# Patient Record
Sex: FEMALE | Race: AMERICAN INDIAN OR ALASKA NATIVE | Employment: OTHER | URBAN - METROPOLITAN AREA
[De-identification: names, ages, dates, MRNs, and addresses within clinical notes are randomized per-mention and may not be internally consistent; named-entity substitution may affect disease eponyms.]

---

## 2017-01-12 ENCOUNTER — TELEPHONE (OUTPATIENT)
Dept: GASTROENTEROLOGY | Facility: OUTPATIENT CENTER | Age: 57
End: 2017-01-12

## 2017-01-16 ENCOUNTER — TELEPHONE (OUTPATIENT)
Dept: GASTROENTEROLOGY | Facility: OUTPATIENT CENTER | Age: 57
End: 2017-01-16

## 2017-01-18 DIAGNOSIS — L20.84 INTRINSIC ECZEMA: Primary | ICD-10-CM

## 2017-01-18 RX ORDER — TRIAMCINOLONE ACETONIDE 1 MG/G
CREAM TOPICAL 2 TIMES DAILY
Qty: 15 G | Refills: 0 | Status: SHIPPED | OUTPATIENT
Start: 2017-01-18 | End: 2019-06-13

## 2017-01-18 NOTE — TELEPHONE ENCOUNTER
Triamcinolone 0.1% Cream      Last Written Prescription Date: 01/26/16  Last Fill Quantity: 15,  # refills: 0   Last Office Visit with [facility], [facility] or [facility] prescribing provider: 12/06/16    Thank You  Mahogany Richard CPhT, [facility] Technician  [phone]  [email or web address]

## 2017-02-23 DIAGNOSIS — F41.9 ANXIETY: ICD-10-CM

## 2017-02-23 RX ORDER — GABAPENTIN 600 MG/1
600 TABLET ORAL 3 TIMES DAILY
Qty: 90 TABLET | Refills: 0 | Status: SHIPPED | OUTPATIENT
Start: 2017-02-23 | End: 2017-03-24

## 2017-02-23 NOTE — TELEPHONE ENCOUNTER
"[facility] phone call message- patient requesting a refill:    Full Medication Name: patient did not have name,or know the name of her medication, she said it was her \"anxiety medication\". Would like medication refill sent to [facility] as she \"is busy tomorrow and we are closed Saturday and Sunday\". Patient states that her \"anxiety is starting to get high\".Forwarded message to triage.        Pharmacy confirmed as   [id number] - [place], [place] - [address]  [address]  [facility] [address]  Phone: [phone] Fax: [phone]    [facility] [place], [place] - [address]  [facility] [address]  Phone: [phone] Fax: [phone]    [facility] [address] [place] [address] AT [facility] & [address] [place] [address]  Phone: [phone] Fax: [phone]  : [facility]    Additional Comments: see above     OK to leave a message on voice mail? Yes    Primary language: English      needed? No    Call taken on February 23, 2017 at 2:38 PM by Angie German  "

## 2017-03-24 ENCOUNTER — TELEPHONE (OUTPATIENT)
Dept: FAMILY MEDICINE | Facility: CLINIC | Age: 57
End: 2017-03-24

## 2017-03-24 DIAGNOSIS — F41.9 ANXIETY: ICD-10-CM

## 2017-03-24 RX ORDER — GABAPENTIN 600 MG/1
600 TABLET ORAL 3 TIMES DAILY
Qty: 90 TABLET | Refills: 0 | Status: SHIPPED | OUTPATIENT
Start: 2017-03-24 | End: 2017-03-28

## 2017-03-24 NOTE — TELEPHONE ENCOUNTER
[facility] phone call message- medication clarification/question:    Full Medication Name: gabapentin (NEURONTIN) 600 MG tablet   Dose: Sig: Take 1 tablet (600 mg) by mouth 3 times daily    Question: Patient requesting nurse call to discuss reason prescription was a 30 day supply.  Please call to discuss.    Pharmacy confirmed as    [facility] [place], [place] - [address]: Yes    OK to leave a message on voice mail? Yes    Primary language: English      needed? No    Call taken on March 24, 2017 at 1:53 PM by Elena Lawrence

## 2017-03-24 NOTE — TELEPHONE ENCOUNTER
Gabapentin 600mg Tablets      Last Written Prescription Date: 02/23/17  Last Fill Quantity: 90,  # refills: 0   Last Office Visit with [facility], [facility] or [facility] prescribing provider: 12/06/16    Thank You  Mahogany Richard CPhT, [facility] Technician  [phone]  [email or web address]

## 2017-03-27 NOTE — TELEPHONE ENCOUNTER
"RN called patient and relayed message below. Verbalized understanding. Scheduled appointment today at 3 pm. Patient states she will \"try her best to come to the appointment. But people don't understanding what anxiety is.\" Unable to understanding patient. RN requesting patient to repeat as she was not able to understanding the patient. RN reinforced importance of doing her best to come to the appointment if she feels she is going through Gabapentin withdrawal as patient previously stated. Patient scheduled. Patient hung up the phone.    Mariel Blunt RN  "

## 2017-03-28 ENCOUNTER — DOCUMENTATION ONLY (OUTPATIENT)
Dept: FAMILY MEDICINE | Facility: CLINIC | Age: 57
End: 2017-03-28

## 2017-03-28 ENCOUNTER — OFFICE VISIT (OUTPATIENT)
Dept: FAMILY MEDICINE | Facility: CLINIC | Age: 57
End: 2017-03-28

## 2017-03-28 VITALS
RESPIRATION RATE: 16 BRPM | SYSTOLIC BLOOD PRESSURE: 118 MMHG | DIASTOLIC BLOOD PRESSURE: 86 MMHG | TEMPERATURE: 98.2 F | OXYGEN SATURATION: 100 % | BODY MASS INDEX: 31.93 KG/M2 | HEART RATE: 103 BPM | WEIGHT: 190.4 LBS

## 2017-03-28 DIAGNOSIS — F41.9 ANXIETY: ICD-10-CM

## 2017-03-28 RX ORDER — GABAPENTIN 600 MG/1
600 TABLET ORAL 3 TIMES DAILY
Qty: 90 TABLET | Refills: 3 | Status: SHIPPED | OUTPATIENT
Start: 2017-03-28 | End: 2021-04-19

## 2017-03-28 ASSESSMENT — ANXIETY QUESTIONNAIRES
7. FEELING AFRAID AS IF SOMETHING AWFUL MIGHT HAPPEN: NEARLY EVERY DAY
6. BECOMING EASILY ANNOYED OR IRRITABLE: MORE THAN HALF THE DAYS
IF YOU CHECKED OFF ANY PROBLEMS ON THIS QUESTIONNAIRE, HOW DIFFICULT HAVE THESE PROBLEMS MADE IT FOR YOU TO DO YOUR WORK, TAKE CARE OF THINGS AT HOME, OR GET ALONG WITH OTHER PEOPLE: SOMEWHAT DIFFICULT
2. NOT BEING ABLE TO STOP OR CONTROL WORRYING: NEARLY EVERY DAY
3. WORRYING TOO MUCH ABOUT DIFFERENT THINGS: NEARLY EVERY DAY
5. BEING SO RESTLESS THAT IT IS HARD TO SIT STILL: NOT AT ALL
1. FEELING NERVOUS, ANXIOUS, OR ON EDGE: NEARLY EVERY DAY
GAD7 TOTAL SCORE: 15

## 2017-03-28 ASSESSMENT — ENCOUNTER SYMPTOMS
RESPIRATORY NEGATIVE: 1
DYSPHORIC MOOD: 1
CONSTITUTIONAL NEGATIVE: 1
CARDIOVASCULAR NEGATIVE: 1
NERVOUS/ANXIOUS: 1

## 2017-03-28 ASSESSMENT — PATIENT HEALTH QUESTIONNAIRE - PHQ9: 5. POOR APPETITE OR OVEREATING: SEVERAL DAYS

## 2017-03-28 NOTE — PROGRESS NOTES
"      HPI:       Amy Choudhury is a 56 year old who presents for the following  Patient presents with:  Medication Request: discuss medication      Patient reports things as \"not going well\".  She feels like things have went \"backwards\".     Has not had any Gabapentin since last Thursday - feels like she is in withdrawal.  Worsening anxiety, difficulty with breathing.      Patient comments that she is a person that is in charge and takes care of everything and anxiety really affects this.     Not currently working.  Feels as though she won't be able to work while being on Gabapentin. Feels like she isn't able to interview well due to \"rambling\" and \"changing of subjects\".     Worsening of anxiety since death of daughter ( from medial complications).      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     No regular counseling.  Previously saw Dr. Arriaga.      Walked to appointment today.  Difficulty with getting out of her home, frequent no shows.     Problem, Medication and Allergy Lists were reviewed and are current.  Patient is an established patient of this clinic.         Review of Systems:   Review of Systems   Constitutional: Negative.    Respiratory: Negative.    Cardiovascular: Negative.    Hematological:        See HPI   Psychiatric/Behavioral: Positive for dysphoric mood. The patient is nervous/anxious.              Physical Exam:   Patient Vitals for the past 24 hrs:   BP Temp Temp src Pulse Resp SpO2 Weight   17 1330 118/86 98.2  F (36.8  C) Oral 103 16 100 % 190 lb 6.4 oz (86.4 kg)     Body mass index is 31.93 kg/(m^2).  Vitals were reviewed and were normal     Physical Exam   Constitutional: She is oriented to person, place, and time. She appears well-nourished. No distress.   Neurological: She is alert and oriented to person, place, and time.   Psychiatric: Thought content normal. Her mood appears anxious. Her speech is rapid and/or " pressured. Cognition and memory are normal.   Patient is tearful during interaction       Assessment and Plan     Amy was seen today for medication request.    Diagnoses and all orders for this visit:    Anxiety  -     gabapentin (NEURONTIN) 600 MG tablet; Take 1 tablet (600 mg) by mouth 3 times daily  -     Encouraged return to regular psychotherapy.    -     Dr. Arriaga met with patient.   -     Follow-up in 1 month for recheck of medication, sooner as needed.       Options for treatment and follow-up care were reviewed with the patient. Amy Choudhury  engaged in the decision making process and verbalized understanding of the options discussed and agreed with the final plan.    Huma Ray, JAVIER CNP

## 2017-03-28 NOTE — PROGRESS NOTES
Met with patient today regarding no shows. Patient states that she will call   If she is not able to make it .  Contract signed and scanned. Copy given to Dr. Castellanos.

## 2017-03-28 NOTE — MR AVS SNAPSHOT
After Visit Summary   3/28/2017    Amy Choudhury    MRN: [id number]           Patient Information     Date Of Birth          1960        Visit Information        Provider Department      3/28/2017 1:20 PM Huma Ray APRN CNP [facility]        Today's Diagnoses     Anxiety           Follow-ups after your visit        Your next 10 appointments already scheduled     2017  9:00 AM CDT   Return Visit with Marta Arriaga PhD   [facility] ([facility])    [address],  [address]  [place] [address]   [phone]              Who to contact     Please call your clinic at [phone] to:    Ask questions about your health    Make or cancel appointments    Discuss your medicines    Learn about your test results    Speak to your doctor   If you have compliments or concerns about an experience at your clinic, or if you wish to file a complaint, please contact [facility] Patient Relations at [phone] or email us at [email or web address]         Additional Information About Your Visit        MyChart Information     [employer] is an electronic gateway that provides easy, online access to your medical records. With [employer], you can request a clinic appointment, read your test results, renew a prescription or communicate with your care team.     To sign up for [employer] visit the website at [email or web address]   You will be asked to enter the access code listed below, as well as some personal information. Please follow the directions to create your username and password.     Your access code is: [email or web address]  Expires: 2017  4:52 PM     Your access code will  in 90 days. If you need help or a new code, please contact your [facility] or call [phone] for assistance.        Care EveryWhere ID     This is your Care EveryWhere ID. This could be  used by other organizations to access your [facility] medical records  [id number]        Your Vitals Were     Pulse Temperature Respirations Pulse Oximetry BMI (Body Mass Index)       103 98.2  F (36.8  C) (Oral) 16 100% 31.93 kg/m2        Blood Pressure from Last 3 Encounters:   03/28/17 118/86   12/06/16 122/84   12/02/16 137/80    Weight from Last 3 Encounters:   03/28/17 190 lb 6.4 oz (86.4 kg)   12/06/16 205 lb (93 kg)   12/02/16 206 lb 6.4 oz (93.6 kg)              Today, you had the following     No orders found for display         Where to get your medicines      These medications were sent to [facility] [place], [place] - [address] [facility] [address]     Phone:  [phone]     gabapentin 600 MG tablet          Primary Care Provider Office Phone # Fax #    Randal Castellanos [phone]       [facility] [address] [address]        Thank you!     Thank you for choosing hospitals [facility]  for your care. Our goal is always to provide you with excellent care. Hearing back from our patients is one way we can continue to improve our services. Please take a few minutes to complete the written survey that you may receive in the mail after your visit with us. Thank you!             Your Updated Medication List - Protect others around you: Learn how to safely use, store and throw away your medicines at www.disposemymeds.org.          This list is accurate as of: 3/28/17  4:52 PM.  Always use your most recent med list.                   Brand Name Dispense Instructions for use    acetaminophen 500 MG tablet    TYLENOL    100 tablet    Take 2 tablets (1,000 mg) by mouth every 8 hours as needed for mild pain       calcium carb 1250 mg (500 mg [place])/vitamin D 200 units 500-200 MG-UNIT per tablet    OSCAL with D    90 tablet    Take 1 tablet by mouth 2 times daily (with meals)       calcium carbonate 500 MG tablet     OS-CRISTIAN 500 mg [place]. Ca    90 tablet    Take 1 tablet (500 mg) by mouth 3 times daily       cholecalciferol 2000 UNITS tablet     90 tablet    Take 1 tablet by mouth daily       cyanocobalamin 1000 MCG/ML injection    VITAMIN B12    0.9 mL    Inject 1 mL (1,000 mcg) into the muscle every 30 days       gabapentin 600 MG tablet    NEURONTIN    90 tablet    Take 1 tablet (600 mg) by mouth 3 times daily       ibuprofen 800 MG tablet    ADVIL/MOTRIN    90 tablet    Take 1 tablet (800 mg) by mouth every 8 hours as needed for moderate pain       MULTIVITAMIN ADULT PO      Take 1 tablet by mouth daily       OMEGA-3 FISH OIL PO          permethrin 1 % Liqd     60 mL    Apply to clean, towel-dried hair, saturate hair and scalp, wash off after 10 min. May repeat in one week if needed.       PROTONIX PO      Take 40 mg by mouth daily as needed       triamcinolone 0.1 % cream    KENALOG    15 g    Apply topically 2 times daily       vitamin B complex with vitamin C Tabs tablet     100 tablet    Take 1 tablet by mouth daily

## 2017-03-28 NOTE — PROGRESS NOTES
"Primary Care Behavioral Health Consult Note    Meeting was: unscheduled  Others present: None  Meeting lasted: 40 minutes    Identifying Information and Presenting Problem:    ZAIDA Ray requested behavioral health consultation for this patient to check-in on functioning.  The patient is a 56 year old American female and agreed to be seen by behavioral health today.    Topics Discussed/Interventions Provided:       Amy presented today for a refill of gabapentin for anxiety. She continues to be concerned that she is \"addicted\" to the medication. Discussed difference between physiological dependence and addiction. She does not like how gabapentin impairs her attention and memory, and believes it negatively impacts her eye sight. Encouraged discussion with PCP or assessment with psychiatry if she is concerned about the medications unwanted effects. She declined at this point.    Amy continues to have a complicated and stressful home environment. Discussed difficulty of setting boundaries with 3 daughters who are now using drugs. Provided supportive listening and affirmation about healthy choices.      Assessment:     Mental Status: Amy Choudhury appeared generally alert and oriented. Dress was casual and appropriate to the weather and occasion. Grooming and hygiene were adequate. Eye contact was intermittent. Speech was of normal volume and rate and was clear, coherent, and relevant. Mood was anxious with restricted, tearful affect. Thought processes were tangential and rambling - consistent with her prior presentation. She responded adequately to redirection. Thought content was WNL with no evidence of psychotic or paranoid features. No evidence of SI/HI or self-harm, intent, or plans. Memory appeared grossly intact. Insight and judgment appeared adequate for safety and patient exhibited adequate impulse control during the appointment.    Does the patient appear to be at imminent risk of harm to self/others at " this time? No      PHQ-9 SCORE 5/11/2016 12/2/2016 3/28/2017   Total Score - - -   Total Score 18 14 18       KACY-7 SCORE 12/2/2016 12/9/2016 3/28/2017   Total Score - - -   Total Score 16 12 15       Diagnoses (PROVISIONAL):      Adjustment Disorder    Plan:      Recommended appointment with Behavioral health for continued counseling

## 2017-03-29 ASSESSMENT — PATIENT HEALTH QUESTIONNAIRE - PHQ9: SUM OF ALL RESPONSES TO PHQ QUESTIONS 1-9: 18

## 2017-03-29 ASSESSMENT — ANXIETY QUESTIONNAIRES: GAD7 TOTAL SCORE: 15

## 2017-03-29 NOTE — PROGRESS NOTES
Preceptor Attestation:  I have reviewed and agree with the behavioral health fellow's documentation for this visit.  I did not see the patient.  Supervising Clinical Psychologist:  Claudia Silverman PSYD LP

## 2017-04-04 ENCOUNTER — TELEPHONE (OUTPATIENT)
Dept: FAMILY MEDICINE | Facility: CLINIC | Age: 57
End: 2017-04-04

## 2017-04-04 NOTE — TELEPHONE ENCOUNTER
This provider placed an outreach call to the patient as she did not show for her scheduled appointment today 4/4/2017. Left a message requesting the patient to call the clinic to discuss missed appointment and plan for ongoing mental health therapy.    Marta Arriaga, PhD

## 2017-04-29 ENCOUNTER — HOSPITAL ENCOUNTER (EMERGENCY)
Facility: CLINIC | Age: 57
Discharge: HOME | End: 2017-04-30
Attending: EMERGENCY MEDICINE | Admitting: EMERGENCY MEDICINE
Payer: COMMERCIAL

## 2017-04-29 DIAGNOSIS — H69.92 DYSFUNCTION OF EUSTACHIAN TUBE, LEFT: ICD-10-CM

## 2017-04-29 PROCEDURE — 99283 EMERGENCY DEPT VISIT LOW MDM: CPT | Performed by: EMERGENCY MEDICINE

## 2017-04-29 PROCEDURE — 99284 EMERGENCY DEPT VISIT MOD MDM: CPT | Mod: Z6 | Performed by: EMERGENCY MEDICINE

## 2017-04-29 NOTE — ED AVS SNAPSHOT
[facility] [place], Emergency Department    [address]    [facility] [address]    Phone:  [phone]    Fax:  [phone]                                       Amy Choudhury   MRN: [id number]    Department:  [facility], Emergency Department   Date of Visit:  4/29/2017           After Visit Summary Signature Page     I have received my discharge instructions, and my questions have been answered. I have discussed any challenges I see with this plan with the nurse or doctor.    ..........................................................................................................................................  Patient/Patient Representative Signature      ..........................................................................................................................................  Patient Representative Print Name and Relationship to Patient    ..................................................               ................................................  Date                                            Time    ..........................................................................................................................................  Reviewed by Signature/Title    ...................................................              ..............................................  Date                                                            Time

## 2017-04-29 NOTE — ED AVS SNAPSHOT
[facility], Emergency Department    [address]    [place] [address]    Phone:  [phone]    Fax:  [phone]                                       Amy Chouduhry   MRN: [id number]    Department:  [facility], Emergency Department   Date of Visit:  4/29/2017           Patient Information     Date Of Birth          1960        Your diagnoses for this visit were:     Dysfunction of eustachian tube, left        You were seen by Adelita Zamudio MD.        Discharge Instructions       Use Sudafed for the next few days, and tylenol and/or ibuprofen as needed. You can also try Afrin nasal spray (over the counter). Follow up with your clinic doctor in 3-5 days if not improving, and return to the ER with any worsening or concerns.    Discharge References/Attachments     EAR, ANATOMY OF THE (ENGLISH)      24 Hour Appointment Hotline       To make an appointment at any [facility] clinic, call [phone] ([phone]). If you don't have a family doctor or clinic, we will help you find one. [facility] clinics are conveniently located to serve the needs of you and your family.             Review of your medicines      START taking        Dose / Directions Last dose taken    pseudoePHEDrine 30 MG tablet   Commonly known as:  SUDAFED   Dose:  60 mg   Quantity:  30 tablet        Take 2 tablets (60 mg) by mouth every 6 hours as needed for congestion   Refills:  0          CONTINUE these medicines which may have CHANGED, or have new prescriptions. If we are uncertain of the size of tablets/capsules you have at home, strength may be listed as something that might have changed.        Dose / Directions Last dose taken    * acetaminophen 500 MG tablet   Commonly known as:  TYLENOL   Dose:  1000 mg   What changed:  Another medication with the same name was added. Make sure you understand how and when to take each.   Quantity:  100 tablet        Take 2 tablets (1,000 mg) by mouth every 8 hours as needed  for mild pain   Refills:  0        * acetaminophen 500 MG tablet   Commonly known as:  TYLENOL   Dose:  1000 mg   What changed:  You were already taking a medication with the same name, and this prescription was added. Make sure you understand how and when to take each.   Quantity:  60 tablet        Take 2 tablets (1,000 mg) by mouth every 6 hours as needed for pain or fever   Refills:  0        * Notice:  This list has 2 medication(s) that are the same as other medications prescribed for you. Read the directions carefully, and ask your doctor or other care provider to review them with you.      Our records show that you are taking the medicines listed below. If these are incorrect, please call your family doctor or clinic.        Dose / Directions Last dose taken    calcium carb 1250 mg (500 mg [place])/vitamin D 200 units 500-200 MG-UNIT per tablet   Commonly known as:  OSCAL with D   Dose:  1 tablet   Quantity:  90 tablet        Take 1 tablet by mouth 2 times daily (with meals)   Refills:  3        calcium carbonate 500 MG tablet   Commonly known as:  OS-CRISTIAN 500 mg [place]. Ca   Dose:  500 mg   Quantity:  90 tablet        Take 1 tablet (500 mg) by mouth 3 times daily   Refills:  3        cholecalciferol 2000 UNITS tablet   Dose:  1 tablet   Quantity:  90 tablet        Take 1 tablet by mouth daily   Refills:  3        cyanocobalamin 1000 MCG/ML injection   Commonly known as:  VITAMIN B12   Dose:  1 mL   Quantity:  0.9 mL        Inject 1 mL (1,000 mcg) into the muscle every 30 days   Refills:  6        gabapentin 600 MG tablet   Commonly known as:  NEURONTIN   Dose:  600 mg   Quantity:  90 tablet        Take 1 tablet (600 mg) by mouth 3 times daily   Refills:  3        ibuprofen 800 MG tablet   Commonly known as:  ADVIL/MOTRIN   Dose:  800 mg   Quantity:  90 tablet        Take 1 tablet (800 mg) by mouth every 8 hours as needed for moderate pain   Refills:  1        MULTIVITAMIN ADULT PO   Dose:  1 tablet        Take 1  "tablet by mouth daily   Refills:  0        OMEGA-3 FISH OIL PO        Refills:  0        permethrin 1 % Liqd   Quantity:  60 mL        Apply to clean, towel-dried hair, saturate hair and scalp, wash off after 10 min. May repeat in one week if needed.   Refills:  1        PROTONIX PO   Dose:  40 mg        Take 40 mg by mouth daily as needed   Refills:  0        triamcinolone 0.1 % cream   Commonly known as:  KENALOG   Quantity:  15 g        Apply topically 2 times daily   Refills:  0        vitamin B complex with vitamin C Tabs tablet   Dose:  1 tablet   Quantity:  100 tablet        Take 1 tablet by mouth daily   Refills:  6                Prescriptions were sent or printed at these locations (2 Prescriptions)                   Other Prescriptions                Printed at Department/Unit printer (2 of 2)         pseudoePHEDrine (SUDAFED) 30 MG tablet               acetaminophen (TYLENOL) 500 MG tablet                Procedures and tests performed during your visit     Beta strep group A culture    Rapid strep screen      Orders Needing Specimen Collection     None      Pending Results     Date and Time Order Name Status Description    4/30/2017 0022 Beta strep group A culture In process             Pending Culture Results     Date and Time Order Name Status Description    4/30/2017 0022 Beta strep group A culture In process             Thank you for choosing [facility]       Thank you for choosing [facility] for your care. Our goal is always to provide you with excellent care. Hearing back from our patients is one way we can continue to improve our services. Please take a few minutes to complete the written survey that you may receive in the mail after you visit with us. Thank you!        [employer] Information     [employer] lets you send messages to your doctor, view your test results, renew your prescriptions, schedule appointments and more. To sign up, go to [email or web address] . Click on \"Log in\" on the left side of " "the screen, which will take you to the Welcome page. Then click on \"Sign up Now\" on the right side of the page.     You will be asked to enter the access code listed below, as well as some personal information. Please follow the directions to create your username and password.     Your access code is: [email or web address]  Expires: 2017  4:52 PM     Your access code will  in 90 days. If you need help or a new code, please call your [facility] clinic or [phone].        Care EveryWhere ID     This is your Care EveryWhere ID. This could be used by other organizations to access your [facility] medical records  [id number]        After Visit Summary       This is your record. Keep this with you and show to your community pharmacist(s) and doctor(s) at your next visit.                  "

## 2017-04-30 VITALS
SYSTOLIC BLOOD PRESSURE: 131 MMHG | DIASTOLIC BLOOD PRESSURE: 85 MMHG | TEMPERATURE: 98.4 F | OXYGEN SATURATION: 98 % | RESPIRATION RATE: 18 BRPM | HEART RATE: 90 BPM

## 2017-04-30 LAB
DEPRECATED S PYO AG THROAT QL EIA: NORMAL
MICRO REPORT STATUS: NORMAL
SPECIMEN SOURCE: NORMAL

## 2017-04-30 PROCEDURE — 87081 CULTURE SCREEN ONLY: CPT | Performed by: EMERGENCY MEDICINE

## 2017-04-30 PROCEDURE — 87880 STREP A ASSAY W/OPTIC: CPT | Performed by: EMERGENCY MEDICINE

## 2017-04-30 RX ORDER — PSEUDOEPHEDRINE HCL 30 MG
60 TABLET ORAL EVERY 6 HOURS PRN
Qty: 30 TABLET | Refills: 0 | Status: SHIPPED | OUTPATIENT
Start: 2017-04-30 | End: 2019-06-13

## 2017-04-30 RX ORDER — ACETAMINOPHEN 500 MG
1000 TABLET ORAL EVERY 6 HOURS PRN
Qty: 60 TABLET | Refills: 0 | Status: SHIPPED | OUTPATIENT
Start: 2017-04-30 | End: 2017-05-07

## 2017-04-30 ASSESSMENT — ENCOUNTER SYMPTOMS
COUGH: 1
RHINORRHEA: 1
HEADACHES: 0
MYALGIAS: 0
SORE THROAT: 1
FEVER: 0

## 2017-04-30 NOTE — DISCHARGE INSTRUCTIONS
Use Sudafed for the next few days, and tylenol and/or ibuprofen as needed. You can also try Afrin nasal spray (over the counter). Follow up with your clinic doctor in 3-5 days if not improving, and return to the ER with any worsening or concerns.

## 2017-04-30 NOTE — ED NOTES
Pt reports started with left ear ache day before yesterday and progressively down in her throat. Ibuprofen not helping her.

## 2017-04-30 NOTE — ED PROVIDER NOTES
History     Chief Complaint   Patient presents with     ear ache and swollen glands     Pt reports started with left ear ache day before yesterday and progressively down in her throat. Ibuprofen not helping her.      HPI  Amy Choudhury is a 57 year old female with a history of GERD and hyperparathyroidism who presents with left ear pain and left-sided sore throat. She complains of difficulty swallowing due to the pain -- notes that her throat/ear are mainly painful when she swallows. She denies fever, dental pain, myalgias or headache. She complains of congestion, runny nose and cough since last night as well. She has been taking ibuprofen with minimal relief. Reports recent sick contact with a neighbor who had strep.    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     Depressive disorder      Gastro-oesophageal reflux disease     bleeding ulcers     Hiatal hernia     NEWLY DIAGNOSISED     Hyperparathyroidism, unspecified (H) 3/29/2012     Peptic ulcer, unspecified site, unspecified as acute or chronic, without mention of hemorrhage or perforation      Pneumonia     NOVEMBER     PONV (postoperative nausea and vomiting)      TMJ (temporomandibular joint syndrome) 5/8/2014     Vitamin D deficiency 6/7/2012       Past Surgical History:   Procedure Laterality Date     APPENDECTOMY       CHOLECYSTECTOMY       DILATION AND CURETTAGE, OPERATIVE HYSTEROSCOPY WITH MORCELLATOR, COMBINED  11/21/2012    Procedure: COMBINED DILATION AND CURETTAGE, OPERATIVE HYSTEROSCOPY WITH MORCELLATOR;  Hysteroscopy, Polypectomy, Dilation and Curettage  ;  Surgeon: Marta Montejo MD;  Location: [facility]     GI SURGERY      gastric bypass at age 31     ORTHOPEDIC SURGERY      back surgery at age 31     PARATHYROIDECTOMY Right 12/14/2015    Procedure: PARATHYROIDECTOMY;  Surgeon: Gregory Coleman MD;   Location: [facility]     CA MARSUP BARTHOLIN GLAND CYST       SPINE SURGERY         Family History   Problem Relation Age of Onset     Thyroid Disease Mother      Breast Cancer Mother 57      65 y.o.     Other - See Comments Mother      cystic acne     DIABETES Daughter      Type 1; insulin     CANCER Brother        Social History   Substance Use Topics     Smoking status: Current Every Day Smoker     Packs/day: 0.50     Years: 30.00     Types: Cigarettes     Smokeless tobacco: Never Used     Alcohol use Yes      Comment: occasionally     No current facility-administered medications for this encounter.      Current Outpatient Prescriptions   Medication     pseudoePHEDrine (SUDAFED) 30 MG tablet     acetaminophen (TYLENOL) 500 MG tablet     ibuprofen (ADVIL/MOTRIN) 800 MG tablet     calcium carb 1250 mg, 500 mg [place],/vitamin D 200 units (OSCAL WITH D) 500-200 MG-UNIT per tablet     gabapentin (NEURONTIN) 600 MG tablet     triamcinolone (KENALOG) 0.1 % cream     Multiple Vitamins-Minerals (MULTIVITAMIN ADULT PO)     calcium carbonate (OS-CRISTIAN 500 MG [place]. [place]) 500 MG tablet     Pantoprazole Sodium (PROTONIX PO)     Omega-3 Fatty Acids (OMEGA-3 FISH OIL PO)     permethrin 1 % LIQD     acetaminophen (TYLENOL) 500 MG tablet     cyanocobalamin (VITAMIN B12) 1000 MCG/ML injection     vitamin  B complex with vitamin C (VITAMIN  B COMPLEX) TABS     cholecalciferol 2000 UNITS tablet        Allergies   Allergen Reactions     Codeine Sulfate GI Disturbance      I have reviewed the Medications, Allergies, Past Medical and Surgical History, and Social History in the Epic system.    Review of Systems   Constitutional: Negative for fever.   HENT: Positive for congestion, ear pain (left), rhinorrhea and sore throat (left-sided). Negative for dental problem.    Respiratory: Positive for cough.    Musculoskeletal: Negative for myalgias.   Neurological: Negative for headaches.   All other systems reviewed and are negative.      Physical  Exam   BP: 134/84  Pulse: 90  Temp: 98.4  F (36.9  C)  Resp: 18  SpO2: 97 %  Physical Exam   Constitutional: No distress.   HENT:   Head: Atraumatic.   Mouth/Throat: No oropharyngeal exudate.       Mild pharyngeal erythema. No trismus or masses. TMs clear   Eyes: Pupils are equal, round, and reactive to light. No scleral icterus.   Cardiovascular: Normal heart sounds and intact distal pulses.    Pulmonary/Chest: Breath sounds normal. No respiratory distress.   Abdominal: Soft. There is no tenderness.   Musculoskeletal: She exhibits no edema or tenderness.   Skin: Skin is warm. No rash noted. She is not diaphoretic.       ED Course     ED Course     Procedures     12:09 AM  The patient was seen and examined by Dr. Zamudio in Room 7.               Critical Care time:  none               Labs Ordered and Resulted from Time of ED Arrival Up to the Time of Departure from the ED   RAPID STREP SCREEN   BETA STREP GROUP A CULTURE            Assessments & Plan (with Medical Decision Making)   Patient does have some very mild pharyngeal erythema, though given the pain with swallowing, I did opt to do a rapid strep, which was negative. She has been afebrile, does not have headache or body aches. I don t think this is the flu. She does have a notable dental carry, but has no tenderness with percussion of any of her teeth. No swelling of the face, no tenderness to palpation of the face at all. TMs are clear. I do think this is likely eustachian tube dysfunction. She will be given prescription for Sudafed and Tylenol, and instructed she may use Afrin as an alternative if the Sudafed is not helping. She verbalized understanding, and is agreeable to plan. She was instructed to follow up with primary care in a few days if symptoms are not improving, return to the ER with any worsening or concerns.     I have reviewed the nursing notes.    I have reviewed the findings, diagnosis, plan and need for follow up with the  patient.    Discharge Medication List as of 4/30/2017  1:13 AM      START taking these medications    Details   pseudoePHEDrine (SUDAFED) 30 MG tablet Take 2 tablets (60 mg) by mouth every 6 hours as needed for congestion, Disp-30 tablet, R-0, Local Print      !! acetaminophen (TYLENOL) 500 MG tablet Take 2 tablets (1,000 mg) by mouth every 6 hours as needed for pain or fever, Disp-60 tablet, R-0, Local Print       !! - Potential duplicate medications found. Please discuss with provider.          Final diagnoses:   Dysfunction of eustachian tube, left     IJory, am serving as a trained medical scribe to document services personally performed by Adelita Zamudio MD, based on the provider's statements to me.   IAdelita MD, was physically present and have reviewed and verified the accuracy of this note documented by Jory Hernandez.     4/29/2017   [facility], EMERGENCY DEPARTMENT     Adelita Zamudio MD  04/30/17 0203

## 2017-05-02 LAB
BACTERIA SPEC CULT: NORMAL
MICRO REPORT STATUS: NORMAL
SPECIMEN SOURCE: NORMAL

## 2017-05-24 ENCOUNTER — TELEPHONE (OUTPATIENT)
Dept: FAMILY MEDICINE | Facility: CLINIC | Age: 57
End: 2017-05-24

## 2017-05-24 DIAGNOSIS — M54.50 CHRONIC LOW BACK PAIN WITHOUT SCIATICA, UNSPECIFIED BACK PAIN LATERALITY: ICD-10-CM

## 2017-05-24 DIAGNOSIS — G89.29 CHRONIC LOW BACK PAIN WITHOUT SCIATICA, UNSPECIFIED BACK PAIN LATERALITY: ICD-10-CM

## 2017-05-24 RX ORDER — IBUPROFEN 800 MG/1
800 TABLET, FILM COATED ORAL EVERY 8 HOURS PRN
Qty: 90 TABLET | Refills: 1 | Status: SHIPPED | OUTPATIENT
Start: 2017-05-24 | End: 2019-06-13

## 2017-05-24 NOTE — TELEPHONE ENCOUNTER
PRIOR AUTHORIZATION FOR   Drug: Gabapentin 600mg Tablets  Insurance: [facility]  ID Number: [id number]  BIN Number: [id number]  PCN Number: [place]  Group Number: [id number]  Phone Number: [phone]    Please notify pharmacy if Prior Auth is approved or denied    Thank You  Mahogany Richard CPhT, [place] [facility] Technician  [phone]  [email or web address]

## 2017-05-24 NOTE — TELEPHONE ENCOUNTER
Ibuprofen 800mg Tablets      Last Written Prescription Date: 12/07/16  Last Fill Quantity: 90,  # refills: 1   Last Office Visit with [facility], [facility] or [facility] prescribing provider: 03/28/17    Thank You  Mahogany Richard CPhT, [place] [facility] Technician  [phone]  [email or web address]

## 2017-05-26 NOTE — TELEPHONE ENCOUNTER
Jennifer from [facility] is calling to let PCP know that if the prescription is changed from tablets to capsules, the medication would be covered.    Erendira Ramesh, CMA

## 2017-06-03 ENCOUNTER — HEALTH MAINTENANCE LETTER (OUTPATIENT)
Age: 57
End: 2017-06-03

## 2018-06-01 ENCOUNTER — TELEPHONE (OUTPATIENT)
Dept: FAMILY MEDICINE | Facility: CLINIC | Age: 58
End: 2018-06-01

## 2018-06-01 NOTE — TELEPHONE ENCOUNTER
She has not been seen in clinic since March 2017, I personally haven't seen her since 2016.  I have very little to say other than she did not follow up as requested has anxiety. To complete a letter I would need to see her.  Sajan Castellanos

## 2019-06-13 ENCOUNTER — OFFICE VISIT (OUTPATIENT)
Dept: FAMILY MEDICINE | Facility: CLINIC | Age: 59
End: 2019-06-13
Payer: COMMERCIAL

## 2019-06-13 VITALS
RESPIRATION RATE: 18 BRPM | HEIGHT: 65 IN | SYSTOLIC BLOOD PRESSURE: 126 MMHG | BODY MASS INDEX: 36.65 KG/M2 | TEMPERATURE: 98.1 F | DIASTOLIC BLOOD PRESSURE: 81 MMHG | WEIGHT: 220 LBS | HEART RATE: 92 BPM | OXYGEN SATURATION: 98 %

## 2019-06-13 DIAGNOSIS — Z90.89 S/P PARATHYROIDECTOMY: ICD-10-CM

## 2019-06-13 DIAGNOSIS — Z13.9 SCREENING FOR CONDITION: ICD-10-CM

## 2019-06-13 DIAGNOSIS — M54.50 CHRONIC LOW BACK PAIN WITHOUT SCIATICA, UNSPECIFIED BACK PAIN LATERALITY: ICD-10-CM

## 2019-06-13 DIAGNOSIS — Z98.84 STATUS POST GASTRIC BYPASS FOR OBESITY: ICD-10-CM

## 2019-06-13 DIAGNOSIS — F41.9 ANXIETY: Primary | ICD-10-CM

## 2019-06-13 DIAGNOSIS — G89.29 CHRONIC LOW BACK PAIN WITHOUT SCIATICA, UNSPECIFIED BACK PAIN LATERALITY: ICD-10-CM

## 2019-06-13 DIAGNOSIS — E53.8 VITAMIN B12 DEFICIENCY (NON ANEMIC): ICD-10-CM

## 2019-06-13 DIAGNOSIS — Z98.890 S/P PARATHYROIDECTOMY: ICD-10-CM

## 2019-06-13 DIAGNOSIS — L20.84 INTRINSIC ECZEMA: ICD-10-CM

## 2019-06-13 LAB — TSH SERPL DL<=0.005 MIU/L-ACNC: 1.84 MU/L (ref 0.4–4)

## 2019-06-13 RX ORDER — TRIAMCINOLONE ACETONIDE 1 MG/G
CREAM TOPICAL 2 TIMES DAILY
Qty: 15 G | Refills: 0 | Status: SHIPPED | OUTPATIENT
Start: 2019-06-13 | End: 2019-06-13

## 2019-06-13 RX ORDER — TRIAMCINOLONE ACETONIDE 1 MG/G
CREAM TOPICAL 2 TIMES DAILY
Qty: 15 G | Refills: 0 | Status: SHIPPED | OUTPATIENT
Start: 2019-06-13 | End: 2019-06-15

## 2019-06-13 RX ORDER — CALCIUM CARBONATE 500(1250)
2 TABLET ORAL 3 TIMES DAILY
Qty: 200 TABLET | Refills: 3 | Status: SHIPPED | OUTPATIENT
Start: 2019-06-13 | End: 2021-04-19

## 2019-06-13 RX ORDER — IBUPROFEN 800 MG/1
800 TABLET, FILM COATED ORAL EVERY 8 HOURS PRN
Qty: 90 TABLET | Refills: 1 | Status: SHIPPED | OUTPATIENT
Start: 2019-06-13 | End: 2021-09-05

## 2019-06-13 ASSESSMENT — MIFFLIN-ST. JEOR: SCORE: 1587.53

## 2019-06-13 NOTE — LETTER
June 18, 2019      Amy Choudhury  [address]  [place] [address]        Dear Amy,    Thank you for getting your care at [facility]. Please see below for your test results. Your Vitamin B1 is low. We need to retest the B12.  I recommend going on oral B12 and a bcomplex vitamin.  Your vitamin d is also low .  I have sent a prescription to [facility] for all these     Resulted Orders   Vitamin B1 plasma   Result Value Ref Range    Vitamin B1 3 (L) 4 - 15 nmol/L      Comment:      (Note)  INTERPRETIVE DATA: Vitamin B1, Plasma  Thiamine (vitamin B1) is reported. However, thiamine   diphosphate (TDP), the biologically active form of   thiamine, is not found in measurable concentrations in   plasma, and is best determined in whole blood specimens.   Plasma thiamine concentration reflects recent intake rather   than body stores.  Test developed and characteristics determined by [facility]. See Compliance Statement B: [facility] [employer]  Performed by [facility],  [address] [place] [id number] [phone]  [email or web address], Barron Guthrie MD, Lab. Director     Vitamin D Deficiency   Result Value Ref Range    Vitamin D Deficiency screening 12 (L) 20 - 75 ug/L      Comment:      Season, race, dietary intake, and treatment affect the concentration of   25-hydroxy-Vitamin D. Values may decrease during winter months and increase   during summer months. Values 20-29 ug/L may indicate Vitamin D insufficiency   and values <20 ug/L may indicate Vitamin D deficiency.  Vitamin D determination is routinely performed by an immunoassay specific for   25 hydroxyvitamin D3.  If an individual is on vitamin D2 (ergocalciferol)   supplementation, please specify 25 OH vitamin D2 and D3 level determination by   LCMSMS test VITD23.     Hepatitis C Screen Reflex to HCV RNA Quant and Genotype   Result Value Ref Range    Hepatitis C Antibody Nonreactive NR^Nonreactive      Comment:      Assay performance  characteristics have not been established for newborns,   infants, and children     Hepatitis B surface antigen   Result Value Ref Range    Hep B Surface Agn Nonreactive NR^Nonreactive   HIV Antigen Antibody Combo   Result Value Ref Range    HIV Antigen Antibody Combo Nonreactive NR^Nonreactive          Comment:      HIV-1 p24 Ag & HIV-1/HIV-2 Ab Not Detected   TSH with free T4 reflex   Result Value Ref Range    TSH 1.84 0.40 - 4.00 mU/L       If you have any concerns about these results please call and leave a message for me or send a [facility] message to the clinic.    Sincerely,    Randal Castellanos MD

## 2019-06-13 NOTE — PROGRESS NOTES
Aym is a 59 year old  who presents for   Patient presents with:  Referral: for mental health [facility]- Used to be in the program and now looking to restart per pt. Referral to a chiropractor for her back pain   Establish Care: reestablish care with PCP  Refill Request: Protonix and Kenalog      Assessment and Plan          1. Anxiety  Informed patient that we unlikely to be able to do the mental health evaluation here in clinic this week though I did the referral so that she we could help her find a place to go.  - BEHAVIORAL HEALTH REFERRAL ([facility] interal and external)    2. Status post gastric bypass for obesity     - cholecalciferol 2000 units tablet; Take 1 tablet by mouth daily  Dispense: 90 tablet; Refill: 3    3. S/P parathyroidectomy (H)     - calcium carbonate (OS-CRISTIAN) 500 MG tablet; Take 2 tablets (1,000 mg) by mouth 3 times daily  Dispense: 200 tablet; Refill: 3    4. Intrinsic eczema     - triamcinolone (KENALOG) 0.1 % external cream; Apply topically 2 times daily  Dispense: 15 g; Refill: 0    5. Vitamin B12 deficiency (non anemic)     - vitamin B complex with vitamin C (VITAMIN  B COMPLEX) tablet; Take 1 tablet by mouth daily  Dispense: 100 tablet; Refill: 6    6. Screening for condition     - Vitamin B1 plasma  - Vitamin D Deficiency  - Hepatitis C Screen Reflex to HCV RNA Quant and Genotype  - Hepatitis B surface antigen  - HIV Antigen Antibody Combo  - TSH with free T4 reflex      Please call or return to clinic if your symptoms don't go away.    Follow up plan  Please make a clinic appointment for follow up with me (JOHNATHON HIGUERA) in 1-3  weeks for physical .         Return in about 2 weeks (around 6/27/2019) for CPE/Wellness Visit.    Medications Discontinued During This Encounter   Medication Reason     calcium carb 1250 mg, 500 mg [place],/vitamin D 200 units (OSCAL WITH D) 500-200 MG-UNIT per tablet Stopped by Patient     calcium carbonate (OS-CRISTIAN 500 MG [place]. [place]) 500 MG tablet       pseudoePHEDrine (SUDAFED) 30 MG tablet Stopped by Patient     cholecalciferol 2000 UNITS tablet Reorder     triamcinolone (KENALOG) 0.1 % cream Reorder     vitamin  B complex with vitamin C (VITAMIN  B COMPLEX) TABS Reorder     ibuprofen (ADVIL/MOTRIN) 800 MG tablet Reorder     triamcinolone (KENALOG) 0.1 % external cream Reorder         Randal Castellanos MD         MEÑO Reyes is a 59 year old  who presents for   Patient presents with:  Referral: for mental health [facility]- Used to be in the program and now looking to restart per pt. Referral to a chiropractor for her back pain   Establish Care: reestablish care with PCP  Refill Request: Protonix and Kenalog      Visit [place]  1. Was homeless for awhile  [place] in the [facility],   [place] living in [place] rough neighborhood.   Courts need a Mental Health Evaluation, requesting that I do a referral for this evaluation  Did have an evaluation at [facility] ([facility]    Patient has a history of anxiety and some depression but this is not been treated consistently.  Currently patient is on no antidepressants or other medications and reports feeling quite well.  2.  Patient has a history of gastric bypass and has not been taking her B12 shots  3.  Patient's requests refills   of vitamin D an ibuprofen and refill of her Kenalog.  Problem, Medication and Allergy Lists were reviewed and updated if needed..  Patient is an established patient of this clinic..  Health Maintenance Due   Topic Date Due     PREVENTIVE CARE VISIT  1960     HEPATITIS C SCREENING  1960     ADVANCED DIRECTIVE PLANNING  1962     DEPRESSION ACTION PLAN  1962     COLONOSCOPY  04/17/1972     HIV SCREENING  04/17/1977     ZOSTER IMMUNIZATION (1 of 2) 04/17/2012     MAMMO SCREENING  10/11/2014     PAP  09/27/2015     KACY ASSESSMENT  03/28/2018     PHQ-9  03/28/2018          Review of Systems:   Review of Systems   Constitutional: Negative for fever.   HENT: Negative for trouble swallowing.    Eyes:  "Negative for pain.   Respiratory: Negative for cough and wheezing.    Cardiovascular: Negative for chest pain.   Gastrointestinal: Negative for abdominal pain.   Genitourinary: Negative for dysuria.            Physical Exam:     Vitals:    06/13/19 1515   BP: 126/81   Pulse: 92   Resp: 18   Temp: 98.1  F (36.7  C)   TempSrc: Oral   SpO2: 98%   Weight: 99.8 kg (220 lb)   Height: 1.657 m (5' 5.24\")     Body mass index is 36.35 kg/m .  Vitals were reviewed and were normal     Physical Exam   Constitutional: Vital signs are normal. She appears well-developed and well-nourished. She is active. She does not appear ill.   HENT:   Head: Normocephalic.   Right Ear: Tympanic membrane and external ear normal.   Left Ear: Tympanic membrane and external ear normal.   Nose: Mucosal edema and rhinorrhea present. No sinus tenderness. Right sinus exhibits no maxillary sinus tenderness and no frontal sinus tenderness. Left sinus exhibits no maxillary sinus tenderness and no frontal sinus tenderness.   Mouth/Throat: Oropharynx is clear and moist.   Eyes: Pupils are equal, round, and reactive to light. Conjunctivae and EOM are normal.   Neck: Normal range of motion.   Pulmonary/Chest: Effort normal and breath sounds normal. No stridor. No respiratory distress.   Abdominal: Soft.   Skin: She is not diaphoretic.   Psychiatric: She has a normal mood and affect.         Results:      Results from this visit  Results for orders placed or performed in visit on 06/13/19   Vitamin D Deficiency   Result Value Ref Range    Vitamin D Deficiency screening 12 (L) 20 - 75 ug/L   Hepatitis C Screen Reflex to HCV RNA Quant and Genotype   Result Value Ref Range    Hepatitis C Antibody Nonreactive NR^Nonreactive   Hepatitis B surface antigen   Result Value Ref Range    Hep B Surface Agn Nonreactive NR^Nonreactive   HIV Antigen Antibody Combo   Result Value Ref Range    HIV Antigen Antibody Combo Nonreactive NR^Nonreactive       TSH with free T4 reflex "   Result Value Ref Range    TSH 1.84 0.40 - 4.00 mU/L       Options for treatment and follow-up care were reviewed with the patient. Amy Choudhury  engaged in the decision making process and verbalized understanding of the options discussed and agreed with the final plan.  Randal Castellanos MD  [facility]

## 2019-06-13 NOTE — PATIENT INSTRUCTIONS
Here is the plan from today's visit    1. Anxiety     - BEHAVIORAL HEALTH REFERRAL ([facility] interal and external)    2. Status post gastric bypass for obesity     - cholecalciferol 2000 units tablet; Take 1 tablet by mouth daily  Dispense: 90 tablet; Refill: 3    3. S/P parathyroidectomy (H)     - calcium carbonate (OS-CRISTIAN) 500 MG tablet; Take 2 tablets (1,000 mg) by mouth 3 times daily  Dispense: 200 tablet; Refill: 3    4. Intrinsic eczema     - triamcinolone (KENALOG) 0.1 % external cream; Apply topically 2 times daily  Dispense: 15 g; Refill: 0    5. Vitamin B12 deficiency (non anemic)     - vitamin B complex with vitamin C (VITAMIN  B COMPLEX) tablet; Take 1 tablet by mouth daily  Dispense: 100 tablet; Refill: 6    6. Screening for condition     - Vitamin B1 plasma  - Vitamin D Deficiency  - Hepatitis C Screen Reflex to HCV RNA Quant and Genotype  - Hepatitis B surface antigen  - HIV Antigen Antibody Combo  - TSH with free T4 reflex      Please call or return to clinic if your symptoms don't go away.    Follow up plan  Please make a clinic appointment for follow up with me (JOHNATHON HIGUERA) in 1-3  weeks for physical .    Thank you for coming to [facility] today.  Lab Testing:  **If you had lab testing today and your results are reassuring or normal they will be mailed to you or sent through [facility] within 7 days.   **If the lab tests need quick action we will call you with the results.  The phone number we will call with results is # [phone] (home) NONE (work). If this is not the best number please call our clinic and change the number.  Medication Refills:  If you need any refills please call your pharmacy and they will contact us.   If you need to  your refill at a new pharmacy, please contact the new pharmacy directly. The new pharmacy will help you get your medications transferred faster.   Scheduling:  If you have any concerns about today's visit or wish to schedule another appointment  please call our office during normal business hours [phone] (8-5:00 M-F)  If a referral was made to a [facility] and you don't get a call from central scheduling please call [phone].  If a Mammogram was ordered for you at [facility] call [phone] to schedule or change your appointment.  If you had an XRay/CT/Ultrasound/MRI ordered the number is [phone] to schedule or change your radiology appointment.   Medical Concerns:  If you have urgent medical concerns please call [phone] at any time of the day.    Randal Castellanos MD

## 2019-06-14 ENCOUNTER — TELEPHONE (OUTPATIENT)
Dept: FAMILY MEDICINE | Facility: CLINIC | Age: 59
End: 2019-06-14

## 2019-06-14 DIAGNOSIS — L20.84 INTRINSIC ECZEMA: ICD-10-CM

## 2019-06-14 LAB
DEPRECATED CALCIDIOL+CALCIFEROL SERPL-MC: 12 UG/L (ref 20–75)
HBV SURFACE AG SERPL QL IA: NONREACTIVE
HCV AB SERPL QL IA: NONREACTIVE
HIV 1+2 AB+HIV1 P24 AG SERPL QL IA: NONREACTIVE

## 2019-06-14 NOTE — TELEPHONE ENCOUNTER
Hanna Castellanos,    Per patient request, she wanted to change her existing prescription order for the Triamcinolone 0.1% cream over to the ointment based product instead. Please let us know if this is ok or send us a new prescription if possible. If there are any questions for us please give us a call back as well at the number provided below.     Thank You,    Magen Wells, Pharmacy Technician  [facility]  PH:[phone]  Fax: [phone]

## 2019-06-15 RX ORDER — TRIAMCINOLONE ACETONIDE 1 MG/G
OINTMENT TOPICAL 2 TIMES DAILY
Qty: 15 G | Refills: 1 | Status: ON HOLD | OUTPATIENT
Start: 2019-06-15 | End: 2021-09-10

## 2019-06-15 ASSESSMENT — ENCOUNTER SYMPTOMS
DYSURIA: 0
WHEEZING: 0
COUGH: 0
FEVER: 0
EYE PAIN: 0
TROUBLE SWALLOWING: 0
ABDOMINAL PAIN: 0

## 2019-06-15 NOTE — RESULT ENCOUNTER NOTE
Dear Amy  Here are your labs as you can see the vitamin D is is low.  My suggested plan is is to supplement this with high-dose vitamin D and to recheck this in 1 to 2 months I also sent in a prescription for vitamin B12 orally..  Please message me if you have any concerns.  Sajan Castellanos MD

## 2019-06-17 ENCOUNTER — TELEPHONE (OUTPATIENT)
Dept: PSYCHOLOGY | Facility: CLINIC | Age: 59
End: 2019-06-17

## 2019-06-17 LAB — VIT B1 SERPL-MCNC: 3 NMOL/L (ref 4–15)

## 2019-06-17 NOTE — TELEPHONE ENCOUNTER
Hanna - Dr. Castellanos's note indicates she wants to restart in [facility].  Due to medical leave, we are not able to enroll people in the program at this time.  We would be happy to contact her in the future when we have the program staffed again.      MH Referral from Dr. Castellanos - patient needs a psychological evaluation as part of a court order. Unclear to me what the evaluation is in connection with.  We are not able to do court ordered evaluation's here.  I would suggest she call some of the big mental health agencies in the community and let them know what she is looking for. It is likely that they will do these.    Mental Health Referral  Please Refer out to:    [facility] (adults)  [address]  [place]  [phone]  (other locations available, can call main #)    [facility]  [address]  [place]  [address]  [phone]    [facility]  [address] [place] [address] [address]  [place], [place]  [id number] [phone]    [facility]  [address] [place], [place]  [phone]                Please document when patient is given this information and close this note.  Thank you.

## 2019-06-18 DIAGNOSIS — E53.8 VITAMIN B12 DEFICIENCY (NON ANEMIC): ICD-10-CM

## 2019-06-18 DIAGNOSIS — E55.9 VITAMIN D DEFICIENCY: Primary | ICD-10-CM

## 2019-06-18 RX ORDER — ERGOCALCIFEROL 1.25 MG/1
50000 CAPSULE, LIQUID FILLED ORAL WEEKLY
Qty: 12 CAPSULE | Refills: 0 | Status: SHIPPED | OUTPATIENT
Start: 2019-06-18 | End: 2019-09-04

## 2019-06-18 RX ORDER — LANOLIN ALCOHOL/MO/W.PET/CERES
1000 CREAM (GRAM) TOPICAL DAILY
Qty: 90 TABLET | Refills: 3 | Status: SHIPPED | OUTPATIENT
Start: 2019-06-18 | End: 2021-04-19

## 2019-06-20 NOTE — TELEPHONE ENCOUNTER
Informed patient that we are not excepting any new enrollments at this time. When offered other MH providers she declined. Patient states that she is working with [facility] for MH services. But would like to wait until we are excepting new enrollments again. She states that she would prefer to stay were she is comfortable.    She will follow up in a month to check on the status.    Elizabeth Dawson CMA  [facility]

## 2019-06-24 NOTE — TELEPHONE ENCOUNTER
Added patient to shawna referred [facility] list so when we are accepting new enrollments again she can be contacted. Thank you

## 2019-09-28 ENCOUNTER — HEALTH MAINTENANCE LETTER (OUTPATIENT)
Age: 59
End: 2019-09-28

## 2021-01-10 ENCOUNTER — HEALTH MAINTENANCE LETTER (OUTPATIENT)
Age: 61
End: 2021-01-10

## 2021-02-01 ENCOUNTER — DOCUMENTATION ONLY (OUTPATIENT)
Dept: FAMILY MEDICINE | Facility: CLINIC | Age: 61
End: 2021-02-01

## 2021-02-01 NOTE — PROGRESS NOTES
"When opening a documentation only encounter, be sure to enter in \"Chief Complaint\" Forms and in \" Comments\" Title of form, description if needed.    Amy is a 60 year old  female  Form received via: Fax  Form now resides in: Provider Ready    Haylee Chan MA       Unable to locate form in provider ready folder,or in  forms folder. Per protocol encounter has been opened x1 month,and will be signed/addressed.      Haylee Chan MA                      "

## 2021-04-19 ENCOUNTER — OFFICE VISIT (OUTPATIENT)
Dept: FAMILY MEDICINE | Facility: CLINIC | Age: 61
End: 2021-04-19
Payer: COMMERCIAL

## 2021-04-19 VITALS
HEIGHT: 67 IN | BODY MASS INDEX: 36.41 KG/M2 | SYSTOLIC BLOOD PRESSURE: 102 MMHG | HEART RATE: 100 BPM | TEMPERATURE: 98.8 F | WEIGHT: 232 LBS | OXYGEN SATURATION: 97 % | DIASTOLIC BLOOD PRESSURE: 73 MMHG

## 2021-04-19 DIAGNOSIS — Z00.00 ROUTINE GENERAL MEDICAL EXAMINATION AT A HEALTH CARE FACILITY: Primary | ICD-10-CM

## 2021-04-19 DIAGNOSIS — Z98.890 S/P PARATHYROIDECTOMY: ICD-10-CM

## 2021-04-19 DIAGNOSIS — E53.8 VITAMIN B12 DEFICIENCY (NON ANEMIC): ICD-10-CM

## 2021-04-19 DIAGNOSIS — M25.562 CHRONIC PAIN OF BOTH KNEES: ICD-10-CM

## 2021-04-19 DIAGNOSIS — G89.29 CHRONIC PAIN OF BOTH KNEES: ICD-10-CM

## 2021-04-19 DIAGNOSIS — Z87.891 PERSONAL HISTORY OF TOBACCO USE: ICD-10-CM

## 2021-04-19 DIAGNOSIS — E66.01 MORBID OBESITY (H): ICD-10-CM

## 2021-04-19 DIAGNOSIS — H53.8 BLURRED VISION: ICD-10-CM

## 2021-04-19 DIAGNOSIS — M25.561 CHRONIC PAIN OF BOTH KNEES: ICD-10-CM

## 2021-04-19 DIAGNOSIS — E21.3 HYPERPARATHYROIDISM (H): ICD-10-CM

## 2021-04-19 DIAGNOSIS — Z90.89 S/P PARATHYROIDECTOMY: ICD-10-CM

## 2021-04-19 DIAGNOSIS — Z98.84 STATUS POST GASTRIC BYPASS FOR OBESITY: ICD-10-CM

## 2021-04-19 DIAGNOSIS — E11.9 TYPE 2 DIABETES MELLITUS WITHOUT COMPLICATION, WITHOUT LONG-TERM CURRENT USE OF INSULIN (H): ICD-10-CM

## 2021-04-19 DIAGNOSIS — R53.83 FATIGUE, UNSPECIFIED TYPE: ICD-10-CM

## 2021-04-19 DIAGNOSIS — Z86.39 HISTORY OF IRON DEFICIENCY: ICD-10-CM

## 2021-04-19 DIAGNOSIS — Z13.9 SCREENING FOR CONDITION: ICD-10-CM

## 2021-04-19 LAB
CHOLEST SERPL-MCNC: 194 MG/DL
HBA1C MFR BLD: 6.7 % (ref 4.1–5.7)
HDLC SERPL-MCNC: 65 MG/DL
HGB BLD-MCNC: 14.1 G/DL (ref 11.7–15.7)
LDLC SERPL CALC-MCNC: 103 MG/DL
NONHDLC SERPL-MCNC: 129 MG/DL
TRIGL SERPL-MCNC: 132 MG/DL
TSH SERPL DL<=0.005 MIU/L-ACNC: 2.58 MU/L (ref 0.4–4)

## 2021-04-19 PROCEDURE — 80061 LIPID PANEL: CPT | Performed by: FAMILY MEDICINE

## 2021-04-19 PROCEDURE — 99213 OFFICE O/P EST LOW 20 MIN: CPT | Mod: 25 | Performed by: FAMILY MEDICINE

## 2021-04-19 PROCEDURE — G0296 VISIT TO DETERM LDCT ELIG: HCPCS | Performed by: FAMILY MEDICINE

## 2021-04-19 PROCEDURE — 99396 PREV VISIT EST AGE 40-64: CPT | Performed by: FAMILY MEDICINE

## 2021-04-19 PROCEDURE — 85018 HEMOGLOBIN: CPT | Performed by: FAMILY MEDICINE

## 2021-04-19 PROCEDURE — 36415 COLL VENOUS BLD VENIPUNCTURE: CPT | Performed by: FAMILY MEDICINE

## 2021-04-19 PROCEDURE — 84443 ASSAY THYROID STIM HORMONE: CPT | Performed by: FAMILY MEDICINE

## 2021-04-19 PROCEDURE — 83036 HEMOGLOBIN GLYCOSYLATED A1C: CPT | Performed by: FAMILY MEDICINE

## 2021-04-19 RX ORDER — LANOLIN ALCOHOL/MO/W.PET/CERES
1000 CREAM (GRAM) TOPICAL DAILY
Qty: 90 TABLET | Refills: 3 | Status: SHIPPED | OUTPATIENT
Start: 2021-04-19 | End: 2021-10-15

## 2021-04-19 RX ORDER — ELECTROLYTES/DEXTROSE
1 SOLUTION, ORAL ORAL DAILY
Qty: 200 TABLET | Refills: 1 | Status: SHIPPED | OUTPATIENT
Start: 2021-04-19 | End: 2021-10-15

## 2021-04-19 RX ORDER — ACETAMINOPHEN 500 MG
1000 TABLET ORAL EVERY 8 HOURS PRN
Qty: 100 TABLET | Refills: 0 | Status: SHIPPED | OUTPATIENT
Start: 2021-04-19 | End: 2021-09-05

## 2021-04-19 RX ORDER — CALCIUM CARBONATE 500(1250)
2 TABLET ORAL 3 TIMES DAILY
Qty: 200 TABLET | Refills: 3 | Status: SHIPPED | OUTPATIENT
Start: 2021-04-19 | End: 2021-10-15

## 2021-04-19 ASSESSMENT — MIFFLIN-ST. JEOR: SCORE: 1653.23

## 2021-04-19 ASSESSMENT — ANXIETY QUESTIONNAIRES
5. BEING SO RESTLESS THAT IT IS HARD TO SIT STILL: NOT AT ALL
GAD7 TOTAL SCORE: 8
3. WORRYING TOO MUCH ABOUT DIFFERENT THINGS: MORE THAN HALF THE DAYS
7. FEELING AFRAID AS IF SOMETHING AWFUL MIGHT HAPPEN: SEVERAL DAYS
IF YOU CHECKED OFF ANY PROBLEMS ON THIS QUESTIONNAIRE, HOW DIFFICULT HAVE THESE PROBLEMS MADE IT FOR YOU TO DO YOUR WORK, TAKE CARE OF THINGS AT HOME, OR GET ALONG WITH OTHER PEOPLE: VERY DIFFICULT
6. BECOMING EASILY ANNOYED OR IRRITABLE: SEVERAL DAYS
1. FEELING NERVOUS, ANXIOUS, OR ON EDGE: MORE THAN HALF THE DAYS
2. NOT BEING ABLE TO STOP OR CONTROL WORRYING: MORE THAN HALF THE DAYS

## 2021-04-19 ASSESSMENT — PATIENT HEALTH QUESTIONNAIRE - PHQ9
5. POOR APPETITE OR OVEREATING: NOT AT ALL
SUM OF ALL RESPONSES TO PHQ QUESTIONS 1-9: 12

## 2021-04-19 NOTE — PATIENT INSTRUCTIONS
Preventive Health Recommendations  Female Ages 50 - 64    Yearly exam: See your health care provider every year in order to  o Review health changes.   o Discuss preventive care.    o Review your medicines if your doctor has prescribed any.      Get a Pap test every three years (unless you have an abnormal result and your provider advises testing more often).    If you get Pap tests with HPV test, you only need to test every 5 years, unless you have an abnormal result.     You do not need a Pap test if your uterus was removed (hysterectomy) and you have not had cancer.    You should be tested each year for STDs (sexually transmitted diseases) if you're at risk.     Have a mammogram every 1 to 2 years.    Have a colonoscopy at age 50, or have a yearly FIT test (stool test). These exams screen for colon cancer.      Have a cholesterol test every 5 years, or more often if advised.    Have a diabetes test (fasting glucose) every three years. If you are at risk for diabetes, you should have this test more often.     If you are at risk for osteoporosis (brittle bone disease), think about having a bone density scan (DEXA).    Shots: Get a flu shot each year. Get a tetanus shot every 10 years.    Nutrition:     Eat at least 5 servings of fruits and vegetables each day.    Eat whole-grain bread, whole-wheat pasta and brown rice instead of white grains and rice.    Get adequate Calcium and Vitamin D.     Lifestyle    Exercise at least 150 minutes a week (30 minutes a day, 5 days a week). This will help you control your weight and prevent disease.    Limit alcohol to one drink per day.    No smoking.     Wear sunscreen to prevent skin cancer.     See your dentist every six months for an exam and cleaning.    See your eye doctor every 1 to 2 years.    Lung Cancer Screening   Frequently Asked Questions  If you are at high-risk for lung cancer, getting screened with low-dose computed tomography (LDCT) every year can help save  your life. This handout offers answers to some of the most common questions about lung cancer screening. If you have other questions, please call [phone] ([phone]).     What is it?  Lung cancer screening uses special X-ray technology to create an image of your lung tissue. The exam is quick and easy and takes less than 10 seconds. We don t give you any medicine or use any needles. You can eat before and after the exam. You don t need to change your clothes as long as the clothing on your chest doesn t contain metal. But, you do need to be able to hold your breath for at least 6 seconds during the exam.    What is the goal of lung cancer screening?  The goal of lung cancer screening is to save lives. Many times, lung cancer is not found until a person starts having physical symptoms. Lung cancer screening can help detect lung cancer in the earliest stages when it may be easier to treat.    Who should be screened for lung cancer?  We suggest lung cancer screening for anyone who is at high-risk for lung cancer. You are in the high-risk group if you:      are between the ages of 55 and 79, and    have smoked at least 1 pack of cigarettes a day for 30 or more years, and    still smoke or have quit within the past 15 years.    However, if you have a new cough or shortness of breath, you should talk to your doctor before being screened.    Some national lung health advocacy groups also recommend screening for people ages 50 to 79 who have smoked an average of 1 pack of cigarettes a day for 20 years. They must also have at least 1 other risk factor for lung cancer, not including exposure to secondhand smoke. Other risk factors are having had cancer in the past, emphysema, pulmonary fibrosis, COPD, a family history of lung cancer, or exposure to certain materials such as arsenic, asbestos, beryllium, cadmium, chromium, diesel fumes, nickel, radon or silica. Your care team can help you know if you have one of  these risk factors.     Why does it matter if I have symptoms?  Certain symptoms can be a sign that you have a condition in your lungs that should be checked and treated by your doctor. These symptoms include fever, chest pain, a new or changing cough, shortness of breath that you have never felt before, coughing up blood or unexplained weight loss. Having any of these symptoms can greatly affect the results of lung cancer screening.       Should all smokers get an LDCT lung cancer screening exam?  It depends. Lung cancer screening is for a very specific group of men and women who have a history of heavy smoking over a long period of time (see  Who should be screened for lung cancer  above).  I am in the high-risk group, but have been diagnosed with cancer in the past. Is LDCT lung cancer screening right for me?  In some cases, you should not have LDCT lung screening, such as when your doctor is already following your cancer with CT scan studies. Your doctor will help you decide if LDCT lung screening is right for you.  Do I need to have a screening exam every year?  Yes. If you are in the high-risk group described earlier, you should get an LDCT lung cancer screening exam every year until you are 79, or are no longer willing or able to undergo screening and possible procedures to diagnose and treat lung cancer.  How effective is LDCT at preventing death from lung cancer?  Studies have shown that LDCT lung cancer screening can lower the risk of death from lung cancer by 20 percent in people who are at high-risk.  What are the risks?  There are some risks and limitations of LDCT lung cancer screening. We want to make sure you understand the risks and benefits, so please let us know if you have any questions. Your doctor may want to talk with you more about these risks.    Radiation exposure: As with any exam that uses radiation, there is a very small increased risk of cancer. The amount of radiation in LDCT is  small--about the same amount a person would get from a mammogram. Your doctor orders the exam when he or she feels the potential benefits outweigh the risks.    False negatives: No test is perfect, including LDCT. It is possible that you may have a medical condition, including lung cancer, that is not found during your exam. This is called a false negative result.    False positives and more testing: LDCT very often finds something in the lung that could be cancer, but in fact is not. This is called a false positive result. False positive tests often cause anxiety. To make sure these findings are not cancer, you may need to have more tests. These tests will be done only if you give us permission. Sometimes patients need a treatment that can have side effects, such as a biopsy. For more information on false positives, see  What can I expect from the results?     Findings not related to lung cancer: Your LDCT exam also takes pictures of areas of your body next to your lungs. In a very small number of cases, the CT scan will show an abnormal finding in one of these areas, such as your kidneys, adrenal glands, liver or thyroid. This finding may not be serious, but you may need more tests. Your doctor can help you decide what other tests you may need, if any.  What can I expect from the results?  About 1 out of 4 LDCT exams will find something that may need more tests. Most of the time, these findings are lung nodules. Lung nodules are very small collections of tissue in the lung. These nodules are very common, and the vast majority--more than 97 percent--are not cancer (benign). Most are normal lymph nodes or small areas of scarring from past infections.  But, if a small lung nodule is found to be cancer, the cancer can be cured more than 90 percent of the time. To know if the nodule is cancer, we may need to get more images before your next yearly screening exam. If the nodule has suspicious features (for example, it  is large, has an odd shape or grows over time), we will refer you to a specialist for further testing.  Will my doctor also get the results?  Yes. Your doctor will get a copy of your results.  Is it okay to keep smoking now that there s a cancer screening exam?  No. Tobacco is one of the strongest cancer-causing agents. It causes not only lung cancer, but other cancers and cardiovascular (heart) diseases as well. The damage caused by smoking builds over time. This means that the longer you smoke, the higher your risk of disease. While it is never too late to quit, the sooner you quit, the better.  Where can I find help to quit smoking?  The best way to prevent lung cancer is to stop smoking. If you have already quit smoking, congratulations and keep it up! For help on quitting smoking, please call [employer] at 5-260-043-IOWR ([phone] or the American Cancer Society at 1-285.251.4854 to find local resources near you.  One-on-one health coaching:  If you d prefer to work individually with a health care provider on tobacco cessation, we offer:      Medication Therapy Management:  Our specially trained pharmacists work closely with you and your doctor to help you quit smoking.  Call [phone] or [phone] (toll free).     Can Do: Health coaching offered by [facility].  [email or web address]

## 2021-04-19 NOTE — PROGRESS NOTES
"   SUBJECTIVE:   CC: Amy Choudhury is an 61 year old woman who presents for a preventive health visit. She has not been seen at the clinic since 2019 and denies receiving healthcare anywhere else during that timeframe.    -Fatigue. Started in November. Affects ability to get things done around the house. Sleep is \"irregular\". Wants to check TSH.  -Numb fingers. Has happened for many years, has been told due to back problems and anxiety.   -Appetite \"low and high\", endorses diarrhea for a few days last week, \"not eating the right things\" because she has to take public transportation for a long time to get to [employer] for healthy food at a lower cost  -Amy would like to check to see if she has diabetes. She gained 30 lbs in the last year and has a family history.  -\"Kaleidoscope\" in peripheral vision, comes and goes. Happens when \"up and doing things\". Endorses mild lightheadedness/dizziness, especially when on the bus. Denies headache, vertigo, gait changes.  -Concerns for depression. Has a therapist that she sees regularly. Has a SW who helps her with finding better housing. Not feeling like a part of her community anymore due to COVID. Her community has changed due to rioting and protesting. \"We Natives take care of our own\". Takes care of two grandkids at home, on a budget, \"household is a mess\". Oldest daughter passed away 6 years ago. Youngest has been in the hospital for 8 months now due to what sounds like septic cranial embolism s/p craniectomy due to IVDU. Wanted to bring in  spiritual healers to hospital but couldn't due to COVID. Many near death moments in hospital where she was called in because her daughter may not make it. Nephew  two weeks ago from an overdose. \"There's so much pain in my family, but I have to keep going for my daughter. I don't know what I would do if I had to bury another child\". Not interested in medication for depression right now because she doesn't want to " "have to be weaned off of it. Gabapentin previously helped with anxiety.   -Hot flashes which began at age 46. LMP 6-7 years ago. Last few months has had \"a couple good ones [hot flashes]\".    Last colonoscopy was in the hospital in 1997 due to GIB. Interested in completing mammogram, screening colonoscopy and pap smear. Denies feeling any breast lumps. Would like to schedule pap for another day. Her daughter had health concerns following pneumococcal vaccine, not interested in getting it herself. Hasn't gotten COVID vaccination yet because she doesn't know if she can trust it yet.    Amy has smoked cigarettes since the age of 16, mostly 1 pack/day. Has recently cut down to 3 cigarettes/day. She is not currently interested in quitting. Her friend was recently diagnosed with lung cancer.    Not taking any medications b/c hasn't seen a doctor in a long time. Interested in re-starting meds and also completing some labs and preventative screenings.    KACY-7 score of 8  PHQ-9 score of 12    Split Bill scripting  The purpose of this visit is to discuss your medical history and prevent health problems before you are sick. You may be responsible for a co-pay, coinsurance, or deductible if your visit today includes services such as checking on a sore throat, having an x-ray or lab test, or treating and evaluating a new or existing condition :[id number]}  Patient has been advised of split billing requirements and indicates understanding: Yes  Healthy Habits:    Do you get at least three servings of calcium containing foods daily (dairy, green leafy vegetables, etc.)? yes         Problems taking medications regularly No    Medication side effects: No    Have you had an eye exam in the past two years? no    Do you see a dentist twice per year? no    Do you have sleep apnea, excessive snoring or daytime drowsiness?no        Today's PHQ-2 Score:   PHQ-2 ( 1999 Pfizer) 4/19/2021 6/13/2019   Q1: Little interest or pleasure in doing " things 2 2   Q2: Feeling down, depressed or hopeless 2 2   PHQ-2 Score 4 4       Abuse: Current or Past(Physical, Sexual or Emotional)- No  Do you feel safe in your environment? Yes    Have you ever done Advance Care Planning? (For example, a Health Directive, POLST, or a discussion with a medical provider or your loved ones about your wishes): No, advance care planning information given to patient to review.  Patient plans to discuss their wishes with loved ones or provider.      Social History     Tobacco Use     Smoking status: Current Every Day Smoker     Packs/day: 0.50     Years: 30.00     Pack years: 15.00     Types: Cigarettes     Smokeless tobacco: Never Used   Substance Use Topics     Alcohol use: Yes     Comment: occasionally     If you drink alcohol do you typically have >3 drinks per day or >7 drinks per week? No                     Reviewed orders with patient.  Reviewed health maintenance and updated orders accordingly - Yes      FSH-7: No flowsheet data found.    Mammogram Screening: Recommended mammography every 1-2 years with patient discussion and risk factor consideration  Pertinent mammograms are reviewed under the imaging tab.    History of abnormal Pap smear: NO - age 30-65 PAP every 5 years with negative HPV co-testing recommended  PAP / HPV 9/27/2012   PAP NIL     Reviewed and updated as needed this visit by clinical staff  Tobacco  Allergies  Meds  Problems  Med Hx  Surg Hx  Fam Hx  Soc Hx        Reviewed and updated as needed this visit by Provider  Tobacco  Allergies  Meds  Problems  Med Hx  Surg Hx  Fam Hx             ROS:  CONSTITUTIONAL: 30 lb weight gain in past year ,NEGATIVE for fever/ chillsweight  EYES: New peripheral vision changes, NEGATIVE for irritation  ENT: NEGATIVE for ear, mouth and throat problems  RESP: NEGATIVE for significant cough or dyspnea  BREAST: NEGATIVE for masses, tenderness or discharge  GI: diarrhea for a few days last week, NEGATIVE for nausea,  "abdominal pain, heartburn  MUSCULOSKELETAL: NEGATIVE for significant arthralgias or myalgia  NEURO: Mild lightheadedness/dizziness with activity, NEGATIVE for weakness, or paresthesias  PSYCHIATRIC: Increased depressive symptoms due to several traumatic life events    OBJECTIVE:   /73 (BP Location: Right arm, Patient Position: Sitting, Cuff Size: Adult Large)   Pulse 100   Temp 98.8  F (37.1  C) (Oral)   Ht 1.691 m (5' 6.58\")   Wt 105.2 kg (232 lb)   SpO2 97%   Breastfeeding No   BMI 36.80 kg/m    EXAM:  GENERAL: healthy, alert and no distress  EYES: Eyes grossly normal to inspection, PERRL and conjunctivae and sclerae normal, arcus sinensis  HENT: ear canals and TM's normal, nose and mouth without ulcers or lesions  RESP: lungs clear to auscultation - no rales, rhonchi or wheezes  CV: regular rate and rhythm, normal S1 S2, no S3 or S4, no murmur, click or rub, no peripheral edema and peripheral pulses strong  ABDOMEN: soft, nontender, no hepatosplenomegaly, no masses and bowel sounds normal  MS: no gross musculoskeletal defects noted, no edema  NEURO: A&Ox3, gait normal    ASSESSMENT/PLAN:   1. Routine general medical examination at a health care facility  Patient desires to resume medications. Does not currently want to get COVID vaccine due to side effects. Counseled on risks and benefits of COVID vaccination.   - EYE ADULT REFERRAL; Future  - Hemoglobin  - Lipid panel reflex to direct LDL Non-fasting    2. Screening for condition  Patient wishes to defer pap for another clinic visit. Hemoglobin ordered due to history of anemia and complaint of fatigue in the last year. Lipid panel, mammogram and colonoscopy screens due.  - Screening Mammogram Digital Bilateral; Future  - GASTROENTEROLOGY ADULT REF PROCEDURE ONLY; Future  - Hemoglobin  - Lipid panel reflex to direct LDL Non-fasting    3. Fatigue, unspecified type  Suspect multi-factorial due to increased life stressors as well as history of " anemia.  - TSH with free T4 reflex  - Hemoglobin    4. S/P parathyroidectomy  Patient wishes to re-start calcium supplement. Patient is also concerned about her thyroid hormone levels after parathyroidectomy 5 years ago.   - calcium carbonate (OS-CRISTIAN) 500 MG tablet; Take 2 tablets (1,000 mg) by mouth 3 times daily  Dispense: 200 tablet; Refill: 3  - Hemoglobin  - Lipid panel reflex to direct LDL Non-fasting  - TSH with free T4 reflex    5. Chronic pain of both knees  Of several year's duration. Suspect OA vs. Patellofemoral syndrome.  - acetaminophen (TYLENOL) 500 MG tablet; Take 2 tablets (1,000 mg) by mouth every 8 hours as needed for mild pain  Dispense: 100 tablet; Refill: 0  - Hemoglobin  - Lipid panel reflex to direct LDL Non-fasting  - DO manipulation referral    6. Blurred vision  Peripheral vision changes within the last year. Suspect glaucoma vs. Macular degeneration. Patient did not endorse symptoms of retinal detachment or increased ICP.  - Multiple Vitamin (MULTIVITAMIN ADULT) TABS; Take 1 tablet by mouth daily  Dispense: 200 tablet; Refill: 1  - Hemoglobin  - Lipid panel reflex to direct LDL Non-fasting  - Hemoglobin A1c ([place])  - Ophthalmology referral    7. Status post gastric bypass for obesity  Patient is interested in re-starting supplements. Counseled patient on importance of supplements when she is s/p bypass.  - cholecalciferol 50 MCG (2000 UT) tablet; Take 1 tablet (50 mcg) by mouth daily  Dispense: 90 tablet; Refill: 3  - cyanocobalamin (VITAMIN B-12) 1000 MCG tablet; Take 1 tablet (1,000 mcg) by mouth daily  Dispense: 90 tablet; Refill: 3  - vitamin B complex with vitamin C (VITAMIN  B COMPLEX) tablet; Take 1 tablet by mouth daily  Dispense: 100 tablet; Refill: 6  - Hemoglobin  - Lipid panel reflex to direct LDL Non-fasting    8. Vitamin B12 deficiency (non anemic)  Patient is interested in re-starting supplements. Counseled patient on importance of supplements when she is s/p bypass.  -  "cyanocobalamin (VITAMIN B-12) 1000 MCG tablet; Take 1 tablet (1,000 mcg) by mouth daily  Dispense: 90 tablet; Refill: 3  - vitamin B complex with vitamin C (VITAMIN  B COMPLEX) tablet; Take 1 tablet by mouth daily  Dispense: 100 tablet; Refill: 6  - Hemoglobin    9. History of iron deficiency  Patient endorses that she has been found to be anemic in the past. Also endorses new fatigue within the last year as well as lightheadedness with activity. Will assess and prescribe iron supplements if necessary.  - Hemoglobin  - Lipid panel reflex to direct LDL Non-fasting    10. Hyperparathyroidism (H)  S/p parathyroidectomy  - Hemoglobin  - Lipid panel reflex to direct LDL Non-fasting    11. Personal history of tobacco use  Patient is not interested in quitting at this time, but is interested in annual lung CT screen as she has 45 pack years.   - Prof fee: Shared Decisionmaking for Lung Cancer Screening  - CT Chest Lung Cancer Scrn Low Dose wo; Future  - Hemoglobin  - Lipid panel reflex to direct LDL Non-fasting    Patient has been advised of split billing requirements and indicates understanding: Yes  COUNSELING:   Reviewed preventive health counseling, as reflected in patient instructions    Estimated body mass index is 36.8 kg/m  as calculated from the following:    Height as of this encounter: 1.691 m (5' 6.58\").    Weight as of this encounter: 105.2 kg (232 lb).    Weight management plan: Discussed healthy diet and exercise guidelines    She reports that she has been smoking cigarettes. She has a 15.00 pack-year smoking history. She has never used smokeless tobacco.  Tobacco Cessation Action Plan:   Information offered: Patient not interested at this time    Counseling Resources:  ATP IV Guidelines  Pooled Cohorts Equation Calculator  Breast Cancer Risk Calculator  BRCA-Related Cancer Risk Assessment: FHS-7 Tool  FRAX Risk Assessment  ICSI Preventive Guidelines  Dietary Guidelines for Americans, 2010  USDA's " MyPlate  ASA Prophylaxis  Lung CA Screening    Randal Castellanos MD  [facility]  Lung Cancer Screening Shared Decision Making Visit     Amy Choudhury is eligible for lung cancer screening on the basis of the information provided in my signed lung cancer screening order.     I have discussed with patient the risks and benefits of screening for lung cancer with low-dose CT.     The risks include:  radiation exposure: one low dose chest CT has as much ionizing radiation as about 15 chest x-rays or 6 months of background radiation living in [place]    false positives: 96% of positive findings/nodules are NOT cancer, but some might still require additional diagnostic evaluation, including biopsy  over-diagnosis: some slow growing cancers that might never have been clinically significant will be detected and treated unnecessarily     The benefit of early detection of lung cancer is contingent upon adherence to annual screening or more frequent follow up if indicated.     Furthermore, reaping the benefits of screening requires Amy Choudhury to be willing and physically able to undergo diagnostic procedures, if indicated. Although no specific guide is available for determining severity of comorbidities, it is reasonable to withhold screening in patients who have greater mortality risk from other diseases.     We did discuss that the only way to prevent lung cancer is to not smoke. Smoking cessation counseling was given, duration 3-10 minutes.      I did offer risk estimation using a calculator such as this one:yes     I was present with the medical student who participated in the service and in the documentation of this note. I have verified the history and personally performed the physical exam and medical decision making, and have verified the content of the note, which accurately reflects my assessment of the patient and the plan of care.   Randal Castellanos MD

## 2021-04-20 PROBLEM — E66.01 MORBID OBESITY (H): Status: ACTIVE | Noted: 2021-04-20

## 2021-04-20 PROBLEM — E11.9 DIABETES MELLITUS, TYPE 2 (H): Status: ACTIVE | Noted: 2021-04-20

## 2021-04-20 ASSESSMENT — ANXIETY QUESTIONNAIRES: GAD7 TOTAL SCORE: 8

## 2021-04-21 NOTE — RESULT ENCOUNTER NOTE
Dear Amy  Here are your labs as you can see the A1c is 6.7.  My suggested plan is that you might have Diabetes. Please follow up to discuss further..  Please message me if you have any concerns.  Sajan Castellanos MD

## 2021-08-28 ENCOUNTER — HEALTH MAINTENANCE LETTER (OUTPATIENT)
Age: 61
End: 2021-08-28

## 2021-09-04 ENCOUNTER — NURSE TRIAGE (OUTPATIENT)
Dept: NURSING | Facility: CLINIC | Age: 61
End: 2021-09-04

## 2021-09-04 NOTE — TELEPHONE ENCOUNTER
Has covid.  Depression and anxiety have increased recently.  Lives in a building that is not safe/clean.   Increase in shortness of breath.    Daughter recently passed away.  Family is not willing to help patient.  Has a plan, is going to jump off a bridge. [facility] [place] [facility]    Has arranged for one daughter to come and take Amy's cat. Daughter is on her way.    Because she's been turned down by family in the past when she's asked for help, she will not let them know of her plan to take her own life.    She's been helping with the care of her 11 granddaughter recently, since her granddaughter's mother passed away. Her granddaughter is with her now.    She's going to call her granddaughter's father and ask him to come get his daughter. She's using her granddaughter's phone. [phone].  Amy doesn't have a phone at this time, otherwise. She said she'll answer the phone when I call her back. She asked for 10 minutes. She is not going to tell her family of her intentionto  harm herself right now.     She is going to tell her family that she is going to the ER. She is not going to tell them that it is related to her depression and her desire to die.    Father of granddaughter will meet Amy and the granddaughter at the hospital.  Daughter who is coming for the cat is at the mall right now so uncertain as to when she'll get there.  Amy is going to check with a friend in her building about taking the cat.  I'll call back again in a little while.    I called Amy again about 3:15 pm. Her daughter is on her way to Amy's home, now to get the cat.  I asked Amy if she is okay then, once her cat is gone to call 911 herself. She said she is. She said she knows she needs help. She's not able at this point to even feed herself.  Wilth covid she knows she'll be in isolation. She knows too that she'll be safe and care for.        COVID 19 Nurse Triage Plan/Patient Instructions    Please be aware  that novel coronavirus (COVID-19) may be circulating in the community. If you develop symptoms such as fever, cough, or SOB or if you have concerns about the presence of another infection including coronavirus (COVID-19), please contact your health care provider or visit [email or web address].     Disposition/Instructions    Call to EMS/911 recommended. Follow protocol based instructions.     Bring Your Own Device:  Please also bring your smart device(s) (smart phones, tablets, laptops) and their charging cables for your personal use and to communicate with your care team during your visit.    Thank you for taking steps to prevent the spread of this virus.  o Limit your contact with others.  o Wear a simple mask to cover your cough.  o Wash your hands well and often.    Resources    [facility]: About COVID-19: [email or web address]    CDC: What to Do If You're Sick: www.cdc.gov/coronavirus/2019-ncov/about/steps-when-sick.html    CDC: Ending Home Isolation: www.cdc.gov/coronavirus/2019-ncov/hcp/disposition-in-home-patients.html     CDC: Caring for Someone: www.cdc.gov/coronavirus/2019-ncov/if-you-are-sick/care-for-someone.html     [facility]: Interim Guidance for Hospital Discharge to Home: www.health.[facility].mn.us/diseases/coronavirus/hcp/hospdischarge.pdf    [facility] clinical trials (COVID-19 research studies): clinicalaffairs.[facility]-clinical-trials     Below are the COVID-19 hotlines at the [facility] ([facility]). Interpreters are available.   o For health questions: Call 450-712-7969 or 1-474.150.4139 (7 a.m. to 7 p.m.)  o For questions about schools and childcare: Call 855-283-5604 or 1-214.801.9253 (7 a.m. to 7 p.m.)     Reason for Disposition    Patient is threatening suicide now    Protocols used: ISHMAEL-A-GWEN WILKERSON RN [facility]

## 2021-09-05 ENCOUNTER — APPOINTMENT (OUTPATIENT)
Dept: GENERAL RADIOLOGY | Facility: CLINIC | Age: 61
DRG: 881 | End: 2021-09-05
Attending: EMERGENCY MEDICINE
Payer: COMMERCIAL

## 2021-09-05 ENCOUNTER — HOSPITAL ENCOUNTER (INPATIENT)
Facility: CLINIC | Age: 61
LOS: 8 days | Discharge: PSYCHIATRIC OR SUBSTANCE USE FACILITY | DRG: 881 | End: 2021-09-13
Attending: EMERGENCY MEDICINE | Admitting: INTERNAL MEDICINE
Payer: COMMERCIAL

## 2021-09-05 DIAGNOSIS — E87.6 HYPOKALEMIA: ICD-10-CM

## 2021-09-05 DIAGNOSIS — U07.1 PNEUMONIA DUE TO 2019 NOVEL CORONAVIRUS: ICD-10-CM

## 2021-09-05 DIAGNOSIS — R45.851 SUICIDAL IDEATION: ICD-10-CM

## 2021-09-05 DIAGNOSIS — J12.82 PNEUMONIA DUE TO 2019 NOVEL CORONAVIRUS: ICD-10-CM

## 2021-09-05 LAB
ALBUMIN SERPL-MCNC: 3.1 G/DL (ref 3.4–5)
ALP SERPL-CCNC: 84 U/L (ref 40–150)
ALT SERPL W P-5'-P-CCNC: 24 U/L (ref 0–50)
ANION GAP SERPL CALCULATED.3IONS-SCNC: 4 MMOL/L (ref 3–14)
AST SERPL W P-5'-P-CCNC: 19 U/L (ref 0–45)
BASOPHILS # BLD AUTO: 0 10E3/UL (ref 0–0.2)
BASOPHILS NFR BLD AUTO: 0 %
BILIRUB SERPL-MCNC: 0.2 MG/DL (ref 0.2–1.3)
BUN SERPL-MCNC: 7 MG/DL (ref 7–30)
CALCIUM SERPL-MCNC: 7.7 MG/DL (ref 8.5–10.1)
CHLORIDE BLD-SCNC: 110 MMOL/L (ref 94–109)
CO2 SERPL-SCNC: 27 MMOL/L (ref 20–32)
CREAT SERPL-MCNC: 0.65 MG/DL (ref 0.52–1.04)
CREAT SERPL-MCNC: 0.79 MG/DL (ref 0.52–1.04)
EOSINOPHIL # BLD AUTO: 0 10E3/UL (ref 0–0.7)
EOSINOPHIL NFR BLD AUTO: 1 %
ERYTHROCYTE [DISTWIDTH] IN BLOOD BY AUTOMATED COUNT: 15.5 % (ref 10–15)
ETHANOL SERPL-MCNC: <0.01 G/DL
GFR SERPL CREATININE-BSD FRML MDRD: 82 ML/MIN/1.73M2
GFR SERPL CREATININE-BSD FRML MDRD: >90 ML/MIN/1.73M2
GLUCOSE BLD-MCNC: 94 MG/DL (ref 70–99)
HCT VFR BLD AUTO: 40 % (ref 35–47)
HGB BLD-MCNC: 12.3 G/DL (ref 11.7–15.7)
HOLD SPECIMEN: NORMAL
IMM GRANULOCYTES # BLD: 0 10E3/UL
IMM GRANULOCYTES NFR BLD: 1 %
LYMPHOCYTES # BLD AUTO: 1.3 10E3/UL (ref 0.8–5.3)
LYMPHOCYTES NFR BLD AUTO: 31 %
MCH RBC QN AUTO: 24.9 PG (ref 26.5–33)
MCHC RBC AUTO-ENTMCNC: 30.8 G/DL (ref 31.5–36.5)
MCV RBC AUTO: 81 FL (ref 78–100)
MONOCYTES # BLD AUTO: 0.6 10E3/UL (ref 0–1.3)
MONOCYTES NFR BLD AUTO: 14 %
NEUTROPHILS # BLD AUTO: 2.3 10E3/UL (ref 1.6–8.3)
NEUTROPHILS NFR BLD AUTO: 53 %
NRBC # BLD AUTO: 0 10E3/UL
NRBC BLD AUTO-RTO: 0 /100
PLATELET # BLD AUTO: 311 10E3/UL (ref 150–450)
POTASSIUM BLD-SCNC: 3.2 MMOL/L (ref 3.4–5.3)
POTASSIUM BLD-SCNC: 3.8 MMOL/L (ref 3.4–5.3)
PROT SERPL-MCNC: 6.6 G/DL (ref 6.8–8.8)
RBC # BLD AUTO: 4.93 10E6/UL (ref 3.8–5.2)
SARS-COV-2 RNA RESP QL NAA+PROBE: POSITIVE
SODIUM SERPL-SCNC: 141 MMOL/L (ref 133–144)
TROPONIN I SERPL-MCNC: <0.015 UG/L (ref 0–0.04)
WBC # BLD AUTO: 4.2 10E3/UL (ref 4–11)

## 2021-09-05 PROCEDURE — 84484 ASSAY OF TROPONIN QUANT: CPT | Performed by: EMERGENCY MEDICINE

## 2021-09-05 PROCEDURE — 36415 COLL VENOUS BLD VENIPUNCTURE: CPT | Performed by: INTERNAL MEDICINE

## 2021-09-05 PROCEDURE — 99285 EMERGENCY DEPT VISIT HI MDM: CPT | Mod: 25

## 2021-09-05 PROCEDURE — 85025 COMPLETE CBC W/AUTO DIFF WBC: CPT | Performed by: EMERGENCY MEDICINE

## 2021-09-05 PROCEDURE — 120N000001 HC R&B MED SURG/OB

## 2021-09-05 PROCEDURE — C9803 HOPD COVID-19 SPEC COLLECT: HCPCS

## 2021-09-05 PROCEDURE — 87635 SARS-COV-2 COVID-19 AMP PRB: CPT | Performed by: INTERNAL MEDICINE

## 2021-09-05 PROCEDURE — 99223 1ST HOSP IP/OBS HIGH 75: CPT | Mod: AI | Performed by: INTERNAL MEDICINE

## 2021-09-05 PROCEDURE — 82077 ASSAY SPEC XCP UR&BREATH IA: CPT | Performed by: EMERGENCY MEDICINE

## 2021-09-05 PROCEDURE — 250N000011 HC RX IP 250 OP 636: Performed by: INTERNAL MEDICINE

## 2021-09-05 PROCEDURE — 84132 ASSAY OF SERUM POTASSIUM: CPT | Performed by: INTERNAL MEDICINE

## 2021-09-05 PROCEDURE — 250N000013 HC RX MED GY IP 250 OP 250 PS 637: Performed by: INTERNAL MEDICINE

## 2021-09-05 PROCEDURE — 80053 COMPREHEN METABOLIC PANEL: CPT | Performed by: EMERGENCY MEDICINE

## 2021-09-05 PROCEDURE — 82565 ASSAY OF CREATININE: CPT | Performed by: INTERNAL MEDICINE

## 2021-09-05 PROCEDURE — 36415 COLL VENOUS BLD VENIPUNCTURE: CPT | Performed by: EMERGENCY MEDICINE

## 2021-09-05 PROCEDURE — 90791 PSYCH DIAGNOSTIC EVALUATION: CPT

## 2021-09-05 PROCEDURE — 250N000013 HC RX MED GY IP 250 OP 250 PS 637: Performed by: EMERGENCY MEDICINE

## 2021-09-05 PROCEDURE — 71045 X-RAY EXAM CHEST 1 VIEW: CPT

## 2021-09-05 PROCEDURE — 93005 ELECTROCARDIOGRAM TRACING: CPT

## 2021-09-05 RX ORDER — IBUPROFEN 600 MG/1
600 TABLET, FILM COATED ORAL EVERY 6 HOURS PRN
Status: DISCONTINUED | OUTPATIENT
Start: 2021-09-05 | End: 2021-09-13 | Stop reason: HOSPADM

## 2021-09-05 RX ORDER — ACETAMINOPHEN 325 MG/1
650 TABLET ORAL EVERY 6 HOURS PRN
Status: DISCONTINUED | OUTPATIENT
Start: 2021-09-05 | End: 2021-09-13 | Stop reason: HOSPADM

## 2021-09-05 RX ORDER — POTASSIUM CHLORIDE 1500 MG/1
40 TABLET, EXTENDED RELEASE ORAL ONCE
Status: COMPLETED | OUTPATIENT
Start: 2021-09-05 | End: 2021-09-05

## 2021-09-05 RX ORDER — LIDOCAINE 40 MG/G
CREAM TOPICAL
Status: DISCONTINUED | OUTPATIENT
Start: 2021-09-05 | End: 2021-09-13 | Stop reason: HOSPADM

## 2021-09-05 RX ORDER — IBUPROFEN 400 MG/1
400 TABLET, FILM COATED ORAL EVERY 8 HOURS PRN
COMMUNITY
End: 2021-10-14

## 2021-09-05 RX ORDER — ONDANSETRON 2 MG/ML
4 INJECTION INTRAMUSCULAR; INTRAVENOUS EVERY 6 HOURS PRN
Status: DISCONTINUED | OUTPATIENT
Start: 2021-09-05 | End: 2021-09-13 | Stop reason: HOSPADM

## 2021-09-05 RX ORDER — PROCHLORPERAZINE 25 MG
25 SUPPOSITORY, RECTAL RECTAL EVERY 12 HOURS PRN
Status: DISCONTINUED | OUTPATIENT
Start: 2021-09-05 | End: 2021-09-13 | Stop reason: HOSPADM

## 2021-09-05 RX ORDER — ACETAMINOPHEN 500 MG
1000 TABLET ORAL EVERY 8 HOURS PRN
Status: DISCONTINUED | OUTPATIENT
Start: 2021-09-05 | End: 2021-09-13 | Stop reason: HOSPADM

## 2021-09-05 RX ORDER — PROCHLORPERAZINE MALEATE 10 MG
10 TABLET ORAL EVERY 6 HOURS PRN
Status: DISCONTINUED | OUTPATIENT
Start: 2021-09-05 | End: 2021-09-13 | Stop reason: HOSPADM

## 2021-09-05 RX ORDER — LANOLIN ALCOHOL/MO/W.PET/CERES
1000 CREAM (GRAM) TOPICAL DAILY
Status: DISCONTINUED | OUTPATIENT
Start: 2021-09-05 | End: 2021-09-05

## 2021-09-05 RX ORDER — ACETAMINOPHEN 500 MG
1000 TABLET ORAL ONCE
Status: DISCONTINUED | OUTPATIENT
Start: 2021-09-05 | End: 2021-09-05

## 2021-09-05 RX ORDER — ONDANSETRON 4 MG/1
4 TABLET, ORALLY DISINTEGRATING ORAL EVERY 6 HOURS PRN
Status: DISCONTINUED | OUTPATIENT
Start: 2021-09-05 | End: 2021-09-13 | Stop reason: HOSPADM

## 2021-09-05 RX ORDER — ELECTROLYTES/DEXTROSE
1 SOLUTION, ORAL ORAL DAILY
Status: DISCONTINUED | OUTPATIENT
Start: 2021-09-05 | End: 2021-09-05

## 2021-09-05 RX ORDER — ACETAMINOPHEN 650 MG/1
650 SUPPOSITORY RECTAL EVERY 6 HOURS PRN
Status: DISCONTINUED | OUTPATIENT
Start: 2021-09-05 | End: 2021-09-13 | Stop reason: HOSPADM

## 2021-09-05 RX ORDER — CALCIUM CARBONATE 500(1250)
2 TABLET ORAL 3 TIMES DAILY
Status: DISCONTINUED | OUTPATIENT
Start: 2021-09-05 | End: 2021-09-05

## 2021-09-05 RX ORDER — IBUPROFEN 200 MG
400 TABLET ORAL ONCE
Status: COMPLETED | OUTPATIENT
Start: 2021-09-05 | End: 2021-09-05

## 2021-09-05 RX ADMIN — POTASSIUM CHLORIDE 40 MEQ: 1500 TABLET, EXTENDED RELEASE ORAL at 03:52

## 2021-09-05 RX ADMIN — ONDANSETRON 4 MG: 2 INJECTION INTRAMUSCULAR; INTRAVENOUS at 15:51

## 2021-09-05 RX ADMIN — ENOXAPARIN SODIUM 40 MG: 40 INJECTION SUBCUTANEOUS at 13:27

## 2021-09-05 RX ADMIN — IBUPROFEN 400 MG: 200 TABLET, FILM COATED ORAL at 03:52

## 2021-09-05 RX ADMIN — IBUPROFEN 600 MG: 600 TABLET ORAL at 15:56

## 2021-09-05 ASSESSMENT — ACTIVITIES OF DAILY LIVING (ADL)
ADLS_ACUITY_SCORE: 16
ADLS_ACUITY_SCORE: 16
ADLS_ACUITY_SCORE: 15

## 2021-09-05 ASSESSMENT — ENCOUNTER SYMPTOMS
DIARRHEA: 1
COUGH: 1
SHORTNESS OF BREATH: 1
ABDOMINAL PAIN: 0
FEVER: 1

## 2021-09-05 ASSESSMENT — MIFFLIN-ST. JEOR
SCORE: 1609.75
SCORE: 1573.79

## 2021-09-05 NOTE — ED NOTES
"[facility]  ED Nurse Handoff Report    ED Chief complaint: Suicidal      ED Diagnosis:   Final diagnoses:   None       Code Status: Full Code---according to previous visits    Allergies:   Allergies   Allergen Reactions     Codeine Sulfate GI Disturbance       Patient Story: pt feeling suicidal. Has lost 2 daughters and another is in the hospital. Also sent another grandson to a group home. Has granddaughter part time. Depression worsening and suicidal thoughts with plan to jump off bridge tonight.   Focused Assessment:  Pt sad and tearful. Reports worsening depression and self worth. Had plan to jump off a bridge. States brought a pen and paper with her to write a note. Does not feel safe to be alone. Came voluntarily but placed on RUPINDER per Dr. Calhoun    Treatments and/or interventions provided: labs, ekg, DEC assessment, RUPINDER  Patient's response to treatments and/or interventions: tearful and cooperative    To be done/followed up on inpatient unit:  monitor    Does this patient have any cognitive concerns?: none    Activity level - Baseline/Home:  Independent  Activity Level - Current:   Independent    Patient's Preferred language: English   Needed?: No    Isolation: None and COVID r/o and special precautions  Infection: Not Applicable  COVID r/o and special precautions  Patient tested for COVID 19 prior to admission: NO----- + test 8/31/2021  Bariatric?: No    Vital Signs:   Vitals:    09/05/21 0207 09/05/21 0212 09/05/21 0253   BP: (!) 143/89     Pulse: 89     Resp: 18     Temp:  98.3  F (36.8  C)    TempSrc:  Temporal    SpO2: 99%  98%   Weight: 99.8 kg (220 lb)     Height: 1.651 m (5' 5\")         Cardiac Rhythm:     Was the PSS-3 completed:   Yes  What interventions are required if any?    Interventions: Monitored via video  Required Interventions: Belongings removed  High Risk Required Interventions: Provider notified;On continuous in person observation    Family Comments: " granddaughter was here but was picked up by father  OBS brochure/video discussed/provided to patient/family: No              Name of person given brochure if not patient:               Relationship to patient:     For the majority of the shift this patient's behavior was Green.   Behavioral interventions performed were none.    ED NURSE PHONE NUMBER: [phone]

## 2021-09-05 NOTE — ED TRIAGE NOTES
Pt reports 2 of her daughters have  and 1 has been in the hospital for the past year. Plan was to jump off a bridge and states brought a pen and paper with her to write a suicide note. They also brought her cat to her granddaughter's friend to watch.

## 2021-09-05 NOTE — PHARMACY-ADMISSION MEDICATION HISTORY
Pharmacy Medication History  Admission medication history interview status for the 9/5/2021  admission is complete. See EPIC admission navigator for prior to admission medications     Location of Interview: Phone  Medication history sources: Patient    Significant changes made to the medication list:  Changed: ibuprofen from 800mg to 400mg    In the past week, patient estimated taking medication this percent of the time: less than 50% due to other and (no longer wanting to take the meds)    Additional medication history information:   Patient reported only taking ibuprofen PRN. Other meds were left on profile due to refills left on prescription but contain notes reporting the patient is not using the medications.    Medication reconciliation completed by provider prior to medication history? Yes    Time spent in this activity: 15 mins    Prior to Admission medications    Medication Sig Last Dose Taking? Auth Provider   ibuprofen (ADVIL/MOTRIN) 400 MG tablet Take 400 mg by mouth every 8 hours as needed for moderate pain  at PRN Yes Unknown, Entered By History   calcium carbonate (OS-CRISTIAN) 500 MG tablet Take 2 tablets (1,000 mg) by mouth 3 times daily   Randal Castellanos MD   cholecalciferol 50 MCG (2000 UT) tablet Take 1 tablet (50 mcg) by mouth daily   Randal Castellanos MD   cyanocobalamin (VITAMIN B-12) 1000 MCG tablet Take 1 tablet (1,000 mcg) by mouth daily   Randal Castellanos MD   Multiple Vitamin (MULTIVITAMIN ADULT) TABS Take 1 tablet by mouth daily   Randal Castellanos MD   triamcinolone (KENALOG) 0.1 % external ointment Apply topically 2 times daily   Randal Castellanos MD   vitamin B complex with vitamin C (VITAMIN  B COMPLEX) tablet Take 1 tablet by mouth daily   Randal Castellanos MD       The information provided in this note is only as accurate as the sources available at the time of update(s)     Susan Castro PharmD

## 2021-09-05 NOTE — SAFE
Amy Choudhury  September 5, 2021  SAFE Note    Critical Safety Issues: Patient is suicidal with desire to die. Does not feel safe alone and will need to be observed while inpatient for medical and seen by psychiatry.       Current Suicidal Ideation/Self-Injurious Concerns/Methods: Jumping (from building, into traffic, etc.)      Current or Historical Inappropriate Sexual Behavior: No      Current or Historical Aggression/Homicidal Ideation: None - N/A      Triggers: Death of daughter, loss of 2nd child, Covid 19, depression, anxiety, hopelessness, not feeling as though other's care    Updated care team: Yes: Informed physician    For additional details see full LMHP assessment.       Anastasiya Ho [facility]

## 2021-09-05 NOTE — PLAN OF CARE
RECEIVING UNIT ED HANDOFF REVIEW    ED Nurse Handoff Report was reviewed by: Joy Huerta RN on September 5, 2021 at 11:11 AM

## 2021-09-05 NOTE — ED NOTES
"9/5/2021  Amy Choudhury 1960     [facility] Crisis Assessment:    Started at: 5:46 am  Completed at: 6:22 am  Patient was assessed via virtually ([employer] cart or other teleconferencing device).    Chief Complaint and History of Presenting Problem:    Patient is a 61 year old  female who presented to the ED by Self and Medics related to concerns for Suicidality. Amy reports she has been planning her death for \"quite some time\" and has been preparing to die. She denies being open and honest with her therapist SB Quinn, [facility] whom she has been with for 2 years. Amy reports the sudden death of her 2nd child is the event that precipitated these thoughts along with being isolated due to pandemic and current dx of Covid 19. Amy reports experiencing symptoms: headache, body aches, weakness, and throat pain. Amy shared she had planned to jump off of a bridge today. She bought a pen and paper to write suicide note. Amy gave her cat away and made plans to drop off her granddaughter. She ended up contacting EMS as she was not able to make it to the bridge as she came to the conclusion her granddaughter just lost her mother, she did not feel as though she could jump. Amy however is conflicted and stated, \"everyone would be better off\" and indicating being a burden to others.     Assessment and intervention involved meeting with pt, obtaining collateral from EPIC and Care Everywhere records and consult with ED staff, employing crisis psychotherapy including: Establishing rapport, Assess dimensions of crisis, Apply solution-focused therapy to address current crisis and Other: empathetic listening, validation of events, aligned with patient, provided a safe and supportive enviornment to engage.. Collateral information includes reviewing of EMR Epic, gathering of information and consulting with providers.     Biopsychosocial Background and Demographic Information    Amy reports she lives alone and " "has been socially isolated due to Covid. Amy has had a difficult year with one of her children being placed inpatient due to illness, and the death of her second child. Amy shares that she has had prior hx of mental illness with depression and anxiety. Amy admits she has been prepping for a couple of months to die. At this time she is not on medication for mental health purposes. It has been 4 years since she took medication. Amy feels she is in need of some sort of respite. She states she feels understood. Amy feels her family does not check on her as often as they should.       Mental Health History and Current Symptoms     Has a long hx of mental health issues. Amy reports since covid she has been isolated and stated\"no one would no if anything happened to me because nobody checks on me. She currently endorses: sad mood, loss of child, tearfulness, thoughts she would be better off dead, thoughts of suicide with plan to jump from bridge, sleep disturbance, along with symptoms of covid.                                                                                        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 At baseline, patient describes their mental health symptoms as present and she is able to manage with therapy alone.     Mental Health History (prior psychiatric hospitalizations, civil commitments, programmatic care, etc):n/a  Family Mental and Chemical Health History: unknown    Current and Historic Psychotropic Medications: n/a  Medication Adherent: No  Recent medication changes? No    Relevant Medical Concerns  Patient identifies concerns with completing ADLs? Yes  Patient can ambulate independently? Yes  Other medical health concerns? Yes  History of concussion or TBI? No     Trauma History   Physical, Emotional, or Sexual abuse: No  Loss of a friend or family member to suicide: Yes  Other identified traumatic event or significant " stressor: Yes    Substance Use History and Treatments  She reports no hx of substance use o ETOH however, EMR records indicate elsewhere    Drug screen/BAL/Breathalyzer Completed? No  Results: n/a     History of Suicidal Ideation, Suicide Attempts, Non-Suicidal Self Injury, and Risk Formulation:   Details of Current Ideation, Attempt(s), Plan(s): Suicidal ideation with intent by jumping from a bridge.  Risk factors:recent death of child, socially isolated,  family history of suicide, access to lethal means, living alone, recent death of loved one and loss of relationship, separation/divorce, etc..   Protective factors: in ED, formal support responsibilities to others (spouse, pets, children, etc.) and raised multiple childen through foster and adoption.  History and Prior Methods of Self-injury: n/a  History of Suicide Attempts:n/a    ESS-6  1.a. Over the past 2 weeks, have you had thoughts of killing yourself? Yes   1.b. Have you ever attempted to kill yourself and, if yes, when did this last happen? No  2. Recent or current suicide plan? Yes  3. Recent or current intent to act on ideation? Yes  4. Lifetime psychiatric hospitalization? No  5. Pattern of excessive substance use? No  6. Current irritability, agitation, or aggression? Yes  ESS-6 Score: 4/6 High risk      Other Risk Areas  Aggressive/assumptive/homicidal risk factors: No   Sexually inappropriate behavior? No      Vulnerability to sexual exploitation? No     Clinical Presentation and Current Symptoms   Amy presented sad and tearful and at times inconsolable. Amy has a desire to die with intent. Her reason for living is her granddaughter. She has prepped to die by giving away debbie items such as her cat. Amy denies auditoy and visual hallucinations. She is oriented to all spheres. She denies homicidal ideation, plan, or intent. Judgement is impacted by grief and sadness from losing a child, and insight is fair.     Attention, Hyperactivity, and  Impulsivity: No   Anxiety:Yes: Generalized Symptoms: Agitation, Avoidance, Cognitive anxiety - feelings of doom, racing thoughts, difficulty concentrating , Excessive worry and Physiological anxiety - sweating, flushing, shaking, shortness of breath, or racing heart    Behavioral Difficulties: Yes: Agitation, Disruptive, Negativistic/Defiant and Withdrawal/Isolation   Mood Symptoms: Yes: Appetite change/weight change , Crying or feels like crying, Feelings of helplessness , Feelings of hopelessness , Feelings of worthlessness , Impaired concentration, Impaired decision making , Increased irritability/agitation, Isolative , Loss of interest / Anhedonia , Sad, depressed mood  and Thoughts of suicide/death    Appetite: No   Feeding and Eating: No  Interpersonal Functioning: Yes: Impaired Interpersonal Functioning  Learning Disabilities/Cognitive/Developmental Disorders: No   General Cognitive Impairments: Yes: Decision-Making and Judgment/Insight  If yes, see completed Mini-Cog Assessment below.  Sleep: No   Psychosis: No    Trauma: No       Mental Status Exam:  Affect: Appropriate and Flat  Appearance: Disheveled   Attention Span/Concentration: Attentive    Eye Contact: Variable  Fund of Knowledge: Appropriate   Language /Speech Content: Fluent  Language /Speech Volume: Normal   Language /Speech Rate/Productions: Normal   Recent Memory: Intact  Remote Memory: Intact  Mood: Anxious, Irritable and Sad   Orientation:   Person: Yes   Place: Yes  Time of Day: Yes   Date: Yes   Situation (Do they understand why they are here?): Yes   Psychomotor Behavior: Normal   Thought Content: Clear and Suicidal  Thought Form: Intact    Current Providers and Contact Information   Patient is her own legal guardian.     Primary Care Provider: Yes, Randal Castellanos MD  Psychiatrist: No  Therapist: Yes, Adam BASURTO [facility]  : No  CTSS or ARMHS: No  ACT Team: No  Other: No    Has an LEXIE been signed?  "Yes ; For PCP and Adam Chavira; By: Amy; Relationship to patient self.     Clinical Summary and Recommendations    Clinical summary of assessment (include strengths, protective factors, community resources, and assessment of vulnerability/risk): Patient presented to ED after contacting ambulance. Along with suicidal ideation patient has ested positive for Covid 19. Patient has symptoms that have to be addressed and monitored. In the meantime, patient is unable to remain safe in the ED or in a room without supervision. Patient has been experiencing pervasive thoughts of death and ending her life. She has prepared and also did not mention to her therapist. While she is empathetic and compassionate she feels as though her life is a burden to even those she has fostered and raised. Thoughts and behaviors are volatile. protecitve factor is her granddaughter as Amy states she just lost her mother I can;t do that to her.\". She is vulnerable to each moment and this is her 2nd child to die. The pain and capcity is no longer bearable for patien.      Diagnosis with F Codes: F43.0 Acute Stress  Recommendation for inpatient placement medical due to Covid and inpatient behavioral helath due to SI with intent and plan.    Disposition  Attending provider, Dr Calhoun Sherice consulted and does  agree with recommended disposition which includes Medical Admission: with inpatient for SI and Covid. Patient agrees with recommended level of care. She agrees she is not stable enough to be left alone and states she will need to be observed and medially admitted for positive covid results with symptoms.     Details of final disposition include: Medical admission: consulted with ED physician and it is agreeable for pateint to be observed and states unable to remain safe .      Patient is going inpatient and is unable to remain safe  If Inpatient, is patient admitted voluntary? Yes   Patient aware of potential for transfer if there is not " appropriate placement? NA  [place]? NA   Central Intake Notified? No  If Discharging, what are follow up needs? **  Safety/after care plan provided to Patient and Provider, Dr Unique Smiley by [facility]    Duration of assessment time: 1.0 hrs    CPT code(s) utilized: 21958, up to 74 minutes      JUNIE Harvey    .[facility]

## 2021-09-05 NOTE — ED TRIAGE NOTES
Has been walking around cities all day with granddaughter. She is stressed out about custody issues with granddaughter after her daughters death and feeling suicidal.

## 2021-09-05 NOTE — ED PROVIDER NOTES
History   Chief Complaint:  Suicidal     The history is provided by the EMS personnel and the patient.      Amy Choudhury is a 61 year old female with history of depression, anxiety and type 2 diabetes who presents with suicidal. The patient has recently had suicidal ideation. She was planning to jump off of a bridge and had brought a pen and paper to write her suicide note. She had made plans for her granddaughter, who currently lives with the patient, and gave her cat away. Tonight, she got on a bus to drop off her granddaughter, and when she got off the bus she didn't feel like she could walk any further so she called EMS. Here, she was diagnosed with COVID-19 a week ago and has been experiencing an intermittent fever, shortness of breath, cough and some diarrhea. She denies any chest pain or abdominal pain.    Review of Systems   Constitutional: Positive for fever.   Respiratory: Positive for cough and shortness of breath.    Cardiovascular: Negative for chest pain.   Gastrointestinal: Positive for diarrhea. Negative for abdominal pain.   Psychiatric/Behavioral: Positive for suicidal ideas.   All other systems reviewed and are negative.    Allergies:  Codeine Sulfate  Nsaids  Morphine    Medications:  Calcium carbonate  Vitamin B-12    Past Medical History:    Anemia  Anxiety  Axillary abscess  Benign neoplasm of adrenal gland  Depression  GERD  Hiatal hernia  Hyperparathyroidism  Peptic ulcer  TMJ  Vitamin D deficiency  Microcytic anemia  Obesity  Sciatica  Tobacco dependence syndrome  Adjustment disorder with mixed anxiety and depressed move  Type 2 diabetes mellitus   Panic attacks  Eczema    Past Surgical History:    Appendectomy  Cholecystectomy  Gastric bypass  Parathyroidectomy  Bartholin gland cyst  Spine surgery    Family History:    Mother: Thyroid disease, Breast cancer, Cystic acne, Diabetes  Brother: Cancer  Daughter(s): Type 1 diabetes, Alcohol/Drug  Father: Cancer    Social History:  The  "patient was accompanied to the ED by EMS.    Physical Exam     Patient Vitals for the past 24 hrs:   BP Temp Temp src Pulse Resp SpO2 Height Weight   09/05/21 0500 -- -- -- -- -- 93 % -- --   09/05/21 0400 -- -- -- -- -- 97 % -- --   09/05/21 0253 -- -- -- -- -- 98 % -- --   09/05/21 0212 -- 98.3  F (36.8  C) Temporal -- -- -- -- --   09/05/21 0207 (!) 143/89 -- -- 89 18 99 % 1.651 m (5' 5\") 99.8 kg (220 lb)       Physical Exam  General: Sitting up in bed  Eyes:  The pupils are equal and round    Conjunctivae and sclerae are normal  ENT:    Wearing a mask  Neck:  Normal range of motion  CV:  Regular rate, regular rhythm     Skin warm and well perfused   Resp:  Non labored breathing on room air    No tachypnea    No cough heard  GI:  Abdomen is soft, there is no rigidity    No distension    No rebound tenderness     No abdominal tenderness  MS:  Normal muscular tone  Skin:  No rash or acute skin lesions noted  Neuro:   Awake, alert.      Speech is normal and fluent.    Face is symmetric.     Moves all extremities equally  Psych:  Crying    Emergency Department Course   ECG:  ECG taken at 0249, ECG read at 0253  Normal sinus rhythm  Possible Inferior infarct, age undetermined  Abnormal ECG  No significant change compared to EKG dated 05/29/2014  Rate 74 bpm. IL interval 160 ms. QRS duration 72 ms. QT/QTc 414/459 ms. P-R-T axes 68 -4 52.    Imaging:  XR Chest Port 1 View  Query mild left greater than right peripheral basilar groundglass, consistent with provided diagnosis of Covid, recommend follow-up to resolution. No pneumothorax or pleural effusion. Cardiac silhouette within normal limits. No acute osseous   abnormality.  Reading per radiology    Laboratory:  CBC: WBC 4.2, HGB 12.3,   CMP: Potassium 3.2 (L), Chloride 110 (H), Calcium 7.7 (L), Albumin 3.1 (L), Protein Total 6.6 (L) o/w WNL (Creatinine 0.79)    Troponin (Collected 0247): <0.015    Ethyl Alcohol Level: <0.01    Drug abuse screen 77 urine: " "pending    Emergency Department Course:  Reviewed:  I reviewed nursing notes, vitals, past medical history and care everywhere    Assessments:  0249 I obtained history and examined the patient as noted above.     0632  Spoke to DEC about patient - recommends psych admission     Interventions:  0352 Potassium chloride 40mEq PO  0352 Ibuprofen 400mg PO    Disposition:  The patient was admitted to the hospital under the care of Dr. Hopkins     Impression & Plan   Medical Decision Making:  Amy Choudhury is a 61-year-old female who presented to the emergency department with suicidal ideation.  Notably\" patient has plans to harm her self.  He is also Covid positive.  Her vital signs are reassuring.  Chest x-ray shows findings of Covid pneumonia but she is not hypoxic. Labs overall reassuring.  Does not need admission for covid right now but does need admission for further psych assessment. Virtual DEC was done that recommended psych admission as well.  Given that she is Covid positive, admitted her medically with psych consult.     Diagnosis:    ICD-10-CM    1. Pneumonia due to 2019 novel coronavirus  U07.1     J12.82    2. Hypokalemia  E87.6    3. Suicidal ideation  R45.851        Scribe Disclosure:  I, Gopal Galeas, am serving as a scribe at 2:12 AM on 9/5/2021 to document services personally performed by Sherice Calhoun MD based on my observations and the provider's statements to me.      Sherice Calhoun MD  09/05/21 0640    "

## 2021-09-05 NOTE — H&P
[facility]    History and Physical  Hospitalist       Date of Admission:  9/5/2021    Assessment & Plan     This is a 61-year-old female with history of GERD, anxiety, depression, prediabetes, who came to the ER with suicidal ideation and thoughts, she was recently diagnosed to have COVID infection.       ASSESSMENT AND PLAN:     1.  Suicidal ideation and thoughts:  The patient is acutely suicidal.  She is a 61-year-old female with history of anxiety and depression, not on any medication.  We will admit her for that.  Keep her on suicidal precautions.  Consult Psychiatry to evaluate the patient.  2.  COVID-19 infection:  The patient does have a positive COVID-19 as her chest x-ray shows some changes consistent with COVID-19, but she is saturating well and afebrile at this time.  Needs conservative management at this time.  No need for any dexamethasone and remdesivir at this time.  We will keep her on continuous pulse oximetry to rule out any hypoxia.  3.  History of anxiety and depression:  Further management as per the psychiatrist.  Currently is stable, not on any medication.  4.  Pre-diabetes:  Blood sugar is well controlled at this time.    CODE STATUS:  FULL CODE.    The case discussed with ER physician and nursing staff taking care of the patient.    Los Rod MD      DVT Prophylaxis: Enoxaparin (Lovenox) SQ  Code Status: Full Code    Disposition: Expected discharge in 2 days once stable.    Los Rod MD    Primary Care Physician   Randal Castellanos    Chief Complaint   Suicidal ideation and thoughts     History is obtained from the patient    History of Present Illness   Admitted: 09/05/2021    HISTORY OF PRESENT ILLNESS:  This is a 61-year-old female with history of GERD, anxiety, depression, prediabetes, who came to the ER with suicidal ideation and thoughts.    The patient told me that she has history of suicidal ideation and thoughts in the past.  She was planning on jumping  off a bridge and brought pen and paper to write a suicide note as well.  She was also diagnosed with COVID-19 last week, on 31st I believe she said, on Monday or Tuesday last week, and was having at that time fever, some cough, some shortness of breath on exertion and some diarrhea off and on. She called EMS today because she was not feeling well and so was brought to the ED.  At this time, denies any fever, chest pain, abdominal pain, nausea, vomiting, dizziness, lightheadedness, occasional cough and mild shortness of breath on exertion.  She told me that she is not taking any drugs or drinking alcohol currently.  Rest of the review of system is negative.    Past Medical History    I have reviewed this patient's medical history and updated it with pertinent information if needed.   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     Depressive disorder      Gastro-oesophageal reflux disease     bleeding ulcers     Hiatal hernia     NEWLY DIAGNOSISED     Hyperparathyroidism, unspecified (H) 3/29/2012     Peptic ulcer, unspecified site, unspecified as acute or chronic, without mention of hemorrhage or perforation      Pneumonia     NOVEMBER     PONV (postoperative nausea and vomiting)      TMJ (temporomandibular joint syndrome) 5/8/2014     Vitamin D deficiency 6/7/2012       Past Surgical History   I have reviewed this patient's surgical history and updated it with pertinent information if needed.  Past Surgical History:   Procedure Laterality Date     APPENDECTOMY       CHOLECYSTECTOMY       DILATION AND CURETTAGE, OPERATIVE HYSTEROSCOPY WITH MORCELLATOR, COMBINED  11/21/2012    Procedure: COMBINED DILATION AND CURETTAGE, OPERATIVE HYSTEROSCOPY WITH MORCELLATOR;  Hysteroscopy, Polypectomy, Dilation and Curettage  ;  Surgeon: Marta Montejo MD;  Location: [facility] OR     GI SURGERY      gastric  bypass at age 31     ORTHOPEDIC SURGERY      back surgery at age 31     PARATHYROIDECTOMY Right 12/14/2015    Procedure: PARATHYROIDECTOMY;  Surgeon: Gregory Coleman MD;  Location: [facility]       SPINE SURGERY         Prior to Admission Medications   Prior to Admission Medications   Prescriptions Last Dose Informant Patient Reported? Taking?   Multiple Vitamin (MULTIVITAMIN ADULT) TABS   No No   Sig: Take 1 tablet by mouth daily   Omega-3 Fatty Acids (OMEGA-3 FISH OIL PO)   Yes No   acetaminophen (TYLENOL) 500 MG tablet   No No   Sig: Take 2 tablets (1,000 mg) by mouth every 8 hours as needed for mild pain   calcium carbonate (OS-CRISTIAN) 500 MG tablet   No No   Sig: Take 2 tablets (1,000 mg) by mouth 3 times daily   cholecalciferol 50 MCG (2000 UT) tablet   No No   Sig: Take 1 tablet (50 mcg) by mouth daily   cyanocobalamin (VITAMIN B-12) 1000 MCG tablet   No No   Sig: Take 1 tablet (1,000 mcg) by mouth daily   ibuprofen (ADVIL/MOTRIN) 800 MG tablet   No No   Sig: Take 1 tablet (800 mg) by mouth every 8 hours as needed for moderate pain   triamcinolone (KENALOG) 0.1 % external ointment   No No   Sig: Apply topically 2 times daily   Patient not taking: Reported on 4/19/2021   vitamin B complex with vitamin C (VITAMIN  B COMPLEX) tablet   No No   Sig: Take 1 tablet by mouth daily      Facility-Administered Medications: None     Allergies   Allergies   Allergen Reactions     Codeine Sulfate GI Disturbance       Social History   I have reviewed this patient's social history and updated it with pertinent information if needed. Amy ALLEN Kei  reports that she has been smoking cigarettes. She has a 15.00 pack-year smoking history. She has never used smokeless tobacco. She reports current alcohol use. She reports that she does not use drugs.    Family History   I have reviewed this patient's family history and updated it with pertinent information if needed.   Family History   Problem  Relation Age of Onset     Thyroid Disease Mother      Breast Cancer Mother 57         65 y.o.     Other - See Comments Mother         cystic acne     Cancer Brother      Diabetes Daughter         Type 1; insulin       Review of Systems   CONSTITUTIONAL:  positive for  fevers  EYES:  negative  HEENT:  negative  RESPIRATORY:  positive for  dry cough and dyspnea  CARDIOVASCULAR:  negative  GASTROINTESTINAL:  positive for diarrhea  GENITOURINARY:  negative  INTEGUMENT/BREAST:  negative  HEMATOLOGIC/LYMPHATIC:  negative  ALLERGIC/IMMUNOLOGIC:  negative  ENDOCRINE:  negative  MUSCULOSKELETAL:  negative  NEUROLOGICAL:  negative  BEHAVIOR/PSYCH:  negative    Physical Exam   Temp: 98.3  F (36.8  C) Temp src: Temporal BP: 123/74 Pulse: 78   Resp: 18 SpO2: 98 % O2 Device: None (Room air)    Vital Signs with Ranges  Temp:  [98.3  F (36.8  C)] 98.3  F (36.8  C)  Pulse:  [78-89] 78  Resp:  [18] 18  BP: (123-143)/(74-89) 123/74  SpO2:  [93 %-99 %] 98 %  220 lbs 0 oz    Constitutional: Awake, alert, cooperative, no apparent distress.  Eyes: Conjunctiva and pupils examined and normal.  HEENT: Moist mucous membranes, normal dentition.  Respiratory: good air entry bilaterally, occasional wheezing.  Cardiovascular: Regular rate and rhythm, normal S1 and S2, and no murmur noted.  GI: Soft, non-distended, non-tender, normal bowel sounds.  Lymph/Hematologic: No anterior cervical or supraclavicular adenopathy.  Skin: No rashes, no cyanosis, no edema.  Musculoskeletal: No joint swelling, erythema or tenderness.  Neurologic: Cranial nerves 2-12 intact, normal strength and sensation.  Psychiatric: Alert, oriented to person, place and time, no obvious anxiety or depression.    Data   Data reviewed today:  I personally reviewed the chest x-ray image(s) showing Query mild left greater than right peripheral basilar groundglass, consistent with provided diagnosis of Covid, recommend follow-up to resolution. No pneumothorax or pleural effusion.  Cardiac silhouette within normal limits. No acute osseous   abnormality..  Recent Labs   Lab 09/05/21  0247   WBC 4.2   HGB 12.3   MCV 81         POTASSIUM 3.2*   CHLORIDE 110*   CO2 27   BUN 7   CR 0.79   ANIONGAP 4   CRISTIAN 7.7*   GLC 94   ALBUMIN 3.1*   PROTTOTAL 6.6*   BILITOTAL 0.2   ALKPHOS 84   ALT 24   AST 19   TROPONIN <0.015       Recent Results (from the past 24 hour(s))   XR Chest Port 1 View    Narrative    EXAM: XR CHEST PORT 1 VIEW  LOCATION: [facility]  DATE/TIME: 9/5/2021 2:51 AM    INDICATION: shortness of breath. covid +  COMPARISON: 01/13/2013      Impression    IMPRESSION: Query mild left greater than right peripheral basilar groundglass, consistent with provided diagnosis of Covid, recommend follow-up to resolution. No pneumothorax or pleural effusion. Cardiac silhouette within normal limits. No acute osseous   abnormality.

## 2021-09-05 NOTE — H&P
Admitted: 09/05/2021    HISTORY OF PRESENT ILLNESS:  This is a 61-year-old female with history of GERD, anxiety, depression, prediabetes, who came to the ER with suicidal ideation and thoughts.    The patient told me that she has history of suicidal ideation and thoughts in the past.  She was planning on jumping off a bridge and brought pen and paper to write a suicide note as well.  She was also diagnosed with COVID-19 last week, on 31st I believe she said, on Monday or Tuesday last week, and was having at that time fever, some cough, some shortness of breath on exertion and some diarrhea off and on. She called EMS today because she was not feeling well and so was brought to the ED.  At this time, denies any fever, chest pain, abdominal pain, nausea, vomiting, dizziness, lightheadedness, occasional cough and mild shortness of breath on exertion.  She told me that she is not taking any drugs or drinking alcohol currently.  Rest of the review of system is negative.    ASSESSMENT AND PLAN:     1.  Suicidal ideation and thoughts:  The patient is acutely suicidal.  She is a 61-year-old female with history of anxiety and depression, not on any medication.  We will admit her for that.  Keep her on suicidal precautions.  Consult Psychiatry to evaluate the patient.  2.  COVID-19 infection:  The patient does have a positive COVID-19 as her chest x-ray shows some changes consistent with COVID-19, but she is saturating well and afebrile at this time.  Needs conservative management at this time.  No need for any dexamethasone and remdesivir at this time.  We will keep her on continuous pulse oximetry to rule out any hypoxia.  3.  History of anxiety and depression:  Further management as per the psychiatrist.  Currently is stable, not on any medication.  4.  Pre-diabetes:  Blood sugar is well controlled at this time.    CODE STATUS:  FULL CODE.    The case discussed with ER physician and nursing staff taking care of the  patient.    Los Rod MD        D: 2021   T: 2021   MT: MIRIAM    Name:     SARAH ASCENCION ALEJANDROMiquel  MRN:      [id number]        Account:     [id number]   :      1960           Admitted:    2021       Document: [id number]

## 2021-09-06 LAB
AMPHETAMINES UR QL SCN: ABNORMAL
ANION GAP SERPL CALCULATED.3IONS-SCNC: 2 MMOL/L (ref 3–14)
ATRIAL RATE - MUSE: 74 BPM
BARBITURATES UR QL: ABNORMAL
BASOPHILS # BLD AUTO: 0 10E3/UL (ref 0–0.2)
BASOPHILS NFR BLD AUTO: 0 %
BENZODIAZ UR QL: ABNORMAL
BUN SERPL-MCNC: 7 MG/DL (ref 7–30)
CALCIUM SERPL-MCNC: 8 MG/DL (ref 8.5–10.1)
CANNABINOIDS UR QL SCN: ABNORMAL
CHLORIDE BLD-SCNC: 113 MMOL/L (ref 94–109)
CO2 SERPL-SCNC: 25 MMOL/L (ref 20–32)
COCAINE UR QL: ABNORMAL
CREAT SERPL-MCNC: 0.7 MG/DL (ref 0.52–1.04)
CRP SERPL-MCNC: 16.4 MG/L (ref 0–8)
D DIMER PPP FEU-MCNC: 0.4 UG/ML FEU (ref 0–0.5)
DIASTOLIC BLOOD PRESSURE - MUSE: NORMAL MMHG
EOSINOPHIL # BLD AUTO: 0 10E3/UL (ref 0–0.7)
EOSINOPHIL NFR BLD AUTO: 1 %
ERYTHROCYTE [DISTWIDTH] IN BLOOD BY AUTOMATED COUNT: 15.3 % (ref 10–15)
GFR SERPL CREATININE-BSD FRML MDRD: >90 ML/MIN/1.73M2
GLUCOSE BLD-MCNC: 143 MG/DL (ref 70–99)
GLUCOSE BLDC GLUCOMTR-MCNC: 121 MG/DL (ref 70–99)
GLUCOSE BLDC GLUCOMTR-MCNC: 133 MG/DL (ref 70–99)
GLUCOSE BLDC GLUCOMTR-MCNC: 97 MG/DL (ref 70–99)
HBA1C MFR BLD: 5.8 % (ref 0–5.6)
HCT VFR BLD AUTO: 38.8 % (ref 35–47)
HGB BLD-MCNC: 11.9 G/DL (ref 11.7–15.7)
IMM GRANULOCYTES # BLD: 0 10E3/UL
IMM GRANULOCYTES NFR BLD: 1 %
INTERPRETATION ECG - MUSE: NORMAL
LDH SERPL L TO P-CCNC: 198 U/L (ref 81–234)
LYMPHOCYTES # BLD AUTO: 1.3 10E3/UL (ref 0.8–5.3)
LYMPHOCYTES NFR BLD AUTO: 33 %
MCH RBC QN AUTO: 24.6 PG (ref 26.5–33)
MCHC RBC AUTO-ENTMCNC: 30.7 G/DL (ref 31.5–36.5)
MCV RBC AUTO: 80 FL (ref 78–100)
MONOCYTES # BLD AUTO: 0.5 10E3/UL (ref 0–1.3)
MONOCYTES NFR BLD AUTO: 11 %
NEUTROPHILS # BLD AUTO: 2.2 10E3/UL (ref 1.6–8.3)
NEUTROPHILS NFR BLD AUTO: 54 %
NRBC # BLD AUTO: 0 10E3/UL
NRBC BLD AUTO-RTO: 0 /100
OPIATES UR QL SCN: ABNORMAL
P AXIS - MUSE: 68 DEGREES
PCP UR QL SCN: ABNORMAL
PLATELET # BLD AUTO: 307 10E3/UL (ref 150–450)
POTASSIUM BLD-SCNC: 3.7 MMOL/L (ref 3.4–5.3)
PR INTERVAL - MUSE: 160 MS
QRS DURATION - MUSE: 72 MS
QT - MUSE: 414 MS
QTC - MUSE: 459 MS
R AXIS - MUSE: -4 DEGREES
RBC # BLD AUTO: 4.84 10E6/UL (ref 3.8–5.2)
SODIUM SERPL-SCNC: 140 MMOL/L (ref 133–144)
SYSTOLIC BLOOD PRESSURE - MUSE: NORMAL MMHG
T AXIS - MUSE: 52 DEGREES
VENTRICULAR RATE- MUSE: 74 BPM
WBC # BLD AUTO: 4 10E3/UL (ref 4–11)

## 2021-09-06 PROCEDURE — 85004 AUTOMATED DIFF WBC COUNT: CPT | Performed by: INTERNAL MEDICINE

## 2021-09-06 PROCEDURE — 99233 SBSQ HOSP IP/OBS HIGH 50: CPT | Performed by: HOSPITALIST

## 2021-09-06 PROCEDURE — 83615 LACTATE (LD) (LDH) ENZYME: CPT | Performed by: HOSPITALIST

## 2021-09-06 PROCEDURE — 250N000011 HC RX IP 250 OP 636: Performed by: INTERNAL MEDICINE

## 2021-09-06 PROCEDURE — 250N000013 HC RX MED GY IP 250 OP 250 PS 637: Performed by: HOSPITALIST

## 2021-09-06 PROCEDURE — 36415 COLL VENOUS BLD VENIPUNCTURE: CPT | Performed by: HOSPITALIST

## 2021-09-06 PROCEDURE — 80307 DRUG TEST PRSMV CHEM ANLYZR: CPT | Performed by: EMERGENCY MEDICINE

## 2021-09-06 PROCEDURE — 120N000001 HC R&B MED SURG/OB

## 2021-09-06 PROCEDURE — 85379 FIBRIN DEGRADATION QUANT: CPT | Performed by: HOSPITALIST

## 2021-09-06 PROCEDURE — 36415 COLL VENOUS BLD VENIPUNCTURE: CPT | Performed by: INTERNAL MEDICINE

## 2021-09-06 PROCEDURE — 86140 C-REACTIVE PROTEIN: CPT | Performed by: HOSPITALIST

## 2021-09-06 PROCEDURE — 250N000013 HC RX MED GY IP 250 OP 250 PS 637: Performed by: INTERNAL MEDICINE

## 2021-09-06 PROCEDURE — 83036 HEMOGLOBIN GLYCOSYLATED A1C: CPT | Performed by: HOSPITALIST

## 2021-09-06 PROCEDURE — 80048 BASIC METABOLIC PNL TOTAL CA: CPT | Performed by: INTERNAL MEDICINE

## 2021-09-06 RX ORDER — NICOTINE POLACRILEX 4 MG
15-30 LOZENGE BUCCAL
Status: DISCONTINUED | OUTPATIENT
Start: 2021-09-06 | End: 2021-09-13 | Stop reason: HOSPADM

## 2021-09-06 RX ORDER — ALBUTEROL SULFATE 90 UG/1
2 AEROSOL, METERED RESPIRATORY (INHALATION) 4 TIMES DAILY PRN
Status: DISCONTINUED | OUTPATIENT
Start: 2021-09-06 | End: 2021-09-06

## 2021-09-06 RX ORDER — DEXTROSE MONOHYDRATE 25 G/50ML
25-50 INJECTION, SOLUTION INTRAVENOUS
Status: DISCONTINUED | OUTPATIENT
Start: 2021-09-06 | End: 2021-09-13 | Stop reason: HOSPADM

## 2021-09-06 RX ORDER — ALBUTEROL SULFATE 90 UG/1
2 AEROSOL, METERED RESPIRATORY (INHALATION) EVERY 4 HOURS PRN
Status: DISCONTINUED | OUTPATIENT
Start: 2021-09-06 | End: 2021-09-13 | Stop reason: HOSPADM

## 2021-09-06 RX ORDER — BENZONATATE 100 MG/1
100 CAPSULE ORAL 3 TIMES DAILY
Status: DISCONTINUED | OUTPATIENT
Start: 2021-09-06 | End: 2021-09-13 | Stop reason: HOSPADM

## 2021-09-06 RX ADMIN — ALBUTEROL SULFATE 2 PUFF: 90 AEROSOL, METERED RESPIRATORY (INHALATION) at 11:29

## 2021-09-06 RX ADMIN — IBUPROFEN 600 MG: 600 TABLET ORAL at 19:19

## 2021-09-06 RX ADMIN — IBUPROFEN 600 MG: 600 TABLET ORAL at 02:11

## 2021-09-06 RX ADMIN — ALBUTEROL SULFATE 2 PUFF: 90 AEROSOL, METERED RESPIRATORY (INHALATION) at 16:33

## 2021-09-06 RX ADMIN — BENZONATATE 100 MG: 100 CAPSULE, LIQUID FILLED ORAL at 10:32

## 2021-09-06 RX ADMIN — BENZONATATE 100 MG: 100 CAPSULE, LIQUID FILLED ORAL at 21:37

## 2021-09-06 RX ADMIN — ENOXAPARIN SODIUM 40 MG: 40 INJECTION SUBCUTANEOUS at 14:56

## 2021-09-06 RX ADMIN — IBUPROFEN 600 MG: 600 TABLET ORAL at 11:28

## 2021-09-06 RX ADMIN — BENZONATATE 100 MG: 100 CAPSULE, LIQUID FILLED ORAL at 16:31

## 2021-09-06 ASSESSMENT — ACTIVITIES OF DAILY LIVING (ADL)
ADLS_ACUITY_SCORE: 15
ADLS_ACUITY_SCORE: 15
ADLS_ACUITY_SCORE: 14
ADLS_ACUITY_SCORE: 15

## 2021-09-06 ASSESSMENT — MIFFLIN-ST. JEOR: SCORE: 1603.75

## 2021-09-06 NOTE — PROGRESS NOTES
Current condition (current mood & behavior): Green; calm and cooperative. Feels sad and tearful at times.  Sitter present: yes  Every 15 minute documentation by NA/RN completed for Shift: yes  Room safety Suicide Checklist completed in Epic: yes  Patient's color of severity (suicide scale): YELLOW; continues to voices suiciadal ideations.   Order for psych consult placed (if appropriate): yes  Suicide care plan added: yes      Karishma Carmona RN

## 2021-09-06 NOTE — PLAN OF CARE
Summary:   admitted today with suicidal ideation and nausea with positive covid result on 8/31  DATE & TIME: 9/5/21 [id number]  Cognitive Concerns/ Orientation : Alert and oriented x4,cooperative.   BEHAVIOR & AGGRESSION TOOL COLOR: yellow  CIWA SCORE: n/a   ABNL VS/O2: VSS RA  MOBILITY: up independently in room and in sight of suicide sitter.  PAIN MANAGMENT: headache and ear pain managed with ibuprophen  DIET: Regular  BOWEL/BLADDER: continent to bathroom  ABNL LAB/BG: potassium 3.8 after got replaced  DRAIN/DEVICES: PIV SL   TELEMETRY RHYTHM: NSR  SKIN: small scratch on arm.  TESTS/PROCEDURES: pending followup with psych fr when sit can be removed  D/C DAY/GOALS/PLACE: pending to inpatient psych for SI after covid isolatino completed.  OTHER IMPORTANT INFO: LS wheezy throughout, occasional cough, BS active. Having occasional diarrhea per report.

## 2021-09-06 NOTE — PROGRESS NOTES
[facility]    Amy Choudhury  September 6, 2021    Amy is followed related to Other: mental health support.  Patient is COVID+ and suicidal.. Please see initial DEC Crisis [facility] for complete assessment information. Notable concerns include suicidal ideation with plan to jump from a bridge.  Recent death of a family member, her child.  Patient is COVID+.    Patient is interviewed via Ipad.  Pt is alert; affect is appropriate; mood is anxious, distressed. Pt has active suicidal thoughts with specific plan to jump from a bridge. There are not concerns for aggression.    Clinician attempted to meet with patient.  She states that she is not getting the help that she needs.  She reports COVID is going to kill her because she is not getting the mental health help she needs.  Patient is fixed on if she would have gone to [place] ED she would be on a mental health unit.  Clinician attempted to discuss with her that COVID+ patients do not go to the mental health unit until they are medically cleared.  Patient is dismissive of this information.  Patient states she is not getting her mental health needs addressed.  Clinician attempted to discuss with patient that clinician was meeting with her today to support her mental health needs.  Patient expressed not wanting to talk to clinician and not wanting a sitter to be present.  Clinician attempted to engage patient.  IPad disconnected.  Clinician spoke to unit staff and received information that another IPad may currently be on its way to patient's room.  Clinician made multiple attempts to reconnect.  Left message with unit staff on how to contact clinician should she like to have a visit.      Clinician confirmed with central intake the COVID+ patients do not go to the mental health units until medically cleared.  Discussed patient is not on their adult wait list. Central intake will need to be notified when  patient is medically cleared.    crow the course of contact, provided reassurance, offered validation and provided psychoeducation.     Coordination with hospital treatment team and central intake.    Plan:     Continue to monitor for harm. Consider: Use a positive, direct and calm approach. Pt's tend to match the energy/mood of the staff. Keep focus positive and upbeat and Provide the pt with options to provide a sense of control. Try to tell the pt what they can do instead of what they can't do    Continue care coordination with hospital treatment team and central intake   Maintain current transition plan.    DEC will follow. DEC may be reached at [phone] if further clinical intervention is needed.     Margaret Stokes  [facility], [facility]   [phone]

## 2021-09-06 NOTE — CONSULTS
[facility], Consult & Liaison    Amy Choudhury  September 6, 2021    Psychiatry consult acknowledged. Consult note completed by Margaret OZUNA on 9/6/21 via [facility] Service.     SB OGDEN, LGSW  [facility], [facility], Consult & Liaison Service  [phone]

## 2021-09-06 NOTE — PLAN OF CARE
Summary:   admitted today with suicidal ideation and nausea with positive covid result on 8/31  DATE & TIME: 9/5/21 3-11pm, shift   Cognitive Concerns/ Orientation : orientedx4- anxious but cooperative.   BEHAVIOR & AGGRESSION TOOL COLOR: yellow  CIWA SCORE: n/a   ABNL VS/O2: VSS ex temp 100.1 max  MOBILITY: up independently in room and in sight of suicide sitter.  PAIN MANAGMENT: headache managed with ibuprophen  DIET: regular-ate well but threw some up tonight- able to eat a bit more after IV zofran  BOWEL/BLADDER: continent to bathroom  ABNL LAB/BG: potassium 3.2, recheck pending.  DRAIN/DEVICES: PIV moved into left hand due to discomfort in old AC.   TELEMETRY RHYTHM: NSR  SKIN: small scratch on arm.  TESTS/PROCEDURES: pending followup with psych fr when sit can be removed  D/C DAY/GOALS/PLACE: pending to inpatient psych for SI after covid isolatino completed.  OTHER IMPORTANT INFO: LS wheezy throughout, occasional cough, BS active. Having occasional diarrhea per report.

## 2021-09-06 NOTE — PROGRESS NOTES
Current condition (current mood & behavior): accepting and anxious  Sitter present: yes  Every 15 minute documentation by NA/RN completed for Shift: yes  Room safety Suicide Checklist completed in Epic: yes  Patient's color of severity (suicide scale): YELLOW  Order for psych consult placed (if appropriate): yes  Suicide care plan added: yes      Tushar Montoya RN

## 2021-09-06 NOTE — PLAN OF CARE
"DATE & TIME: 09/06/2021 AM shift    Cognitive Concerns/ Orientation : A/Ox4. Sad/tearful at times; other times calm and cooperative.    BEHAVIOR & AGGRESSION TOOL COLOR: Green; sitter present.   CIWA SCORE: n/a   ABNL VS/O2: VSS on RA except BP elevated 140s/70s. Afebrile. Denies chest pain. Reports SOB and LOBATO. Hx of smoking. +Covid 19. LS with expiratory wheezes, frequent dry cough. Started on Tessalon. PRN albuterol inhaler x 1. Encouraged to use IS or flutter.   MOBILITY: Independent in the room.  PAIN MANAGMENT: C/o headache, ibuprofen x 1 given with relief.   DIET: Mod carb diet; eating well this shift.   BOWEL/BLADDER: Continent of B&B. No BM this shift. Still needs urine sample.   ABNL LAB/BG: /133. Hg A1C 5.8. CRP inflammation 16.4.   DRAIN/DEVICES: PIV SL  TELEMETRY RHYTHM: NSR  SKIN: WDL; bruises noted.   TESTS/PROCEDURES: Psych eval pending.   D/C DATE: Pending psych eval. Pt continues to voice thoughts of hurting herself. States \"I still think about it. I think about what led me to think about it.\" Pt response well when focused on positives.   Discharge Barriers:still voices suicidal ideations; needs psych eval.   OTHER IMPORTANT INFO: sitter present; pt is on suicide precautions.   "

## 2021-09-06 NOTE — PROGRESS NOTES
[facility]    Hospitalist Progress Note  PT Summary:  PT is a 61-year-old female with history of GERD, anxiety, depression, prediabetes, who came to the ER with suicidal ideation and thoughts, she was recently diagnosed to have COVID infection.    ASSESSMENT AND PLAN:     Suicidal ideatios  Hx of anxiety and depression   Was not on any medication PTA.  still having thoughts but would not actually do it.   - Suicidal precautions.  - Further plan per psych.    COVID-19 infection:  Mild tomoderate disease.   Positive for COVID-19 on 8/31 and on admission (9/5). CXR shows some changes suggestive of COVID-19. Mildly worsened chronic cough/sob (smoker since age 17). Sating well on RA (mid to high 90's). Aafebrile at this time.    - Continuous pulse-ox.   - Supportive with antitussives  - Check inflamatory markers (alyssia d dimer to adkust lovenox if needed).  -  No indication for dexamethasone and remdesivir at this time (start if hypoxic).    DM likely type II. A1C in 2019 was 6.7.    Blood sugar is well controlled at this time.  - sliding scale insulin.  - repeat A1C.  - ADA diet.  - diabetic education.    Smoking use do:  - Appropriate counseling.  - Declines NRT.    DVT Prophylaxis: Enoxaparin (Lovenox) SQ  Code Status: Full Code    Disposition: Per psych. TBD..    Josh Sweet MD  [phone] (C) [phone]    Interval History   Still with suicidal ideations. Slightly worsened chronic cough and sob. Smoker. No cp/ no GI sxs.    Physical Exam   Temp: 98.6  F (37  C) Temp src: Oral BP: 139/73 Pulse: 76   Resp: 16 SpO2: 97 % O2 Device: None (Room air)    Vitals:    09/05/21 0207 09/05/21 1556   Weight: 99.8 kg (220 lb) 101.8 kg (224 lb 6.9 oz)     Vital Signs with Ranges  Temp:  [98.3  F (36.8  C)-100.1  F (37.8  C)] 98.6  F (37  C)  Pulse:  [67-89] 76  Resp:  [16-18] 16  BP: (123-143)/(71-89) 139/73  SpO2:  [93 %-99 %] 97 %  No intake/output data recorded.    Constitutional: Awake, alert,  cooperative, no apparent distress  Respiratory: Clear to auscultation bilaterally, no crackles or wheezing  Cardiovascular: Regular rate and rhythm, normal S1 and S2, and no murmur noted  GI: Normal bowel sounds, soft, non-distended, non-tender  Skin/Integumen: No rashes, no cyanosis, no edema  Other:     Medications       enoxaparin ANTICOAGULANT  40 mg Subcutaneous Q24H     sodium chloride (PF)  3 mL Intracatheter Q8H       Data   Recent Labs   Lab 09/05/21  1239 09/05/21  0247   WBC  --  4.2   HGB  --  12.3   MCV  --  81   PLT  --  311   NA  --  141   POTASSIUM  --  3.2*   CHLORIDE  --  110*   CO2  --  27   BUN  --  7   CR 0.65 0.79   ANIONGAP  --  4   CRISTIAN  --  7.7*   GLC  --  94   ALBUMIN  --  3.1*   PROTTOTAL  --  6.6*   BILITOTAL  --  0.2   ALKPHOS  --  84   ALT  --  24   AST  --  19   TROPONIN  --  <0.015       Recent Results (from the past 24 hour(s))   XR Chest Port 1 View    Narrative    EXAM: XR CHEST PORT 1 VIEW  LOCATION: [facility]  DATE/TIME: 9/5/2021 2:51 AM    INDICATION: shortness of breath. covid +  COMPARISON: 01/13/2013      Impression    IMPRESSION: Query mild left greater than right peripheral basilar groundglass, consistent with provided diagnosis of Covid, recommend follow-up to resolution. No pneumothorax or pleural effusion. Cardiac silhouette within normal limits. No acute osseous   abnormality.

## 2021-09-07 LAB
GLUCOSE BLDC GLUCOMTR-MCNC: 103 MG/DL (ref 70–99)
GLUCOSE BLDC GLUCOMTR-MCNC: 104 MG/DL (ref 70–99)
GLUCOSE BLDC GLUCOMTR-MCNC: 108 MG/DL (ref 70–99)
GLUCOSE BLDC GLUCOMTR-MCNC: 86 MG/DL (ref 70–99)
GLUCOSE BLDC GLUCOMTR-MCNC: 88 MG/DL (ref 70–99)

## 2021-09-07 PROCEDURE — 99232 SBSQ HOSP IP/OBS MODERATE 35: CPT | Performed by: PSYCHIATRY & NEUROLOGY

## 2021-09-07 PROCEDURE — 120N000001 HC R&B MED SURG/OB

## 2021-09-07 PROCEDURE — 250N000013 HC RX MED GY IP 250 OP 250 PS 637: Performed by: INTERNAL MEDICINE

## 2021-09-07 PROCEDURE — 250N000013 HC RX MED GY IP 250 OP 250 PS 637: Performed by: HOSPITALIST

## 2021-09-07 PROCEDURE — 99233 SBSQ HOSP IP/OBS HIGH 50: CPT | Performed by: HOSPITALIST

## 2021-09-07 PROCEDURE — 250N000011 HC RX IP 250 OP 636: Performed by: INTERNAL MEDICINE

## 2021-09-07 RX ORDER — LORAZEPAM 1 MG/1
1 TABLET ORAL 2 TIMES DAILY PRN
Status: DISCONTINUED | OUTPATIENT
Start: 2021-09-07 | End: 2021-09-08

## 2021-09-07 RX ADMIN — ALBUTEROL SULFATE 2 PUFF: 90 AEROSOL, METERED RESPIRATORY (INHALATION) at 12:24

## 2021-09-07 RX ADMIN — GUAIFENESIN 10 ML: 100 SOLUTION ORAL at 22:50

## 2021-09-07 RX ADMIN — LORAZEPAM 1 MG: 1 TABLET ORAL at 17:03

## 2021-09-07 RX ADMIN — BENZONATATE 100 MG: 100 CAPSULE, LIQUID FILLED ORAL at 08:31

## 2021-09-07 RX ADMIN — BENZONATATE 100 MG: 100 CAPSULE, LIQUID FILLED ORAL at 16:04

## 2021-09-07 RX ADMIN — IBUPROFEN 600 MG: 600 TABLET ORAL at 16:04

## 2021-09-07 RX ADMIN — GUAIFENESIN 10 ML: 100 SOLUTION ORAL at 04:33

## 2021-09-07 RX ADMIN — IBUPROFEN 600 MG: 600 TABLET ORAL at 04:33

## 2021-09-07 RX ADMIN — BENZONATATE 100 MG: 100 CAPSULE, LIQUID FILLED ORAL at 22:49

## 2021-09-07 RX ADMIN — ALBUTEROL SULFATE 2 PUFF: 90 AEROSOL, METERED RESPIRATORY (INHALATION) at 04:24

## 2021-09-07 RX ADMIN — ENOXAPARIN SODIUM 40 MG: 40 INJECTION SUBCUTANEOUS at 13:56

## 2021-09-07 ASSESSMENT — ACTIVITIES OF DAILY LIVING (ADL)
ADLS_ACUITY_SCORE: 14

## 2021-09-07 NOTE — PROGRESS NOTES
Extended Care    Amy Choudhury  2021    Amy is followed related to Other: pt is COVID+ and has suicidal thoughts. Please see initial DEC Crisis Assessment [facility] for complete assessment information. Notable concerns include suicidal thoughts with plan to jump off a bridge. Pt has had much death and loss in her life; notably her daughter.     Patient is interviewed via Ipad. Pt is alert; affect is blunted, tearful and appropriate; mood is sad. Pt has passive suicidal thoughts described as a desire to be with her family members that have . . There are not concerns for Aggression. There are not new concerns noted. Over the course of contact, offered validation and assisted in processing patient's thoughts and feeling relating to loss and grieving. Pt is very sad and noted she has been struggling with the loss of her daughter, and even more so since having to manage the isolation from COVID. She stated she has struggled with side effects from medication and so does not want to take them. She noted this is one reason she did not come in sooner; she realizes one of the biggest things we do is prescribe medication. Pt does see a therapist, Adam FAULKNER at [facility] ([phone]). She requested writer attempt to call and let him know she is in the hospital. She has tried calling and was on hold for 20 minutes. She is also hoping to get phone numbers from her cell phone (secured at the hospital), so she can make calls (discussed with RN).     Pt was upset about having 1:1 staff due to not having much privacy, and so did not wish to go into much detail about her loss, etc., but she did seem to open up somewhat anyway.     Writer did leave message for therapist with her direct room number: [phone]    Brief check in with pt's RN, Jo.         Plan:     Continue to monitor for harm. Consider: Use a positive, direct and calm approach. Pt's tend to match the  energy/mood of the staff. Keep focus positive and upbeat, Provide the pt with options to provide a sense of control. Try to tell the pt what they can do instead of what they can't do and Allow family calls/visits    Continue care coordination with hospital treatment team and Intake     Maintain current transition plan.     DEC will follow. DEC may be reached at [phone] if further clinical intervention is needed.     Manisha Coronel  [facility], [facility]   [phone]

## 2021-09-07 NOTE — PROGRESS NOTES
"CLINICAL NUTRITION SERVICES BRIEF NOTE  Consult received - Provider Order \"DM\"    New Findings  - Patient arrived to ED with suicidal ideation and thoughts. She is COVID positive.   - Reviewed A1c:   Lab Results   Component Value Date    A1C 5.8 09/06/2021    A1C 6.7 04/19/2021    A1C 5.0 02/11/2015     - Called into patient's room (COVID precautions in place). She reports that her appetite is normal. She typically grazes, as she finds that she doesn't like to eat three large meals each day. This has been helpful in her BG management as well.   - Growing up her mother had diabetes, and her daughter also had Type 1 DM. She is well aware of the recommendations and was able to successfully help her daughter manage her glucose levels growing up.     Intervention  - Patient did not feel a full ed session was necessary. Did provide general tips such as balancing CHO-dense foods with foods high in protein. Pt asked about portion sizing and if she could receive two portions of the protein (ex: fish).   - Added High protein diet to trigger call center to allow larger protein portions.     Amita Banda RD, LD  [facility] [facility], [facility]   Pager: [phone]  Weekend Pager: [phone]    "

## 2021-09-07 NOTE — CONSULTS
[facility] Psychiatric Consult Progress Note    Interval History:   Pt seen, chart reviewed, case discussed with nursing staff.  Patient remains on station 66 convalescing from Covid. She reports that she is minimally symptomatic. She is fearful about having Covid though. She is more concerned about her dperession/ Brings up her loss of her second daughter earlier this year as primarily why her depression has been severe in recent months. Deines active SI in the hospital but still having passive SI. She has 1:1 sitter. She reports poor sleep and appetite. Energy low. Denies histor yof ana, OCD, psychosis, eating disorder or ADHD/learning disorders. Follows with therapist. She remains opposed to medications in general. Advised consideration of selective serotonin reuptake inhibitor though given severity of symptoms. She politley listned but reiterated she does not want to take medication> She is very tearful at times, emotionally, labile. Other times pleasant. She is in favor of going to inpatient psychiatry once she is able to> Frustrated by fact she cannot go now due to her Covid. Under the impression that if she had gone to [place] she could be in inpatient psychiatry but I checked there and found out that is still not true. Per intake we do not have any covid positive dedicated psych units at [facility] or anywhere else.    Review of systems:   10 point Review of Systems completed by Dr. Lamar, and is  is negative other than noted in the HPI     Medications:       benzonatate  100 mg Oral TID     enoxaparin ANTICOAGULANT  40 mg Subcutaneous Q24H     insulin aspart  1-3 Units Subcutaneous TID AC     insulin aspart  1-3 Units Subcutaneous At Bedtime     sodium chloride (PF)  3 mL Intracatheter Q8H     acetaminophen **OR** acetaminophen, acetaminophen, albuterol, glucose **OR** dextrose **OR** glucagon, guaiFENesin, ibuprofen, lidocaine 4%, lidocaine (buffered or not buffered), LORazepam,  melatonin, ondansetron **OR** ondansetron, prochlorperazine **OR** prochlorperazine **OR** prochlorperazine, sodium chloride (PF)    Mental Status Examination:     Appearance:  awake, alert  Eye Contact:  good  Speech:  clear, coherent  Language:Normal  Psychomotor Behavior:  no evidence of tardive dyskinesia, dystonia, or tics  Mood:  sad  and depressed  Affect:  mood congruent, intensity is exaggerated, intensity is heightened, intensity is dramatic and labile  Thought Process:  logical, linear and goal oriented no loose associations  Thought Content:  passive suicidal ideation present, no auditory hallucinations present and no visual hallucinations present  Oriented to:  time, person, and place  Attention Span and Concentration:  intact  Recent and Remote Memory:  intact  Fund of Knowledge: appropriate  Muscle Strength and Tone: normal  Gait and Station: not evaluated  Insight:  fair  Judgment:  fair        Labs/Vitals:     Recent Results (from the past 24 hour(s))   Glucose by meter    Collection Time: 09/06/21  9:38 PM   Result Value Ref Range    GLUCOSE BY METER POCT 121 (H) 70 - 99 mg/dL   Glucose by meter    Collection Time: 09/07/21  1:47 AM   Result Value Ref Range    GLUCOSE BY METER POCT 88 70 - 99 mg/dL   Glucose by meter    Collection Time: 09/07/21  8:26 AM   Result Value Ref Range    GLUCOSE BY METER POCT 108 (H) 70 - 99 mg/dL   Glucose by meter    Collection Time: 09/07/21 10:57 AM   Result Value Ref Range    GLUCOSE BY METER POCT 86 70 - 99 mg/dL   Glucose by meter    Collection Time: 09/07/21  5:03 PM   Result Value Ref Range    GLUCOSE BY METER POCT 103 (H) 70 - 99 mg/dL     B/P: 142/94, T: 98.2, P: 67, R: 20  Impression  MDD severe, recurrent .  Patient remains on station 66 convalescing from Covid. She reports that she is minimally symptomatic. She is fearful about having Covid though. She is more concerned about her dperession/ Brings up her loss of her second daughter earlier this year as  primarily why her depression has been severe in recent months.              Plan:   1. Continue medical stabilization. Once medically cleared from Covid we would recommend transfer to inpatient psychiatry. At this time patient remains holdable if she were to demand discharge though she is voluntary and seeking help at this time. She is opposed to medications for now but encouraged her ot consider an SSRI trial. Has tried Citalopram and Wellbutrin previously and generally is avoidant of medications to help with her mental health, finding spiritual/therapy approaches prefererred.    2. Re-consult psychiatry as needed during time boarding awaiting clearance of Covid  Attestation:  Patient has been seen and evaluated by me,  Shankar Lamar MD

## 2021-09-07 NOTE — PROGRESS NOTES
Current condition (current mood & behavior): calm and cooperative  Sitter present: yes  Every 15 minute documentation by NA/RN completed for Shift: yes  Room safety Suicide Checklist completed in Epic: yes  Patient's color of severity (suicide scale): YELLOW  Order for psych consult placed (if appropriate): yes  Suicide care plan added: yes      Beth Carty RN

## 2021-09-07 NOTE — PROGRESS NOTES
[facility]    Hospitalist Progress Note  PT Summary:  PT is a 61-year-old female with history of GERD, anxiety, depression, prediabetes, who came to the ER with suicidal ideation and thoughts, she was recently diagnosed to have COVID infection.    ASSESSMENT AND PLAN:     Suicidal ideatios  Hx of anxiety and depression   Was not on any medication PTA.  still having thoughts but would not act on them.  - Suicidal precautions.  - Further plan per psych.    COVID-19 infection:  Mild tomoderate disease.   Positive for COVID-19 on 8/31 and on admission (9/5). CXR shows some changes suggestive of COVID-19. No sob this am cough is better  (smoker since age 17). Sating well on RA (mid to high 90's). Aafebrile at this time.    - Continuous pulse-ox.   - Supportive with antitussives  -  No indication for dexamethasone and remdesivir at this time (start if hypoxic).    DM likely type II. A1C in 2019 was 6.7.    Blood sugar is well controlled at this time.  - sliding scale insulin.  - repeat A1C.  - ADA diet.  - diabetic education.    Smoking use do:  - Appropriate counseling.  - Declines NRT.    DVT Prophylaxis: Enoxaparin (Lovenox) SQ  Code Status: Full Code    Disposition: Per psych. TBD..    Josh Sweet MD  [phone] (R) [phone]    Interval History   Still having some suicidal thoughts.. Cough Is better. No sob at this timer. Denies cp..    Physical Exam   Temp: 97.9  F (36.6  C) Temp src: Oral BP: 136/84 Pulse: 78   Resp: 20 SpO2: 97 % O2 Device: None (Room air)    Vitals:    09/05/21 0207 09/05/21 1556 09/06/21 0640   Weight: 99.8 kg (220 lb) 101.8 kg (224 lb 6.9 oz) 101.2 kg (223 lb 1.7 oz)     Vital Signs with Ranges  Temp:  [97.4  F (36.3  C)-98.4  F (36.9  C)] 97.9  F (36.6  C)  Pulse:  [62-78] 78  Resp:  [18-20] 20  BP: (136-148)/(71-94) 136/84  SpO2:  [95 %-97 %] 97 %  I/O last 3 completed shifts:  In: 780 [P.O.:780]  Out: 550 [Urine:550]    Constitutional: Awake, alert,  cooperative, no apparent distress  Respiratory: Clear to auscultation bilaterally, no crackles or wheezing  Cardiovascular: Regular rate and rhythm, normal S1 and S2, and no murmur noted  GI: Normal bowel sounds, soft, non-distended, non-tender  Skin/Integumen: No rashes, no cyanosis, no edema  Other:     Medications       benzonatate  100 mg Oral TID     enoxaparin ANTICOAGULANT  40 mg Subcutaneous Q24H     insulin aspart  1-3 Units Subcutaneous TID AC     insulin aspart  1-3 Units Subcutaneous At Bedtime     sodium chloride (PF)  3 mL Intracatheter Q8H       Data   Recent Labs   Lab 09/07/21  0826 09/07/21  0147 09/06/21  2138 09/06/21  0852 09/05/21  2249 09/05/21  1239 09/05/21  0247 09/05/21  0247   WBC  --   --   --  4.0  --   --   --  4.2   HGB  --   --   --  11.9  --   --   --  12.3   MCV  --   --   --  80  --   --   --  81   PLT  --   --   --  307  --   --   --  311   NA  --   --   --  140  --   --   --  141   POTASSIUM  --   --   --  3.7 3.8  --   --  3.2*   CHLORIDE  --   --   --  113*  --   --   --  110*   CO2  --   --   --  25  --   --   --  27   BUN  --   --   --  7  --   --   --  7   CR  --   --   --  0.70  --  0.65  --  0.79   ANIONGAP  --   --   --  2*  --   --   --  4   CRISTIAN  --   --   --  8.0*  --   --   --  7.7*   * 88 121* 143*  --   --    < > 94   ALBUMIN  --   --   --   --   --   --   --  3.1*   PROTTOTAL  --   --   --   --   --   --   --  6.6*   BILITOTAL  --   --   --   --   --   --   --  0.2   ALKPHOS  --   --   --   --   --   --   --  84   ALT  --   --   --   --   --   --   --  24   AST  --   --   --   --   --   --   --  19   TROPONIN  --   --   --   --   --   --   --  <0.015    < > = values in this interval not displayed.       No results found for this or any previous visit (from the past 24 hour(s)).

## 2021-09-07 NOTE — PLAN OF CARE
DATE & TIME: 09/06/2021 3823-3470    Cognitive Concerns/ Orientation : A/Ox4.     BEHAVIOR & AGGRESSION TOOL COLOR: Green. Calm and cooperative.   CIWA SCORE: n/a   ABNL VS/O2: VSS on RA except BP elevated 148/94. Afebrile.Reports SOB and LOBATO. Hx of smoking. +Covid 19. LS with expiratory wheezes, frequent dry congested cough. On schedule Tessalon and PRN albuterol inhaler given x1 and PRN Robitussin given for cough X1 . Encouraged to use IS or flutter.  MOBILITY: Independent in the room.  PAIN MANAGMENT: Ibuprofen given for ear, throat and mild headache.  DIET: Mod carb diet; good appetite.  BOWEL/BLADDER: Continent of B&B. No BM this shift.    ABNL LAB/BG: BG 88. Hg A1C 5.8. CRP inflammation 16.4.   DRAIN/DEVICES: PIV SL  TELEMETRY RHYTHM: NSR  SKIN: scattered bruises   TESTS/PROCEDURES: urine for toxicity positive for cocaine qual urine.  D/C DATE: Pending.   Discharge Barriers: per psych continue to monitor; use a positive , direct and calm approach on patient; try to tell the patient what she can do instead of what she can't do.  OTHER IMPORTANT INFO: sitter present; pt is on suicide precautions. No suicidal comment noted. Special precaution for COVID 19 maintained.

## 2021-09-07 NOTE — PROVIDER NOTIFICATION
MD Notification    Notified Person: MD    Notified Person Name: Dr. Sweet    Notification Date/Time: 1610, 9/7/21    Notification Interaction: Page    Purpose of Notification: Pt feels as if she is having an anxiety attack and would like a medication for anxiety. Says she takes Lorazepam at home.     Orders Received:    Comments:

## 2021-09-07 NOTE — PLAN OF CARE
DATE & TIME: 09/06/2021 PM shift    Cognitive Concerns/ Orientation : A/Ox4.     BEHAVIOR & AGGRESSION TOOL COLOR: Green. Calm and cooperative.   CIWA SCORE: n/a   ABNL VS/O2: VSS on RA except BP elevated 144/88. Afebrile. Reports SOB and LOBATO. Hx of smoking. +Covid 19. LS with expiratory wheezes, frequent dry congested cough. On schedule Tessalon. PRN albuterol inhaler x 1. Encouraged to use IS or flutter.   MOBILITY: Independent in the room.  PAIN MANAGMENT: C/o headache, ibuprofen x 1 given with relief.   DIET: Mod carb diet; good appetite.  BOWEL/BLADDER: Continent of B&B. No BM this shift.    ABNL LAB/BG: BG 97/121. Hg A1C 5.8. CRP inflammation 16.4.   DRAIN/DEVICES: PIV SL  TELEMETRY RHYTHM: NSR  SKIN: scattered bruises   TESTS/PROCEDURES: urine for toxicity positive for cocaine qual urine.  D/C DATE: Pending.   Discharge Barriers: per psych continue to monitor; use a positive , direct and calm approach on patient; try to tell the patient what she can do instead of what she can't do.  OTHER IMPORTANT INFO: sitter present; pt is on suicide precautions. No suicidal comment noted. Special precaution for COVID 19 maintained.

## 2021-09-08 ENCOUNTER — TELEPHONE (OUTPATIENT)
Dept: BEHAVIORAL HEALTH | Facility: CLINIC | Age: 61
End: 2021-09-08
Payer: COMMERCIAL

## 2021-09-08 LAB
CREAT SERPL-MCNC: 0.67 MG/DL (ref 0.52–1.04)
GFR SERPL CREATININE-BSD FRML MDRD: >90 ML/MIN/1.73M2
GLUCOSE BLD-MCNC: 96 MG/DL (ref 70–99)
GLUCOSE BLDC GLUCOMTR-MCNC: 111 MG/DL (ref 70–99)
GLUCOSE BLDC GLUCOMTR-MCNC: 114 MG/DL (ref 70–99)
GLUCOSE BLDC GLUCOMTR-MCNC: 117 MG/DL (ref 70–99)
GLUCOSE BLDC GLUCOMTR-MCNC: 91 MG/DL (ref 70–99)
PLATELET # BLD AUTO: 295 10E3/UL (ref 150–450)
POTASSIUM BLD-SCNC: 3.9 MMOL/L (ref 3.4–5.3)

## 2021-09-08 PROCEDURE — 250N000011 HC RX IP 250 OP 636: Performed by: INTERNAL MEDICINE

## 2021-09-08 PROCEDURE — 82565 ASSAY OF CREATININE: CPT | Performed by: INTERNAL MEDICINE

## 2021-09-08 PROCEDURE — 99232 SBSQ HOSP IP/OBS MODERATE 35: CPT | Performed by: PSYCHIATRY & NEUROLOGY

## 2021-09-08 PROCEDURE — 250N000013 HC RX MED GY IP 250 OP 250 PS 637: Performed by: PSYCHIATRY & NEUROLOGY

## 2021-09-08 PROCEDURE — 250N000013 HC RX MED GY IP 250 OP 250 PS 637: Performed by: HOSPITALIST

## 2021-09-08 PROCEDURE — 120N000001 HC R&B MED SURG/OB

## 2021-09-08 PROCEDURE — 84132 ASSAY OF SERUM POTASSIUM: CPT | Performed by: INTERNAL MEDICINE

## 2021-09-08 PROCEDURE — 36415 COLL VENOUS BLD VENIPUNCTURE: CPT | Performed by: INTERNAL MEDICINE

## 2021-09-08 PROCEDURE — 85049 AUTOMATED PLATELET COUNT: CPT | Performed by: INTERNAL MEDICINE

## 2021-09-08 PROCEDURE — 250N000013 HC RX MED GY IP 250 OP 250 PS 637: Performed by: INTERNAL MEDICINE

## 2021-09-08 PROCEDURE — 82947 ASSAY GLUCOSE BLOOD QUANT: CPT | Performed by: INTERNAL MEDICINE

## 2021-09-08 PROCEDURE — 99233 SBSQ HOSP IP/OBS HIGH 50: CPT | Performed by: HOSPITALIST

## 2021-09-08 RX ORDER — SERTRALINE HYDROCHLORIDE 25 MG/1
25 TABLET, FILM COATED ORAL DAILY
Status: DISCONTINUED | OUTPATIENT
Start: 2021-09-08 | End: 2021-09-12

## 2021-09-08 RX ORDER — LORAZEPAM 1 MG/1
1 TABLET ORAL 2 TIMES DAILY PRN
Status: DISCONTINUED | OUTPATIENT
Start: 2021-09-08 | End: 2021-09-12

## 2021-09-08 RX ADMIN — BENZONATATE 100 MG: 100 CAPSULE, LIQUID FILLED ORAL at 08:38

## 2021-09-08 RX ADMIN — ALBUTEROL SULFATE 2 PUFF: 90 AEROSOL, METERED RESPIRATORY (INHALATION) at 05:36

## 2021-09-08 RX ADMIN — SERTRALINE HYDROCHLORIDE 25 MG: 25 TABLET ORAL at 09:21

## 2021-09-08 RX ADMIN — GUAIFENESIN 10 ML: 100 SOLUTION ORAL at 05:35

## 2021-09-08 RX ADMIN — IBUPROFEN 600 MG: 600 TABLET ORAL at 18:23

## 2021-09-08 RX ADMIN — BENZONATATE 100 MG: 100 CAPSULE, LIQUID FILLED ORAL at 15:11

## 2021-09-08 RX ADMIN — ENOXAPARIN SODIUM 40 MG: 40 INJECTION SUBCUTANEOUS at 13:53

## 2021-09-08 RX ADMIN — GUAIFENESIN 10 ML: 100 SOLUTION ORAL at 21:17

## 2021-09-08 RX ADMIN — BENZONATATE 100 MG: 100 CAPSULE, LIQUID FILLED ORAL at 21:18

## 2021-09-08 RX ADMIN — LORAZEPAM 1 MG: 1 TABLET ORAL at 11:27

## 2021-09-08 ASSESSMENT — ACTIVITIES OF DAILY LIVING (ADL)
ADLS_ACUITY_SCORE: 14
ADLS_ACUITY_SCORE: 12
ADLS_ACUITY_SCORE: 12
ADLS_ACUITY_SCORE: 14
ADLS_ACUITY_SCORE: 12
ADLS_ACUITY_SCORE: 14

## 2021-09-08 NOTE — PLAN OF CARE
DATE & TIME: 09/08/2021 3080-9924   Cognitive Concerns/ Orientation : A&O x4, calm and cooperative; anxious at times. Ativan given x1. Pt has intermittent panic attacks and she states that Ibuprofen and Ativan help. Pt tearful at times explaining story, reassured pt. Ibuprofen given x1.  BEHAVIOR & AGGRESSION TOOL COLOR: Green  CIWA SCORE: N/A  ABNL VS/O2: VSS on RA  MOBILITY: Independent in the room  PAIN MANAGMENT: Denies  DIET: Mod carb diet; good appetite.  BOWEL/BLADDER: Continent of B&B. Two BMs this shift, per pt report  ABNL LAB/BG: BGM 96, 117, 114  DRAIN/DEVICES: PIV SL  TELEMETRY RHYTHM: NSR  SKIN: scattered bruises, scabs on L knee  TESTS/PROCEDURES: urine for toxicity positive for cocaine qual urine.  D/C DATE: Discharge to inpatient psych on 9/9 once bed available  Discharge Barriers: per psych continue to monitor; use a positive , direct and calm approach on patient; try to tell the patient what she can do instead of what she can't do.  OTHER IMPORTANT INFO: Covid positive, covid precautions maintained. Suicide precautions in place. Sitter in room for SW precautions. Pt denies current SI but states that if she were home alone she would be worried. LOBATO, IS use encouraged. Covid recovered on 9/10/2021 at midnight. SW following.    Current condition (current mood & behavior): Calm and cooperative, tearful and anxious at times  Sitter present: yes  Every 15 minute documentation by NA/RN completed for Shift: yes  Room safety Suicide Checklist completed in Epic: yes  Patient's color of severity (suicide scale): YELLOW  Order for psych consult placed (if appropriate): yes  Suicide care plan added: yes      Chiqui Ontiveros RN

## 2021-09-08 NOTE — CONSULTS
[facility] Psychiatric Consult Progress Note    Interval History:   Pt seen, chart reviewed, case discussed with nursing staff and attending physician.   Patient remains on station 66 convalescing from Covid. She reports that she is minimally symptomatic, cough continues to improve, and at this point believes it is just a typical smokers cough for her. Denies other symptoms. She is relived that she is likely going to clear from Covid quarantine tomorrow as it has been quite mild for her and she was previously fearful it could get serious given her age and not being vaccinated. She is more concerned about her dperession./ Brings up her loss of her second daughter earlier this year as primarily why her depression has been severe in recent months, noting she is sad to be away from her granddaughter she lives with and cares for espeically because she does not like going to her dad's. She would like to get better. Deines active SI in the hospital but still having passive SI. She has 1:1 sitter. She reports poor sleep and appetite. Energy low.  She is now interested in trying selective serotonin reuptake inhibitor after again reviewing risks/benefits today. Advised consideration of selective serotonin reuptake inhibitor though given severity of symptoms.     Review of systems:   10 point Review of Systems completed by Dr. Lamar, and is  is negative other than noted in the HPI     Medications:       benzonatate  100 mg Oral TID     enoxaparin ANTICOAGULANT  40 mg Subcutaneous Q24H     insulin aspart  1-3 Units Subcutaneous TID AC     insulin aspart  1-3 Units Subcutaneous At Bedtime     sertraline  25 mg Oral Daily     sodium chloride (PF)  3 mL Intracatheter Q8H     acetaminophen **OR** acetaminophen, acetaminophen, albuterol, glucose **OR** dextrose **OR** glucagon, guaiFENesin, ibuprofen, lidocaine 4%, lidocaine (buffered or not buffered), LORazepam, melatonin, ondansetron **OR** ondansetron,  prochlorperazine **OR** prochlorperazine **OR** prochlorperazine, sodium chloride (PF)    Mental Status Examination:     Appearance:  awake, alert  Eye Contact:  good  Speech:  clear, coherent  Language:Normal  Psychomotor Behavior:  no evidence of tardive dyskinesia, dystonia, or tics  Mood:  sad  and depressed  Affect:   Slight improvement, but still quite tearful at times and labile. Other times very pleasant especially after making plan and developing rapport.  Thought Process:  logical, linear and goal oriented no loose associations  Thought Content:  passive suicidal ideation present, no auditory hallucinations present and no visual hallucinations present  Oriented to:  time, person, and place  Attention Span and Concentration:  intact  Recent and Remote Memory:  intact  Fund of Knowledge: appropriate  Muscle Strength and Tone: normal  Gait and Station: not evaluated  Insight:  fair  Judgment:  fair        Labs/Vitals:     Recent Results (from the past 24 hour(s))   Glucose by meter    Collection Time: 09/07/21 10:57 AM   Result Value Ref Range    GLUCOSE BY METER POCT 86 70 - 99 mg/dL   Glucose by meter    Collection Time: 09/07/21  5:03 PM   Result Value Ref Range    GLUCOSE BY METER POCT 103 (H) 70 - 99 mg/dL   Glucose by meter    Collection Time: 09/07/21  9:24 PM   Result Value Ref Range    GLUCOSE BY METER POCT 104 (H) 70 - 99 mg/dL   Glucose by meter    Collection Time: 09/08/21  2:00 AM   Result Value Ref Range    GLUCOSE BY METER POCT 91 70 - 99 mg/dL   Platelet count    Collection Time: 09/08/21  6:55 AM   Result Value Ref Range    Platelet Count 295 150 - 450 10e3/uL   Creatinine    Collection Time: 09/08/21  6:55 AM   Result Value Ref Range    Creatinine 0.67 0.52 - 1.04 mg/dL    GFR Estimate >90 >60 mL/min/1.73m2   Potassium    Collection Time: 09/08/21  6:55 AM   Result Value Ref Range    Potassium 3.9 3.4 - 5.3 mmol/L   Glucose    Collection Time: 09/08/21  6:55 AM   Result Value Ref Range     Glucose 96 70 - 99 mg/dL     B/P: 142/94, T: 98.2, P: 67, R: 20  Impression  MDD severe, recurrent .  Patient remains on station 66 convalescing from Covid. She reports that she is minimally symptomatic. She is fearful about having Covid though. She is more concerned about her dperession/ Brings up her loss of her second daughter earlier this year as primarily why her depression has been severe in recent months.              Plan:   1. Continue medical stabilization. My understanding per discussion with hospitalist Dr. Sweet is patient likely will be cleared from [facility] tomorrow and could thus transfer to inpatient psychiatry at that time.   2. Start sertraline 25 mg daily. Risks of suicidal thoughts, behaviors, worsening mood/anxiety, rare but life threatening risks like suicidal thoughts/behaivors reviewed. Continue Ativan 1 mg BID PRN anxiety as she reports good benefit from this yesterday when needed   2. Re-consult psychiatry Patient will need transfer to inpatient psychiatry once medically cleared which is anticipated tomorrow d  Attestation:  Patient has been seen and evaluated by me,  Shankar Lamar MD

## 2021-09-08 NOTE — PROGRESS NOTES
"DATE & TIME: 09/07/2021 0479-4649    Cognitive Concerns/ Orientation : A/Ox4.     BEHAVIOR & AGGRESSION TOOL COLOR: Green. Calm and cooperative.   CIWA SCORE: n/a   ABNL VS/O2: VSS on RA. Denies pain and SOB. Is LOBATO at ties. Dry cough, scheduled Robitussin given. IS in room. Lung sounds are slightly wheezy on expiration.   MOBILITY: Independent in the room.  PAIN MANAGMENT: HA this shift, Ibuprofen given 1x. Became very anxious during shift, PRN oral ativan given and effective for pt.  DIET: Mod carb diet; good appetite.  BOWEL/BLADDER: Continent of B&B. No BM this shift.    ABNL LAB/BG: BG stable, last one was 103.  DRAIN/DEVICES: PIV SL  TELEMETRY RHYTHM: NSR  SKIN: scattered bruises, WNL   TESTS/PROCEDURES: urine for toxicity positive for cocaine qual urine.  D/C DATE: Pending.   Discharge Barriers: per psych continue to monitor; use a positive , direct and calm approach on patient; try to tell the patient what she can do instead of what she can't do.  OTHER IMPORTANT INFO: Covid positive, covid precautions maintained. Suicide precautions in place, pt is calm and states she feels \"better than last night\" this am. Sitter in room. Behavior health followed up w/ pt today. Psych consult ordered and psych recommends inpatient psych care once medically stable.  "

## 2021-09-08 NOTE — PROGRESS NOTES
Extended Care    Amy Choudhury  September 8, 2021    Amy is followed related to long hospital stay with depression and suicidal ideation symptoms. Please see initial DEC Crisis Assessment [facility]. Census on the extended care team did not allow for the patient to be assessed today, however staff will continue to follow her case. Patient requires 10 days of isolation and according to infection control documentation tomorrow will be her 10th day.    Mahogany Valenzuela, [facility], [facility]   [phone]

## 2021-09-08 NOTE — PLAN OF CARE
DATE & TIME: 09/08/2021     Cognitive Concerns/ Orientation : Alert and Oriented x4.     BEHAVIOR & AGGRESSION TOOL COLOR: Green. Calm and cooperative.   CIWA SCORE: NA    ABNL VS/O2: VSS on RA. Denies pain and SOB. Is LOBATO at times. Dry cough, Scheduled Tessalon and PRN Robitussin given at bedtime.  PRN Robitussin given again at 0545.    MOBILITY: Independent in the room.  PAIN MANAGMENT: Denies pain this shift.    DIET: Mod carb diet; good appetite.  BOWEL/BLADDER: Continent of B&B. No BM this shift.    ABNL LAB/BG: BG stable 104 and 91, insulin not needed.    DRAIN/DEVICES: PIV SL  TELEMETRY RHYTHM: SB  SKIN: scattered bruises, WNL   TESTS/PROCEDURES: urine for toxicity positive for cocaine qual urine.  D/C DATE: Pending.   Discharge Barriers: per psych continue to monitor; use a positive , direct and calm approach on patient; try to tell the patient what she can do instead of what she can't do.  OTHER IMPORTANT INFO: Covid positive, covid precautions maintained. Suicide precautions in place, pt is calm and has been sleeping most of shift.  Sitter in room for safety.

## 2021-09-08 NOTE — PROGRESS NOTES
[facility]    Hospitalist Progress Note  PT Summary:  PT is a 61-year-old female with history of GERD, anxiety, depression, prediabetes, who came to the ER with suicidal ideation and thoughts, she was recently diagnosed to have COVID infection.    ASSESSMENT AND PLAN:     Suicidal ideatios  Hx of anxiety and depression   Was not on any medication PTA.  still having thoughts but would not act on them.  - Suicidal precautions.  - Further plan per psych (zoloft, prn ativan).    COVID-19 infection:  Mild tomoderate disease.   Positive for COVID-19 on 8/31 and on admission (9/5). CXR shows some changes suggestive of COVID-19. No sob this am cough at baseline  (smoker since age 17). Sating well on RA (high 90's). Afebrile at this time.    - Continuous pulse-ox.   - Supportive with antitussives  -  No indication for dexamethasone and remdesivir at this time (start if hypoxic).    DM likely type II. A1C in 2019 was 6.7.    Blood sugar is well controlled at this time.  - sliding scale insulin.  - repeat A1C.  - ADA diet.  - diabetic education.    Smoking use do:  - Appropriate counseling.  - Declines NRT.    DVT Prophylaxis: Enoxaparin (Lovenox) SQ  Code Status: Full Code    Disposition: to inpatient psych. COVID 19 ends today so she will be able to transfer from tomorrow on.    Josh Sweet MD  [phone] (Y) [phone]    Interval History   Still having  suicidal thoughts.. Cough Is better (this morning she states there was no really new cough and has been on her baseline all along). No sob at this timer. Denies cp.    Physical Exam   Temp: 98.7  F (37.1  C) Temp src: Oral BP: 137/72 Pulse: 76   Resp: 18 SpO2: 97 % O2 Device: None (Room air)    Vitals:    09/05/21 0207 09/05/21 1556 09/06/21 0640   Weight: 99.8 kg (220 lb) 101.8 kg (224 lb 6.9 oz) 101.2 kg (223 lb 1.7 oz)     Vital Signs with Ranges  Temp:  [97.6  F (36.4  C)-98.7  F (37.1  C)] 98.7  F (37.1  C)  Pulse:  [67-82] 76  Resp:   [16-20] 18  BP: (137-148)/(72-94) 137/72  SpO2:  [92 %-97 %] 97 %  I/O last 3 completed shifts:  In: 450 [P.O.:450]  Out: -     Constitutional: Awake, alert, cooperative, no apparent distress  Respiratory: Clear to auscultation bilaterally, no crackles or wheezing  Cardiovascular: Regular rate and rhythm, normal S1 and S2, and no murmur noted  GI: Normal bowel sounds, soft, non-distended, non-tender  Skin/Integumen: No rashes, no cyanosis, no edema  Other:     Medications       benzonatate  100 mg Oral TID     enoxaparin ANTICOAGULANT  40 mg Subcutaneous Q24H     insulin aspart  1-3 Units Subcutaneous TID AC     insulin aspart  1-3 Units Subcutaneous At Bedtime     sertraline  25 mg Oral Daily     sodium chloride (PF)  3 mL Intracatheter Q8H       Data   Recent Labs   Lab 09/08/21  0655 09/08/21  0200 09/07/21  2124 09/06/21  0852 09/05/21  2249 09/05/21  1239 09/05/21  0247 09/05/21  0247   WBC  --   --   --  4.0  --   --   --  4.2   HGB  --   --   --  11.9  --   --   --  12.3   MCV  --   --   --  80  --   --   --  81     --   --  307  --   --   --  311   NA  --   --   --  140  --   --   --  141   POTASSIUM 3.9  --   --  3.7 3.8  --   --  3.2*   CHLORIDE  --   --   --  113*  --   --   --  110*   CO2  --   --   --  25  --   --   --  27   BUN  --   --   --  7  --   --   --  7   CR 0.67  --   --  0.70  --  0.65  --  0.79   ANIONGAP  --   --   --  2*  --   --   --  4   CRISTIAN  --   --   --  8.0*  --   --   --  7.7*   GLC 96 91 104* 143*  --   --    < > 94   ALBUMIN  --   --   --   --   --   --   --  3.1*   PROTTOTAL  --   --   --   --   --   --   --  6.6*   BILITOTAL  --   --   --   --   --   --   --  0.2   ALKPHOS  --   --   --   --   --   --   --  84   ALT  --   --   --   --   --   --   --  24   AST  --   --   --   --   --   --   --  19   TROPONIN  --   --   --   --   --   --   --  <0.015    < > = values in this interval not displayed.       No results found for this or any previous visit (from the past 24  hour(s)).

## 2021-09-08 NOTE — PROGRESS NOTES
Patient asymptomatic for COVID on 8/31.    FOR ASYMPTOMATIC - 10 days from positive test  AND no COVID-19 symptom development in 10-day timeframe.    Day 10 is 9/9/21. Patient needs to complete iso 9/9/21 and at midnight patient can be considered recovered from COVID.

## 2021-09-09 ENCOUNTER — DOCUMENTATION ONLY (OUTPATIENT)
Dept: FAMILY MEDICINE | Facility: CLINIC | Age: 61
End: 2021-09-09

## 2021-09-09 LAB
GLUCOSE BLD-MCNC: 142 MG/DL (ref 70–99)
GLUCOSE BLDC GLUCOMTR-MCNC: 133 MG/DL (ref 70–99)
GLUCOSE BLDC GLUCOMTR-MCNC: 95 MG/DL (ref 70–99)
GLUCOSE BLDC GLUCOMTR-MCNC: 95 MG/DL (ref 70–99)
POTASSIUM BLD-SCNC: 4 MMOL/L (ref 3.4–5.3)

## 2021-09-09 PROCEDURE — 120N000001 HC R&B MED SURG/OB

## 2021-09-09 PROCEDURE — 250N000013 HC RX MED GY IP 250 OP 250 PS 637: Performed by: PSYCHIATRY & NEUROLOGY

## 2021-09-09 PROCEDURE — 250N000013 HC RX MED GY IP 250 OP 250 PS 637: Performed by: HOSPITALIST

## 2021-09-09 PROCEDURE — 250N000013 HC RX MED GY IP 250 OP 250 PS 637: Performed by: INTERNAL MEDICINE

## 2021-09-09 PROCEDURE — 250N000011 HC RX IP 250 OP 636: Performed by: INTERNAL MEDICINE

## 2021-09-09 PROCEDURE — 99232 SBSQ HOSP IP/OBS MODERATE 35: CPT | Performed by: HOSPITALIST

## 2021-09-09 PROCEDURE — 36415 COLL VENOUS BLD VENIPUNCTURE: CPT | Performed by: INTERNAL MEDICINE

## 2021-09-09 PROCEDURE — 82947 ASSAY GLUCOSE BLOOD QUANT: CPT | Performed by: INTERNAL MEDICINE

## 2021-09-09 PROCEDURE — 84132 ASSAY OF SERUM POTASSIUM: CPT | Performed by: INTERNAL MEDICINE

## 2021-09-09 RX ADMIN — ENOXAPARIN SODIUM 40 MG: 40 INJECTION SUBCUTANEOUS at 13:11

## 2021-09-09 RX ADMIN — BENZONATATE 100 MG: 100 CAPSULE, LIQUID FILLED ORAL at 08:47

## 2021-09-09 RX ADMIN — LORAZEPAM 1 MG: 1 TABLET ORAL at 10:23

## 2021-09-09 RX ADMIN — IBUPROFEN 600 MG: 600 TABLET ORAL at 10:23

## 2021-09-09 RX ADMIN — BENZONATATE 100 MG: 100 CAPSULE, LIQUID FILLED ORAL at 22:28

## 2021-09-09 RX ADMIN — SERTRALINE HYDROCHLORIDE 25 MG: 25 TABLET ORAL at 08:47

## 2021-09-09 RX ADMIN — BENZONATATE 100 MG: 100 CAPSULE, LIQUID FILLED ORAL at 15:29

## 2021-09-09 ASSESSMENT — ACTIVITIES OF DAILY LIVING (ADL)
ADLS_ACUITY_SCORE: 12

## 2021-09-09 NOTE — PROGRESS NOTES
Current condition (current mood & behavior): calm cooperative   Sitter present: yes  Every 15 minute documentation by NA/RN completed for Shift: yes  Room safety Suicide Checklist completed in Epic: yes  Patient's color of severity (suicide scale): YELLOW  Order for psych consult placed (if appropriate): yes  Suicide care plan added: yes      Laron Cole RN

## 2021-09-09 NOTE — PLAN OF CARE
DATE & TIME: 09/09/2021   Cognitive Concerns/ Orientation : Alert and Oriented  x4, calm and cooperative; slept well through most of shift.    BEHAVIOR & AGGRESSION TOOL COLOR: Green  CIWA SCORE: N/A  ABNL VS/O2: VSS on RA  MOBILITY: Independent in the room  PAIN MANAGMENT: Denies  DIET: Mod carb diet; good appetite.  BOWEL/BLADDER: Continent of B&B. ABNL LAB/BG:   DRAIN/DEVICES: PIV SL  TELEMETRY RHYTHM: NSR  SKIN: scattered bruises, scabs on L knee  TESTS/PROCEDURES: urine for toxicity positive for cocaine qual urine.  D/C DATE: Discharge to inpatient psych on 9/10 once bed available, needs to be after midnight on 9/10.    Discharge Barriers: per psych continue to monitor; use a positive , direct and calm approach on patient; try to tell the patient what she can do instead of what she can't do.  OTHER IMPORTANT INFO: Covid positive, covid precautions maintained. Suicide precautions in place. Sitter in room for SW precautions. Pt denies current SI but states that if she were home alone she would be worried. LOBATO, IS use encouraged. Covid recovered on 9/10/2021 at midnight. SW following.

## 2021-09-09 NOTE — CONSULTS
[facility], Consult & Liaison    Amy Choudhury  September 9, 2021      Psychiatry consult acknowledged.  attempted to meet with patient on unit to complete brief re-assessment for referral to inpatient psych. However, per infectious disease patient's quarantine period does not end until 1200 this evening. Nurse also reports patient is symptomatic (coughing) and may require a longer quarantine period. [facility] to meet with patient to assess mental health and provide brief therapeutic support. Please continue with recommendations from Dr. Lamar yesterday. Re-consult if concerns with medications or if patient has completed quarantine per infectious disease.     Annamaria Lui, [facility], [facility], Consult & Liaison Service  [phone]

## 2021-09-09 NOTE — PROGRESS NOTES
"When opening a documentation only encounter, be sure to enter in \"Chief Complaint\" Forms and in \" Comments\" Title of form, description if needed.    Amy is a 61 year old  female  Form received via: Fax  Form now resides in: Provider Ready    Shreya Hameed MA      Form has been completed by provider.     Form sent out via: Fax to [employer] at Fax Number: [phone]  Patient informed: No, Reason:n/a  Output date: September 9, 2021    Haylee Chan MA      **Please close the encounter**                          "

## 2021-09-09 NOTE — PROGRESS NOTES
[facility]    Hospitalist Progress Note      Assessment & Plan   Amy Choudhury is a 61 year old female who was admitted on 9/5/2021 with suicidal ideation. Found to have mild COVID-19 infection without need for intervention, monitored in hospital while awaiting for her to be out of quarantine.    Suicidal ideatios  Hx of anxiety and depression   Was not on any medication PTA.  still having thoughts but would not act on them.  - Suicidal precautions.  - Further plan per psych (zoloft, prn ativan).     COVID-19 infection:  Mild tomoderate disease.   Positive for COVID-19 on 8/31 and on admission (9/5). CXR shows some changes suggestive of COVID-19. No sob this am cough at baseline  (smoker since age 17). Sating well on RA (high 90's). Afebrile at this time.    - Continuous pulse-ox.   - Supportive with antitussives  -  No indication for dexamethasone and remdesivir at this time (start if hypoxic).  - No need for further precautions after midnight 9/9  - lovenox, discharge on xarelto for 30 days     DM likely type II. A1C in 2019 was 6.7.    Blood sugar is well controlled at this time. No need for further insulin monitoring  - ADA diet.     Smoking use do:  - Appropriate counseling.  - Declines NRT.    Clinically Significant Risk Factors Present on Admission                DVT Prophylaxis: Enoxaparin (Lovenox) SQ  Code Status: Full Code  Expected discharge: 9/10/21, to inpatient psych if bed available and still appropriate.    Elysia Huynh,   Hospitalist Service  [facility]  Securely message with the Vocera Web Console (learn more here)  Text Page (7am - 6pm) via [employer] Paging/Directory      Interval History   Patient seen and examined. 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 She feels well otherwise. No complaints. No coughing. Disappointed that her isolation precautions continued throughout today as her nephew was on his  way to visit from [place] today. No chest pain, shortness of breath, nausea, vomiting. Tolerating diet.    -Data reviewed today: I reviewed all new labs and imaging results over the last 24 hours. I personally reviewed no images or EKG's today.    Physical Exam   Temp: 97.9  F (36.6  C) Temp src: Oral BP: (!) 156/89 Pulse: 60   Resp: 18 SpO2: 98 % O2 Device: None (Room air)    Vitals:    09/05/21 0207 09/05/21 1556 09/06/21 0640   Weight: 99.8 kg (220 lb) 101.8 kg (224 lb 6.9 oz) 101.2 kg (223 lb 1.7 oz)     Vital Signs with Ranges  Temp:  [97.6  F (36.4  C)-98  F (36.7  C)] 97.9  F (36.6  C)  Pulse:  [57-66] 60  Resp:  [18] 18  BP: (124-159)/(79-91) 156/89  SpO2:  [96 %-98 %] 98 %  I/O last 3 completed shifts:  In: 120 [P.O.:120]  Out: -     Constitutional: Awake, alert, cooperative, no apparent distress  Respiratory: Clear to auscultation bilaterally, no crackles or wheezing  Cardiovascular: Regular rate and rhythm, normal S1 and S2, and no murmur noted  GI: Normal bowel sounds, soft, non-distended, non-tender  Skin/Integumen: No rashes, no cyanosis, no edema  Other:     Medications       benzonatate  100 mg Oral TID     enoxaparin ANTICOAGULANT  40 mg Subcutaneous Q24H     sertraline  25 mg Oral Daily     sodium chloride (PF)  3 mL Intracatheter Q8H       Data   Recent Labs   Lab 09/09/21  1611 09/09/21  1126 09/09/21  0720 09/08/21  0655 09/06/21  0852 09/05/21  2249 09/05/21  1239 09/05/21  0247 09/05/21  0247   WBC  --   --   --   --  4.0  --   --   --  4.2   HGB  --   --   --   --  11.9  --   --   --  12.3   MCV  --   --   --   --  80  --   --   --  81   PLT  --   --   --  295 307  --   --   --  311   NA  --   --   --   --  140  --   --   --  141   POTASSIUM  --   --  4.0 3.9 3.7  --   --    < > 3.2*   CHLORIDE  --   --   --   --  113*  --   --   --  110*   CO2  --   --   --   --  25  --   --   --  27   BUN  --   --   --   --  7  --   --   --  7   CR  --   --   --  0.67 0.70  --  0.65  --  0.79    ANIONGAP  --   --   --   --  2*  --   --   --  4   CRISTIAN  --   --   --   --  8.0*  --   --   --  7.7*   * 95 142* 96 143*   < >  --   --  94   ALBUMIN  --   --   --   --   --   --   --   --  3.1*   PROTTOTAL  --   --   --   --   --   --   --   --  6.6*   BILITOTAL  --   --   --   --   --   --   --   --  0.2   ALKPHOS  --   --   --   --   --   --   --   --  84   ALT  --   --   --   --   --   --   --   --  24   AST  --   --   --   --   --   --   --   --  19   TROPONIN  --   --   --   --   --   --   --   --  <0.015    < > = values in this interval not displayed.       No results found for this or any previous visit (from the past 24 hour(s)).

## 2021-09-09 NOTE — PLAN OF CARE
DATE & TIME: 09/09/2021 6085-1862            Cognitive Concerns/ Orientation : A&O x4, calm and cooperative; anxious at times. Ativan given x1. Pt has intermittent panic attacks and she states that Ibuprofen and Ativan help. Ibuprofen given x1.  BEHAVIOR & AGGRESSION TOOL COLOR: Green  CIWA SCORE: N/A  ABNL VS/O2: BP elevated 156/89 on RA  MOBILITY: Independent in the room  PAIN MANAGMENT: Denies  DIET: Mod carb diet; good appetite.  BOWEL/BLADDER: Continent of B&B  ABNL LAB/BG: , 95, 133  DRAIN/DEVICES: PIV SL  TELEMETRY RHYTHM: NSR  SKIN: scattered bruises, scabs on L knee  TESTS/PROCEDURES: urine for toxicity positive for cocaine qual urine.  D/C DATE: Discharge to inpatient psych on 9/10 once bed available  Discharge Barriers: per psych continue to monitor; use a positive , direct and calm approach on patient; try to tell the patient what she can do instead of what she can't do.  OTHER IMPORTANT INFO: Covid positive, covid precautions maintained. Suicide precautions in place. Sitter in room for SW precautions. Pt denies current SI but states that if she were home alone she would be worried. LOBATO, IS use encouraged. Covid recovered on 9/10/2021 at midnight. SW following.    Current condition (current mood & behavior): Calm and cooperative  Sitter present: yes  Every 15 minute documentation by NA/RN completed for Shift: yes  Room safety Suicide Checklist completed in Epic: yes  Patient's color of severity (suicide scale): YELLOW  Order for psych consult placed (if appropriate): yes  Suicide care plan added: yes      Chiqui Ontiveros RN

## 2021-09-10 LAB — GLUCOSE BLDC GLUCOMTR-MCNC: 88 MG/DL (ref 70–99)

## 2021-09-10 PROCEDURE — 99232 SBSQ HOSP IP/OBS MODERATE 35: CPT | Performed by: PSYCHIATRY & NEUROLOGY

## 2021-09-10 PROCEDURE — 250N000013 HC RX MED GY IP 250 OP 250 PS 637: Performed by: INTERNAL MEDICINE

## 2021-09-10 PROCEDURE — 250N000013 HC RX MED GY IP 250 OP 250 PS 637: Performed by: PSYCHIATRY & NEUROLOGY

## 2021-09-10 PROCEDURE — 250N000011 HC RX IP 250 OP 636: Performed by: INTERNAL MEDICINE

## 2021-09-10 PROCEDURE — 99232 SBSQ HOSP IP/OBS MODERATE 35: CPT | Performed by: HOSPITALIST

## 2021-09-10 PROCEDURE — 250N000013 HC RX MED GY IP 250 OP 250 PS 637: Performed by: HOSPITALIST

## 2021-09-10 PROCEDURE — 120N000001 HC R&B MED SURG/OB

## 2021-09-10 RX ORDER — LORAZEPAM 1 MG/1
1 TABLET ORAL 2 TIMES DAILY PRN
Qty: 10 TABLET | Refills: 0 | Status: ON HOLD | OUTPATIENT
Start: 2021-09-10 | End: 2021-09-20

## 2021-09-10 RX ORDER — SERTRALINE HYDROCHLORIDE 25 MG/1
25 TABLET, FILM COATED ORAL DAILY
Qty: 30 TABLET | Refills: 0 | Status: SHIPPED | OUTPATIENT
Start: 2021-09-11 | End: 2021-09-12

## 2021-09-10 RX ADMIN — SERTRALINE HYDROCHLORIDE 25 MG: 25 TABLET ORAL at 08:18

## 2021-09-10 RX ADMIN — BENZONATATE 100 MG: 100 CAPSULE, LIQUID FILLED ORAL at 16:25

## 2021-09-10 RX ADMIN — LORAZEPAM 1 MG: 1 TABLET ORAL at 08:18

## 2021-09-10 RX ADMIN — LORAZEPAM 1 MG: 1 TABLET ORAL at 14:17

## 2021-09-10 RX ADMIN — BENZONATATE 100 MG: 100 CAPSULE, LIQUID FILLED ORAL at 21:49

## 2021-09-10 RX ADMIN — ENOXAPARIN SODIUM 40 MG: 40 INJECTION SUBCUTANEOUS at 14:03

## 2021-09-10 RX ADMIN — BENZONATATE 100 MG: 100 CAPSULE, LIQUID FILLED ORAL at 08:17

## 2021-09-10 RX ADMIN — IBUPROFEN 600 MG: 600 TABLET ORAL at 08:18

## 2021-09-10 ASSESSMENT — ACTIVITIES OF DAILY LIVING (ADL)
ADLS_ACUITY_SCORE: 14
ADLS_ACUITY_SCORE: 12
ADLS_ACUITY_SCORE: 14
ADLS_ACUITY_SCORE: 12

## 2021-09-10 ASSESSMENT — MIFFLIN-ST. JEOR: SCORE: 1599.19

## 2021-09-10 NOTE — TELEPHONE ENCOUNTER
Patient cleared and ready for behavioral bed placement: Yes   S: Received call at 00:07 from Bhavesh on  unit 66 regarding Inpt MH bed for 61/F w/ suicidal Ideation. Callback number [phone].     B: Hx anxiety, suicidal ideation and adjustment disorder. Pt reports SI without a plan but she cannot contract for safety if she were to go home. Pt lost her daughter earlier this year. Pt is calm and cooperative - no reports of aggressive behaviors. No concerns for drug or etoh use. Pt does have a therapist. Pt is not currently on psych meds. Pt tested positive for Covid-19 on 8/31/21 and is currently medically cleared from Infection Prevention standpoint for Inpt MH admission. Hx of type II diabetes. Independent w/ cares and ambulation.     A: Voluntary    Hospitalist: Dr. Huynh. Unit will have to call back in AM to provide callback number as it was unknown at time of call.     R: Pt placed on work list pending available placement availability

## 2021-09-10 NOTE — PLAN OF CARE
DATE & TIME: 9/10/2021 [id number]   Cognitive Concerns/ Orientation : Pt A/Ox4. Sad & anxious.   BEHAVIOR & AGGRESSION TOOL COLOR: Green   ABNL VS/O2: Vitals stable  MOBILITY: Independent  PAIN MANAGMENT:Headache ( Ibuprofen given)  DIET: Mod CHO  BOWEL/BLADDER: Continent  DRAIN/DEVICES: IV SL  TELEMETRY RHYTHM: NSR  SKIN: Scattered bruising  D/C DATE: Discharge to inpatient psych once bed available   OTHER IMPORTANT INFO: Lung sounds diminished. Suicide precautions in place. Sitter at bedside. Pt states no SI this shift. Special precautions were discontinued, COVID recovered.  Contact precautions maintained. PRN Ativan for anxiety given twice during this shift.

## 2021-09-10 NOTE — PLAN OF CARE
DATE & TIME: 9/10/2021 5974-0018   Cognitive Concerns/ Orientation : Pt A/Ox4.   BEHAVIOR & AGGRESSION TOOL COLOR: Green   ABNL VS/O2: HTN and johanna at times on RA  MOBILITY: Independent  PAIN MANAGMENT: Denies  DIET: Mod CHO  BOWEL/BLADDER: Continent  DRAIN/DEVICES: IV SL  TELEMETRY RHYTHM: SB  SKIN: Scattered bruising  D/C DATE: Discharge to inpatient psych once bed available   OTHER IMPORTANT INFO: Lung sounds diminished. Suicide precautions in place. Sitter at bedside. Pt states no SI this shift. Special precautions were discontinued, COVID recovered  Contact precautions maintained.    Current condition (current mood & behavior): Calm, cooperative  Sitter present: yes  Every 15 minute documentation by NA/RN completed for Shift: yes  Room safety Suicide Checklist completed in Epic: yes  Patient's color of severity (suicide scale): YELLOW  Order for psych consult placed (if appropriate): yes  Suicide care plan added: yes    Patricia Espinoza RN

## 2021-09-10 NOTE — PROGRESS NOTES
.Current condition (current mood & behavior): Calm and cooperative  Sitter present: yes  Every 15 minute documentation by NA/RN completed for Shift: yes  Room safety Suicide Checklist completed in Epic: yes  Patient's color of severity (suicide scale): YELLOW  Order for psych consult placed (if appropriate): yes  Suicide care plan added: yes      Mary Murphy RN

## 2021-09-10 NOTE — PROGRESS NOTES
Amy Choudhury is reviewed for [facility] service.   Received report from patient's nurse, Mary, that patient is doing okay.  Nurse notes that patient has been anxious.  Patient's is now out of isolation and her mother just arrived for a visit.  Nurse reports patient appears happy to be out of isolation and visiting with mother.      Discussed clinician will not interrupt this time with her mother.  Will plan to offer a visit later today or tomorrow.    Will follow and meet with patient/family/care team as able or requested.     Margaret Stokes  [facility], [facility]   [phone]

## 2021-09-10 NOTE — PROGRESS NOTES
..Current condition (current mood & behavior): Anxious/teary but cooperative  Sitter present: yes  Every 15 minute documentation by NA/RN completed for Shift: yes  Room safety Suicide Checklist completed in Epic: yes  Patient's color of severity (suicide scale): YELLOW  Order for psych consult placed (if appropriate): yes  Suicide care plan added: yes      Dodie Humphreys RN

## 2021-09-10 NOTE — CONSULTS
"[facility] Psychiatric Consult Progress Note    Interval History:   Pt seen, chart reviewed, case discussed with nursing staff and attending physician.   Patient remains on station 66 , now has cleared from Covid. No longer on covid precautions. On my interview patient remains very depressed and emotional. Easily tearful. She describes thoughts of suicide have been passive, \"I'm not going to just go into the bathroom.' She states she is doing slightly better on the sertraline even at the low dose. States that the Ativan is quite effective PRN. She does not feel ready to go home and would like to go to inpatient psychiatry for further treatment of her depression. She is not having any covid symptoms. States she has a mild headache partially due to waking up and having a cold breakfast. She is able to laugh this off right now and is not in any significant discomfort. No cough, SOB, congestion or fever. She is planning on walking the unit today now that she is cleared to do so, noting that she is looking forward to getting that opportunity.     Review of systems:   10 point Review of Systems completed by Dr. Lamar, and is  is negative other than noted in the HPI     Medications:       benzonatate  100 mg Oral TID     enoxaparin ANTICOAGULANT  40 mg Subcutaneous Q24H     sertraline  25 mg Oral Daily     sodium chloride (PF)  3 mL Intracatheter Q8H     acetaminophen **OR** acetaminophen, acetaminophen, albuterol, glucose **OR** dextrose **OR** glucagon, guaiFENesin, ibuprofen, lidocaine 4%, lidocaine (buffered or not buffered), LORazepam, melatonin, ondansetron **OR** ondansetron, prochlorperazine **OR** prochlorperazine **OR** prochlorperazine, sodium chloride (PF)    Mental Status Examination:     Appearance:  awake, alert  Eye Contact:  good  Speech:  clear, coherent  Language:Normal  Psychomotor Behavior:  no evidence of tardive dyskinesia, dystonia, or tics  Mood:  sad  and depressed, slight " improvement  Affect:   Slight improvement, but still quite tearful at times and labile. Other times very pleasant especially after making plan and developing rapport.  Thought Process:  logical, linear and goal oriented no loose associations  Thought Content:  passive suicidal ideation present, no auditory hallucinations present and no visual hallucinations present  Oriented to:  time, person, and place  Attention Span and Concentration:  intact  Recent and Remote Memory:  intact  Fund of Knowledge: appropriate  Muscle Strength and Tone: normal  Gait and Station: not evaluated  Insight:  fair  Judgment:  fair        Labs/Vitals:     Recent Results (from the past 24 hour(s))   Glucose by meter    Collection Time: 09/09/21 11:26 AM   Result Value Ref Range    GLUCOSE BY METER POCT 95 70 - 99 mg/dL   Glucose by meter    Collection Time: 09/09/21  4:11 PM   Result Value Ref Range    GLUCOSE BY METER POCT 133 (H) 70 - 99 mg/dL   Glucose by meter    Collection Time: 09/10/21  7:23 AM   Result Value Ref Range    GLUCOSE BY METER POCT 88 70 - 99 mg/dL     B/P: 142/94, T: 98.2, P: 67, R: 20  Impression  MDD severe, recurrent .  Patient remains on station 66 now cleared from Covid isolation protocol. She remains very depressed with p passive SI but is strongly motivated to pursue inpatient psychiatric hospitalization.               Plan:   1. She has been medically cleared. Psych intake is working on trying to transfer to inpatient psychiatry  2. Continue sertraline 25 mg daily, plan would be to increase after 3-7 days as tolerated. SHe declines to push the dose quite yet but denies any side effects and believes she is experience a slight improvement since starting.   Continue Ativan 1 mg BID PRN anxiety as she reports good benefit from this yesterday when needed     Attestation:  Patient has been seen and evaluated by me,  Shankar Lamar MD

## 2021-09-10 NOTE — PLAN OF CARE
DATE & TIME: 9/9/21 1900-2300    Cognitive Concerns/ Orientation : Pt A/Ox4. She is excited to see family tomorrow.  BEHAVIOR & AGGRESSION TOOL COLOR: Green   ABNL VS/O2: VSS on RA  MOBILITY: Independent  PAIN MANAGMENT: Denies  DIET: Mod CHO  BOWEL/BLADDER: Continent  DRAIN/DEVICES: IV SL  TELEMETRY RHYTHM: SB  SKIN: Scattered bruising  D/C DATE: Discharge to inpatient psych once bed available off covid precautions at 2359 on 9/9  OTHER IMPORTANT INFO: Lung sounds diminished. Suicide precautions in place. Sitter at bedside. Pt states no SI this evening.    Current condition (current mood & behavior): Calm, cooperative  Sitter present: yes  Every 15 minute documentation by NA/RN completed for Shift: yes  Room safety Suicide Checklist completed in Epic: yes  Patient's color of severity (suicide scale): YELLOW  Order for psych consult placed (if appropriate): yes  Suicide care plan added: yes      Haylee Preston RN

## 2021-09-10 NOTE — PROGRESS NOTES
[facility]    Hospitalist Progress Note      Assessment & Plan   Amy Choudhury is a 61 year old female who was admitted on 2021 with suicidal ideation. Found to have mild COVID-19 infection without need for intervention, monitored in hospital while awaiting for her to be out of quarantine.    Suicidal ideation  Hx of anxiety and depression   Was not on any medication PTA.  still having thoughts but would not act on them.  - Suicidal precautions.  - Further plan per psych (zoloft, prn ativan). Keep sertraline at 25mg daily, consideration for uptitration in next few days if needed  - discharge once inpatient psych bed available     COVID-19 infection:  Mild tomoderate disease.   Positive for COVID-19 on  and on admission (). CXR shows some changes suggestive of COVID-19. No sob this am cough at baseline  (smoker since age 17). Sating well on RA (high 90's). Afebrile. Did not require dexamethasone or remdesivir. Precautions  at midnight   - Supportive with antitussives  - lovenox while inpatient, discharge on xarelto for 30 days     DM likely type II. A1C in 2019 was 6.7.    Blood sugar is well controlled at this time. No need for further insulin monitoring  - ADA diet.     Smoking use do:  - Appropriate counseling.  - Declines NRT.    Clinically Significant Risk Factors Present on Admission                DVT Prophylaxis: Enoxaparin (Lovenox) SQ  Code Status: Full Code  Expected discharge: 9/10/21, to inpatient psych if bed available    Elysia Huynh DO  Hospitalist Service  [facility]  Securely message with the Vocera Web Console (learn more here)  Text Page (7am - 6pm) via [facility] Paging/Directory      Interval History   Patient seen and examined. She has a headache today. Slept poorly. Breakfast was cold. She had sweats overnight, but no fever. She wasn't sure if family was going to be able to visit today. She is not currently suicidal. She  plans to go for a walk today.    -Data reviewed today: I reviewed all new labs and imaging results over the last 24 hours. I personally reviewed no images or EKG's today.    Physical Exam   Temp: 98.2  F (36.8  C) Temp src: Oral BP: (!) 147/87 Pulse: 61   Resp: 18 SpO2: 99 % O2 Device: None (Room air)    Vitals:    09/05/21 1556 09/06/21 0640 09/10/21 0648   Weight: 101.8 kg (224 lb 6.9 oz) 101.2 kg (223 lb 1.7 oz) 100.7 kg (222 lb 1.6 oz)     Vital Signs with Ranges  Temp:  [97.7  F (36.5  C)-98.2  F (36.8  C)] 98.2  F (36.8  C)  Pulse:  [59-62] 61  Resp:  [18] 18  BP: (135-164)/(87-92) 147/87  SpO2:  [96 %-99 %] 99 %  No intake/output data recorded.    Constitutional: Awake, alert, cooperative, no apparent distress  Respiratory: Clear to auscultation bilaterally, no crackles or wheezing  Cardiovascular: Regular rate and rhythm, normal S1 and S2, and no murmur noted  GI: Normal bowel sounds, soft, non-distended, non-tender  Skin/Integumen: No rashes, no cyanosis, no edema  Other:     Medications       benzonatate  100 mg Oral TID     enoxaparin ANTICOAGULANT  40 mg Subcutaneous Q24H     sertraline  25 mg Oral Daily     sodium chloride (PF)  3 mL Intracatheter Q8H       Data   Recent Labs   Lab 09/10/21  0723 09/09/21  1611 09/09/21  1126 09/09/21  0720 09/08/21  0655 09/06/21  0852 09/05/21  2249 09/05/21  1239 09/05/21  0247 09/05/21  0247   WBC  --   --   --   --   --  4.0  --   --   --  4.2   HGB  --   --   --   --   --  11.9  --   --   --  12.3   MCV  --   --   --   --   --  80  --   --   --  81   PLT  --   --   --   --  295 307  --   --   --  311   NA  --   --   --   --   --  140  --   --   --  141   POTASSIUM  --   --   --  4.0 3.9 3.7  --   --    < > 3.2*   CHLORIDE  --   --   --   --   --  113*  --   --   --  110*   CO2  --   --   --   --   --  25  --   --   --  27   BUN  --   --   --   --   --  7  --   --   --  7   CR  --   --   --   --  0.67 0.70  --  0.65  --  0.79   ANIONGAP  --   --   --   --   --  2*   --   --   --  4   CRISTIAN  --   --   --   --   --  8.0*  --   --   --  7.7*   GLC 88 133* 95 142* 96 143*   < >  --   --  94   ALBUMIN  --   --   --   --   --   --   --   --   --  3.1*   PROTTOTAL  --   --   --   --   --   --   --   --   --  6.6*   BILITOTAL  --   --   --   --   --   --   --   --   --  0.2   ALKPHOS  --   --   --   --   --   --   --   --   --  84   ALT  --   --   --   --   --   --   --   --   --  24   AST  --   --   --   --   --   --   --   --   --  19   TROPONIN  --   --   --   --   --   --   --   --   --  <0.015    < > = values in this interval not displayed.       No results found for this or any previous visit (from the past 24 hour(s)).

## 2021-09-10 NOTE — PLAN OF CARE
DATE & TIME: 9/10/2021 (6423-3342)   Cognitive Concerns/ Orientation : Pt A/Ox4. Sad & anxious at times, PRN Ativan given x2 this shift.   BEHAVIOR & AGGRESSION TOOL COLOR: Green   ABNL VS/O2: Vitals stable  MOBILITY: Independent in room, ambulated in hallways   PAIN MANAGMENT:Headache ( Ibuprofen given) with some relief   DIET: Mod CHO, tolerating   BOWEL/BLADDER: Continent  DRAIN/DEVICES: IV SL  TELEMETRY RHYTHM: NSR, telemetry discontinued this evening   SKIN: Scattered bruising  D/C DATE: Discharge to inpatient psych once bed available   OTHER IMPORTANT INFO: Lung sounds diminished. Suicide precautions in place. Sitter at bedside. Pt states no SI this shift. Special precautions were discontinued, COVID recovered.  Contact precautions maintained.

## 2021-09-11 LAB
CREAT SERPL-MCNC: 0.65 MG/DL (ref 0.52–1.04)
GFR SERPL CREATININE-BSD FRML MDRD: >90 ML/MIN/1.73M2
PLATELET # BLD AUTO: 368 10E3/UL (ref 150–450)

## 2021-09-11 PROCEDURE — 120N000001 HC R&B MED SURG/OB

## 2021-09-11 PROCEDURE — 250N000013 HC RX MED GY IP 250 OP 250 PS 637: Performed by: HOSPITALIST

## 2021-09-11 PROCEDURE — 82565 ASSAY OF CREATININE: CPT | Performed by: INTERNAL MEDICINE

## 2021-09-11 PROCEDURE — 99232 SBSQ HOSP IP/OBS MODERATE 35: CPT | Performed by: HOSPITALIST

## 2021-09-11 PROCEDURE — 36415 COLL VENOUS BLD VENIPUNCTURE: CPT | Performed by: INTERNAL MEDICINE

## 2021-09-11 PROCEDURE — 250N000013 HC RX MED GY IP 250 OP 250 PS 637: Performed by: PSYCHIATRY & NEUROLOGY

## 2021-09-11 PROCEDURE — 85049 AUTOMATED PLATELET COUNT: CPT | Performed by: INTERNAL MEDICINE

## 2021-09-11 PROCEDURE — 250N000013 HC RX MED GY IP 250 OP 250 PS 637: Performed by: INTERNAL MEDICINE

## 2021-09-11 RX ADMIN — LORAZEPAM 1 MG: 1 TABLET ORAL at 16:42

## 2021-09-11 RX ADMIN — BENZONATATE 100 MG: 100 CAPSULE, LIQUID FILLED ORAL at 08:26

## 2021-09-11 RX ADMIN — BENZONATATE 100 MG: 100 CAPSULE, LIQUID FILLED ORAL at 16:43

## 2021-09-11 RX ADMIN — SERTRALINE HYDROCHLORIDE 25 MG: 25 TABLET ORAL at 08:26

## 2021-09-11 RX ADMIN — RIVAROXABAN 10 MG: 10 TABLET, FILM COATED ORAL at 16:43

## 2021-09-11 RX ADMIN — IBUPROFEN 600 MG: 600 TABLET ORAL at 16:43

## 2021-09-11 RX ADMIN — LORAZEPAM 1 MG: 1 TABLET ORAL at 08:26

## 2021-09-11 RX ADMIN — IBUPROFEN 600 MG: 600 TABLET ORAL at 08:26

## 2021-09-11 ASSESSMENT — ACTIVITIES OF DAILY LIVING (ADL)
ADLS_ACUITY_SCORE: 14

## 2021-09-11 NOTE — PLAN OF CARE
DATE & TIME: 9/11/21 (9905-7630)   Cognitive Concerns/ Orientation : Pt A&Ox4, cooperative, anxious at times, Ativan PRN givenx2 this am  BEHAVIOR & AGGRESSION TOOL COLOR: Green   ABNL VS/O2: Vitals stable  MOBILITY: Independent in room  PAIN MANAGMENT: C/o headache, Ibuprofenx2 given with relief  DIET: Mod CHO, tolerating   BOWEL/BLADDER: Continent  DRAIN/DEVICES: IV SL  TELEMETRY RHYTHM: None  SKIN: Scattered bruising  D/C DATE: Discharge to inpatient psych once bed available   OTHER IMPORTANT INFO: Lung sounds diminished. Suicide precautions in place. Sitter at bedside. Pt states no SI this shift. COVID recovered.  Contact precautions maintained MRSA.     .Current condition (current mood & behavior): Cooperative, anxious at times, PRN ativan given x2  Sitter present: yes  Every 15 minute documentation by NA/RN completed for Shift: yes  Room safety Suicide Checklist completed in Epic: yes  Patient's color of severity (suicide scale): YELLOW  Order for psych consult placed (if appropriate): yes  Suicide care plan added: yes      Mary Murphy RN

## 2021-09-11 NOTE — PROGRESS NOTES
[facility]    Hospitalist Progress Note      Assessment & Plan   Amy Choudhury is a 61 year old female who was admitted on 2021 with suicidal ideation. Found to have mild COVID-19 infection without need for intervention, monitored in hospital while awaiting for her to be out of quarantine.    Suicidal ideation  Hx of anxiety and depression   Was not on any medication PTA.  still having thoughts but would not act on them.  - Suicidal precautions.  - Further plan per psych (zoloft, prn ativan). Keep sertraline at 25mg daily, consideration for uptitration in next few days if needed  - discharge once inpatient psych bed available     COVID-19 infection:  Mild tomoderate disease.   Positive for COVID-19 on  and on admission (). CXR shows some changes suggestive of COVID-19. No sob this am cough at baseline  (smoker since age 17). Sating well on RA (high 90's). Afebrile. Did not require dexamethasone or remdesivir. Precautions  at midnight   - Supportive with antitussives  - initially on lovenox for prophylaxis, switch to xarelto 10mg daily starting      DM likely type II. A1C in 2019 was 6.7.    Blood sugar is well controlled at this time. No need for further insulin monitoring  - ADA diet.     Smoking use do:  - Appropriate counseling.  - Declines NRT.    Clinically Significant Risk Factors Present on Admission                DVT Prophylaxis: xarelto  Code Status: Full Code  Expected discharge: to inpatient psych when bed available, stable for discharge    Elysia Huynh DO  Hospitalist Service  [facility]  Securely message with the Vocera Web Console (learn more here)  Text Page (7am - 6pm) via [facility] Paging/Directory      Interval History   Patient seen and examined. No headache today. No chest pain, shortness of breath, nausea, vomiting. Upset that sitter had to be in room when family visiting yesterday, wished she had some privacy. Had  night sweats again last night, no fevers. Breakfast is always cold when it arrives to her. Asking for some chocolate today.    -Data reviewed today: I reviewed all new labs and imaging results over the last 24 hours. I personally reviewed no images or EKG's today.    Physical Exam   Temp: 97.8  F (36.6  C) Temp src: Oral BP: 137/88 Pulse: 69   Resp: 18 SpO2: 99 % O2 Device: None (Room air)    Vitals:    09/05/21 1556 09/06/21 0640 09/10/21 0648   Weight: 101.8 kg (224 lb 6.9 oz) 101.2 kg (223 lb 1.7 oz) 100.7 kg (222 lb 1.6 oz)     Vital Signs with Ranges  Temp:  [97.8  F (36.6  C)-98.2  F (36.8  C)] 97.8  F (36.6  C)  Pulse:  [61-69] 69  Resp:  [18] 18  BP: (137-153)/(87-96) 137/88  SpO2:  [98 %-99 %] 99 %  No intake/output data recorded.    Constitutional: Awake, alert, cooperative, no apparent distress  Respiratory: Clear to auscultation bilaterally, no crackles or wheezing  Cardiovascular: Regular rate and rhythm, normal S1 and S2, and no murmur noted  GI: Normal bowel sounds, soft, non-distended, non-tender  Skin/Integumen: No rashes, no cyanosis, no edema  Other:     Medications       benzonatate  100 mg Oral TID     rivaroxaban ANTICOAGULANT  10 mg Oral Daily with supper     sertraline  25 mg Oral Daily     sodium chloride (PF)  3 mL Intracatheter Q8H       Data   Recent Labs   Lab 09/11/21  0546 09/10/21  0723 09/09/21  1611 09/09/21  1126 09/09/21  0720 09/08/21  0655 09/06/21  0852 09/05/21  1239 09/05/21  0247   WBC  --   --   --   --   --   --  4.0  --  4.2   HGB  --   --   --   --   --   --  11.9  --  12.3   MCV  --   --   --   --   --   --  80  --  81     --   --   --   --  295 307  --  311   NA  --   --   --   --   --   --  140  --  141   POTASSIUM  --   --   --   --  4.0 3.9 3.7   < > 3.2*   CHLORIDE  --   --   --   --   --   --  113*  --  110*   CO2  --   --   --   --   --   --  25  --  27   BUN  --   --   --   --   --   --  7  --  7   CR 0.65  --   --   --   --  0.67 0.70  --  0.79    ANIONGAP  --   --   --   --   --   --  2*  --  4   CRISTIAN  --   --   --   --   --   --  8.0*  --  7.7*   GLC  --  88 133* 95 142* 96 143*   < > 94   ALBUMIN  --   --   --   --   --   --   --   --  3.1*   PROTTOTAL  --   --   --   --   --   --   --   --  6.6*   BILITOTAL  --   --   --   --   --   --   --   --  0.2   ALKPHOS  --   --   --   --   --   --   --   --  84   ALT  --   --   --   --   --   --   --   --  24   AST  --   --   --   --   --   --   --   --  19   TROPONIN  --   --   --   --   --   --   --   --  <0.015    < > = values in this interval not displayed.       No results found for this or any previous visit (from the past 24 hour(s)).

## 2021-09-11 NOTE — PLAN OF CARE
DATE & TIME: 9/10/2021 0103-2622   Cognitive Concerns/ Orientation : Pt A/Ox4. Sad & anxious at times.  BEHAVIOR & AGGRESSION TOOL COLOR: Green   ABNL VS/O2: Vitals stable  MOBILITY: Independent in room  PAIN MANAGMENT: Denied this shift  DIET: Mod CHO, tolerating   BOWEL/BLADDER: Continent  DRAIN/DEVICES: IV SL  TELEMETRY RHYTHM: discontinued previous shift  SKIN: Scattered bruising  D/C DATE: Discharge to inpatient psych once bed available   OTHER IMPORTANT INFO: Lung sounds diminished. Suicide precautions in place. Sitter at bedside. Pt states no SI this shift. COVID recovered.  Contact precautions maintained.

## 2021-09-12 PROCEDURE — 250N000013 HC RX MED GY IP 250 OP 250 PS 637: Performed by: INTERNAL MEDICINE

## 2021-09-12 PROCEDURE — 250N000013 HC RX MED GY IP 250 OP 250 PS 637: Performed by: HOSPITALIST

## 2021-09-12 PROCEDURE — 120N000001 HC R&B MED SURG/OB

## 2021-09-12 PROCEDURE — 99232 SBSQ HOSP IP/OBS MODERATE 35: CPT | Performed by: HOSPITALIST

## 2021-09-12 PROCEDURE — 250N000013 HC RX MED GY IP 250 OP 250 PS 637: Performed by: PSYCHIATRY & NEUROLOGY

## 2021-09-12 RX ORDER — SERTRALINE HYDROCHLORIDE 25 MG/1
25 TABLET, FILM COATED ORAL ONCE
Status: COMPLETED | OUTPATIENT
Start: 2021-09-12 | End: 2021-09-12

## 2021-09-12 RX ORDER — SERTRALINE HYDROCHLORIDE 25 MG/1
50 TABLET, FILM COATED ORAL DAILY
Qty: 30 TABLET | Refills: 0 | Status: ON HOLD | OUTPATIENT
Start: 2021-09-12 | End: 2021-09-20

## 2021-09-12 RX ORDER — LORAZEPAM 1 MG/1
1 TABLET ORAL 3 TIMES DAILY PRN
Status: DISCONTINUED | OUTPATIENT
Start: 2021-09-12 | End: 2021-09-13 | Stop reason: HOSPADM

## 2021-09-12 RX ADMIN — BENZONATATE 100 MG: 100 CAPSULE, LIQUID FILLED ORAL at 17:37

## 2021-09-12 RX ADMIN — RIVAROXABAN 10 MG: 10 TABLET, FILM COATED ORAL at 17:37

## 2021-09-12 RX ADMIN — IBUPROFEN 600 MG: 600 TABLET ORAL at 17:37

## 2021-09-12 RX ADMIN — IBUPROFEN 600 MG: 600 TABLET ORAL at 06:58

## 2021-09-12 RX ADMIN — SERTRALINE HYDROCHLORIDE 25 MG: 25 TABLET ORAL at 07:42

## 2021-09-12 RX ADMIN — BENZONATATE 100 MG: 100 CAPSULE, LIQUID FILLED ORAL at 07:42

## 2021-09-12 RX ADMIN — SERTRALINE HYDROCHLORIDE 25 MG: 25 TABLET ORAL at 13:23

## 2021-09-12 RX ADMIN — LORAZEPAM 1 MG: 1 TABLET ORAL at 07:42

## 2021-09-12 RX ADMIN — LORAZEPAM 1 MG: 1 TABLET ORAL at 17:37

## 2021-09-12 RX ADMIN — LORAZEPAM 1 MG: 1 TABLET ORAL at 13:23

## 2021-09-12 ASSESSMENT — ACTIVITIES OF DAILY LIVING (ADL)
ADLS_ACUITY_SCORE: 14

## 2021-09-12 NOTE — PLAN OF CARE
DATE & TIME: 9/11/21 1900-0730  Cognitive Concerns/ Orientation : Pt A&Ox4, cooperative, anxious at times  BEHAVIOR & AGGRESSION TOOL COLOR: Green   ABNL VS/O2: Vitals stable  MOBILITY: Independent in room  PAIN MANAGMENT: C/o headache, Ibuprofen given with relief  DIET: Mod CHO, tolerating   BOWEL/BLADDER: Continent  DRAIN/DEVICES: IV SL  TELEMETRY RHYTHM: None  SKIN: Scattered bruising  D/C DATE: Discharge to inpatient psych once bed available   OTHER IMPORTANT INFO: Lung sounds diminished. Suicide precautions in place. Sitter at bedside. Pt states no SI this shift. COVID recovered.  Contact precautions maintained MRSA.

## 2021-09-12 NOTE — PLAN OF CARE
DATE & TIME: 9/12/21 (2329-5560)  Cognitive Concerns/ Orientation : Pt A&Ox4, cooperative, anxious at times having panic attacks and teary eyed, Ativan PRN given x2 this shift, MD increased PRN TID today, Zoloft increased 50 mg Daily, additional dose given today  BEHAVIOR & AGGRESSION TOOL COLOR: Green  ABNL VS/O2: Vitals stable, RA   MOBILITY: Independent in room  PAIN MANAGMENT: C/o headache, Ibuprofen given with relief this am   DIET: Mod CHO, tolerating   BOWEL/BLADDER: Continent  DRAIN/DEVICES: IV SL  TELEMETRY RHYTHM: None  SKIN: Scattered bruising  D/C DATE: Discharge to inpatient psych once bed available   OTHER IMPORTANT INFO: Lung sounds diminished. Suicide precautions in place. Sitter at bedside. Pt states no SI this shift. COVID recovered.  Contact precautions maintained MRSA.     .Current condition (current mood & behavior): Anxious, teary eyed, cooperative  Sitter present: yes  Every 15 minute documentation by NA/RN completed for Shift: yes  Room safety Suicide Checklist completed in Epic: yes  Patient's color of severity (suicide scale): Yellow  Order for psych consult placed (if appropriate): yes  Suicide care plan added: yes  Yes  Mary Murphy, RN

## 2021-09-12 NOTE — PROGRESS NOTES
[facility]    Hospitalist Progress Note      Assessment & Plan   Amy Choudhury is a 61 year old female who was admitted on 2021 with suicidal ideation. Found to have mild COVID-19 infection without need for intervention, monitored in hospital while awaiting for her to be out of quarantine.    Suicidal ideation  Hx of anxiety and depression   Was not on any medication PTA.  still having thoughts but would not act on them.  - Suicidal precautions.  - Further plan per psych (zoloft, prn ativan TID). increased sertraline to 50mg daily on  given persistent poor mood (patient request)  - discharge once inpatient psych bed available     COVID-19 infection:  Mild tomoderate disease.   Positive for COVID-19 on  and on admission (). CXR shows some changes suggestive of COVID-19. No sob this am cough at baseline  (smoker since age 17). Sating well on RA (high 90's). Afebrile. Did not require dexamethasone or remdesivir. Precautions  at midnight   - Supportive with antitussives  - initially on lovenox for prophylaxis, switched to xarelto 10mg daily on , continue for 20 days after discharge     DM likely type II. A1C in 2019 was 6.7.    Blood sugar is well controlled at this time. No need for further insulin monitoring  - ADA diet.     Smoking use do:  - Appropriate counseling.  - Declines NRT.    Clinically Significant Risk Factors Present on Admission                DVT Prophylaxis: xarelto  Code Status: Full Code  Expected discharge: to inpatient psych when bed available, stable for discharge    Elysia Huynh DO  Hospitalist Service  [facility]  Securely message with the Vocera Web Console (learn more here)  Text Page (7am - 6pm) via [employer] Paging/Directory      Interval History   Patient seen and examined. Tearful, anxious and sad today. Worried about her granddaughter and leaving her family to fend for themselves. Still upset about cold  breakfasts. Discussed increasing sertraline and she is agreeable. She requested additional ativan today. No chest pain, shortness of breath.    -Data reviewed today: I reviewed all new labs and imaging results over the last 24 hours. I personally reviewed no images or EKG's today.    Physical Exam   Temp: 97.9  F (36.6  C) Temp src: Oral BP: (!) 156/90 Pulse: 63   Resp: 18 SpO2: 97 % O2 Device: None (Room air)    Vitals:    09/05/21 1556 09/06/21 0640 09/10/21 0648   Weight: 101.8 kg (224 lb 6.9 oz) 101.2 kg (223 lb 1.7 oz) 100.7 kg (222 lb 1.6 oz)     Vital Signs with Ranges  Temp:  [97.8  F (36.6  C)-99.8  F (37.7  C)] 97.9  F (36.6  C)  Pulse:  [56-63] 63  Resp:  [16-20] 18  BP: (138-156)/(82-90) 156/90  SpO2:  [96 %-98 %] 97 %  I/O last 3 completed shifts:  In: 960 [P.O.:960]  Out: -     Constitutional: Awake, alert, cooperative, tearful, upset  Respiratory: Clear to auscultation bilaterally, no crackles or wheezing  Cardiovascular: Regular rate and rhythm, normal S1 and S2, and no murmur noted  GI: Normal bowel sounds, soft, non-distended, non-tender  Skin/Integumen: No rashes, no cyanosis, no edema  Other:     Medications       benzonatate  100 mg Oral TID     rivaroxaban ANTICOAGULANT  10 mg Oral Daily with supper     [START ON 9/13/2021] sertraline  50 mg Oral Daily     sodium chloride (PF)  3 mL Intracatheter Q8H       Data   Recent Labs   Lab 09/11/21  0546 09/10/21  0723 09/09/21  1611 09/09/21  1126 09/09/21  0720 09/08/21  0655 09/06/21  0852   WBC  --   --   --   --   --   --  4.0   HGB  --   --   --   --   --   --  11.9   MCV  --   --   --   --   --   --  80     --   --   --   --  295 307   NA  --   --   --   --   --   --  140   POTASSIUM  --   --   --   --  4.0 3.9 3.7   CHLORIDE  --   --   --   --   --   --  113*   CO2  --   --   --   --   --   --  25   BUN  --   --   --   --   --   --  7   CR 0.65  --   --   --   --  0.67 0.70   ANIONGAP  --   --   --   --   --   --  2*   CRISTIAN  --   --   --    --   --   --  8.0*   GLC  --  88 133* 95 142* 96 143*       No results found for this or any previous visit (from the past 24 hour(s)).

## 2021-09-12 NOTE — PROGRESS NOTES
"SPIRITUAL HEALTH SERVICES Progress Note  [facility]    Called pt due to COVID concerns. I introduced myself and SH services and pt shared that \"things have been really tough with personal issues right now\" but that she has no SH needs at this time. I offered words of our care and support and she expressed appreciation. I let her know that we remain available to her for support.    [facility]S remains available.      Mariah Mooney  Chaplain Resident    "

## 2021-09-13 ENCOUNTER — HOSPITAL ENCOUNTER (INPATIENT)
Facility: CLINIC | Age: 61
LOS: 8 days | Discharge: PSYCHIATRIC OR SUBSTANCE USE FACILITY | DRG: 885 | End: 2021-09-21
Attending: PSYCHIATRY & NEUROLOGY | Admitting: PSYCHIATRY & NEUROLOGY
Payer: COMMERCIAL

## 2021-09-13 ENCOUNTER — TELEPHONE (OUTPATIENT)
Dept: BEHAVIORAL HEALTH | Facility: CLINIC | Age: 61
End: 2021-09-13

## 2021-09-13 VITALS
TEMPERATURE: 98.1 F | BODY MASS INDEX: 35.69 KG/M2 | DIASTOLIC BLOOD PRESSURE: 85 MMHG | SYSTOLIC BLOOD PRESSURE: 150 MMHG | OXYGEN SATURATION: 96 % | HEIGHT: 66 IN | HEART RATE: 64 BPM | RESPIRATION RATE: 18 BRPM | WEIGHT: 222.1 LBS

## 2021-09-13 DIAGNOSIS — J12.82 PNEUMONIA DUE TO 2019 NOVEL CORONAVIRUS: ICD-10-CM

## 2021-09-13 DIAGNOSIS — R45.851 SUICIDAL IDEATION: ICD-10-CM

## 2021-09-13 DIAGNOSIS — F43.23 ADJUSTMENT DISORDER WITH MIXED ANXIETY AND DEPRESSED MOOD: Primary | ICD-10-CM

## 2021-09-13 DIAGNOSIS — U07.1 PNEUMONIA DUE TO 2019 NOVEL CORONAVIRUS: ICD-10-CM

## 2021-09-13 LAB
GLUCOSE BLDC GLUCOMTR-MCNC: 111 MG/DL (ref 70–99)
MRSA DNA SPEC QL NAA+PROBE: NEGATIVE
SA TARGET DNA: NEGATIVE

## 2021-09-13 PROCEDURE — 250N000013 HC RX MED GY IP 250 OP 250 PS 637: Performed by: HOSPITALIST

## 2021-09-13 PROCEDURE — 99239 HOSP IP/OBS DSCHRG MGMT >30: CPT | Performed by: HOSPITALIST

## 2021-09-13 PROCEDURE — 87641 MR-STAPH DNA AMP PROBE: CPT | Performed by: HOSPITALIST

## 2021-09-13 PROCEDURE — 250N000013 HC RX MED GY IP 250 OP 250 PS 637: Performed by: INTERNAL MEDICINE

## 2021-09-13 PROCEDURE — 124N000002 HC R&B MH UMMC

## 2021-09-13 RX ORDER — LORAZEPAM 1 MG/1
1 TABLET ORAL 2 TIMES DAILY PRN
Status: DISCONTINUED | OUTPATIENT
Start: 2021-09-13 | End: 2021-09-15

## 2021-09-13 RX ORDER — TRAZODONE HYDROCHLORIDE 50 MG/1
50 TABLET, FILM COATED ORAL
Status: DISCONTINUED | OUTPATIENT
Start: 2021-09-13 | End: 2021-09-15

## 2021-09-13 RX ORDER — AMOXICILLIN 250 MG
1 CAPSULE ORAL 2 TIMES DAILY PRN
Status: DISCONTINUED | OUTPATIENT
Start: 2021-09-13 | End: 2021-09-21 | Stop reason: HOSPADM

## 2021-09-13 RX ORDER — MAGNESIUM HYDROXIDE/ALUMINUM HYDROXICE/SIMETHICONE 120; 1200; 1200 MG/30ML; MG/30ML; MG/30ML
30 SUSPENSION ORAL EVERY 4 HOURS PRN
Status: DISCONTINUED | OUTPATIENT
Start: 2021-09-13 | End: 2021-09-21 | Stop reason: HOSPADM

## 2021-09-13 RX ORDER — ACETAMINOPHEN 325 MG/1
650 TABLET ORAL EVERY 4 HOURS PRN
Status: DISCONTINUED | OUTPATIENT
Start: 2021-09-13 | End: 2021-09-21 | Stop reason: HOSPADM

## 2021-09-13 RX ORDER — HYDROXYZINE HYDROCHLORIDE 25 MG/1
25 TABLET, FILM COATED ORAL EVERY 4 HOURS PRN
Status: DISCONTINUED | OUTPATIENT
Start: 2021-09-13 | End: 2021-09-21 | Stop reason: HOSPADM

## 2021-09-13 RX ADMIN — SERTRALINE HYDROCHLORIDE 50 MG: 50 TABLET ORAL at 08:26

## 2021-09-13 RX ADMIN — BENZONATATE 100 MG: 100 CAPSULE, LIQUID FILLED ORAL at 16:42

## 2021-09-13 RX ADMIN — LORAZEPAM 1 MG: 1 TABLET ORAL at 17:04

## 2021-09-13 RX ADMIN — BENZONATATE 100 MG: 100 CAPSULE, LIQUID FILLED ORAL at 08:26

## 2021-09-13 RX ADMIN — LORAZEPAM 1 MG: 1 TABLET ORAL at 12:09

## 2021-09-13 RX ADMIN — RIVAROXABAN 10 MG: 10 TABLET, FILM COATED ORAL at 16:42

## 2021-09-13 RX ADMIN — IBUPROFEN 600 MG: 600 TABLET ORAL at 05:55

## 2021-09-13 RX ADMIN — LORAZEPAM 1 MG: 1 TABLET ORAL at 05:55

## 2021-09-13 ASSESSMENT — ACTIVITIES OF DAILY LIVING (ADL)
ADLS_ACUITY_SCORE: 14

## 2021-09-13 ASSESSMENT — MIFFLIN-ST. JEOR: SCORE: 1598.28

## 2021-09-13 NOTE — PLAN OF CARE
DATE & TIME: 9/13/2021 2059-0788  Cognitive Concerns/ Orientation : A & O x 4, anxious at times  BEHAVIOR & AGGRESSION TOOL COLOR: Green   CIWA SCORE: NA    ABNL VS/O2: VSS except HTN on RA   MOBILITY: Independent in room   PAIN MANAGMENT: Ibuprofen x 1 for back pain  DIET: Mod carb  BOWEL/BLADDER: BS active x 4, continent   ABNL LAB/BG: None new  DRAIN/DEVICES: PIV saline locked   TELEMETRY RHYTHM: NA   SKIN: pale, abrasion to L  Knee   TESTS/PROCEDURES: NA   D/C DATE: pending to inpatient psych   Discharge Barriers: placement  OTHER IMPORTANT INFO: Denies suicidal ideations on this shift, suicide precautions in place-sitter at bedside; ativan given x 1 for increased anxiety; covid recovered, contact precautions maintained (MRSA); psych following       Current condition (current mood & behavior): sleeping   Sitter present: yes   Every 15 minute documentation by NA/RN completed for Shift: yes  Room safety Suicide Checklist completed in Epic: yes  Patient's color of severity (suicide scale): YELLOW  Order for psych consult placed (if appropriate): yes  Suicide care plan added: yes    Patricia Espinoza RN

## 2021-09-13 NOTE — PLAN OF CARE
DATE & TIME: 9/13/21 1518-3393  Cognitive Concerns/ Orientation : A&Ox4  BEHAVIOR & AGGRESSION TOOL COLOR: Green  CIWA SCORE:    ABNL VS/O2: VSS on RA  MOBILITY: Ind  PAIN MANAGEMENT: Denies  DIET: Mod Carb  BOWEL/BLADDER: Continent  ABNL LAB/BG: None  DRAIN/DEVICES: PIV SL  TELEMETRY RHYTHM:   SKIN: Pale, abrasion to L needed.  TESTS/PROCEDURES: MRSA swab done this am  D/C DATE: Transfer to [facility] at 2200 tonight.  Discharge Barriers: Bed availability.   OTHER IMPORTANT INFO: Suicide precautions in place-sitter at bedside.  Ativan given  for increased anxiety. COVID recovered, contact precautions maintained (MRSA); psych following.    Current condition (current mood & behavior): Calm, cooperative.  Sitter present: yes  Every 15 minute documentation by NA/RN completed for Shift: yes  Room safety Suicide Checklist completed in Epic: yes  Patient's color of severity (suicide scale): YELLOW  Order for psych consult placed (if appropriate): yes  Suicide care plan added: yes      Humaira Rendon, RN

## 2021-09-13 NOTE — PROGRESS NOTES
Patient is COVID recoverd. Patient does not have a current active MRSA infection requiring attention. Patient has a hx of MRSA dating back to 2009. Patient is on contact precautions per hospital policy for hx of MDROs.

## 2021-09-13 NOTE — PROGRESS NOTES
"Extended Care    Amy Choudhury  2021    Amy is followed related to Long wait time for admission: pt was COVID+, but is now off of quarantine; however, was found to have MRSA precautions and is now needing clarification of this, so is currently on isolation precautions. She is to be tested today and tomorrow (if she is not placed by then). . Please see initial DEC Crisis Assessment completed by [facility] on 21 for complete assessment information. Notable concerns include presented for suicidal thoughts to jump off a bridge. She has had much death and loss in her life; most recently and notably, her daughter.    Pt is pleasant, but tearful. She noted she has no plans to kill herself in the hospital, but still doesn't feel comfortable going home. She stated she keeps being reminded of death/suicide/feeling worthless when she watches TV; noting the many commercials and shows about this. She stated her daughter and grandson (19; who was adopted by her  daughter when she was 21), came to visit. She said it was \"humiliating\" as they had someone following them around the whole time.     Pt stated she is tired of being in a \"waiting pattern,\" and said that now that she is over COVID, she has been in isolation for MRSA, which she says she has never had, but was told it is very difficult to get off your chart.      Pt stated she comes from a family of spiritual healers and medicine people. She stated she does have people she can call for spiritual support, and that she may do so. She noted feeling worse that she thinks she is letting her family down for being unavailable to them.     Pt is upset that the food and coffee is cold by the time it reaches her, and that she has tried calling at different times of day to counteract this, but it has not worked. She has also adjusted and ordered cold items so when they come cold, she knows that is the way it is supposed to be.     Patient is " interviewed via Ipad. Pt is alert; affect is tearful and appropriate; mood is sad; tired. There are not concerns for Aggression. There are not new concerns noted. Over the course of contact, provided reassurance, offered validation and assisted in processing patient's thoughts and feeling relating to daughter's death and frustration with current situation. .     Coordination with floor RN    ED care team is updated, including floor RN. Discussed talking with hospitalist about allowing pt some time alone when family visits, and also about allowing hot meals as she has a sitter with her and has not exhibited any unsafe behaviors, nor intent to harm anyone thus far.     Plan:     Continue to monitor for harm. Consider: Use a positive, direct and calm approach. Pt's tend to match the energy/mood of the staff. Keep focus positive and upbeat, Use clear and concise directions, too many words can be overwhelming, Provide the pt with options to provide a sense of control. Try to tell the pt what they can do instead of what they can't do and Allow family calls/visits    Continue care coordination with family as requested by pt     Maintain current transition plan of awaiting inpatient mental health bed.     DEC will follow. DEC may be reached at [phone] if further clinical intervention is needed.     Manisha Coronel  [facility]   [phone]

## 2021-09-13 NOTE — PLAN OF CARE
DATE & TIME: 9/12/2021; 7712-1114  Cognitive Concerns/ Orientation : A & O x 4, calm and cooperative, resting most of shift, anxious at times  BEHAVIOR & AGGRESSION TOOL COLOR: Green   CIWA SCORE: NA    ABNL VS/O2: VSS on RA   MOBILITY: independent in room   PAIN MANAGMENT: Reported HA pain, managed well with ibuprofen   DIET: Mod carb-tolerating   BOWEL/BLADDER: BS active x 4, continent   ABNL LAB/BG: WDL   DRAIN/DEVICES: PIV/SL   TELEMETRY RHYTHM: NA   SKIN: pale, abrasion to L. Knee   TESTS/PROCEDURES: NA   D/C DATE: pending to inpatient psych   Discharge Barriers: placement  OTHER IMPORTANT INFO: denies suicidal ideations on this shift, suicide precautions in place-sitter at bedside; ativan given x 1 for increased anxiety; covid recovered, contact precautions maintained (MRSA); psych following    Current condition (current mood & behavior): sleeping   Sitter present: yes   Every 15 minute documentation by NA/RN completed for Shift: yes  Room safety Suicide Checklist completed in Epic: yes  Patient's color of severity (suicide scale): YELLOW  Order for psych consult placed (if appropriate): yes  Suicide care plan added: yes    Leesa Rodriguez, RN

## 2021-09-13 NOTE — TELEPHONE ENCOUNTER
S: Pt currently admitted to  66 awaiting placement in IP MH.     B: Pt has completed COVID quarantine of 10 days, is medically cleared and ready for bed placement in IP. Pt had MRSA status added on 9/7/21 due to an external infection from 2009. Current barriers to admission are bed availability, and identifying and clarifying with Infection Prevention if Pt must retain MRSA status and precautions.  Hx anxiety, suicidal ideation and adjustment disorder. Pt reports SI without a plan but she cannot contract for safety if she were to go home. Pt lost her daughter earlier this year. Pt is calm and cooperative - no reports of aggressive behaviors. No concerns for drug or etoh use. Pt does have a therapist. Pt is not currently on psych meds. Pt tested positive for Covid-19 on 8/31/21 and is currently medically cleared from Infection Prevention standpoint for In MH admission. Hx of type II diabetes. Independent w/ cares and ambulation.     A: Vol, medically cleared.     R: Intake called Infection prevention, 9/13 @ 9:35am and left voicemail asking to be contacted regarding Pt status, as intake is needing to know if Pt will need private bed.     Intake conducted a bed search outside [place] to seek IP MH placement. As of 9am:  [facility] per call to [place], at cap, no expected movement today  [facility]: at cap per website  [facility]: per call to Kev, they are at cap until tomorrow am  [facility]: per call to [place] at cap with no expected d/c s  [facility]: at cap per website  [facility]: at cap per website  [facility]: at cap per website  [facility]: at cap per website  [facility]: at cap per website  [place] [facility]: at cap per website  [facility]: at cap per website  [facility]: at cap per website  [facility]: per call to [facility], call after 11 am  [facility]: at cap per website  [facility]: at cap per website      9/13/21 @ 10:39am: Intake spoke with Saira at infection prevention who states that Pt is on standard MRSA precautions regardless  of presence of wound or not, and can only have MRSA status removed with 2 negative tests that are at least 24hrs apart. Intake called RN for Pt and confirmed that Pt does not have MRSA weeping wounds at this time. RN is going to page the physician on unit to order a test swab to start the process.     Intake continues to look for appropriate bed within [place] and within the [facility]. Current barriers are physical bed availability and MRSA status making this placement require a private bed for placement to [facility].     1:37pm - presenting for placement to [facility] private room  3:30pm - provider accepts for placement to [facility].   4:02pm unit and  66 informed of disposition.

## 2021-09-13 NOTE — PROGRESS NOTES
[facility]    Hospitalist Progress Note      Assessment & Plan   Amy Choudhury is a 61 year old female who was admitted on 2021 with suicidal ideation. Found to have mild COVID-19 infection without need for intervention, monitored in hospital while awaiting for her to be out of quarantine.    Suicidal ideation  Hx of anxiety and depression   Was not on any medication PTA.  still having thoughts but would not act on them.  - Suicidal precautions.  - Further plan per psych (zoloft, prn ativan TID). increased sertraline to 50mg daily on  given persistent poor mood (patient request)  - discharge once inpatient psych bed available     COVID-19 infection:  Mild tomoderate disease.   Positive for COVID-19 on  and on admission (). CXR shows some changes suggestive of COVID-19. No sob this am cough at baseline  (smoker since age 17). Sating well on RA (high 90's). Afebrile. Did not require dexamethasone or remdesivir. Precautions  at midnight   - Supportive with antitussives  - initially on lovenox for prophylaxis, switched to xarelto 10mg daily on , continue for 20 days after discharge     DM likely type II. A1C in 2019 was 6.7.    Blood sugar is well controlled at this time. No need for further insulin monitoring  - ADA diet.     Smoking use do:  - Appropriate counseling.  - Declines NRT.    MRSA precautions  Hx MRSA  without active infection this admit.   - MRSA swab checked , recheck on  to help with removal from her chart.    Clinically Significant Risk Factors Present on Admission              DVT Prophylaxis: xarelto  Code Status: Full Code  Expected discharge: to inpatient psych when bed available, stable for discharge    Elysia Huynh DO  Hospitalist Service  [facility]  Securely message with the Vocera Web Console (learn more here)  Text Page (7am - 6pm) via [employer] Paging/Directory      Interval History   Patient seen  and examined. Brought her a fun-size snickers today which got her to smile for me. Tried to work with nutrition to get tray changed to one with a warmer instead of the safe tray given stable behaviors. Discussed possibility with RN about sitter maybe sitting in ante-room for patient where she can still see patient, but not have to be directly in the room, as patient is upset about not having any private alone time.  Spent >25 minutes evaluating patient, speaking with RN, trying to brainstorm ideas to make things more comfortable for Amy, speaking with Charge RN and reviewing SW note.    -Data reviewed today: I reviewed all new labs and imaging results over the last 24 hours. I personally reviewed no images or EKG's today.    Physical Exam   Temp: 97.9  F (36.6  C) Temp src: Oral BP: (!) 154/91 Pulse: 78   Resp: 18 SpO2: 95 % O2 Device: None (Room air)    Vitals:    09/05/21 1556 09/06/21 0640 09/10/21 0648   Weight: 101.8 kg (224 lb 6.9 oz) 101.2 kg (223 lb 1.7 oz) 100.7 kg (222 lb 1.6 oz)     Vital Signs with Ranges  Temp:  [97.9  F (36.6  C)-98.3  F (36.8  C)] 97.9  F (36.6  C)  Pulse:  [62-78] 78  Resp:  [18] 18  BP: (141-154)/(83-91) 154/91  SpO2:  [95 %-97 %] 95 %  I/O last 3 completed shifts:  In: 560 [P.O.:560]  Out: -     Constitutional: Awake, alert, cooperative, no acute distress  Respiratory: Clear to auscultation bilaterally, no crackles or wheezing  Cardiovascular: Regular rate and rhythm, normal S1 and S2, and no murmur noted  GI: Normal bowel sounds, soft, non-distended, non-tender  Skin/Integumen: No rashes, no cyanosis, no edema  Other:     Medications       benzonatate  100 mg Oral TID     rivaroxaban ANTICOAGULANT  10 mg Oral Daily with supper     sertraline  50 mg Oral Daily     sodium chloride (PF)  3 mL Intracatheter Q8H       Data   Recent Labs   Lab 09/11/21  0546 09/10/21  0723 09/09/21  1611 09/09/21  1126 09/09/21  0720 09/08/21  0655     --   --   --   --  295   POTASSIUM  --    --   --   --  4.0 3.9   CR 0.65  --   --   --   --  0.67   GLC  --  88 133* 95 142* 96       No results found for this or any previous visit (from the past 24 hour(s)).

## 2021-09-13 NOTE — DISCHARGE SUMMARY
[facility]    Discharge Summary  Hospitalist    Date of Admission:  2021  Date of Discharge:  2021  Discharging Provider: Elysia Hunyh DO  Date of Service (when I saw the patient): 21    Discharge Diagnoses   Suicidal ideation  Hx of anxiety and depression   COVID-19 infection, recovered  DM likely type II  Tobacco use disorder  Hx MRSA    History of Present Illness   Amy Choudhury is an 61 year old female who presented with with suicidal ideation. Found to have mild COVID-19 infection without need for intervention, monitored in hospital while awaiting for her to be out of quarantine. Passive suicidal ideation continued this admit and she was monitored on 1:1    Hospital Course   Amy Choudhury was admitted on 2021.  The following problems were addressed during her hospitalization:    Suicidal ideation  Hx of anxiety and depression   Was not on any medication PTA.  Still having thoughts but would not act on them.  - Suicidal precautions.  - Sertraline to 50mg daily on  given persistent poor mood (patient request) and she is tolerating well  - ativan 1mg BID PRN for anxiety  - discharging to inpatient psych at [facility] [place] on      COVID-19 infection, resolved  Positive for COVID-19 on  and on admission (). CXR showed some changes suggestive of COVID-19. No SOB, has baseline intermittent cough (smoker since age 17). Sating well on RA (high 90's). Afebrile. Did not require dexamethasone or remdesivir. Precautions  at midnight   - Supportive with antitussives  - initially on lovenox for prophylaxis, switched to xarelto 10mg daily on , continue for 20 days after discharge     DM likely type II. A1C in 2019 was 6.7.    Blood sugar is well controlled at this time. No need for further insulin monitoring  - ADA diet.     Tobacco use disorder  - Appropriate counseling.  - Declines NRT.     MRSA precautions  Hx MRSA  without active infection this  "admit.   - MRSA swab checked 9/13, recheck on 9/20 to help with removal from her chart.    Elysia Huynh, DO    Significant Results and Procedures   See above    Pending Results   These results will be followed up by PCP  Unresulted Labs Ordered in the Past 30 Days of this Admission     Date and Time Order Name Status Description    9/13/2021 11:39 AM MRSA MSSA PCR, Nasal Swab In process           Code Status   Full Code       Primary Care Physician   Randal Castellanos    Physical Exam   Temp: 97.9  F (36.6  C) Temp src: Oral BP: (!) 154/91 Pulse: 78   Resp: 18 SpO2: 95 % O2 Device: None (Room air)    Vitals:    09/05/21 1556 09/06/21 0640 09/10/21 0648   Weight: 101.8 kg (224 lb 6.9 oz) 101.2 kg (223 lb 1.7 oz) 100.7 kg (222 lb 1.6 oz)     Vital Signs with Ranges  Temp:  [97.9  F (36.6  C)] 97.9  F (36.6  C)  Pulse:  [62-78] 78  Resp:  [18] 18  BP: (141-154)/(90-91) 154/91  SpO2:  [95 %-97 %] 95 %  I/O last 3 completed shifts:  In: 320 [P.O.:320]  Out: -     Patient seen and examined on day of discharge. She smiled for me when I brought her a small piece of chocolate that she excitedly ate. States she is \"easy to make happy\". Still has some passive suicidal ideation. Disappointed with her meals here because they are cold and the lack of privacy with bedside sitter. Discussed patient with psych at [place], they have a bed and can take her sometime tonight. Stable for discharge to inpatient psychiatry    Constitutional: Awake, alert, cooperative, no apparent distress.  Eyes: Conjunctiva and pupils examined and normal.  HEENT: Moist mucous membranes, normal dentition.  Respiratory: Clear to auscultation bilaterally, no crackles or wheezing.  Cardiovascular: Regular rate and rhythm, normal S1 and S2, and no murmur noted.  GI: Soft, non-distended, non-tender, normal bowel sounds.  Lymph/Hematologic: No anterior cervical or supraclavicular adenopathy.  Skin: No rashes, no cyanosis, no edema.  Musculoskeletal: No joint " swelling, erythema or tenderness.  Neurologic: Cranial nerves 2-12 intact, normal strength and sensation.  Psychiatric: Alert, oriented to person, place and time, slightly sad, but brightened briefly with the small chocolate treat.    Discharge Disposition   Discharged to inpatient psychiatry  Condition at discharge: Stable    Consultations This Hospital Stay   PSYCHIATRY IP CONSULT  NUTRITION SERVICES ADULT IP CONSULT  PSYCHIATRY IP CONSULT  PSYCHIATRY IP CONSULT  PSYCHIATRY IP CONSULT  PSYCHIATRY IP CONSULT    Time Spent on this Encounter   IElysia DO, personally saw the patient today and spent greater than 30 minutes discharging this patient.    Discharge Orders      COVID-19 GetWell Loop Referral      Reason for your hospital stay    Suicidal ideation     Follow-up and recommended labs and tests     Follow up with primary care provider, Randal Casetllanos, within 7 days for hospital follow- up.  No follow up labs or test are needed.     Contact provider    Contact your primary care provider if If increased trouble breathing, new arm/leg swelling, dizziness/passing out, falls, bleeding that doesn't stop, or uncontrolled pain.     Discharge - Quarantine/Isolation Instruction    Date of symptom onset:    Date of first positive test:  8/31  If you tested positive COVID-19 and show symptoms (fever, cough, body aches or trouble breathing):        Stay home and away from others (self-isolate) until:        At least 10 days have passed since your symptoms started. AND...        You've had no fever-and no medicine that reduces fever for 1 full day (24 hours). AND...         Your other symptoms have resolved (gotten better).  If you tested positive for COVID-19 but don't show symptoms:       Stay home and away from others (self-isolate) until at least 10 days have passed since the date of your first positive COVID-19 test.     Activity    Your activity upon discharge: activity as tolerated     Diet    Follow this  diet upon discharge: diabetic diet     Discharge Medications   Current Discharge Medication List      START taking these medications    Details   LORazepam (ATIVAN) 1 MG tablet Take 1 tablet (1 mg) by mouth 2 times daily as needed for anxiety  Qty: 10 tablet, Refills: 0    Associated Diagnoses: Suicidal ideation      rivaroxaban ANTICOAGULANT (XARELTO) 10 MG TABS tablet Take 1 tablet (10 mg) by mouth daily (with dinner)  Qty: 20 tablet, Refills: 0    Associated Diagnoses: Pneumonia due to 2019 novel coronavirus      sertraline (ZOLOFT) 25 MG tablet Take 2 tablets (50 mg) by mouth daily  Qty: 30 tablet, Refills: 0    Associated Diagnoses: Suicidal ideation         CONTINUE these medications which have NOT CHANGED    Details   ibuprofen (ADVIL/MOTRIN) 400 MG tablet Take 400 mg by mouth every 8 hours as needed for moderate pain      calcium carbonate (OS-CRISTIAN) 500 MG tablet Take 2 tablets (1,000 mg) by mouth 3 times daily  Qty: 200 tablet, Refills: 3    Associated Diagnoses: S/P parathyroidectomy      cholecalciferol 50 MCG (2000 UT) tablet Take 1 tablet (50 mcg) by mouth daily  Qty: 90 tablet, Refills: 3    Associated Diagnoses: Status post gastric bypass for obesity      cyanocobalamin (VITAMIN B-12) 1000 MCG tablet Take 1 tablet (1,000 mcg) by mouth daily  Qty: 90 tablet, Refills: 3    Associated Diagnoses: Vitamin B12 deficiency (non anemic)      !! Multiple Vitamin (MULTIVITAMIN ADULT) TABS Take 1 tablet by mouth daily  Qty: 200 tablet, Refills: 1    Associated Diagnoses: Blurred vision      !! vitamin B complex with vitamin C (VITAMIN  B COMPLEX) tablet Take 1 tablet by mouth daily  Qty: 100 tablet, Refills: 6    Associated Diagnoses: Vitamin B12 deficiency (non anemic)       !! - Potential duplicate medications found. Please discuss with provider.      STOP taking these medications       acetaminophen (TYLENOL) 500 MG tablet Comments:   Reason for Stopping:         triamcinolone (KENALOG) 0.1 % external ointment  Comments:   Reason for Stopping:             Allergies   Allergies   Allergen Reactions     Codeine Sulfate GI Disturbance     Data   Most Recent 3 CBC's:  Recent Labs   Lab Test 09/11/21  0546 09/08/21  0655 09/06/21  0852 09/05/21  0247 04/19/21  1713 12/08/15  1552 12/08/15  1552 02/11/15  1115   WBC  --   --  4.0 4.2  --   --   --  5.7   HGB  --   --  11.9 12.3 14.1  --  13.2 15.5   MCV  --   --  80 81  --   --  90.8 88    295 307 311  --    < >  --  304    < > = values in this interval not displayed.      Most Recent 3 BMP's:  Recent Labs   Lab Test 09/11/21  0546 09/10/21  0723 09/09/21  1611 09/09/21  1126 09/09/21  0720 09/08/21  0655 09/06/21  0852 09/05/21  1239 09/05/21  0247 12/15/15  0730 12/14/15  2100 12/08/15  1552 02/11/15  1115   NA  --   --   --   --   --   --  140  --  141  --   --  139.9 140   POTASSIUM  --   --   --   --  4.0 3.9 3.7   < > 3.2*  --   --  3.9 4.2   CHLORIDE  --   --   --   --   --   --  113*  --  110*  --   --  105.1 109   CO2  --   --   --   --   --   --  25  --  27  --   --  25.9 26   BUN  --   --   --   --   --   --  7  --  7  --   --  15.3 10   CR 0.65  --   --   --   --  0.67 0.70  --  0.79  --    < > 0.8 0.76   ANIONGAP  --   --   --   --   --   --  2*  --  4  --   --   --  4   CRISTIAN  --   --   --   --   --   --  8.0*  --  7.7* 8.1*  --  10.2* 10.7*   GLC  --  88 133* 95 142* 96 143*   < > 94  --    < > 95.8 76    < > = values in this interval not displayed.     Most Recent 2 LFT's:  Recent Labs   Lab Test 09/05/21  0247 05/29/14  1730   AST 19 25   ALT 24 25   ALKPHOS 84 110   BILITOTAL 0.2 0.4     Most Recent INR's and Anticoagulation Dosing History:  Anticoagulation Dose History     Recent Dosing and Labs Latest Ref Rng & Units 5/29/2014    INR 0.86 - 1.14 0.95        Most Recent 3 Troponin's:  Recent Labs   Lab Test 09/05/21 0247   TROPONIN <0.015     Most Recent Cholesterol Panel:  Recent Labs   Lab Test 04/19/21  1713   CHOL 194   *   HDL 65   TRIG 132      Most Recent 6 Bacteria Isolates From Any Culture (See EPIC Reports for Culture Details):  Recent Labs   Lab Test 04/30/17  0022   CULT No Beta Streptococcus isolated     Most Recent TSH, T4 and A1c Labs:  Recent Labs   Lab Test 09/06/21  0852 04/19/21  1713   TSH  --  2.58   A1C 5.8*  --      Results for orders placed or performed during the hospital encounter of 09/05/21   XR Chest Port 1 View    Narrative    EXAM: XR CHEST PORT 1 VIEW  LOCATION: [facility]  DATE/TIME: 9/5/2021 2:51 AM    INDICATION: shortness of breath. covid +  COMPARISON: 01/13/2013      Impression    IMPRESSION: Query mild left greater than right peripheral basilar groundglass, consistent with provided diagnosis of Covid, recommend follow-up to resolution. No pneumothorax or pleural effusion. Cardiac silhouette within normal limits. No acute osseous   abnormality.

## 2021-09-14 LAB
ALBUMIN SERPL-MCNC: 3.3 G/DL (ref 3.4–5)
ALP SERPL-CCNC: 92 U/L (ref 40–150)
ALT SERPL W P-5'-P-CCNC: 43 U/L (ref 0–50)
ANION GAP SERPL CALCULATED.3IONS-SCNC: 4 MMOL/L (ref 3–14)
AST SERPL W P-5'-P-CCNC: 33 U/L (ref 0–45)
BASOPHILS # BLD AUTO: 0 10E3/UL (ref 0–0.2)
BASOPHILS NFR BLD AUTO: 1 %
BILIRUB SERPL-MCNC: 0.4 MG/DL (ref 0.2–1.3)
BUN SERPL-MCNC: 13 MG/DL (ref 7–30)
CALCIUM SERPL-MCNC: 8.2 MG/DL (ref 8.5–10.1)
CHLORIDE BLD-SCNC: 109 MMOL/L (ref 94–109)
CHOLEST SERPL-MCNC: 200 MG/DL
CO2 SERPL-SCNC: 26 MMOL/L (ref 20–32)
CREAT SERPL-MCNC: 0.66 MG/DL (ref 0.52–1.04)
EOSINOPHIL # BLD AUTO: 0.1 10E3/UL (ref 0–0.7)
EOSINOPHIL NFR BLD AUTO: 2 %
ERYTHROCYTE [DISTWIDTH] IN BLOOD BY AUTOMATED COUNT: 14.6 % (ref 10–15)
GFR SERPL CREATININE-BSD FRML MDRD: >90 ML/MIN/1.73M2
GLUCOSE BLD-MCNC: 135 MG/DL (ref 70–99)
HCT VFR BLD AUTO: 39.4 % (ref 35–47)
HDLC SERPL-MCNC: 51 MG/DL
HGB BLD-MCNC: 12.6 G/DL (ref 11.7–15.7)
IMM GRANULOCYTES # BLD: 0 10E3/UL
IMM GRANULOCYTES NFR BLD: 0 %
LDLC SERPL CALC-MCNC: 123 MG/DL
LYMPHOCYTES # BLD AUTO: 1.3 10E3/UL (ref 0.8–5.3)
LYMPHOCYTES NFR BLD AUTO: 23 %
MCH RBC QN AUTO: 25 PG (ref 26.5–33)
MCHC RBC AUTO-ENTMCNC: 32 G/DL (ref 31.5–36.5)
MCV RBC AUTO: 78 FL (ref 78–100)
MONOCYTES # BLD AUTO: 0.6 10E3/UL (ref 0–1.3)
MONOCYTES NFR BLD AUTO: 10 %
NEUTROPHILS # BLD AUTO: 3.8 10E3/UL (ref 1.6–8.3)
NEUTROPHILS NFR BLD AUTO: 64 %
NONHDLC SERPL-MCNC: 149 MG/DL
NRBC # BLD AUTO: 0 10E3/UL
NRBC BLD AUTO-RTO: 0 /100
PLATELET # BLD AUTO: 411 10E3/UL (ref 150–450)
POTASSIUM BLD-SCNC: 4.2 MMOL/L (ref 3.4–5.3)
PROT SERPL-MCNC: 7.2 G/DL (ref 6.8–8.8)
RBC # BLD AUTO: 5.04 10E6/UL (ref 3.8–5.2)
SODIUM SERPL-SCNC: 139 MMOL/L (ref 133–144)
TRIGL SERPL-MCNC: 129 MG/DL
WBC # BLD AUTO: 5.8 10E3/UL (ref 4–11)

## 2021-09-14 PROCEDURE — 99222 1ST HOSP IP/OBS MODERATE 55: CPT | Mod: AI | Performed by: PSYCHIATRY & NEUROLOGY

## 2021-09-14 PROCEDURE — 250N000013 HC RX MED GY IP 250 OP 250 PS 637: Performed by: PSYCHIATRY & NEUROLOGY

## 2021-09-14 PROCEDURE — 80061 LIPID PANEL: CPT | Performed by: NURSE PRACTITIONER

## 2021-09-14 PROCEDURE — 36415 COLL VENOUS BLD VENIPUNCTURE: CPT | Performed by: NURSE PRACTITIONER

## 2021-09-14 PROCEDURE — 250N000013 HC RX MED GY IP 250 OP 250 PS 637: Performed by: NURSE PRACTITIONER

## 2021-09-14 PROCEDURE — 124N000002 HC R&B MH UMMC

## 2021-09-14 PROCEDURE — 85004 AUTOMATED DIFF WBC COUNT: CPT | Performed by: NURSE PRACTITIONER

## 2021-09-14 PROCEDURE — 80053 COMPREHEN METABOLIC PANEL: CPT | Performed by: NURSE PRACTITIONER

## 2021-09-14 RX ORDER — IBUPROFEN 600 MG/1
600 TABLET, FILM COATED ORAL EVERY 6 HOURS PRN
Status: DISCONTINUED | OUTPATIENT
Start: 2021-09-14 | End: 2021-09-21 | Stop reason: HOSPADM

## 2021-09-14 RX ADMIN — LORAZEPAM 1 MG: 1 TABLET ORAL at 07:59

## 2021-09-14 RX ADMIN — SERTRALINE HYDROCHLORIDE 50 MG: 50 TABLET ORAL at 07:57

## 2021-09-14 RX ADMIN — RIVAROXABAN 10 MG: 10 TABLET, FILM COATED ORAL at 18:30

## 2021-09-14 RX ADMIN — IBUPROFEN 600 MG: 600 TABLET ORAL at 11:01

## 2021-09-14 RX ADMIN — LORAZEPAM 1 MG: 1 TABLET ORAL at 14:50

## 2021-09-14 ASSESSMENT — MIFFLIN-ST. JEOR: SCORE: 1584.67

## 2021-09-14 NOTE — H&P
"[facility], [place]   Psychiatric History and Physical  Admission date: 9/13/2021        Chief Complaint:   \"Not good.\"         HPI:     The patient is a 60yo female with a history of depression and anxiety who was admitted after endorsing SI with a plan. She reports that she is still feeling \"like I am not needed here\" and has had thoughts to jump from a bridge. Not sleeping well. Is quite tearful during the interview. Says that she has had thoughts to \"hurt some of my neighbors\" if they are loud at night but \"not really.\" Is still grieving the loss of her daughter earlier this year. Has been taking care of her grandchildren but hasn't been able to do that recently due to her depression. Is tolerating Zoloft well. Discussed risks and benefits of Ativan. Is open to grief and loss group therapy.     Per [facility] assessment:  Patient is a 61 year old  female who presented to the ED by [facility] related to concerns for Suicidality. Amy reports she has been planning her death for \"quite some time\" and has been preparing to die. She denies being open and honest with her therapist SB Quinn, [facility] whom she has been with for 2 years. Amy reports the sudden death of her 2nd child is the event that precipitated these thoughts along with being isolated due to pandemic and current dx of Covid 19. Amy reports experiencing symptoms: headache, body aches, weakness, and throat pain. Amy shared she had planned to jump off of a bridge today. She bought a pen and paper to write suicide note. Amy gave her cat away and made plans to drop off her granddaughter. She ended up contacting EMS as she was not able to make it to the bridge as she came to the conclusion her granddaughter just lost her mother, she did not feel as though she could jump. Amy however is conflicted and stated, \"everyone would be better off\" and indicating being a burden to others.         Past Psychiatric " "History:     History of depression and anxiety.   Has been on Celexa and gabapentin in the past.   No history of suicide attempts.         Substance Use and History:     Denies alcohol or drug use. Says \"I could though because I live in a [place].\"         Past Medical History:   PAST MEDICAL HISTORY:   Past Medical History:   Diagnosis Date     Anemia      Anxiety      Axillary abscess     chronic, recurring     Backache      Benign neoplasm of adrenal gland 2012     Depressive disorder      Gastro-oesophageal reflux disease     bleeding ulcers     Hiatal hernia     NEWLY DIAGNOSISED     Hyperparathyroidism, unspecified (H) 3/29/2012     Peptic ulcer, unspecified site, unspecified as acute or chronic, without mention of hemorrhage or perforation      Pneumonia     NOVEMBER     PONV (postoperative nausea and vomiting)      TMJ (temporomandibular joint syndrome) 2014     Vitamin D deficiency 2012       PAST SURGICAL HISTORY:   Past Surgical History:   Procedure Laterality Date     APPENDECTOMY       CHOLECYSTECTOMY       DILATION AND CURETTAGE, OPERATIVE HYSTEROSCOPY WITH MORCELLATOR, COMBINED  2012    Procedure: COMBINED DILATION AND CURETTAGE, OPERATIVE HYSTEROSCOPY WITH MORCELLATOR;  Hysteroscopy, Polypectomy, Dilation and Curettage  ;  Surgeon: Marta Montejo MD;  Location: [facility] OR     GI SURGERY      gastric bypass at age 31     ORTHOPEDIC SURGERY      back surgery at age 31     PARATHYROIDECTOMY Right 2015    Procedure: PARATHYROIDECTOMY;  Surgeon: Gregory Coleman MD;  Location: [facility]       SPINE SURGERY               Family History:   FAMILY HISTORY:   Family History   Problem Relation Age of Onset     Thyroid Disease Mother      Breast Cancer Mother 57         65 y.o.     Other - See Comments Mother         cystic acne     Cancer Brother      Diabetes Daughter         Type 1; insulin           Social History:   Please see the full " psychosocial profile from the clinical treatment coordinator.   SOCIAL HISTORY:   Social History     Tobacco Use     Smoking status: Current Every Day Smoker     Packs/day: 0.50     Years: 30.00     Pack years: 15.00     Types: Cigarettes     Smokeless tobacco: Never Used   Substance Use Topics     Alcohol use: Yes     Comment: occasionally            Physical ROS:   The patient endorsed poor sleep. The remainder of 10-point review of systems was negative except as noted in HPI.         PTA Medications:     Medications Prior to Admission   Medication Sig Dispense Refill Last Dose     calcium carbonate (OS-CRISTIAN) 500 MG tablet Take 2 tablets (1,000 mg) by mouth 3 times daily 200 tablet 3      cholecalciferol 50 MCG (2000 UT) tablet Take 1 tablet (50 mcg) by mouth daily 90 tablet 3      cyanocobalamin (VITAMIN B-12) 1000 MCG tablet Take 1 tablet (1,000 mcg) by mouth daily 90 tablet 3      ibuprofen (ADVIL/MOTRIN) 400 MG tablet Take 400 mg by mouth every 8 hours as needed for moderate pain        LORazepam (ATIVAN) 1 MG tablet Take 1 tablet (1 mg) by mouth 2 times daily as needed for anxiety 10 tablet 0      Multiple Vitamin (MULTIVITAMIN ADULT) TABS Take 1 tablet by mouth daily 200 tablet 1      rivaroxaban ANTICOAGULANT (XARELTO) 10 MG TABS tablet Take 1 tablet (10 mg) by mouth daily (with dinner) 20 tablet 0      sertraline (ZOLOFT) 25 MG tablet Take 2 tablets (50 mg) by mouth daily 30 tablet 0      vitamin B complex with vitamin C (VITAMIN  B COMPLEX) tablet Take 1 tablet by mouth daily 100 tablet 6           Allergies:     Allergies   Allergen Reactions     Codeine Sulfate GI Disturbance          Labs:     Recent Results (from the past 48 hour(s))   MRSA MSSA PCR, Nasal Swab    Collection Time: 09/13/21 12:01 PM    Specimen: Nare, Left; Swab   Result Value Ref Range    MRSA Target DNA Negative Negative    SA Target DNA Negative Negative   Glucose by meter    Collection Time: 09/13/21  9:32 PM   Result Value Ref  "Range    GLUCOSE BY METER POCT 111 (H) 70 - 99 mg/dL          Physical and Psychiatric Examination:     BP (!) 148/85   Pulse 66   Temp 97.9  F (36.6  C) (Oral)   Resp 14   Ht 1.676 m (5' 6\")   Wt 100.7 kg (221 lb 14.4 oz)   SpO2 96%   BMI 35.82 kg/m    Weight is 221 lbs 14.4 oz  Body mass index is 35.82 kg/m .    Physical Exam:  I have reviewed the physical exam as documented by the medical team and agree with findings and assessment and have no additional findings to add at this time.    Mental Status Exam:  Appearance: awake, alert and adequately groomed  Attitude:  cooperative  Eye Contact:  fair  Mood:  depressed  Affect:  mood congruent  Speech:  clear, coherent  Language: fluent and intact in English  Psychomotor, Gait, Musculoskeletal:  no evidence of tardive dyskinesia, dystonia, or tics  Thought Process:  goal oriented  Associations:  no loose associations  Thought Content:  passive suicidal ideation present  Insight:  fair  Judgement:  intact  Oriented to:  time, person, and place  Attention Span and Concentration:  intact  Recent and Remote Memory:  intact  Fund of Knowledge:  appropriate         Admission Diagnoses:   MDD, recurrent, severe without psychosis  Grief disorder         Assessment & Plan:     1) Continue Zoloft 50mg Qday. Titrate to effective dose.   2) Continue PRN Ativan. Discussed risks of this.   3) Patient to be referred to grief/loss therapy.     Disposition Plan   Reason for ongoing admission: poses an imminent risk to self  Discharge location: TBD  Discharge Medications: not ordered  Follow-up Appointments: not scheduled  Legal Status: voluntary    Entered by: Moo Dahl on 9/14/2021 at 6:10 AM           "

## 2021-09-14 NOTE — PLAN OF CARE
Patient is a 61 year old female admitted to station 20 from [facility] unit 66, with post covid symptoms but medically cleared, has MRSA inactive phase, Hx of anxiety, depression, suicidal ideation and adjustment disorder, Patient has history of diabetes type 2 but controlled with diet management no medications prescribed, patient reports SI without a plan but had ideations to jump off a bridge, patient's current stressors include loosing loved ones, patient lost 2 daughters to cancer one in 2014 and the last in May of 2021, patient reported that she is still struggling with grief.    Patient presents with sad tearful affect, reporting depressed mood, patient's blood pressure was slightly elevated initial reading was 148/85, other vitals were stable.    Patient was oriented to the unit protocols explained patient verbalized understanding, no other concerns were reported or noted, will continue to monitor condition and offer support.

## 2021-09-14 NOTE — PHARMACY-ADMISSION MEDICATION HISTORY
Please see Admission Medication History note completed on 9/5/21 under previous encounter at [facility] for information regarding prior to admission medications.    Of note, patient started on rivaroxaban (Xarelto) 20 mg daily on 9/11/21 for COVID-19 DVT prophylaxis, indicated to continue for 20 days after discharge from medical specialty unit.    Nicollette McMann, PharmD  [facility]: [place] *[phone]

## 2021-09-14 NOTE — PROGRESS NOTES
09/14/21 0528   Patient Belongings   Did you bring any home meds/supplements to the hospital?  Yes   Disposition of meds  Sent to security/pharmacy per site process   Patient Belongings locker   Patient Belongings Put in Hospital Secure Location (Security or Locker, etc.) cell phone/electronics;clothing;glasses;keys;purse/wallet;money (see comment)   Belongings Search Yes   Clothing Search Yes     Medication send to security . Envelop number [id number]     Black bag with 12 magazines; one reading book; a bag of crayons; 3 packs of toilette wipes; sun glasses in the right pocket;    Pick purses with keys set, 4 dollars  and 92 cents; [employer] Black  smart phone in a purple case,  and adapter, driving licence,     Brown bag with a bag of premium pipe tobacco with a folding kit color white; clothing ( Black short PJ set, 2 tops, 3 underwares, 4 short pants, 4 dresses 1bras.    No shoes were found when working on patient belongings          A               Admission:  I am responsible for any personal items that are not sent to the safe or pharmacy.  [facility] is not responsible for loss, theft or damage of any property in my possession.    Signature:  _________________________________ Date: _______  Time: _____                                              Staff Signature:  ____________________________ Date: ________  Time: _____      2nd Staff person, if patient is unable/unwilling to sign:    Signature: ________________________________ Date: ________  Time: _____     Discharge:  [facility] has returned all of my personal belongings:    Signature: _________________________________ Date: ________  Time: _____                                          Staff Signature:  ____________________________ Date: ________  Time: _____

## 2021-09-14 NOTE — PROGRESS NOTES
Transport arrived at 2130, patient transferring to [facility] unit 20. RN at [place] contacted, had received report from previous RN and had no questions. Patient said all belongings were returned.

## 2021-09-14 NOTE — PLAN OF CARE
Problem: Behavioral Health Plan of Care  Goal: Patient-Specific Goal (Individualization)  Flowsheets (Taken 9/14/2021 1322)  Patient Vulnerabilities:   recent loss   poor physical health   lacks insight into illness   traumatic event  Patient Personal Strengths:   expressive of emotions   independent living skills   stable living environment   no history of violence   motivated for treatment  Note:   Personal Plan of Care    Patient goals:  Feel stable  Absence of SI    Plan for admission:  1. stabilization of mood disorder symptoms.   2. Absence of SI/Safe with self  3. Medication management per Mds  4. Coordination of Care with outpatient providers/family  5. Psychiatric follow up care in place

## 2021-09-14 NOTE — PLAN OF CARE
BEHAVIORAL TEAM DISCUSSION    Participants: Sherice Melton RN, Gabbie Ibarra [facility]  Progress: Initial assessment   Anticipated length of stay: 3-5 days  Continued Stay Criteria/Rationale: SI with plan due to worsening depression. Needs clinical stabilization and medication management.  Medical/Physical: See H&P note  Precautions:   Behavioral Orders   Procedures    Code 1 - Restrict to Unit    Routine Programming     As clinically indicated    Status 15     Every 15 minutes.    Suicide precautions     Patients on Suicide Precautions should have a Combination Diet ordered that includes a Diet selection(s) AND a Behavioral Tray selection for Safe Tray - with utensils, or Safe Tray - NO utensils       Plan: Patient will have ongoing psychiatric assessment. Medications will be reviewed and adjusted per MD as indicated. Outpatient providers will be contacted for care coordination. CTC will continue to assess needs and  ensure appropriate follow up care is in place.   Rationale for change in precautions or plan: None

## 2021-09-14 NOTE — PLAN OF CARE
"Initial Psychosocial Assessment    I have reviewed the chart, met with the patient, and developed Care Plan.  Information for assessment was obtained from: Patient and chart review         Presenting Problem:  Patient is a 61-year-old female admitted to [facility] on 9/14/2021 due to SI with plan in the context of worsening depression and anxiety. Patient's suicide plan was  jumping from a bridge. Patient reports coming to the hospital because she felt \"like not wanting to live anymore.\" Patient shared that she has experienced too many loses throughout her life but losing her daughter this past May was extremely difficult. In addition, she expressed concerns about losing her son who has been ill for several months and is currently in the hospital as she added \"I cannot bear the thought of losing another child this year.\"        History of Mental Health and Chemical Dependency:  Patient has a mental health hx of anxiety and MDD with trauma and stessor related disorder. Patient reports one previous mental health hospitalization for depression. Reports no hx of substance use o ETOH.     Family Description (Constellation, Family Psychiatric History):  Patient grew up in [place]. She has two biological children (bio-daughter passed away May 2021), has three adopted children, and has six step children. Patient also raised multiple childen through foster care. Patient  her ex- about 15 years ago. Patient reports hx of family history of suicide but did not provide further details.     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     Financial Status:  Unable to assess     Legal Issues:  None    Ethnic/Cultural Issues:  Denies issues    Spiritual Orientation:  No issues " endorsed      Service History:  None    Social Functioning (organization, interests):  At baseline, patient describes her mental health symptoms as present and she is able to manage with therapy alone    Current Treatment Providers are:  Primary Care Provider: Randal Castellanos MD  Therapist: SB Quinn, Corinna at [facility] Care Coordinator: Jennifer [phone]    Social Service Assessment/Plan:  Patient will have ongoing psychiatric assessment. Medications will be reviewed and adjusted per MD as indicated. Outpatient providers will be contacted for care coordination. Hospital staff will provide a safe environment and a therapeutic milieu. Patient will be encouraged to participate in unit groups and activities. CTC will continue to assess needs and  ensure appropriate follow up care is in place.

## 2021-09-14 NOTE — PLAN OF CARE
"  Problem: Suicidal Behavior  Goal: Suicidal Behavior is Absent or Managed  Outcome: No Change     Problem: Depression  Goal: Improved Mood  Outcome: No Change     Problem: Sleep Disturbance  Goal: Adequate Sleep/Rest  Outcome: No Change     Pt mood is sad. Presented with a flat affect. A little irritable. Pt was tearful when this writer checked in with her. Pt stated that she is here because \"I need help and nobody is helping\". Reassured pt that staff and team is here to help her. Pt was a little irritable when being asked question for assessment. Requested for egg crate mattress. Unit still waiting from [facility] for the supply. Pt complained of headache at a 9/10. Refused Tylenol. Order for Ibuprofen obtained and was administered to pt. Will continue to monitor.   "

## 2021-09-14 NOTE — PLAN OF CARE
Problem: Sleep Disturbance  Goal: Adequate Sleep/Rest  Outcome: Improving   Patient slept most part of the shift,had  7 hours of sleep,safety checks in place every 15 minutes per unit policy, no other known concerns this shift. Will monitor as needed

## 2021-09-15 PROCEDURE — 124N000002 HC R&B MH UMMC

## 2021-09-15 PROCEDURE — G0177 OPPS/PHP; TRAIN & EDUC SERV: HCPCS

## 2021-09-15 PROCEDURE — 99232 SBSQ HOSP IP/OBS MODERATE 35: CPT | Performed by: PSYCHIATRY & NEUROLOGY

## 2021-09-15 PROCEDURE — 250N000013 HC RX MED GY IP 250 OP 250 PS 637: Performed by: NURSE PRACTITIONER

## 2021-09-15 PROCEDURE — 250N000013 HC RX MED GY IP 250 OP 250 PS 637: Performed by: PSYCHIATRY & NEUROLOGY

## 2021-09-15 RX ORDER — SERTRALINE HYDROCHLORIDE 100 MG/1
100 TABLET, FILM COATED ORAL DAILY
Status: DISCONTINUED | OUTPATIENT
Start: 2021-09-16 | End: 2021-09-21 | Stop reason: HOSPADM

## 2021-09-15 RX ORDER — TRAZODONE HYDROCHLORIDE 50 MG/1
50 TABLET, FILM COATED ORAL AT BEDTIME
Status: DISCONTINUED | OUTPATIENT
Start: 2021-09-15 | End: 2021-09-16

## 2021-09-15 RX ORDER — LORAZEPAM 1 MG/1
1 TABLET ORAL 3 TIMES DAILY PRN
Status: DISCONTINUED | OUTPATIENT
Start: 2021-09-15 | End: 2021-09-17

## 2021-09-15 RX ADMIN — LORAZEPAM 1 MG: 1 TABLET ORAL at 09:48

## 2021-09-15 RX ADMIN — RIVAROXABAN 10 MG: 10 TABLET, FILM COATED ORAL at 18:48

## 2021-09-15 RX ADMIN — LORAZEPAM 1 MG: 1 TABLET ORAL at 19:18

## 2021-09-15 RX ADMIN — IBUPROFEN 600 MG: 600 TABLET ORAL at 08:18

## 2021-09-15 RX ADMIN — SERTRALINE HYDROCHLORIDE 50 MG: 50 TABLET ORAL at 08:18

## 2021-09-15 RX ADMIN — LORAZEPAM 1 MG: 1 TABLET ORAL at 14:13

## 2021-09-15 NOTE — PROGRESS NOTES
"  PSYCHIATRIC PROGRESS NOTE    Admission Date: 2021  Date of Service: 09/15/2021    The patient was seen for Dr. Dahl, her chart reviewed, her case discussed with staff.  She continues to be markedly depressed and anxious. She seems to have tolerated Zoloft well and has been utilizing PRN Ativan with some relief of her anxiety. The only thing that keeps her alive is her grand daughter whom she has been taking care of. Otherwise she would be \"going home to where we all came from\".  She was once hospitalized here under the care of Dr. Pathak in  and was treated with Celexa and Seroquel for \"psychotic depression\". She states that Celexa \"didn't do anything\" and was eventually stopped. Her depression lingered but \"was manageable\" until her second daughter  earlier this year  This is a 61 year old female with history of depression, anxiety and type 2 diabetes who initially presented to [facility] ER with suicidal ideation. She was planning to jump off of a bridge and had brought a pen and paper to write her suicide note. She had made plans for her granddaughter, who currently lives with the patient, and gave her cat away. On the day of admission she did not feel well and called 911. In ED she was diagnosed with COVID-19. She has been experiencing an intermittent fever, shortness of breath, cough and some diarrhea for a week prior to admission. She was admitted to the medical floor for COVID-induced pneumonia. There she was seen by Dr. Lamar for psychiatric consultation on 21. He started her on Zoloft and PRN Ativan. She continued to be depressed and suicidal and was eventually transferred to this hospital, [facility] where she was seen by Dr. Dahl.    MEDICATIONS  Zoloft 50 mg QAM  Ativan 1 mg BID PRN  Trazodone 50 mg HS PRN    LABS: As of 21 her Blood Chemistry was notable for low Calcium of 8.2, low Albumin of 3.3, high Cholesterol of 200 with LDL Cholesterol of 123 and Non HDL " Cholesterol of 149. Her blood Glucose was 135. Her CBC was WNL except for MCH of 25.0    VS: Pulse - 80, BP - 124/82, T - 98.1, Resp - 16, SpO2 - 98%    MENTAL STATUS EXAMINATION  Reveals a normally built and normally developed 61-year-old white female appearing her stated age. She was alert and oriented X 3. Her speech was coherent and goal related. She appeared to be moderately-to- severely depressed, hopeless and anhedonic. She was tearful throughout the interview. She continued to verbalize suicidal ideas. No overt psychotic features were noted or elicited. She had some insight into her problems but her judgement was questionable.    DIAGNOSTIC IMPRESSION  Major Depression, recurrent  Dysthymic Disorder  Anxiety Disorder NOS    Plan: I will increase her Zoloft to 100 mg QAM and order Trazodone at bed time. I will increase PRN Ativan to 1 mg TID.    Aldo Ruiz MD

## 2021-09-15 NOTE — PLAN OF CARE
"  Problem: Suicidal Behavior  Goal: Suicidal Behavior is Absent or Managed  Outcome: No Change     Problem: Depression  Goal: Improved Mood  Outcome: No Change     Problem: Anxiety  Goal: Anxiety Reduction or Resolution  Outcome: No Change     Problem: Behavioral Health Plan of Care  Goal: Plan of Care Review  Outcome: No Change     Problem: Behavioral Health Plan of Care  Goal: Patient-Specific Goal (Individualization)  Outcome: No Change  Flowsheets (Taken 9/15/2021 6838)  Patient Personal Strengths:   expressive of emotions   expressive of needs     Problem: Behavioral Health Plan of Care  Goal: Adheres to Safety Considerations for Self and Others  Outcome: No Change       Pt presented with a flat affect. Pt states that she is \"impatient, thinking that the process here is slow\". Pt acknowledges that she has to get better before she comes home. Pt misses her grand daughter  and she is tearful as she spoke with this writer. Rates her anxiety and depression both at 8/10. Pt endorses SI thoughts but contracts for safety while on the unit.  For medication side effect, Pt states that she had hot flushes despite having menopause  years ago. Attributes it with medication. Encouraged pt to speak with her doctors about it. Pt complained of headache  rating a 7/10. PRN requested and was given ibuprofen. Goal for today is to go to groups -which she accomplished, pt now in group activity with OT.   "

## 2021-09-15 NOTE — PLAN OF CARE
Assessment/Intervention/Current Symptoms and Care Coordination: Met with team. Reviewed patient's chart and progress notes. Checked in with patient. Patient is agreeable to therapy and psychiatry referrals. Patient called patient's [facility] Care Coordinator, Jennifer [phone]. Writer provided update on patient's treatment progress.          Discharge Plan or Goal: Will return home. Potential referral for day treatment or PHP. Will be referred to outpatient mental health providers.          Barriers to Discharge: Worsening depression and SI. Need clinical stabilization and medication management.               Referral Status:         Legal Status:  Voluntary

## 2021-09-15 NOTE — PLAN OF CARE
Problem: Sleep Disturbance  Goal: Adequate Sleep/Rest  Outcome: Improving   Patient had 7 hours of sleep, no PRN's or snacks given, safety precautions in place every 15 minutes per unit policy. No other known concerns at this time.   Will continue to offer support for the rest of the shift.

## 2021-09-16 PROCEDURE — 250N000013 HC RX MED GY IP 250 OP 250 PS 637: Performed by: NURSE PRACTITIONER

## 2021-09-16 PROCEDURE — 99232 SBSQ HOSP IP/OBS MODERATE 35: CPT | Performed by: PSYCHIATRY & NEUROLOGY

## 2021-09-16 PROCEDURE — 250N000013 HC RX MED GY IP 250 OP 250 PS 637: Performed by: PSYCHIATRY & NEUROLOGY

## 2021-09-16 PROCEDURE — G0177 OPPS/PHP; TRAIN & EDUC SERV: HCPCS

## 2021-09-16 PROCEDURE — 124N000002 HC R&B MH UMMC

## 2021-09-16 RX ORDER — TRAZODONE HYDROCHLORIDE 50 MG/1
50 TABLET, FILM COATED ORAL
Status: DISCONTINUED | OUTPATIENT
Start: 2021-09-16 | End: 2021-09-21 | Stop reason: HOSPADM

## 2021-09-16 RX ADMIN — SERTRALINE HYDROCHLORIDE 100 MG: 100 TABLET ORAL at 08:31

## 2021-09-16 RX ADMIN — LORAZEPAM 1 MG: 1 TABLET ORAL at 17:06

## 2021-09-16 RX ADMIN — IBUPROFEN 600 MG: 600 TABLET ORAL at 17:06

## 2021-09-16 RX ADMIN — RIVAROXABAN 10 MG: 10 TABLET, FILM COATED ORAL at 18:20

## 2021-09-16 RX ADMIN — LORAZEPAM 1 MG: 1 TABLET ORAL at 08:31

## 2021-09-16 RX ADMIN — IBUPROFEN 600 MG: 600 TABLET ORAL at 08:31

## 2021-09-16 ASSESSMENT — ACTIVITIES OF DAILY LIVING (ADL)
DRESS: SCRUBS (BEHAVIORAL HEALTH);INDEPENDENT
ORAL_HYGIENE: INDEPENDENT
HYGIENE/GROOMING: INDEPENDENT

## 2021-09-16 ASSESSMENT — MIFFLIN-ST. JEOR: SCORE: 1589.21

## 2021-09-16 NOTE — PLAN OF CARE
Assessment/Intervention/Current Symptoms and Care Coordination: Met with team. Reviewed patient's chart and progress notes. Checked in with patient. Writer referred patient to the [facility] for Grief and Loss Writer was informed that patient will need to contact the [facility] directly to complete an intake prior to scheduling an appointment. Writer called patient's CADI worker Eugenegagan Puentes at [phone]. Writer left vm requesting a call back.          Discharge Plan or Goal: Will return home. Potential referral for day treatment or [facility]. Will be referred to outpatient mental health providers.            Barriers to Discharge: Worsening depression and SI. Need clinical stabilization and medication management.                  Referral Status:           Legal Status:  Voluntary

## 2021-09-16 NOTE — PROGRESS NOTES
09/15/21 1408   General Information   Date Initially Attended OT 09/15/21     Amy attended 1 OT group today: Clinic. Purpose: Coping skill exploration, creative expression within personally meaningful activities, and clinical observation of social, cognitive, and kinesthetic performance skills.  Pt Response: Chosen activity: Creative expression project. IND to initiate, gather materials, sequence and adjust to workspace demands as needed. Demonstrated adequate focus, planning, and problem solving for this minimally complex task. Able to ask for assistance and appeared comfortable interacting with peers and staff.    Susan Hannon OT on 9/16/2021 at 9:09 AM

## 2021-09-16 NOTE — PLAN OF CARE
"C/o anxiety upon waking this morning, requested and received  Ativan PRN with decrease in anxiety.  Reports mood as \"okay\".  Denies suicidal thoughts and thoughts of self harm.  Rates depression 5/10.  Affect flat and blunted, calm and cooperative.  Reported having a headache this morning, given Ibuprofen with relief of headache.     Problem: Suicidal Behavior  Goal: Suicidal Behavior is Absent or Managed  Outcome: No Change     Problem: Depression  Goal: Improved Mood  Outcome: No Change     Problem: Anxiety  Goal: Anxiety Reduction or Resolution  Outcome: No Change     Problem: Sleep Disturbance  Goal: Adequate Sleep/Rest  Outcome: No Change     Problem: Behavioral Health Plan of Care  Goal: Plan of Care Review  Outcome: No Change  Flowsheets (Taken 9/16/2021 3018)  Plan of Care Reviewed With: patient  Patient Agreement with Plan of Care: agrees  Goal: Patient-Specific Goal (Individualization)  Outcome: No Change  Note:     Goal: Adheres to Safety Considerations for Self and Others  Outcome: No Change  Goal: Absence of New-Onset Illness or Injury  Outcome: No Change  Goal: Optimized Coping Skills in Response to Life Stressors  Outcome: No Change  Goal: Develops/Participates in Therapeutic [place] to Support Successful Transition  Outcome: No Change     "

## 2021-09-16 NOTE — PROGRESS NOTES
Behavioral Health  Note   Behavioral Health  Spirituality Group Note     [facility]    Name: Amy Choudhury    YOB: 1960   MRN: [id number]    Age: 61 year old     Patient attended -led group, which included discussion of spirituality, coping with illness and building resilience.   Patient attended group for 1.0 hrs.   patient minimally participated, but was respectful to the group process.     Jovanna [facility]  Staff    Page [phone]

## 2021-09-16 NOTE — PLAN OF CARE
"  Problem: Suicidal Behavior  Goal: Suicidal Behavior is Absent or Managed  Outcome: Improving  Flowsheets (Taken 9/15/2021 2043)  Mutually Determined Action Steps (Suicidal Behavior Absent/Managed):   identifies protective factors   sets future-oriented goal   verbalizes safety check rationale     Problem: Depression  Goal: Improved Mood  Outcome: Improving     Problem: Anxiety  Goal: Anxiety Reduction or Resolution  Outcome: Improving     Problem: Behavioral Health Plan of Care  Goal: Adheres to Safety Considerations for Self and Others  Outcome: Improving  Goal: Optimized Coping Skills in Response to Life Stressors  Outcome: Improving     Patient was isolative to room intermittently out with encouragements, patient endorsed mild anxiety and improved depressive symptoms, voiced concerns regarding her progress here, stated \"It feels like everything is moving slow' referring to the care here, patient denies any active suicidal and self harm thinking, nutritional intake was adequate, insight and judgement were appropriate, no other concerns noted.   "

## 2021-09-16 NOTE — PROGRESS NOTES
"[facility], [place]   Psychiatric Progress Note        Interim History:   The patient's care was discussed with the treatment team during the daily team meeting and/or staff's chart notes were reviewed.  Staff report patient has been doing well on the unit. Will be referred to grief and loss groups.     The patient reports that she is \"okay - maybe a little better.\" Says that she woke up with a headache but it is now gone. Says that her suicidal thoughts are decreasing. Is still tearful when discussing her losses and is looking forward to starting grief therapy. Says that she was started on Trazodone without the provider speaking to her about her sleep yesterday. Asks about having double proteins with meals.          Medications:       rivaroxaban ANTICOAGULANT  10 mg Oral Daily with supper     sertraline  100 mg Oral Daily          Allergies:     Allergies   Allergen Reactions     Codeine Sulfate GI Disturbance          Labs:   No results found for this or any previous visit (from the past 24 hour(s)).       Psychiatric Examination:     /80   Pulse 74   Temp 97.8  F (36.6  C) (Oral)   Resp 15   Ht 1.676 m (5' 6\")   Wt 99.7 kg (219 lb 14.4 oz)   SpO2 97%   BMI 35.49 kg/m    Weight is 219 lbs 14.4 oz  Body mass index is 35.49 kg/m .  Orthostatic Vitals       Most Recent      Sitting Orthostatic /80 09/16 0751    Sitting Orthostatic Pulse (bpm) 74 09/16 0751    Standing Orthostatic /82 09/16 0751    Standing Orthostatic Pulse (bpm) 80 09/16 0751            Appearance: awake, alert and adequately groomed  Attitude:  cooperative  Eye Contact:  good  Mood:  better  Affect:  mood congruent  Speech:  clear, coherent  Psychomotor Behavior:  no evidence of tardive dyskinesia, dystonia, or tics  Thought Process:  goal oriented  Associations:  no loose associations  Thought Content:  passive suicidal ideation present and improving  Insight:  fair  Judgement:  " intact  Oriented to:  time, person, and place  Attention Span and Concentration:  intact  Recent and Remote Memory:  intact    Clinical Global Impressions  First:  Considering your total clinical experience with this particular patient population, how severe are the patient's symptoms at this time?: 7 (09/14/21 0607)  Compared to the patient's condition at the START of treatment, this patient's condition is: 4 (09/14/21 0607)  Most recent:  Considering your total clinical experience with this particular patient population, how severe are the patient's symptoms at this time?: 7 (09/14/21 0607)  Compared to the patient's condition at the START of treatment, this patient's condition is: 4 (09/14/21 0607)         Precautions:     Behavioral Orders   Procedures     Code 1 - Restrict to Unit     Routine Programming     As clinically indicated     Status 15     Every 15 minutes.     Suicide precautions     Patients on Suicide Precautions should have a Combination Diet ordered that includes a Diet selection(s) AND a Behavioral Tray selection for Safe Tray - with utensils, or Safe Tray - NO utensils            Diagnoses:     MDD, recurrent, severe without psychosis  Grief disorder         Plan:     1) Continue Zoloft 100mg Qday.   2) Continue PRN Ativan.   3) Patient being referred to grief and loss groups.   4) Change Trazodone to PRN.       Disposition Plan   Reason for ongoing admission: poses an imminent risk to self  Discharge location: home with self-care  Discharge Medications: not ordered  Follow-up Appointments: not scheduled  Legal Status: voluntary    Entered by: Moo Dahl on September 16, 2021 at 10:08 AM

## 2021-09-16 NOTE — PLAN OF CARE
Amy appeared to sleep a total of 6.75 hours this night shift.  No prns or snacks given or requested.

## 2021-09-17 PROCEDURE — 99232 SBSQ HOSP IP/OBS MODERATE 35: CPT | Performed by: PSYCHIATRY & NEUROLOGY

## 2021-09-17 PROCEDURE — 250N000013 HC RX MED GY IP 250 OP 250 PS 637: Performed by: PSYCHIATRY & NEUROLOGY

## 2021-09-17 PROCEDURE — 124N000002 HC R&B MH UMMC

## 2021-09-17 PROCEDURE — G0177 OPPS/PHP; TRAIN & EDUC SERV: HCPCS

## 2021-09-17 PROCEDURE — 250N000013 HC RX MED GY IP 250 OP 250 PS 637: Performed by: NURSE PRACTITIONER

## 2021-09-17 RX ORDER — LORAZEPAM 1 MG/1
1 TABLET ORAL 2 TIMES DAILY PRN
Status: DISCONTINUED | OUTPATIENT
Start: 2021-09-17 | End: 2021-09-21

## 2021-09-17 RX ADMIN — LORAZEPAM 1 MG: 1 TABLET ORAL at 12:42

## 2021-09-17 RX ADMIN — RIVAROXABAN 10 MG: 10 TABLET, FILM COATED ORAL at 18:22

## 2021-09-17 RX ADMIN — SERTRALINE HYDROCHLORIDE 100 MG: 100 TABLET ORAL at 08:32

## 2021-09-17 RX ADMIN — HYDROXYZINE HYDROCHLORIDE 25 MG: 25 TABLET, FILM COATED ORAL at 10:00

## 2021-09-17 RX ADMIN — LORAZEPAM 1 MG: 1 TABLET ORAL at 01:54

## 2021-09-17 RX ADMIN — IBUPROFEN 600 MG: 600 TABLET ORAL at 20:18

## 2021-09-17 RX ADMIN — IBUPROFEN 600 MG: 600 TABLET ORAL at 01:54

## 2021-09-17 RX ADMIN — LORAZEPAM 1 MG: 1 TABLET ORAL at 20:18

## 2021-09-17 RX ADMIN — ALUMINUM HYDROXIDE, MAGNESIUM HYDROXIDE, AND SIMETHICONE 30 ML: 200; 200; 20 SUSPENSION ORAL at 20:18

## 2021-09-17 ASSESSMENT — ACTIVITIES OF DAILY LIVING (ADL)
DRESS: INDEPENDENT
HYGIENE/GROOMING: INDEPENDENT
HYGIENE/GROOMING: INDEPENDENT
DRESS: INDEPENDENT
ORAL_HYGIENE: INDEPENDENT
ORAL_HYGIENE: INDEPENDENT
LAUNDRY: UNABLE TO COMPLETE

## 2021-09-17 NOTE — PLAN OF CARE
Amy appeared to sleep a total of 5.75 hours this night shift.  Ibuprofen 600 mg and Ativan both given prn for anxiety and back pain.  No snacks given or requested.

## 2021-09-17 NOTE — PLAN OF CARE
Assessment/Intervention/Current Symptoms and Care Coordination: Met with team. Reviewed patient's chart and progress notes. Checked in with patient. Writer received a call from patient's CADI worker, Eugene Puentes at [phone]. Writer discussed patient's treatment progress and potential aftercare follow ups. Writer called [facility] [phone]. Writer was unable to leave a .         Discharge Plan or Goal: Will return home. Potential referral for day treatment or [facility]. Will be referred to outpatient mental health providers.            Barriers to Discharge: Worsening depression and SI. Need clinical stabilization and medication management.                  Referral Status:           Legal Status:  Voluntary

## 2021-09-17 NOTE — PROGRESS NOTES
"[facility], [place]   Psychiatric Progress Note        Interim History:   The patient's care was discussed with the treatment team during the daily team meeting and/or staff's chart notes were reviewed.  Staff report patient has been referred to grief and loss counseling.     The patient reports that she is feeling a little better. Sleeping and eating well. Denies any current SI. Says that she did wake up at 2am and took PRN Ativan due to feeling anxious. Will call grief and loss center today. Doesn't feel safe returning home but doesn't want to consider IRTS as she has her granddaughter living with her. She will discuss this with her  and will likely need to discharge to current living situation on Monday.          Medications:       rivaroxaban ANTICOAGULANT  10 mg Oral Daily with supper     sertraline  100 mg Oral Daily          Allergies:     Allergies   Allergen Reactions     Codeine Sulfate GI Disturbance          Labs:   No results found for this or any previous visit (from the past 24 hour(s)).       Psychiatric Examination:     /80   Pulse 74   Temp 98.4  F (36.9  C) (Oral)   Resp 16   Ht 1.676 m (5' 6\")   Wt 99.7 kg (219 lb 14.4 oz)   SpO2 98%   BMI 35.49 kg/m    Weight is 219 lbs 14.4 oz  Body mass index is 35.49 kg/m .  Orthostatic Vitals       Most Recent      Sitting Orthostatic /80 09/16 0751    Sitting Orthostatic Pulse (bpm) 74 09/16 0751    Standing Orthostatic /82 09/16 0751    Standing Orthostatic Pulse (bpm) 80 09/16 0751            Appearance: awake, alert and adequately groomed  Attitude:  cooperative  Eye Contact:  good  Mood:  better  Affect:  mood congruent  Speech:  clear, coherent  Psychomotor Behavior:  no evidence of tardive dyskinesia, dystonia, or tics  Thought Process:  goal oriented  Associations:  no loose associations  Thought Content:  no evidence of suicidal ideation or homicidal ideation and no evidence of " psychotic thought  Insight:  fair  Judgement:  intact  Oriented to:  time, person, and place  Attention Span and Concentration:  intact  Recent and Remote Memory:  intact    Clinical Global Impressions  First:  Considering your total clinical experience with this particular patient population, how severe are the patient's symptoms at this time?: 7 (09/14/21 0607)  Compared to the patient's condition at the START of treatment, this patient's condition is: 4 (09/14/21 0607)  Most recent:  Considering your total clinical experience with this particular patient population, how severe are the patient's symptoms at this time?: 7 (09/14/21 0607)  Compared to the patient's condition at the START of treatment, this patient's condition is: 4 (09/14/21 0607)         Precautions:     Behavioral Orders   Procedures     Code 1 - Restrict to Unit     Routine Programming     As clinically indicated     Status 15     Every 15 minutes.     Suicide precautions     Patients on Suicide Precautions should have a Combination Diet ordered that includes a Diet selection(s) AND a Behavioral Tray selection for Safe Tray - with utensils, or Safe Tray - NO utensils            Diagnoses:     MDD, recurrent, severe without psychosis  Grief disorder         Plan:     1) Continue Zoloft 100mg Qday.   2) Continue PRN Ativan.   3) Patient being referred to grief and loss groups.   4) Continue Trazodone PRN.   5) Patient will discuss with her outside  her housing options. Likely discharge on Monday.       Disposition Plan   Reason for ongoing admission: poses an imminent risk to self  Discharge location: home with self-care  Discharge Medications: not ordered  Follow-up Appointments: not scheduled  Legal Status: voluntary    Entered by: Moo Dahl on September 17, 2021 at 11:37 AM

## 2021-09-17 NOTE — PLAN OF CARE
Problem: OT General Care Plan  Goal: OT Frequency  Description: With min assist, Amy will demonstrate illness management skills necessary for positive resumption of home and community roles by discharge.  Outcome: Improving     Date of Service: September 17, 2021    Description: Amy attended 1 occupational therapy group today. Overall mood/affect: Content - congruent  11:15 - 12:00. 7 total participants. Occupational Therapy Clinic. Purpose: Coping skill exploration, creative expression within personally meaningful activities, and clinical observation of social, cognitive, and kinesthetic performance skills.  Pt Response: Chosen activity: Sun catcher. I to initiate, gather materials, sequence and adjust to workspace demands as needed. Demonstrated improved focus, planning, and problem solving. Able to ask for assistance and appeared comfortable interacting with peers and staff.    Assessment: Demonstrates adequate cognition / concentration to tasks. Benefit from engagement in OT groups is supported as they explore healthy recovery strategies, positive coping skills for symptom management, relapse prevention, and resumption of personal roles, routines and daily occupations.    Plan: Continue to encourage group attendance. Provide graded occupation-based activities for increased success towards OT goals and ongoing assessment.     Susan Hannon OT on 9/17/2021 at 2:10 PM

## 2021-09-17 NOTE — PLAN OF CARE
Pt was mostly isolative to her room for a majority of this evening shift. She was visible in the dining room during dinner and was social with some of the other patients. She presented with blunt/flat affect and depressed, sad mood. She denied any active SI/SIB thoughts or urges. No PRN medications were administered. Medication compliant.    Problem: Suicidal Behavior  Goal: Suicidal Behavior is Absent or Managed  Outcome: Improving

## 2021-09-17 NOTE — PLAN OF CARE
"Affect flat and blunted.  Out in the lounge area this morning, c/o increased anxiety mid morning , requested PRN ativan, encouraged to try hydroxyzine for anxiety.  Medicated with hydroxyzine 25 mg at 10:00, reports little relief of anxiety and some mild blurring of vision after taking hydroxyzine.  Encouraged to try using TIPP's skills,  given information sheet on TIPP's,  also given essential oil of lavender.  Reported increasing anxiety again at 12:40, requested and received PRN Ativan at that time.  Rates anxiety 8/10.  Unable to rate depression \"anxiety over riding depression\".  denies suicidal thoughts , thoughts of self harm and hallucinations.     Problem: Suicidal Behavior  Goal: Suicidal Behavior is Absent or Managed  Outcome: No Change     Problem: Depression  Goal: Improved Mood  Outcome: No Change     Problem: Anxiety  Goal: Anxiety Reduction or Resolution  Outcome: No Change     Problem: Sleep Disturbance  Goal: Adequate Sleep/Rest  Outcome: No Change     Problem: Behavioral Health Plan of Care  Goal: Plan of Care Review  Outcome: No Change  Flowsheets (Taken 9/17/2021 1253)  Plan of Care Reviewed With: patient  Patient Agreement with Plan of Care: agrees  Goal: Patient-Specific Goal (Individualization)  Outcome: No Change  Note:     Goal: Adheres to Safety Considerations for Self and Others  Outcome: No Change  Goal: Absence of New-Onset Illness or Injury  Outcome: No Change  Goal: Optimized Coping Skills in Response to Life Stressors  Outcome: No Change  Goal: Develops/Participates in Therapeutic [place] to Support Successful Transition  Outcome: No Change     "

## 2021-09-18 PROCEDURE — 250N000013 HC RX MED GY IP 250 OP 250 PS 637: Performed by: PSYCHIATRY & NEUROLOGY

## 2021-09-18 PROCEDURE — 124N000002 HC R&B MH UMMC

## 2021-09-18 PROCEDURE — 250N000013 HC RX MED GY IP 250 OP 250 PS 637: Performed by: NURSE PRACTITIONER

## 2021-09-18 RX ADMIN — SERTRALINE HYDROCHLORIDE 100 MG: 100 TABLET ORAL at 09:20

## 2021-09-18 RX ADMIN — LORAZEPAM 1 MG: 1 TABLET ORAL at 09:21

## 2021-09-18 RX ADMIN — RIVAROXABAN 10 MG: 10 TABLET, FILM COATED ORAL at 18:26

## 2021-09-18 RX ADMIN — LORAZEPAM 1 MG: 1 TABLET ORAL at 18:28

## 2021-09-18 ASSESSMENT — ACTIVITIES OF DAILY LIVING (ADL)
LAUNDRY: UNABLE TO COMPLETE
DRESS: SCRUBS (BEHAVIORAL HEALTH)
DRESS: SCRUBS (BEHAVIORAL HEALTH);INDEPENDENT
ORAL_HYGIENE: INDEPENDENT
HYGIENE/GROOMING: INDEPENDENT
HYGIENE/GROOMING: INDEPENDENT
ORAL_HYGIENE: INDEPENDENT

## 2021-09-18 NOTE — PLAN OF CARE
"Upon waking reported feeling good, denied anxiety or pain.  Reports increasing anxiety after breakfast, rating anxiety 8/10.   Reports trigger as \"everything that has been going on\".  Encouraged to try using TIPP's strategies and use of essential oils, verbalized need for medication, requested and received PRN Ativan 1 mg.  Has been working on crossword puzzles and word puzzles, \"to keep in brain focused\".  Denies SI/SIB/AH/VH.   14:30  Has been napping in room throughout the afternoon.     Problem: Suicidal Behavior  Goal: Suicidal Behavior is Absent or Managed  Outcome: No Change     Problem: Depression  Goal: Improved Mood  Outcome: No Change  Intervention: Monitor and Manage Depressive Symptoms  Recent Flowsheet Documentation  Taken 9/18/2021 1010 by Beverly Schroeder RN  Supportive Measures:    self-care encouraged    verbalization of feelings encouraged  Complementary Therapy:    essential oils utilized    other (see comments)     Problem: Anxiety  Goal: Anxiety Reduction or Resolution  Outcome: No Change  Intervention: Promote Anxiety Reduction  Flowsheets (Taken 9/18/2021 1010)  Supportive Measures:    self-care encouraged    verbalization of feelings encouraged  Complementary Therapy:    essential oils utilized    other (see comments)  Note: TIPP's strategies encouraged     Problem: Sleep Disturbance  Goal: Adequate Sleep/Rest  Outcome: No Change     Problem: Behavioral Health Plan of Care  Goal: Plan of Care Review  Outcome: No Change  Flowsheets (Taken 9/18/2021 1000)  Plan of Care Reviewed With: patient  Patient Agreement with Plan of Care: agrees  Goal: Patient-Specific Goal (Individualization)  Outcome: No Change  Note:     Goal: Adheres to Safety Considerations for Self and Others  Outcome: No Change  Goal: Absence of New-Onset Illness or Injury  Outcome: No Change  Goal: Optimized Coping Skills in Response to Life Stressors  Outcome: No Change  Intervention: Promote Effective Coping " Strategies  Recent Flowsheet Documentation  Taken 9/18/2021 1010 by Beverly Schroeder, RN  Supportive Measures:    self-care encouraged    verbalization of feelings encouraged  Goal: Develops/Participates in Therapeutic [place] to Support Successful Transition  Outcome: No Change

## 2021-09-18 NOTE — PLAN OF CARE
"Pt was mostly isolative to her room for most of this evening shift. She was observed resting/sleeping in bed. During the few times she was visible in the milieu, she appeared anxious. She was also untidy and disheveled in appearance. She stated that she just tried PRN Hydroxyzine for the first time today and that it \"knocked her out a little bit.\" She was unable to acknowledge if she currently felt anxious because she said she was \"too drowsy.\" She ate dinner in the dining room by herself and stated she was \"sad\" her \"friends\" had discharged today but \"relieved\" that there were less patients on the unit overall.  She denied any active SI/SIB urges or thoughts. No safety concerns at this time.     Problem: Behavioral Health Plan of Care  Goal: Plan of Care Review  Recent Flowsheet Documentation  Taken 9/17/2021 3231 by Jennifer Dennis  Plan of Care Reviewed With: patient  Patient Agreement with Plan of Care: agrees     Problem: Anxiety  Goal: Anxiety Reduction or Resolution  Outcome: No Change     "

## 2021-09-19 PROCEDURE — 250N000013 HC RX MED GY IP 250 OP 250 PS 637: Performed by: NURSE PRACTITIONER

## 2021-09-19 PROCEDURE — 250N000013 HC RX MED GY IP 250 OP 250 PS 637: Performed by: PSYCHIATRY & NEUROLOGY

## 2021-09-19 PROCEDURE — 124N000002 HC R&B MH UMMC

## 2021-09-19 RX ADMIN — IBUPROFEN 600 MG: 600 TABLET ORAL at 07:50

## 2021-09-19 RX ADMIN — HYDROXYZINE HYDROCHLORIDE 25 MG: 25 TABLET, FILM COATED ORAL at 07:51

## 2021-09-19 RX ADMIN — LORAZEPAM 1 MG: 1 TABLET ORAL at 14:17

## 2021-09-19 RX ADMIN — SERTRALINE HYDROCHLORIDE 100 MG: 100 TABLET ORAL at 07:50

## 2021-09-19 RX ADMIN — IBUPROFEN 600 MG: 600 TABLET ORAL at 18:59

## 2021-09-19 RX ADMIN — RIVAROXABAN 10 MG: 10 TABLET, FILM COATED ORAL at 18:56

## 2021-09-19 ASSESSMENT — ACTIVITIES OF DAILY LIVING (ADL)
DRESS: SCRUBS (BEHAVIORAL HEALTH);INDEPENDENT
HYGIENE/GROOMING: INDEPENDENT
ORAL_HYGIENE: INDEPENDENT
DRESS: SCRUBS (BEHAVIORAL HEALTH);INDEPENDENT
ORAL_HYGIENE: INDEPENDENT
LAUNDRY: UNABLE TO COMPLETE
HYGIENE/GROOMING: INDEPENDENT

## 2021-09-19 ASSESSMENT — MIFFLIN-ST. JEOR: SCORE: 1588.76

## 2021-09-19 NOTE — PLAN OF CARE
Pt was mostly isolative and withdrawn throughout most of this evening shift. She was observed watching TV by herself in the lounge for part of the evening, and she also ate dinner in the lounge alongside another patient, although she had minimal social interaction with others.   Problem: Behavioral Health Plan of Care  Goal: Plan of Care Review  Recent Flowsheet Documentation  Taken 9/18/2021 3091 by Jennifer Dennis  Plan of Care Reviewed With: patient  Patient Agreement with Plan of Care: agrees

## 2021-09-19 NOTE — PLAN OF CARE
Amy appears to have slept all night shift.  Appears comfortable with no concerns while sleeping.  No prns or snacks given or requested.

## 2021-09-19 NOTE — PLAN OF CARE
"Denies suicidal thoughts and thoughts of self harm.  Reports mood as \"sad\".  Reports that she tends to reminisce about the family members she has lost.  Affect is flat and blunted.  Rates depression 8/10 and anxiety 8/10.  Requested Hydroxyzine this morning for anxiety and Ativan this afternoon for increasing anxiety.  Reports she has been using deep breathing and distraction as coping skills.  Has been isolative to room most of the day,resting or reading in bed.   C/o back pain this morning, medicated with ibuprofen with decrease in pain.     Thoughts of self harm.   Problem: Depression  Goal: Improved Mood  Outcome: No Change     Problem: Anxiety  Goal: Anxiety Reduction or Resolution  Outcome: No Change     Problem: Behavioral Health Plan of Care  Goal: Plan of Care Review  Recent Flowsheet Documentation  Taken 9/19/2021 1427 by Beverly Schroeder RN  Plan of Care Reviewed With: patient  Patient Agreement with Plan of Care: agrees  Goal: Develops/Participates in Therapeutic [place] to Support Successful Transition  Intervention: Foster Therapeutic [place]  Recent Flowsheet Documentation  Taken 9/19/2021 1427 by Beverly Schroeder RN  Trust Relationship/Rapport: thoughts/feelings acknowledged     Problem: Depression  Goal: Improved Mood  Outcome: No Change     Problem: Anxiety  Goal: Anxiety Reduction or Resolution  Outcome: No Change     "

## 2021-09-20 PROCEDURE — 250N000013 HC RX MED GY IP 250 OP 250 PS 637: Performed by: PSYCHIATRY & NEUROLOGY

## 2021-09-20 PROCEDURE — H2032 ACTIVITY THERAPY, PER 15 MIN: HCPCS

## 2021-09-20 PROCEDURE — 99232 SBSQ HOSP IP/OBS MODERATE 35: CPT | Performed by: PSYCHIATRY & NEUROLOGY

## 2021-09-20 PROCEDURE — 250N000013 HC RX MED GY IP 250 OP 250 PS 637: Performed by: NURSE PRACTITIONER

## 2021-09-20 PROCEDURE — U0005 INFEC AGEN DETEC AMPLI PROBE: HCPCS | Performed by: PSYCHIATRY & NEUROLOGY

## 2021-09-20 PROCEDURE — 124N000002 HC R&B MH UMMC

## 2021-09-20 RX ORDER — LORAZEPAM 1 MG/1
1 TABLET ORAL DAILY PRN
Qty: 14 TABLET | Refills: 0 | Status: SHIPPED | OUTPATIENT
Start: 2021-09-20 | End: 2021-10-15

## 2021-09-20 RX ORDER — SERTRALINE HYDROCHLORIDE 100 MG/1
100 TABLET, FILM COATED ORAL DAILY
Qty: 30 TABLET | Refills: 1 | Status: SHIPPED | OUTPATIENT
Start: 2021-09-21 | End: 2021-10-15

## 2021-09-20 RX ADMIN — TRAZODONE HYDROCHLORIDE 50 MG: 50 TABLET ORAL at 23:12

## 2021-09-20 RX ADMIN — LORAZEPAM 1 MG: 1 TABLET ORAL at 14:56

## 2021-09-20 RX ADMIN — SERTRALINE HYDROCHLORIDE 100 MG: 100 TABLET ORAL at 09:28

## 2021-09-20 RX ADMIN — IBUPROFEN 600 MG: 600 TABLET ORAL at 09:28

## 2021-09-20 RX ADMIN — HYDROXYZINE HYDROCHLORIDE 25 MG: 25 TABLET, FILM COATED ORAL at 05:51

## 2021-09-20 RX ADMIN — RIVAROXABAN 10 MG: 10 TABLET, FILM COATED ORAL at 20:02

## 2021-09-20 ASSESSMENT — ACTIVITIES OF DAILY LIVING (ADL)
ORAL_HYGIENE: INDEPENDENT
DRESS: INDEPENDENT
ORAL_HYGIENE: INDEPENDENT
DRESS: INDEPENDENT
HYGIENE/GROOMING: INDEPENDENT
LAUNDRY: WITH SUPERVISION
LAUNDRY: WITH SUPERVISION
HYGIENE/GROOMING: INDEPENDENT

## 2021-09-20 NOTE — PLAN OF CARE
Amy appeared to sleep a total of 6.75 hours this night shift.  Up once during the night for Vistaril prn.  No snacks given or requested.  Now appears to be sleeping and comfortable.

## 2021-09-20 NOTE — PLAN OF CARE
Assessment/Intervention/Current Symptoms and Care Coordination: Met with team. Reviewed patient's chart and progress notes. Checked in with patient. Writer call patient's CADI worker, Eugene Puentes at [phone]. Writer discussed patient's progress and discharge plan. Writer [facility] at [phone]. Wrier left  requesting a call back. Writer called [facility] crisis stabilization center. Patient was accepted for admission at [facility]. Writer scheduled primary care appointment. Completed AVS.      Discharge Plan or Goal: Will be discharged to [facility]. Will follow up with outpatient providers.             Barriers to Discharge: Denies SI/SIB. Ready to discharge.            Referral Status:           Legal Status:  Voluntary

## 2021-09-20 NOTE — PLAN OF CARE
Pt was mostly isolative to her room throughout this evening shift. She spent most of the evening in her bed reading books/magazines. She stated she was experiencing pain in her back and she appeared to wince/grimace when she repositioned in bed. She was given PRN Advil (600mg) and reminded the importance of trying to stay ahead of her pain when attempting to manage it. She communicated understanding. No other PRN's were administered. She appeared less anxious during this evening shift. Denied any active SI/SIB urges/thoughts.     Problem: Behavioral Health Plan of Care  Goal: Plan of Care Review  Recent Flowsheet Documentation  Taken 9/19/2021 2053 by Jennifer Dennis  Plan of Care Reviewed With: patient  Patient Agreement with Plan of Care: agrees     Problem: Anxiety  Goal: Anxiety Reduction or Resolution  Outcome: Improving

## 2021-09-20 NOTE — PLAN OF CARE
Pt has been isolative to her room this shift, she is pleasant and calm upon interaction. Med compliant with no stated or observed side effects, pt received PRN ibuprofen for c/o back pain. Pt endorses anxiety related to discharge as well as mild depression. She endorses passive SI without a plan, contracts for safety in the hospital.

## 2021-09-20 NOTE — DISCHARGE SUMMARY
"Psychiatric Discharge Summary    Amy Choudhury MRN# [id number]   Age: 61 year old YOB: 1960     Date of Admission:  9/13/2021  Date of Discharge:  9/21/2021  Admitting Physician:  Moo Dahl MD  Discharge Physician:  Moo Dahl MD          Event Leading to Hospitalization:   The patient is a 60yo female with a history of depression and anxiety who was admitted after endorsing SI with a plan. She reports that she is still feeling \"like I am not needed here\" and has had thoughts to jump from a bridge. Not sleeping well. Is quite tearful during the interview. Says that she has had thoughts to \"hurt some of my neighbors\" if they are loud at night but \"not really.\" Is still grieving the loss of her daughter earlier this year. Has been taking care of her grandchildren but hasn't been able to do that recently due to her depression. Is tolerating Zoloft well. Discussed risks and benefits of Ativan. Is open to grief and loss group therapy.           See Admission note by Moo Dahl MD for additional details.          Diagnoses:     MDD, recurrent, severe without psychosis  Grief disorder         Labs:        Lab Results   Component Value Date     09/14/2021    .9 12/08/2015    Lab Results   Component Value Date    CHLORIDE 109 09/14/2021    CHLORIDE 105.1 12/08/2015    Lab Results   Component Value Date    BUN 13 09/14/2021    BUN 15.3 12/08/2015      Lab Results   Component Value Date    POTASSIUM 4.2 09/14/2021    POTASSIUM 3.9 12/08/2015    Lab Results   Component Value Date    CO2 26 09/14/2021    CO2 25.9 12/08/2015    Lab Results   Component Value Date    CR 0.66 09/14/2021    CR 0.8 12/08/2015          Lab Results   Component Value Date    WBC 5.8 09/14/2021    HGB 12.6 09/14/2021    HCT 39.4 09/14/2021    MCV 78 09/14/2021     09/14/2021     Lab Results   Component Value Date    AST 33 09/14/2021    ALT 43 09/14/2021    ALKPHOS 92 09/14/2021    " BILITOTAL 0.4 09/14/2021     Lab Results   Component Value Date    TSH 2.58 04/19/2021            Consults:   No consultations were requested during this admission         Hospital Course:   Amy Choudhury was admitted to [facility] with attending Moo Dahl MD as a voluntary patient. The patient was placed under status 15 (15 minute checks) to ensure patient safety.   CBC, BMP and utox obtained.    All outpatient medications were continued. Zoloft was titrated to 100mg Qday to good effect. Ativan was decreased to 1mg Qday PRN.     Amy Choudhury did participate in groups and was visible in the milieu.     The patient's symptoms of depression and suicidality improved.     Amy Choudhury was released to crisis bed. At the time of discharge Amy Choudhury was determined to not be a danger to herself or others. At the current time of discharge, the patient does not meet criteria for involuntary hospitalization. On the day of discharge, the patient reports that they do not have suicidal or homicidal ideation and would never hurt themselves or others. Steps taken to minimize risk include: assessing patient s behavior and thought process daily during hospital stay, discharging patient with adequate plan for follow up for mental and physical health and discussing safety plan of returning to the hospital should the patient ever have thoughts of harming themselves or others. Therefore, based on all available evidence including the factors cited above, the patient does not appear to be at imminent risk for self-harm, and is appropriate for outpatient level of care.           Discharge Medications:     Current Discharge Medication List      CONTINUE these medications which have CHANGED    Details   LORazepam (ATIVAN) 1 MG tablet Take 1 tablet (1 mg) by mouth daily as needed for anxiety  Qty: 14 tablet, Refills: 0    Associated Diagnoses: Suicidal ideation      sertraline (ZOLOFT) 100 MG tablet Take 1 tablet (100  mg) by mouth daily  Qty: 30 tablet, Refills: 1    Associated Diagnoses: Adjustment disorder with mixed anxiety and depressed mood         CONTINUE these medications which have NOT CHANGED    Details   calcium carbonate (OS-CRISTIAN) 500 MG tablet Take 2 tablets (1,000 mg) by mouth 3 times daily  Qty: 200 tablet, Refills: 3    Associated Diagnoses: S/P parathyroidectomy      cholecalciferol 50 MCG (2000 UT) tablet Take 1 tablet (50 mcg) by mouth daily  Qty: 90 tablet, Refills: 3    Associated Diagnoses: Status post gastric bypass for obesity      cyanocobalamin (VITAMIN B-12) 1000 MCG tablet Take 1 tablet (1,000 mcg) by mouth daily  Qty: 90 tablet, Refills: 3    Associated Diagnoses: Vitamin B12 deficiency (non anemic)      ibuprofen (ADVIL/MOTRIN) 400 MG tablet Take 400 mg by mouth every 8 hours as needed for moderate pain      !! Multiple Vitamin (MULTIVITAMIN ADULT) TABS Take 1 tablet by mouth daily  Qty: 200 tablet, Refills: 1    Associated Diagnoses: Blurred vision      rivaroxaban ANTICOAGULANT (XARELTO) 10 MG TABS tablet Take 1 tablet (10 mg) by mouth daily (with dinner)  Qty: 20 tablet, Refills: 0    Associated Diagnoses: Pneumonia due to 2019 novel coronavirus      !! vitamin B complex with vitamin C (VITAMIN  B COMPLEX) tablet Take 1 tablet by mouth daily  Qty: 100 tablet, Refills: 6    Associated Diagnoses: Vitamin B12 deficiency (non anemic)       !! - Potential duplicate medications found. Please discuss with provider.               Psychiatric Examination:   Appearance:  awake, alert and adequately groomed  Attitude:  cooperative  Eye Contact:  good  Mood:  better  Affect:  mood congruent  Speech:  clear, coherent  Psychomotor Behavior:  no evidence of tardive dyskinesia, dystonia, or tics  Thought Process:  goal oriented  Associations:  no loose associations  Thought Content:  no evidence of suicidal ideation or homicidal ideation and no evidence of psychotic thought  Insight:  fair  Judgment:   fair  Oriented to:  time, person, and place  Attention Span and Concentration:  intact  Recent and Remote Memory:  intact  Language: Able to read and write  Fund of Knowledge: appropriate  Muscle Strength and Tone: normal  Gait and Station: Normal         Discharge Plan:   Continue medications as above.     Health Care Follow-up:      [facility] [place], [facility]  Date/Time: Any time today   Addresss: [address] [place] [address]  Phone: [phone]     [facility]  Appointment Date/Time: 10/15/2021 at 8:40 am. This will be an in person appointment      Primary Care Giver: Randal Castellanos MD    Address: [address] [place] [id number]     Phone Number: [phone]          Other Referrals:  [facility]   Phone: [phone] or [phone]      Other Providers:  CADI Worker: Eugene Puentes [phone]  Behavioral Health  at [facility]: Mae Ledbetter  [phone]     [facility]: [phone]  Your ID: [id number]        Attend all scheduled appointments with your outpatient providers. Call at least 24 hours in advance if you need to reschedule an appointment to ensure continued access to your outpatient providers.      Major Treatments, Procedures and Findings:  You were provided with: a psychiatric assessment, assessed for medical stability, medication evaluation and/or management, group therapy and milieu management     Symptoms to Report: feeling more aggressive, increased confusion, losing more sleep, mood getting worse or thoughts of suicide     Early warning signs can include: increased depression or anxiety sleep disturbances increased thoughts or behaviors of suicide or self-harm  increased unusual thinking, such as paranoia or hearing voices     Safety and Wellness:  Take all medicines as directed.  Make no changes unless your doctor suggests them.  Follow treatment recommendations.  Refrain from  "alcohol and non-prescribed drugs.  Ask your support system to help you reduce your access to items that could harm yourself or others. If there is a concern for safety, call 911.     Resources:   Crisis Intervention: 301.308.1761 or 817-913-8189 (TTY: 131.739.3865).  Call anytime for help.  National [place] on Mental Illness (www.[place].feliberto.org): 450.118.8719 or 927-023-5150.  Suicide Awareness Voices of Education (SAVE) (www.save.org): 262-325-LGCT (4196)  National Suicide Prevention Line (www.mentalhealthmn.org): 902-851-JUTM (8630)  Mental Health Consumer/Survivor Network of [place] (www.mhcsn.net): 467.169.8200 or 467-112-2875  Mental Health Association of [place] (www.mentalhealth.org): 820.722.8646 or 641-060-2212  [facility] Crisis (COPE) Response - Adult 526 348-1001  Text 4 Life: txt \"LIFE\" to 14403 for immediate support and crisis intervention  Crisis text line: Text \"[place]\" to 798317. Free, confidential, 24/7.    Attestation:    The patient was seen and evaluated by me. I spent more than 30 minutes on discharge day activities. Moo Dahl MD    "

## 2021-09-21 VITALS
RESPIRATION RATE: 16 BRPM | WEIGHT: 219.8 LBS | TEMPERATURE: 97.7 F | HEIGHT: 66 IN | DIASTOLIC BLOOD PRESSURE: 86 MMHG | SYSTOLIC BLOOD PRESSURE: 140 MMHG | HEART RATE: 69 BPM | BODY MASS INDEX: 35.32 KG/M2 | OXYGEN SATURATION: 97 %

## 2021-09-21 LAB — SARS-COV-2 RNA RESP QL NAA+PROBE: NEGATIVE

## 2021-09-21 PROCEDURE — 99239 HOSP IP/OBS DSCHRG MGMT >30: CPT | Performed by: PSYCHIATRY & NEUROLOGY

## 2021-09-21 PROCEDURE — 250N000013 HC RX MED GY IP 250 OP 250 PS 637: Performed by: PSYCHIATRY & NEUROLOGY

## 2021-09-21 PROCEDURE — H2032 ACTIVITY THERAPY, PER 15 MIN: HCPCS

## 2021-09-21 PROCEDURE — 250N000013 HC RX MED GY IP 250 OP 250 PS 637: Performed by: NURSE PRACTITIONER

## 2021-09-21 RX ORDER — LORAZEPAM 1 MG/1
1 TABLET ORAL DAILY PRN
Status: DISCONTINUED | OUTPATIENT
Start: 2021-09-21 | End: 2021-09-21 | Stop reason: HOSPADM

## 2021-09-21 RX ADMIN — LORAZEPAM 1 MG: 1 TABLET ORAL at 10:15

## 2021-09-21 RX ADMIN — SERTRALINE HYDROCHLORIDE 100 MG: 100 TABLET ORAL at 09:20

## 2021-09-21 RX ADMIN — ALUMINUM HYDROXIDE, MAGNESIUM HYDROXIDE, AND SIMETHICONE 30 ML: 200; 200; 20 SUSPENSION ORAL at 08:20

## 2021-09-21 ASSESSMENT — ACTIVITIES OF DAILY LIVING (ADL)
DRESS: INDEPENDENT
HYGIENE/GROOMING: INDEPENDENT
ORAL_HYGIENE: INDEPENDENT

## 2021-09-21 NOTE — PLAN OF CARE
"Tearful this morning, stating that she was upset about not being discharged yesterday to [facility]. Feels that she said something wrong yesterday. Reassurance provided that she did nothing wrong.  Denies suicidal thoughts or thoughts of self harm today.  Reports that she can maintain safety upon discharge to [facility], stating \"I need to take the next step forward\".   Talked tearfully about all of her losses, encouraged her to follow through on setting up and going to grief counseling ( has information and number, that was provided by [facility], at bedside). C/o of anxiety, requested and received Ativan 1 mg. Rates depression and anxiety 9/10.   Attended Ot group this morning. Pleasant and social with peers when in lounge area.  Does spend more time in her room reading than in the lounge area, stating that reading helps keep her mind focused.  Aware of plans to discharge to [facility] and agrees with discharge plan.        Problem: Depression  Goal: Improved Mood  Outcome: No Change     Problem: Anxiety  Goal: Anxiety Reduction or Resolution  Outcome: No Change     Problem: Behavioral Health Plan of Care  Goal: Plan of Care Review  Recent Flowsheet Documentation  Taken 9/21/2021 1218 by Beverly Schroeder RN  Plan of Care Reviewed With: patient  Patient Agreement with Plan of Care: agrees  Goal: Develops/Participates in Therapeutic [place] to Support Successful Transition  Intervention: Foster Therapeutic [place]  Recent Flowsheet Documentation  Taken 9/21/2021 1218 by Beverly Schroeder RN  Trust Relationship/Rapport:    emotional support provided    empathic listening provided     Problem: Depression  Goal: Improved Mood  Outcome: No Change     Problem: Anxiety  Goal: Anxiety Reduction or Resolution  Outcome: No Change     Problem: Behavioral Health Plan of Care  Goal: Plan of Care Review  Recent Flowsheet Documentation  Taken 9/21/2021 1218 by Beverly Schroeder RN  Plan of Care Reviewed With: " patient  Patient Agreement with Plan of Care: agrees  Goal: Develops/Participates in Therapeutic [place] to Support Successful Transition  Intervention: Foster Therapeutic [place]  Recent Flowsheet Documentation  Taken 9/21/2021 1218 by Beverly Schroeder RN  Trust Relationship/Rapport:    emotional support provided    empathic listening provided

## 2021-09-21 NOTE — PLAN OF CARE
Problem: General Rehab Plan of Care  Goal: Therapeutic Recreation/Music Therapy Goal (Art Therapy)  Description: The patient and/or their representative will achieve their patient-specific goals related to the plan of care.  The patient-specific goals include: emotional expression  Outcome: Improving   Art Therapy directive was to practice a breathing exercise using a visual breathing tool handout and also to create pt's owning visual breathing tool using art materials of pts choice.  Goals of directive: mindfulness, emotion regulation  Pt was an active participant, focused on task for the full duration of group. Pt created two interlocked hearts as the outline for her breathing tool. Pt shared that she lost her daughter 6 years ago and a second child this last June. Pt shared that she is still actively grieving these losses and is why she is currently admitted to hospital.  Pts mood was calm, pleasant participant, engaged in art making process.

## 2021-09-21 NOTE — PLAN OF CARE
Pt has been generally isolative to her room this shift, she did attend the evening group that was offered. She endorses anxiety and depression. Pt endorses passive SI and contracts for safety while in hospital, states she does not know if she would be able to maintain safety outside of hospital. Writer reinforced the importance of utilizing coping strategies and continuing medications following discharge. Med compliant with no stated or observed side effects.

## 2021-09-21 NOTE — PLAN OF CARE
Pt ambulated off unit at 1730 with this writer, and she got into a pre arranged cab destined for [place] [facility]. [facility] that pt would arrive approximately one hour later than planned.    Pt left with all belongings intact, including new and continued Rx. She verbalized understanding these Rx, why, how and when to take them and with whom to follow up for further Rx management, as outlined on AVS. Pt denied SI upon discharge but was clearly sad and tearful regarding the multiple deaths in her family. She was able to cite coping skills and supportive people in her life. She was otherwise calm and cooperative and was future oriented. She was grateful for the care she received here and was encouraged to use multiple resources listed on AVS or come back to the hospital in the event of decompensation.     Problem: Suicidal Behavior  Goal: Suicidal Behavior is Absent or Managed  Outcome: Adequate for Discharge  Flowsheets (Taken 9/21/2021 1888)  Mutually Determined Action Steps (Suicidal Behavior Absent/Managed):   sets future-oriented goal   identifies crisis plan     Problem: Depression  Goal: Improved Mood  Outcome: Adequate for Discharge     Problem: Anxiety  Goal: Anxiety Reduction or Resolution  Outcome: Adequate for Discharge     Problem: Sleep Disturbance  Goal: Adequate Sleep/Rest  Outcome: Adequate for Discharge     Problem: Behavioral Health Plan of Care  Goal: Plan of Care Review  Outcome: Adequate for Discharge  Goal: Patient-Specific Goal (Individualization)  Outcome: Adequate for Discharge  Note: Shift Summery:  met    Patient's Stated Goal for Shift:  met    Goal Status:  Met    Goal: Adheres to Safety Considerations for Self and Others  Outcome: Adequate for Discharge  Goal: Absence of New-Onset Illness or Injury  Outcome: Adequate for Discharge  Goal: Optimized Coping Skills in Response to Life Stressors  Outcome: Adequate for Discharge  Goal:  Develops/Participates in Therapeutic [place] to Support Successful Transition  Outcome: Adequate for Discharge

## 2021-09-21 NOTE — PLAN OF CARE
Problem: General Rehab Plan of Care  Goal: Therapeutic Recreation/Music Therapy Goal (Art Therapy)  Description: The patient and/or their representative will achieve their patient-specific goals related to the plan of care.  The patient-specific goals include: identifying positive strengths and goals  Outcome: No Change   Pt attended 2 out of 3 Art Therapy groups.  Art Therapy directives included: collage vision board and personal strength sculpture.  Goals of directives: identifying personal strengths and goals, emotional regulation.  Pt was an active participant, focused on collage vision board during the first two groups. Pt finished collage and shared with author and group. Pt briefly mentioned the grief and loss her and her family are having due to recent loss of pts children. Pt said her family needs a break from the trauma and loss and pt found calming collage images of water, comfort food, flowers in soft colors. Expressing her feelings of loss through her artwork seems to be helpful for pt to process.  Pts mood was calm, pleasant participant.

## 2021-09-21 NOTE — DISCHARGE INSTRUCTIONS
Behavioral Discharge Planning and Instructions    Summary: You were admitted on 9/13/2021  due to Worsening depression and SI with plan.  You were treated by Dr. Dahl and discharged on 9/21/2021 from [facility] to [facility]     Main Diagnosis: Depression and Anxiety       Health Care Follow-up:     Transportation arranged through [employer]. Patient will be picked up between 4:00 PM - 4:30PM by [employer] or [employer]: [phone]    [facility]  Date/Time: 9/21/2021 at 5:00 PM   Addresss: [id number] [place], [place] [address], [place] [address]  Phone: [phone]    [facility]  Appointment Date/Time: 10/15/2021 at 8:40 am. This will be an in person appointment      Primary Care Giver: Randal Castellanos MD    Address: [address] [place] [id number]     Phone Number: [phone]        Other Referrals:  [facility]   Phone: [phone] or [phone]     Other Providers:  CADI Worker: Eugene Puentes [phone]  Behavioral Health  at [facility] Mae Ledbetter  [phone]    Medic Ride: [phone]  Your ID: [id number]      Attend all scheduled appointments with your outpatient providers. Call at least 24 hours in advance if you need to reschedule an appointment to ensure continued access to your outpatient providers.     Major Treatments, Procedures and Findings:  You were provided with: a psychiatric assessment, assessed for medical stability, medication evaluation and/or management, group therapy and milieu management    Symptoms to Report: feeling more aggressive, increased confusion, losing more sleep, mood getting worse or thoughts of suicide    Early warning signs can include: increased depression or anxiety sleep disturbances increased thoughts or behaviors of suicide or self-harm  increased unusual thinking, such as paranoia or hearing voices    Safety and Wellness:  Take all  "medicines as directed.  Make no changes unless your doctor suggests them.  Follow treatment recommendations.  Refrain from alcohol and non-prescribed drugs.  Ask your support system to help you reduce your access to items that could harm yourself or others. If there is a concern for safety, call 911.    Resources:   Crisis Intervention: 215.637.9520 or 198-841-2117 (TTY: 200.226.5756).  Call anytime for help.  National [place] on Mental Illness (www.[place].feliberto.org): 221.352.2021 or 228-242-7658.  Suicide Awareness Voices of Education (SAVE) (www.save.org): 433-151-YCUS (1482)  National Suicide Prevention Line (www.mentalQ Care Internationalmn.org): 645-361-VNVZ (7694)  Mental Health Consumer/Survivor Network of [place] (www.mhcsn.net): 191.624.7261 or 984-429-1901  Mental Health Association of [place] (www.mentalhealth.org): 133.964.8797 or 278-486-5798  [facility] Crisis (COPE) Response - Adult 856 392-9295  Text 4 Life: txt \"LIFE\" to 67651 for immediate support and crisis intervention  Crisis text line: Text \"[place]\" to 722711. Free, confidential, 24/7.    General Medication Instructions:   See your medication sheet(s) for instructions.   Take all medicines as directed.  Make no changes unless your doctor suggests them.   Go to all your doctor visits.  Be sure to have all your required lab tests. This way, your medicines can be refilled on time.  Do not use any drugs not prescribed by your doctor.  Avoid alcohol.    Advance Directives:   Scanned document on file with [employer]? No scanned doc  Is document scanned? Pt states no documents  Honoring Choices Your Rights Handout: Informed and given  Was more information offered? Pt declined    The Treatment team has appreciated the opportunity to work with you. If you have any questions or concerns about your recent admission, you can contact the unit which can receive your call 24 hours a day, 7 days a week. They will be able to get in touch with a Provider if needed. The unit number is " [phone].

## 2021-09-21 NOTE — PLAN OF CARE
Problem: Anxiety  Goal: Anxiety Reduction or Resolution  Outcome: No Change     Amy appears to be sleeping comfortably all this night shift.  No prns or snacks given or requested.

## 2021-09-21 NOTE — PROGRESS NOTES
"[facility] [place]   Psychiatric Progress Note        Interim History:   The patient's care was discussed with the treatment team during the daily team meeting and/or staff's chart notes were reviewed.  Staff report patient has been doing better. Had some concerns about her safety if discharged.     The patient reports that she is feeling sad. Says that she has had some urinary urgency. Agreeable to follow with her PCP. Sleeping and eating better. Denies SI. Initially agreeable to discharge to crisis bed but then had concerns about being safe with this plan.          Medications:       rivaroxaban ANTICOAGULANT  10 mg Oral Daily with supper     sertraline  100 mg Oral Daily          Allergies:     Allergies   Allergen Reactions     Codeine Sulfate GI Disturbance          Labs:   No results found for this or any previous visit (from the past 24 hour(s)).       Psychiatric Examination:     BP (!) 140/86   Pulse 69   Temp 97.7  F (36.5  C) (Oral)   Resp 16   Ht 1.676 m (5' 6\")   Wt 99.7 kg (219 lb 12.8 oz)   SpO2 98%   BMI 35.48 kg/m    Weight is 219 lbs 12.8 oz  Body mass index is 35.48 kg/m .  Orthostatic Vitals       Most Recent      Sitting Orthostatic /80 09/16 0751    Sitting Orthostatic Pulse (bpm) 74 09/16 0751    Standing Orthostatic /82 09/16 0751    Standing Orthostatic Pulse (bpm) 80 09/16 0751            Appearance: awake, alert and adequately groomed  Attitude:  cooperative  Eye Contact:  good  Mood:  anxious  Affect:  mood congruent  Speech:  clear, coherent  Psychomotor Behavior:  no evidence of tardive dyskinesia, dystonia, or tics  Thought Process:  goal oriented  Associations:  no loose associations  Thought Content:  no evidence of suicidal ideation or homicidal ideation and no evidence of psychotic thought  Insight:  fair  Judgement:  fair  Oriented to:  time, person, and place  Attention Span and Concentration:  intact  Recent and Remote Memory:  " intact    Clinical Global Impressions  First:  Considering your total clinical experience with this particular patient population, how severe are the patient's symptoms at this time?: 7 (09/14/21 0607)  Compared to the patient's condition at the START of treatment, this patient's condition is: 4 (09/14/21 0607)  Most recent:  Considering your total clinical experience with this particular patient population, how severe are the patient's symptoms at this time?: 7 (09/14/21 0607)  Compared to the patient's condition at the START of treatment, this patient's condition is: 4 (09/14/21 0607)         Precautions:     Behavioral Orders   Procedures     Code 1 - Restrict to Unit     Routine Programming     As clinically indicated     Status 15     Every 15 minutes.     Suicide precautions     Patients on Suicide Precautions should have a Combination Diet ordered that includes a Diet selection(s) AND a Behavioral Tray selection for Safe Tray - with utensils, or Safe Tray - NO utensils            Diagnoses:     MDD, recurrent, severe without psychosis  Grief disorder         Plan:     1) Continue Zoloft 100mg Qday.   2) Continue PRN Ativan.   3) Patient being referred to grief and loss groups.   4) Continue Trazodone PRN.   5) Patient did not feel safe to discharge today. Possible discharge to crisis bed tomorrow.       Disposition Plan   Reason for ongoing admission: poses an imminent risk to self  Discharge location: home with self-care  Discharge Medications: ordered.  Follow-up Appointments: scheduled  Legal Status: voluntary    Entered by: Moo Dahl on September 20, 2021 at 5:39 AM

## 2021-09-21 NOTE — PLAN OF CARE
BEHAVIORAL TEAM DISCUSSION    Participants: Bonnie Melton RN, Gabbie Ibarra [facility]  Progress: Patient endorses sadness and hopelessness but denies any plan or intent to hurt herself. Patient is open to go to [place] crisis bed. No other behavioral concerns noted   Anticipated length of stay: No anticipated discharge plan at this moment  Continued Stay Criteria/Rationale: Endorses SI with plan. Needs clinical stabilization and medication management  Medical/Physical: See MD's daily note.   Precautions:   Behavioral Orders   Procedures     Code 1 - Restrict to Unit     Routine Programming     As clinically indicated     Status 15     Every 15 minutes.     Suicide precautions     Patients on Suicide Precautions should have a Combination Diet ordered that includes a Diet selection(s) AND a Behavioral Tray selection for Safe Tray - with utensils, or Safe Tray - NO utensils       Plan: Potential placement at crisis bed with [facility].   Rationale for change in precautions or plan: None

## 2021-09-22 ENCOUNTER — PATIENT OUTREACH (OUTPATIENT)
Dept: CARE COORDINATION | Facility: CLINIC | Age: 61
End: 2021-09-22

## 2021-09-22 DIAGNOSIS — Z71.89 OTHER SPECIFIED COUNSELING: ICD-10-CM

## 2021-09-22 NOTE — PROGRESS NOTES
Clinic Care Coordination Contact  [facility]/Voicemail       Clinical Data: Care Coordinator Outreach  Outreach attempted x 1.  Left message on patient's voicemail with call back information and requested return call.  Plan: CC KULWANT will attempt to reach patient in one business day.    CARLINE Reyes   Social Work Clinic Care Coordinator   [facility]  PH: [phone]  [email or web address]

## 2021-09-23 ENCOUNTER — DOCUMENTATION ONLY (OUTPATIENT)
Dept: FAMILY MEDICINE | Facility: CLINIC | Age: 61
End: 2021-09-23

## 2021-09-23 ENCOUNTER — TELEPHONE (OUTPATIENT)
Dept: FAMILY MEDICINE | Facility: CLINIC | Age: 61
End: 2021-09-23

## 2021-09-23 NOTE — PROGRESS NOTES
Clinic Care Coordination Contact  [facility]/Voicemail       Clinical Data: Care Coordinator Outreach  Outreach attempted x 2.  Left message on patient's voicemail with call back information and requested return call.  Plan: CC KULWANT will make no further outreaches at this time.    CARLINE Reyes   Social Work Clinic Care Coordinator   [facility]  PH: [phone]  [email or web address]

## 2021-09-23 NOTE — PROGRESS NOTES
"When opening a documentation only encounter, be sure to enter in \"Chief Complaint\" Forms and in \" Comments\" Title of form, description if needed.    Amy is a 61 year old  female  Form received via: Fax  Form now resides in: Provider Ready    Haylee Chan MA     Form has been completed by provider.     Form sent out via: Fax to [employer] at Fax Number: [phone]  Patient informed: No, Reason:n/a  Output date: September 29, 2021    Haylee Chan MA      **Please close the encounter**                      "

## 2021-10-14 ENCOUNTER — TELEPHONE (OUTPATIENT)
Dept: FAMILY MEDICINE | Facility: CLINIC | Age: 61
End: 2021-10-14

## 2021-10-14 NOTE — TELEPHONE ENCOUNTER
Writer spoke to [facility] RN Lisa- states she was aware of patient's symptoms and xarelto question. States patient also has orders for IB so she is wondering if that should be held as well until appointment tomorrow. Wondering if okay for tylenol instead but didn't know if patient had history of liver problems.     Call back is [phone] and to ask for RN. Routing additional information to Dr. Castellanos to advise on if patient should hold xarelto and IB tonight (appt tomorrow) and if okay to take tylenol for pain.       Lisa would also like a copy of patient's discharge summary form hospital faxed to them at [phone]. She will also be sending paperwork with patient tomorrow to be filled out by Dr. Castellanos.   Annamaria Goldman RN

## 2021-10-14 NOTE — TELEPHONE ENCOUNTER
RN spoke to Lisa SPEARS at [facility]- relayed message below from Dr. Castellanos. She verbalized understanding- asked that medication list be updated at visit tomorrow indicating the discontinuation of these meds and addition of tylenol. Would like copy of medication list sent back with patient.      Lisa also wanted Dr. Castellanos to know that she needs a refill of her lorazepam 1 mg tablet and has been out for a week. States she has put this on patient's paperwork as well. Patient requesting to have all medication sent to [facility] pharmacy tomorrow.     Routing to PCP as FYI for appointment.   Annamaria Goldman, RN

## 2021-10-14 NOTE — TELEPHONE ENCOUNTER
[facility] phone call message- medication clarification/question:    Full Medication Name: Xarelto   Dose: 10mg    Question/Clarification needed: Patient was told to hold this medication by one RN and told to not hold the medication by another RN. Patient would like clarification on what she should do.             OK to leave a message on voice mail? No-Unable to take message because patient cannot be reached by phone    Advised patient that RN would call back within 3 hours, unless emergent.    Primary language: English      needed? No    Call taken on October 14, 2021 at 10:52 AM by Rosio Lara CMA    Route to [facility] TRIAGE

## 2021-10-14 NOTE — TELEPHONE ENCOUNTER
"RN spoke to patient- reports she has appointment with Dr. Castellanos for tomorrow 10/15/21.     States she was recently in the hospital for COVID and then went to [facility] for mental health treatment. At the time she was put on xarelto 10 mg and told to continue until told to stop by her doctor. She is now at [facility] in [facility] which is IR for mental health. Last night patient had BM with \"bright red blood\" in it. States she has had \"bleeding ulcers\" in the past but it has been many years. States at the time her stomach felt \"hard and distended\". Unable to provide quantity as she only noticed it when she was flushing- reports \"not a lot\".     She has not had a BM today so no recurrence of the bleeding. Denies abdominal pain, pain with passing stool, fever, dizziness. Reports distention of abdomen resolved after her BM and is not present now. Patient reports she talked to an \"on call\" RN last night who told her NOT to take her xarelto 10 mg tablet until she can see Dr. Castellanos. The RN at her facility who was in today said since she hasn't had a recurrence of the bleeding she SHOULD take her xarelto so she doesn't get a blood clot.     Patient very concerned about what she should do tonight. RN advised that I was encouraged she wasn't having any symptoms of GI bleed today and that it appears she was started on xarelto as a prophylactic with COVID vs actual clot. Declined appointment for today to discuss symptoms. Patient requested message be sent high priority to PCP to advise on whether or not to hold Xarelto tonight and then she will follow up at appointment tomorrow.     RN did advise if the bleeding happens again today, she develops fever, abdominal distention, severe pain, or dizziness prior to appointment tomorrow she should be evaluated in clinic, UC or ED right away. She verbalized understanding. Routing to PCP to advise as requested.     Of note- patient wasn't sure of call back number at her " "facility and couldn't find staff to ask. Writer found house number of [phone] which patient thought was correct. Was calling from \"library phone\" [phone]. RN advised patient to contact clinic directly once she finds staff and gets correct number.   Annamaria Goldman RN    "

## 2021-10-14 NOTE — TELEPHONE ENCOUNTER
Recommend discontinuing Xarelto and ibuprofen, Acetaminophen is fine to use up to 1000mg take three times daily   Sajan Castellanos

## 2021-10-14 NOTE — PROGRESS NOTES
[facility] Progress Note        Assessment & Plan     Moderate episode of recurrent major depressive disorder (H)  Patient reported that she is not actively suicidal and is committed to staying  Alive for her granddaughter she is interested in connecting with a psychiatrist and finding out about EMDR and other therapies for anxiety and depression.  Plan is to increase sertraline, exercise every other day, 3 good things, follow up in 1-2 weeks   - sertraline (ZOLOFT) 100 MG tablet; Take 1.5 tablets (150 mg) by mouth daily  - hydrOXYzine (ATARAX) 25 MG tablet; Take 1 tablet (25 mg) by mouth 3 times daily as needed for itching  - LORazepam (ATIVAN) 1 MG tablet; Take 1 tablet (1 mg) by mouth daily as needed for anxiety  - Behavioral Health Referral ([facility] internal and external)    Rectal bleeding  Likely anal fissure vs hemorrhoid, has now stopped, patient will follow thorough with colonoscopy   - Hemoglobin; Future  - Adult Gastro Ref - Procedure Only; Future  - Hemoglobin    KACY (generalized anxiety disorder)  Will add lorazepam back as well as hydroxyzine  - sertraline (ZOLOFT) 100 MG tablet; Take 1.5 tablets (150 mg) by mouth daily  - hydrOXYzine (ATARAX) 25 MG tablet; Take 1 tablet (25 mg) by mouth 3 times daily as needed for itching  - LORazepam (ATIVAN) 1 MG tablet; Take 1 tablet (1 mg) by mouth daily as needed for anxiety  - Behavioral Health Referral ([facility] internal and external)    Suicidal ideation  Discussed in detail patient is feeling stuck though also committed to not act on it.    Chronic low back pain without sciatica, unspecified back pain laterality    - acetaminophen (TYLENOL) 650 MG CR tablet; Take 1-2 tablets (650-1,300 mg) by mouth every 8 hours as needed for mild pain or fever    Hyperglycemia    - Hemoglobin A1c; Future  - Hemoglobin A1c    Need for prophylactic vaccination and inoculation against influenza    - INFLUENZA QUAD, RECOMBINANT, P-FREE (RIV4) (FLUBLOK)      I spent a  "total of 51 minutes on the day of the visit.   Time spent doing chart review, history and exam, documentation and further activities per the note       BMI:   Estimated body mass index is 36.54 kg/m  as calculated from the following:    Height as of 9/13/21: 1.676 m (5' 6\").    Weight as of this encounter: 102.7 kg (226 lb 6.4 oz).       Depression Screening Follow Up    PHQ 10/15/2021   PHQ-9 Total Score 16   Q9: Thoughts of better off dead/self-harm past 2 weeks Several days           C-SSRS ([place]) 10/15/2021   Within the last month, have you wished you were dead or wished you could go to sleep and not wake up? No   Within the last month, have you had any actual thoughts of killing yourself? No   Within the last month, have you ever done anything, started to do anything, or prepared to do anything to end your life? Yes, lifetime       Follow Up       Follow Up Actions Taken  Mental Health Referral placed    Discussed the following ways the patient can remain in a safe environment:  be around others and patient currently in a half way house      Return in about 2 weeks (around 10/29/2021) for Recheck Depression.    Randal Castellanos MD  [facility] CARMINE Reyes is a 61 year old who presents for the following health issues Anxiety and Depression  Patient presents with:  RECHECK: follow up hospital   Imm/Inj: Flu Shot    PHQ 3/28/2017 4/19/2021 10/15/2021   PHQ-9 Total Score 18 12 16   Q9: Thoughts of better off dead/self-harm past 2 weeks Not at all Not at all Several days           HPI     Depression and Anxiety Follow-Up    How are you doing with your depression since your last visit? Improved from hospitalization    How are you doing with your anxiety since your last visit?  Worsened -somewhat frustrated by [facility] house    Are you having other symptoms that might be associated with depression or anxiety? No    Have you had a significant life event? OTHER: death of " daughter     Do you have any concerns with your use of alcohol or other drugs? No    Social History     Tobacco Use     Smoking status: Current Every Day Smoker     Packs/day: 0.50     Years: 30.00     Pack years: 15.00     Types: Cigarettes     Smokeless tobacco: Never Used   Substance Use Topics     Alcohol use: Yes     Comment: occasionally     Drug use: No     PHQ 3/28/2017 4/19/2021 10/15/2021   PHQ-9 Total Score 18 12 16   Q9: Thoughts of better off dead/self-harm past 2 weeks Not at all Not at all Several days     KACY-7 SCORE 3/28/2017 4/19/2021 10/15/2021   Total Score - - -   Total Score 15 8 16             Objective    /83   Pulse 92   Temp 98.7  F (37.1  C) (Oral)   Resp 16   Wt 102.7 kg (226 lb 6.4 oz)   SpO2 95%   BMI 36.54 kg/m    Body mass index is 36.54 kg/m .  Physical Exam  Vitals and nursing note reviewed.   Constitutional:       General: She is not in acute distress.     Appearance: She is well-developed.   Neurological:      Mental Status: She is alert and oriented to person, place, and time.   Psychiatric:         Speech: Speech normal.         Behavior: Behavior normal.         Thought Content: Thought content normal.         Cognition and Memory: Cognition normal.         Judgment: Judgment normal.      Comments: MENTAL STATUS EXAM  Appearance: appropriate  Attitude: cooperative  Behavior: normal  Eye Contact: normal  Speech: normal to slightly pressured   Orientation: oriented to person , place, time and situation  Mood: labile, still in grieving  Affect: Mood Congruent  Thought Process: clear  Suicidal Ideation: reports no recent thoughts, no intention  Hallucination: no

## 2021-10-15 ENCOUNTER — OFFICE VISIT (OUTPATIENT)
Dept: FAMILY MEDICINE | Facility: CLINIC | Age: 61
End: 2021-10-15
Payer: COMMERCIAL

## 2021-10-15 VITALS
SYSTOLIC BLOOD PRESSURE: 134 MMHG | TEMPERATURE: 98.7 F | WEIGHT: 226.4 LBS | DIASTOLIC BLOOD PRESSURE: 83 MMHG | OXYGEN SATURATION: 95 % | BODY MASS INDEX: 36.54 KG/M2 | RESPIRATION RATE: 16 BRPM | HEART RATE: 92 BPM

## 2021-10-15 DIAGNOSIS — R45.851 SUICIDAL IDEATION: ICD-10-CM

## 2021-10-15 DIAGNOSIS — F41.1 GAD (GENERALIZED ANXIETY DISORDER): ICD-10-CM

## 2021-10-15 DIAGNOSIS — F33.1 MODERATE EPISODE OF RECURRENT MAJOR DEPRESSIVE DISORDER (H): Primary | ICD-10-CM

## 2021-10-15 DIAGNOSIS — R73.9 HYPERGLYCEMIA: ICD-10-CM

## 2021-10-15 DIAGNOSIS — K62.5 RECTAL BLEEDING: ICD-10-CM

## 2021-10-15 DIAGNOSIS — M54.50 CHRONIC LOW BACK PAIN WITHOUT SCIATICA, UNSPECIFIED BACK PAIN LATERALITY: ICD-10-CM

## 2021-10-15 DIAGNOSIS — G89.29 CHRONIC LOW BACK PAIN WITHOUT SCIATICA, UNSPECIFIED BACK PAIN LATERALITY: ICD-10-CM

## 2021-10-15 DIAGNOSIS — Z23 NEED FOR PROPHYLACTIC VACCINATION AND INOCULATION AGAINST INFLUENZA: ICD-10-CM

## 2021-10-15 LAB
HBA1C MFR BLD: 6.6 % (ref 0–5.6)
HGB BLD-MCNC: 12.2 G/DL (ref 11.7–15.7)

## 2021-10-15 PROCEDURE — 36415 COLL VENOUS BLD VENIPUNCTURE: CPT | Performed by: FAMILY MEDICINE

## 2021-10-15 PROCEDURE — 90471 IMMUNIZATION ADMIN: CPT | Performed by: FAMILY MEDICINE

## 2021-10-15 PROCEDURE — 99215 OFFICE O/P EST HI 40 MIN: CPT | Mod: 25 | Performed by: FAMILY MEDICINE

## 2021-10-15 PROCEDURE — 85018 HEMOGLOBIN: CPT | Performed by: FAMILY MEDICINE

## 2021-10-15 PROCEDURE — 83036 HEMOGLOBIN GLYCOSYLATED A1C: CPT | Performed by: FAMILY MEDICINE

## 2021-10-15 PROCEDURE — 90682 RIV4 VACC RECOMBINANT DNA IM: CPT | Performed by: FAMILY MEDICINE

## 2021-10-15 RX ORDER — LORAZEPAM 1 MG/1
1 TABLET ORAL DAILY PRN
Qty: 14 TABLET | Refills: 0 | Status: ON HOLD | OUTPATIENT
Start: 2021-10-15 | End: 2022-02-17

## 2021-10-15 RX ORDER — SENNOSIDES 8.6 MG
650-1300 CAPSULE ORAL EVERY 8 HOURS PRN
Qty: 100 TABLET | Refills: 1 | Status: ON HOLD | OUTPATIENT
Start: 2021-10-15 | End: 2022-02-17

## 2021-10-15 RX ORDER — HYDROXYZINE HYDROCHLORIDE 25 MG/1
25 TABLET, FILM COATED ORAL 3 TIMES DAILY PRN
Qty: 90 TABLET | Refills: 1 | Status: SHIPPED | OUTPATIENT
Start: 2021-10-15 | End: 2024-04-23

## 2021-10-15 RX ORDER — SERTRALINE HYDROCHLORIDE 100 MG/1
150 TABLET, FILM COATED ORAL DAILY
Qty: 90 TABLET | Refills: 1 | Status: SHIPPED | OUTPATIENT
Start: 2021-10-15 | End: 2022-07-29

## 2021-10-15 ASSESSMENT — ANXIETY QUESTIONNAIRES
GAD7 TOTAL SCORE: 16
6. BECOMING EASILY ANNOYED OR IRRITABLE: NEARLY EVERY DAY
3. WORRYING TOO MUCH ABOUT DIFFERENT THINGS: NEARLY EVERY DAY
5. BEING SO RESTLESS THAT IT IS HARD TO SIT STILL: NOT AT ALL
1. FEELING NERVOUS, ANXIOUS, OR ON EDGE: NEARLY EVERY DAY
7. FEELING AFRAID AS IF SOMETHING AWFUL MIGHT HAPPEN: NEARLY EVERY DAY
2. NOT BEING ABLE TO STOP OR CONTROL WORRYING: NEARLY EVERY DAY
IF YOU CHECKED OFF ANY PROBLEMS ON THIS QUESTIONNAIRE, HOW DIFFICULT HAVE THESE PROBLEMS MADE IT FOR YOU TO DO YOUR WORK, TAKE CARE OF THINGS AT HOME, OR GET ALONG WITH OTHER PEOPLE: EXTREMELY DIFFICULT

## 2021-10-15 ASSESSMENT — PATIENT HEALTH QUESTIONNAIRE - PHQ9
5. POOR APPETITE OR OVEREATING: SEVERAL DAYS
SUM OF ALL RESPONSES TO PHQ QUESTIONS 1-9: 16

## 2021-10-15 ASSESSMENT — COLUMBIA-SUICIDE SEVERITY RATING SCALE - C-SSRS
1. WITHIN THE PAST MONTH, HAVE YOU WISHED YOU WERE DEAD OR WISHED YOU COULD GO TO SLEEP AND NOT WAKE UP?: NO
2. IN THE PAST MONTH, HAVE YOU ACTUALLY HAD ANY THOUGHTS OF KILLING YOURSELF?: NO
6. HAVE YOU EVER DONE ANYTHING, STARTED TO DO ANYTHING, OR PREPARED TO DO ANYTHING TO END YOUR LIFE?: YES, LIFETIME

## 2021-10-15 NOTE — PATIENT INSTRUCTIONS
Here is the plan from today's visit    1. Moderate episode of recurrent major depressive disorder (H)  15 min exercise every other day  3 good things    - sertraline (ZOLOFT) 100 MG tablet; Take 1.5 tablets (150 mg) by mouth daily  Dispense: 90 tablet; Refill: 1  - hydrOXYzine (ATARAX) 25 MG tablet; Take 1 tablet (25 mg) by mouth 3 times daily as needed for itching  Dispense: 90 tablet; Refill: 1    2. Rectal bleeding    - Hemoglobin; Future  - Adult Gastro Ref - Procedure Only; Future    3. KACY (generalized anxiety disorder)    - sertraline (ZOLOFT) 100 MG tablet; Take 1.5 tablets (150 mg) by mouth daily  Dispense: 90 tablet; Refill: 1  - hydrOXYzine (ATARAX) 25 MG tablet; Take 1 tablet (25 mg) by mouth 3 times daily as needed for itching  Dispense: 90 tablet; Refill: 1  3 good things  Every evening and the 2 hours before you go to bed sit down and write down 3 good things that happened here during the day.  These do not have to be big things that can be as little as enjoying a couple coffee or seeing a beautiful [place].    Write down  - 3 good things that happened that day.  -What was the positive emotion that you felt when you experience that good thing.  For example all or amusement or [place] or pride.  -Finally write down what was your part in making each of those good things happen.    When people do the good things exercise daily for as little as 3 weeks studies have shown that this is as effective as an antidepressant.  Even after 6 months has been shown that the effect is still measurable.    Its free, effective, and has no side effects. Give it a try!      Please call or return to clinic if your symptoms don't go away.    Follow up plan  Return in about 2 weeks (around 10/29/2021) for Recheck Depression.     Thank you for coming to [facility] today.  Lab Testing:  **If you had lab testing today and your results are reassuring or normal they will be mailed to you or sent through [employer] within 7 days.    **If the lab tests need quick action we will call you with the results.  The phone number we will call with results is # [phone] (home) NONE (work). If this is not the best number please call our clinic and change the number.  Medication Refills:  If you need any refills please call your pharmacy and they will contact us.   If you need to  your refill at a new pharmacy, please contact the new pharmacy directly. The new pharmacy will help you get your medications transferred faster.   Scheduling:  If you have any concerns about today's visit or wish to schedule another appointment please call our office during normal business hours [phone] (8-5:00 M-F)   eferrals to [facility] please call [phone].   Mammogram Scheduling [phone]     XRay/CT/Ultrasound/MRI Scheduling [phone]    Medical Concerns:  If you have urgent medical concerns please call [phone] at any time of the day.    Randal Castellanos MD
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
## 2021-10-16 ASSESSMENT — ANXIETY QUESTIONNAIRES: GAD7 TOTAL SCORE: 16

## 2021-10-18 ENCOUNTER — TELEPHONE (OUTPATIENT)
Dept: PSYCHOLOGY | Facility: CLINIC | Age: 61
End: 2021-10-18

## 2021-10-18 NOTE — TELEPHONE ENCOUNTER
Psychiatry Consult Referral:  Please schedule this patient with Dr. Celeste.  Check with the patient if they are able to do a video visit.  They will need access to either a smartphone or a laptop with camera/microphone.  Please talk to patient about ability to do video visits.  These consults should really be done as a video visit if at all possible.  If patient does not have access to technology, we can offer them to come into clinic to be at one of our computers to do the video visit with the psychiatrist.      This is for a one time consult only, not for ongoing psychiatric care.  She will provide recommendations to the PCP for ongoing care.     Please let the patient know that this is an educational consult clinic.  For that reason, Dr. Celeste and a resident will be seeing the patient together virtually.     If you are unable to reach the patient after two phone calls, please send a letter and close this encounter.    Thank you!    Claudia Silverman PsyD

## 2021-10-20 NOTE — TELEPHONE ENCOUNTER
Video visit consult scheduled on 11/4 at 10:00am    CARLINE Hobbs  Pronouns: She/Her/Hers  , Care Coordination  [facility]  [phone]

## 2021-10-23 ENCOUNTER — HEALTH MAINTENANCE LETTER (OUTPATIENT)
Age: 61
End: 2021-10-23

## 2021-11-04 ENCOUNTER — VIRTUAL VISIT (OUTPATIENT)
Dept: PSYCHOLOGY | Facility: CLINIC | Age: 61
End: 2021-11-04
Payer: COMMERCIAL

## 2021-11-04 DIAGNOSIS — F41.9 ANXIETY: Primary | ICD-10-CM

## 2021-11-04 DIAGNOSIS — F41.9 ANXIETY DISORDER, UNSPECIFIED TYPE: ICD-10-CM

## 2021-11-04 PROCEDURE — 99203 OFFICE O/P NEW LOW 30 MIN: CPT | Mod: 95 | Performed by: PSYCHIATRY & NEUROLOGY

## 2021-11-04 ASSESSMENT — PATIENT HEALTH QUESTIONNAIRE - PHQ9: SUM OF ALL RESPONSES TO PHQ QUESTIONS 1-9: 16
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
## 2021-11-04 NOTE — PATIENT INSTRUCTIONS
Plan Today    Keep telehealth appt tomorrow with Dr Nicole  Psych Clinic will call you and that number is [phone]      Thank you for coming to the [facility].    Scheduling:  If you have any concerns about today's visit or wish to schedule another appointment please call our office during normal business hours [phone] (8-5:00 M-F)        MENTAL HEALTH CRISIS NUMBERS:  For a medical emergency please call  911 or go to the nearest ER.     [facility]:   [facility] -[phone]   Crisis Residence [facility] Residence -[phone]   Walk-In Counseling Center [facility] -[phone]   COPE 24/7 [place] Mobile Team -[phone] (adults)/[phone] (child)  CHILD: [facility] needs assessment team - [phone]      [facility]:   [facility] - [phone]   Walk-in counseling [facility] - [phone]   Walk-in counseling [facility] - [phone]   Crisis Residence [facility] Residence - [phone]  Urgent Care Adult Mental [facility]-[phone] mobile unit/ 24/7 crisis line    National Crisis Numbers:   National Suicide Prevention Lifeline: 4-009-868-TALK (058-108-5746)  Poison Control Center - 1-233.508.1246  [employer]/resources for a list of additional resources (SOS)  Trans Lifeline a hotline for transgender people 1-250.258.4291  The [employer] a hotline for LGBT youth 1-994.217.8834  Crisis Text Line: For any crisis 24/7   To: 976422  see www.crisistextline.org  - IF MAKING A CALL FEELS TOO HARD, send a text!       Again thank you for choosing [facility] and please let us know how we can best partner with you to improve you and your family's health.

## 2021-11-05 ENCOUNTER — VIRTUAL VISIT (OUTPATIENT)
Dept: PSYCHIATRY | Facility: CLINIC | Age: 61
End: 2021-11-05
Attending: PSYCHIATRY & NEUROLOGY
Payer: COMMERCIAL

## 2021-11-05 DIAGNOSIS — Z63.4 BEREAVEMENT: ICD-10-CM

## 2021-11-05 DIAGNOSIS — F43.23 ADJUSTMENT DISORDER WITH MIXED ANXIETY AND DEPRESSED MOOD: Primary | ICD-10-CM

## 2021-11-05 PROCEDURE — 90791 PSYCH DIAGNOSTIC EVALUATION: CPT | Mod: 95 | Performed by: STUDENT IN AN ORGANIZED HEALTH CARE EDUCATION/TRAINING PROGRAM

## 2021-11-05 SDOH — SOCIAL STABILITY - SOCIAL INSECURITY: DISSAPEARANCE AND DEATH OF FAMILY MEMBER: Z63.4
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
## 2021-11-05 NOTE — PATIENT INSTRUCTIONS
Plan Today    1) Meds:  Continue current medications as prescribed by Dr. Castellanos    2) Follow-up:  -Schedule follow-up appointment with Dr. Castellanos in 2-3 weeks  -Schedule/restart therapy with Slim Chavira    3) Other:  Look into survivor resources, support group for parents who have lost a child.      Thank you for coming to the [facility] MENTAL HEALTH & ADDICTION [facility].    Scheduling:  If you have any concerns about today's visit or wish to schedule another appointment please call our office during normal business hours [phone] (8-5:00 M-F)        MENTAL HEALTH CRISIS NUMBERS:  For a medical emergency please call  911 or go to the nearest ER.     [facility]:   [facility] -[phone]   Crisis Residence [facility] -[phone]   Walk-In Counseling [facility] -[phone]   COPE 24/7 [place] Mobile Team -[phone] (adults)/[phone] (child)  CHILD: [facility] needs assessment team - [phone]      [facility] - [phone]   Walk-in counseling [facility] - [phone]   Walk-in counseling [facility] - [phone]   Crisis Residence [facility] - [phone]  Urgent Care Adult Mental Memopp-[phone] mobile unit/ 24/7 crisis line    National Crisis Numbers:   National Suicide Prevention Lifeline: 9-391-878-TALK (080-707-3438)  Poison Control Center - 5-418-147-8283  SpeakingFriendsignia.SOS Online Backup/resources for a list of additional resources (SOS)  Trans Lifeline a hotline for transgender people 1-408.572.6907  The [employer] Project a hotline for LGBT youth 1-357.475.2180  Crisis Text Line: For any crisis 24/7   To: 827886  see www.crisistextline.org  - IF MAKING A CALL FEELS TOO HARD, send a text!       Again thank you for choosing [facility] HEALTH & ADDICTION [facility] and please let us know how we can best partner with you to improve you  and your family's health.

## 2021-11-09 ENCOUNTER — TELEPHONE (OUTPATIENT)
Dept: PSYCHOLOGY | Facility: CLINIC | Age: 61
End: 2021-11-09
Payer: COMMERCIAL

## 2021-11-09 NOTE — TELEPHONE ENCOUNTER
Ammon Adams - Can you reach out to Amy to schedule her for a follow up appointment with Dr. Castellanos in 2-3 weeks please?  Also, could you provide this link to the patient.  [facility] has a nice web page with grief resources listed.  Dr. Nicole asked about a group specifically for parents and loss of child, but the specifically child loss groups I could find cap out at a child age of around 18.    https://[email or web address]/health-conditions-[email or web address]-treatments/grief-resources/[email or web address]-groups    Thank you!  ===View-only below this line===  ----- Message -----  From: Coffin, Richard Breyen, MD  Sent: 11/5/2021   3:32 PM CST  To: Claudia Silverman PsyD, Randal Castellanos MD, *  Subject: RE: psych consult to be completed Friday Nov*    I saw Amy today, although she was also quite late to my appointment, we were able to get a quick history of what's going on with her here's our recommendations from the visit:    1) Meds:  Continue current medications as prescribed by Dr. Castellanos - she reported needing refill of lorazepam (she believes she had 5-6 tabs left so it was not refilled today and encouraged her to reach out to Dr. Castellanos for refills). Encouraged her to reach out for these refills, but wanted to pass along the message.    2) Follow-up:  -Schedule follow-up appointment with Dr. Castellanos in 2-3 weeks - for ongoing management of her anxiety and depression (seems like she's on a good regimen and just needs more time to continue working as she's been noticing some benefit).  -Schedule/restart therapy with Slim Chavira - was seeing him quite a bit prior to hospitalization and just needs to reconnect now that she's discharged.    3) Other:  Look into survivor resources, support group for parents who have lost a child.    Please let us know if you have any other questions!  Scott  ----- Message -----  From: Anabela Celeste MD  Sent: 11/4/2021  11:38 AM CDT  To: Claudia Silverman PsyD, Timothy Ramer, MD, *  Subject: psych  consult to be completed Friday Nov 5       Hello All,  This pt appeared in my Doxy at 1050 for a 1000 visit today.  It was too late to do a full psych consult.  I talked with the pt for about 20 mins, really just assessing any acute risks/ getting general background.  She is very sad, but no risk for self-harm and no psychosis.  Currently in an IRTS.    Dr Nicole has some open time in the AM tomorrow.  Since we are partnered for SMI consults, I am going to have the pt do a consult with Scott  tomorrow via Doxy through the Psych Clinic.    INTAKE:  I told the pt 0930 Friday Nov 5.  This is [phone] (and block 1030 slot for staffing/ wrap up)  Please put her on the schedule and call to confirm.  She is aware and agreed.    PLAN:  1) Intake- please call the pt on her cell (# in demo) to confirm this appt and work out tech plan  2) Scott - I will put a short paragraph in the chart and you can staff with me tomorrow when you are ready    Pt missed her appt today d/t oversleeping and limited access to her phone.  Tanvi

## 2021-12-03 NOTE — TELEPHONE ENCOUNTER
Attempted to reach patient regarding information below. Unable to LVM due to phone continuing to ring. If patient returns call please transfer to any available CC/SW.     Paulette Florence, Care Coordinator

## 2022-02-10 ENCOUNTER — APPOINTMENT (OUTPATIENT)
Dept: GENERAL RADIOLOGY | Facility: CLINIC | Age: 62
DRG: 563 | End: 2022-02-10
Attending: EMERGENCY MEDICINE
Payer: COMMERCIAL

## 2022-02-10 ENCOUNTER — HOSPITAL ENCOUNTER (INPATIENT)
Facility: CLINIC | Age: 62
LOS: 7 days | Discharge: NURSING, REHAB OR LONG-TERM CARE | DRG: 563 | End: 2022-02-17
Attending: EMERGENCY MEDICINE | Admitting: STUDENT IN AN ORGANIZED HEALTH CARE EDUCATION/TRAINING PROGRAM
Payer: COMMERCIAL

## 2022-02-10 DIAGNOSIS — G89.29 CHRONIC LOW BACK PAIN WITHOUT SCIATICA, UNSPECIFIED BACK PAIN LATERALITY: ICD-10-CM

## 2022-02-10 DIAGNOSIS — M54.50 CHRONIC LOW BACK PAIN WITHOUT SCIATICA, UNSPECIFIED BACK PAIN LATERALITY: ICD-10-CM

## 2022-02-10 DIAGNOSIS — K59.00 CONSTIPATION, UNSPECIFIED CONSTIPATION TYPE: ICD-10-CM
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
## 2022-02-10 DIAGNOSIS — F41.9 ANXIETY: ICD-10-CM
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
## 2022-02-10 DIAGNOSIS — S42.291A HUMERAL HEAD FRACTURE, RIGHT, CLOSED, INITIAL ENCOUNTER: ICD-10-CM

## 2022-02-10 DIAGNOSIS — E11.9 DIABETES MELLITUS, TYPE 2 (H): ICD-10-CM

## 2022-02-10 DIAGNOSIS — S42.201A CLOSED FRACTURE OF PROXIMAL END OF RIGHT HUMERUS, UNSPECIFIED FRACTURE MORPHOLOGY, INITIAL ENCOUNTER: Primary | ICD-10-CM

## 2022-02-10 LAB
ALBUMIN SERPL-MCNC: 3.4 G/DL (ref 3.4–5)
ALP SERPL-CCNC: 111 U/L (ref 40–150)
ALT SERPL W P-5'-P-CCNC: 22 U/L (ref 0–50)
ANION GAP SERPL CALCULATED.3IONS-SCNC: 4 MMOL/L (ref 3–14)
AST SERPL W P-5'-P-CCNC: 13 U/L (ref 0–45)
BASOPHILS # BLD AUTO: 0.1 10E3/UL (ref 0–0.2)
BASOPHILS NFR BLD AUTO: 0 %
BILIRUB SERPL-MCNC: 0.2 MG/DL (ref 0.2–1.3)
BUN SERPL-MCNC: 11 MG/DL (ref 7–30)
CALCIUM SERPL-MCNC: 8.3 MG/DL (ref 8.5–10.1)
CHLORIDE BLD-SCNC: 111 MMOL/L (ref 94–109)
CO2 SERPL-SCNC: 24 MMOL/L (ref 20–32)
CREAT SERPL-MCNC: 0.52 MG/DL (ref 0.52–1.04)
EOSINOPHIL # BLD AUTO: 0.1 10E3/UL (ref 0–0.7)
EOSINOPHIL NFR BLD AUTO: 1 %
ERYTHROCYTE [DISTWIDTH] IN BLOOD BY AUTOMATED COUNT: 14.2 % (ref 10–15)
GFR SERPL CREATININE-BSD FRML MDRD: >90 ML/MIN/1.73M2
GLUCOSE BLD-MCNC: 139 MG/DL (ref 70–99)
HCT VFR BLD AUTO: 38.8 % (ref 35–47)
HGB BLD-MCNC: 12.5 G/DL (ref 11.7–15.7)
IMM GRANULOCYTES # BLD: 0 10E3/UL
IMM GRANULOCYTES NFR BLD: 0 %
LYMPHOCYTES # BLD AUTO: 1.6 10E3/UL (ref 0.8–5.3)
LYMPHOCYTES NFR BLD AUTO: 12 %
MCH RBC QN AUTO: 25.6 PG (ref 26.5–33)
MCHC RBC AUTO-ENTMCNC: 32.2 G/DL (ref 31.5–36.5)
MCV RBC AUTO: 79 FL (ref 78–100)
MONOCYTES # BLD AUTO: 0.9 10E3/UL (ref 0–1.3)
MONOCYTES NFR BLD AUTO: 6 %
NEUTROPHILS # BLD AUTO: 11.4 10E3/UL (ref 1.6–8.3)
NEUTROPHILS NFR BLD AUTO: 81 %
NRBC # BLD AUTO: 0 10E3/UL
NRBC BLD AUTO-RTO: 0 /100
PLATELET # BLD AUTO: 412 10E3/UL (ref 150–450)
POTASSIUM BLD-SCNC: 3.5 MMOL/L (ref 3.4–5.3)
PROT SERPL-MCNC: 7.4 G/DL (ref 6.8–8.8)
RBC # BLD AUTO: 4.89 10E6/UL (ref 3.8–5.2)
SODIUM SERPL-SCNC: 139 MMOL/L (ref 133–144)
WBC # BLD AUTO: 14.1 10E3/UL (ref 4–11)

## 2022-02-10 PROCEDURE — 99285 EMERGENCY DEPT VISIT HI MDM: CPT | Mod: 25 | Performed by: EMERGENCY MEDICINE

## 2022-02-10 PROCEDURE — 96375 TX/PRO/DX INJ NEW DRUG ADDON: CPT | Performed by: EMERGENCY MEDICINE

## 2022-02-10 PROCEDURE — 80053 COMPREHEN METABOLIC PANEL: CPT | Performed by: EMERGENCY MEDICINE

## 2022-02-10 PROCEDURE — 96374 THER/PROPH/DIAG INJ IV PUSH: CPT | Performed by: EMERGENCY MEDICINE

## 2022-02-10 PROCEDURE — 96376 TX/PRO/DX INJ SAME DRUG ADON: CPT | Performed by: EMERGENCY MEDICINE

## 2022-02-10 PROCEDURE — 73070 X-RAY EXAM OF ELBOW: CPT | Mod: RT

## 2022-02-10 PROCEDURE — 73000 X-RAY EXAM OF COLLAR BONE: CPT | Mod: RT

## 2022-02-10 PROCEDURE — 120N000002 HC R&B MED SURG/OB UMMC

## 2022-02-10 PROCEDURE — U0005 INFEC AGEN DETEC AMPLI PROBE: HCPCS | Performed by: EMERGENCY MEDICINE

## 2022-02-10 PROCEDURE — 250N000011 HC RX IP 250 OP 636: Performed by: EMERGENCY MEDICINE

## 2022-02-10 PROCEDURE — C9803 HOPD COVID-19 SPEC COLLECT: HCPCS | Performed by: EMERGENCY MEDICINE

## 2022-02-10 PROCEDURE — 71045 X-RAY EXAM CHEST 1 VIEW: CPT

## 2022-02-10 PROCEDURE — 99284 EMERGENCY DEPT VISIT MOD MDM: CPT | Performed by: EMERGENCY MEDICINE

## 2022-02-10 PROCEDURE — 85018 HEMOGLOBIN: CPT | Performed by: EMERGENCY MEDICINE

## 2022-02-10 PROCEDURE — 36415 COLL VENOUS BLD VENIPUNCTURE: CPT | Performed by: EMERGENCY MEDICINE

## 2022-02-10 PROCEDURE — 82040 ASSAY OF SERUM ALBUMIN: CPT | Performed by: EMERGENCY MEDICINE

## 2022-02-10 PROCEDURE — 73060 X-RAY EXAM OF HUMERUS: CPT | Mod: RT

## 2022-02-10 PROCEDURE — 73030 X-RAY EXAM OF SHOULDER: CPT | Mod: RT

## 2022-02-10 RX ORDER — FENTANYL CITRATE 50 UG/ML
50-100 INJECTION, SOLUTION INTRAMUSCULAR; INTRAVENOUS ONCE
Status: COMPLETED | OUTPATIENT
Start: 2022-02-10 | End: 2022-02-10

## 2022-02-10 RX ORDER — HYDROMORPHONE HCL IN WATER/PF 6 MG/30 ML
0.5 PATIENT CONTROLLED ANALGESIA SYRINGE INTRAVENOUS
Status: DISCONTINUED | OUTPATIENT
Start: 2022-02-10 | End: 2022-02-10

## 2022-02-10 RX ORDER — HYDROMORPHONE HCL IN WATER/PF 6 MG/30 ML
0.5 PATIENT CONTROLLED ANALGESIA SYRINGE INTRAVENOUS
Status: DISCONTINUED | OUTPATIENT
Start: 2022-02-10 | End: 2022-02-11

## 2022-02-10 RX ADMIN — FENTANYL CITRATE 100 MCG: 50 INJECTION INTRAMUSCULAR; INTRAVENOUS at 21:42

## 2022-02-10 RX ADMIN — FENTANYL CITRATE 100 MCG: 50 INJECTION INTRAMUSCULAR; INTRAVENOUS at 23:14

## 2022-02-10 RX ADMIN — HYDROMORPHONE HYDROCHLORIDE 0.5 MG: 0.2 INJECTION, SOLUTION INTRAMUSCULAR; INTRAVENOUS; SUBCUTANEOUS at 21:07

## 2022-02-10 NOTE — LETTER
Health Information Management Services               Recipient:    Albania Gautam      Sender:    Liane Glasgow RN, BSN  Care Coordinator, [address] [facility]  Phone [phone]  Pager [phone]          Date: February 17, 2022  Patient Name:  Amy Choudhury  Routing Message:      TCU Referral, Any TCU      The documents accompanying this notice contain confidential information belonging to the sender.  This information is intended only for the use of the individual or entity named above.  The authorized recipient of this information is prohibited from disclosing this information to any other party and is required to destroy the information after its stated need has been fulfilled, unless otherwise required by state law.      If you are not the intended recipient, you are hereby notified that any disclosure, copy, distribution or action taken in reliance on the contents of these documents is strictly prohibited.  If you have received this document in error, please return it by fax to [phone] with a note on the cover sheet explaining why you are returning it (e.g. not your patient, not your provider, etc.).  If you need further assistance, please call [facility] Centralized Transcription at [phone].  Documents may also be returned by mail to [employer], , [address], [place], [place] [address].

## 2022-02-10 NOTE — LETTER
Health Information Management Services               Recipient:    Cornel Ferreira      Sender:    Liane Glasgow RN, BSN  Care Coordinator, [address] [facility]  Phone [phone]  Pager [phone]        Date: February 17, 2022  Patient Name:  Amy Choudhury  Routing Message:      TCU Referral, Any TCU      The documents accompanying this notice contain confidential information belonging to the sender.  This information is intended only for the use of the individual or entity named above.  The authorized recipient of this information is prohibited from disclosing this information to any other party and is required to destroy the information after its stated need has been fulfilled, unless otherwise required by state law.      If you are not the intended recipient, you are hereby notified that any disclosure, copy, distribution or action taken in reliance on the contents of these documents is strictly prohibited.  If you have received this document in error, please return it by fax to [phone] with a note on the cover sheet explaining why you are returning it (e.g. not your patient, not your provider, etc.).  If you need further assistance, please call [facility] Centralized Transcription at [phone].  Documents may also be returned by mail to [employer], , [address], [place], [place] [address].

## 2022-02-11 ENCOUNTER — APPOINTMENT (OUTPATIENT)
Dept: OCCUPATIONAL THERAPY | Facility: CLINIC | Age: 62
DRG: 563 | End: 2022-02-11
Attending: STUDENT IN AN ORGANIZED HEALTH CARE EDUCATION/TRAINING PROGRAM
Payer: COMMERCIAL

## 2022-02-11 LAB
ANION GAP SERPL CALCULATED.3IONS-SCNC: 3 MMOL/L (ref 3–14)
BUN SERPL-MCNC: 11 MG/DL (ref 7–30)
CALCIUM SERPL-MCNC: 8.4 MG/DL (ref 8.5–10.1)
CHLORIDE BLD-SCNC: 110 MMOL/L (ref 94–109)
CO2 SERPL-SCNC: 26 MMOL/L (ref 20–32)
CREAT SERPL-MCNC: 0.57 MG/DL (ref 0.52–1.04)
ERYTHROCYTE [DISTWIDTH] IN BLOOD BY AUTOMATED COUNT: 14.3 % (ref 10–15)
GFR SERPL CREATININE-BSD FRML MDRD: >90 ML/MIN/1.73M2
GLUCOSE BLD-MCNC: 140 MG/DL (ref 70–99)
GLUCOSE BLDC GLUCOMTR-MCNC: 127 MG/DL (ref 70–99)
GLUCOSE BLDC GLUCOMTR-MCNC: 145 MG/DL (ref 70–99)
HCT VFR BLD AUTO: 37.6 % (ref 35–47)
HGB BLD-MCNC: 11.9 G/DL (ref 11.7–15.7)
MCH RBC QN AUTO: 25.4 PG (ref 26.5–33)
MCHC RBC AUTO-ENTMCNC: 31.6 G/DL (ref 31.5–36.5)
MCV RBC AUTO: 80 FL (ref 78–100)
PLATELET # BLD AUTO: 382 10E3/UL (ref 150–450)
POTASSIUM BLD-SCNC: 3.4 MMOL/L (ref 3.4–5.3)
RBC # BLD AUTO: 4.68 10E6/UL (ref 3.8–5.2)
SARS-COV-2 RNA RESP QL NAA+PROBE: NEGATIVE
SODIUM SERPL-SCNC: 139 MMOL/L (ref 133–144)
WBC # BLD AUTO: 9.6 10E3/UL (ref 4–11)

## 2022-02-11 PROCEDURE — 99207 PR CDG-MDM COMPONENT: MEETS MODERATE - UP CODED: CPT | Performed by: STUDENT IN AN ORGANIZED HEALTH CARE EDUCATION/TRAINING PROGRAM

## 2022-02-11 PROCEDURE — 85027 COMPLETE CBC AUTOMATED: CPT | Performed by: STUDENT IN AN ORGANIZED HEALTH CARE EDUCATION/TRAINING PROGRAM

## 2022-02-11 PROCEDURE — 80048 BASIC METABOLIC PNL TOTAL CA: CPT | Performed by: STUDENT IN AN ORGANIZED HEALTH CARE EDUCATION/TRAINING PROGRAM

## 2022-02-11 PROCEDURE — 83036 HEMOGLOBIN GLYCOSYLATED A1C: CPT | Performed by: PHYSICIAN ASSISTANT

## 2022-02-11 PROCEDURE — 250N000013 HC RX MED GY IP 250 OP 250 PS 637: Performed by: STUDENT IN AN ORGANIZED HEALTH CARE EDUCATION/TRAINING PROGRAM

## 2022-02-11 PROCEDURE — 250N000013 HC RX MED GY IP 250 OP 250 PS 637: Performed by: INTERNAL MEDICINE

## 2022-02-11 PROCEDURE — 250N000011 HC RX IP 250 OP 636: Performed by: EMERGENCY MEDICINE

## 2022-02-11 PROCEDURE — 120N000002 HC R&B MED SURG/OB UMMC

## 2022-02-11 PROCEDURE — 96376 TX/PRO/DX INJ SAME DRUG ADON: CPT | Performed by: EMERGENCY MEDICINE

## 2022-02-11 PROCEDURE — 250N000011 HC RX IP 250 OP 636

## 2022-02-11 PROCEDURE — 250N000013 HC RX MED GY IP 250 OP 250 PS 637: Performed by: EMERGENCY MEDICINE

## 2022-02-11 PROCEDURE — 97535 SELF CARE MNGMENT TRAINING: CPT | Mod: GO

## 2022-02-11 PROCEDURE — 99223 1ST HOSP IP/OBS HIGH 75: CPT | Mod: AI | Performed by: STUDENT IN AN ORGANIZED HEALTH CARE EDUCATION/TRAINING PROGRAM

## 2022-02-11 PROCEDURE — 36415 COLL VENOUS BLD VENIPUNCTURE: CPT | Performed by: STUDENT IN AN ORGANIZED HEALTH CARE EDUCATION/TRAINING PROGRAM

## 2022-02-11 PROCEDURE — 250N000011 HC RX IP 250 OP 636: Performed by: STUDENT IN AN ORGANIZED HEALTH CARE EDUCATION/TRAINING PROGRAM

## 2022-02-11 PROCEDURE — 97165 OT EVAL LOW COMPLEX 30 MIN: CPT | Mod: GO

## 2022-02-11 RX ORDER — NALOXONE HYDROCHLORIDE 0.4 MG/ML
0.2 INJECTION, SOLUTION INTRAMUSCULAR; INTRAVENOUS; SUBCUTANEOUS
Status: DISCONTINUED | OUTPATIENT
Start: 2022-02-11 | End: 2022-02-17 | Stop reason: HOSPADM

## 2022-02-11 RX ORDER — ACETAMINOPHEN 325 MG/1
975 TABLET ORAL 3 TIMES DAILY
Status: DISCONTINUED | OUTPATIENT
Start: 2022-02-11 | End: 2022-02-17 | Stop reason: HOSPADM

## 2022-02-11 RX ORDER — PANTOPRAZOLE SODIUM 40 MG/1
40 TABLET, DELAYED RELEASE ORAL
Status: DISCONTINUED | OUTPATIENT
Start: 2022-02-12 | End: 2022-02-11

## 2022-02-11 RX ORDER — HYDROXYZINE HYDROCHLORIDE 25 MG/1
25 TABLET, FILM COATED ORAL 3 TIMES DAILY PRN
Status: DISCONTINUED | OUTPATIENT
Start: 2022-02-11 | End: 2022-02-12

## 2022-02-11 RX ORDER — PANTOPRAZOLE SODIUM 40 MG/1
40 TABLET, DELAYED RELEASE ORAL
Status: DISCONTINUED | OUTPATIENT
Start: 2022-02-11 | End: 2022-02-11

## 2022-02-11 RX ORDER — ALBUTEROL SULFATE 0.83 MG/ML
2.5 SOLUTION RESPIRATORY (INHALATION)
Status: DISCONTINUED | OUTPATIENT
Start: 2022-02-11 | End: 2022-02-17 | Stop reason: HOSPADM

## 2022-02-11 RX ORDER — MAGNESIUM HYDROXIDE/ALUMINUM HYDROXICE/SIMETHICONE 120; 1200; 1200 MG/30ML; MG/30ML; MG/30ML
30 SUSPENSION ORAL 4 TIMES DAILY PRN
Status: DISCONTINUED | OUTPATIENT
Start: 2022-02-11 | End: 2022-02-17 | Stop reason: HOSPADM

## 2022-02-11 RX ORDER — HYDROMORPHONE HCL IN WATER/PF 6 MG/30 ML
0.5 PATIENT CONTROLLED ANALGESIA SYRINGE INTRAVENOUS ONCE
Status: COMPLETED | OUTPATIENT
Start: 2022-02-11 | End: 2022-02-11

## 2022-02-11 RX ORDER — NALOXONE HYDROCHLORIDE 0.4 MG/ML
0.4 INJECTION, SOLUTION INTRAMUSCULAR; INTRAVENOUS; SUBCUTANEOUS
Status: DISCONTINUED | OUTPATIENT
Start: 2022-02-11 | End: 2022-02-17 | Stop reason: HOSPADM

## 2022-02-11 RX ORDER — ALBUTEROL SULFATE 90 UG/1
2 AEROSOL, METERED RESPIRATORY (INHALATION) EVERY 6 HOURS PRN
Status: DISCONTINUED | OUTPATIENT
Start: 2022-02-11 | End: 2022-02-17 | Stop reason: HOSPADM

## 2022-02-11 RX ORDER — BISACODYL 10 MG
10 SUPPOSITORY, RECTAL RECTAL DAILY PRN
Status: DISCONTINUED | OUTPATIENT
Start: 2022-02-11 | End: 2022-02-17 | Stop reason: HOSPADM

## 2022-02-11 RX ORDER — AMOXICILLIN 250 MG
1 CAPSULE ORAL 2 TIMES DAILY
Status: DISCONTINUED | OUTPATIENT
Start: 2022-02-11 | End: 2022-02-17 | Stop reason: HOSPADM

## 2022-02-11 RX ORDER — METHOCARBAMOL 500 MG/1
500 TABLET, FILM COATED ORAL 4 TIMES DAILY PRN
Status: DISCONTINUED | OUTPATIENT
Start: 2022-02-11 | End: 2022-02-17 | Stop reason: HOSPADM

## 2022-02-11 RX ORDER — OXYCODONE HYDROCHLORIDE 5 MG/1
5 TABLET ORAL EVERY 4 HOURS PRN
Status: DISCONTINUED | OUTPATIENT
Start: 2022-02-11 | End: 2022-02-12

## 2022-02-11 RX ORDER — ONDANSETRON 2 MG/ML
4 INJECTION INTRAMUSCULAR; INTRAVENOUS EVERY 6 HOURS PRN
Status: DISCONTINUED | OUTPATIENT
Start: 2022-02-11 | End: 2022-02-17 | Stop reason: HOSPADM

## 2022-02-11 RX ORDER — OXYCODONE HYDROCHLORIDE 5 MG/1
5 TABLET ORAL ONCE
Status: COMPLETED | OUTPATIENT
Start: 2022-02-11 | End: 2022-02-11

## 2022-02-11 RX ORDER — POTASSIUM CHLORIDE 750 MG/1
40 TABLET, EXTENDED RELEASE ORAL ONCE
Status: COMPLETED | OUTPATIENT
Start: 2022-02-11 | End: 2022-02-11

## 2022-02-11 RX ORDER — AMOXICILLIN 250 MG
2 CAPSULE ORAL 2 TIMES DAILY
Status: DISCONTINUED | OUTPATIENT
Start: 2022-02-11 | End: 2022-02-17 | Stop reason: HOSPADM

## 2022-02-11 RX ORDER — HYDROMORPHONE HCL IN WATER/PF 6 MG/30 ML
0.5 PATIENT CONTROLLED ANALGESIA SYRINGE INTRAVENOUS EVERY 4 HOURS PRN
Status: CANCELLED | OUTPATIENT
Start: 2022-02-11

## 2022-02-11 RX ORDER — LIDOCAINE 40 MG/G
CREAM TOPICAL
Status: DISCONTINUED | OUTPATIENT
Start: 2022-02-11 | End: 2022-02-17 | Stop reason: HOSPADM

## 2022-02-11 RX ORDER — ONDANSETRON 4 MG/1
4 TABLET, ORALLY DISINTEGRATING ORAL EVERY 6 HOURS PRN
Status: DISCONTINUED | OUTPATIENT
Start: 2022-02-11 | End: 2022-02-17 | Stop reason: HOSPADM

## 2022-02-11 RX ADMIN — ENOXAPARIN SODIUM 40 MG: 100 INJECTION SUBCUTANEOUS at 08:10

## 2022-02-11 RX ADMIN — ACETAMINOPHEN 975 MG: 325 TABLET, FILM COATED ORAL at 04:36

## 2022-02-11 RX ADMIN — OXYCODONE HYDROCHLORIDE 5 MG: 5 TABLET ORAL at 19:01

## 2022-02-11 RX ADMIN — ACETAMINOPHEN 975 MG: 325 TABLET, FILM COATED ORAL at 19:00

## 2022-02-11 RX ADMIN — OXYCODONE HYDROCHLORIDE 5 MG: 5 TABLET ORAL at 08:26

## 2022-02-11 RX ADMIN — PANTOPRAZOLE SODIUM 40 MG: 40 TABLET, DELAYED RELEASE ORAL at 13:51

## 2022-02-11 RX ADMIN — SERTRALINE HYDROCHLORIDE 150 MG: 100 TABLET ORAL at 08:09

## 2022-02-11 RX ADMIN — HYDROMORPHONE HYDROCHLORIDE 0.5 MG: 1 INJECTION, SOLUTION INTRAMUSCULAR; INTRAVENOUS; SUBCUTANEOUS at 10:21

## 2022-02-11 RX ADMIN — METHOCARBAMOL 500 MG: 500 TABLET ORAL at 19:00

## 2022-02-11 RX ADMIN — HYDROMORPHONE HYDROCHLORIDE 0.5 MG: 1 INJECTION, SOLUTION INTRAMUSCULAR; INTRAVENOUS; SUBCUTANEOUS at 03:45

## 2022-02-11 RX ADMIN — ALUMINUM HYDROXIDE, MAGNESIUM HYDROXIDE, AND SIMETHICONE 30 ML: 200; 200; 20 SUSPENSION ORAL at 10:43

## 2022-02-11 RX ADMIN — SENNOSIDES AND DOCUSATE SODIUM 2 TABLET: 8.6; 5 TABLET ORAL at 08:09

## 2022-02-11 RX ADMIN — HYDROMORPHONE HYDROCHLORIDE 0.5 MG: 1 INJECTION, SOLUTION INTRAMUSCULAR; INTRAVENOUS; SUBCUTANEOUS at 22:46

## 2022-02-11 RX ADMIN — POTASSIUM CHLORIDE 40 MEQ: 750 TABLET, EXTENDED RELEASE ORAL at 08:19

## 2022-02-11 RX ADMIN — B-COMPLEX W/ C & FOLIC ACID TAB 1 TABLET: TAB at 08:09

## 2022-02-11 RX ADMIN — OXYCODONE HYDROCHLORIDE 5 MG: 5 TABLET ORAL at 04:36

## 2022-02-11 RX ADMIN — HYDROMORPHONE HYDROCHLORIDE 0.5 MG: 1 INJECTION, SOLUTION INTRAMUSCULAR; INTRAVENOUS; SUBCUTANEOUS at 12:51

## 2022-02-11 RX ADMIN — ACETAMINOPHEN 975 MG: 325 TABLET, FILM COATED ORAL at 13:51

## 2022-02-11 RX ADMIN — METHOCARBAMOL 500 MG: 500 TABLET ORAL at 09:55

## 2022-02-11 RX ADMIN — OXYCODONE HYDROCHLORIDE 5 MG: 5 TABLET ORAL at 02:03

## 2022-02-11 ASSESSMENT — ACTIVITIES OF DAILY LIVING (ADL)
ADLS_ACUITY_SCORE: 13
ADLS_ACUITY_SCORE: 11
ADLS_ACUITY_SCORE: 11
DRESSING/BATHING: BATHING DIFFICULTY, ASSISTANCE 1 PERSON;DRESSING DIFFICULTY, ASSISTANCE 1 PERSON
ADLS_ACUITY_SCORE: 13
NUMBER_OF_TIMES_PATIENT_HAS_FALLEN_WITHIN_LAST_SIX_MONTHS: 2
ADLS_ACUITY_SCORE: 13
ADLS_ACUITY_SCORE: 21
ADLS_ACUITY_SCORE: 15
ADLS_ACUITY_SCORE: 15
DOING_ERRANDS_INDEPENDENTLY_DIFFICULTY: NO
ADLS_ACUITY_SCORE: 21
PATIENT_/_FAMILY_COMMUNICATION_STYLE: SPOKEN LANGUAGE (ENGLISH OR BILINGUAL)
ADLS_ACUITY_SCORE: 17
ADLS_ACUITY_SCORE: 17
ADLS_ACUITY_SCORE: 15
ADLS_ACUITY_SCORE: 15
WALKING_OR_CLIMBING_STAIRS_DIFFICULTY: YES
ADLS_ACUITY_SCORE: 11
ADLS_ACUITY_SCORE: 11
ADLS_ACUITY_SCORE: 17
ADLS_ACUITY_SCORE: 13
ADLS_ACUITY_SCORE: 21
DIFFICULTY_COMMUNICATING: NO
ADLS_ACUITY_SCORE: 21
ADLS_ACUITY_SCORE: 11
DRESSING/BATHING_DIFFICULTY: YES
CONCENTRATING,_REMEMBERING_OR_MAKING_DECISIONS_DIFFICULTY: YES
TOILETING_ISSUES: NO
ADLS_ACUITY_SCORE: 17
WALKING_OR_CLIMBING_STAIRS: AMBULATION DIFFICULTY, ASSISTANCE 1 PERSON;STAIR CLIMBING DIFFICULTY, ASSISTANCE 1 PERSON;TRANSFERRING DIFFICULTY, ASSISTANCE 1 PERSON
ADLS_ACUITY_SCORE: 11
FALL_HISTORY_WITHIN_LAST_SIX_MONTHS: YES
WEAR_GLASSES_OR_BLIND: YES
HEARING_DIFFICULTY_OR_DEAF: NO
VISION_MANAGEMENT: GLASSES
ADLS_ACUITY_SCORE: 21
DIFFICULTY_EATING/SWALLOWING: NO
ADLS_ACUITY_SCORE: 13

## 2022-02-11 ASSESSMENT — ENCOUNTER SYMPTOMS
EYE REDNESS: 0
NECK STIFFNESS: 0
NAUSEA: 0
ARTHRALGIAS: 1
SORE THROAT: 0
FEVER: 0
JOINT SWELLING: 1
VOMITING: 0
DIFFICULTY URINATING: 0
COUGH: 0
HEADACHES: 0
COLOR CHANGE: 0
SHORTNESS OF BREATH: 0
CHILLS: 0
DIARRHEA: 0
MYALGIAS: 1
AGITATION: 1
CONFUSION: 0
NECK PAIN: 0
ABDOMINAL PAIN: 0

## 2022-02-11 NOTE — ED NOTES
Cut off clothing to place gown on and apply sling, patient in to much pain, gave 100 mcg Fentanyl prior to re-positioning. Unable to apply sling and remove clothing from behind her. attempted x 3

## 2022-02-11 NOTE — CONSULTS
[facility]               ORTHOPAEDIC SURGERY CONSULT  DATE OF CONSULT: 2/11/2022 12:58 AM    REQUESTING PROVIDER: Yomaira Armstrong MD, MD - [facility] ED Staff.    CC: right shoulder pain    DATE OF INJURY: 2/10    HISTORY OF PRESENT ILLNESS:   The orthopaedic surgery service was consulted by Yomaira Fagan MD for evaluation and treatment recommendations of right proximal humerus fracture.    Amy Choudhury is a 61 year old RHD female with pmhx anxiety, depression, vitamin D deficiency who presents after a fall onto her right side. She reports she slipped and fell hard onto her right side. Immediate onset of pain to right side. Unable to get up until help arrived. She denies any lower extremity pain.     Denies any head trauma, or LOC.     Denies numbness, tingling, or weakness to the affected extremities.  Denies fevers, chills, nausea, vomiting, diarrhea, constipation, chest pain, shortness of breath.    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    PAST SURGICAL HISTORY:    Past Surgical History:   Procedure Laterality Date     APPENDECTOMY       CHOLECYSTECTOMY       DILATION AND CURETTAGE, OPERATIVE HYSTEROSCOPY WITH MORCELLATOR, COMBINED  11/21/2012    Procedure: COMBINED DILATION  AND CURETTAGE, OPERATIVE HYSTEROSCOPY WITH MORCELLATOR;  Hysteroscopy, Polypectomy, Dilation and Curettage  ;  Surgeon: Marta Montejo MD;  Location: [facility] OR     GI SURGERY      gastric bypass at age 31     ORTHOPEDIC SURGERY      back surgery at age 31     PARATHYROIDECTOMY Right 12/14/2015    Procedure: PARATHYROIDECTOMY;  Surgeon: Gregory Coleman MD;  Location: [facility]     [place] MARSUP BARTHOLIN GLAND CYST       SPINE SURGERY         MEDICATIONS:   Prior to Admission medications    Medication Sig Last Dose Taking? Auth Provider   acetaminophen (TYLENOL) 650 MG CR tablet Take 1-2 tablets (650-1,300 mg) by mouth every 8 hours as needed for mild pain or fever   Randal Castellanos MD   hydrOXYzine (ATARAX) 25 MG tablet Take 1 tablet (25 mg) by mouth 3 times daily as needed for itching   Randal Castellanos MD   LORazepam (ATIVAN) 1 MG tablet Take 1 tablet (1 mg) by mouth daily as needed for anxiety   Randal Castellanos MD   sertraline (ZOLOFT) 100 MG tablet Take 1.5 tablets (150 mg) by mouth daily   Randal Castellanos MD   vitamin B complex with vitamin C (VITAMIN  B COMPLEX) tablet Take 1 tablet by mouth daily  Patient not taking: Reported on 10/15/2021   Randal Castellanos MD       ALLERGIES:   Codeine sulfate    SOCIAL HISTORY:   Social History     Socioeconomic History     Marital status:      Spouse name: Not on file     Number of children: Not on file     Years of education: Not on file     Highest education level: Not on file   Occupational History     Not on file   Tobacco Use     Smoking status: Current Every Day Smoker     Packs/day: 0.50     Years: 30.00     Pack years: 15.00     Types: Cigarettes     Smokeless tobacco: Never Used   Substance and Sexual Activity     Alcohol use: Yes     Comment: occasionally     Drug use: No     Sexual activity: Yes     Partners: Male     Birth control/protection: None   Other Topics Concern     Parent/sibling w/ CABG, MI or angioplasty before 65F 55M? Not Asked   Social  History Narrative     Not on file     Social Determinants of Health     Financial Resource Strain: Not on file   Food Insecurity: Not on file   Transportation Needs: Not on file   Physical Activity: Not on file   Stress: Not on file   Social Connections: Not on file   Intimate Partner Violence: Not on file   Housing Stability: Not on file     FAMILY HISTORY:  Family History   Problem Relation Age of Onset     Thyroid Disease Mother      Breast Cancer Mother 57         65 y.o.     Other - See Comments Mother         cystic acne     Cancer Brother      Diabetes Daughter         Type 1; insulin       Patient denies known family history of bleeding, clotting, or anesthesia related complications.     REVIEW OF SYSTEMS:   Positive for right shoulder pain. Otherwise a 10-point reviews of systems was negative except as noted above in the HPI.     PHYSICAL EXAM:   Vitals:    02/10/22 1956   Pulse: 76   Resp: 16   SpO2: 99%     General: Awake, alert, appropriate, following commands, NAD. Appears to be in pain  Lungs: Breathing comfortably and nonlabored, no wheezes or stridor noted.  Right Upper Extremity: Skin intact. TTP over shoulder/proximal arm. No significant tenderness to palpation over clavicle, AC joint, elbow, forearm, wrist. Motor intact distally with finger flexion/extension/intrinsics/EPL, OK sign. SILT ax/m/r/u nerve distributions. Radial pulse palpable, 2+.   Left Upper Extremity: No deformity, skin intact. No significant tenderness to palpation over clavicle, AC joint, shoulder, arm, elbow, forearm, wrist. Normal ROM shoulder, elbow, wrist without pain. Motor intact distally with finger flexion/extension/intrinsics/EPL, OK sign 5/5 strength. SILT ax/m/r/u nerve distributions. Radial pulse palpable, 2+.     LABS:  Hemoglobin   Date Value Ref Range Status   02/10/2022 12.5 11.7 - 15.7 g/dL Final   10/15/2021 12.2 11.7 - 15.7 g/dL Final   2021 14.1 11.7 - 15.7 g/dL Final   2015 13.2 11.7 - 15.7  g/dL Final     WBC   Date Value Ref Range Status   2015 5.7 4.0 - 11.0 10e9/L Final     WBC Count   Date Value Ref Range Status   02/10/2022 14.1 (H) 4.0 - 11.0 10e3/uL Final     Platelet Count   Date Value Ref Range Status   02/10/2022 412 150 - 450 10e3/uL Final   2015 304 150 - 450 10e9/L Final     INR   Date Value Ref Range Status   2014 0.95 0.86 - 1.14 Final     Creatinine   Date Value Ref Range Status   02/10/2022 0.52 0.52 - 1.04 mg/dL Final   2015 0.8 0.5 - 1.0 mg/dL Final     Glucose   Date Value Ref Range Status   02/10/2022 139 (H) 70 - 99 mg/dL Final   2015 95.8 70.0 - 99.0 mg'dL Final     CRP Inflammation   Date Value Ref Range Status   2021 16.4 (H) 0.0 - 8.0 mg/L Final   2014 6.4 0.0 - 8.0 mg/L Final     Sed Rate   Date Value Ref Range Status   2014 8 0 - 30 mm/h Final       IMAGING:  Xr of right elbow, humerus, shoulder and clavicle were reviewed. Show a comminuted proximal humerus fracture.     IMPRESSION:   Amy Choduhury is a 61 year old RHD female w/ PMHx status-post fall on 2/10 with the followin. Right proximal humerus fracture    RECOMMENDATIONS:   - discharge to home if able to get adequate pain control.  - Plan for OR: none currently  - Anticoagulation/DVT Prophylaxis: none from orthopedics  - X-rays/Imaging: completed from orthopedics point of view  - Activity: up ad sylvester as pain allows  - Weight bearing: NWB RUE  - Pain control: multimodal, ice  - Diet: regular  - Follow-up: 1-2 weeks in clinic  - Disposition: discharge if able    Assessment and Plan discussed with staff Dr. Dumont,    David Reed MD  Orthopaedic Surgery Resident, PGY4   Pager: [phone]    For questions about this patient, please contact me at my pager during business hours before contacting the on call resident. At night or on weekends please contact the on call resident.

## 2022-02-11 NOTE — PLAN OF CARE
"Patient vital signs are at baseline: Yes  Patient able to ambulate as they were prior to admission or with assist devices provided by therapies during their stay:  No,  Reason:  lots of pain    Patient MUST void prior to discharge:  No,  Reason:  got here this morning    Patient able to tolerate oral intake:  No,  Reason:  got here this morning    Pain has adequate pain control using Oral analgesics:  No,  Reason:  no patient screaming of pain.        VS:   Vital signs:  Temp: (!) 96.3  F (35.7  C) Temp src: Oral BP: (!) 147/68 Pulse: 63   Resp: 18 SpO2: 97 % O2 Device: None (Room air) Oxygen Delivery: 2 LPM      Estimated body mass index is 36.54 kg/m  as calculated from the following:    Height as of 9/13/21: 1.676 m (5' 6\").    Weight as of 10/15/21: 102.7 kg (226 lb 6.4 oz).        O2: 2 Liters patient is a smoker sat was 87 RA when sleeping   Output: Had not voided yet   Last BM: 2/9/22   Activity: Was transferred by 6 people is not able to walk   Up for meals? No   Skin: Right upper arm broken bone painful   Pain: 10/10, calmed down a little after settled, lots of med in board   CMS: Pain, and not able to move right arm   Dressing: none   Diet: Reg   LDA: LFA   Equipment: IV Pump, chair patient belongings   Plan: Monitor pain   Additional Info: Patient was walking outside slipped on the ice at home.       "

## 2022-02-11 NOTE — PROGRESS NOTES
02/11/22 0900   Quick Adds   Type of Visit Initial Occupational Therapy Evaluation   Living Environment   People in home other (see comments)  (roommate)   Current Living Arrangements apartment   Home Accessibility stairs to enter home   Number of Stairs, Main Entrance 6   Stair Railings, Main Entrance railing on left side (ascending)   Transportation Anticipated   (unsure)   Living Environment Comments Patient reports living with a roommate, but she would not count on her to assist her with ADLs. Has tub/shower, but reports the shower isn't working well.    Self-Care   Usual Activity Tolerance good   Current Activity Tolerance poor   Equipment Currently Used at Home none   Activity/Exercise/Self-Care Comment PTA patient IND with mobility and ADLs. Was a .    Instrumental Activities of Daily Living (IADL)   IADL Comments Typically IND with IADLs.    Disability/Function   Hearing Difficulty or Deaf no   Wear Glasses or Blind yes   Vision Management glasses   Difficulty Communicating no   Difficulty Eating/Swallowing no   Walking or Climbing Stairs Difficulty no   Dressing/Bathing Difficulty no   Toileting issues no   Doing Errands Independently Difficulty (such as shopping) no   Fall history within last six months yes   Number of times patient has fallen within last six months 2  (fell on ice)   General Information   Onset of Illness/Injury or Date of Surgery 02/10/22   Referring Physician France Franco MD   Patient/Family Therapy Goal Statement (OT) Did not endorse   Additional Occupational Profile Info/Pertinent History of Current Problem Amy Choudhury is a 61 year old RHD female w/ PMHx status-post fall on 2/10 with the following: Right proximal humerus fracture   Existing Precautions/Restrictions fall;weight bearing;shoulder   Right Upper Extremity (Weight-bearing Status) non weight-bearing (NWB)   Cognitive Status Examination   Orientation Status orientation to person, place and time    Affect/Mental Status (Cognitive) WNL   Follows Commands WNL   Visual Perception   Visual Impairment/Limitations corrective lenses full-time   Sensory   Sensory Quick Adds No deficits were identified   Pain Assessment   Patient Currently in Pain Yes, see Vital Sign flowsheet  (significant pain)   Range of Motion Comprehensive   Comment, General Range of Motion L UE WFL. R UE cannot move elbow or shoulder, is able to wiggle fingers. Due to significant pain, pt not allowing staff to attempt to place R UE in sling   Strength Comprehensive (MMT)   Comment, General Manual Muscle Testing (MMT) Assessment L UE WFL. R UE not tested secondary to precautions and pain.    Coordination   Upper Extremity Coordination Right UE impaired   Functional Limitations Impaired ability to perform bilateral tasks   Bed Mobility   Bed Mobility   (unable to assess due to pain)   Transfers   Transfer Comments Unable to assess as patietn in too much pain, unwilling to let staff place sling in prep for mobility.    Bathing Assessment   Kevil Level (Bathing) maximum assist (25% patient effort)   Upper Body Dressing Assessment   Kevil Level (Upper Body Dressing) maximum assist (25% patient effort)   Lower Body Dressing Assessment   Kevil Level (Lower Body Dressing) maximum assist (25% patient effort)   Eating/Self Feeding   Kevil Level (Feeding) set up;independent   Comment (Feeding) with use of L hand   Toileting   Kevil Level (Toileting) dependent (less than 25% patient effort)  (pure wick )   Clinical Impression   Criteria for Skilled Therapeutic Interventions Met (OT) yes   OT Diagnosis Decreased functional mobility and ADLs   OT Problem List-Impairments impacting ADL problems related to;pain;post-surgical precautions;fear & anxiety   Assessment of Occupational Performance 5 or more Performance Deficits   Identified Performance Deficits dressing,bathing, toileting, transfers, home mgmt   Planned Therapy  Interventions (OT) ADL retraining;transfer training   Clinical Decision Making Complexity (OT) low complexity   Therapy Frequency (OT) Daily   Predicted Duration of Therapy 1 week   Anticipated Equipment Needs Upon Discharge (OT)   (TBD)   Risk & Benefits of therapy have been explained care plan/treatment goals reviewed;patient   OT Discharge Planning    OT Discharge Recommendation (DC Rec) Home with assist;home with home care occupational therapy;Transitional Care Facility   OT Rationale for DC Rec Patient is well below baseline, currently unable to tolerate any activity due to pain. Unable to place sling due to pain. patient reports she lives with roommate but cannot count on her to assist with ADLs. Needs to be fully IND to return to home setting. Does not have any family nearby that can assist. May need TCU.    OT Brief overview of current status  Unable to tolerate any activity due to pain, RN and MD's informed.    Total Evaluation Time (Minutes)   Total Evaluation Time (Minutes) 8

## 2022-02-11 NOTE — H&P
[facility]    History and Physical - Hospitalist Service, GOLD TEAM        Date of Admission:  2/10/2022    Assessment & Plan      Amy Choudhury is a 61 year old female admitted on 2/10/2022. She has history of GERD, depression, anxiety, Type II DM, vitamin D deficiency, and tobacco use disorder and presents after a fall onto the R elbow, found to have a proximal R humeral fracture. Orthopedics is recommending non-operative management and she is admitted for pain control.     Comminuted fracture of R humeral head and neck   Fall   Vitamin D deficiency  - Orthopedics consulted, appreciate recommendations  - NWKORINA HERNANDEZ   - Pain control with scheduled tylenol,  PO oxycodone PRN and IV dilaudid for breakthrough pain   - Sling as tolerated   - Ice PRN   - Follow up in Ortho Clinic in 1-2 weeks   - PT/OT consults   - Start Vitamin D supplement     Type II DM  - Diet controlled, last A1c 6.6 in October 2021     Anxiety, Depression   - Continue PTA sertraline  - Hold PTA lorazepam for now while receiving IV narcotics        Diet: Combination Diet Regular Diet AdultRegular   DVT Prophylaxis: Enoxaparin (Lovenox) SQ  Diehl Catheter: Not present  Central Lines: None  Cardiac Monitoring: None  Code Status:   FULL     Clinically Significant Risk Factors Present on Admission          # Hypocalcemia: Ca = 8.3 mg/dL (Ref range: 8.5 - 10.1 mg/dL) and/or iCa = N/A on admission, will replace as needed            Disposition Plan   Expected Discharge: 1-2 days    Anticipated discharge location:  Awaiting care coordination huddle  Delays:    Home versus TCU pending PT/OT evaluation        The patient's care was discussed with the Bedside Nurse and Patient.    France Franco MD  Hospitalist Service, GOLD TEAM   [facility]  Securely message with the Vocera Web Console (learn more here)  Text page via [employer] Paging/Directory          ______________________________________________________________________    Chief Complaint   Fall     History is obtained from the patient and chart review     History of Present Illness   Amy Choudhury is a 61 year old female who has history of Vitamin D deficiency,GERD, depression, anxiety, Type II DM, and tobacco use disorder who presents today after a fall. She says she fell and slipped on the ice, landed on her R arm. Had immediate pain and unable to get up and 911 was called. No loss of consciousness or hitting her head. She denies any headache, vision change, neck pain, chest pain, shortness of breath, abdominal pain, nausea, or vomiting. She does say it aggravates her shoulder pain to take a deep breath.     In the ED she was found to have a proximal humerus fracture. She was seen by orthopedics who recommended supportive care and non-operative management. Pain was unable to be controlled in the ED so she was admitted for further pain control.     Review of Systems    The 10 point Review of Systems is negative other than noted in the HPI or here.     Past Medical History    I have reviewed this patient's medical history and updated it with pertinent information if needed.   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  Past Surgical History:   Procedure Laterality Date     APPENDECTOMY       CHOLECYSTECTOMY       DILATION AND CURETTAGE, OPERATIVE HYSTEROSCOPY WITH MORCELLATOR, COMBINED  2012    Procedure: COMBINED DILATION AND CURETTAGE, OPERATIVE HYSTEROSCOPY WITH MORCELLATOR;  Hysteroscopy, Polypectomy, Dilation and Curettage  ;  Surgeon: Marta Montejo MD;  Location: [facility] OR     GI SURGERY      gastric bypass at age 31     ORTHOPEDIC SURGERY      back surgery at age 31     PARATHYROIDECTOMY Right 2015    Procedure: PARATHYROIDECTOMY;  Surgeon: Gregory Coleman MD;  [facility]: [facility]     [place] MARSUP BARTHOLIN GLAND CYST       SPINE SURGERY         Social History   I have reviewed this patient's social history and updated it with pertinent information if needed.  Social History     Tobacco Use     Smoking status: Current Every Day Smoker     Packs/day: 0.50     Years: 30.00     Pack years: 15.00     Types: Cigarettes     Smokeless tobacco: Never Used   Substance Use Topics     Alcohol use: Yes     Comment: occasionally     Drug use: No       Family History   I have reviewed this patient's family history and updated it with pertinent information if needed.  Family History   Problem Relation Age of Onset     Thyroid Disease Mother      Breast Cancer Mother 57         65 y.o.     Other - See Comments Mother         cystic acne     Cancer Brother      Diabetes Daughter         Type 1; insulin       Prior to Admission Medications   Prior to Admission Medications   Prescriptions Last Dose Informant Patient Reported? Taking?   LORazepam (ATIVAN) 1 MG tablet   No No   Sig: Take 1 tablet (1 mg) by mouth daily as needed for anxiety   acetaminophen (TYLENOL) 650 MG CR tablet   No No   Sig: Take 1-2 tablets (650-1,300 mg) by mouth every 8 hours as needed for mild pain or fever   hydrOXYzine (ATARAX) 25 MG tablet   No No   Sig: Take 1 tablet (25 mg) by mouth 3 times daily as needed for itching   sertraline  (ZOLOFT) 100 MG tablet   No No   Sig: Take 1.5 tablets (150 mg) by mouth daily   vitamin B complex with vitamin C (VITAMIN  B COMPLEX) tablet  Self No No   Sig: Take 1 tablet by mouth daily   Patient not taking: Reported on 10/15/2021      Facility-Administered Medications: None     Allergies   Allergies   Allergen Reactions     Codeine Sulfate GI Disturbance       Physical Exam   Vital Signs: Temp: 97.8  F (36.6  C) Temp src: Oral BP: 122/81 Pulse: 72   Resp: 18 SpO2: 94 % O2 Device: None (Room air)    Weight: 0 lbs 0 oz    General: Awake, alert, no distress, conversant   Eyes: Sclera anicteric. No conjunctival injection. PERRLA.   Mouth: Oropharynx benign, no tonsillar exudate. MMM.   Neck: Supple, full ROM, no adenopathy.  CV: Normal rate and rhythm, normal S1 and S2. No murmurs, rubs, gallops. Radial pulses 2+ and symmetric.  Respiratory: Lungs clear to auscultation bilaterally. No wheezing, rhonchi, or rales.  Abdomen: Soft, non-distended. Non-tender to palpation. No rebound tenderness or guarding. +BS.   MSK: Holds R arm straight, guarding and does not allow palpation of the R arm. No obvious joint deformity but appears swollen. No bruising. Unable to assess ROM of the R shoulder or elbow. Able to move fingers of R hand freely.   Extremities: No lower extremity edema.   Skin: Warm, dry. No rash on visible skin.   Neuro: Alert, oriented to conversation and situation. Face symmetric, speech intact. Moves all extremities.   Psych: Anxious       Data   Data reviewed today: I reviewed all medications, new labs and imaging results over the last 24 hours. I personally reviewed the Right clavicle XR showing R humeral head and neck fracture.     Recent Labs   Lab 02/10/22  2313   WBC 14.1*   HGB 12.5   MCV 79         POTASSIUM 3.5   CHLORIDE 111*   CO2 24   BUN 11   CR 0.52   ANIONGAP 4   CRISTIAN 8.3*   *   ALBUMIN 3.4   PROTTOTAL 7.4   BILITOTAL 0.2   ALKPHOS 111   ALT 22   AST 13

## 2022-02-11 NOTE — PLAN OF CARE
"Pt A/O X 4. Afebrile. VSS. Lungs-Clear bilaterally with anterior/lateral side only. Pt educated on the incentive spirometer, IS encouraged. Pt declined PRN albuterol. Pt is on Continuous Pulse-oximetry. Bowels-Hyperactive in all four quadrants, last BM 2-10-22 per pt report. Pt was incontinent X 1, agueda-cares completed, and Purewick put in place. Pt was bladder scanned @ 1400 PM and had 208 mL's. Denies nausea and vomiting. CMS and Neuro's are intact. Pt has a moderate  with a the right hand. Denies numbness and tingling in all extremities. Has pain in the right arm and given IV Dilaudid, Robaxin, Oxycodone, ICE applied, and pain is manageable. Pt declined Oxycodone at times when offered, and pt stated that she just wants to sleep. Is on a Regular diet and appetite was Good this shift. Pt declined a full body assessment and let writer assess the bilateral legs and arms only. Pt stated, \"I can't move, and don't feel like moving for you to see anything. I hurt when moving.\" Pt's right arm was placed in a sling/immobilizer by . Pt did not get out of bed and refused to be repositioned this shift. PIV patent in the left arm and saline locked. Pt is able to make needs known, and call light is within reach. Continue to monitor.   "

## 2022-02-11 NOTE — ED NOTES
Bed: ED08  Expected date:   Expected time:   Means of arrival:   Comments:  A659  61 F  Right arm pain due to fall

## 2022-02-11 NOTE — PHARMACY-ADMISSION MEDICATION HISTORY
Admission Medication History Completed by Pharmacy    See [facility] Admission Navigator for allergy information, preferred outpatient pharmacy, prior to admission medications and immunization status.     Medication History Sources:     Patient    Pharmacy fill history via [facility] Everywhere    Changes made to PTA medication list (reason):    Added: None    Deleted: None    Changed: None    Additional Information:    Pt reports not taking hydroxyzine or vitamin B complex. States hydroxyzine doesn't work for anxiety.    She states she has used triamcinolone 0.1% ointment in the past for eczema, is requesting some here    Prior to Admission medications    Medication Sig Last Dose Taking? Auth Provider   acetaminophen (TYLENOL) 650 MG CR tablet Take 1-2 tablets (650-1,300 mg) by mouth every 8 hours as needed for mild pain or fever Past Month at Unknown time Yes Randal Castellanos MD   LORazepam (ATIVAN) 1 MG tablet Take 1 tablet (1 mg) by mouth daily as needed for anxiety More than a month at Unknown time Yes Randal Castellanos MD   sertraline (ZOLOFT) 100 MG tablet Take 1.5 tablets (150 mg) by mouth daily 2/10/2022 at Unknown time Yes Randal Castellanos MD   hydrOXYzine (ATARAX) 25 MG tablet Take 1 tablet (25 mg) by mouth 3 times daily as needed for itching  Patient not taking: Reported on 2/11/2022 Not Taking at Unknown time  Randal Castellanos MD   vitamin B complex with vitamin C (VITAMIN  B COMPLEX) tablet Take 1 tablet by mouth daily  Patient not taking: Reported on 2/11/2022 Not Taking at Unknown time  Randal Castellanos MD       Date completed: 02/11/22    Medication history completed by: Kerri Carpenter [facility]

## 2022-02-11 NOTE — UTILIZATION REVIEW
[facility] Utilization Review  Admission Status; Secondary Review Determination     Admission Date: 2/10/2022  7:54 PM      Under the authority of the Utilization Management Committee, the utilization review process indicated a secondary review on the above patient.  The review outcome is based on review of the medical records, discussions with staff, and applying clinical experience noted on the date of the review.        (X)      Inpatient Status Appropriate - This patient's medical care is consistent with medical management for inpatient care and reasonable inpatient medical practice.          RATIONALE FOR DETERMINATION   Amy Choudhury is a 61 year old female with past medical history of depression, diabetes, who presented to the emergency room with complaints of right arm pain after slipping on ice and falling.  She is admitted for management of comminuted, right humeral fracture and pain control with safe disposition planning.  Per orthopedics, recommended nonoperative management.  Also noted to have hypoxia requiring supplemental oxygen briefly.  However, patient is in significant pain and unable to ambulate requiring 6 person assist and frequent IV and oral opioids.  Continues to have 10/10 pain and required IV Dilaudid/fentanyl x5 thus far.  PT/OT consults are pending.  In light of ongoing uncontrolled pain requiring IV and oral opioids, inability to ambulate with concern for recurrent falls, need for supportive cares and safe disposition planning and anticipated length of stay more than 2 midnights, criteria for inpatient admission is met.      The information on this document is developed by the utilization review team in order for the business office to ensure compliance.  This only denotes the appropriateness of proper admission status and does not reflect the quality of care rendered.              Sincerely,       Lit Agrawal MD, MS  Physician Advisor  Utilization  Review-[place]    Phone: [phone]

## 2022-02-11 NOTE — PROGRESS NOTES
Patient was seen, circumstances of admission reviewed.    History and physical from this morning reviewed    Patient notes improved pain control as the days progressed.  She did experience abdominal discomfort likely secondary to potassium replacement.    She is at risk for respiratory failure in view of a long cigarette use history and reviewed the nature of her injury.  She did have mild hypoxia earlier this morning requiring oxygen.  Lung exam remarkable for faint scattered rhonchi.  Patient is no respiratory distress.    Plan:  Cautious use of pain medications  Incentive spirometry  As needed albuterol inhaler or nebs  Mobilize  OT and PT consultation  Discharge when pain controlled and medically stable.

## 2022-02-11 NOTE — ED PROVIDER NOTES
ED Provider Note  [facility]      History     Chief Complaint   Patient presents with     Fall     slipped on ice; fell onto right elbo; pain elbo to shoulder;      HPI  Amy Choudhury is a 61 year old female who presents to the emergency department today after slipping on ice.  Please note it is extraordinarily difficult to get an accurate history from the patient due to her current state.  She was evidently walking outside and slipped on ice, fell onto her right side.  She is not certain if she fell directly onto her elbow or how she landed, but she had immediate pain in her right shoulder.  She was unable to get up and 911 was called.  She denies hitting her head, denies LOC.  She denies any neck pain, chest pain, back pain, abdominal pain, cough, or shortness of breath.  No fever, nausea, vomiting, or diarrhea.    Patient denies any medical problems whatsoever, though admits that she typically does not see a clinic or have a primary physician.  Record review shows that she does have a significant mental health history including severe major depressive disorder.    She reports that she has received 1 dose of the Covid 19 vaccine, but then subsequently had COVID-19 back in September 2021.  She has not received her second dose of the vaccine. No COVID vaccine documented in [facility].    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     Depressive disorder      Gastro-oesophageal reflux disease     bleeding ulcers     Hiatal hernia     NEWLY DIAGNOSISED     Hyperparathyroidism, unspecified (H) 3/29/2012     Peptic ulcer, unspecified site, unspecified as acute or chronic, without mention of hemorrhage or perforation      Pneumonia     NOVEMBER     PONV (postoperative nausea and vomiting)      TMJ (temporomandibular joint syndrome) 5/8/2014     Vitamin D deficiency 6/7/2012        Past Surgical History:   Procedure Laterality Date     APPENDECTOMY       CHOLECYSTECTOMY       DILATION AND CURETTAGE, OPERATIVE HYSTEROSCOPY WITH MORCELLATOR, COMBINED  2012    Procedure: COMBINED DILATION AND CURETTAGE, OPERATIVE HYSTEROSCOPY WITH MORCELLATOR;  Hysteroscopy, Polypectomy, Dilation and Curettage  ;  Surgeon: Marta Montejo MD;  Location: [facility] OR     GI SURGERY      gastric bypass at age 31     ORTHOPEDIC SURGERY      back surgery at age 31     PARATHYROIDECTOMY Right 2015    Procedure: PARATHYROIDECTOMY;  Surgeon: Gregory Coleman MD;  [facility]     [facility] BARTHOLIN GL[facility] CYST       SPINE SURGERY         Family History   Problem Relation Age of Onset     Thyroid Disease Mother      Breast Cancer Mother 57         65 y.o.     Other - See Comments Mother         cystic acne     Cancer Brother      Diabetes Daughter         Type 1; insulin       Social History     Tobacco Use     Smoking status: Current Every Day Smoker     Packs/day: 0.50     Years: 30.00     Pack years: 15.00     Types: Cigarettes     Smokeless tobacco: Never Used   Substance Use Topics     Alcohol use: Yes     Comment: occasionally         Past Medical History  Past Medical History:   Diagnosis Date     Anemia      Anxiety      Axillary abscess     chronic, recurring     Backache      Benign neoplasm of adrenal gland 2012     Depressive disorder      Gastro-oesophageal reflux disease     bleeding ulcers     Hiatal hernia     NEWLY DIAGNOSISED     Hyperparathyroidism, unspecified (H) 3/29/2012     Peptic ulcer, unspecified site, unspecified as acute or chronic, without mention of hemorrhage or perforation      Pneumonia     NOVEMBER     PONV (postoperative nausea and vomiting)      TMJ (temporomandibular joint syndrome) 2014     Vitamin D deficiency 2012     Past Surgical History:   Procedure Laterality Date     APPENDECTOMY       CHOLECYSTECTOMY       DILATION AND CURETTAGE,  OPERATIVE HYSTEROSCOPY WITH MORCELLATOR, COMBINED  2012    Procedure: COMBINED DILATION AND CURETTAGE, OPERATIVE HYSTEROSCOPY WITH MORCELLATOR;  Hysteroscopy, Polypectomy, Dilation and Curettage  ;  Surgeon: Marta Montejo MD;  Location: [facility]     GI SURGERY      gastric bypass at age 31     ORTHOPEDIC SURGERY      back surgery at age 31     PARATHYROIDECTOMY Right 2015    Procedure: PARATHYROIDECTOMY;  Surgeon: Gregory Coleman MD;  [facility]     [facility] MARSUP BARTHOLIN GLAND CYST       SPINE SURGERY       acetaminophen (TYLENOL) 650 MG CR tablet  hydrOXYzine (ATARAX) 25 MG tablet  LORazepam (ATIVAN) 1 MG tablet  sertraline (ZOLOFT) 100 MG tablet  vitamin B complex with vitamin C (VITAMIN  B COMPLEX) tablet      Allergies   Allergen Reactions     Codeine Sulfate GI Disturbance     Family History  Family History   Problem Relation Age of Onset     Thyroid Disease Mother      Breast Cancer Mother 57         65 y.o.     Other - See Comments Mother         cystic acne     Cancer Brother      Diabetes Daughter         Type 1; insulin     Social History   Social History     Tobacco Use     Smoking status: Current Every Day Smoker     Packs/day: 0.50     Years: 30.00     Pack years: 15.00     Types: Cigarettes     Smokeless tobacco: Never Used   Substance Use Topics     Alcohol use: Yes     Comment: occasionally     Drug use: No      Past medical history, past surgical history, medications, allergies, family history, and social history were reviewed with the patient. No additional pertinent items.       Review of Systems   Constitutional: Negative for chills and fever.   HENT: Negative for congestion and sore throat.    Eyes: Negative for redness.   Respiratory: Negative for cough and shortness of breath.    Cardiovascular: Negative for chest pain.   Gastrointestinal: Negative for abdominal pain, diarrhea, nausea and vomiting.   Genitourinary: Negative for difficulty urinating.    Musculoskeletal: Positive for arthralgias, joint swelling and myalgias. Negative for neck pain and neck stiffness.   Skin: Negative for color change.   Neurological: Negative for headaches.   Psychiatric/Behavioral: Positive for agitation. Negative for confusion.   All other systems reviewed and are negative.    A complete review of systems was performed with pertinent positives and negatives noted in the HPI, and all other systems negative.    Physical Exam   Pulse: 76  Resp: 16  SpO2: 99 %  Physical Exam  Vitals and nursing note reviewed.   Constitutional:       General: She is in acute distress.      Appearance: She is well-developed. She is not diaphoretic.      Comments: Highly anxious, uncooperative with most questions and exam, highly distressed even after analgesic medication. Difficult to redirect. Angry and inappropriate at times.    HENT:      Head: Normocephalic and atraumatic.   Eyes:      Conjunctiva/sclera: Conjunctivae normal.      Pupils: Pupils are equal, round, and reactive to light.   Neck:      Comments: No posterior C spine TTP  Cardiovascular:      Rate and Rhythm: Normal rate and regular rhythm.      Heart sounds: Normal heart sounds.   Pulmonary:      Effort: Pulmonary effort is normal. No respiratory distress.      Breath sounds: Normal breath sounds. No wheezing or rales.   Abdominal:      General: Bowel sounds are normal. There is no distension.      Palpations: Abdomen is soft.      Tenderness: There is no abdominal tenderness.      Comments: Obese, soft, nontender   Musculoskeletal:         General: Swelling and tenderness present.      Cervical back: Normal range of motion and neck supple.      Comments: Significant tenderness to palpation over right shoulder, and along proximal humerus.  No tenderness to gentle palpation of the mid to distal humerus, elbow, forearm, or hand.  Sensation intact in all sensory distributions distally.  Cap refill normal.  She is able to wiggle her  fingers.  She will not allow passive range of motion at the elbow and will not attempt range of motion at the elbow on her own.   Skin:     General: Skin is warm and dry.   Neurological:      Mental Status: She is alert and oriented to person, place, and time.   Psychiatric:      Comments: Highly anxious, irritable, angry, verbally lashing out at medical staff. Poor insight. Defensive.          ED Course      Procedures       The medical record was reviewed and interpreted.  Current labs reviewed and interpreted.              Results for orders placed or performed during the hospital encounter of 02/10/22   Clavicle XR, right     Status: None    Narrative    EXAM: XR CLAVICLE RT 2 VIEWS  LOCATION: [facility]  DATE/TIME: 2/10/2022 9:21 PM    INDICATION: Fall with right shoulder pain  COMPARISON: None.      Impression    IMPRESSION: Comminuted fracture involving in the right humeral head and the right humeral neck. Minimally displaced comminuted fracture fragments laterally with slight impaction of the proximal right humeral shaft upon the humeral head. No definitive   evidence for intra-articular involvement. No evidence for dislocation of the right glenohumeral joint. Minimal degenerative changes right acromioclavicular joint. No other fractures.    XR Shoulder Right G/E 3 Views     Status: None    Narrative    EXAM: XR SHOULDER RIGHT G/E 3 VIEWS  LOCATION: [facility]  DATE/TIME: 2/10/2022 9:25 PM    INDICATION: Fall with right shoulder pain  COMPARISON: None.      Impression    IMPRESSION: Comminuted fracture involving in the right humeral head and neck. No definitive evidence for significant intra-articular involvement. No dislocation of glenohumeral joint. Minimal displacement of the lateral humeral head fractures with   impaction of the humeral shaft upon the humeral head. Right acromioclavicular joint intact. Right  clavicle intact. No other fractures evident.       Humerus XR, G/E 2 views, right     Status: None    Narrative    EXAM: XR HUMERUS RIGHT G/E 2 VIEWS  LOCATION: [facility]  DATE/TIME: 2/10/2022 9:21 PM    INDICATION: Fall with right arm pain  COMPARISON: Right shoulder radiographs 02/10/2022      Impression    IMPRESSION: Comminuted fracture involving in the right humeral head and neck with minimally displaced lateral fracture fragments. No dislocation of the right glenohumeral joint. Moderate degenerative osteoarthrosis at the right glenohumeral joint.   Minimal degenerative changes at an intact right acromioclavicular joint. No evidence for distal radial fracture. Right elbow intact.   XR Elbow Right 2 Views     Status: None    Narrative    EXAM: XR ELBOW RIGHT 2 VIEWS  LOCATION: [facility]  DATE/TIME: 2/10/2022 9:21 PM    INDICATION: Fall with right elbow pain  COMPARISON: None.      Impression    IMPRESSION: Allowing for rotation of the AP view there is no definitive evidence for acute fracture or dislocation. Radiocapitellar and ulnohumeral joints intact. No significant elbow effusion.   XR Chest 1 View     Status: None    Narrative    EXAM: XR CHEST 1 VIEW  LOCATION: [facility]  DATE/TIME: 2/10/2022 9:22 PM    INDICATION: Fall with right sided chest pain and shoulder pain  COMPARISON: Right shoulder radiograph 02/10/2022      Impression    IMPRESSION: Comminuted fracture involving the right humeral head and neck. Please see dedicated right shoulder radiographs 02/10/2022 for further details. Minimal cardiac enlargement. Normal pulmonary vascularity. No pneumothorax or pulmonary   infiltrates. Minimal degenerative changes in the left shoulder in the spine. Metallic density projecting over the left aspect of the neck may lie outside the patient.   Comprehensive metabolic panel      Status: Abnormal   Result Value Ref Range    Sodium 139 133 - 144 mmol/L    Potassium 3.5 3.4 - 5.3 mmol/L    Chloride 111 (H) 94 - 109 mmol/L    Carbon Dioxide (CO2) 24 20 - 32 mmol/L    Anion Gap 4 3 - 14 mmol/L    Urea Nitrogen 11 7 - 30 mg/dL    Creatinine 0.52 0.52 - 1.04 mg/dL    Calcium 8.3 (L) 8.5 - 10.1 mg/dL    Glucose 139 (H) 70 - 99 mg/dL    Alkaline Phosphatase 111 40 - 150 U/L    AST 13 0 - 45 U/L    ALT 22 0 - 50 U/L    Protein Total 7.4 6.8 - 8.8 g/dL    Albumin 3.4 3.4 - 5.0 g/dL    Bilirubin Total 0.2 0.2 - 1.3 mg/dL    GFR Estimate >90 >60 mL/min/1.73m2   Asymptomatic COVID-19 Virus (Coronavirus) by PCR Nasopharyngeal     Status: Normal    Specimen: Nasopharyngeal; Swab   Result Value Ref Range    SARS CoV2 PCR Negative Negative    Narrative    Testing was performed using the [employer]  SARS-CoV-2 & Influenza A/B Assay on the [employer]  [employer]  System.  This test should be ordered for the detection of SARS-COV-2 in individuals who meet SARS-CoV-2 clinical and/or epidemiological criteria. Test performance is unknown in asymptomatic patients.  This test is for in vitro diagnostic use under the FDA EUA for laboratories certified under CLIA to perform moderate and/or high complexity testing. This test has not been FDA cleared or approved.  A negative test does not rule out the presence of PCR inhibitors in the specimen or target RNA in concentration below the limit of detection for the assay. The possibility of a false negative should be considered if the patient's recent exposure or clinical presentation suggests COVID-19.  [facility] are certified under the Clinical Laboratory Improvement Amendments of 1988 (CLIA-88) as qualified to perform moderate and/or high complexity laboratory testing.   CBC with platelets and differential     Status: Abnormal   Result Value Ref Range    WBC Count 14.1 (H) 4.0 - 11.0 10e3/uL    RBC Count 4.89 3.80 - 5.20 10e6/uL    Hemoglobin 12.5 11.7 - 15.7  g/dL    Hematocrit 38.8 35.0 - 47.0 %    MCV 79 78 - 100 fL    MCH 25.6 (L) 26.5 - 33.0 pg    MCHC 32.2 31.5 - 36.5 g/dL    RDW 14.2 10.0 - 15.0 %    Platelet Count 412 150 - 450 10e3/uL    % Neutrophils 81 %    % Lymphocytes 12 %    % Monocytes 6 %    % Eosinophils 1 %    % Basophils 0 %    % Immature Granulocytes 0 %    NRBCs per 100 WBC 0 <1 /100    Absolute Neutrophils 11.4 (H) 1.6 - 8.3 10e3/uL    Absolute Lymphocytes 1.6 0.8 - 5.3 10e3/uL    Absolute Monocytes 0.9 0.0 - 1.3 10e3/uL    Absolute Eosinophils 0.1 0.0 - 0.7 10e3/uL    Absolute Basophils 0.1 0.0 - 0.2 10e3/uL    Absolute Immature Granulocytes 0.0 <=0.4 10e3/uL    Absolute NRBCs 0.0 10e3/uL   CBC with Platelets & Differential     Status: Abnormal    Narrative    The following orders were created for panel order CBC with Platelets & Differential.  Procedure                               Abnormality         Status                     ---------                               -----------         ------                     CBC with platelets and d...[[id number]]  Abnormal            Final result                 Please view results for these tests on the individual orders.     Medications   HYDROmorphone (DILAUDID) injection 0.5 mg (0.5 mg Intravenous Given 2/10/22 2107)   oxyCODONE (ROXICODONE) tablet 5 mg (has no administration in time range)   fentaNYL (PF) (SUBLIMAZE) injection  mcg (100 mcg Intravenous Given 2/10/22 2142)   fentaNYL (PF) (SUBLIMAZE) injection  mcg (100 mcg Intravenous Given 2/10/22 2314)        Assessments & Plan (with Medical Decision Making)   Patient presents to the emergency department today after falling and injuring her shoulder.  She was screaming upon arrival in the emergency department and was unable to be redirected/called.  She was given a dose of Dilaudid without improvement.  She was subsequently given a dose of fentanyl which seemed to help somewhat.  When I attempted to examine her she was very angry, hostile,  and defensive.  She would not allow me to help reposition her arm despite the fact that she called medical staff into her room to help with positioning.     Patient had multiple x-ray images which were ordered.  Right shoulder x-ray demonstrates comminuted fracture of the right humeral head and neck without definitive evidence of significant intra-articular involvement.  Right AC joint is intact and right clavicle is intact.  She had x-rays of the humerus, elbow, clavicle and chest which did not show any other injuries.    We had quite a bit of difficulty doing very basic cares for the patient.  She would not allow full vital signs (we still do not have a blood pressure or temperature on her as she has not allowed it even in several hours).  We did cut her sweater and shirt off with her permission but she then would not even attempt to sit forward so we could remove the clothing from her despite the fact that she was complaining that we have allowed her to sit in wet clothing.  This was after 2 doses of fentanyl.  During my exam, I attempted to place her into a more flexed position at the elbow with her forearm resting on her abdominal wall.  Patient would not allow this.  We have consulted orthopedics, please see their note for full details.  Orthopedics believes that this will be a nonoperative injury and recommends a right upper extremity sling.  Nursing did try several times to apply the sling and the patient has it somewhat longterm on at present but will not allow us to actually complete the proper positioning of the sling and bend of the elbow.    At this point patient will need to be admitted for pain control as she very clearly cannot handle this herself at home.  I do not suspect any other additional injury at this point based on her exam.  Will be admitted to the medicine service here on [place] for pain control.  Discussed with admitting team.    I have reviewed the nursing notes. I have reviewed the  findings, diagnosis, plan and need for follow up with the patient.    New Prescriptions    No medications on file       Final diagnoses:   Humeral head fracture, right, closed, initial encounter   Fall due to slipping on ice or snow, initial encounter       --  Yomaira Armstrong MD  [facility] EMERGENCY DEPARTMENT  2/10/2022     Yomaira Armstrong MD  02/11/22 0148

## 2022-02-12 ENCOUNTER — APPOINTMENT (OUTPATIENT)
Dept: OCCUPATIONAL THERAPY | Facility: CLINIC | Age: 62
DRG: 563 | End: 2022-02-12
Payer: COMMERCIAL

## 2022-02-12 ENCOUNTER — HEALTH MAINTENANCE LETTER (OUTPATIENT)
Age: 62
End: 2022-02-12

## 2022-02-12 LAB
ANION GAP SERPL CALCULATED.3IONS-SCNC: 4 MMOL/L (ref 3–14)
BUN SERPL-MCNC: 11 MG/DL (ref 7–30)
CALCIUM SERPL-MCNC: 8.3 MG/DL (ref 8.5–10.1)
CHLORIDE BLD-SCNC: 111 MMOL/L (ref 94–109)
CO2 SERPL-SCNC: 24 MMOL/L (ref 20–32)
CREAT SERPL-MCNC: 0.6 MG/DL (ref 0.52–1.04)
GFR SERPL CREATININE-BSD FRML MDRD: >90 ML/MIN/1.73M2
GLUCOSE BLD-MCNC: 115 MG/DL (ref 70–99)
GLUCOSE BLDC GLUCOMTR-MCNC: 108 MG/DL (ref 70–99)
HBA1C MFR BLD: 6 % (ref 0–5.6)
POTASSIUM BLD-SCNC: 4.1 MMOL/L (ref 3.4–5.3)
SODIUM SERPL-SCNC: 139 MMOL/L (ref 133–144)

## 2022-02-12 PROCEDURE — 99232 SBSQ HOSP IP/OBS MODERATE 35: CPT | Performed by: INTERNAL MEDICINE

## 2022-02-12 PROCEDURE — 250N000011 HC RX IP 250 OP 636: Performed by: STUDENT IN AN ORGANIZED HEALTH CARE EDUCATION/TRAINING PROGRAM

## 2022-02-12 PROCEDURE — 80048 BASIC METABOLIC PNL TOTAL CA: CPT | Performed by: INTERNAL MEDICINE

## 2022-02-12 PROCEDURE — 250N000013 HC RX MED GY IP 250 OP 250 PS 637: Performed by: INTERNAL MEDICINE

## 2022-02-12 PROCEDURE — 36415 COLL VENOUS BLD VENIPUNCTURE: CPT | Performed by: INTERNAL MEDICINE

## 2022-02-12 PROCEDURE — 250N000013 HC RX MED GY IP 250 OP 250 PS 637: Performed by: STUDENT IN AN ORGANIZED HEALTH CARE EDUCATION/TRAINING PROGRAM

## 2022-02-12 PROCEDURE — 97535 SELF CARE MNGMENT TRAINING: CPT | Mod: GO | Performed by: OCCUPATIONAL THERAPIST

## 2022-02-12 PROCEDURE — 120N000002 HC R&B MED SURG/OB UMMC

## 2022-02-12 RX ORDER — POLYETHYLENE GLYCOL 3350 17 G/17G
17 POWDER, FOR SOLUTION ORAL DAILY
Status: DISCONTINUED | OUTPATIENT
Start: 2022-02-12 | End: 2022-02-17 | Stop reason: HOSPADM

## 2022-02-12 RX ORDER — TRAZODONE HYDROCHLORIDE 50 MG/1
50 TABLET, FILM COATED ORAL AT BEDTIME
Status: DISCONTINUED | OUTPATIENT
Start: 2022-02-12 | End: 2022-02-17 | Stop reason: HOSPADM

## 2022-02-12 RX ORDER — LORAZEPAM 1 MG/1
1 TABLET ORAL DAILY PRN
Status: DISCONTINUED | OUTPATIENT
Start: 2022-02-12 | End: 2022-02-17 | Stop reason: HOSPADM

## 2022-02-12 RX ORDER — OXYCODONE HYDROCHLORIDE 5 MG/1
5-10 TABLET ORAL EVERY 4 HOURS PRN
Status: DISCONTINUED | OUTPATIENT
Start: 2022-02-12 | End: 2022-02-17 | Stop reason: HOSPADM

## 2022-02-12 RX ADMIN — OXYCODONE HYDROCHLORIDE 5 MG: 5 TABLET ORAL at 02:14

## 2022-02-12 RX ADMIN — ACETAMINOPHEN 975 MG: 325 TABLET, FILM COATED ORAL at 07:53

## 2022-02-12 RX ADMIN — METHOCARBAMOL 500 MG: 500 TABLET ORAL at 22:05

## 2022-02-12 RX ADMIN — METHOCARBAMOL 500 MG: 500 TABLET ORAL at 02:14

## 2022-02-12 RX ADMIN — OXYCODONE HYDROCHLORIDE 10 MG: 5 TABLET ORAL at 19:02

## 2022-02-12 RX ADMIN — OXYCODONE HYDROCHLORIDE 10 MG: 5 TABLET ORAL at 08:04

## 2022-02-12 RX ADMIN — HYDROMORPHONE HYDROCHLORIDE 0.5 MG: 1 INJECTION, SOLUTION INTRAMUSCULAR; INTRAVENOUS; SUBCUTANEOUS at 06:11

## 2022-02-12 RX ADMIN — MICONAZOLE NITRATE: 20 POWDER TOPICAL at 08:04

## 2022-02-12 RX ADMIN — ACETAMINOPHEN 975 MG: 325 TABLET, FILM COATED ORAL at 14:06

## 2022-02-12 RX ADMIN — MICONAZOLE NITRATE: 20 POWDER TOPICAL at 22:00

## 2022-02-12 RX ADMIN — B-COMPLEX W/ C & FOLIC ACID TAB 1 TABLET: TAB at 07:56

## 2022-02-12 RX ADMIN — ACETAMINOPHEN 975 MG: 325 TABLET, FILM COATED ORAL at 21:58

## 2022-02-12 RX ADMIN — SENNOSIDES AND DOCUSATE SODIUM 2 TABLET: 8.6; 5 TABLET ORAL at 19:02

## 2022-02-12 RX ADMIN — POLYETHYLENE GLYCOL 3350 17 G: 17 POWDER, FOR SOLUTION ORAL at 07:55

## 2022-02-12 RX ADMIN — TRAZODONE HYDROCHLORIDE 50 MG: 50 TABLET ORAL at 21:58

## 2022-02-12 RX ADMIN — SENNOSIDES AND DOCUSATE SODIUM 2 TABLET: 8.6; 5 TABLET ORAL at 07:56

## 2022-02-12 RX ADMIN — ENOXAPARIN SODIUM 40 MG: 100 INJECTION SUBCUTANEOUS at 07:53

## 2022-02-12 RX ADMIN — OXYCODONE HYDROCHLORIDE 10 MG: 5 TABLET ORAL at 12:07

## 2022-02-12 RX ADMIN — SERTRALINE HYDROCHLORIDE 150 MG: 100 TABLET ORAL at 08:04

## 2022-02-12 ASSESSMENT — ACTIVITIES OF DAILY LIVING (ADL)
ADLS_ACUITY_SCORE: 21
ADLS_ACUITY_SCORE: 17
ADLS_ACUITY_SCORE: 17
ADLS_ACUITY_SCORE: 15
ADLS_ACUITY_SCORE: 17
ADLS_ACUITY_SCORE: 21
ADLS_ACUITY_SCORE: 17
ADLS_ACUITY_SCORE: 21
ADLS_ACUITY_SCORE: 17
ADLS_ACUITY_SCORE: 21
ADLS_ACUITY_SCORE: 17
ADLS_ACUITY_SCORE: 15
ADLS_ACUITY_SCORE: 15
ADLS_ACUITY_SCORE: 17

## 2022-02-12 NOTE — PLAN OF CARE
VS: BP (!) 150/87 (BP Location: Left arm)   Pulse 68   Temp (!) 96.7  F (35.9  C) (Oral)   Resp 16   SpO2 98%    O2: NC on 1L. Denied SOB or chest pain this shift.    Output: Continence of B/B this shift using a bed pan    Last BM: 2/10/22   Activity: Pt not OOB this shift. Chair fast.    Skin: Skin intact except under L abdominal flap (redness present). Miconazole powder in cubby. Pt refused Powder this shift.    Pain: 10/10 pain in right shoulder. Treated with IV Dilaudid Oxy and Robaxin.   CMS: A/Ox4, intact. Denied n/t, n/v, or dizziness this shift.    Dressing: None   Diet: Regular Diet    LDA: L PIV S.L.    Equipment: Splint for Shoulder, personal belongings    Plan: Expected discharge 2/13/22. Continue POC. Pt. Is able to make needs known. Call light within reach. Pt appears to be sleeping during rounds.    Additional Info: Left a Sticky note for MD. Pt wants a 1:1 with her MD to discuss a different arm brace, sleeping pills options and stronger pain meds.

## 2022-02-12 NOTE — PROGRESS NOTES
Pt was seen course reviewed with nursing staff    Pt state pain control has been poor overnight, also concerned re proper fit of R UE immobilizer  Notes an occ non-productive cough, denies chest pain or SOB  Appetite improved  No BM since PTA, no abd pain  Wants something ordered for sleep      Afebrile  BP 150s-160s/  HR 70  02 sats upper 90s 1 LPM    Alert, in NAD, irritable  RR 12, unlabored  Lungs clear, occ congested cough  CV rrr  Abd soft, non-distended  No LE edema  R UE in immobilizer  No R hand swelling, strength intact      Results for DEMAR ASCENCIO (MRN [id number]) as of 2/12/2022 13:17   Ref. Range 2/12/2022 07:12   Sodium Latest Ref Range: 133 - 144 mmol/L 139   Potassium Latest Ref Range: 3.4 - 5.3 mmol/L 4.1   Chloride Latest Ref Range: 94 - 109 mmol/L 111 (H)   Carbon Dioxide Latest Ref Range: 20 - 32 mmol/L 24   Urea Nitrogen Latest Ref Range: 7 - 30 mg/dL 11   Creatinine Latest Ref Range: 0.52 - 1.04 mg/dL 0.60   GFR Estimate Latest Ref Range: >60 mL/min/1.73m2 >90   Calcium Latest Ref Range: 8.5 - 10.1 mg/dL 8.3 (L)   Anion Gap Latest Ref Range: 3 - 14 mmol/L 4       Assessment/plan       Demar Ascencio is a 61 year old female admitted on 2/10/2022. She has history of GERD, depression, anxiety, Type II DM, vitamin D deficiency, and tobacco use disorder and presents after a fall onto the R elbow, found to have a proximal R humeral fracture. Orthopedics has recommended non-operative management.  Pt was admitted for pain control    Comminuted fracture of R humeral head and neck   Fall   Vitamin D deficiency  Pain poorly controlled  Plan increase oxycodone to 5-10 mg q 4 prn, + prn IV Dilaudid  PT/OT  OT to assess immobilizer  Mobilize  Enoxaparin for DVT proph  Bowel regimen  outpt ortho f/u    Mild hypoxia  Long history of smoking, no formal history of lung disease  CXR wnl  Suspect atelectasis, hypoventilation secondary to injury, pain  Plan encourage IS  Prn albuterol      Type II DM  - Diet  controlled, last A1c 6.6 in October 2021   Plan discontinue BG monitoring     Anxiety, Depression   - Continue PTA sertraline  Prn lorazepam (takes PTA)

## 2022-02-12 NOTE — PROGRESS NOTES
"  VS: BP (!) 162/75 (BP Location: Right leg)   Pulse 70   Temp (!) 96.7  F (35.9  C) (Oral)   Resp 16   SpO2 97%    O2: Room air saturations 97%. Pt was able to complete IS to level of 1500. Pt states \" I smoke so I know  I am suppose to breathe deep. \" Pt has a cough occasionally. Needs to be encouraged to take deep breathes.    Output: Pt has been requesting to go on bedpan. Pt has been refusing to get up to try and go to the bathroom.    Last BM: 2/11/2022 reports patient. Talked about constipating effects of narcotics.    Activity: Pt states\" I am not going to try to get up until my pain is under control!!\" Pt is agreeable to try and get up with OT at 1230 today and allow staff to readjust sling and try to sit at edge of bed.    Skin:    Pain: Pt complaining of pain to a 10 on scale of 1-10. Pt falls asleep easily after having pain medications.    CMS: Intact   Dressing:    Diet: Regular. Tolerating well. Denies nausea.    LDA:    Equipment: Right arm sling and immobilizer.    Plan: Pt was able to get up with OT help and staff. Pt very anxious about brace and the way it is fitting for comfort. Pt was able to walk up to bathroom and void. Pt wants to hold arm when up. Sling was readjusted to patients comfort level. Pt was able to walk. Pt needs encouragement with ambulation and positioning. Pt only took oral pain pill on entire shift. Seems to be resting quite comfortably between cares. At times seems drowsy. Will continue to keep patient on continuous oximetry.    Additional Info: Pt very irritable with sleeplessness, pain and activity. Will try to arrange plan of cares on more of a schedule to help facilitate patients participation with cares.  Pt states\" I live in the bottom floor of an apartment.\" Status of patient will continue to be monitored.        "

## 2022-02-12 NOTE — PLAN OF CARE
VS: BP (!) 156/72 (BP Location: Right leg)   Pulse 58   Temp 96.8  F (36  C) (Oral)   Resp 16   SpO2 99%      O2: Sats >90% on 1 lpm.   Output: Pt is continent but unable to move due to pain. Pt has brief on and uses bedpan.   Last BM: LBM 2/10   Activity: Pt has been in bed. Able to lift bottom when changing brief.   Skin: Skin intact except  under abdominal fold. Miconazole powder ordered.   Pain: Pain managed with prn oxycodone and IV dilaudid for breakthrough pain.   CMS: Baseline numbness in bilateral hands.   Dressing: None.   Diet: Tolerating regular diet.   LDA: PIV SL in left hand.   Equipment: Shoulder splint brace and patient belongings.   Plan: TBD. Will continue to monitor.   Additional Info:

## 2022-02-13 PROCEDURE — 250N000013 HC RX MED GY IP 250 OP 250 PS 637: Performed by: STUDENT IN AN ORGANIZED HEALTH CARE EDUCATION/TRAINING PROGRAM

## 2022-02-13 PROCEDURE — 99207 PR CDG-MDM COMPONENT: MEETS MODERATE - UP CODED: CPT | Performed by: INTERNAL MEDICINE

## 2022-02-13 PROCEDURE — 99232 SBSQ HOSP IP/OBS MODERATE 35: CPT | Performed by: INTERNAL MEDICINE

## 2022-02-13 PROCEDURE — 250N000013 HC RX MED GY IP 250 OP 250 PS 637: Performed by: INTERNAL MEDICINE

## 2022-02-13 PROCEDURE — 250N000011 HC RX IP 250 OP 636: Performed by: STUDENT IN AN ORGANIZED HEALTH CARE EDUCATION/TRAINING PROGRAM

## 2022-02-13 PROCEDURE — 120N000002 HC R&B MED SURG/OB UMMC

## 2022-02-13 RX ADMIN — MICONAZOLE NITRATE: 20 POWDER TOPICAL at 21:26

## 2022-02-13 RX ADMIN — B-COMPLEX W/ C & FOLIC ACID TAB 1 TABLET: TAB at 08:30

## 2022-02-13 RX ADMIN — ENOXAPARIN SODIUM 40 MG: 100 INJECTION SUBCUTANEOUS at 08:30

## 2022-02-13 RX ADMIN — POLYETHYLENE GLYCOL 3350 17 G: 17 POWDER, FOR SOLUTION ORAL at 08:30

## 2022-02-13 RX ADMIN — ACETAMINOPHEN 975 MG: 325 TABLET, FILM COATED ORAL at 08:30

## 2022-02-13 RX ADMIN — OXYCODONE HYDROCHLORIDE 10 MG: 5 TABLET ORAL at 06:53

## 2022-02-13 RX ADMIN — OXYCODONE HYDROCHLORIDE 10 MG: 5 TABLET ORAL at 11:36

## 2022-02-13 RX ADMIN — TRAZODONE HYDROCHLORIDE 50 MG: 50 TABLET ORAL at 21:26

## 2022-02-13 RX ADMIN — OXYCODONE HYDROCHLORIDE 10 MG: 5 TABLET ORAL at 21:26

## 2022-02-13 RX ADMIN — OXYCODONE HYDROCHLORIDE 10 MG: 5 TABLET ORAL at 17:04

## 2022-02-13 RX ADMIN — SENNOSIDES AND DOCUSATE SODIUM 1 TABLET: 50; 8.6 TABLET ORAL at 08:30

## 2022-02-13 RX ADMIN — SENNOSIDES AND DOCUSATE SODIUM 2 TABLET: 8.6; 5 TABLET ORAL at 19:06

## 2022-02-13 RX ADMIN — SERTRALINE HYDROCHLORIDE 150 MG: 100 TABLET ORAL at 08:30

## 2022-02-13 RX ADMIN — METHOCARBAMOL 500 MG: 500 TABLET ORAL at 06:53

## 2022-02-13 RX ADMIN — OXYCODONE HYDROCHLORIDE 10 MG: 5 TABLET ORAL at 00:14

## 2022-02-13 RX ADMIN — ACETAMINOPHEN 975 MG: 325 TABLET, FILM COATED ORAL at 19:07

## 2022-02-13 RX ADMIN — ACETAMINOPHEN 975 MG: 325 TABLET, FILM COATED ORAL at 13:06

## 2022-02-13 RX ADMIN — HYDROMORPHONE HYDROCHLORIDE 0.5 MG: 1 INJECTION, SOLUTION INTRAMUSCULAR; INTRAVENOUS; SUBCUTANEOUS at 04:49

## 2022-02-13 ASSESSMENT — ACTIVITIES OF DAILY LIVING (ADL)
ADLS_ACUITY_SCORE: 17

## 2022-02-13 NOTE — PLAN OF CARE
VS: BP (!) 140/68 (BP Location: Left arm)   Pulse 63   Temp 97.5  F (36.4  C) (Oral)   Resp 16   SpO2 95%      O2: Sats >90% on 1 lpm.   Output: Pt is continent but unable to move due to pain. Pt has brief on and uses bedpan.   Last BM: LBM 2/10   Activity: Pt has been in bed. Able to lift bottom when changing brief.   Skin: Skin intact except  under abdominal fold. Miconazole powder applied.   Pain: Pain managed with prn oxycodone and Robaxin.    CMS: Baseline numbness in bilateral hands.   Dressing: None.   Diet: Tolerating regular diet.   LDA: PIV SL in left hand.   Equipment: Shoulder splint brace and patient belongings.   Plan: TBD. Will continue to monitor.   Additional Info:

## 2022-02-13 NOTE — PROGRESS NOTES
SPIRITUAL HEALTH SERVICES  SPIRITUAL ASSESSMENT Progress Note  [facility] ([facility] [place]) 5th floor Ortho     REFERRAL SOURCE: Admission request     Pt was receptive to  visit but voiced no spiritual needs at this time.  Identifies as .  Pt was given information on how to reach out to [facility] should she have further needs.    PLAN: [facility] will remain available     Ivania Coe    Pager: [phone]

## 2022-02-13 NOTE — PLAN OF CARE
[id number]  VS: VSS and afebrile.   O2: Sats >92%    Output: Brief on and uses bedpan.   Last BM: 2/11   Activity: Decline OOB.  Soft sling in R shoulder   Skin: Skin intact except  under abdominal fold.    Pain: C/o intolerable pain, IV dilaudid given 1x, Oxycodone and Robaxin.    CMS: Baseline numbness in bilateral hands.   Dressing: None.   Diet: Regular   LDA: PIV SL in left hand.   Equipment: patient belongings.   Plan: Continue POC   Additional Info:

## 2022-02-13 NOTE — PLAN OF CARE
VS: BP (!) 152/70 (BP Location: Left arm)   Pulse 73   Temp 98.7  F (37.1  C) (Oral)   Resp 16   SpO2 90%    O2: Stable on room air   Output: Voids in toilet or bedpan continent   Last BM: 2/11/22   Activity: Up with assist of 1 or 2 as standby, pt has constant pain and requires emotional support throughout any ambulation   Up for meals? Yes   Skin: Intact, some redness in abdominal fold   Pain: 8-9/10, medications given, ice pack applied   CMS: Numbness in lower extremities baseline per pt   Dressing: None   Diet: Regular, tolerated well   LDA: PIV left hand   Equipment: Personal belongings, shoulder splint brace, call light within reach   Plan: TBD, will continue to monitor   Additional Info:

## 2022-02-13 NOTE — PROGRESS NOTES
Pt was seen course reviewed with nursing staff    Pt states pain control is gradually improving, though she is still very uncomfortable at times, particularly when getting out of bed  Has been able to decrease use of IV Dilaudid  Denies SOB, has a minimal cough  No BM since admission    Afebrile  BP 150s-160s/  HR 70  02 sats low 90s RA    Sleepy, appears comfortable  RR 12, unlabored  Lungs clear  CV rrr  Abd soft, non-distended  No LE edema  R UE in immobilizer        Assessment/plan       Amy Choudhury is a 61 year old female admitted on 2/10/2022. She has history of GERD, depression, anxiety, Type II DM, vitamin D deficiency, and tobacco use disorder and presents after a fall onto the R elbow, found to have a proximal R humeral fracture. Orthopedics has recommended non-operative management.  Pt was admitted for pain control    Comminuted fracture of R humeral head and neck   Fall   Vitamin D deficiency  Pain control is gradually improving  Pt remains on oxycodone to 5-10 mg q 4 prn, + prn IV Dilaudid  PT/OT  Mobilize, unclear if Pt will be able to discharge to home vs TCU  Enoxaparin for DVT proph  Bowel regimen  outpt ortho f/u    Mild hypoxia  Long history of smoking, no formal history of lung disease  CXR wnl  Suspect atelectasis, hypoventilation secondary to injury, pain  Plan encourage IS  Prn albuterol      Type II DM  - Diet controlled, last A1c 6.6 in October 2021   Plan discontinue BG monitoring     Anxiety, Depression   - Continue PTA sertraline  Prn lorazepam (takes PTA)

## 2022-02-14 ENCOUNTER — APPOINTMENT (OUTPATIENT)
Dept: OCCUPATIONAL THERAPY | Facility: CLINIC | Age: 62
DRG: 563 | End: 2022-02-14
Payer: COMMERCIAL

## 2022-02-14 ENCOUNTER — APPOINTMENT (OUTPATIENT)
Dept: GENERAL RADIOLOGY | Facility: CLINIC | Age: 62
DRG: 563 | End: 2022-02-14
Attending: CLINICAL NURSE SPECIALIST
Payer: COMMERCIAL

## 2022-02-14 LAB
CREAT SERPL-MCNC: 0.72 MG/DL (ref 0.52–1.04)
GFR SERPL CREATININE-BSD FRML MDRD: >90 ML/MIN/1.73M2
PLATELET # BLD AUTO: 377 10E3/UL (ref 150–450)

## 2022-02-14 PROCEDURE — 82565 ASSAY OF CREATININE: CPT | Performed by: STUDENT IN AN ORGANIZED HEALTH CARE EDUCATION/TRAINING PROGRAM

## 2022-02-14 PROCEDURE — 250N000011 HC RX IP 250 OP 636: Performed by: STUDENT IN AN ORGANIZED HEALTH CARE EDUCATION/TRAINING PROGRAM

## 2022-02-14 PROCEDURE — 73060 X-RAY EXAM OF HUMERUS: CPT | Mod: RT

## 2022-02-14 PROCEDURE — 99232 SBSQ HOSP IP/OBS MODERATE 35: CPT | Performed by: INTERNAL MEDICINE

## 2022-02-14 PROCEDURE — 36415 COLL VENOUS BLD VENIPUNCTURE: CPT | Performed by: STUDENT IN AN ORGANIZED HEALTH CARE EDUCATION/TRAINING PROGRAM

## 2022-02-14 PROCEDURE — 73060 X-RAY EXAM OF HUMERUS: CPT | Mod: 26 | Performed by: RADIOLOGY

## 2022-02-14 PROCEDURE — 99207 PR CDG-MDM COMPONENT: MEETS MODERATE - UP CODED: CPT | Performed by: INTERNAL MEDICINE

## 2022-02-14 PROCEDURE — 250N000013 HC RX MED GY IP 250 OP 250 PS 637: Performed by: STUDENT IN AN ORGANIZED HEALTH CARE EDUCATION/TRAINING PROGRAM

## 2022-02-14 PROCEDURE — 120N000002 HC R&B MED SURG/OB UMMC

## 2022-02-14 PROCEDURE — 97530 THERAPEUTIC ACTIVITIES: CPT | Mod: GO | Performed by: OCCUPATIONAL THERAPIST

## 2022-02-14 PROCEDURE — 85049 AUTOMATED PLATELET COUNT: CPT | Performed by: STUDENT IN AN ORGANIZED HEALTH CARE EDUCATION/TRAINING PROGRAM

## 2022-02-14 PROCEDURE — 250N000013 HC RX MED GY IP 250 OP 250 PS 637: Performed by: INTERNAL MEDICINE

## 2022-02-14 RX ORDER — TRIAMCINOLONE ACETONIDE 1 MG/G
CREAM TOPICAL 2 TIMES DAILY
Status: DISCONTINUED | OUTPATIENT
Start: 2022-02-14 | End: 2022-02-17 | Stop reason: HOSPADM

## 2022-02-14 RX ADMIN — OXYCODONE HYDROCHLORIDE 10 MG: 5 TABLET ORAL at 10:40

## 2022-02-14 RX ADMIN — MICONAZOLE NITRATE: 20 POWDER TOPICAL at 19:33

## 2022-02-14 RX ADMIN — METHOCARBAMOL 500 MG: 500 TABLET ORAL at 10:40

## 2022-02-14 RX ADMIN — SERTRALINE HYDROCHLORIDE 150 MG: 100 TABLET ORAL at 07:46

## 2022-02-14 RX ADMIN — OXYCODONE HYDROCHLORIDE 10 MG: 5 TABLET ORAL at 15:04

## 2022-02-14 RX ADMIN — B-COMPLEX W/ C & FOLIC ACID TAB 1 TABLET: TAB at 07:47

## 2022-02-14 RX ADMIN — METHOCARBAMOL 500 MG: 500 TABLET ORAL at 13:47

## 2022-02-14 RX ADMIN — TRIAMCINOLONE ACETONIDE: 1 CREAM TOPICAL at 21:29

## 2022-02-14 RX ADMIN — SENNOSIDES AND DOCUSATE SODIUM 2 TABLET: 8.6; 5 TABLET ORAL at 19:30

## 2022-02-14 RX ADMIN — ACETAMINOPHEN 975 MG: 325 TABLET, FILM COATED ORAL at 07:46

## 2022-02-14 RX ADMIN — ENOXAPARIN SODIUM 40 MG: 100 INJECTION SUBCUTANEOUS at 07:47

## 2022-02-14 RX ADMIN — ACETAMINOPHEN 975 MG: 325 TABLET, FILM COATED ORAL at 13:41

## 2022-02-14 RX ADMIN — ACETAMINOPHEN 975 MG: 325 TABLET, FILM COATED ORAL at 19:30

## 2022-02-14 RX ADMIN — OXYCODONE HYDROCHLORIDE 10 MG: 5 TABLET ORAL at 19:30

## 2022-02-14 RX ADMIN — OXYCODONE HYDROCHLORIDE 10 MG: 5 TABLET ORAL at 23:45

## 2022-02-14 RX ADMIN — OXYCODONE HYDROCHLORIDE 10 MG: 5 TABLET ORAL at 04:52

## 2022-02-14 RX ADMIN — SENNOSIDES AND DOCUSATE SODIUM 2 TABLET: 8.6; 5 TABLET ORAL at 07:47

## 2022-02-14 RX ADMIN — METHOCARBAMOL 500 MG: 500 TABLET ORAL at 23:59

## 2022-02-14 RX ADMIN — MICONAZOLE NITRATE: 20 POWDER TOPICAL at 07:47

## 2022-02-14 RX ADMIN — METHOCARBAMOL 500 MG: 500 TABLET ORAL at 04:52

## 2022-02-14 RX ADMIN — POLYETHYLENE GLYCOL 3350 17 G: 17 POWDER, FOR SOLUTION ORAL at 07:47

## 2022-02-14 RX ADMIN — TRAZODONE HYDROCHLORIDE 50 MG: 50 TABLET ORAL at 21:29

## 2022-02-14 ASSESSMENT — ACTIVITIES OF DAILY LIVING (ADL)
ADLS_ACUITY_SCORE: 15
ADLS_ACUITY_SCORE: 15
ADLS_ACUITY_SCORE: 17
ADLS_ACUITY_SCORE: 17
ADLS_ACUITY_SCORE: 15
ADLS_ACUITY_SCORE: 17
ADLS_ACUITY_SCORE: 17
ADLS_ACUITY_SCORE: 13
ADLS_ACUITY_SCORE: 15
ADLS_ACUITY_SCORE: 13
ADLS_ACUITY_SCORE: 17
ADLS_ACUITY_SCORE: 15
ADLS_ACUITY_SCORE: 15
ADLS_ACUITY_SCORE: 17
ADLS_ACUITY_SCORE: 15
ADLS_ACUITY_SCORE: 15
ADLS_ACUITY_SCORE: 17
ADLS_ACUITY_SCORE: 15
ADLS_ACUITY_SCORE: 17
ADLS_ACUITY_SCORE: 15
ADLS_ACUITY_SCORE: 13
ADLS_ACUITY_SCORE: 15

## 2022-02-14 NOTE — PROGRESS NOTES
VS: Blood pressure (!) 148/84, pulse 69, temperature 98.1  F (36.7  C), temperature source Oral, resp. rate 16, SpO2 98 %, not currently breastfeeding.       O2: Spo@ 98% on RA; lung sounds clearn and equal bilaterally; Dry non productive cough present   Output: Voids spontaneously into bedpan    Last BM: lbm 02/11   Activity: Not OOB during shift due to high rated pain; turns independently in bed   Skin: Redness under abdominal fold- powder applied; bruising to right arm above elbow   Pain: Pain rated 8-9/10; pain managed with oxycodone and tylenol   CMS: Numbness in upper extremities at baseline; aox4;    Dressing: L hand PIV dressing-cdi   Diet: Regular diet, moderate appetite    LDA: L hand PIV- SL   Equipment: IV pole, walker, should splint brace   Plan: Continue with plan of care    Additional Info:

## 2022-02-14 NOTE — PROGRESS NOTES
Pt was seen course reviewed with nursing staff    Pt states R arm remains painful, is having a hard time getting out of bed, caring for self. Doesn't think she will be able to go home (lives with a roommate).  She is concerned that more needs to be done for fracture)  Last BM was 2/11    Afebrile  BP 120s-140s/  HR 70s  02 sats upper 90s RA    Alert, anxious, frustrated, lying in bed, in NAD  RR 12, unlabored  Lungs clear  CV rrr  Abd soft, non-distended  No LE edema  R UE in immobilizer. R hand without edema, strength intact        Assessment/plan       Amy Choudhury is a 61 year old female admitted on 2/10/2022. She has history of GERD, depression, anxiety, Type II DM, vitamin D deficiency, and tobacco use disorder and presents after a fall onto the R elbow, found to have a proximal R humeral fracture. Orthopedics has recommended non-operative management.  Pt was admitted for pain control    Comminuted fracture of R humeral head and neck   Fall   Vitamin D deficiency  Pain control remains problematic, as is functional status  Pt remains on oxycodone to 5-10 mg q 4 hours prn  Plan ortho f/u consult today. Therapies  Enoxaparin for DVT proph  Suspect need for TCU placement    Mild hypoxia  Long history of smoking, no formal history of lung disease  CXR wnl  Suspect atelectasis, hypoventilation secondary to injury, pain  Improved, now saturating well on RA  Plan encourage IS  Prn albuterol      Type II DM  - Diet controlled, last A1c 6.6 in October 2021        Anxiety, Depression   - Continue PTA sertraline  Prn lorazepam (takes PTA)    Disposition likely to TCU

## 2022-02-14 NOTE — PLAN OF CARE
VS: /71 (BP Location: Left arm)   Pulse 78   Temp 97.8  F (36.6  C) (Oral)   Resp 18   SpO2 98%    O2: Stable on room air   Output: Voids spontaneously in toilet or bedpan   Last BM: 2/11/22   Activity: Up with assist of 1   Up for meals? Yes   Skin: Redness to folds under abdomen   Pain: 9-10/10, medications were given, pt stated improvement   CMS: Intact   Dressing: None   Diet: Regular, tolerated well   LDA: PIV left hand SL   Equipment: IV pole, immobilizer arm brace, call light within reach, personal belongings,    Plan: TBD   Additional Info:    Patient vital signs are at baseline: Yes  Patient able to ambulate as they were prior to admission or with assist devices provided by therapies during their stay:  No,  Reason:  States pain upon movement  Patient MUST void prior to discharge:  Yes  Patient able to tolerate oral intake:  Yes  Pain has adequate pain control using Oral analgesics:  No,  Reason:  Pain never went below a 9/10 this shift

## 2022-02-14 NOTE — PLAN OF CARE
"OT:  Patient not progressing activity due to pain and reporting feeling \"like bone is going to pop through her skin\" with activity.  Patient holding arm and crying with bed mobility and transfer from bed to cart to x-ray, not allowing therapist to readjust when clearly R elbow is not even in sling.  Patient reporting only comfort is with ice pack pressed on arm/compression to area.  Discussed possible use of Taylor brace with ortho NP- NP reports will put in orthotics consult.    "

## 2022-02-14 NOTE — PLAN OF CARE
[id number]  V/S VSS and afebrile   O2: Sats > 92% on RA  Infrequent cough present   Output: Voids spontaneously into bedpan    Last BM: 2/11   Activity: Decline OOB. Does not wanna move.  Lift buttocks when voiding.    Skin: Redness under abdominal fold.  Bruising to right arm above elbow   Pain: Given oxycodone and Robaxin 1x.    CMS: Numbness in upper extremities at baseline  Alert and oriented x4.    Dressing: L hand PIV dressing-CDI    Diet: Regular   LDA: L hand PIV- SL   Equipment: IV pole, walker, should splint brace   Plan: Continue with plan of care    Additional Info: Irritable. Does not wanna be disturb overnight.   Safety check done.

## 2022-02-14 NOTE — PROGRESS NOTES
I did see patient today about ultrasling 4 adjustment. I am happy to readjust ultrasling 4. Patient stated she was in discomfort and wanted me to come back later to adjust the sling. I will attempt to readjust the sling at a later time. The patient informed me she does not like the sling and would like to have the arm Ace wrapped to her body. If you would like to try something different a shoulder immobilizer would accomplish what the ace wrap would do. The Don wilmer ultrasling Is the more supportive option. I will try back later in the  day or tomorrow AM.   Cornel FERNANDEZ,LO.

## 2022-02-15 ENCOUNTER — APPOINTMENT (OUTPATIENT)
Dept: OCCUPATIONAL THERAPY | Facility: CLINIC | Age: 62
DRG: 563 | End: 2022-02-15
Payer: COMMERCIAL

## 2022-02-15 LAB
GLUCOSE BLDC GLUCOMTR-MCNC: 105 MG/DL (ref 70–99)
GLUCOSE BLDC GLUCOMTR-MCNC: 98 MG/DL (ref 70–99)

## 2022-02-15 PROCEDURE — 250N000011 HC RX IP 250 OP 636: Performed by: STUDENT IN AN ORGANIZED HEALTH CARE EDUCATION/TRAINING PROGRAM

## 2022-02-15 PROCEDURE — 99232 SBSQ HOSP IP/OBS MODERATE 35: CPT | Performed by: INTERNAL MEDICINE

## 2022-02-15 PROCEDURE — 250N000013 HC RX MED GY IP 250 OP 250 PS 637: Performed by: STUDENT IN AN ORGANIZED HEALTH CARE EDUCATION/TRAINING PROGRAM

## 2022-02-15 PROCEDURE — 97535 SELF CARE MNGMENT TRAINING: CPT | Mod: GO

## 2022-02-15 PROCEDURE — 250N000013 HC RX MED GY IP 250 OP 250 PS 637: Performed by: INTERNAL MEDICINE

## 2022-02-15 PROCEDURE — 120N000002 HC R&B MED SURG/OB UMMC

## 2022-02-15 RX ADMIN — OXYCODONE HYDROCHLORIDE 10 MG: 5 TABLET ORAL at 05:34

## 2022-02-15 RX ADMIN — TRIAMCINOLONE ACETONIDE: 1 CREAM TOPICAL at 09:47

## 2022-02-15 RX ADMIN — B-COMPLEX W/ C & FOLIC ACID TAB 1 TABLET: TAB at 09:41

## 2022-02-15 RX ADMIN — ACETAMINOPHEN 975 MG: 325 TABLET, FILM COATED ORAL at 09:39

## 2022-02-15 RX ADMIN — OXYCODONE HYDROCHLORIDE 10 MG: 5 TABLET ORAL at 19:00

## 2022-02-15 RX ADMIN — ACETAMINOPHEN 975 MG: 325 TABLET, FILM COATED ORAL at 23:05

## 2022-02-15 RX ADMIN — TRAZODONE HYDROCHLORIDE 50 MG: 50 TABLET ORAL at 23:05

## 2022-02-15 RX ADMIN — ACETAMINOPHEN 975 MG: 325 TABLET, FILM COATED ORAL at 17:25

## 2022-02-15 RX ADMIN — OXYCODONE HYDROCHLORIDE 10 MG: 5 TABLET ORAL at 23:05

## 2022-02-15 RX ADMIN — OXYCODONE HYDROCHLORIDE 10 MG: 5 TABLET ORAL at 14:29

## 2022-02-15 RX ADMIN — ENOXAPARIN SODIUM 40 MG: 100 INJECTION SUBCUTANEOUS at 09:40

## 2022-02-15 RX ADMIN — SENNOSIDES AND DOCUSATE SODIUM 2 TABLET: 8.6; 5 TABLET ORAL at 20:37

## 2022-02-15 RX ADMIN — SERTRALINE HYDROCHLORIDE 150 MG: 100 TABLET ORAL at 09:42

## 2022-02-15 RX ADMIN — SENNOSIDES AND DOCUSATE SODIUM 2 TABLET: 8.6; 5 TABLET ORAL at 09:43

## 2022-02-15 RX ADMIN — POLYETHYLENE GLYCOL 3350 17 G: 17 POWDER, FOR SOLUTION ORAL at 09:41

## 2022-02-15 RX ADMIN — METHOCARBAMOL 500 MG: 500 TABLET ORAL at 12:37

## 2022-02-15 RX ADMIN — OXYCODONE HYDROCHLORIDE 10 MG: 5 TABLET ORAL at 09:51

## 2022-02-15 RX ADMIN — MICONAZOLE NITRATE: 20 POWDER TOPICAL at 20:39

## 2022-02-15 RX ADMIN — METHOCARBAMOL 500 MG: 500 TABLET ORAL at 20:37

## 2022-02-15 ASSESSMENT — ACTIVITIES OF DAILY LIVING (ADL)
ADLS_ACUITY_SCORE: 13

## 2022-02-15 NOTE — PROGRESS NOTES
I did show the patient the shoulder immobilizer.  Patient stated she is happy with the adjustments made to her ultra sling. I did make some further adjustments today at the patient request. Patient informed me that she would like to stay in her current brace at this time.I will delete the order at this time and if patient has a change of mind about the fit or function of her current brace just place another order and we will adjust or change sling.   BONILLA Gonzalez.

## 2022-02-15 NOTE — PLAN OF CARE
VS: BP (!) 145/77   Pulse 66   Temp 97.8  F (36.6  C) (Oral)   Resp 16   SpO2 95%    O2: SpO2 > 95% on RA. LS clear and equal bilaterally. Denies chest pain and SOB.    Output: Voids spontaneously to bathroom without difficulty.    Last BM: 2/11/22   Activity: Up with assist of 1. Pt reminded to call before independently going to bathroom.    Skin: Intact. Redness to abdominal folds.    Pain: Pain managed with ice packs and Oxycodone PRN, and Robaxin.    CMS: AOX4. Intact. Baseline numbness in upper extremities.    Dressing: CDI   Diet: Regular, denies N/V   LDA: L PIV SL.    Equipment: IV pole, personal belongings, immobilizer arm brace   Plan: TBD. Continue with plan of care, call light in pt's reach.    Additional Info:     Patient vital signs are at baseline: Yes  Patient able to ambulate as they were prior to admission or with assist devices provided by therapies during their stay:  Yes  Patient MUST void prior to discharge:  Yes  Patient able to tolerate oral intake:  Yes  Pain has adequate pain control using Oral analgesics:  No,  Reason:  pt reports 8-9 on a scale of 1-10 this shift.

## 2022-02-15 NOTE — PLAN OF CARE
Pt A/O X 4. Afebrile. VSS. Lungs-Clear bilaterally with both anterior and posterior. Bowels-Hyperactive in all four quadrants, last BM 2-11-22. Voids spontaneously without difficulty in the bathroom. Denies nausea and vomiting. CMS and Neuro's are intact. Baseline numbness in the BUE's (Neuropathy). Has pain in the right shoulder and given Oxycodone, Robaxin, Tylenol, ICE applied, and pain is manageable. Is on a Regular diet and appetite was Good this shift. Blood glucose levels 98, 105. Abdominal folds have blanchable redness. Pt up in room with SBA. PIV patent in the left arm and saline locked. Bilateral heels are elevated off the bed. Pt is able to make needs known, and call light is within reach. Continue to monitor.

## 2022-02-15 NOTE — PROGRESS NOTES
VS: Blood pressure 137/86, pulse 63, temperature 98.4  F (36.9  C), temperature source Oral, resp. rate 16, SpO2 97 %, not currently breastfeeding.       O2: SpO2 97% on RA; lung sounds clear and equal bilaterally; denies any SOB or chest pain; infrequent productive cough present    Output: Voids spontaneously w/o difficulty   Last BM: lbm 02/12   Activity: Up with assist of 1 w/ walker    Skin: Redness to abdominal fold   Pain: Pain rated 8-9/10 to right shoulder and arm, pt reports relief after medication administration; ice applied to the area   CMS: Intact; aox4;    Dressing: L hand PIV- CDI   Diet: Regular diet, good appetite for evening meal    LDA: PIV in L hand-SL   Equipment: IV pole, arm brace, personal belongings    Plan: Continue to monitor    Additional Info:

## 2022-02-15 NOTE — PROGRESS NOTES
[facility]    Medicine Progress Note - Hospitalist Service, GOLD TEAM 17    Date of Admission:  2/10/2022    Assessment & Plan          Amy Choudhury is a 61 year old female admitted on 2/10/2022. She has history of GERD, depression, anxiety, Type II DM, vitamin D deficiency, and tobacco use disorder and presents after a fall onto the R elbow, found to have a proximal R humeral fracture. Orthopedics has recommended non-operative management.  Pt was admitted for pain control     Comminuted fracture of R humeral head and neck   Fall   Vitamin D deficiency  Pain control remains problematic, as is functional status  Pt remains on oxycodone to 5-10 mg q 4 hours prn  Plan ortho f/u consult today. Therapies  Enoxaparin for DVT proph  Suspect need for TCU placement     Mild hypoxia  Long history of smoking, no formal history of lung disease  CXR wnl  Suspect atelectasis, hypoventilation secondary to injury, pain  Improved, now saturating well on RA  Plan encourage IS  Prn albuterol        Type II DM  - Diet controlled, Hg A1c 6 2/11/22  , blood sugar          Anxiety, Depression   - Continue PTA sertraline  Prn lorazepam (takes PTA)    Hypertension   No history of hypertension , monitor for  need to star medications       Chronic lower back pain      Diet: Combination Diet Regular Diet Adult    DVT Prophylaxis:  Subcutaneous lovenox Diehl Catheter: Not present  Central Lines: None  Cardiac Monitoring: None  Code Status: Full Code      Disposition Plan  states cannot use right arm , has roommate who cannot help her   Expected Discharge: 02/16/2022     Anticipated discharge location:  Awaiting care coordination huddle     The patient's care was discussed with the care team .    Laisha Bojorquez MD  Hospitalist Service, GOLD TEAM 17  [facility]  Securely message with the Vocera Web Console (learn more here)  Text page via [employer]  Paging/Directory   Please see signed in provider for up to date coverage information      Clinically Significant Risk Factors Present on Admission                    ______________________________________________________________________    Interval History      Pain better after sling was adjusted , no chest pain  No shortness of breath  No nausea vomiting       Data reviewed today: I reviewed all medications, new labs and imaging results over the last 24 hours.      Physical Exam   Vital Signs: Temp: 97  F (36.1  C) Temp src: Oral BP: (!) 154/86 Pulse: 80   Resp: 16 SpO2: 94 % O2 Device: None (Room air)    Weight: 0 lbs 0 oz   General appearence: awake alert   no apparent distress    HEENT: EOMI, PEARLA, sclera nonicteric,  moist mucus membranes,   NECK : supple  RESPIRATORY: lungs clear to auscultation bilateral,  no wheezing or crackles   CARDIOVASCULAR:S1 S2 regular rate and rhythm, no rubs gallops or murmurs appreciated  GASTROINTESTINAL:soft, non-distended , non-tender , + bowel sounds, no masses felt   SKIN: warm and dry, no mottling noted   NEUROLOGIC; awake alert and oriented, no focal deficits found  EXTREMITIES: no clubbing, cyanosis or edema , moves all extremity, good pedal pulses   MUSCULOSKELETAL:  Right arm in brace        Data   Recent Labs   Lab 02/15/22  1044 02/15/22  0921 02/14/22  0532 02/12/22  0748 02/12/22  0712 02/11/22  0822 02/11/22  0550 02/10/22  2313   WBC  --   --   --   --   --   --  9.6 14.1*   HGB  --   --   --   --   --   --  11.9 12.5   MCV  --   --   --   --   --   --  80 79   PLT  --   --  377  --   --   --  382 412   NA  --   --   --   --  139  --  139 139   POTASSIUM  --   --   --   --  4.1  --  3.4 3.5   CHLORIDE  --   --   --   --  111*  --  110* 111*   CO2  --   --   --   --  24  --  26 24   BUN  --   --   --   --  11  --  11 11   CR  --   --  0.72  --  0.60  --  0.57 0.52   ANIONGAP  --   --   --   --  4  --  3 4   CRISTIAN  --   --   --   --  8.3*  --  8.4* 8.3*   * 98   --  108* 115*   < > 140* 139*   ALBUMIN  --   --   --   --   --   --   --  3.4   PROTTOTAL  --   --   --   --   --   --   --  7.4   BILITOTAL  --   --   --   --   --   --   --  0.2   ALKPHOS  --   --   --   --   --   --   --  111   ALT  --   --   --   --   --   --   --  22   AST  --   --   --   --   --   --   --  13    < > = values in this interval not displayed.     No results found for this or any previous visit (from the past 24 hour(s)).

## 2022-02-16 ENCOUNTER — APPOINTMENT (OUTPATIENT)
Dept: OCCUPATIONAL THERAPY | Facility: CLINIC | Age: 62
DRG: 563 | End: 2022-02-16
Payer: COMMERCIAL

## 2022-02-16 LAB — GLUCOSE BLDC GLUCOMTR-MCNC: 187 MG/DL (ref 70–99)

## 2022-02-16 PROCEDURE — 250N000013 HC RX MED GY IP 250 OP 250 PS 637: Performed by: STUDENT IN AN ORGANIZED HEALTH CARE EDUCATION/TRAINING PROGRAM

## 2022-02-16 PROCEDURE — 97530 THERAPEUTIC ACTIVITIES: CPT | Mod: GO

## 2022-02-16 PROCEDURE — 250N000013 HC RX MED GY IP 250 OP 250 PS 637: Performed by: INTERNAL MEDICINE

## 2022-02-16 PROCEDURE — 120N000002 HC R&B MED SURG/OB UMMC

## 2022-02-16 PROCEDURE — 97535 SELF CARE MNGMENT TRAINING: CPT | Mod: GO

## 2022-02-16 PROCEDURE — 99232 SBSQ HOSP IP/OBS MODERATE 35: CPT | Performed by: INTERNAL MEDICINE

## 2022-02-16 PROCEDURE — 250N000011 HC RX IP 250 OP 636: Performed by: STUDENT IN AN ORGANIZED HEALTH CARE EDUCATION/TRAINING PROGRAM

## 2022-02-16 RX ADMIN — OXYCODONE HYDROCHLORIDE 10 MG: 5 TABLET ORAL at 17:21

## 2022-02-16 RX ADMIN — POLYETHYLENE GLYCOL 3350 17 G: 17 POWDER, FOR SOLUTION ORAL at 07:49

## 2022-02-16 RX ADMIN — TRIAMCINOLONE ACETONIDE: 1 CREAM TOPICAL at 08:09

## 2022-02-16 RX ADMIN — B-COMPLEX W/ C & FOLIC ACID TAB 1 TABLET: TAB at 07:48

## 2022-02-16 RX ADMIN — ACETAMINOPHEN 975 MG: 325 TABLET, FILM COATED ORAL at 14:30

## 2022-02-16 RX ADMIN — OXYCODONE HYDROCHLORIDE 10 MG: 5 TABLET ORAL at 03:50

## 2022-02-16 RX ADMIN — ACETAMINOPHEN 975 MG: 325 TABLET, FILM COATED ORAL at 07:47

## 2022-02-16 RX ADMIN — TRAZODONE HYDROCHLORIDE 50 MG: 50 TABLET ORAL at 21:31

## 2022-02-16 RX ADMIN — OXYCODONE HYDROCHLORIDE 10 MG: 5 TABLET ORAL at 21:30

## 2022-02-16 RX ADMIN — SENNOSIDES AND DOCUSATE SODIUM 1 TABLET: 50; 8.6 TABLET ORAL at 07:48

## 2022-02-16 RX ADMIN — ACETAMINOPHEN 975 MG: 325 TABLET, FILM COATED ORAL at 21:30

## 2022-02-16 RX ADMIN — OXYCODONE HYDROCHLORIDE 10 MG: 5 TABLET ORAL at 08:14

## 2022-02-16 RX ADMIN — SERTRALINE HYDROCHLORIDE 150 MG: 100 TABLET ORAL at 07:48

## 2022-02-16 RX ADMIN — ENOXAPARIN SODIUM 40 MG: 100 INJECTION SUBCUTANEOUS at 07:49

## 2022-02-16 RX ADMIN — SENNOSIDES AND DOCUSATE SODIUM 2 TABLET: 8.6; 5 TABLET ORAL at 21:31

## 2022-02-16 RX ADMIN — METHOCARBAMOL 500 MG: 500 TABLET ORAL at 03:50

## 2022-02-16 RX ADMIN — OXYCODONE HYDROCHLORIDE 10 MG: 5 TABLET ORAL at 12:40

## 2022-02-16 ASSESSMENT — ACTIVITIES OF DAILY LIVING (ADL)
ADLS_ACUITY_SCORE: 13
ADLS_ACUITY_SCORE: 13
ADLS_ACUITY_SCORE: 14
ADLS_ACUITY_SCORE: 14
ADLS_ACUITY_SCORE: 13
ADLS_ACUITY_SCORE: 14
ADLS_ACUITY_SCORE: 13
ADLS_ACUITY_SCORE: 14
ADLS_ACUITY_SCORE: 13
ADLS_ACUITY_SCORE: 14
ADLS_ACUITY_SCORE: 13
ADLS_ACUITY_SCORE: 13
ADLS_ACUITY_SCORE: 12
ADLS_ACUITY_SCORE: 13
ADLS_ACUITY_SCORE: 13
ADLS_ACUITY_SCORE: 14

## 2022-02-16 NOTE — PLAN OF CARE
Physical Therapy: Orders received. Chart reviewed and discussed with care team.? Physical Therapy not indicated due to pt being able to mobilize independently.? Defer discharge recommendations to OT.? Will complete orders.

## 2022-02-16 NOTE — PLAN OF CARE
"  VS: VSS. Denies CP/SOB. A/O x4   O2: >90% on RA    Output: Voiding adequate amounts w/o pain or difficulty    Last BM: 2/15, small- \"still need to go\" fluids encouraged bowel meds given    Activity: Up SBA   Up for meals? Declined    Skin: Bruising R shoulder, rash/redness in abd folds   Pain: Pain in R shoulder managing with PRN oxy and robaxin. Scheduled tylenol. Ice packs    CMS: Baseline neuropathy in BUE    Dressing: None    Diet: Regular, tolerating well   LDA: PIV saline locked    Equipment: IV pole   Plan: TBD    Additional Info:        "

## 2022-02-16 NOTE — PLAN OF CARE
VS:       BP (!) 144/70 (BP Location: Left arm, Patient Position: Sitting)   Pulse 70   Temp 97.5  F (36.4  C) (Oral)   Resp 16   SpO2 96%  Pt A/O X 4. Afebrile. VSS. Lungs sounds clear bilaterally with both anterior and posterior. IS encouraged. Denies nausea, shortness of breath, and chest pain.     Output:       Bowels sounds present in all four quadrants. Voids spontaneously without difficulty in the toilet.      Activity:       Pt up ad sylvester. Right arm remains immobilized in sling. Needs some assistance with tray set up for meals and opening containers.     Skin:   Bruising noted on right forearm. Redness noted on stomach skin folds, antifungal powder declined. Redness and flaking noted on R side of face next to nose, cream applied.     Pain:       Has pain 7-8/10 in the right shoulder and upper arm. Given Oxycodone 10mg q4h and Tylenol 975mg q6h, ICE applied. Pt consistently reports pain as 8/10 prior to administration of oxycodone and 7/10 on reassessment one hour post administration.     CMS:       CMS and Neuro's are intact. Denies numbness and tingling in all extremities.      Dressing:       IV dressing is CDI.      Diet:       Pt is on a regular diet and appetite was adequate this shift.       LDA:       PIV is patent in the R hand.      Equipment:       PCD's declined.   Plan:       Pt is able to make needs known and the call light is within the pt's reach. Continue to monitor.       Additional Info:       Mild drowsiness and flat affect noted 1 hr after oxycodone. Pt remains on Contact precautions for MRSA. Blood glucose of 187 noted 1-2 hrs after breakfast.                  Goal Outcome Evaluation:    Plan of Care Reviewed With: patient

## 2022-02-16 NOTE — PLAN OF CARE
VS: BP (!) 162/76 (BP Location: Left arm, Patient Position: Supine)   Pulse 60   Temp (!) 96.6  F (35.9  C) (Axillary)   Resp 15   SpO2 96%     O2: SpO2 > 95% on RA. LS clear and equal bilaterally. Denies chest pain and SOB. Infrequent dry coughs noted.    Output: Voids spontaneously to bathroom without difficulty.    Last BM: 2/15/22. BS active in all quadrants   Activity: SBA. Pt reminded to call before independently going to bathroom.    Skin: Intact. R Shoulder bruising. Redness to abdominal folds, powder applied.     Pain: Pain managed with ice packs and Oxycodone PRN, and Robaxin.    CMS: AOX4. Intact. Baseline neuropathy in upper extremities.    Dressing: PIV dressing CDI.   Diet: Regular, denies N/V   LDA: L PIV SL.    Equipment: IV pole, personal belongings, immobilizer arm brace   Plan: TBD. Continue with plan of care, call light in pt's reach.    Additional Info:

## 2022-02-16 NOTE — PROGRESS NOTES
[facility]    Medicine Progress Note - Hospitalist Service, GOLD TEAM 17    Date of Admission:  2/10/2022    Assessment & Plan          Amy Choudhury is a 61 year old female admitted on 2/10/2022. She has history of GERD, depression, anxiety, Type II DM, vitamin D deficiency, and tobacco use disorder and presents after a fall onto the R elbow, found to have a proximal R humeral fracture. Orthopedics has recommended non-operative management.  Pt was admitted for pain control     Comminuted fracture of R humeral head and neck   Fall   Vitamin D deficiency  Pain control remains problematic, as is functional status  Pt remains on oxycodone to 5-10 mg q 4 hours prn  Plan ortho f/u consult today. Therapies  Enoxaparin for DVT proph  Suspect need for TCU placement     Mild hypoxia  Long history of smoking, no formal history of lung disease  CXR wnl  Suspect atelectasis, hypoventilation secondary to injury, pain  Improved, now saturating well on RA  Plan encourage IS  Prn albuterol        Type II DM  - Diet controlled, Hg A1c 6 2/11/22  , blood sugar          Anxiety, Depression   - Continue PTA sertraline  Prn lorazepam (takes PTA)    Hypertension   No history of hypertension , monitor for  need to star medications       Chronic lower back pain      Diet: Combination Diet Regular Diet Adult    DVT Prophylaxis:  Subcutaneous lovenox Diehl Catheter: Not present  Central Lines: None  Cardiac Monitoring: None  Code Status: Full Code      Disposition Plan  states cannot use right arm , has roommate who cannot help her   Expected Discharge: 02/17/2022     Anticipated discharge location:  Awaiting care coordination huddle     The patient's care was discussed with the care team .    Laisha Bojorquez MD  Hospitalist Service, GOLD TEAM 17  [facility]  Securely message with the Voc[employer] Web Console (learn more here)  Text page via [employer]  Paging/Directory   Please see signed in provider for up to date coverage information      Clinically Significant Risk Factors Present on Admission                    ______________________________________________________________________    Interval History      Some more pain today in right arm, states she cannot do much , cannot dress herself, open cans or anything without use of right arm  Denies chest pain  No shortness of breath  No nausea vomiting       Data reviewed today: I reviewed all medications, new labs and imaging results over the last 24 hours.      Physical Exam   Vital Signs: Temp: 97.5  F (36.4  C) Temp src: Oral BP: (!) 144/70 Pulse: 70   Resp: 16 SpO2: 96 % O2 Device: None (Room air)    Weight: 0 lbs 0 oz   General appearence: awake alert   no apparent distress    HEENT: EOMI, PEARLA, sclera nonicteric,  moist mucus membranes,   NECK : supple  RESPIRATORY: lungs clear to auscultation bilateral,  no wheezing or crackles   CARDIOVASCULAR:S1 S2 regular rate and rhythm, no rubs gallops or murmurs appreciated  GASTROINTESTINAL:soft, non-distended , non-tender , + bowel sounds, no masses felt   SKIN: warm and dry, no mottling noted   NEUROLOGIC; awake alert and oriented, no focal deficits found  EXTREMITIES: no clubbing, cyanosis or edema , moves all extremity, good pedal pulses   MUSCULOSKELETAL:  Right arm in brace        Data   Recent Labs   Lab 02/16/22  1132 02/15/22  1044 02/15/22  0921 02/14/22  0532 02/12/22  0748 02/12/22  0712 02/11/22  0822 02/11/22  0550 02/10/22  2313   WBC  --   --   --   --   --   --   --  9.6 14.1*   HGB  --   --   --   --   --   --   --  11.9 12.5   MCV  --   --   --   --   --   --   --  80 79   PLT  --   --   --  377  --   --   --  382 412   NA  --   --   --   --   --  139  --  139 139   POTASSIUM  --   --   --   --   --  4.1  --  3.4 3.5   CHLORIDE  --   --   --   --   --  111*  --  110* 111*   CO2  --   --   --   --   --  24  --  26 24   BUN  --   --   --   --   --   11  --  11 11   CR  --   --   --  0.72  --  0.60  --  0.57 0.52   ANIONGAP  --   --   --   --   --  4  --  3 4   CRISTIAN  --   --   --   --   --  8.3*  --  8.4* 8.3*   * 105* 98  --    < > 115*   < > 140* 139*   ALBUMIN  --   --   --   --   --   --   --   --  3.4   PROTTOTAL  --   --   --   --   --   --   --   --  7.4   BILITOTAL  --   --   --   --   --   --   --   --  0.2   ALKPHOS  --   --   --   --   --   --   --   --  111   ALT  --   --   --   --   --   --   --   --  22   AST  --   --   --   --   --   --   --   --  13    < > = values in this interval not displayed.     No results found for this or any previous visit (from the past 24 hour(s)).

## 2022-02-17 ENCOUNTER — APPOINTMENT (OUTPATIENT)
Dept: OCCUPATIONAL THERAPY | Facility: CLINIC | Age: 62
DRG: 563 | End: 2022-02-17
Payer: COMMERCIAL

## 2022-02-17 VITALS
TEMPERATURE: 97.1 F | DIASTOLIC BLOOD PRESSURE: 72 MMHG | OXYGEN SATURATION: 96 % | RESPIRATION RATE: 16 BRPM | HEART RATE: 65 BPM | SYSTOLIC BLOOD PRESSURE: 132 MMHG

## 2022-02-17 LAB
CREAT SERPL-MCNC: 0.63 MG/DL (ref 0.52–1.04)
GFR SERPL CREATININE-BSD FRML MDRD: >90 ML/MIN/1.73M2
PLATELET # BLD AUTO: 409 10E3/UL (ref 150–450)

## 2022-02-17 PROCEDURE — 97535 SELF CARE MNGMENT TRAINING: CPT | Mod: GO

## 2022-02-17 PROCEDURE — 250N000013 HC RX MED GY IP 250 OP 250 PS 637: Performed by: INTERNAL MEDICINE

## 2022-02-17 PROCEDURE — 99232 SBSQ HOSP IP/OBS MODERATE 35: CPT | Performed by: INTERNAL MEDICINE

## 2022-02-17 PROCEDURE — 250N000011 HC RX IP 250 OP 636: Performed by: STUDENT IN AN ORGANIZED HEALTH CARE EDUCATION/TRAINING PROGRAM

## 2022-02-17 PROCEDURE — 85049 AUTOMATED PLATELET COUNT: CPT | Performed by: STUDENT IN AN ORGANIZED HEALTH CARE EDUCATION/TRAINING PROGRAM

## 2022-02-17 PROCEDURE — 36415 COLL VENOUS BLD VENIPUNCTURE: CPT | Performed by: STUDENT IN AN ORGANIZED HEALTH CARE EDUCATION/TRAINING PROGRAM

## 2022-02-17 PROCEDURE — 82565 ASSAY OF CREATININE: CPT | Performed by: STUDENT IN AN ORGANIZED HEALTH CARE EDUCATION/TRAINING PROGRAM

## 2022-02-17 PROCEDURE — 97530 THERAPEUTIC ACTIVITIES: CPT | Mod: GO

## 2022-02-17 PROCEDURE — 250N000013 HC RX MED GY IP 250 OP 250 PS 637: Performed by: STUDENT IN AN ORGANIZED HEALTH CARE EDUCATION/TRAINING PROGRAM

## 2022-02-17 RX ORDER — AMOXICILLIN 250 MG
1 CAPSULE ORAL 2 TIMES DAILY
Qty: 30 TABLET | Refills: 0 | Status: SHIPPED | OUTPATIENT
Start: 2022-02-17 | End: 2022-02-17

## 2022-02-17 RX ORDER — METHOCARBAMOL 500 MG/1
500 TABLET, FILM COATED ORAL 4 TIMES DAILY PRN
Qty: 20 TABLET | Refills: 0 | Status: SHIPPED | OUTPATIENT
Start: 2022-02-17 | End: 2024-04-23

## 2022-02-17 RX ORDER — AMOXICILLIN 250 MG
1 CAPSULE ORAL 2 TIMES DAILY
Qty: 30 TABLET | Refills: 0 | DISCHARGE
Start: 2022-02-17 | End: 2024-04-23

## 2022-02-17 RX ORDER — OXYCODONE HYDROCHLORIDE 5 MG/1
5-10 TABLET ORAL EVERY 6 HOURS PRN
Qty: 15 TABLET | Refills: 0 | Status: SHIPPED | OUTPATIENT
Start: 2022-02-17 | End: 2024-04-19

## 2022-02-17 RX ORDER — POLYETHYLENE GLYCOL 3350 17 G/17G
17 POWDER, FOR SOLUTION ORAL DAILY
Qty: 510 G | Refills: 0 | Status: SHIPPED | OUTPATIENT
Start: 2022-02-17 | End: 2024-04-23

## 2022-02-17 RX ORDER — SENNOSIDES 8.6 MG
650 CAPSULE ORAL 4 TIMES DAILY
Qty: 100 TABLET | Refills: 1 | Status: SHIPPED | OUTPATIENT
Start: 2022-02-17 | End: 2024-04-23

## 2022-02-17 RX ADMIN — OXYCODONE HYDROCHLORIDE 10 MG: 5 TABLET ORAL at 09:52

## 2022-02-17 RX ADMIN — ACETAMINOPHEN 975 MG: 325 TABLET, FILM COATED ORAL at 13:57

## 2022-02-17 RX ADMIN — METHOCARBAMOL 500 MG: 500 TABLET ORAL at 05:29

## 2022-02-17 RX ADMIN — OXYCODONE HYDROCHLORIDE 10 MG: 5 TABLET ORAL at 13:57

## 2022-02-17 RX ADMIN — TRIAMCINOLONE ACETONIDE: 1 CREAM TOPICAL at 09:52

## 2022-02-17 RX ADMIN — ACETAMINOPHEN 975 MG: 325 TABLET, FILM COATED ORAL at 08:45

## 2022-02-17 RX ADMIN — POLYETHYLENE GLYCOL 3350 17 G: 17 POWDER, FOR SOLUTION ORAL at 08:45

## 2022-02-17 RX ADMIN — ENOXAPARIN SODIUM 40 MG: 100 INJECTION SUBCUTANEOUS at 08:45

## 2022-02-17 RX ADMIN — B-COMPLEX W/ C & FOLIC ACID TAB 1 TABLET: TAB at 08:45

## 2022-02-17 RX ADMIN — SENNOSIDES AND DOCUSATE SODIUM 1 TABLET: 50; 8.6 TABLET ORAL at 08:45

## 2022-02-17 RX ADMIN — LORAZEPAM 1 MG: 1 TABLET ORAL at 15:47

## 2022-02-17 RX ADMIN — OXYCODONE HYDROCHLORIDE 10 MG: 5 TABLET ORAL at 05:29

## 2022-02-17 RX ADMIN — SERTRALINE HYDROCHLORIDE 150 MG: 100 TABLET ORAL at 08:45

## 2022-02-17 RX ADMIN — MICONAZOLE NITRATE: 20 POWDER TOPICAL at 08:46

## 2022-02-17 ASSESSMENT — ACTIVITIES OF DAILY LIVING (ADL)
ADLS_ACUITY_SCORE: 13
ADLS_ACUITY_SCORE: 14
ADLS_ACUITY_SCORE: 13
ADLS_ACUITY_SCORE: 12
ADLS_ACUITY_SCORE: 13
ADLS_ACUITY_SCORE: 12
ADLS_ACUITY_SCORE: 13

## 2022-02-17 NOTE — PLAN OF CARE
VS: VSS. Denies CP/SOB. A/O x4   O2: >90% on RA    Output: Voiding adequate amounts w/o pain or difficulty    Last BM: 2/16, small   Activity: Up SBA   Up for meals? Declined    Skin: Bruising R shoulder, rash/redness in abd folds   Pain: Pain in R shoulder managing with PRN oxy. Scheduled tylenol. Ice packs    CMS: Baseline neuropathy in BUE    Dressing: None    Diet: Regular, tolerating well   LDA: PIV saline locked    Equipment: IV pole   Plan: TBD    Additional Info:  No social work consult, or note from Social work if they are following? If pt going to TCU- need consult placed.

## 2022-02-17 NOTE — PLAN OF CARE
Status Note    [id number]    Patient slept throughout night, VSS on RA, able to make needs known, using call light appropriately,  amb with SBA in room.  Oxy and flexeril given x1 for pain rated 8/10, effective.  No acute events this shift, continue with POC.

## 2022-02-17 NOTE — PLAN OF CARE
VS: Temp: 98.3  F (36.8  C) Temp src: Oral BP: (!) 141/86 Pulse: 64   Resp: 16 SpO2: 95 % O2 Device: None (Room air)       O2: 95% on room air. No supplemental oxygen required.   Output: Pt voids spontaneously and independently.   Last BM: 02/15/2022   Activity: Standby assist, pt moves around independently and has a steady gait.   Skin: Bruising on R) arm, redness/rash on abdominal skin folds. Cream/powder declined.   Pain: Pt rating pain at an 8-9 before pain medication and a 6-7 after pain medication. Pain is controlled well by oxycodone 10mg q 4hrs PRN.   CMS: A & O x4, denies numbness/tingling.   Dressing: N/A   Diet: Regular   LDA: PIV L) lower forearm   Equipment: Immobilizer, personal belongings, pt refused PCDs.   Plan: Possible TCU placement   Additional Info: Pt needs tray set up and assistance opening packages before meals

## 2022-02-17 NOTE — PLAN OF CARE
VS: VSS. Denies CP/SOB. A/O x4   O2: >90% on RA    Output: Voiding adequate amounts w/o pain or difficulty    Last BM: 2/16, small   Activity: Up SBA, steady    Up for meals? Declined    Skin: Bruising R shoulder, rash/redness in abd folds   Pain: Pain in R shoulder managing with PRN oxy. Scheduled tylenol. Ice packs.   CMS: Baseline neuropathy in BUE    Dressing: None    Diet: Regular, tolerating well   LDA: PIV removed before discharge   Equipment: IV pole, shoulder immobilizer    Plan: Discharge to TCU    Additional Info: Pt anxious after being told she will be discharging, is ok and agreeable with plan but anxious about things that need to be done at home-requested PRN ativan- given. Thoughts/feeling acknowledged, reassurance provided.   Shower given before discharge.             DISCHARGE SUMMARY    Pt discharging to: [facility]  Transportation: wheelchair, [facility]  AVS given and discussed: Faxed and hard copies sent with pt/EMS  Stoplight Tool given and discussed: N/A  Medications given: Faxed and hard copies sent with pt/EMS  Belongings returned: Yes, ensured all belongings packed and sent with pt.   Comments: Escorted safely to elevators, Report given to RN at [facility].         Goal Outcome Evaluation:    Plan of Care Reviewed With: patient

## 2022-02-17 NOTE — CONSULTS
Care Management Discharge Note    Discharge Date: 02/17/2022       Discharge Disposition: Home with Home Care     Discharge Services:   Meals on Wheels, Transportation Services    [facility] [place]   Phone  [phone]  Fax  [phone]      Discharge DME: None    Discharge Transportation: health plan transportation    Education Provided on the Discharge Plan:  yes  Persons Notified of Discharge Plans: patient  Patient/Family in Agreement with the Plan: yes    Handoff Referral Completed: Yes    Additional Information:  Met with patient at bedside to discuss discharge planning. Patient voiced concerns about needing assistance with setting up meals and bathing. A referral was sent to [facility] for skilled nursing, occupational therapy and a HHA. Patient was given Meals on Wheels information. At time of discharge [place] transportation to be set up for patient. No further discharge concerns at this time. RNCC available as needed.    Update 1216: 21 home care referrals made and denied. Patient does not have insurance coverage for a HHA. Patient would need the HHA 3 times a week for a safe discharge to home. TCU referrals made globally.    Liane Glasgow RN, BSN  Care Coordinator, 5 Ortho  Phone [phone]  Pager [phone]

## 2022-02-17 NOTE — PROGRESS NOTES
VS: Blood pressure 139/73, pulse 69, temperature 97.9  F (36.6  C), temperature source Oral, resp. rate 16, SpO2 95 %, not currently breastfeeding.       O2: spo2 95% on RA; lung sounds clear and equal bilaterally; denies any SOB or chest pain   Output: Voids spontaneously without difficulty    Last BM: lbm 02/17- bowel regimen continued    Activity: Pt up with stand by assist; has been ambulating to the restroom independently w/o calling for help;    Skin: Bruising to right bicep and abdomen left side; rash under abdominal fold- powder applied; HORTENCIA right arm fully, sling in place for support   Pain: Pain rated 8/10 to right shoulder and elbow; managed w/ PRN oxycodone 10mg and ice   CMS: Aox4; bilateral numbness/tingling in hands at baseline, no other numbness/tingling present;    Dressing: none   Diet: Regular diet, adequate appetite    LDA: L hand PIV SL   Equipment: IV pole, walker. Personal clothing    Plan: Pt awaiting possible discharge to TCU. Does not meet requirements for PT but requires assistance w/ OT.  Unable to go home w/ homecare- not covered.    Additional Info:

## 2022-02-17 NOTE — PROGRESS NOTES
Care Management Discharge Note    Discharge Date: 02/17/2022       Discharge Disposition:TCU    Discharge Services:   [facility] ([facility] Details  Fax            [address] [place] [address]       Phone: [phone]       Fax: [phone]          Discharge DME: None    Discharge Transportation: health plan transportation    PAS Confirmation Code:  [id number]  Patient/family educated on Medicare website which has current facility and service quality ratings: yes    Education Provided on the Discharge Plan:  yes  Persons Notified of Discharge Plans: patient  Patient/Family in Agreement with the Plan: yes    Handoff Referral Completed: Yes    Additional Information:  Met with patient at bedside to discuss discharge planning. [facility] has been accepted to [facility]. Patient has not been vaccinated but has had Covid-19 in September 2021. Patient will need to quarantine in her TCU room for 14 days. Patient has a private room but a shared bathroom. Patient will not be able to use the bathroom. A commode will be at bedside and she will be able to do bed baths at bedside. Patient will not be able to go out and smoke. She may have a nicotine patch in needed. All of these things were explained to the patient and she was in agreement. Discharge orders faxed to [facility].  Wheelchair transport set up with [facility] at 1700. RNCC available as needed.    Liane Glasgow RN, BSN  Care Coordinator, 5 Ortho  Phone [phone]  Pager [phone]

## 2022-02-17 NOTE — PROGRESS NOTES
[facility]    Medicine Progress Note - Hospitalist Service, GOLD TEAM 17    Date of Admission:  2/10/2022    Assessment & Plan          Amy Choudhury is a 61 year old female admitted on 2/10/2022. She has history of GERD, depression, anxiety, Type II DM, vitamin D deficiency, and tobacco use disorder and presents after a fall onto the R elbow, found to have a proximal R humeral fracture. Orthopedics has recommended non-operative management.  Pt was admitted for pain control     Comminuted fracture of R humeral head and neck   Fall   Vitamin D deficiency  Pain control remains problematic, as is functional status  Pt remains on oxycodone to 5-10 mg q 4 hours prn  Plan ortho f/u consult today. Therapies  Enoxaparin for DVT proph  Plan home with home care however now having difficulty finding home health care so might need TCU      Mild hypoxia  Long history of smoking, no formal history of lung disease  CXR wnl  Suspect atelectasis, hypoventilation secondary to injury, pain  Improved, now saturating well on RA  Plan encourage IS  Prn albuterol        Type II DM  - Diet controlled, Hg A1c 6 2/11/22  , blood sugar          Anxiety, Depression   - Continue PTA sertraline  Prn lorazepam (takes PTA)    Hypertension   No history of hypertension , monitor for  need to star medications       Chronic lower back pain      Diet: Combination Diet Regular Diet Adult  Diet    DVT Prophylaxis:  Subcutaneous lovenox Diehl Catheter: Not present  Central Lines: None  Cardiac Monitoring: None  Code Status: Full Code      Disposition Plan  states cannot use right arm , has roommate who cannot help her   Expected Discharge: 02/17/2022     Anticipated discharge location: home with help/services       The patient's care was discussed with the care team .    Laisha Bojorquez MD  Hospitalist Service, GOLD TEAM 17  [facility]  Securely message  with the [employer] Web Console (learn more here)  Text page via [facility] Paging/Directory   Please see signed in provider for up to date coverage information      Clinically Significant Risk Factors Present on Admission                    ______________________________________________________________________    Interval History      Still having pain, cannot do anything with right arm , no chest pain  No shortness of breath    Data reviewed today: I reviewed all medications, new labs and imaging results over the last 24 hours.      Physical Exam   Vital Signs: Temp: 97.9  F (36.6  C) Temp src: Oral BP: 139/73 Pulse: 69   Resp: 16 SpO2: 95 % O2 Device: None (Room air)    Weight: 0 lbs 0 oz   General appearence: awake alert   no apparent distress    HEENT: EOMI, PEARLA, sclera nonicteric,  moist mucus membranes,   NECK : supple  RESPIRATORY: lungs clear to auscultation bilateral,  no wheezing or crackles   CARDIOVASCULAR:S1 S2 regular rate and rhythm, no rubs gallops or murmurs appreciated  GASTROINTESTINAL:soft, non-distended , non-tender , + bowel sounds, no masses felt   SKIN: warm and dry, no mottling noted   NEUROLOGIC; awake alert and oriented, no focal deficits found  EXTREMITIES: no clubbing, cyanosis or edema , moves all extremity, good pedal pulses   MUSCULOSKELETAL:  Right arm in brace        Data   Recent Labs   Lab 02/17/22  0705 02/16/22  1132 02/15/22  1044 02/15/22  0921 02/14/22  0532 02/12/22  0748 02/12/22  0712 02/11/22  0822 02/11/22  0550 02/10/22  2313   WBC  --   --   --   --   --   --   --   --  9.6 14.1*   HGB  --   --   --   --   --   --   --   --  11.9 12.5   MCV  --   --   --   --   --   --   --   --  80 79     --   --   --  377  --   --   --  382 412   NA  --   --   --   --   --   --  139  --  139 139   POTASSIUM  --   --   --   --   --   --  4.1  --  3.4 3.5   CHLORIDE  --   --   --   --   --   --  111*  --  110* 111*   CO2  --   --   --   --   --   --  24  --  26 24   BUN  --   --   --    --   --   --  11  --  11 11   CR 0.63  --   --   --  0.72  --  0.60  --  0.57 0.52   ANIONGAP  --   --   --   --   --   --  4  --  3 4   CRISTIAN  --   --   --   --   --   --  8.3*  --  8.4* 8.3*   GLC  --  187* 105* 98  --    < > 115*   < > 140* 139*   ALBUMIN  --   --   --   --   --   --   --   --   --  3.4   PROTTOTAL  --   --   --   --   --   --   --   --   --  7.4   BILITOTAL  --   --   --   --   --   --   --   --   --  0.2   ALKPHOS  --   --   --   --   --   --   --   --   --  111   ALT  --   --   --   --   --   --   --   --   --  22   AST  --   --   --   --   --   --   --   --   --  13    < > = values in this interval not displayed.     No results found for this or any previous visit (from the past 24 hour(s)).

## 2022-02-18 ENCOUNTER — PATIENT OUTREACH (OUTPATIENT)
Dept: CARE COORDINATION | Facility: CLINIC | Age: 62
End: 2022-02-18
Payer: COMMERCIAL

## 2022-02-18 DIAGNOSIS — Z71.89 OTHER SPECIFIED COUNSELING: ICD-10-CM

## 2022-02-18 NOTE — PROGRESS NOTES
Clinic Care Coordination Contact    Background: Care Coordination referral placed from [facility] discharge report for reason of patient meeting criteria for a TCM outreach call by [facility] team.    Assessment: Upon chart review, [facility] Team member will cancel/close the referral for TCM outreach due to reason below:    Patient is not established within [facility]. Upon chart review, CCRC Team member noted patient discharged to TCU    Plan: Care Coordination referral for TCM outreach canceled.    Julianna De Jesus MA  [facility], [facility]

## 2022-02-18 NOTE — PLAN OF CARE
Occupational Therapy Discharge Summary    Reason for therapy discharge:    Discharged to transitional care facility.    Progress towards therapy goal(s). See goals on Care Plan in [facility] electronic health record for goal details.  Goals partially met.  Barriers to achieving goals:   limited tolerance for therapy and discharge from facility.    Therapy recommendation(s):    Continued therapy is recommended.  Rationale/Recommendations:  to progress IND with one handed ADLs within restrictions.

## 2022-02-22 NOTE — DISCHARGE SUMMARY
[facility]  Hospitalist Discharge Summary      Date of Admission:  2/10/2022  Date of Discharge:  2/17/2022  5:15 PM  Discharging Provider: Laisha Bojorquez MD  Discharge Service: Hospitalist Service, GOLD TEAM 17    Discharge Diagnoses     Comminuted fracture of R humeral head and neck   Fall   Vitamin D deficiency  Mild hypoxia  Type II DM  Anxiety, Depression   Hypertension       Follow-ups Needed After Discharge   Follow-up Appointments     Adult [facility] Follow-up and recommended labs and tests      Follow up with primary care provider, Randal Castellanos, within 7 days to   evaluate medication change and for hospital follow- up.     Follow up with   orthopedic surgery   in 1 week     Appointments on [place] and/or [facility] (with [facility] or [facility]   provider or service). Call [phone] if you haven't heard regarding   these appointments within 7 days of discharge.             Unresulted Labs Ordered in the Past 30 Days of this Admission     No orders found from 1/11/2022 to 2/11/2022.          Discharge Disposition   Transferred to TCU   Condition at discharge: Stable      Hospital Course      Amy Choudhury is a 61 year old female admitted on 2/10/2022. She has history of GERD, depression, anxiety, Type II DM, vitamin D deficiency, and tobacco use disorder and presents after a fall onto the R elbow, found to have a proximal R humeral fracture. Orthopedics has recommended non-operative management.  Pt was admitted for pain control     Comminuted fracture of R humeral head and neck   Fall   Vitamin D deficiency  - seen by ortho and recomended non-out patient  Management with   -she cannot do anything with right arm/hand, unfortunately has no home health care coverage so needed TCU   -follow up  with orthopedic surgery         Mild hypoxia  Long history of smoking, no formal history of lung disease, resolved   CXR wnl  Suspect atelectasis, hypoventilation  secondary to injury, pain          Type II DM  - Diet controlled, Hg A1c 6 2/11/22  , blood sugars remained good       Anxiety, Depression   - Continue PTA sertraline  - lorazepam (takes PTA) uses onec every 2 weeks about, did not prescribe on discharge      Hypertension   Not on medications  , monitor for  need to star medications         Chronic lower back pain            Consultations This Hospital Stay   PHYSICAL THERAPY ADULT IP CONSULT  OCCUPATIONAL THERAPY ADULT IP CONSULT  ORTHOSIS BRACE IP CONSULT  ORTHOPAEDIC SURGERY ADULT/PEDS IP CONSULT  ORTHOSIS EXTREMITY UPPER REFERRAL IP CONSULT  ORTHOSIS EXTREMITY UPPER REFERRAL IP CONSULT  PHYSICAL THERAPY ADULT IP CONSULT  CARE MANAGEMENT / SOCIAL WORK IP CONSULT    Code Status   Prior    Time Spent on this Encounter          Laisha Bojorquez MD  [facility] MED SURG ORTHOPEDIC  [address] [facility] [address]  Phone: [phone]  Fax: [phone]  ______________________________________________________________________    Physical Exam   Vital Signs:                    Weight: 0 lbs 0 oz   Vital Signs: Temp: 97.9  F (36.6  C) Temp src: Oral BP: 139/73 Pulse: 69   Resp: 16 SpO2: 95 % O2 Device: None (Room air)    Weight: 0 lbs 0 oz   General appearence: awake alert   no apparent distress     HEENT: EOMI, PEARLA, sclera nonicteric,  moist mucus membranes,   NECK : supple  RESPIRATORY: lungs clear to auscultation bilateral,  no wheezing or crackles   CARDIOVASCULAR:S1 S2 regular rate and rhythm, no rubs gallops or murmurs appreciated  GASTROINTESTINAL:soft, non-distended , non-tender , + bowel sounds, no masses felt   SKIN: warm and dry, no mottling noted   NEUROLOGIC; awake alert and oriented, no focal deficits found  EXTREMITIES: no clubbing, cyanosis or edema , moves all extremity, good pedal pulses   MUSCULOSKELETAL:  Right arm in brace           Primary Care Physician   Randal Castellanos    Discharge Orders      Reason for your hospital  stay    Humerus fracture     Activity    Your activity upon discharge: non weight bearing in right arm, wear your sling, do not consume any alcohol or operate any heavy machinery or drive while you are taking narcotic medications     Adult [facility] Follow-up and recommended labs and tests    Follow up with primary care provider, Randal Castellanos, within 7 days to evaluate medication change and for hospital follow- up.     Follow up with   orthopedic surgery   in 1 week     Appointments on [place] and/or [facility] (with [facility] or [facility] provider or service). Call [phone] if you haven't heard regarding these appointments within 7 days of discharge.     Diet    Follow this diet upon discharge: as tolerated       Significant Results and Procedures   Most Recent 3 CBC's:Recent Labs   Lab Test 02/17/22  0705 02/14/22  0532 02/11/22  0550 02/10/22  2313 10/15/21  1008 09/14/21  0819   WBC  --   --  9.6 14.1*  --  5.8   HGB  --   --  11.9 12.5 12.2 12.6   MCV  --   --  80 79  --  78    377 382 412  --  411     Most Recent 3 BMP's:Recent Labs   Lab Test 02/17/22  0705 02/16/22  1132 02/15/22  1044 02/15/22  0921 02/14/22  0532 02/12/22  0748 02/12/22  0712 02/11/22  0822 02/11/22  0550 02/10/22  2313   NA  --   --   --   --   --   --  139  --  139 139   POTASSIUM  --   --   --   --   --   --  4.1  --  3.4 3.5   CHLORIDE  --   --   --   --   --   --  111*  --  110* 111*   CO2  --   --   --   --   --   --  24  --  26 24   BUN  --   --   --   --   --   --  11  --  11 11   CR 0.63  --   --   --  0.72  --  0.60  --  0.57 0.52   ANIONGAP  --   --   --   --   --   --  4  --  3 4   CRISTIAN  --   --   --   --   --   --  8.3*  --  8.4* 8.3*   GLC  --  187* 105* 98  --    < > 115*   < > 140* 139*    < > = values in this interval not displayed.     Most Recent 2 LFT's:Recent Labs   Lab Test 02/10/22  2313 09/14/21  0819   AST 13 33   ALT 22 43   ALKPHOS 111 92   BILITOTAL 0.2 0.4     Most Recent 3 INR's:Recent Labs   Lab Test  05/29/14  1730   INR 0.95   ,   Results for orders placed or performed during the hospital encounter of 02/10/22   Clavicle XR, right    Narrative    EXAM: XR CLAVICLE RT 2 VIEWS  LOCATION: [facility]  DATE/TIME: 2/10/2022 9:21 PM    INDICATION: Fall with right shoulder pain  COMPARISON: None.      Impression    IMPRESSION: Comminuted fracture involving in the right humeral head and the right humeral neck. Minimally displaced comminuted fracture fragments laterally with slight impaction of the proximal right humeral shaft upon the humeral head. No definitive   evidence for intra-articular involvement. No evidence for dislocation of the right glenohumeral joint. Minimal degenerative changes right acromioclavicular joint. No other fractures.    XR Shoulder Right G/E 3 Views    Narrative    EXAM: XR SHOULDER RIGHT G/E 3 VIEWS  LOCATION: [facility]  DATE/TIME: 2/10/2022 9:25 PM    INDICATION: Fall with right shoulder pain  COMPARISON: None.      Impression    IMPRESSION: Comminuted fracture involving in the right humeral head and neck. No definitive evidence for significant intra-articular involvement. No dislocation of glenohumeral joint. Minimal displacement of the lateral humeral head fractures with   impaction of the humeral shaft upon the humeral head. Right acromioclavicular joint intact. Right clavicle intact. No other fractures evident.       Humerus XR, G/E 2 views, right    Narrative    EXAM: XR HUMERUS RIGHT G/E 2 VIEWS  LOCATION: [facility]  DATE/TIME: 2/10/2022 9:21 PM    INDICATION: Fall with right arm pain  COMPARISON: Right shoulder radiographs 02/10/2022      Impression    IMPRESSION: Comminuted fracture involving in the right humeral head and neck with minimally displaced lateral fracture fragments. No dislocation of the right glenohumeral joint. Moderate  degenerative osteoarthrosis at the right glenohumeral joint.   Minimal degenerative changes at an intact right acromioclavicular joint. No evidence for distal radial fracture. Right elbow intact.   XR Elbow Right 2 Views    Narrative    EXAM: XR ELBOW RIGHT 2 VIEWS  LOCATION: [facility]  DATE/TIME: 2/10/2022 9:21 PM    INDICATION: Fall with right elbow pain  COMPARISON: None.      Impression    IMPRESSION: Allowing for rotation of the AP view there is no definitive evidence for acute fracture or dislocation. Radiocapitellar and ulnohumeral joints intact. No significant elbow effusion.   XR Chest 1 View    Narrative    EXAM: XR CHEST 1 VIEW  LOCATION: [facility]  DATE/TIME: 2/10/2022 9:22 PM    INDICATION: Fall with right sided chest pain and shoulder pain  COMPARISON: Right shoulder radiograph 02/10/2022      Impression    IMPRESSION: Comminuted fracture involving the right humeral head and neck. Please see dedicated right shoulder radiographs 02/10/2022 for further details. Minimal cardiac enlargement. Normal pulmonary vascularity. No pneumothorax or pulmonary   infiltrates. Minimal degenerative changes in the left shoulder in the spine. Metallic density projecting over the left aspect of the neck may lie outside the patient.   XR Humerus Right G/E 2 Views    Narrative    2 views right humerus radiographs 2/14/2022 1:43 PM    History: s/p right proximal humerus fracture    Comparison: 2/10/2022    Findings:    AP and lateral views of the right humerus were obtained.     Redemonstration of proximal humeral fracture with new angulation of  the distal fragment, when compared to the prior exam    Bones appear osteopenic. No osseous bridging.    Shoulder and elbow joints are incompletely assessed but grossly  congruent. No substantial degenerative change in shoulder or elbow.    Soft tissue is unremarkable.      Impression     Impression:  1. Redemonstration of proximal humeral fracture. Change in alignment  with new angulation of the distal fragment of the proximal humeral  fracture when compared to the prior exam.   2. Glenohumeral joint is normally aligned.    JUTTA ELLERMANN, MD         SYSTEM ID:  [id number]       Discharge Medications   Discharge Medication List as of 2/17/2022  4:25 PM      START taking these medications    Details   methocarbamol (ROBAXIN) 500 MG tablet Take 1 tablet (500 mg) by mouth 4 times daily as needed for muscle spasms, Disp-20 tablet, R-0, E-Prescribe      oxyCODONE (ROXICODONE) 5 MG tablet Take 1-2 tablets (5-10 mg) by mouth every 6 hours as needed for moderate to severe pain, Disp-15 tablet, R-0, Local Print      polyethylene glycol (MIRALAX) 17 GM/Dose powder Take 17 g by mouth daily, Disp-510 g, R-0, E-Prescribe      senna-docusate (SENOKOT-S/PERICOLACE) 8.6-50 MG tablet Take 1 tablet by mouth 2 times daily, Disp-30 tablet, R-0, E-Prescribe         CONTINUE these medications which have CHANGED    Details   acetaminophen (TYLENOL) 650 MG CR tablet Take 1 tablet (650 mg) by mouth 4 times daily, Disp-100 tablet, R-1, E-Prescribe         CONTINUE these medications which have NOT CHANGED    Details   sertraline (ZOLOFT) 100 MG tablet Take 1.5 tablets (150 mg) by mouth daily, Disp-90 tablet, R-1, E-Prescribe      hydrOXYzine (ATARAX) 25 MG tablet Take 1 tablet (25 mg) by mouth 3 times daily as needed for itching, Disp-90 tablet, R-1, E-Prescribe      vitamin B complex with vitamin C (VITAMIN  B COMPLEX) tablet Take 1 tablet by mouth daily, Disp-100 tablet, R-6, E-Prescribe         STOP taking these medications       LORazepam (ATIVAN) 1 MG tablet Comments:   Reason for Stopping:             Allergies   Allergies   Allergen Reactions     Codeine Sulfate GI Disturbance

## 2022-03-08 ENCOUNTER — TELEPHONE (OUTPATIENT)
Dept: ORTHOPEDICS | Facility: CLINIC | Age: 62
End: 2022-03-08
Payer: COMMERCIAL

## 2022-03-08 NOTE — TELEPHONE ENCOUNTER
[facility] Call Center    Phone Message    May a detailed message be left on voicemail: yes     Reason for Call: Other: Lori @ [facility] calling to schedule post op appt for patient     Action Taken: Message routed to:  [facility]    Travel Screening: Not Applicable

## 2022-03-08 NOTE — TELEPHONE ENCOUNTER
Per ortho resident, pt can be managed non-operatively. Spoke with Dr. Warren's team. They are ok to see pt. Apt scheduled with Dr. Warren.             -Jerry, ATC- Orthopedics

## 2022-03-08 NOTE — TELEPHONE ENCOUNTER
DIAGNOSIS:fracture of R humeral head and neck     APPOINTMENT DATE: 3.15.22   NOTES STATUS DETAILS   DISCHARGE SUMMARY from hospital Internal 2.10.22-2.17.22 [facility]   MEDICATION LIST Internal    XRAYS (IMAGES & REPORTS) Internal 2.14.22 R humerus  2.10.22 R clavicle, R elbow, R humerus, R shoulder

## 2022-03-15 ENCOUNTER — ANCILLARY PROCEDURE (OUTPATIENT)
Dept: GENERAL RADIOLOGY | Facility: CLINIC | Age: 62
End: 2022-03-15
Attending: PREVENTIVE MEDICINE
Payer: COMMERCIAL

## 2022-03-15 ENCOUNTER — PRE VISIT (OUTPATIENT)
Dept: ORTHOPEDICS | Facility: CLINIC | Age: 62
End: 2022-03-15

## 2022-03-15 ENCOUNTER — OFFICE VISIT (OUTPATIENT)
Dept: ORTHOPEDICS | Facility: CLINIC | Age: 62
End: 2022-03-15
Payer: COMMERCIAL

## 2022-03-15 VITALS — WEIGHT: 225 LBS | RESPIRATION RATE: 17 BRPM | BODY MASS INDEX: 36.16 KG/M2 | HEIGHT: 66 IN

## 2022-03-15 DIAGNOSIS — M54.12 CERVICAL RADICULAR PAIN: ICD-10-CM

## 2022-03-15 DIAGNOSIS — S49.91XS RIGHT SHOULDER INJURY, SEQUELA: ICD-10-CM

## 2022-03-15 DIAGNOSIS — M54.12 CERVICAL RADICULAR PAIN: Primary | ICD-10-CM

## 2022-03-15 DIAGNOSIS — S69.91XS FINGER INJURY, RIGHT, SEQUELA: ICD-10-CM

## 2022-03-15 DIAGNOSIS — S42.309A HUMERUS FRACTURE: Primary | ICD-10-CM

## 2022-03-15 DIAGNOSIS — S42.309A HUMERUS FRACTURE: ICD-10-CM

## 2022-03-15 DIAGNOSIS — S42.201A CLOSED FRACTURE OF PROXIMAL END OF RIGHT HUMERUS, UNSPECIFIED FRACTURE MORPHOLOGY, INITIAL ENCOUNTER: ICD-10-CM

## 2022-03-15 PROCEDURE — 99204 OFFICE O/P NEW MOD 45 MIN: CPT | Performed by: PREVENTIVE MEDICINE

## 2022-03-15 PROCEDURE — 73060 X-RAY EXAM OF HUMERUS: CPT | Mod: RT | Performed by: RADIOLOGY

## 2022-03-15 PROCEDURE — 72040 X-RAY EXAM NECK SPINE 2-3 VW: CPT | Mod: GC | Performed by: RADIOLOGY

## 2022-03-15 PROCEDURE — 73130 X-RAY EXAM OF HAND: CPT | Mod: RT | Performed by: RADIOLOGY

## 2022-03-15 RX ORDER — LIDOCAINE 50 MG/G
1 PATCH TOPICAL EVERY 24 HOURS
Qty: 15 PATCH | Refills: 1 | Status: SHIPPED | OUTPATIENT
Start: 2022-03-15 | End: 2024-04-23

## 2022-03-15 RX ORDER — METHYLPREDNISOLONE 4 MG
TABLET, DOSE PACK ORAL
Qty: 21 TABLET | Refills: 0 | Status: SHIPPED | OUTPATIENT
Start: 2022-03-15 | End: 2024-04-23

## 2022-03-15 NOTE — LETTER
3/15/2022      RE: Amy Choudhury  [address]  [place] [address]       HISTORY OF PRESENT ILLNESS  Ms. Choudhury is a pleasant 61 year old year old female who presents to clinic today with   Amy explains that she had an injury when she fell about 7 weeks ago  Location: right shoulder, neck, right hand/finger  Quality:  achy pain    Severity: 8/10 at worst    Duration: since fall/injury over 6 weeks prior  Timing: occurs intermittently  Context: occurs while exercising and lifting her arm, using shoulder  Modifying factors:  resting and non-use makes it better, movement and use makes it worse  Associated signs & symptoms: neck pain and right shoudler and right hand pain    MEDICAL HISTORY  Patient Active Problem List   Diagnosis     Hyperparathyroidism (H)     Anxiety     Obesity     Status post gastric bypass for obesity     Microcytic anemia     Vitamin D deficiency     Benign neoplasm of adrenal gland     TMJ (temporomandibular joint syndrome)     Sciatica     Tobacco dependence syndrome     Adjustment disorder with mixed anxiety and depressed mood     Diabetes mellitus, type 2 (H)     Morbid obesity (H)     Suicidal ideation     Hypokalemia     Pneumonia due to 2019 novel coronavirus     Closed fracture of proximal end of right humerus, unspecified fracture morphology, initial encounter       Current Outpatient Medications   Medication Sig Dispense Refill     acetaminophen (TYLENOL) 650 MG CR tablet Take 1 tablet (650 mg) by mouth 4 times daily 100 tablet 1     hydrOXYzine (ATARAX) 25 MG tablet Take 1 tablet (25 mg) by mouth 3 times daily as needed for itching (Patient not taking: Reported on 2/11/2022) 90 tablet 1     methocarbamol (ROBAXIN) 500 MG tablet Take 1 tablet (500 mg) by mouth 4 times daily as needed for muscle spasms 20 tablet 0     oxyCODONE (ROXICODONE) 5 MG tablet Take 1-2 tablets (5-10 mg) by mouth every 6 hours as needed for moderate to severe pain 15 tablet 0      polyethylene glycol (MIRALAX) 17 GM/Dose powder Take 17 g by mouth daily 510 g 0     senna-docusate (SENOKOT-S/PERICOLACE) 8.6-50 MG tablet Take 1 tablet by mouth 2 times daily 30 tablet 0     sertraline (ZOLOFT) 100 MG tablet Take 1.5 tablets (150 mg) by mouth daily 90 tablet 1     vitamin B complex with vitamin C (VITAMIN  B COMPLEX) tablet Take 1 tablet by mouth daily (Patient not taking: Reported on 2022) 100 tablet 6       Allergies   Allergen Reactions     Codeine Sulfate GI Disturbance       Family History   Problem Relation Age of Onset     Thyroid Disease Mother      Breast Cancer Mother 57         65 y.o.     Other - See Comments Mother         cystic acne     Cancer Brother      Diabetes Daughter         Type 1; insulin     Social History     Socioeconomic History     Marital status:      Spouse name: Not on file     Number of children: Not on file     Years of education: Not on file     Highest education level: Not on file   Occupational History     Not on file   Tobacco Use     Smoking status: Current Every Day Smoker     Packs/day: 0.50     Years: 30.00     Pack years: 15.00     Types: Cigarettes     Smokeless tobacco: Never Used   Substance and Sexual Activity     Alcohol use: Yes     Comment: occasionally     Drug use: No     Sexual activity: Yes     Partners: Male     Birth control/protection: None   Other Topics Concern     Parent/sibling w/ CABG, MI or angioplasty before 65F 55M? Not Asked   Social History Narrative     Not on file     Social Determinants of Health     Financial Resource Strain: Not on file   Food Insecurity: Not on file   Transportation Needs: Not on file   Physical Activity: Not on file   Stress: Not on file   Social Connections: Not on file   Intimate Partner Violence: Not on file   Housing Stability: Not on file       Additional medical/Social/Surgical histories reviewed in [facility] and updated as appropriate.     REVIEW OF SYSTEMS (3/15/2022)  10 point ROS of  systems including Constitutional, Eyes, Respiratory, Cardiovascular, Gastroenterology, Genitourinary, Integumentary, Musculoskeletal, Psychiatric, Allergic/Immunologic were all negative except for pertinent positives noted in my HPI.     PHYSICAL EXAM  VSS  General  - normal appearance, in no obvious distress  HEENT  - conjunctivae not injected, moist mucous membranes, normocephalic/atraumatic head, ears normal appearance, no lesions, mouth normal appearance, no scars, normal dentition and teeth present  CV  - normal radial pulse  Pulm  - normal respiratory pattern, non-labored  Musculoskeletal - right shoulder  - inspection: normal bone and joint alignment, no obvious deformity, no scapular winging, no AC step-off  - palpation: has ttp over shoulder at proximal humerus, and right neck/trapezius,  normal clavicle, non-tender AC  - ROM:  limited flexion, IR, ER, abduction, painful at end range  - strength: 5/5  strength, 5/5 in all shoulder planes  Neuro  - no sensory or motor deficit, grossly normal coordination, normal muscle tone  Skin  - no ecchymosis, erythema, warmth, or induration, no obvious rash  Psych  - interactive, appropriate, normal mood and affect  Right hand: has ttp over cmc joint right thumb and pain with gripping      ASSESSMENT & PLAN  60 yo female with right shoulder proximal humerus fracture, hand injury, cmc arthritis, and neck pain due to cervical ddd, trauma    I independently reviewed the following imaging studies:  xrays cervical spine: shows ddd  xrays shoulder shows healing proximal slightly displaced humerus fracture  RX given for medrol and lidocaine patches  Cont. Other medications as prescribed  Cont. HEP  Given order for PT  F/u in 2-3 weeks    Appropriate PPE was utilized for prevention of spread of Covid-19.  Floyd Warren MD, CAQSM

## 2022-03-15 NOTE — NURSING NOTE
"Reason For Visit:   Chief Complaint   Patient presents with     Consult     pt is here to check on if her arm is healed. pt fell over a month ago.        Resp 17   Ht 1.676 m (5' 5.98\")   Wt 102.1 kg (225 lb)   BMI 36.33 kg/m      Pain Assessment  Patient Currently in Pain: Yes  0-10 Pain Scale: 3  Primary Pain Location: Arm    Amparo Lerma ATC     "

## 2022-03-15 NOTE — PROGRESS NOTES
HISTORY OF PRESENT ILLNESS  Ms. Choudhury is a pleasant 61 year old year old female who presents to clinic today with   Amy explains that she had an injury when she fell about 7 weeks ago  Location: right shoulder, neck, right hand/finger  Quality:  achy pain    Severity: 8/10 at worst    Duration: since fall/injury over 6 weeks prior  Timing: occurs intermittently  Context: occurs while exercising and lifting her arm, using shoulder  Modifying factors:  resting and non-use makes it better, movement and use makes it worse  Associated signs & symptoms: neck pain and right shoudler and right hand pain    MEDICAL HISTORY  Patient Active Problem List   Diagnosis     Hyperparathyroidism (H)     Anxiety     Obesity     Status post gastric bypass for obesity     Microcytic anemia     Vitamin D deficiency     Benign neoplasm of adrenal gland     TMJ (temporomandibular joint syndrome)     Sciatica     Tobacco dependence syndrome     Adjustment disorder with mixed anxiety and depressed mood     Diabetes mellitus, type 2 (H)     Morbid obesity (H)     Suicidal ideation     Hypokalemia     Pneumonia due to 2019 novel coronavirus     Closed fracture of proximal end of right humerus, unspecified fracture morphology, initial encounter       Current Outpatient Medications   Medication Sig Dispense Refill     acetaminophen (TYLENOL) 650 MG CR tablet Take 1 tablet (650 mg) by mouth 4 times daily 100 tablet 1     hydrOXYzine (ATARAX) 25 MG tablet Take 1 tablet (25 mg) by mouth 3 times daily as needed for itching (Patient not taking: Reported on 2/11/2022) 90 tablet 1     methocarbamol (ROBAXIN) 500 MG tablet Take 1 tablet (500 mg) by mouth 4 times daily as needed for muscle spasms 20 tablet 0     oxyCODONE (ROXICODONE) 5 MG tablet Take 1-2 tablets (5-10 mg) by mouth every 6 hours as needed for moderate to severe pain 15 tablet 0     polyethylene glycol (MIRALAX) 17 GM/Dose powder Take 17 g by mouth daily 510 g 0     senna-docusate  (SENOKOT-S/PERICOLACE) 8.6-50 MG tablet Take 1 tablet by mouth 2 times daily 30 tablet 0     sertraline (ZOLOFT) 100 MG tablet Take 1.5 tablets (150 mg) by mouth daily 90 tablet 1     vitamin B complex with vitamin C (VITAMIN  B COMPLEX) tablet Take 1 tablet by mouth daily (Patient not taking: Reported on 2022) 100 tablet 6       Allergies   Allergen Reactions     Codeine Sulfate GI Disturbance       Family History   Problem Relation Age of Onset     Thyroid Disease Mother      Breast Cancer Mother 57         65 y.o.     Other - See Comments Mother         cystic acne     Cancer Brother      Diabetes Daughter         Type 1; insulin     Social History     Socioeconomic History     Marital status:      Spouse name: Not on file     Number of children: Not on file     Years of education: Not on file     Highest education level: Not on file   Occupational History     Not on file   Tobacco Use     Smoking status: Current Every Day Smoker     Packs/day: 0.50     Years: 30.00     Pack years: 15.00     Types: Cigarettes     Smokeless tobacco: Never Used   Substance and Sexual Activity     Alcohol use: Yes     Comment: occasionally     Drug use: No     Sexual activity: Yes     Partners: Male     Birth control/protection: None   Other Topics Concern     Parent/sibling w/ CABG, MI or angioplasty before 65F 55M? Not Asked   Social History Narrative     Not on file     Social Determinants of Health     Financial Resource Strain: Not on file   Food Insecurity: Not on file   Transportation Needs: Not on file   Physical Activity: Not on file   Stress: Not on file   Social Connections: Not on file   Intimate Partner Violence: Not on file   Housing Stability: Not on file       Additional medical/Social/Surgical histories reviewed in [facility] and updated as appropriate.     REVIEW OF SYSTEMS (3/15/2022)  10 point ROS of systems including Constitutional, Eyes, Respiratory, Cardiovascular, Gastroenterology, Genitourinary,  Integumentary, Musculoskeletal, Psychiatric, Allergic/Immunologic were all negative except for pertinent positives noted in my HPI.     PHYSICAL EXAM  VSS  General  - normal appearance, in no obvious distress  HEENT  - conjunctivae not injected, moist mucous membranes, normocephalic/atraumatic head, ears normal appearance, no lesions, mouth normal appearance, no scars, normal dentition and teeth present  CV  - normal radial pulse  Pulm  - normal respiratory pattern, non-labored  Musculoskeletal - right shoulder  - inspection: normal bone and joint alignment, no obvious deformity, no scapular winging, no AC step-off  - palpation: has ttp over shoulder at proximal humerus, and right neck/trapezius,  normal clavicle, non-tender AC  - ROM:  limited flexion, IR, ER, abduction, painful at end range  - strength: 5/5  strength, 5/5 in all shoulder planes  Neuro  - no sensory or motor deficit, grossly normal coordination, normal muscle tone  Skin  - no ecchymosis, erythema, warmth, or induration, no obvious rash  Psych  - interactive, appropriate, normal mood and affect  Right hand: has ttp over cmc joint right thumb and pain with gripping      ASSESSMENT & PLAN  60 yo female with right shoulder proximal humerus fracture, hand injury, cmc arthritis, and neck pain due to cervical ddd, trauma    I independently reviewed the following imaging studies:  xrays cervical spine: shows ddd  xrays shoulder shows healing proximal slightly displaced humerus fracture  RX given for medrol and lidocaine patches  Cont. Other medications as prescribed  Cont. HEP  Given order for PT  F/u in 2-3 weeks    Appropriate PPE was utilized for prevention of spread of Covid-19.  Floyd Warren MD, CAQSM

## 2022-03-16 ENCOUNTER — TELEPHONE (OUTPATIENT)
Dept: ORTHOPEDICS | Facility: CLINIC | Age: 62
End: 2022-03-16
Payer: COMMERCIAL

## 2022-03-16 NOTE — TELEPHONE ENCOUNTER
Central Prior Authorization Team   Phone: [phone]      PRIOR AUTHORIZATION DENIED    Medication: lidocaine (LIDODERM) 5 % patch - EPA DENIED     Denial Date: 3/16/2022    Denial Rational:   We looked at [employer] policy on Lidocaine 5 % ADH. PATCH, information from your prescriber, and  your Member Handbook. Your medical records show that: Your plan covers this drug when you are  using it for an approved use. Your use of this drug does not meet the requirement. This is based on  the information we have.        Appeal Information: If the provider would like to appeal this denial, please provide a letter of medical necessity. Please also include any therapies that the patient has tried and their outcomes. The patient's insurance company will also require the provider to address why the insurance preferred options are not appropriate in the patient's therapy.  The reason could be that the preferred options will harm the patient; either physically or mentally. They are contraindicated to the patient; or the patient has already been taking the requested medication and changing the therapy would change the outcome of their therapy.    Once it has been completed and placed in the patient's chart, notify the [employer] and the appeal can be initiated on behalf of the patient and provider.

## 2022-03-16 NOTE — TELEPHONE ENCOUNTER
ATC LVM encouraged pt to try OTC Lidocaine patches. Informed pt that percentage of medication is different. Provided call center number and encouraged pt to call back with questions.     ATC LVM with facility RN line, informing them that Rx was denied and pt is encouraged to use OTC Lidocaine patches. Provided call center number and encouraged staff to call if they have questions/concerns.     Amparo Lerma ATC

## 2022-04-05 ENCOUNTER — VIRTUAL VISIT (OUTPATIENT)
Dept: ORTHOPEDICS | Facility: CLINIC | Age: 62
End: 2022-04-05
Payer: COMMERCIAL

## 2022-04-05 DIAGNOSIS — S42.201S CLOSED FRACTURE OF PROXIMAL END OF RIGHT HUMERUS, UNSPECIFIED FRACTURE MORPHOLOGY, SEQUELA: Primary | ICD-10-CM

## 2022-04-05 PROCEDURE — 99213 OFFICE O/P EST LOW 20 MIN: CPT | Mod: 95 | Performed by: PREVENTIVE MEDICINE

## 2022-04-05 RX ORDER — METHYLPREDNISOLONE 4 MG
TABLET, DOSE PACK ORAL
Qty: 21 TABLET | Refills: 0 | Status: SHIPPED | OUTPATIENT
Start: 2022-04-05 | End: 2024-04-23

## 2022-04-05 RX ORDER — IBUPROFEN 600 MG/1
600 TABLET, FILM COATED ORAL 2 TIMES DAILY PRN
Qty: 60 TABLET | Refills: 0 | Status: SHIPPED | OUTPATIENT
Start: 2022-04-05 | End: 2024-04-23

## 2022-04-05 NOTE — LETTER
4/5/2022       RE: Amy Choudhury  [address]  [place] [place] [facility] [address]     Dear Colleague,    Thank you for referring your patient, Amy Choudhury, to the [facility] [place] at [facility]. Please see a copy of my visit note below.    Patient is a  61   year old who is being evaluated via a billable telephone visit.      What phone number would you like to be contacted at? CELL  How would you like to obtain your AVS? MYCHART        Subjective   Patient is a  61   year old who presents by phone call visit for the following:   Ongoing right shoulder pain  Not resolved  Wants to discuss further workup and treatment  HPI       Review of Systems   Constitutional, HEENT, cardiovascular, pulmonary, gi and gu systems are negative, except as otherwise noted.      Objective           Vitals:  No vitals were obtained today due to virtual visit.    Physical Exam   healthy, alert and no distress  PSYCH: Alert and oriented times 3; coherent speech, normal   rate and volume, able to articulate logical thoughts, able   to abstract reason, no tangential thoughts, no hallucinations   or delusions  His affect is normal  RESP: No cough, no audible wheezing, able to talk in full sentences  Remainder of exam unable to be completed due to telephone visits    Assessment/Plan  60 yo female with ongoing right shoulder pain, not improved since previous treatments    I independently reviewed the following imaging studies and discussed with patient:  Shoulder xray: shows some arthritis  Discussed and ordered shoulder MRI  Given RX for ibuprofen and medrol pack  F/u after MRI          Phone call duration: 20 minutes  Phone call start: 320pm  Phone call end: 340pm  Dr Warren      Again, thank you for allowing me to participate in the care of your patient.      Sincerely,    Floyd Warren MD

## 2022-04-05 NOTE — LETTER
4/5/2022      RE: Amy Choudhury  [address]  [place] [address]       Patient is a  61   year old who is being evaluated via a billable telephone visit.      What phone number would you like to be contacted at? CELL  How would you like to obtain your AVS? SIDT        Subjective   Patient is a  61   year old who presents by phone call visit for the following:   Ongoing right shoulder pain  Not resolved  Wants to discuss further workup and treatment  HPI       Review of Systems   Constitutional, HEENT, cardiovascular, pulmonary, gi and gu systems are negative, except as otherwise noted.      Objective           Vitals:  No vitals were obtained today due to virtual visit.    Physical Exam   healthy, alert and no distress  PSYCH: Alert and oriented times 3; coherent speech, normal   rate and volume, able to articulate logical thoughts, able   to abstract reason, no tangential thoughts, no hallucinations   or delusions  His affect is normal  RESP: No cough, no audible wheezing, able to talk in full sentences  Remainder of exam unable to be completed due to telephone visits    Assessment/Plan  60 yo female with ongoing right shoulder pain, not improved since previous treatments    I independently reviewed the following imaging studies and discussed with patient:  Shoulder xray: shows some arthritis  Discussed and ordered shoulder MRI  Given RX for ibuprofen and medrol pack  F/u after MRI          Phone call duration: 20 minutes  Phone call start: 320pm  Phone call end: 340pm  Dr Warren

## 2022-04-05 NOTE — PROGRESS NOTES
Patient is a  61   year old who is being evaluated via a billable telephone visit.      What phone number would you like to be contacted at? CELL  How would you like to obtain your AVS? ANDREW        Subjective   Patient is a  61   year old who presents by phone call visit for the following:   Ongoing right shoulder pain  Not resolved  Wants to discuss further workup and treatment  HPI       Review of Systems   Constitutional, HEENT, cardiovascular, pulmonary, gi and gu systems are negative, except as otherwise noted.      Objective           Vitals:  No vitals were obtained today due to virtual visit.    Physical Exam   healthy, alert and no distress  PSYCH: Alert and oriented times 3; coherent speech, normal   rate and volume, able to articulate logical thoughts, able   to abstract reason, no tangential thoughts, no hallucinations   or delusions  His affect is normal  RESP: No cough, no audible wheezing, able to talk in full sentences  Remainder of exam unable to be completed due to telephone visits    Assessment/Plan  60 yo female with ongoing right shoulder pain, not improved since previous treatments    I independently reviewed the following imaging studies and discussed with patient:  Shoulder xray: shows some arthritis  Discussed and ordered shoulder MRI  Given RX for ibuprofen and medrol pack  F/u after MRI          Phone call duration: 20 minutes  Phone call start: 320pm  Phone call end: 340pm  Dr Warren

## 2022-04-07 ENCOUNTER — PRE VISIT (OUTPATIENT)
Dept: ORTHOPEDICS | Facility: CLINIC | Age: 62
End: 2022-04-07
Payer: COMMERCIAL

## 2022-04-07 ENCOUNTER — TELEPHONE (OUTPATIENT)
Dept: ORTHOPEDICS | Facility: CLINIC | Age: 62
End: 2022-04-07
Payer: COMMERCIAL

## 2022-04-07 DIAGNOSIS — M54.12 CERVICAL RADICULAR PAIN: Primary | ICD-10-CM

## 2022-04-07 NOTE — TELEPHONE ENCOUNTER
DIAGNOSIS: Closed fracture of proximal end of right humerus   APPOINTMENT DATE: 04/13/2022   NOTES STATUS DETAILS   OFFICE NOTE from referring provider Internal 04/05/2022 -- Floyd Warren MD in [facility]   DISCHARGE REPORT from the ER Internal  02/10/2022 -- France Franco MD -- [facility]   MEDICATION LIST Internal    XRAYS (IMAGES & REPORTS) Internal 03/15/2022, 02/14/2022, 02/10/2022 -- XR Humerus    02/10/2022 -- XR Shoulder

## 2022-04-07 NOTE — TELEPHONE ENCOUNTER
[facility] Call Center    Phone Message    May a detailed message be left on voicemail: yes     Reason for Call: Medication Refill Request    Has the patient contacted the pharmacy for the refill? Yes   Name of medication being requested: prednisone  Provider who prescribed the medication: Dr. Warren  Pharmacy: [facility]  Date medication is needed: today     Please fax prescription to [phone].    Action Taken: sports    Travel Screening: Not Applicable

## 2022-04-08 NOTE — TELEPHONE ENCOUNTER
ATC spoke with Claudine at [facility]. Claudine is requesting the prescription from date of service 4/5/2022 be faxed to [phone].     ATC has faxed the prescription for 600 mg of ibuprofen and medrol pack to [facility].    KATHIA Wilcox

## 2022-04-08 NOTE — TELEPHONE ENCOUNTER
[facility] Call Center    Phone Message    May a detailed message be left on voicemail: yes     Reason for Call: Other: Soo states the orders sent dont indicate how often pt needs to take this      Action Taken: Message routed to:  [facility]): sports    Travel Screening: Not Applicable     They need a new script with instructions

## 2022-04-10 ENCOUNTER — ANCILLARY PROCEDURE (OUTPATIENT)
Dept: MRI IMAGING | Facility: CLINIC | Age: 62
End: 2022-04-10
Attending: PREVENTIVE MEDICINE
Payer: COMMERCIAL

## 2022-04-10 DIAGNOSIS — S42.201S CLOSED FRACTURE OF PROXIMAL END OF RIGHT HUMERUS, UNSPECIFIED FRACTURE MORPHOLOGY, SEQUELA: ICD-10-CM

## 2022-04-10 PROCEDURE — 73221 MRI JOINT UPR EXTREM W/O DYE: CPT | Mod: RT | Performed by: RADIOLOGY

## 2022-04-12 RX ORDER — PREDNISONE 20 MG/1
40 TABLET ORAL DAILY
Qty: 12 TABLET | Refills: 0 | Status: SHIPPED | OUTPATIENT
Start: 2022-04-12 | End: 2024-04-23

## 2022-04-12 NOTE — TELEPHONE ENCOUNTER
ATC spoke with Claudine [facility]. Confirmed their facility has received Dr. Warren's most recent prescription of Prednisone and the requested dosage.    KATHIA Wilcox

## 2022-04-13 ENCOUNTER — OFFICE VISIT (OUTPATIENT)
Dept: ORTHOPEDICS | Facility: CLINIC | Age: 62
End: 2022-04-13
Payer: COMMERCIAL

## 2022-04-13 VITALS — WEIGHT: 225.09 LBS | BODY MASS INDEX: 36.17 KG/M2 | HEIGHT: 66 IN

## 2022-04-13 DIAGNOSIS — S42.201A CLOSED FRACTURE OF PROXIMAL END OF RIGHT HUMERUS, UNSPECIFIED FRACTURE MORPHOLOGY, INITIAL ENCOUNTER: Primary | ICD-10-CM

## 2022-04-13 DIAGNOSIS — S42.201S CLOSED FRACTURE OF PROXIMAL END OF RIGHT HUMERUS, UNSPECIFIED FRACTURE MORPHOLOGY, SEQUELA: ICD-10-CM

## 2022-04-13 PROCEDURE — 99203 OFFICE O/P NEW LOW 30 MIN: CPT | Performed by: ORTHOPAEDIC SURGERY

## 2022-04-13 NOTE — NURSING NOTE
"Reason For Visit:   Chief Complaint   Patient presents with     Consult     Closed fracture of proximal end of right humerus DOI: 2/10/22 ref. Dr Warren       PCP: Randal Castellanos  Ref: Dr. Warren    ?  No  Occupation none.  Currently working? No.    Date of injury: 2/10/22  Type of injury: slipped on ice and fell.  Date of surgery: NA  Type of surgery: NA.  Smoker: Yes  Request smoking cessation information: No    Right hand dominant    SANE score  Affected shoulder: Right  Right shoulder SANE: 35  Left shoulder SANE: 100    Ht 1.676 m (5' 5.98\")   Wt 102.1 kg (225 lb 1.4 oz)   BMI 36.35 kg/m        Pain Assessment  Patient Currently in Pain: Yes  0-10 Pain Scale: 4  Primary Pain Location: Shoulder (Right)  Pain Descriptors: Other (comment) (moves back and forth)  Alleviating Factors: Pain medication (predisone)  Aggravating Factors: Movement, Other (comment) (lifting)    Mahogany Chaparro, KATHIA        "

## 2022-04-13 NOTE — LETTER
4/13/2022         RE: Amy Choudhury  [address]  [place] [address]        Dear Colleague,    Thank you for referring your patient, Amy Choudhury, to the [facility] [place]. Please see a copy of my visit note below.    CHIEF CONCERN:  Right proximal humerus fracture    HISTORY OF PRESENT ILLNESS:  Amy is a 61 year old RHD woman who is seen today referred by Dr. Warren for a subacute right proximal humerus fracture sustained 2/10/22 in a slip and fall on the ice.  She was discharged from the hospital to transitional care and saw Dr. Warren about 3-1/2 weeks after her injury.  The patient reports that she started doing shoulder range of motion about 4 weeks after her injury.  She notes that she discontinued her sling or took it off quite a bit more starting about 5 days ago.  She describes that she has used her shoulder or that arm to reach for her grab bars.  She is troubled today by limitations with in her hand and describes limited movement of the right hand.  She denies numbness or tingling into that hand.    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     Depressive disorder      Gastro-oesophageal reflux disease     bleeding ulcers     Hiatal hernia     NEWLY DIAGNOSISED     Hyperparathyroidism, unspecified (H) 3/29/2012     Peptic ulcer, unspecified site, unspecified as acute or chronic, without mention of hemorrhage or perforation      Pneumonia     NOVEMBER     PONV (postoperative nausea and vomiting)      TMJ (temporomandibular joint syndrome) 5/8/2014     Vitamin D deficiency 6/7/2012       Past Surgical History:   Procedure Laterality Date     APPENDECTOMY       CHOLECYSTECTOMY       DILATION AND CURETTAGE, OPERATIVE HYSTEROSCOPY WITH MORCELLATOR, COMBINED  11/21/2012    Procedure: COMBINED DILATION AND CURETTAGE, OPERATIVE  HYSTEROSCOPY WITH MORCELLATOR;  Hysteroscopy, Polypectomy, Dilation and Curettage  ;  Surgeon: Marta Montejo MD;  Location: [facility] OR     GI SURGERY      gastric bypass at age 31     ORTHOPEDIC SURGERY      back surgery at age 31     PARATHYROIDECTOMY Right 12/14/2015    Procedure: PARATHYROIDECTOMY;  Surgeon: Gregory Coleman MD;  Location: [facility]     AK MARSUP BARTHOLIN GLAND CYST       SPINE SURGERY         Current Outpatient Medications   Medication Sig Dispense Refill     acetaminophen (TYLENOL) 650 MG CR tablet Take 1 tablet (650 mg) by mouth 4 times daily 100 tablet 1     hydrOXYzine (ATARAX) 25 MG tablet Take 1 tablet (25 mg) by mouth 3 times daily as needed for itching 90 tablet 1     ibuprofen (ADVIL/MOTRIN) 600 MG tablet Take 1 tablet (600 mg) by mouth 2 times daily as needed for moderate pain 60 tablet 0     lidocaine (LIDODERM) 5 % patch Place 1 patch onto the skin every 24 hours To prevent lidocaine toxicity, patient should be patch free for 12 hrs daily. Apply to right shoulder PRN daily 12 hours on/12 hours off 15 patch 1     methocarbamol (ROBAXIN) 500 MG tablet Take 1 tablet (500 mg) by mouth 4 times daily as needed for muscle spasms 20 tablet 0     methylPREDNISolone (MEDROL DOSEPAK) 4 MG tablet therapy pack Follow Package Directions 21 tablet 0     methylPREDNISolone (MEDROL DOSEPAK) 4 MG tablet therapy pack Follow Package Directions 21 tablet 0     oxyCODONE (ROXICODONE) 5 MG tablet Take 1-2 tablets (5-10 mg) by mouth every 6 hours as needed for moderate to severe pain 15 tablet 0     predniSONE (DELTASONE) 20 MG tablet Take 2 tablets (40 mg) by mouth in the morning. 12 tablet 0     sertraline (ZOLOFT) 100 MG tablet Take 1.5 tablets (150 mg) by mouth daily 90 tablet 1     vitamin B complex with vitamin C (VITAMIN  B COMPLEX) tablet Take 1 tablet by mouth daily 100 tablet 6     polyethylene glycol (MIRALAX) 17 GM/Dose powder Take 17 g by mouth daily (Patient not taking: Reported on  4/13/2022) 510 g 0     senna-docusate (SENOKOT-S/PERICOLACE) 8.6-50 MG tablet Take 1 tablet by mouth 2 times daily (Patient not taking: Reported on 4/13/2022) 30 tablet 0          Allergies   Allergen Reactions     Codeine Sulfate GI Disturbance       SOCIAL HISTORY:    Social History     Socioeconomic History     Marital status:      Spouse name: Not on file     Number of children: Not on file     Years of education: Not on file     Highest education level: Not on file   Occupational History     Not on file   Tobacco Use     Smoking status: Current Every Day Smoker     Packs/day: 0.50     Years: 30.00     Pack years: 15.00     Types: Cigarettes     Smokeless tobacco: Never Used   Substance and Sexual Activity     Alcohol use: Yes     Comment: occasionally     Drug use: No     Sexual activity: Yes     Partners: Male     Birth control/protection: None   Other Topics Concern     Parent/sibling w/ CABG, MI or angioplasty before 65F 55M? Not Asked   Social History Narrative     Not on file     Social Determinants of Health     Financial Resource Strain: Not on file   Food Insecurity: Not on file   Transportation Needs: Not on file   Physical Activity: Not on file   Stress: Not on file   Social Connections: Not on file   Intimate Partner Violence: Not on file   Housing Stability: Not on file       FAMILY HISTORY: Reviewed in EMR      REVIEW OF SYSTEMS: Positive for that noted in past medical history and history of present illness and otherwise reviewed in EMR     PHYSICAL EXAM:    Adult female in no acute distress. Articulates and communicates with normal affect.  Respirations even and unlabored  Focused upper extremity exam: Skin is intact about the right shoulder and upper extremity.  Sensation is intact to the axillary nerve distribution over the shoulder and she gently can set her deltoid.  Right shoulder range of motion is active equal to passive forward elevation to 70, external rotation to 15, and internal  rotation to the back pocket.  She has full elbow range of motion.  However she is unable to fully actively or passively flex her index finger.  Her index PIP joint does not quite get to 90 degrees.  Her long finger range of motion is better but she is still and unable to reach the fingertip to the palm.  She does demonstrate intact EPL.  Intrinsic function appears to be intact.    IMAGING:  Right shoulder/humerus XRs today were reviewed and this demonstrates callous formation about the metaphyseal humerus fracture which does have varus angulation.     ASSESSMENT:    1. Right proximal humerus fracture    PLAN:  I reviewed the imaging and exam findings with the patient. We discussed the varus angulation but evidence for progressive callous formation and healing. At this point I would focus on working to improve her hand ROM. Although this may be due to stiffness secondary to her period of immobilization I will monitor this closely as we initiate formal Hand therapy. If she does not progress active ROM I would obtain an EMG.  A Hand therapy order was placed and PT instructions provided for her Rehab facility.   She will return in 3-4 weeks to re-evaluate.     Radha Ellis MD

## 2022-04-13 NOTE — PROGRESS NOTES
CHIEF CONCERN:  Right proximal humerus fracture    HISTORY OF PRESENT ILLNESS:  Amy is a 61 year old RHD woman who is seen today referred by Dr. Warren for a subacute right proximal humerus fracture sustained 2/10/22 in a slip and fall on the ice.  She was discharged from the hospital to transitional care and saw Dr. Warren about 3-1/2 weeks after her injury.  The patient reports that she started doing shoulder range of motion about 4 weeks after her injury.  She notes that she discontinued her sling or took it off quite a bit more starting about 5 days ago.  She describes that she has used her shoulder or that arm to reach for her grab bars.  She is troubled today by limitations with in her hand and describes limited movement of the right hand.  She denies numbness or tingling into that hand.    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     Depressive disorder      Gastro-oesophageal reflux disease     bleeding ulcers     Hiatal hernia     NEWLY DIAGNOSISED     Hyperparathyroidism, unspecified (H) 3/29/2012     Peptic ulcer, unspecified site, unspecified as acute or chronic, without mention of hemorrhage or perforation      Pneumonia     NOVEMBER     PONV (postoperative nausea and vomiting)      TMJ (temporomandibular joint syndrome) 5/8/2014     Vitamin D deficiency 6/7/2012       Past Surgical History:   Procedure Laterality Date     APPENDECTOMY       CHOLECYSTECTOMY       DILATION AND CURETTAGE, OPERATIVE HYSTEROSCOPY WITH MORCELLATOR, COMBINED  11/21/2012    Procedure: COMBINED DILATION AND CURETTAGE, OPERATIVE HYSTEROSCOPY WITH MORCELLATOR;  Hysteroscopy, Polypectomy, Dilation and Curettage  ;  Surgeon: Marta Montejo MD;  Location: [facility]     GI SURGERY      gastric bypass at age 31     ORTHOPEDIC SURGERY      back surgery at age 31     PARATHYROIDECTOMY Right 12/14/2015     Procedure: PARATHYROIDECTOMY;  Surgeon: Gregory Coleman MD;  Location: [facility]     [place] [facility]       SPINE SURGERY         Current Outpatient Medications   Medication Sig Dispense Refill     acetaminophen (TYLENOL) 650 MG CR tablet Take 1 tablet (650 mg) by mouth 4 times daily 100 tablet 1     hydrOXYzine (ATARAX) 25 MG tablet Take 1 tablet (25 mg) by mouth 3 times daily as needed for itching 90 tablet 1     ibuprofen (ADVIL/MOTRIN) 600 MG tablet Take 1 tablet (600 mg) by mouth 2 times daily as needed for moderate pain 60 tablet 0     lidocaine (LIDODERM) 5 % patch Place 1 patch onto the skin every 24 hours To prevent lidocaine toxicity, patient should be patch free for 12 hrs daily. Apply to right shoulder PRN daily 12 hours on/12 hours off 15 patch 1     methocarbamol (ROBAXIN) 500 MG tablet Take 1 tablet (500 mg) by mouth 4 times daily as needed for muscle spasms 20 tablet 0     methylPREDNISolone (MEDROL DOSEPAK) 4 MG tablet therapy pack Follow Package Directions 21 tablet 0     methylPREDNISolone (MEDROL DOSEPAK) 4 MG tablet therapy pack Follow Package Directions 21 tablet 0     oxyCODONE (ROXICODONE) 5 MG tablet Take 1-2 tablets (5-10 mg) by mouth every 6 hours as needed for moderate to severe pain 15 tablet 0     predniSONE (DELTASONE) 20 MG tablet Take 2 tablets (40 mg) by mouth in the morning. 12 tablet 0     sertraline (ZOLOFT) 100 MG tablet Take 1.5 tablets (150 mg) by mouth daily 90 tablet 1     vitamin B complex with vitamin C (VITAMIN  B COMPLEX) tablet Take 1 tablet by mouth daily 100 tablet 6     polyethylene glycol (MIRALAX) 17 GM/Dose powder Take 17 g by mouth daily (Patient not taking: Reported on 4/13/2022) 510 g 0     senna-docusate (SENOKOT-S/PERICOLACE) 8.6-50 MG tablet Take 1 tablet by mouth 2 times daily (Patient not taking: Reported on 4/13/2022) 30 tablet 0          Allergies   Allergen Reactions     Codeine Sulfate GI Disturbance       SOCIAL HISTORY:    Social  History     Socioeconomic History     Marital status:      Spouse name: Not on file     Number of children: Not on file     Years of education: Not on file     Highest education level: Not on file   Occupational History     Not on file   Tobacco Use     Smoking status: Current Every Day Smoker     Packs/day: 0.50     Years: 30.00     Pack years: 15.00     Types: Cigarettes     Smokeless tobacco: Never Used   Substance and Sexual Activity     Alcohol use: Yes     Comment: occasionally     Drug use: No     Sexual activity: Yes     Partners: Male     Birth control/protection: None   Other Topics Concern     Parent/sibling w/ CABG, MI or angioplasty before 65F 55M? Not Asked   Social History Narrative     Not on file     Social Determinants of Health     Financial Resource Strain: Not on file   Food Insecurity: Not on file   Transportation Needs: Not on file   Physical Activity: Not on file   Stress: Not on file   Social Connections: Not on file   Intimate Partner Violence: Not on file   Housing Stability: Not on file       FAMILY HISTORY: Reviewed in EMR      REVIEW OF SYSTEMS: Positive for that noted in past medical history and history of present illness and otherwise reviewed in EMR     PHYSICAL EXAM:    Adult female in no acute distress. Articulates and communicates with normal affect.  Respirations even and unlabored  Focused upper extremity exam: Skin is intact about the right shoulder and upper extremity.  Sensation is intact to the axillary nerve distribution over the shoulder and she gently can set her deltoid.  Right shoulder range of motion is active equal to passive forward elevation to 70, external rotation to 15, and internal rotation to the back pocket.  She has full elbow range of motion.  However she is unable to fully actively or passively flex her index finger.  Her index PIP joint does not quite get to 90 degrees.  Her long finger range of motion is better but she is still and unable to reach  the fingertip to the palm.  She does demonstrate intact EPL.  Intrinsic function appears to be intact.    IMAGING:  Right shoulder/humerus XRs today were reviewed and this demonstrates callous formation about the metaphyseal humerus fracture which does have varus angulation.     ASSESSMENT:    1. Right proximal humerus fracture    PLAN:  I reviewed the imaging and exam findings with the patient. We discussed the varus angulation but evidence for progressive callous formation and healing. At this point I would focus on working to improve her hand ROM. Although this may be due to stiffness secondary to her period of immobilization I will monitor this closely as we initiate formal Hand therapy. If she does not progress active ROM I would obtain an EMG.  A Hand therapy order was placed and PT instructions provided for her Rehab facility.   She will return in 3-4 weeks to re-evaluate.     Radha Ellis MD

## 2022-04-15 ENCOUNTER — THERAPY VISIT (OUTPATIENT)
Dept: OCCUPATIONAL THERAPY | Facility: CLINIC | Age: 62
End: 2022-04-15
Attending: ORTHOPAEDIC SURGERY
Payer: COMMERCIAL

## 2022-04-15 DIAGNOSIS — M25.641 STIFFNESS OF FINGER JOINT OF RIGHT HAND: ICD-10-CM

## 2022-04-15 DIAGNOSIS — S42.201S CLOSED FRACTURE OF PROXIMAL END OF RIGHT HUMERUS, UNSPECIFIED FRACTURE MORPHOLOGY, SEQUELA: ICD-10-CM

## 2022-04-15 DIAGNOSIS — M79.621 PAIN OF RIGHT UPPER ARM: ICD-10-CM

## 2022-04-15 DIAGNOSIS — M25.621 STIFFNESS OF RIGHT ELBOW JOINT: ICD-10-CM

## 2022-04-15 PROBLEM — M25.649 FINGER STIFFNESS: Status: ACTIVE | Noted: 2022-04-15

## 2022-04-15 PROBLEM — M25.629 ELBOW STIFFNESS: Status: ACTIVE | Noted: 2022-04-15

## 2022-04-15 PROCEDURE — 97165 OT EVAL LOW COMPLEX 30 MIN: CPT | Mod: GO | Performed by: OCCUPATIONAL THERAPIST

## 2022-04-15 PROCEDURE — 97110 THERAPEUTIC EXERCISES: CPT | Mod: GO | Performed by: OCCUPATIONAL THERAPIST

## 2022-04-15 NOTE — PROGRESS NOTES
[facility]    OUTPATIENT Occupational Therapy ORTHOPEDIC EVALUATION  PLAN OF TREATMENT FOR OUTPATIENT REHABILITATION  (COMPLETE FOR INITIAL CLAIMS ONLY)  Patient's Last Name, First Name, M.I.  YOB: 1960  Amy Choudhury    Provider s Name:  [facility]   Medical Record No.  [id number]   Start of Care Date:  04/15/22   Onset Date:   02/10/22   Type:     ___PT   __x_OT Medical Diagnosis:    Encounter Diagnoses   Name Primary?    Closed fracture of proximal end of right humerus, unspecified fracture morphology, sequela     Pain of right upper arm     Stiffness of finger joint of right hand     Stiffness of right elbow joint         Treatment Diagnosis:  Closed fracture of proximal end of right humerus        Goals:     04/15/22 0500   Goal #1   Goal #1 household chores   Previous Performance Level Independent   Current Functional Task    Current Performance Level 9/10 pain   STG Target Perfomance Open a tight or new jar   STG Target Perform Level 5/10 pain   Due Date 05/14/22   LTG Target Task/Performance Pain free household chores   Due Date 06/13/22       Therapy Frequency:  1x per week  Predicted Duration of Therapy Intervention:  10 weeks    MARGO DELGADO OT                 I CERTIFY THE NEED FOR THESE SERVICES FURNISHED UNDER        THIS PLAN OF TREATMENT AND WHILE UNDER MY CARE     (Physician attestation of this document indicates review and certification of the therapy plan).                     Certification Date From:  04/15/22   Certification Date To:  06/24/22    Referring Provider:  Radha Ellis    Initial Assessment        See Epic Evaluation SOC Date: 04/15/22

## 2022-04-15 NOTE — PROGRESS NOTES
Hand Therapy Initial Evaluation    Current Date:  4/15/2022    Diagnosis: Closed fracture of proximal end of right humerus  DOI: 2/10/22   DOS: NA   Procedure:  NA  Post:  9w 1d    Precautions: None noted     MD Order: A/AA/PROM     Subjective:  Amy Choudhury is a 61 year old female.    Patient reports symptoms of the right shoulder, elbow, wrist and hand which occurred due to fall. Since onset symptoms are Gradually getting better.  General health as reported by patient is fair.  Pertinent medical history includes:Anemia, Depression, Numbness/Tingling, Overweight, Smoking, Thyroid Problems, Pain at Night/Rest  Medical allergies:none.  Surgical history: other:   Past Surgical History:   Procedure Laterality Date     APPENDECTOMY       CHOLECYSTECTOMY       DILATION AND CURETTAGE, OPERATIVE HYSTEROSCOPY WITH MORCELLATOR, COMBINED  11/21/2012    Procedure: COMBINED DILATION AND CURETTAGE, OPERATIVE HYSTEROSCOPY WITH MORCELLATOR;  Hysteroscopy, Polypectomy, Dilation and Curettage  ;  Surgeon: Marta Montejo MD;  Location: [facility]     GI SURGERY      gastric bypass at age 31     ORTHOPEDIC SURGERY      back surgery at age 31     PARATHYROIDECTOMY Right 12/14/2015    Procedure: PARATHYROIDECTOMY;  Surgeon: Gregory Coleman MD;  Location: [facility]       SPINE SURGERY       Medication history: Anti-depressants, Anti-inflammatory, Muscle Relaxants, Pain, Steroids.    Current occupation is unemployed     Occupational Profile Information:  Right hand dominant  Prior functional level:  no limitations  Patient reports symptoms of pain, stiffness/loss of motion, weakness/loss of strength, edema, numbness and tingling   Special tests:  x-ray.    Previous treatment: PT in IP setting (current)  Barriers include:transportation  Mobility: No difficulty  Transportation: From facility  Currently not working due to present treatment problem  Leisure activities/hobbies: Reading, beading,  "time with grandchildren   Other: Pt is in inpatient rehab currently. Working on home situation. Has partner to help as needed. Has been out of immobilizer for about 1 week. Prior herniated disc surgery. L hand numbness noted at times. Om prednisone.     Functional Outcome Measure:   Upper Extremity Functional Index Score:  SCORE:   Column Totals: /80: 11   (A lower score indicates greater disability.)    Objective:  Pain Level (Scale 0-10)   4/15/2022   At Rest 1   With Use 9     Pain Description  Date 4/15/2022   Location R shoulder, R trapezius, R neck.    Pain Quality Miserable, Numb, Sharp, Shooting, Tender, Throbbing, Tingling and Unbearable   Frequency constant     Pain is worst  daytime or nighttime   Exacerbated by  Weighted lifting, movement, door handles   Relieved by otc medications, rest and massage    Progression Same      Edema  None, noted in R hand in am. Has been performing edema digit massage.     Sensation   L hand \"whole hand\" numb at times     ROM  Shoulder 4/15/2022 4/15/2022   AROM (PROM) L R   Flexion 160 120   Extension 62 40   Internal Rotation NT NT   External Rotation NT NT   Abduction 110 40     ROM  Pain Report: - none  + mild    ++ moderate    +++ severe   Elbow 4/15/2022 4/15/2022   AROM (PROM) L R   Extension 0 -13   Flexion 148 144   Supination See wrist ROM  \"   Pronation \" \"     ROM  Pain Report: - none  + mild    ++ moderate    +++ severe   Wrist 4/15/2022 4/15/2022   AROM (PROM) L R   Extension 55 55   Flexion 65 65   RD 20 20   UD 20 28   Supination 90 90+   Pronation 85 85     ROM  Thumb 4/15/2022 4/15/2022   AROM  (PROM) L R   MP 0/55 0/55   IP 0/70 0/30   RABD 65 65   PABD 55 58   Retropulsion     Kapandji Opposition Scale (0-10/10) 10 9     Distance of fingertip from palmar crease during composite fist (as measured by the Digit O' Meter)  R Digits  04/15/22     IF 6.5   LF 5   RF 3   SF  2     L digits AROM WFL     Strength   (Measured in pounds)  Pain Report: - none  + " mild    ++ moderate    +++ severe    4/15/2022 4/15/2022   Trials L R   1  2  3 15 40   Average       Lat Pinch 4/15/2022 4/15/2022   Trials L R   1  2  3 13.5 10   Average       3 Pt Pinch 4/15/2022 4/15/2022   Trials L R   1  2  3 13.5 2   Average         Assessment:  Patient presents with symptoms consistent with diagnosis noted above,  with conservative intervention.     Patient's limitations or Problem List includes:  Pain, Decreased ROM/motion, Increased edema, Weakness, Sensory disturbance, Hypomobility, Decreased , Decreased pinch, Decreased coordination, Decreased dexterity and Tightness in musculature of the right elbow, wrist, hand, thumb, index finger, long finger, ring finger and small finger which interferes with the patient's ability to perform Self Care Tasks (dressing, bathing), Work Tasks, Sleep Patterns, Recreational Activities, Household Chores and Driving  as compared to previous level of function.    Rehab Potential:  Good - Return to full activity, some limitations    Patient will benefit from skilled Occupational Therapy to increase ROM, flexibility, motion, overall strength,  strength, pinch strength, forearm strength, stability of wrist, stability of thumb, coordination, dexterity and sensation and decrease pain and edema to return to previous activity level and resume normal daily tasks and to reach their rehab potential.    Barriers to Learning:  No barrier    Communication Issues:  Patient appears to be able to clearly communicate and understand verbal and written communication and follow directions correctly.    Chart Review: Chart Review and Simple history review with patient    Identified Performance Deficits: bathing/showering, dressing, functional mobility, driving and community mobility, health management and maintenance, home establishment and management, meal preparation and cleanup, shopping, sleep, work and leisure activities    Assessment of Occupational  Performance:  5 or more Performance Deficits    Clinical Decision Making (Complexity): Low complexity    Treatment Explanation:  The following has been discussed with the patient:  RX ordered/plan of care  Anticipated outcomes  Possible risks and side effects    Plan:  Frequency:  1 X week, once daily  Duration:  for 10 weeks    Treatment Plan:   Modalities:  US, Iontophoresis, Fluidotherapy, Paraffin, TENS and E-Stim  Therapeutic Exercise:  AROM, AAROM, PROM, Tendon Gliding, Blocking, Reverse Blocking, Extensor Tracking, Isotonics, Isometrics and Stabilization  Neuromuscular re-education:  Nerve Gliding, Coordination/Dexterity, Sensory re-education, Posture, Kinesiotaping, Isometrics and Stabilization  Manual Techniques:  Coordination/Dexterity, Joint mobilization, Friction massage, Myofascial release and Manual edema mobilization  Orthotic Fabrication:  Static, Static progressive and Dynamic  Self Care:  Self Care Tasks, Ergonomic Considerations, Community Transportation and Work Tasks    Discharge Plan:  Achieve all LTG.  Independent in home treatment program.  Reach maximal therapeutic benefit.    Home Exercise Program:  PTRX Report  The following values have been sent to [facility].  Pendulum/Codmans  EMR Notes  HEP - Sets  Reps  Sessions per day  Notes As needed/advised by inpatient therapist.  Periscapular Table Slide Flexion  EMR Notes  HEP - Sets  Reps  Sessions per day  Notes As needed/advised by inpatient therapist.  Supported Shoulder External Rotation  EMR Notes  HEP - Sets  Reps  Sessions per day  Notes As needed/advised by inpatient therapist.  Shoulder Rolls  EMR Notes  HEP - Sets  Reps 5-10  Sessions per day 2-3  Notes As needed/advised by inpatient therapist.  Warmth  EMR Notes  HEP - Sets  Reps  Sessions per day  Notes Can use warm washcloth or water. Only if there is no swelling present.  Median Nerve Mobility  EMR Notes  HEP - Sets  Reps 5-10  Sessions per day 2-3  Notes Gentle  Elbow Active Range of  Motion Combined Extension and Flexion  EMR Notes  HEP - Sets  Reps 5-10  Sessions per day 2-3  Notes  Forearm Active Range of Motion Combined Pronation and Supination  EMR Notes  HEP - Sets  Reps 5-10  Sessions per day 2-3  Notes  Wrist Active Range of Motion Extension and Flexion Combined  EMR Notes  HEP - Sets  Reps 5-10  Sessions per day 2-3  Notes Pain free range. Out of brace.  Active Range of Motion Combined Radial and Ulnar Deviation  EMR Notes  HEP - Sets  Reps 5-10  Sessions per day 2-3  Notes  Finger Active Range of Motion Tendon Glides Fist Series  EMR Notes  HEP - Sets  Reps 5-10  Sessions per day 2-3  Notes  Passive Range of Motion Fingers PIP Joint Flexion  EMR Notes  HEP - Sets  Reps  Sessions per day  Notes  Passive Range of Motion Fingers DIP Flexion  EMR Notes  HEP - Sets  Reps 5-10  Sessions per day 2-3  Notes  Finger Passive Range of Motion Composite Flexion  EMR Notes  HEP - Sets  Reps 5-10  Sessions per day 2-3  Notes  Thumb Active Range of Motion Composite Flexion  EMR Notes  HEP - Sets  Reps 5-10  Sessions per day 2-3  Notes  Thumb Active Range of Motion IP Joint Blocking  EMR Notes  HEP - Sets  Reps 5-10  Sessions per day 3  Notes      Next Visit:  Assess HEP  PROM of digits   Elbow stretches   Wrist stretches   Pain management   Taping   RMO? Orthosis?

## 2022-04-29 ENCOUNTER — THERAPY VISIT (OUTPATIENT)
Dept: OCCUPATIONAL THERAPY | Facility: CLINIC | Age: 62
End: 2022-04-29
Payer: COMMERCIAL

## 2022-04-29 DIAGNOSIS — M25.621 STIFFNESS OF RIGHT ELBOW JOINT: ICD-10-CM

## 2022-04-29 DIAGNOSIS — M25.641 STIFFNESS OF FINGER JOINT OF RIGHT HAND: ICD-10-CM

## 2022-04-29 DIAGNOSIS — M79.621 PAIN OF RIGHT UPPER ARM: Primary | ICD-10-CM

## 2022-04-29 PROCEDURE — 2894A PR THERAPEUTIC EXERCISES. EA 15 MIN: CPT | Mod: GO | Performed by: OCCUPATIONAL THERAPIST

## 2022-04-29 PROCEDURE — 97760 ORTHOTIC MGMT&TRAING 1ST ENC: CPT | Mod: GO | Performed by: OCCUPATIONAL THERAPIST

## 2022-05-25 DIAGNOSIS — S42.201A CLOSED FRACTURE OF PROXIMAL END OF RIGHT HUMERUS, UNSPECIFIED FRACTURE MORPHOLOGY, INITIAL ENCOUNTER: Primary | ICD-10-CM

## 2022-06-04 ENCOUNTER — HEALTH MAINTENANCE LETTER (OUTPATIENT)
Age: 62
End: 2022-06-04

## 2022-06-06 ENCOUNTER — LAB REQUISITION (OUTPATIENT)
Dept: LAB | Facility: CLINIC | Age: 62
End: 2022-06-06
Payer: COMMERCIAL

## 2022-06-06 DIAGNOSIS — D50.9 IRON DEFICIENCY ANEMIA, UNSPECIFIED: ICD-10-CM

## 2022-06-08 ENCOUNTER — LAB REQUISITION (OUTPATIENT)
Dept: LAB | Facility: CLINIC | Age: 62
End: 2022-06-08
Payer: COMMERCIAL

## 2022-06-08 DIAGNOSIS — D50.9 IRON DEFICIENCY ANEMIA, UNSPECIFIED: ICD-10-CM

## 2022-06-15 ENCOUNTER — LAB REQUISITION (OUTPATIENT)
Dept: LAB | Facility: CLINIC | Age: 62
End: 2022-06-15
Payer: COMMERCIAL

## 2022-06-15 DIAGNOSIS — D50.9 IRON DEFICIENCY ANEMIA, UNSPECIFIED: ICD-10-CM

## 2022-06-20 PROBLEM — M25.649 FINGER STIFFNESS: Status: RESOLVED | Noted: 2022-04-15 | Resolved: 2022-06-20

## 2022-06-20 PROBLEM — M79.621 PAIN OF RIGHT UPPER ARM: Status: RESOLVED | Noted: 2022-04-15 | Resolved: 2022-06-20

## 2022-06-20 PROBLEM — M25.629 ELBOW STIFFNESS: Status: RESOLVED | Noted: 2022-04-15 | Resolved: 2022-06-20

## 2022-06-29 ENCOUNTER — OFFICE VISIT (OUTPATIENT)
Dept: ORTHOPEDICS | Facility: CLINIC | Age: 62
End: 2022-06-29
Payer: COMMERCIAL

## 2022-06-29 ENCOUNTER — ANCILLARY PROCEDURE (OUTPATIENT)
Dept: GENERAL RADIOLOGY | Facility: CLINIC | Age: 62
End: 2022-06-29
Attending: ORTHOPAEDIC SURGERY
Payer: COMMERCIAL

## 2022-06-29 DIAGNOSIS — S42.201A CLOSED FRACTURE OF PROXIMAL END OF RIGHT HUMERUS, UNSPECIFIED FRACTURE MORPHOLOGY, INITIAL ENCOUNTER: ICD-10-CM

## 2022-06-29 DIAGNOSIS — S42.201S CLOSED FRACTURE OF PROXIMAL END OF RIGHT HUMERUS, UNSPECIFIED FRACTURE MORPHOLOGY, SEQUELA: Primary | ICD-10-CM

## 2022-06-29 PROCEDURE — 99213 OFFICE O/P EST LOW 20 MIN: CPT | Mod: GC | Performed by: ORTHOPAEDIC SURGERY

## 2022-06-29 PROCEDURE — 73030 X-RAY EXAM OF SHOULDER: CPT | Mod: RT | Performed by: RADIOLOGY

## 2022-06-29 NOTE — NURSING NOTE
Reason For Visit:   Chief Complaint   Patient presents with     RECHECK     Follow up Closed fracture of proximal end of right humerus DOI: 2/10/22        PCP: Randal Castellanos  Ref: Dr. Warren     ?  No  Occupation none.  Currently working? No.     Date of injury: 2/10/22  Type of injury: slipped on ice and fell.  Date of surgery: NA  Type of surgery: NA.  Smoker: Yes  Request smoking cessation information: No     Right hand dominant    SANE score  Affected shoulder: Right  Right shoulder SANE: 75  Left shoulder SANE: 100    There were no vitals taken for this visit.      Pain Assessment  Patient Currently in Pain: Denies (sometime it aches, has some issues with right hand)    Mahogany Chaparro, ATC

## 2022-06-29 NOTE — LETTER
6/29/2022         RE: Amy Choudhury  [address]  [place] [address]        Dear Colleague,    Thank you for referring your patient, Amy Choudhury, to the [facility] [place]. Please see a copy of my visit note below.    CHIEF CONCERN:  Follow-up right proximal humerus fracture treated nonoperatively  DOI: 2/10/2022  MECHANISM: Slipped on ice    HISTORY OF PRESENT ILLNESS:  Ms. Amy Choudhury is a 61-year-old right-hand-dominant female who returns for reevaluation 5 months status post right proximal humerus fracture treated nonoperatively.  Last seen in orthopedics clinic on 4/13/2022 at which time the plan was for range of shoulder/hand motion as guided by physical therapy/hand therapy with possible EMG    Today, returns unaccompanied stating that she is feeling better.  Denies pain.  Unable to side sleep on right because of discomfort.  Feels that she has a occasional shifting and some stiffness.  Takes Tylenol periodically.  Shoulder and hand range of motion has been progressing with physical therapy/hand therapy.  Remains at a rehab facility but will be leaving on 7/17/2022.  Intends to continue with outpatient therapy.  Intermittent generalized hand paresthesias bilaterally.  Continues with home exercises.    PHYSICAL EXAM:    Gen: Adult  in no acute distress. Articulates and communicates with normal affect.  Pulm: Respirations even and unlabored on room air  CV: Regular rate via peripheral pulse, extremities wwp  MSK: Focused upper extremity exam:   RUE:  - Skin intact. No erythema.   - Sensation intact all dermatomes into the hand to light touch.   - NTTP clavicle, shoulder, arm, elbow, forearm  - Shoulder active motion (contralateral): FE to 120 (180), Abd to 90 (180), ER at side to 50 (50), and IR to T8 (T8).  - 5/5 strength with Mel, bearhug, resisted external rotation at the side  - Fires deltoid, triceps, EPL, FPL, and Intrinsics.  - 2+ radial  pulse  - Negative Tinel's over carpal tunnel, negative Durkan  - Forms loose composite fist with index finger 3 cm from palmar crease  - Fires FDS, FDP to index finger; passively ranges all fingers with stiffness    IMAGIN views right shoulder obtained in clinic today demonstrating healed proximal humerus fracture with stable varus angulation and interval bone remodeling    ASSESSMENT:  1. Right proximal humerus fracture  2. Right hand stiffness, improved    PLAN:  -Okay for weightbearing activity as tolerated  -Continue with physical therapy/hand therapy as long as finding benefit  -Recommend over-the-counter pain medications as needed  -Follow-up as needed    Patient seen and discussed with Dr. Ellis who agrees with aforementioned plan.    Rubio Patricia MD  Orthopaedic Surgery PGY-4    I have personally examined this patient and have reviewed the clinical presentation and progress note with the resident.  I agree with the treatment plan as outlined.  The plan was formulated with the resident on the day of the resident's note.         Radha Ellis MD

## 2022-06-29 NOTE — PROGRESS NOTES
CHIEF CONCERN:  Follow-up right proximal humerus fracture treated nonoperatively  DOI: 2/10/2022  MECHANISM: Slipped on ice    HISTORY OF PRESENT ILLNESS:  Ms. Amy Choudhury is a 61-year-old right-hand-dominant female who returns for reevaluation 5 months status post right proximal humerus fracture treated nonoperatively.  Last seen in orthopedics clinic on 2022 at which time the plan was for range of shoulder/hand motion as guided by physical therapy/hand therapy with possible EMG    Today, returns unaccompanied stating that she is feeling better.  Denies pain.  Unable to side sleep on right because of discomfort.  Feels that she has a occasional shifting and some stiffness.  Takes Tylenol periodically.  Shoulder and hand range of motion has been progressing with physical therapy/hand therapy.  Remains at a rehab facility but will be leaving on 2022.  Intends to continue with outpatient therapy.  Intermittent generalized hand paresthesias bilaterally.  Continues with home exercises.    PHYSICAL EXAM:    Gen: Adult  in no acute distress. Articulates and communicates with normal affect.  Pulm: Respirations even and unlabored on room air  CV: Regular rate via peripheral pulse, extremities wwp  MSK: Focused upper extremity exam:   RUE:  - Skin intact. No erythema.   - Sensation intact all dermatomes into the hand to light touch.   - NTTP clavicle, shoulder, arm, elbow, forearm  - Shoulder active motion (contralateral): FE to 120 (180), Abd to 90 (180), ER at side to 50 (50), and IR to T8 (T8).  - 5/5 strength with Mel, bearhug, resisted external rotation at the side  - Fires deltoid, triceps, EPL, FPL, and Intrinsics.  - 2+ radial pulse  - Negative Tinel's over carpal tunnel, negative Durkan  - Forms loose composite fist with index finger 3 cm from palmar crease  - Fires FDS, FDP to index finger; passively ranges all fingers with stiffness    IMAGIN views right shoulder obtained in clinic today  demonstrating healed proximal humerus fracture with stable varus angulation and interval bone remodeling    ASSESSMENT:  1. Right proximal humerus fracture  2. Right hand stiffness, improved    PLAN:  -Okay for weightbearing activity as tolerated  -Continue with physical therapy/hand therapy as long as finding benefit  -Recommend over-the-counter pain medications as needed  -Follow-up as needed    Patient seen and discussed with Dr. Ellis who agrees with aforementioned plan.    Rubio Patricia MD  Orthopaedic Surgery PGY-4    I have personally examined this patient and have reviewed the clinical presentation and progress note with the resident.  I agree with the treatment plan as outlined.  The plan was formulated with the resident on the day of the resident's note.

## 2022-07-29 ENCOUNTER — MYC MEDICAL ADVICE (OUTPATIENT)
Dept: FAMILY MEDICINE | Facility: CLINIC | Age: 62
End: 2022-07-29

## 2022-07-29 DIAGNOSIS — F33.1 MODERATE EPISODE OF RECURRENT MAJOR DEPRESSIVE DISORDER (H): ICD-10-CM

## 2022-07-29 DIAGNOSIS — F41.1 GAD (GENERALIZED ANXIETY DISORDER): ICD-10-CM

## 2022-07-29 RX ORDER — SERTRALINE HYDROCHLORIDE 100 MG/1
150 TABLET, FILM COATED ORAL DAILY
Qty: 90 TABLET | Refills: 1 | Status: SHIPPED | OUTPATIENT
Start: 2022-07-29 | End: 2024-04-23

## 2022-07-29 NOTE — TELEPHONE ENCOUNTER
Last seen 10/15/2021 by FP, last filled this medication 10/15/2021 for 90 tablets with 1 refill. Patient taking 1.5 tabs daily  I have queued up the medication and will forward to provider for consideration.    Radha Ragsdale RN

## 2022-10-10 ENCOUNTER — HEALTH MAINTENANCE LETTER (OUTPATIENT)
Age: 62
End: 2022-10-10

## 2023-02-18 ENCOUNTER — HEALTH MAINTENANCE LETTER (OUTPATIENT)
Age: 63
End: 2023-02-18

## 2023-02-20 ENCOUNTER — DOCUMENTATION ONLY (OUTPATIENT)
Dept: FAMILY MEDICINE | Facility: CLINIC | Age: 63
End: 2023-02-20
Payer: COMMERCIAL

## 2023-02-20 NOTE — PROGRESS NOTES
"When opening a documentation only encounter, be sure to enter in \"Chief Complaint\" Forms and in \" Comments\" Title of form, description if needed.    Amy is a 62 year old  female  Form received via: Fax  Form now resides in: Provider Ready    Iram Dupree MA                  "

## 2023-05-27 ENCOUNTER — HEALTH MAINTENANCE LETTER (OUTPATIENT)
Age: 63
End: 2023-05-27

## 2023-06-10 ENCOUNTER — HEALTH MAINTENANCE LETTER (OUTPATIENT)
Age: 63
End: 2023-06-10

## 2023-10-28 ENCOUNTER — HEALTH MAINTENANCE LETTER (OUTPATIENT)
Age: 63
End: 2023-10-28

## 2024-03-16 ENCOUNTER — HEALTH MAINTENANCE LETTER (OUTPATIENT)
Age: 64
End: 2024-03-16

## 2024-04-04 NOTE — TELEPHONE ENCOUNTER
Attempted to reach patient to follow up after recent hospitalization, unable to reach. Left VM with name and call back number.    Patient was admitted to [facility] for SI, discharged 9/21/21.    FD: If patient calls back transfer to RN. Pt has follow up appt scheduled already    Kim Scott RN           Medication: Ramipril passed protocol. Last office visit date: 11/7/23  Next appointment scheduled?: Yes   Number of refills given: 0    Upcoming appointment scheduled on: 5/7/2024   Per last office visit, patient is to follow up 6 months.    Last refill on: 1/9/24

## 2024-04-19 ENCOUNTER — DOCUMENTATION ONLY (OUTPATIENT)
Dept: GERIATRICS | Facility: CLINIC | Age: 64
End: 2024-04-19

## 2024-04-19 ENCOUNTER — LAB REQUISITION (OUTPATIENT)
Dept: LAB | Facility: CLINIC | Age: 64
End: 2024-04-19
Payer: COMMERCIAL

## 2024-04-19 ENCOUNTER — TRANSITIONAL CARE UNIT VISIT (OUTPATIENT)
Dept: GERIATRICS | Facility: CLINIC | Age: 64
End: 2024-04-19
Payer: COMMERCIAL

## 2024-04-19 VITALS
BODY MASS INDEX: 39.7 KG/M2 | HEIGHT: 66 IN | RESPIRATION RATE: 20 BRPM | WEIGHT: 247 LBS | OXYGEN SATURATION: 96 % | HEART RATE: 83 BPM | DIASTOLIC BLOOD PRESSURE: 63 MMHG | TEMPERATURE: 97.2 F | SYSTOLIC BLOOD PRESSURE: 126 MMHG

## 2024-04-19 DIAGNOSIS — E11.9 TYPE 2 DIABETES MELLITUS WITHOUT COMPLICATION, WITHOUT LONG-TERM CURRENT USE OF INSULIN (H): ICD-10-CM

## 2024-04-19 DIAGNOSIS — S42.215D CLOSED NONDISPLACED FRACTURE OF SURGICAL NECK OF LEFT HUMERUS WITH ROUTINE HEALING, UNSPECIFIED FRACTURE MORPHOLOGY, SUBSEQUENT ENCOUNTER: Primary | ICD-10-CM
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
## 2024-04-19 DIAGNOSIS — E66.09 CLASS 2 OBESITY DUE TO EXCESS CALORIES WITHOUT SERIOUS COMORBIDITY WITH BODY MASS INDEX (BMI) OF 39.0 TO 39.9 IN ADULT: ICD-10-CM

## 2024-04-19 DIAGNOSIS — M62.81 GENERALIZED MUSCLE WEAKNESS: ICD-10-CM

## 2024-04-19 DIAGNOSIS — F43.23 ADJUSTMENT DISORDER WITH MIXED ANXIETY AND DEPRESSED MOOD: ICD-10-CM

## 2024-04-19 DIAGNOSIS — Z78.9 IMPAIRED MOBILITY AND ADLS: ICD-10-CM

## 2024-04-19 DIAGNOSIS — F17.200 TOBACCO DEPENDENCE SYNDROME: ICD-10-CM

## 2024-04-19 DIAGNOSIS — R53.1 WEAKNESS: ICD-10-CM

## 2024-04-19 DIAGNOSIS — Z98.84 STATUS POST GASTRIC BYPASS FOR OBESITY: ICD-10-CM

## 2024-04-19 DIAGNOSIS — F41.9 ANXIETY: ICD-10-CM

## 2024-04-19 DIAGNOSIS — Z74.09 IMPAIRED MOBILITY AND ADLS: ICD-10-CM

## 2024-04-19 DIAGNOSIS — M54.30 SCIATICA, UNSPECIFIED LATERALITY: Primary | ICD-10-CM

## 2024-04-19 DIAGNOSIS — E66.812 CLASS 2 OBESITY DUE TO EXCESS CALORIES WITHOUT SERIOUS COMORBIDITY WITH BODY MASS INDEX (BMI) OF 39.0 TO 39.9 IN ADULT: ICD-10-CM

## 2024-04-19 DIAGNOSIS — R29.898 RIGHT ARM WEAKNESS: ICD-10-CM

## 2024-04-19 PROBLEM — L30.9 ECZEMA: Status: ACTIVE | Noted: 2024-04-19

## 2024-04-19 PROCEDURE — 99310 SBSQ NF CARE HIGH MDM 45: CPT | Performed by: NURSE PRACTITIONER

## 2024-04-19 RX ORDER — OXYCODONE HYDROCHLORIDE 5 MG/1
5-10 TABLET ORAL EVERY 4 HOURS PRN
COMMUNITY
End: 2024-04-19

## 2024-04-19 RX ORDER — POLYETHYLENE GLYCOL 3350 17 G/17G
17 POWDER, FOR SOLUTION ORAL DAILY
COMMUNITY
Start: 2024-04-19

## 2024-04-19 RX ORDER — METHOCARBAMOL 500 MG/1
500 TABLET, FILM COATED ORAL 3 TIMES DAILY
COMMUNITY
Start: 2024-04-19

## 2024-04-19 RX ORDER — OXYCODONE HYDROCHLORIDE 5 MG/1
10 TABLET ORAL EVERY 4 HOURS PRN
COMMUNITY
End: 2024-04-19

## 2024-04-19 RX ORDER — SENNOSIDES 8.6 MG
1 TABLET ORAL EVERY 12 HOURS PRN
COMMUNITY
Start: 2024-04-19

## 2024-04-19 RX ORDER — ACETAMINOPHEN 500 MG
1000 TABLET ORAL 4 TIMES DAILY
COMMUNITY
Start: 2024-04-19

## 2024-04-19 RX ORDER — OXYCODONE HYDROCHLORIDE 5 MG/1
5-10 TABLET ORAL EVERY 4 HOURS PRN
Qty: 60 TABLET | Refills: 0 | Status: SHIPPED | OUTPATIENT
Start: 2024-04-19 | End: 2024-04-22 | Stop reason: DRUGHIGH

## 2024-04-19 NOTE — LETTER
4/19/2024        RE: Amy Choudhury  [address]  [facility] [address]        [facility] GERIATRICS    PRIMARY CARE PROVIDER AND CLINIC:  Randal Castellanos MD, [address] [facility] / [facility] [address]  Chief Complaint   Patient presents with     Hospital F/U     [facility] 4/8/2024 - 4/18/2024      [place] Medical Record Number:  [id number]  Place of Service where encounter took place:  [facility] (TCU) [[id number]]    Amy Choudhury  is a 64 year old  (1960), admitted to the above facility from  [facility] . Hospital stay 4/8/24 through 4/18/24.       Past medical history includes  Closed fracture of the left humerus neck  Hx of closed fracture of proximal end of right humerus (2022)  Fall  Obesity  Anxiety  Adjustment disorder with mixed anxiety and depressed mood  Diabetes type 2  Tobacco dependence    /8/2024, patient presented to the emergency room after falling on the treadmill 4 days prior and hitting her left shoulder on the wall.  She had been struggling with baseline tacit is changing close and was and able to cook and having trouble using the bathroom.  In the emergency room she was found to have a humeral neck nondisplaced fracture.  Orthopedic surgery saw her, and the nonoperative treatment plan was recommended.  Discharge was delayed due to prior authorization    Today she states her pain is 7 out of 10 after just taking oxycodone.  It decreases to a 5 out of 10.  She is taking the Tylenol and methocarbamol.          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     Depressive disorder      Gastro-oesophageal reflux disease     bleeding ulcers     Hiatal hernia     NEWLY DIAGNOSISED     Hyperparathyroidism, unspecified (H) 3/29/2012     Peptic ulcer, unspecified site, unspecified as acute or chronic, without mention of hemorrhage or perforation      Pneumonia     NOVEMBER     PONV (postoperative nausea and vomiting)      TMJ (temporomandibular joint syndrome) 5/8/2014     Vitamin D deficiency 6/7/2012      PAST SURGICAL HISTORY:   has a past surgical history that includes orthopedic surgery; Spine surgery; Cholecystectomy; appendectomy; NE MARSUP BARTHOLIN GLAND CYST; GI surgery; Dilation and curettage, operative hysteroscopy with morcellator, combined (11/21/2012); and Parathyroidectomy (Right, 12/14/2015).  FAMILY HISTORY: family history includes Breast Cancer (age of onset: 57) in her mother; Cancer in her brother; Diabetes in her daughter; Other - See Comments in her mother; Thyroid Disease in her mother.  SOCIAL HISTORY:   reports that she has been smoking cigarettes. She has a 15 pack-year smoking history. She has never used smokeless tobacco. She reports current alcohol use. She reports that she does not use drugs.  Patient's living condition: lives alone    Post Discharge Medication Reconciliation Status:   MED REC REQUIRED  Post Medication Reconciliation Status:  Discharge medications reconciled and changed, see notes/orders       Current Outpatient Medications   Medication Sig Dispense Refill     acetaminophen (TYLENOL) 500 MG tablet Take 1,000 mg by mouth 4 times daily       methocarbamol (ROBAXIN) 500 MG tablet Take 500 mg by mouth 3 times daily       oxyCODONE (ROXICODONE) 5 MG tablet Take 10 mg by mouth every 4 hours as needed for pain       polyethylene glycol (MIRALAX) 17 GM/Dose powder Take 17 g by mouth daily       sennosides (SENOKOT) 8.6 MG tablet Take 1-2 tablets by mouth every 12 hours as needed for  constipation       acetaminophen (TYLENOL) 650 MG CR tablet Take 1 tablet (650 mg) by mouth 4 times daily (Patient not taking: Reported on 4/19/2024) 100 tablet 1     hydrOXYzine (ATARAX) 25 MG tablet Take 1 tablet (25 mg) by mouth 3 times daily as needed for itching (Patient not taking: Reported on 4/19/2024) 90 tablet 1     ibuprofen (ADVIL/MOTRIN) 600 MG tablet Take 1 tablet (600 mg) by mouth 2 times daily as needed for moderate pain (Patient not taking: Reported on 4/19/2024) 60 tablet 0     lidocaine (LIDODERM) 5 % patch Place 1 patch onto the skin every 24 hours To prevent lidocaine toxicity, patient should be patch free for 12 hrs daily. Apply to right shoulder PRN daily 12 hours on/12 hours off (Patient not taking: Reported on 4/19/2024) 15 patch 1     methocarbamol (ROBAXIN) 500 MG tablet Take 1 tablet (500 mg) by mouth 4 times daily as needed for muscle spasms (Patient not taking: Reported on 4/19/2024) 20 tablet 0     methylPREDNISolone (MEDROL DOSEPAK) 4 MG tablet therapy pack Follow Package Directions (Patient not taking: Reported on 6/29/2022) 21 tablet 0     methylPREDNISolone (MEDROL DOSEPAK) 4 MG tablet therapy pack Follow Package Directions (Patient not taking: Reported on 6/29/2022) 21 tablet 0     oxyCODONE (ROXICODONE) 5 MG tablet Take 1-2 tablets (5-10 mg) by mouth every 6 hours as needed for moderate to severe pain (Patient not taking: Reported on 6/29/2022) 15 tablet 0     polyethylene glycol (MIRALAX) 17 GM/Dose powder Take 17 g by mouth daily (Patient not taking: Reported on 4/19/2024) 510 g 0     predniSONE (DELTASONE) 20 MG tablet Take 2 tablets (40 mg) by mouth in the morning. (Patient not taking: Reported on 6/29/2022) 12 tablet 0     senna-docusate (SENOKOT-S/PERICOLACE) 8.6-50 MG tablet Take 1 tablet by mouth 2 times daily (Patient not taking: Reported on 4/13/2022) 30 tablet 0     sertraline (ZOLOFT) 100 MG tablet Take 1.5 tablets (150 mg) by mouth daily (Patient not taking: Reported  "on 4/19/2024) 90 tablet 1     vitamin B complex with vitamin C (VITAMIN  B COMPLEX) tablet Take 1 tablet by mouth daily (Patient not taking: Reported on 4/19/2024) 100 tablet 6     No current facility-administered medications for this visit.       ROS:  4 point ROS including Respiratory, CV, GI and , other than that noted in the HPI,  is negative    Vitals:  /63   Pulse 83   Temp 97.2  F (36.2  C)   Resp 20   Ht 1.676 m (5' 6\")   Wt 112 kg (247 lb)   SpO2 96%   BMI 39.87 kg/m    Exam:  GENERAL APPEARANCE:  Alert, in no distress, anxious  RESP:  lungs clear to auscultation , no respiratory distress  CV:  Palpation and auscultation of heart done , rate-normal  M/S:   left arm in sling.  Good capilarlly refill  PSYCH:  oriented X 3, anxious    Lab/Diagnostic data:  Recent labs in [facility] reviewed by me today.       XR CLAVICLE LEFT, XR SHOULDER 3 VIEWS LEFT (4/2024)  Findings/impression   There is a faint hairline lucency at the surgical neck of the humerus,   suggestive of an acute, nondisplaced fracture. No additional fractures or   malalignment.   No suspicious osseous lesions.  Soft tissues are within normal limits. The visualized lungs are clear.         ASSESSMENT/PLAN:    (S47.557D) Closed nondisplaced fracture of surgical neck of left humerus with routine healing, unspecified fracture morphology, subsequent encounter  (primary encounter diagnosis)  (W19.XXXD) Fall, subsequent encounter  (M62.81) Generalized muscle weakness  (Z74.09,  Z78.9) Impaired mobility and ADLs  Comment: s/p humeral neck nondisplaced fracture after falling off a treadmill  For pain medication currently taking oxycodone and Tylenol and methocarbamol  On a bowel regimen  No DVT prophylaxis  When to wear: Sling at all times except for ROM exercises of elbow and wrist. NWB. Follow up in 2 weeks with repeat xrays   Plan: Therapy to evaluate and treat  Stop current oxycodone order  Start oxycodone 5 to 10 mg every 4 hours as " needed  Follow-up with Rosa Boone PA-C   Consider calcium and vitamin D    (R27.078) right arm weakness  Comment: Hx of closed fracture of proximal end of right humerus (2022)  Has limited mobility in the right upper arm due to history of fracture, unable to make full fist with right hand  Plan: Therapy to evaluate and treat      (E66.09,  Z68.39) Class 2 obesity due to excess calories without serious comorbidity with body mass index (BMI) of 39.0 to 39.9 in adult  (Z98.84) Status post gastric bypass for obesity  Comment: chronic.  BMI 39.9  Plan: Encourage weight loss      (F41.9) Anxiety  (F43.23) Adjustment disorder with mixed anxiety and depressed mood  Comment: Chronic.  Patient very anxious during visit today.  Currently not on any pharmacologic interventions  States she goes to psychotherapy  Saw psychology in the hosptial (4/2024)   Plan: Continue with plan of care no changes at this time, adjustment as needed      (E11.9) Type 2 diabetes mellitus without complication, without long-term current use of insulin (H)  Comment: chronic.  Diet controlled  Hemoglobin A1c was 6 (2022)  Plan: Continue with plan of care no changes at this time, adjustment as needed      (F17.200) Tobacco dependence syndrome  Comment: chronic.   Plan: Okay for patch if needed      Total time spent with patient visit at the skilled nursing facility was 45 min including patient visit, review of past records, and reviewed [facility] record.     Electronically signed by:      JAVIER Calloway CNP on 4/19/2024 at 5:57 PM                     Sincerely,        JAVIER Calloway CNP

## 2024-04-19 NOTE — PROGRESS NOTES
[facility] GERIATRICS    PRIMARY CARE PROVIDER AND CLINIC:  Randal Castellanos MD, [address] [place] [address] / [facility] [address]  Chief Complaint   Patient presents with    Hospital F/U     [facility] 4/8/2024 - 4/18/2024      [place] Medical Record Number:  [id number]  Place of Service where encounter took place:  [facility] (TCU) [[id number]]    Amy Choudhury  is a 64 year old  (1960), admitted to the above facility from  [facility] . Hospital stay 4/8/24 through 4/18/24.       Past medical history includes  Closed fracture of the left humerus neck  Hx of closed fracture of proximal end of right humerus (2022)  Fall  Obesity  Anxiety  Adjustment disorder with mixed anxiety and depressed mood  Diabetes type 2  Tobacco dependence    /8/2024, patient presented to the emergency room after falling on the treadmill 4 days prior and hitting her left shoulder on the wall.  She had been struggling with baseline tacit is changing close and was and able to cook and having trouble using the bathroom.  In the emergency room she was found to have a humeral neck nondisplaced fracture.  Orthopedic surgery saw her, and the nonoperative treatment plan was recommended.  Discharge was delayed due to prior authorization    Today she states her pain is 7 out of 10 after just taking oxycodone.  It decreases to a 5 out of 10.  She is taking the Tylenol and methocarbamol.          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    Depressive disorder     Gastro-oesophageal reflux disease      bleeding ulcers    Hiatal hernia     NEWLY DIAGNOSISED    Hyperparathyroidism, unspecified (H) 3/29/2012    Peptic ulcer, unspecified site, unspecified as acute or chronic, without mention of hemorrhage or perforation     Pneumonia     NOVEMBER    PONV (postoperative nausea and vomiting)     TMJ (temporomandibular joint syndrome) 5/8/2014    Vitamin D deficiency 6/7/2012      PAST SURGICAL HISTORY:   has a past surgical history that includes orthopedic surgery; Spine surgery; Cholecystectomy; appendectomy; [place] MARSUP BARTHOLIN GLAND CYST; GI surgery; Dilation and curettage, operative hysteroscopy with morcellator, combined (11/21/2012); and Parathyroidectomy (Right, 12/14/2015).  FAMILY HISTORY: family history includes Breast Cancer (age of onset: 57) in her mother; Cancer in her brother; Diabetes in her daughter; Other - See Comments in her mother; Thyroid Disease in her mother.  SOCIAL HISTORY:   reports that she has been smoking cigarettes. She has a 15 pack-year smoking history. She has never used smokeless tobacco. She reports current alcohol use. She reports that she does not use drugs.  Patient's living condition: lives alone    Post Discharge Medication Reconciliation Status:   MED REC REQUIRED  Post Medication Reconciliation Status:  Discharge medications reconciled and changed, see notes/orders       Current Outpatient Medications   Medication Sig Dispense Refill    acetaminophen (TYLENOL) 500 MG tablet Take 1,000 mg by mouth 4 times daily      methocarbamol (ROBAXIN) 500 MG tablet Take 500 mg by mouth 3 times daily      oxyCODONE (ROXICODONE) 5 MG tablet Take 10 mg by mouth every 4 hours as needed for pain      polyethylene glycol (MIRALAX) 17 GM/Dose powder Take 17 g by mouth daily      sennosides (SENOKOT) 8.6 MG tablet Take 1-2 tablets by mouth every 12 hours as needed for constipation      acetaminophen (TYLENOL) 650 MG CR tablet Take 1 tablet (650 mg) by mouth 4 times daily (Patient not taking:  Reported on 4/19/2024) 100 tablet 1    hydrOXYzine (ATARAX) 25 MG tablet Take 1 tablet (25 mg) by mouth 3 times daily as needed for itching (Patient not taking: Reported on 4/19/2024) 90 tablet 1    ibuprofen (ADVIL/MOTRIN) 600 MG tablet Take 1 tablet (600 mg) by mouth 2 times daily as needed for moderate pain (Patient not taking: Reported on 4/19/2024) 60 tablet 0    lidocaine (LIDODERM) 5 % patch Place 1 patch onto the skin every 24 hours To prevent lidocaine toxicity, patient should be patch free for 12 hrs daily. Apply to right shoulder PRN daily 12 hours on/12 hours off (Patient not taking: Reported on 4/19/2024) 15 patch 1    methocarbamol (ROBAXIN) 500 MG tablet Take 1 tablet (500 mg) by mouth 4 times daily as needed for muscle spasms (Patient not taking: Reported on 4/19/2024) 20 tablet 0    methylPREDNISolone (MEDROL DOSEPAK) 4 MG tablet therapy pack Follow Package Directions (Patient not taking: Reported on 6/29/2022) 21 tablet 0    methylPREDNISolone (MEDROL DOSEPAK) 4 MG tablet therapy pack Follow Package Directions (Patient not taking: Reported on 6/29/2022) 21 tablet 0    oxyCODONE (ROXICODONE) 5 MG tablet Take 1-2 tablets (5-10 mg) by mouth every 6 hours as needed for moderate to severe pain (Patient not taking: Reported on 6/29/2022) 15 tablet 0    polyethylene glycol (MIRALAX) 17 GM/Dose powder Take 17 g by mouth daily (Patient not taking: Reported on 4/19/2024) 510 g 0    predniSONE (DELTASONE) 20 MG tablet Take 2 tablets (40 mg) by mouth in the morning. (Patient not taking: Reported on 6/29/2022) 12 tablet 0    senna-docusate (SENOKOT-S/PERICOLACE) 8.6-50 MG tablet Take 1 tablet by mouth 2 times daily (Patient not taking: Reported on 4/13/2022) 30 tablet 0    sertraline (ZOLOFT) 100 MG tablet Take 1.5 tablets (150 mg) by mouth daily (Patient not taking: Reported on 4/19/2024) 90 tablet 1    vitamin B complex with vitamin C (VITAMIN  B COMPLEX) tablet Take 1 tablet by mouth daily (Patient not  "taking: Reported on 4/19/2024) 100 tablet 6     No current facility-administered medications for this visit.       ROS:  4 point ROS including Respiratory, CV, GI and , other than that noted in the HPI,  is negative    Vitals:  /63   Pulse 83   Temp 97.2  F (36.2  C)   Resp 20   Ht 1.676 m (5' 6\")   Wt 112 kg (247 lb)   SpO2 96%   BMI 39.87 kg/m    Exam:  GENERAL APPEARANCE:  Alert, in no distress, anxious  RESP:  lungs clear to auscultation , no respiratory distress  CV:  Palpation and auscultation of heart done , rate-normal  M/S:   left arm in sling.  Good capilarlly refill  PSYCH:  oriented X 3, anxious    Lab/Diagnostic data:  Recent labs in [facility] reviewed by me today.       XR CLAVICLE LEFT, XR SHOULDER 3 VIEWS LEFT (4/2024)  Findings/impression   There is a faint hairline lucency at the surgical neck of the humerus,   suggestive of an acute, nondisplaced fracture. No additional fractures or   malalignment.   No suspicious osseous lesions.  Soft tissues are within normal limits. The visualized lungs are clear.         ASSESSMENT/PLAN:    (S42.215D) Closed nondisplaced fracture of surgical neck of left humerus with routine healing, unspecified fracture morphology, subsequent encounter  (primary encounter diagnosis)  (W19.XXXD) Fall, subsequent encounter  (M62.81) Generalized muscle weakness  (Z74.09,  Z78.9) Impaired mobility and ADLs  Comment: s/p humeral neck nondisplaced fracture after falling off a treadmill  For pain medication currently taking oxycodone and Tylenol and methocarbamol  On a bowel regimen  No DVT prophylaxis  When to wear: Sling at all times except for ROM exercises of elbow and wrist. NWB. Follow up in 2 weeks with repeat xrays   Plan: Therapy to evaluate and treat  Stop current oxycodone order  Start oxycodone 5 to 10 mg every 4 hours as needed  Follow-up with Rosa Boone PA-C   Consider calcium and vitamin D    (Q22.617) right arm weakness  Comment: Hx of closed fracture of " proximal end of right humerus (2022)  Has limited mobility in the right upper arm due to history of fracture, unable to make full fist with right hand  Plan: Therapy to evaluate and treat      (E66.09,  Z68.39) Class 2 obesity due to excess calories without serious comorbidity with body mass index (BMI) of 39.0 to 39.9 in adult  (Z98.84) Status post gastric bypass for obesity  Comment: chronic.  BMI 39.9  Plan: Encourage weight loss      (F41.9) Anxiety  (F43.23) Adjustment disorder with mixed anxiety and depressed mood  Comment: Chronic.  Patient very anxious during visit today.  Currently not on any pharmacologic interventions  States she goes to psychotherapy  Saw psychology in the hosptial (4/2024)   Plan: Continue with plan of care no changes at this time, adjustment as needed      (E11.9) Type 2 diabetes mellitus without complication, without long-term current use of insulin (H)  Comment: chronic.  Diet controlled  Hemoglobin A1c was 6 (2022)  Plan: Continue with plan of care no changes at this time, adjustment as needed      (F17.200) Tobacco dependence syndrome  Comment: chronic.   Plan: Okay for patch if needed      Total time spent with patient visit at the [facility] nursing facility was 45 min including patient visit, review of past records, and reviewed [facility] record.     Electronically signed by:      JAVIER Calloway CNP on 4/19/2024 at 5:57 PM

## 2024-04-22 ENCOUNTER — DOCUMENTATION ONLY (OUTPATIENT)
Dept: GERIATRICS | Facility: CLINIC | Age: 64
End: 2024-04-22
Payer: COMMERCIAL

## 2024-04-22 ENCOUNTER — TRANSITIONAL CARE UNIT VISIT (OUTPATIENT)
Dept: GERIATRICS | Facility: CLINIC | Age: 64
End: 2024-04-22
Payer: COMMERCIAL

## 2024-04-22 DIAGNOSIS — R52 PAIN: Primary | ICD-10-CM

## 2024-04-22 DIAGNOSIS — S42.215D CLOSED NONDISPLACED FRACTURE OF SURGICAL NECK OF LEFT HUMERUS WITH ROUTINE HEALING, UNSPECIFIED FRACTURE MORPHOLOGY, SUBSEQUENT ENCOUNTER: Primary | ICD-10-CM

## 2024-04-22 LAB
ANION GAP SERPL CALCULATED.3IONS-SCNC: 11 MMOL/L (ref 7–15)
BUN SERPL-MCNC: 11.1 MG/DL (ref 4–19)
CALCIUM SERPL-MCNC: 8.3 MG/DL (ref 7.6–10.4)
CHLORIDE SERPL-SCNC: 108 MMOL/L (ref 98–107)
CREAT SERPL-MCNC: 0.61 MG/DL (ref 0.31–0.88)
DEPRECATED HCO3 PLAS-SCNC: 22 MMOL/L (ref 22–29)
EGFRCR SERPLBLD CKD-EPI 2021: ABNORMAL ML/MIN/{1.73_M2}
ERYTHROCYTE [DISTWIDTH] IN BLOOD BY AUTOMATED COUNT: 15.9 % (ref 10–15)
GLUCOSE SERPL-MCNC: 120 MG/DL (ref 51–99)
HCT VFR BLD AUTO: 34 % (ref 44–72)
HGB BLD-MCNC: 10.5 G/DL (ref 15–24)
MCH RBC QN AUTO: 25.9 PG (ref 33.5–41.4)
MCHC RBC AUTO-ENTMCNC: 30.9 G/DL (ref 31.5–36.5)
MCV RBC AUTO: 84 FL (ref 104–118)
PLATELET # BLD AUTO: 363 10E3/UL (ref 150–450)
POTASSIUM SERPL-SCNC: 3.8 MMOL/L (ref 3.2–6)
RBC # BLD AUTO: 4.06 10E6/UL (ref 4.1–6.7)
SODIUM SERPL-SCNC: 141 MMOL/L (ref 135–145)
WBC # BLD AUTO: 6.2 10E3/UL (ref 5–21)

## 2024-04-22 PROCEDURE — 80048 BASIC METABOLIC PNL TOTAL CA: CPT | Mod: ORL | Performed by: NURSE PRACTITIONER

## 2024-04-22 PROCEDURE — 85027 COMPLETE CBC AUTOMATED: CPT | Mod: ORL | Performed by: NURSE PRACTITIONER

## 2024-04-22 PROCEDURE — 99207 PR NO CHARGE LOS: CPT | Performed by: PHYSICIAN ASSISTANT

## 2024-04-22 PROCEDURE — P9604 ONE-WAY ALLOW PRORATED TRIP: HCPCS | Mod: ORL | Performed by: NURSE PRACTITIONER

## 2024-04-22 PROCEDURE — 36415 COLL VENOUS BLD VENIPUNCTURE: CPT | Mod: ORL | Performed by: NURSE PRACTITIONER

## 2024-04-22 RX ORDER — OXYCODONE HYDROCHLORIDE 5 MG/1
TABLET ORAL
COMMUNITY
Start: 2024-04-22 | End: 2024-04-22

## 2024-04-22 RX ORDER — OXYCODONE HYDROCHLORIDE 5 MG/1
TABLET ORAL
Qty: 60 TABLET | Refills: 0 | Status: SHIPPED | OUTPATIENT
Start: 2024-04-22 | End: 2024-04-29

## 2024-04-22 NOTE — PROGRESS NOTES
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    Depressive disorder     Gastro-oesophageal reflux disease     bleeding ulcers    Hiatal hernia     NEWLY DIAGNOSISED    Hyperparathyroidism, unspecified (H) 3/29/2012    Peptic ulcer, unspecified site, unspecified as acute or chronic, without mention of hemorrhage or perforation     Pneumonia     NOVEMBER    PONV (postoperative nausea and vomiting)     TMJ (temporomandibular joint syndrome) 5/8/2014    Vitamin D deficiency 6/7/2012      Past Surgical History:   Procedure Laterality Date    APPENDECTOMY      CHOLECYSTECTOMY      DILATION AND CURETTAGE, OPERATIVE HYSTEROSCOPY WITH MORCELLATOR, COMBINED  11/21/2012    Procedure: COMBINED DILATION AND CURETTAGE, OPERATIVE HYSTEROSCOPY WITH MORCELLATOR;  Hysteroscopy, Polypectomy, Dilation and Curettage  ;  Surgeon: Marta Montejo MD;  Location: [facility] OR    GI SURGERY      gastric bypass at age 31    ORTHOPEDIC SURGERY      back surgery at age 31    PARATHYROIDECTOMY Right 12/14/2015    Procedure: PARATHYROIDECTOMY;  Surgeon: Gregory Coleman MD;  Location: [facility]    TX MARSUP BARTHOLIN GLAND CYST      SPINE SURGERY          Allergies   Allergen Reactions    Nsaids Other (See Comments), Nausea and Vomiting and GI Disturbance     History of GI bleeding and ulcers    Codeine Sulfate GI Disturbance      There were no vitals taken for this visit.     Exam: Seen on-site today, now 2 weeks S/P injury, resting well this AM in sling, able to move wrist, fingers, using arm for codman exercises; Sleeping only short  intervals, due to rash. Pain is well controlled this AM. Able to get dressed via self, W/O Asst;     Anxious to be discharges to home with F/U at [facility] next week for imaging.    Did not want to be changed over to shoulder immobilizer as patient has tried this in the past with her Right shoulder fracture.       X-rays reviewed with patient today,      ASSESSMENT / PLAN:    Continue hand to mouth activity, no lifting, .    Patient has my contact # if any questions;    [id number]          Yoni OPA-C  [phone] Cell

## 2024-04-23 ENCOUNTER — TRANSITIONAL CARE UNIT VISIT (OUTPATIENT)
Dept: GERIATRICS | Facility: CLINIC | Age: 64
End: 2024-04-23
Payer: COMMERCIAL

## 2024-04-23 VITALS
WEIGHT: 247.9 LBS | RESPIRATION RATE: 18 BRPM | BODY MASS INDEX: 39.84 KG/M2 | TEMPERATURE: 97.3 F | HEIGHT: 66 IN | OXYGEN SATURATION: 97 % | HEART RATE: 80 BPM | DIASTOLIC BLOOD PRESSURE: 80 MMHG | SYSTOLIC BLOOD PRESSURE: 151 MMHG

## 2024-04-23 DIAGNOSIS — R52 PAIN: ICD-10-CM

## 2024-04-23 DIAGNOSIS — M62.81 GENERALIZED MUSCLE WEAKNESS: ICD-10-CM
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
## 2024-04-23 DIAGNOSIS — S42.215D CLOSED NONDISPLACED FRACTURE OF SURGICAL NECK OF LEFT HUMERUS WITH ROUTINE HEALING, UNSPECIFIED FRACTURE MORPHOLOGY, SUBSEQUENT ENCOUNTER: Primary | ICD-10-CM

## 2024-04-23 PROCEDURE — 99309 SBSQ NF CARE MODERATE MDM 30: CPT | Performed by: NURSE PRACTITIONER

## 2024-04-23 RX ORDER — MAGNESIUM HYDROXIDE/ALUMINUM HYDROXICE/SIMETHICONE 120; 1200; 1200 MG/30ML; MG/30ML; MG/30ML
30 SUSPENSION ORAL EVERY 8 HOURS PRN
COMMUNITY

## 2024-04-23 RX ORDER — CALCIUM CARBONATE 500 MG/1
1 TABLET, CHEWABLE ORAL 2 TIMES DAILY PRN
COMMUNITY

## 2024-04-23 NOTE — PROGRESS NOTES
[facility]        Visit Type: No chief complaint on file.     Facility:   No question data found.         History of Present Illness: Amy Choudhury is a 64 year old female     Past medical history includes  Closed fracture of the left humerus neck  Hx of closed fracture of proximal end of right humerus (2022)  Fall  Obesity  Anxiety  Adjustment disorder with mixed anxiety and depressed mood  Diabetes type 2  Tobacco dependence     On 4/8/2024, patient presented to the emergency room after falling on the treadmill 4 days prior and hitting her left shoulder on the wall.  She had been struggling with baseline tacit is changing close and was and able to cook and having trouble using the bathroom.  In the emergency room she was found to have a humeral neck nondisplaced fracture.  Orthopedic surgery saw her, and the nonoperative treatment plan was recommended.  Discharge was delayed due to prior authorization      Today patient is looking comfortable.   Per nursing,Requires Minimum assist of one with bathing, lower body dressing.Maximum assist of one with upper body dressing.Assistive device reacher.Set up with eating open container as needed. Moderate independence with grooming, oral care, toileting. Ambulate per self.  She has been outside walking around the neighborhood.   She is wearing her sling.  She is able to move her fingers and wrist denies any numbness or tingling  She is Upset that her pain medicine has been changed to for the weaning process    Current Outpatient Medications   Medication Sig Dispense Refill    acetaminophen (TYLENOL) 500 MG tablet Take 1,000 mg by mouth 4 times daily      acetaminophen (TYLENOL) 650 MG CR tablet Take 1 tablet (650 mg) by mouth 4 times daily (Patient not taking: Reported on 4/19/2024) 100 tablet 1    hydrOXYzine (ATARAX) 25 MG tablet Take 1 tablet (25 mg) by mouth 3 times daily as needed for itching (Patient not taking: Reported on 4/19/2024) 90 tablet 1     ibuprofen (ADVIL/MOTRIN) 600 MG tablet Take 1 tablet (600 mg) by mouth 2 times daily as needed for moderate pain (Patient not taking: Reported on 4/19/2024) 60 tablet 0    lidocaine (LIDODERM) 5 % patch Place 1 patch onto the skin every 24 hours To prevent lidocaine toxicity, patient should be patch free for 12 hrs daily. Apply to right shoulder PRN daily 12 hours on/12 hours off (Patient not taking: Reported on 4/19/2024) 15 patch 1    methocarbamol (ROBAXIN) 500 MG tablet Take 500 mg by mouth 3 times daily      methocarbamol (ROBAXIN) 500 MG tablet Take 1 tablet (500 mg) by mouth 4 times daily as needed for muscle spasms (Patient not taking: Reported on 4/19/2024) 20 tablet 0    methylPREDNISolone (MEDROL DOSEPAK) 4 MG tablet therapy pack Follow Package Directions (Patient not taking: Reported on 6/29/2022) 21 tablet 0    methylPREDNISolone (MEDROL DOSEPAK) 4 MG tablet therapy pack Follow Package Directions (Patient not taking: Reported on 6/29/2022) 21 tablet 0    oxyCODONE (ROXICODONE) 5 MG tablet Take 5-10mg Q 6 hours PRN x 3 days then 5mg Q 8 hours PRN thereafter. 60 tablet 0    polyethylene glycol (MIRALAX) 17 GM/Dose powder Take 17 g by mouth daily      polyethylene glycol (MIRALAX) 17 GM/Dose powder Take 17 g by mouth daily (Patient not taking: Reported on 4/19/2024) 510 g 0    predniSONE (DELTASONE) 20 MG tablet Take 2 tablets (40 mg) by mouth in the morning. (Patient not taking: Reported on 6/29/2022) 12 tablet 0    senna-docusate (SENOKOT-S/PERICOLACE) 8.6-50 MG tablet Take 1 tablet by mouth 2 times daily (Patient not taking: Reported on 4/13/2022) 30 tablet 0    sennosides (SENOKOT) 8.6 MG tablet Take 1-2 tablets by mouth every 12 hours as needed for constipation      sertraline (ZOLOFT) 100 MG tablet Take 1.5 tablets (150 mg) by mouth daily (Patient not taking: Reported on 4/19/2024) 90 tablet 1    vitamin B complex with vitamin C (VITAMIN  B COMPLEX) tablet Take 1 tablet by mouth daily (Patient not  taking: Reported on 4/19/2024) 100 tablet 6       ALLERGIES:Nsaids and Codeine sulfate    Vitals:  There were no vitals taken for this visit.  There is no height or weight on file to calculate BMI.    Review of Systems   All other systems reviewed and are negative.         Physical Exam  Vitals and nursing note reviewed.   Constitutional:       Appearance: She is obese.   Pulmonary:      Effort: Pulmonary effort is normal.   Neurological:      Mental Status: She is alert.   Psychiatric:         Attention and Perception: Attention normal.         Mood and Affect: Mood is anxious. Affect is labile and tearful.         Speech: Speech is rapid and pressured.         Behavior: Behavior is agitated.           Labs:     I personally reviewed labs in [employer]        Assessment/Plan:    (S46.130B) Closed nondisplaced fracture of surgical neck of left humerus with routine healing, unspecified fracture morphology, subsequent encounter  (primary encounter diagnosis)  (W19.XXXD) Fall, subsequent encounter  (M62.81) Generalized muscle weakness  (R52)  Comment: s/p humeral neck nondisplaced fracture after falling off a treadmill  For pain medication currently taking oxycodone and Tylenol and methocarbamol = emotional when discussing decreasing pain medication.   PT did not want hydroxyzine to decrease anxiety  On a bowel regimen  No DVT prophylaxis - she is walking in the neighborhood  When to wear: Sling at all times except for ROM exercises of elbow and wrist. NWB. Follow up in 2 weeks with repeat   xrays   Plan: DA Boone- Dipti Date & Time of Appointment: 5/6 @ 10:00 AM Phone Number: [phone] Address: [address] [place] [address]  Start weaning oxycodone - 5-10 every 6 hours as needed x 2 days then 5 mg every 8 hours as needed  Consider calcium and vitamin D     (R29.898) right arm weakness  Comment: Hx of closed fracture of proximal end of right humerus (2022)  Has limited mobility in the right upper arm due to  history of fracture, unable to make full fist with right hand  Working with therapy   Plan: Continue with plan of care no changes at this time, adjustment as needed             Electronically signed by:    JAVIER Calloway CNP on 4/23/2024 at 9:09 PM      MEDICATIONS:  MED REC REQUIRED  Post Medication Reconciliation Status:  Medication reconciliation previously completed during another office visit

## 2024-04-24 ENCOUNTER — TRANSITIONAL CARE UNIT VISIT (OUTPATIENT)
Dept: GERIATRICS | Facility: CLINIC | Age: 64
End: 2024-04-24
Payer: COMMERCIAL

## 2024-04-24 VITALS
HEIGHT: 66 IN | WEIGHT: 247 LBS | SYSTOLIC BLOOD PRESSURE: 151 MMHG | DIASTOLIC BLOOD PRESSURE: 80 MMHG | HEART RATE: 80 BPM | TEMPERATURE: 97.3 F | OXYGEN SATURATION: 97 % | RESPIRATION RATE: 18 BRPM | BODY MASS INDEX: 39.7 KG/M2

## 2024-04-24 DIAGNOSIS — F34.9 EMOTIONAL DISORDER (H): ICD-10-CM

## 2024-04-24 DIAGNOSIS — R52 PAIN: ICD-10-CM

## 2024-04-24 DIAGNOSIS — F41.9 ANXIETY: Primary | ICD-10-CM

## 2024-04-24 DIAGNOSIS — S42.215D CLOSED NONDISPLACED FRACTURE OF SURGICAL NECK OF LEFT HUMERUS WITH ROUTINE HEALING, UNSPECIFIED FRACTURE MORPHOLOGY, SUBSEQUENT ENCOUNTER: ICD-10-CM

## 2024-04-24 PROCEDURE — 99309 SBSQ NF CARE MODERATE MDM 30: CPT | Performed by: NURSE PRACTITIONER

## 2024-04-24 NOTE — PROGRESS NOTES
" [facility]        Visit Type: pain medication titration     Facility:   [facility] () [[id number]]         History of Present Illness: Amy Choudhury is a 64 year old female     Past medical history includes  Closed fracture of the left humerus neck  Hx of closed fracture of proximal end of right humerus (2022)  Fall  Obesity  Anxiety  Adjustment disorder with mixed anxiety and depressed mood  Diabetes type 2  Tobacco dependence    Today pt had an episode of fecal incontinence in her bed as she stated the bed is broken and staff did not come quickly enough when she used in call light  Per nursing, Requires Minimum assist of one with bathing, lower body dressing.Maximum assist of one with upper body dressing.Assistive device reacher.Set up with eating open container as needed. Moderate independence with grooming, oral care, toileting. Ambulate per self.   Continent of bowel and bladder    Current Outpatient Medications   Medication Sig Dispense Refill    acetaminophen (TYLENOL) 500 MG tablet Take 1,000 mg by mouth 4 times daily      alum & mag hydroxide-simethicone (MAALOX) 200-200-20 MG/5ML SUSP suspension Take 30 mLs by mouth every 8 hours as needed for indigestion      calcium carbonate (TUMS) 500 MG chewable tablet Take 1 chew tab by mouth 2 times daily as needed for heartburn      methocarbamol (ROBAXIN) 500 MG tablet Take 500 mg by mouth 3 times daily      oxyCODONE (ROXICODONE) 5 MG tablet Take 5-10mg Q 6 hours PRN x 3 days then 5mg Q 8 hours PRN thereafter. 60 tablet 0    polyethylene glycol (MIRALAX) 17 GM/Dose powder Take 17 g by mouth daily      sennosides (SENOKOT) 8.6 MG tablet Take 1 tablet by mouth every 12 hours as needed for constipation         ALLERGIES:Nsaids and Codeine sulfate    Vitals:  BP (!) 151/80   Pulse 80   Temp 97.3  F (36.3  C)   Resp 18   Ht 1.676 m (5' 6\")   Wt 112 kg (247 lb)   SpO2 97%   BMI 39.87 kg/m    Body mass index is 39.87 " kg/m .    Review of Systems   Reason unable to perform ROS: emotional.          Physical Exam  Vitals and nursing note reviewed.   Constitutional:       Appearance: She is obese.      Comments: Emotional disregulation   Pulmonary:      Effort: Pulmonary effort is normal.   Neurological:      Mental Status: She is alert.   Psychiatric:         Mood and Affect: Mood is anxious and elated. Affect is labile, angry, tearful and inappropriate.         Speech: Speech is rapid and pressured and tangential.         Behavior: Behavior is agitated, hyperactive and combative.         Thought Content: Thought content is paranoid and delusional.         Judgment: Judgment is impulsive and inappropriate.           Labs:     Most Recent 3 CBC's:  Recent Labs   Lab Test 04/22/24  0700 02/17/22  0705 02/14/22  0532 02/11/22  0550 02/10/22  2313   WBC 6.2  --   --  9.6 14.1*   HGB 10.5*  --   --  11.9 12.5   MCV 84*  --   --  80 79    409 377 382 412     Most Recent 3 BMP's:  Recent Labs   Lab Test 04/22/24  0552 02/17/22  0705 02/16/22  1132 02/15/22  1044 02/15/22  0921 02/14/22  0532 02/12/22  0748 02/12/22  0712 02/11/22  0822 02/11/22  0550     --   --   --   --   --   --  139  --  139   POTASSIUM 3.8  --   --   --   --   --   --  4.1  --  3.4   CHLORIDE 108*  --   --   --   --   --   --  111*  --  110*   CO2 22  --   --   --   --   --   --  24  --  26   BUN 11.1  --   --   --   --   --   --  11  --  11   CR 0.61 0.63  --   --   --  0.72  --  0.60  --  0.57   ANIONGAP 11  --   --   --   --   --   --  4  --  3   CRISTIAN 8.3  --   --   --   --   --   --  8.3*  --  8.4*   *  --  187* 105*   < >  --    < > 115*   < > 140*    < > = values in this interval not displayed.     Most Recent 2 LFT's:  Recent Labs   Lab Test 02/10/22  2313 09/14/21  0819   AST 13 33   ALT 22 43   ALKPHOS 111 92   BILITOTAL 0.2 0.4              Assessment/Plan:     Anxiety  Pain  Closed nondisplaced fracture of surgical neck of left humerus  with routine healing, unspecified fracture morphology, subsequent encounter  Emotional disorder (H24)  Comment: s/p humeral neck nondisplaced fracture after falling off a treadmill  For pain medication currently taking oxycodone and Tylenol and methocarbamol; once again today she became labile with emotions, tangential, perseverated on staff from a few days ago when discussing decreasing pain medication.     Once again, PT did not want hydroxyzine to decrease anxiety  On a bowel regimen  Had been independent during TCU stay with toileting and this morning had a episode of fecal incontinence  When attempted to redirect pt she became more emotional.    No DVT prophylaxis - she is walking in the neighborhood  When to wear: Sling at all times except for ROM exercises of elbow and wrist. NWB. Follow up in 2 weeks with repeat   xrays   Plan:   Rosa Resendez Date & Time of Appointment: 5/6 @ 10:00 AM Phone Number: [phone] Address: [address] [place] [address]  Start weaning oxycodone - 5-10 every 6 hours as needed x 2 days then 5 mg every 8 hours as needed (was not able to discuss with pt because she was emotional labile and tangential as discussed above)             Electronically signed by:      JAVIER Calloway CNP on 4/24/2024 at 9:49 PM      MEDICATIONS:  MED REC REQUIRED  Post Medication Reconciliation Status:  Medication reconciliation previously completed during another office visit

## 2024-04-24 NOTE — LETTER
4/24/2024        RE: Amy Choudhury  [address]  [facility] [address]         [facility]        Visit Type: pain medication titration     Facility:   [facility] () [[id number]]         History of Present Illness: Amy Choudhury is a 64 year old female     Past medical history includes  Closed fracture of the left humerus neck  Hx of closed fracture of proximal end of right humerus (2022)  Fall  Obesity  Anxiety  Adjustment disorder with mixed anxiety and depressed mood  Diabetes type 2  Tobacco dependence    Today pt had an episode of fecal incontinence in her bed as she stated the bed is broken and staff did not come quickly enough when she used in call light  Per nursing, Requires Minimum assist of one with bathing, lower body dressing.Maximum assist of one with upper body dressing.Assistive device reacher.Set up with eating open container as needed. Moderate independence with grooming, oral care, toileting. Ambulate per self.   Continent of bowel and bladder    Current Outpatient Medications   Medication Sig Dispense Refill     acetaminophen (TYLENOL) 500 MG tablet Take 1,000 mg by mouth 4 times daily       alum & mag hydroxide-simethicone (MAALOX) 200-200-20 MG/5ML SUSP suspension Take 30 mLs by mouth every 8 hours as needed for indigestion       calcium carbonate (TUMS) 500 MG chewable tablet Take 1 chew tab by mouth 2 times daily as needed for heartburn       methocarbamol (ROBAXIN) 500 MG tablet Take 500 mg by mouth 3 times daily       oxyCODONE (ROXICODONE) 5 MG tablet Take 5-10mg Q 6 hours PRN x 3 days then 5mg Q 8 hours PRN thereafter. 60 tablet 0     polyethylene glycol (MIRALAX) 17 GM/Dose powder Take 17 g by mouth daily       sennosides (SENOKOT) 8.6 MG tablet Take 1 tablet by mouth every 12 hours as needed for constipation         ALLERGIES:Nsaids and Codeine sulfate    Vitals:  BP (!) 151/80   Pulse 80   Temp 97.3  F (36.3  C)   Resp 18   Ht  "1.676 m (5' 6\")   Wt 112 kg (247 lb)   SpO2 97%   BMI 39.87 kg/m    Body mass index is 39.87 kg/m .    Review of Systems   Reason unable to perform ROS: emotional.          Physical Exam  Vitals and nursing note reviewed.   Constitutional:       Appearance: She is obese.      Comments: Emotional disregulation   Pulmonary:      Effort: Pulmonary effort is normal.   Neurological:      Mental Status: She is alert.   Psychiatric:         Mood and Affect: Mood is anxious and elated. Affect is labile, angry, tearful and inappropriate.         Speech: Speech is rapid and pressured and tangential.         Behavior: Behavior is agitated, hyperactive and combative.         Thought Content: Thought content is paranoid and delusional.         Judgment: Judgment is impulsive and inappropriate.           Labs:     Most Recent 3 CBC's:  Recent Labs   Lab Test 04/22/24  0700 02/17/22  0705 02/14/22  0532 02/11/22  0550 02/10/22  2313   WBC 6.2  --   --  9.6 14.1*   HGB 10.5*  --   --  11.9 12.5   MCV 84*  --   --  80 79    409 377 382 412     Most Recent 3 BMP's:  Recent Labs   Lab Test 04/22/24  0552 02/17/22  0705 02/16/22  1132 02/15/22  1044 02/15/22  0921 02/14/22  0532 02/12/22  0748 02/12/22  0712 02/11/22  0822 02/11/22  0550     --   --   --   --   --   --  139  --  139   POTASSIUM 3.8  --   --   --   --   --   --  4.1  --  3.4   CHLORIDE 108*  --   --   --   --   --   --  111*  --  110*   CO2 22  --   --   --   --   --   --  24  --  26   BUN 11.1  --   --   --   --   --   --  11  --  11   CR 0.61 0.63  --   --   --  0.72  --  0.60  --  0.57   ANIONGAP 11  --   --   --   --   --   --  4  --  3   CRISTIAN 8.3  --   --   --   --   --   --  8.3*  --  8.4*   *  --  187* 105*   < >  --    < > 115*   < > 140*    < > = values in this interval not displayed.     Most Recent 2 LFT's:  Recent Labs   Lab Test 02/10/22  2313 09/14/21  0819   AST 13 33   ALT 22 43   ALKPHOS 111 92   BILITOTAL 0.2 0.4        "       Assessment/Plan:     Anxiety  Pain  Closed nondisplaced fracture of surgical neck of left humerus with routine healing, unspecified fracture morphology, subsequent encounter  Emotional disorder (H24)  Comment: s/p humeral neck nondisplaced fracture after falling off a treadmill  For pain medication currently taking oxycodone and Tylenol and methocarbamol; once again today she became labile with emotions, tangential, perseverated on staff from a few days ago when discussing decreasing pain medication.     Once again, PT did not want hydroxyzine to decrease anxiety  On a bowel regimen  Had been independent during TCU stay with toileting and this morning had a episode of fecal incontinence  When attempted to redirect pt she became more emotional.    No DVT prophylaxis - she is walking in the neighborhood  When to wear: Sling at all times except for ROM exercises of elbow and wrist. NWB. Follow up in 2 weeks with repeat   xrays   Plan:   Rosa Resendez Date & Time of Appointment: 5/6 @ 10:00 AM Phone Number: [phone] Address: [address] [place] [address]  Start weaning oxycodone - 5-10 every 6 hours as needed x 2 days then 5 mg every 8 hours as needed (was not able to discuss with pt because she was emotional labile and tangential as discussed above)             Electronically signed by:      JAVIER Calloway CNP on 4/24/2024 at 9:49 PM      MEDICATIONS:  MED REC REQUIRED  Post Medication Reconciliation Status:  Medication reconciliation previously completed during another office visit                  Sincerely,        JAVIER Calloway CNP

## 2024-04-24 NOTE — LETTER
4/24/2024        RE: Amy Choudhury  [address]  [facility] [address]         [facility]        Visit Type: No chief complaint on file.     Facility:   No question data found.         History of Present Illness: Amy Choudhury is a 64 year old female     Past medical history includes  Closed fracture of the left humerus neck  Hx of closed fracture of proximal end of right humerus (2022)  Fall  Obesity  Anxiety  Adjustment disorder with mixed anxiety and depressed mood  Diabetes type 2  Tobacco dependence    Today ****  Per nursing, Requires Minimum assist of one with bathing, lower body dressing.Maximum assist of one with upper body dressing.Assistive device reacher.Set up with eating open container as needed. Moderste independence with grooming, oral care, toileting. Ambulate per self.   Continent of bowel and bladder    Current Outpatient Medications   Medication Sig Dispense Refill    acetaminophen (TYLENOL) 500 MG tablet Take 1,000 mg by mouth 4 times daily      alum & mag hydroxide-simethicone (MAALOX) 200-200-20 MG/5ML SUSP suspension Take 30 mLs by mouth every 8 hours as needed for indigestion      calcium carbonate (TUMS) 500 MG chewable tablet Take 1 chew tab by mouth 2 times daily as needed for heartburn      methocarbamol (ROBAXIN) 500 MG tablet Take 500 mg by mouth 3 times daily      oxyCODONE (ROXICODONE) 5 MG tablet Take 5-10mg Q 6 hours PRN x 3 days then 5mg Q 8 hours PRN thereafter. 60 tablet 0    polyethylene glycol (MIRALAX) 17 GM/Dose powder Take 17 g by mouth daily      sennosides (SENOKOT) 8.6 MG tablet Take 1 tablet by mouth every 12 hours as needed for constipation         ALLERGIES:Nsaids and Codeine sulfate    Vitals:  There were no vitals taken for this visit.  There is no height or weight on file to calculate BMI.    Review of Systems   ***    Physical Exam  ***    Labs:     Most Recent 3 CBC's:  Recent Labs   Lab Test 02/17/22  0705 02/14/22  0183  02/11/22  0550 02/10/22  2313 10/15/21  1008 09/14/21  0819   WBC  --   --  9.6 14.1*  --  5.8   HGB  --   --  11.9 12.5 12.2 12.6   MCV  --   --  80 79  --  78    377 382 412  --  411     Most Recent 3 BMP's:  Recent Labs   Lab Test 02/17/22  0705 02/16/22  1132 02/15/22  1044 02/15/22  0921 02/14/22  0532 02/12/22  0748 02/12/22  0712 02/11/22  0822 02/11/22  0550 02/10/22  2313   NA  --   --   --   --   --   --  139  --  139 139   POTASSIUM  --   --   --   --   --   --  4.1  --  3.4 3.5   CHLORIDE  --   --   --   --   --   --  111*  --  110* 111*   CO2  --   --   --   --   --   --  24  --  26 24   BUN  --   --   --   --   --   --  11  --  11 11   CR 0.63  --   --   --  0.72  --  0.60  --  0.57 0.52   ANIONGAP  --   --   --   --   --   --  4  --  3 4   CRISTIAN  --   --   --   --   --   --  8.3*  --  8.4* 8.3*   GLC  --  187* 105* 98  --    < > 115*   < > 140* 139*    < > = values in this interval not displayed.     Most Recent 2 LFT's:  Recent Labs   Lab Test 02/10/22  2313 09/14/21  0819   AST 13 33   ALT 22 43   ALKPHOS 111 92   BILITOTAL 0.2 0.4     Most Recent 3 INR's:No lab results found.  Most Recent Cholesterol Panel:  Recent Labs   Lab Test 09/14/21 0819   CHOL 200*   *   HDL 51   TRIG 129     Most Recent TSH and T4:  Recent Labs   Lab Test 04/19/21  1713   TSH 2.58     Most Recent Hemoglobin A1c:  Recent Labs   Lab Test 02/11/22  0550   A1C 6.0*     25 OH Vit D2   Date Value Ref Range Status   08/14/2014 <5 ug/L Final   03/10/2013 16 ug/L Final   10/18/2012 16. ug/L Final     25 OH Vit D3   Date Value Ref Range Status   08/14/2014 36 ug/L Final   03/10/2013 8 ug/L Final   10/18/2012 16. ug/L Final     25 OH Vit D total   Date Value Ref Range Status   08/14/2014  30 - 75 ug/L Final    <41  Season, race, dietary intake, and treatment affect the concentration of   25-hydroxy-Vitamin D. Values may decrease during winter months and increase   during summer months. Values less than 30 ug/L may  indicate Vitamin D   deficiency.     03/10/2013 24 (L) 30 - 75 ug/L Final     Comment:     Season, race, dietary intake, and treatment affect the concentration of   25-hydroxy-Vitamin D. Values may decrease during winter months and increase   during summer months. Values less than 30 ug/L may indicate Vitamin D   deficiency.   10/18/2012 32 30 - 75 ug/L Final     Comment:     Season, race, dietary intake, and treatment affect the concentration of   25-hydroxy-Vitamin D. Values may decrease during winter months and increase   during summer months. Values less than 30 ug/L may indicate Vitamin D   deficiency.            Assessment/Plan:    (S42.215D) Closed nondisplaced fracture of surgical neck of left humerus with routine healing, unspecified fracture morphology, subsequent encounter  (primary encounter diagnosis)  (W19.XXXD) Fall, subsequent encounter  (M62.81) Generalized muscle weakness  (R52)  Comment: s/p humeral neck nondisplaced fracture after falling off a treadmill  For pain medication currently taking oxycodone and Tylenol and methocarbamol = emotional when discussing decreasing pain medication.   PT did not want hydroxyzine to decrease anxiety  On a bowel regimen  No DVT prophylaxis - she is walking in the neighborhood  When to wear: Sling at all times except for ROM exercises of elbow and wrist. NWB. Follow up in 2 weeks with repeat   xrays   Plan: [facility] Date & Time of Appointment: 5/6 @ 10:00 AM Phone Number: [phone] Address: [address] [place] [address]  Start weaning oxycodone - 5-10 every 6 hours as needed x 2 days then 5 mg every 8 hours as needed  Consider calcium and vitamin D     (R29.898) right arm weakness  Comment: Hx of closed fracture of proximal end of right humerus (2022)  Has limited mobility in the right upper arm due to history of fracture, unable to make full fist with right hand  Working with therapy   Plan: Continue with plan of care no changes at this time,  adjustment as needed           Electronically signed by:JAVIER Calloway CNP***    MEDICATIONS:  MED REC REQUIRED{TIP  Click the link below to document or use med rec list, use list to pull in response :[id number]}  Post Medication Reconciliation Status: {MED REC LIST:[id number]}                  Sincerely,        JAVIER Calloway CNP

## 2024-04-29 ENCOUNTER — DISCHARGE SUMMARY NURSING HOME (OUTPATIENT)
Dept: GERIATRICS | Facility: CLINIC | Age: 64
End: 2024-04-29
Payer: COMMERCIAL

## 2024-04-29 VITALS
BODY MASS INDEX: 39.53 KG/M2 | OXYGEN SATURATION: 91 % | WEIGHT: 246 LBS | HEART RATE: 83 BPM | SYSTOLIC BLOOD PRESSURE: 157 MMHG | RESPIRATION RATE: 20 BRPM | DIASTOLIC BLOOD PRESSURE: 85 MMHG | HEIGHT: 66 IN | TEMPERATURE: 96.6 F

## 2024-04-29 DIAGNOSIS — Z78.9 ACTIVITY OF DAILY LIVING ALTERATION: ICD-10-CM

## 2024-04-29 DIAGNOSIS — S42.201A CLOSED FRACTURE OF PROXIMAL END OF RIGHT HUMERUS, UNSPECIFIED FRACTURE MORPHOLOGY, INITIAL ENCOUNTER: Primary | ICD-10-CM

## 2024-04-29 DIAGNOSIS — M62.81 GENERALIZED MUSCLE WEAKNESS: ICD-10-CM
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
## 2024-04-29 DIAGNOSIS — R45.86 EMOTIONAL LABILITY: ICD-10-CM

## 2024-04-29 DIAGNOSIS — E66.09 CLASS 2 OBESITY DUE TO EXCESS CALORIES WITHOUT SERIOUS COMORBIDITY WITH BODY MASS INDEX (BMI) OF 39.0 TO 39.9 IN ADULT: ICD-10-CM

## 2024-04-29 DIAGNOSIS — F41.9 ANXIETY: ICD-10-CM

## 2024-04-29 DIAGNOSIS — E66.812 CLASS 2 OBESITY DUE TO EXCESS CALORIES WITHOUT SERIOUS COMORBIDITY WITH BODY MASS INDEX (BMI) OF 39.0 TO 39.9 IN ADULT: ICD-10-CM

## 2024-04-29 PROCEDURE — 99316 NF DSCHRG MGMT 30 MIN+: CPT | Performed by: NURSE PRACTITIONER

## 2024-04-29 RX ORDER — OXYCODONE HYDROCHLORIDE 5 MG/1
5 TABLET ORAL DAILY PRN
Status: SHIPPED
Start: 2024-04-29 | End: 2024-05-07

## 2024-04-29 NOTE — LETTER
4/29/2024        RE: Amy Choudhury  [address]  [facility] [address]        [facility] GERIATRICS DISCHARGE SUMMARY  PATIENT'S NAME: Amy Choudhury  YOB: 1960  MEDICAL RECORD NUMBER:  [id number]  Place of Service where encounter took place:  [facility] (TCU) [[id number]]    PRIMARY CARE PROVIDER AND CLINIC RESPONSIBLE AFTER TRANSFER:   Randal Castellanos MD, [address] [place] [address] / [facility] [id number]    [facility] Provider     Transferring providers: María HERRERA CNP; Sommer Pinon MD  Recent Hospitalization/ED:  [facility]  stay 4/8/24 to 4/18/24.  Date of SNF Admission: April 08, 2024  Date of SNF (anticipated) Discharge: April 30, 2024  Discharged to: HOME  Cognitive Scores: SLUMS: 29/30  Physical Function: per nursing, Pt requires max Ax1 with upper body dressing, min Ax1 with bathing and lower body dressing, Mod-IND with grooming/oral care and toileting, independent with feeding after set up, pt is also independent with transfers and bed mobility.  DME:  mat in tub    CODE STATUS/ADVANCE DIRECTIVES DISCUSSION:  FULL CODE  ALLERGIES: Nsaids and Codeine sulfate    Past medical history includes  Closed fracture of the left humerus neck  Hx of closed fracture of proximal end of right humerus (2022)  Fall  Obesity  Anxiety  Adjustment disorder with mixed anxiety and depressed mood  Diabetes type 2  Tobacco dependence    On 4/8/2024, patient presented to the emergency room after falling on the treadmill 4 days prior and hitting her left shoulder on the wall.  She had been struggling with baseline tacit is changing close and was and able to cook and having trouble using the bathroom.  In the emergency room she was found to have a humeral neck nondisplaced fracture.  Orthopedic surgery saw her, and the nonoperative treatment plan was recommended.  Discharge was delayed due to prior authorization    NURSING FACILITY COURSE    Medication Changes/Rationale:   Summary of nursing facility stay:     (S43.270D) Closed nondisplaced fracture of surgical neck of left humerus with routine healing, unspecified fracture morphology, subsequent encounter  (primary encounter diagnosis)  (W19.XXXD) Fall, subsequent encounter  (M62.81) Generalized muscle weakness  ( Z78.9) Activity of daily living alteration   Comment:   s/p humeral neck nondisplaced fracture after falling off a treadmill  For pain medication currently taking oxycodone and Tylenol and methocarbamol.  Oxycodone and methocarbamol have been decreased during visit.    Saw [facility] ortho PA during TCU stay with recommendation of Continue hand to mouth activity, no lifting   On a bowel regimen  No DVT prophylaxis  When to wear: Sling at all times except for ROM exercises of elbow and wrist. NWB. Follow up in 2 weeks with repeat xrays   Follow-up Rosa Resendez Date & Time of Appointment: 5/6 @ 10:00 AM Phone Number: [phone] Address: [address] [place] [address]  Consider calcium and vitamin D     (R29.238) right arm weakness  Comment: Hx of closed fracture of proximal end of right humerus (2022)  Has limited mobility in the right upper arm due to history of fracture, unable to make full fist with right hand        (F41.9) Anxiety  (F45.86) emotional lability   Comment: Chronic.  Patient very anxious during visit today.  Currently not on any pharmacologic interventions  States she goes to psychotherapy  Saw psychology in the hospital (4/2024)   Intermittently anxious and emotional including raising voice and crying when discussing decreasing oxycodone        (E11.9) Type 2 diabetes mellitus without complication, without long-term current use of insulin (H)  (E66.09, Z68.39) Class 2 obesity due to excess calories without serious comorbidity with body mass index (BMI) of 39.0 to 39.9 in adult   (Z98.84) Status post gastric bypass for obesity  Comment: chronic.  Diet controlled  BMI  "39.9.    Hemoglobin A1c was 6 (2022)          (F17.200) Tobacco dependence syndrome  Comment: chronic.   Smoked at TCU    Discharge Medications:  MED REC REQUIRED  Post Medication Reconciliation Status:  Discharge medications reconciled and changed, see notes/orders       Current Outpatient Medications   Medication Sig Dispense Refill     oxyCODONE (ROXICODONE) 5 MG tablet Take 1 tablet (5 mg) by mouth daily as needed for pain       acetaminophen (TYLENOL) 500 MG tablet Take 1,000 mg by mouth 4 times daily       alum & mag hydroxide-simethicone (MAALOX) 200-200-20 MG/5ML SUSP suspension Take 30 mLs by mouth every 8 hours as needed for indigestion       calcium carbonate (TUMS) 500 MG chewable tablet Take 1 chew tab by mouth 2 times daily as needed for heartburn       methocarbamol (ROBAXIN) 500 MG tablet Take 500 mg by mouth 3 times daily       polyethylene glycol (MIRALAX) 17 GM/Dose powder Take 17 g by mouth daily       sennosides (SENOKOT) 8.6 MG tablet Take 1 tablet by mouth every 12 hours as needed for constipation            Controlled medications:   Medication: oxycodone , remaining tabs given to patient at the time of discharge to take home     Past Medical History:   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     Depressive disorder      Gastro-oesophageal reflux disease     bleeding ulcers     Hiatal hernia     NEWLY DIAGNOSISED     Hyperparathyroidism, unspecified (H24) 3/29/2012     Peptic ulcer, unspecified site, unspecified as acute or chronic, without mention of hemorrhage or perforation      Pneumonia     NOVEMBER     PONV (postoperative nausea and vomiting)      TMJ (temporomandibular joint syndrome) 5/8/2014     Vitamin D deficiency 6/7/2012     Physical Exam:   Vitals: BP (!) 157/85   Pulse 83   Temp (!) 96.6  F (35.9  C)   Resp 20   Ht 1.676 m (5' 6\")   Wt 111.6 kg " (246 lb)   SpO2 91%   BMI 39.71 kg/m    BMI: Body mass index is 39.71 kg/m .  GENERAL APPEARANCE:  Alert, in no distress  RESP:  lungs clear to auscultation , no respiratory distress  CV:  Palpation and auscultation of heart done , rate-normal  M/S:   left arm in sling.   Able to move hand/wrist;/fingers.  Good capillary refill  PSYCH:  oriented X 3     SNF labs: Most Recent 3 CBC's:  Recent Labs   Lab Test 04/22/24  0700 02/17/22  0705 02/14/22  0532 02/11/22  0550 02/10/22  2313   WBC 6.2  --   --  9.6 14.1*   HGB 10.5*  --   --  11.9 12.5   MCV 84*  --   --  80 79    409 377 382 412     Most Recent 3 BMP's:  Recent Labs   Lab Test 04/22/24  0552 02/17/22  0705 02/16/22  1132 02/15/22  1044 02/15/22  0921 02/14/22  0532 02/12/22  0748 02/12/22  0712 02/11/22  0822 02/11/22  0550     --   --   --   --   --   --  139  --  139   POTASSIUM 3.8  --   --   --   --   --   --  4.1  --  3.4   CHLORIDE 108*  --   --   --   --   --   --  111*  --  110*   CO2 22  --   --   --   --   --   --  24  --  26   BUN 11.1  --   --   --   --   --   --  11  --  11   CR 0.61 0.63  --   --   --  0.72  --  0.60  --  0.57   ANIONGAP 11  --   --   --   --   --   --  4  --  3   CRISTIAN 8.3  --   --   --   --   --   --  8.3*  --  8.4*   *  --  187* 105*   < >  --    < > 115*   < > 140*    < > = values in this interval not displayed.       DISCHARGE PLAN:  Follow up labs: No labs orders/due  Medical Follow Up:        Follow up with specialist   Rosa Resendez Date & Time of Appointment: 5/6 @ 10:00 AM Phone Number: [phone] Address: [address] [place] [address]   Follow up with primary care provider in 2-3 weeks    [facility] scheduled appointments: none  Discharge Services: Home Care:  Occupational Therapy and Physical Therapy  Discharge Instructions Verbalized to Patient at Discharge:   Weight bearing restrictions:  Non-weight bearing. On ULE    TOTAL DISCHARGE TIME:   Greater than 30  minutes  Electronically signed by:      JAVIER Calloway CNP on 4/29/2024 at 7:35 PM      Documentation of Face to Face and Certification for Home Health Services    I certify that patient: Amy Choudhury is under my care and that I, or a nurse practitioner or physician's assistant working with me, had a face-to-face encounter that meets the physician face-to-face encounter requirements with this patient on: 4/29/2024.    This encounter with the patient was in whole, or in part, for the following medical condition, which is the primary reason for home health care:        Closed fracture of proximal end of right humerus, unspecified fracture morphology, initial encounter  Anxiety  Emotional lability  Class 2 obesity due to excess calories without serious comorbidity with body mass index (BMI) of 39.0 to 39.9 in adult  Fall, subsequent encounter  Generalized muscle weakness  Activity of daily living alteration .    I certify that, based on my findings, the following services are medically necessary home health services: Occupational Therapy and Physical Therapy.    My clinical findings support the need for the above services because: Occupational Therapy Services are needed to assess and treat cognitive ability and address ADL safety due to impairment in ADLS. and Physical Therapy Services are needed to assess and treat the following functional impairments: ADLS.    Further, I certify that my clinical findings support that this patient is homebound (i.e. absences from home require considerable and taxing effort and are for medical reasons or [employer] services or infrequently or of short duration when for other reasons) because: Mental health symptoms including: Mental health condition is exacerbated by exposure to crowds, unfamiliar environment or new stressful situations...    Based on the above findings. I certify that this patient is confined to the home and needs intermittent skilled nursing care,  physical therapy and/or speech therapy.  The patient is under my care, and I have initiated the establishment of the plan of care.  This patient will be followed by a physician who will periodically review the plan of care.  Physician/Provider to provide follow up care: Randal Castellanos    Attending hospital physician (the Medicare certified [facility] provider): JAVIER Calloway CNP  Physician Signature: See electronic signature associated with these discharge orders.  Date: 4/30/2024               Sincerely,        JAVIER Calloway CNP

## 2024-06-07 ENCOUNTER — DOCUMENTATION ONLY (OUTPATIENT)
Dept: FAMILY MEDICINE | Facility: CLINIC | Age: 64
End: 2024-06-07
Payer: COMMERCIAL

## 2024-06-07 NOTE — PROGRESS NOTES
"When opening a documentation only encounter, be sure to enter in \"Chief Complaint\" Forms and in \" Comments\" Title of form, description if needed.    Amy is a 64 year old  female  Form received via: Fax  Form now resides in: Provider Ann Marie Figueroa MA               "

## 2024-08-03 ENCOUNTER — HEALTH MAINTENANCE LETTER (OUTPATIENT)
Age: 64
End: 2024-08-03

## 2024-12-21 ENCOUNTER — HEALTH MAINTENANCE LETTER (OUTPATIENT)
Age: 64
End: 2024-12-21

## 2025-03-22 ENCOUNTER — HEALTH MAINTENANCE LETTER (OUTPATIENT)
Age: 65
End: 2025-03-22

## 2025-05-06 ENCOUNTER — APPOINTMENT (OUTPATIENT)
Dept: GENERAL RADIOLOGY | Facility: CLINIC | Age: 65
DRG: 545 | End: 2025-05-06
Attending: EMERGENCY MEDICINE
Payer: COMMERCIAL

## 2025-05-06 ENCOUNTER — APPOINTMENT (OUTPATIENT)
Dept: CT IMAGING | Facility: CLINIC | Age: 65
End: 2025-05-06
Payer: COMMERCIAL

## 2025-05-06 ENCOUNTER — HOSPITAL ENCOUNTER (INPATIENT)
Facility: CLINIC | Age: 65
End: 2025-05-06
Attending: EMERGENCY MEDICINE | Admitting: STUDENT IN AN ORGANIZED HEALTH CARE EDUCATION/TRAINING PROGRAM
Payer: COMMERCIAL

## 2025-05-06 ENCOUNTER — APPOINTMENT (OUTPATIENT)
Dept: CT IMAGING | Facility: CLINIC | Age: 65
DRG: 545 | End: 2025-05-06
Attending: EMERGENCY MEDICINE
Payer: COMMERCIAL

## 2025-05-06 DIAGNOSIS — R41.3 MEMORY LOSS: ICD-10-CM

## 2025-05-06 DIAGNOSIS — Z71.89 OTHER SPECIFIED COUNSELING: Chronic | ICD-10-CM

## 2025-05-06 DIAGNOSIS — E85.4 CEREBRAL AMYLOID ANGIOPATHY (H): ICD-10-CM

## 2025-05-06 DIAGNOSIS — F41.9 ANXIETY: Primary | ICD-10-CM

## 2025-05-06 DIAGNOSIS — I68.0 CEREBRAL AMYLOID ANGIOPATHY (H): ICD-10-CM

## 2025-05-06 PROBLEM — I67.83 POSTERIOR REVERSIBLE ENCEPHALOPATHY SYNDROME: Status: ACTIVE | Noted: 2025-05-06

## 2025-05-06 LAB
ALBUMIN SERPL BCG-MCNC: 4.4 G/DL (ref 3.5–5.2)
ALBUMIN UR-MCNC: NEGATIVE MG/DL
ALP SERPL-CCNC: 120 U/L (ref 40–150)
ALT SERPL W P-5'-P-CCNC: 19 U/L (ref 0–50)
AMPHETAMINES UR QL SCN: ABNORMAL
ANION GAP SERPL CALCULATED.3IONS-SCNC: 17 MMOL/L (ref 7–15)
APPEARANCE UR: ABNORMAL
AST SERPL W P-5'-P-CCNC: 24 U/L (ref 0–45)
BACTERIA #/AREA URNS HPF: ABNORMAL /HPF
BARBITURATES UR QL SCN: ABNORMAL
BASOPHILS # BLD AUTO: 0.1 10E3/UL (ref 0–0.2)
BASOPHILS NFR BLD AUTO: 1 %
BENZODIAZ UR QL SCN: ABNORMAL
BILIRUB SERPL-MCNC: 0.4 MG/DL
BILIRUB UR QL STRIP: NEGATIVE
BUN SERPL-MCNC: 18.1 MG/DL (ref 8–23)
BZE UR QL SCN: ABNORMAL
CALCIUM SERPL-MCNC: 9.4 MG/DL (ref 8.8–10.4)
CANNABINOIDS UR QL SCN: ABNORMAL
CHLORIDE SERPL-SCNC: 97 MMOL/L (ref 98–107)
CHOLEST SERPL-MCNC: 167 MG/DL
COLOR UR AUTO: YELLOW
CREAT SERPL-MCNC: 0.75 MG/DL (ref 0.51–0.95)
EGFRCR SERPLBLD CKD-EPI 2021: 89 ML/MIN/1.73M2
EOSINOPHIL # BLD AUTO: 0.1 10E3/UL (ref 0–0.7)
EOSINOPHIL NFR BLD AUTO: 1 %
ERYTHROCYTE [DISTWIDTH] IN BLOOD BY AUTOMATED COUNT: 15.2 % (ref 10–15)
EST. AVERAGE GLUCOSE BLD GHB EST-MCNC: 203 MG/DL
ETHANOL SERPL-MCNC: <0.01 G/DL
FENTANYL UR QL: ABNORMAL
GLUCOSE BLDC GLUCOMTR-MCNC: 182 MG/DL (ref 70–99)
GLUCOSE SERPL-MCNC: 275 MG/DL (ref 70–99)
GLUCOSE UR STRIP-MCNC: NEGATIVE MG/DL
HBA1C MFR BLD: 8.7 %
HCO3 SERPL-SCNC: 23 MMOL/L (ref 22–29)
HCT VFR BLD AUTO: 43 % (ref 35–47)
HDLC SERPL-MCNC: 55 MG/DL
HGB BLD-MCNC: 13.6 G/DL (ref 11.7–15.7)
HGB UR QL STRIP: NEGATIVE
HYALINE CASTS: 55 /LPF
IMM GRANULOCYTES # BLD: 0.1 10E3/UL
IMM GRANULOCYTES NFR BLD: 1 %
KETONES UR STRIP-MCNC: NEGATIVE MG/DL
LDLC SERPL CALC-MCNC: 72 MG/DL
LEUKOCYTE ESTERASE UR QL STRIP: NEGATIVE
LYMPHOCYTES # BLD AUTO: 1.7 10E3/UL (ref 0.8–5.3)
LYMPHOCYTES NFR BLD AUTO: 12 %
MCH RBC QN AUTO: 25.9 PG (ref 26.5–33)
MCHC RBC AUTO-ENTMCNC: 31.6 G/DL (ref 31.5–36.5)
MCV RBC AUTO: 82 FL (ref 78–100)
MONOCYTES # BLD AUTO: 1 10E3/UL (ref 0–1.3)
MONOCYTES NFR BLD AUTO: 7 %
MUCOUS THREADS #/AREA URNS LPF: PRESENT /LPF
NEUTROPHILS # BLD AUTO: 11.5 10E3/UL (ref 1.6–8.3)
NEUTROPHILS NFR BLD AUTO: 80 %
NITRATE UR QL: NEGATIVE
NONHDLC SERPL-MCNC: 112 MG/DL
NRBC # BLD AUTO: 0 10E3/UL
NRBC BLD AUTO-RTO: 0 /100
OPIATES UR QL SCN: ABNORMAL
PCP QUAL URINE (ROCHE): ABNORMAL
PH UR STRIP: 6 [PH] (ref 5–7)
PLATELET # BLD AUTO: 473 10E3/UL (ref 150–450)
POTASSIUM SERPL-SCNC: 3.9 MMOL/L (ref 3.4–5.3)
PROT SERPL-MCNC: 7.6 G/DL (ref 6.4–8.3)
RBC # BLD AUTO: 5.25 10E6/UL (ref 3.8–5.2)
RBC URINE: 3 /HPF
SODIUM SERPL-SCNC: 137 MMOL/L (ref 135–145)
SP GR UR STRIP: 1.03 (ref 1–1.03)
SQUAMOUS EPITHELIAL: 93 /HPF
TRIGL SERPL-MCNC: 198 MG/DL
TROPONIN T SERPL HS-MCNC: 11 NG/L
TSH SERPL DL<=0.005 MIU/L-ACNC: 1.24 UIU/ML (ref 0.3–4.2)
UROBILINOGEN UR STRIP-MCNC: 0.2 MG/DL
WBC # BLD AUTO: 14.3 10E3/UL (ref 4–11)
WBC URINE: 3 /HPF

## 2025-05-06 PROCEDURE — 250N000013 HC RX MED GY IP 250 OP 250 PS 637: Performed by: EMERGENCY MEDICINE

## 2025-05-06 PROCEDURE — 82077 ASSAY SPEC XCP UR&BREATH IA: CPT | Performed by: EMERGENCY MEDICINE

## 2025-05-06 PROCEDURE — 71046 X-RAY EXAM CHEST 2 VIEWS: CPT | Mod: 26 | Performed by: RADIOLOGY

## 2025-05-06 PROCEDURE — 70450 CT HEAD/BRAIN W/O DYE: CPT

## 2025-05-06 PROCEDURE — 99285 EMERGENCY DEPT VISIT HI MDM: CPT | Performed by: EMERGENCY MEDICINE

## 2025-05-06 PROCEDURE — 82947 ASSAY GLUCOSE BLOOD QUANT: CPT | Performed by: EMERGENCY MEDICINE

## 2025-05-06 PROCEDURE — 70496 CT ANGIOGRAPHY HEAD: CPT

## 2025-05-06 PROCEDURE — 70498 CT ANGIOGRAPHY NECK: CPT | Mod: 26 | Performed by: RADIOLOGY

## 2025-05-06 PROCEDURE — 80061 LIPID PANEL: CPT

## 2025-05-06 PROCEDURE — 80307 DRUG TEST PRSMV CHEM ANLYZR: CPT | Performed by: EMERGENCY MEDICINE

## 2025-05-06 PROCEDURE — 71046 X-RAY EXAM CHEST 2 VIEWS: CPT

## 2025-05-06 PROCEDURE — 84484 ASSAY OF TROPONIN QUANT: CPT

## 2025-05-06 PROCEDURE — 84443 ASSAY THYROID STIM HORMONE: CPT | Performed by: EMERGENCY MEDICINE

## 2025-05-06 PROCEDURE — 83036 HEMOGLOBIN GLYCOSYLATED A1C: CPT

## 2025-05-06 PROCEDURE — 250N000011 HC RX IP 250 OP 636

## 2025-05-06 PROCEDURE — 36415 COLL VENOUS BLD VENIPUNCTURE: CPT | Performed by: EMERGENCY MEDICINE

## 2025-05-06 PROCEDURE — 82310 ASSAY OF CALCIUM: CPT | Performed by: EMERGENCY MEDICINE

## 2025-05-06 PROCEDURE — 85025 COMPLETE CBC W/AUTO DIFF WBC: CPT | Performed by: EMERGENCY MEDICINE

## 2025-05-06 PROCEDURE — 120N000002 HC R&B MED SURG/OB UMMC

## 2025-05-06 PROCEDURE — 81003 URINALYSIS AUTO W/O SCOPE: CPT | Performed by: EMERGENCY MEDICINE

## 2025-05-06 PROCEDURE — 82962 GLUCOSE BLOOD TEST: CPT

## 2025-05-06 PROCEDURE — 70496 CT ANGIOGRAPHY HEAD: CPT | Mod: 26 | Performed by: RADIOLOGY

## 2025-05-06 PROCEDURE — 250N000013 HC RX MED GY IP 250 OP 250 PS 637

## 2025-05-06 RX ORDER — AMOXICILLIN 250 MG
1-2 CAPSULE ORAL 2 TIMES DAILY
Status: DISPENSED | OUTPATIENT
Start: 2025-05-06

## 2025-05-06 RX ORDER — ONDANSETRON 4 MG/1
4 TABLET, ORALLY DISINTEGRATING ORAL EVERY 6 HOURS PRN
Status: ACTIVE | OUTPATIENT
Start: 2025-05-06

## 2025-05-06 RX ORDER — LIDOCAINE 40 MG/G
CREAM TOPICAL
Status: ACTIVE | OUTPATIENT
Start: 2025-05-06

## 2025-05-06 RX ORDER — ACETAMINOPHEN 325 MG/10.15ML
650 LIQUID ORAL EVERY 4 HOURS PRN
Status: DISCONTINUED | OUTPATIENT
Start: 2025-05-06 | End: 2025-05-08

## 2025-05-06 RX ORDER — IOPAMIDOL 755 MG/ML
67 INJECTION, SOLUTION INTRAVASCULAR ONCE
Status: COMPLETED | OUTPATIENT
Start: 2025-05-06 | End: 2025-05-06

## 2025-05-06 RX ORDER — ACETAMINOPHEN 500 MG
1000 TABLET ORAL ONCE
Status: COMPLETED | OUTPATIENT
Start: 2025-05-06 | End: 2025-05-06

## 2025-05-06 RX ORDER — DIPHENHYDRAMINE HCL 25 MG
25 CAPSULE ORAL ONCE
Status: COMPLETED | OUTPATIENT
Start: 2025-05-06 | End: 2025-05-06

## 2025-05-06 RX ORDER — ACETAMINOPHEN 325 MG/1
650 TABLET ORAL EVERY 4 HOURS PRN
Status: DISCONTINUED | OUTPATIENT
Start: 2025-05-06 | End: 2025-05-08

## 2025-05-06 RX ORDER — DEXTROSE MONOHYDRATE 25 G/50ML
25-50 INJECTION, SOLUTION INTRAVENOUS
Status: ACTIVE | OUTPATIENT
Start: 2025-05-06

## 2025-05-06 RX ORDER — ONDANSETRON 2 MG/ML
4 INJECTION INTRAMUSCULAR; INTRAVENOUS EVERY 6 HOURS PRN
Status: ACTIVE | OUTPATIENT
Start: 2025-05-06

## 2025-05-06 RX ORDER — NICOTINE POLACRILEX 4 MG
15-30 LOZENGE BUCCAL
Status: ACTIVE | OUTPATIENT
Start: 2025-05-06

## 2025-05-06 RX ORDER — HYDRALAZINE HYDROCHLORIDE 20 MG/ML
10 INJECTION INTRAMUSCULAR; INTRAVENOUS EVERY 6 HOURS PRN
Status: ACTIVE | OUTPATIENT
Start: 2025-05-06

## 2025-05-06 RX ADMIN — DIPHENHYDRAMINE HYDROCHLORIDE 25 MG: 25 CAPSULE ORAL at 21:30

## 2025-05-06 RX ADMIN — IOPAMIDOL 67 ML: 755 INJECTION, SOLUTION INTRAVENOUS at 20:06

## 2025-05-06 RX ADMIN — ACETAMINOPHEN 1000 MG: 500 TABLET ORAL at 16:10

## 2025-05-06 ASSESSMENT — ACTIVITIES OF DAILY LIVING (ADL)
ADLS_ACUITY_SCORE: 54

## 2025-05-06 ASSESSMENT — COLUMBIA-SUICIDE SEVERITY RATING SCALE - C-SSRS
2. HAVE YOU ACTUALLY HAD ANY THOUGHTS OF KILLING YOURSELF IN THE PAST MONTH?: NO
6. HAVE YOU EVER DONE ANYTHING, STARTED TO DO ANYTHING, OR PREPARED TO DO ANYTHING TO END YOUR LIFE?: NO
1. IN THE PAST MONTH, HAVE YOU WISHED YOU WERE DEAD OR WISHED YOU COULD GO TO SLEEP AND NOT WAKE UP?: NO

## 2025-05-06 NOTE — ED TRIAGE NOTES
Triage Assessment & Note:    There were no vitals taken for this visit.      Patient presents with: Pt BIBA ([facility]) from home with c/o of migraine. Pt recently hospitalized in OSH and discharged 05/058/25.   120/80  228 bg      Home Treatments/Remedies: Home medications    Febrile / Afebrile: afebrile    Duration of C/o: < 24 hrs    Jolly Khan RN  May 6, 2025

## 2025-05-06 NOTE — ED PROVIDER NOTES
"    [place] [facility] DE[facility]MENT ([facility])    5/06/25       ED PROVIDER NOTE        History     Chief Complaint   Patient presents with    Headache     HPI  Amy Choudhury is a 65 year old female with history of DM2, anxiety, tobacco dependence, GERD, s/p gastric bypass, who presents to the ED via EMS with worsening headache. Patient was admitted to [facility] 4/28/25 where she was assessed for posterior reversible encephalopathy syndrome and inflammatory microvascular disease. She was readmitted 5/2/25 for headache and hypertension and was seen at [facility] ED yesterday as well. She discharged with prescriptions for sertraline, metformin, amlodipine, hydrochlorothiazide, and losartan. Patient reports she was lost after leaving the hospital despite growing up in the area. She tripped and fell several times because of construction, denies loss of consciousness or hitting her head. No injury.  She managed to get home last night, but when she awoke today her headache returned with double vision. Non-positional.  No thunderclap.  Her headaches can present differently, this one feels tight around her temples.    She managed to get dressed and decided to go back to the ED. She describes a loss of time, like movement is choppy and she loses seconds. Her vision doubles into \"cubes\" and reports the clock is backwards. Patient lost all her meds except the sertraline, which she took. She does not know what happened to the other pill bottles because of her episodes of memory loss.    Patient denies drug or alcohol use since her first admission a week ago. She has not take tylenol for her headache. Patient reports she has terrible anxiety, denies self-harm ideation, says she sometimes wants to hurt other people but never would. Patient does not need housing help, she has an apartment.    Past Medical History  Past Medical History:   Diagnosis Date    Anemia     Anxiety     Axillary abscess     chronic, recurring    " Backache     Benign neoplasm of adrenal gland 2012    Depressive disorder     Gastro-oesophageal reflux disease     bleeding ulcers    Hiatal hernia     NEWLY DIAGNOSISED    Hyperparathyroidism, unspecified 3/29/2012    Peptic ulcer, unspecified site, unspecified as acute or chronic, without mention of hemorrhage or perforation     Pneumonia     NOVEMBER    PONV (postoperative nausea and vomiting)     TMJ (temporomandibular joint syndrome) 2014    Vitamin D deficiency 2012     Past Surgical History:   Procedure Laterality Date    APPENDECTOMY      CHOLECYSTECTOMY      DILATION AND CURETTAGE, OPERATIVE HYSTEROSCOPY WITH MORCELLATOR, COMBINED  2012    Procedure: COMBINED DILATION AND CURETTAGE, OPERATIVE HYSTEROSCOPY WITH MORCELLATOR;  Hysteroscopy, Polypectomy, Dilation and Curettage  ;  Surgeon: Marta Montejo MD;  Location: [facility] OR    GI SURGERY      gastric bypass at age 31    ORTHOPEDIC SURGERY      back surgery at age 31    PARATHYROIDECTOMY Right 2015    Procedure: PARATHYROIDECTOMY;  Surgeon: Gregory Coleman MD;  Location: [facility]    DE MARSUP BARTHOLIN GLAND CYST      SPINE SURGERY       sertraline (ZOLOFT) 50 MG tablet  acetaminophen (TYLENOL) 500 MG tablet  alum & mag hydroxide-simethicone (MAALOX) 200-200-20 MG/5ML SUSP suspension  calcium carbonate (TUMS) 500 MG chewable tablet  methocarbamol (ROBAXIN) 500 MG tablet  polyethylene glycol (MIRALAX) 17 GM/Dose powder  sennosides (SENOKOT) 8.6 MG tablet      Allergies   Allergen Reactions    Nsaids Other (See Comments), Nausea and Vomiting and GI Disturbance     History of GI bleeding and ulcers    Codeine Sulfate GI Disturbance     Family History  Family History   Problem Relation Age of Onset    Thyroid Disease Mother     Breast Cancer Mother 57         65 y.o.    Other - See Comments Mother         cystic acne    Cancer Brother     Diabetes Daughter         Type 1; insulin     Social History   Social History  "    Tobacco Use    Smoking status: Every Day     Current packs/day: 0.50     Average packs/day: 0.5 packs/day for 30.0 years (15.0 ttl pk-yrs)     Types: Cigarettes    Smokeless tobacco: Never   Substance Use Topics    Alcohol use: Yes     Comment: occasionally    Drug use: No      Past medical history, past surgical history, medications, allergies, family history, and social history were reviewed with the patient. No additional pertinent items.   A complete review of systems was performed with pertinent positives and negatives noted in the HPI, and all other systems negative.    Physical Exam   BP: 123/84  Pulse: 94  Temp: 97.4  F (36.3  C)  Resp: 17  Height: 167.6 cm (5' 6\")  Weight: 99.8 kg (220 lb)  SpO2: 97 %  Physical Exam  General:  No acute distress.  Crying  HENT:  Normocephalic and atraumatic. No sign of trauma.  No meningismus.  Eyes: EOMI. Conjunctivae normal.  PERRLA  Cardiovascular: Normal rate and regular rhythm.  Normal heart sounds. No murmur heard.  Pulmonary:  No respiratory distress. Normal breath sounds.   Abdominal: There is no distension.  Abdomen is soft. There is no mass.  There is no abdominal tenderness.   Musculoskeletal: No swelling or tenderness.  Moving all extremities spontaneously.  Grossly intact strength and sensation. No midline spinal tenderness.  Skin: Warm and dry  Neurological: No focal deficit present.  CN2-12 intact  Mood and Affect: Mood normal. Tearful, calm, does not appear to be responding to internal stimuli      ED Course, Procedures, & Data      Procedures                No results found for any visits on 05/06/25.  Medications - No data to display  Labs Ordered and Resulted from Time of ED Arrival to Time of ED Departure - No data to display  No orders to display          Critical care was not performed.     Medical Decision Making  The patient's presentation was of high complexity (a chronic illness severe exacerbation, progression, or side effect of " "treatment).    The patient's evaluation involved:  review of external note(s) from 3+ sources (see separate area of note for details)  review of 3+ test result(s) ordered prior to this encounter (see separate area of note for details)  strong consideration of a test (see separate area of note for details) that was ultimately deferred  ordering and/or review of 3+ test(s) in this encounter (see separate area of note for details)  independent interpretation of testing performed by another health professional (see separate area of note for details)    The patient's management necessitated high risk (a decision regarding hospitalization).    Assessment & Plan    Patient arrives for evaluation of new onset periods of memory loss she describes as \"memory blocks\", return of b/l temporal headache, and vision disturbance she describes as     Patients BP is 123/84.  Exam is non-focal.  Differential diagnosis includes but is not limited to seizure, PRES, amyloidopathy, CVA, ICH, substance use, transient amnesia, complex migraine, tension headache.  I do not CNS infection based on history and exam.    Patient was given tylenol for her headache.  UDS positive for amphetamines, benzodiapenes and cannabinoids.  No benzos on her home med list.  Patient reports last methamphetamine use prior to most recent hospitalization.  Leukocytosis of 14.3.  She denies any infectious symptoms.  Added on chest x-ray and UA.  UA negative for UTI.    I spoke with neurology who reviewed her previous MRI and will come evaluate the patient, they request a non-con head CT, and CTA.  I requested the ED HUC push over MRIs and CT brain since 4/28/2025 for this patient from the outside hospital.  Patient admitted to neurology observation status.  Pending imaging.    I have reviewed the nursing notes. I have reviewed the findings, diagnosis, plan and need for follow up with the patient.    New Prescriptions    No medications on file       Final diagnoses: "   None       I, Avtar Chambers, am serving as a trained medical scribe to document services personally performed by Haylee Tomlin DO based on the provider's statements to me on May 6, 2025.  This document has been checked and approved by the attending provider.    I, Haylee Tomlin DO, was physically present and have reviewed and verified the accuracy of this note documented by Avtar Chambers, medical scribe.      Haylee Tomlin DO  [facility] EMERGENCY DEPARTMENT  5/6/2025     Haylee Tomlin DO  05/06/25 1803

## 2025-05-06 NOTE — H&P
[facility]    Stroke Admission Note    Chief Complaint  Headache and blurry vision    HPI  Amy Choudhury is a 65 year old female with history of DM2, anxiety, tobacco dependence, GERD, s/p gastric bypass, who presents to the ED via EMS with worsening headache and blurred vision.     Patient was admitted to [facility] on 4/25/2025 with left-sided frontal headache for 3 weeks.  Her CT head was unremarkable . She was treated with migraine cocktail, improved and discharged home.    She presented to the [facility] ED again on 4/2825 with headache again and was found to have blood pressure of 220s/120s. MRI brain w/without contrast showed Interval development of patchy T2 FLAIR hyperintensity within the left posteroinferior cerebellum, with possible patchy enhancement. Additional new areas of subcortical T2 FLAIR hyperintensity involving the posterior parieto-occipital regions. Progressed scattered associated susceptibility artifact, most notably right occipital lobe.  She was treated as a case of posterior skull encephalopathy with gradual decrease in blood pressure and started on blood pressure medications to be taken at home.  Her UDS was also positive for methamphetamine.    She had another admission from 5/2/25-5/5/25 for headache and was again hypertensive.  Repeat MRI was similar to prior MRIs with small microhemorrhages and superficial siderosis along with the signal normalities through posterior cerebral and cerebellar hemispheres with leptomeningeal enhancement.  She was discharged yesterday.    On her way back, she lost her way while walking because she could not remember her way back home.  A bystander called EMS and she was dropped off home by them.  She slept fine.  She woke up this morning with persistent headache and blurred/quadrupled/choppy vision.  Throughout time since yesterday, she thinks she has been losing time.  She has 7/10 headache from front of her  "head but that keeps migrating.  She was very hungry and Eating food in the ED. she also made few remarks such as, \"the pen is turning into cigarette, or I have seen that lady jumping before\".      Impression     # Posterior skull encephalopathy +/cerebral amyloid angiopathy versus hypertensive micro hemorrhages    Amy Choudhury is a 65 year old female with history of DM2, anxiety, tobacco dependence, GERD, s/p gastric bypass, who presents to the ED via EMS with worsening headache , blurred vision, sense of loss of time.  Patient had recent admission and workup at [facility] after presenting with headache and blurred vision and the MRI was concerning for Plus cerebral amyloid angiopathy versus hypertensive micro hemorrhages.  Her blood pressure here is 123 x 86.  She is also found to have UDS positive for methamphetamine and cannabis.  Her symptoms could be either continuation of her symptoms from press in addition to drug use or anything new such as intracranial bleed/subarachnoid hemorrhage, or the cerebral amyloid angiopathy is inflammatory in nature and has been untreated. Unfortunately, we do not have the images from outside hospital to review.  In the setting, will admit the patient to obs and would request outside images to be pushed to our PACS.  Meanwhile we will obtain CT head and CT head and neck and continue to watch her blood pressure and manage accordingly.  There events of loss of time may present amyloid spells versus seizures . We will also continue to watch her for confusion overnight, and will consider EEG tomorrow morning if there is still a concern.       Plan  -Admit to stroke service for observation  -Get MRI brain and CT head pushed to PACS from [facility]  -Treat blood pressure if SBP >220  -CT head and CTA head and neck with contrast    # Leukocytosis  She is also found to have high leukocyte count 14.3.  But chest x-ray and UA are unremarkable  - Daily CBC  - Temperature monitoring    # " "Type 2 diabetes mellitus  - Medium dose sliding scale    # History of hypertension  - Hydralazine/labetalol as needed for systolic blood pressure>220        Prophylaxis            For VTE Prevention:  - pneumatic compression device    For Acid Suppression:  - GI prophylaxis is not indicated    Code Status  Full Code    During initial physical assessment, the plan of care was discussed and developed with patient.  Plan of care includes: As above .    Patient was admitted via [facility] ED ([place])    The patient will be admitted to the Neuro Critical Care/Stroke team..     The patient was discussed with the Stroke Staff is Dr. Hoffman.    Litzy Kulkarni MD MS  Neurology Resident PGY 3  ___________________________________________________    Nutrition:   Orders Placed This Encounter      Regular Diet Adult    Clinically Significant Risk Factors Present on Admission          # Hypochloremia: Lowest Cl = 97 mmol/L in last 2 days, will monitor as appropriate                  # Severe Obesity: Estimated body mass index is 35.51 kg/m  as calculated from the following:    Height as of this encounter: 1.676 m (5' 6\").    Weight as of this encounter: 99.8 kg (220 lb). 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    Depressive disorder     Gastro-oesophageal reflux disease     bleeding ulcers    Hiatal hernia     NEWLY DIAGNOSISED    Hyperparathyroidism, unspecified 3/29/2012    Peptic ulcer, unspecified site, unspecified as acute or chronic, without mention of hemorrhage or perforation     Pneumonia     NOVEMBER    PONV (postoperative nausea and vomiting)     TMJ (temporomandibular joint syndrome) 5/8/2014    Vitamin D deficiency 6/7/2012     Past Surgical History   Past Surgical History:   Procedure Laterality Date    APPENDECTOMY      " CHOLECYSTECTOMY      DILATION AND CURETTAGE, OPERATIVE HYSTEROSCOPY WITH MORCELLATOR, COMBINED  11/21/2012    Procedure: COMBINED DILATION AND CURETTAGE, OPERATIVE HYSTEROSCOPY WITH MORCELLATOR;  Hysteroscopy, Polypectomy, Dilation and Curettage  ;  Surgeon: Marta Montejo MD;  Location: [facility] OR    GI SURGERY      gastric bypass at age 31    ORTHOPEDIC SURGERY      back surgery at age 31    PARATHYROIDECTOMY Right 12/14/2015    Procedure: PARATHYROIDECTOMY;  Surgeon: Gregory Coleman MD;  Location: [facility]    IN MARSUP BARTHOLIN GLAND CYST      SPINE SURGERY       Medications   Home Meds  Prior to Admission medications    Medication Sig Start Date End Date Taking? Authorizing Provider   sertraline (ZOLOFT) 50 MG tablet Take 50 mg by mouth daily.   Yes Reported, Patient   acetaminophen (TYLENOL) 500 MG tablet Take 1,000 mg by mouth 4 times daily 4/19/24   Reported, Patient   alum & mag hydroxide-simethicone (MAALOX) 200-200-20 MG/5ML SUSP suspension Take 30 mLs by mouth every 8 hours as needed for indigestion    Reported, Patient   calcium carbonate (TUMS) 500 MG chewable tablet Take 1 chew tab by mouth 2 times daily as needed for heartburn    Reported, Patient   methocarbamol (ROBAXIN) 500 MG tablet Take 500 mg by mouth 3 times daily 4/19/24   Reported, Patient   polyethylene glycol (MIRALAX) 17 GM/Dose powder Take 17 g by mouth daily 4/19/24   Reported, Patient   sennosides (SENOKOT) 8.6 MG tablet Take 1 tablet by mouth every 12 hours as needed for constipation 4/19/24   Reported, Patient       Scheduled Meds  Current Facility-Administered Medications   Medication Dose Route Frequency Provider Last Rate Last Admin    insulin aspart (NovoLOG) injection (RAPID ACTING)  1-7 Units Subcutaneous TID AC Litzy Kulkarni MD        insulin aspart (NovoLOG) injection (RAPID ACTING)  1-5 Units Subcutaneous At Bedtime Litzy Kulkarni MD        senna-docusate (SENOKOT-S/PERICOLACE) 8.6-50 MG per tablet 1-2 tablet  1-2  tablet Oral or NG Tube BID Litzy uKlkarni MD        [START ON 2025] sertraline (ZOLOFT) tablet 50 mg  50 mg Oral Daily Litzy Kulkarni MD        sodium chloride (PF) 0.9% PF flush 3 mL  3 mL Intracatheter Q8H [facility] Litzy Kulkarni MD           Infusion Meds  Current Facility-Administered Medications   Medication Dose Route Frequency Provider Last Rate Last Admin       PRN Meds  Current Facility-Administered Medications   Medication Dose Route Frequency Provider Last Rate Last Admin    acetaminophen (TYLENOL) tablet 650 mg  650 mg Oral Q4H PRN Litzy Kulkarni MD        Or    acetaminophen (TYLENOL) oral liquid 650 mg  650 mg Oral Q4H PRN Litzy Kulkarni MD        Or    acetaminophen (TYLENOL) Suppository 650 mg  650 mg Rectal Q4H PRN Litzy Kulkarni MD        glucose gel 15-30 g  15-30 g Oral Q15 Min PRN Litzy Kulkarni MD        Or    dextrose 50 % injection 25-50 mL  25-50 mL Intravenous Q15 Min PRN Litzy Kulkarni MD        Or    glucagon injection 1 mg  1 mg Subcutaneous Q15 Min PRN Litzy Kulkarni MD        hydrALAZINE (APRESOLINE) injection 10 mg  10 mg Intravenous Q6H PRN Litzy Kulkarni MD        lidocaine (LMX4) cream   Topical Q1H PRN Litzy Kulkarni MD        lidocaine 1 % 0.1-1 mL  0.1-1 mL Other Q1H PRN Litzy Kulkarni MD        ondansetron (ZOFRAN ODT) ODT tab 4 mg  4 mg Oral Q6H PRN Litzy Kulkarni MD        Or    ondansetron (ZOFRAN) injection 4 mg  4 mg Intravenous Q6H PRN Litzy Kulkarni MD        sodium chloride (PF) 0.9% PF flush 3 mL  3 mL Intracatheter q1 min prn Litzy Kulkarni MD           Allergies   Allergies   Allergen Reactions    Nsaids Other (See Comments), Nausea and Vomiting and GI Disturbance     History of GI bleeding and ulcers    Codeine Sulfate GI Disturbance     Family History   Family History   Problem Relation Age of Onset    Thyroid Disease Mother     Breast Cancer Mother 57         65 y.o.    Other - See Comments Mother         cystic acne    Cancer Brother     Diabetes Daughter         Type 1; insulin     Social  "History   Social History     Tobacco Use    Smoking status: Every Day     Current packs/day: 0.50     Average packs/day: 0.5 packs/day for 30.0 years (15.0 ttl pk-yrs)     Types: Cigarettes    Smokeless tobacco: Never   Substance Use Topics    Alcohol use: Yes     Comment: occasionally    Drug use: No       Review of Systems   The 10 point Review of Systems is negative other than noted in the HPI or here.        PHYSICAL EXAMINATION  Temp:  [97.4  F (36.3  C)] 97.4  F (36.3  C)  Pulse:  [94] 94  Resp:  [17] 17  BP: (123)/(84) 123/84  SpO2:  [97 %] 97 %    General:  patient lying in bed without any acute distress    HEENT:  normocephalic/atraumatic  Cardio:  RRR  Pulmonary:  no respiratory distress  Abdomen:  soft  Extremities:  no edema  Skin:  intact     Neurologic  Mental Status:  alert, oriented x 3, follows commands, speech clear and fluent, naming and repetition normal, remembered 2/3 of words given, occasional comments such as \" the pen is turning into cigarette but I know it is not\"  Cranial Nerves:  visual fields intact, PERRL, EOMI with normal smooth pursuit, facial sensation intact and symmetric, facial movements symmetric, hearing not formally tested but intact to conversation, palate elevation symmetric and uvula midline, no dysarthria, shoulder shrug strong bilaterally, tongue protrusion midline  Motor:  normal muscle tone and bulk, no abnormal movements, able to move all limbs spontaneously, strength 5/5 throughout upper and lower extremities, no pronator drift  Reflexes:  toes down-going  Sensory:  light touch sensation intact and symmetric throughout upper and lower extremities, no extinction on double simultaneous stimulation   Coordination:  normal finger-to-nose and heel-to-shin bilaterally without dysmetria, rapid alternating movements symmetric  Station/Gait:  deferred    Dysphagia Screen  Passed screening, no dysarthria - Regular Diet with thin liquids  05/06/2025     Stroke " "Scales    NIHSS  1a. Level of Consciousness 0-->Alert, keenly responsive   1b. LOC Questions 0-->Answers both questions correctly   1c. LOC Commands 0-->Performs both tasks correctly   2.   Best Gaze 0-->Normal   3.   Visual 0-->No visual loss   4.   Facial Palsy 0-->Normal symmetrical movements   5a. Motor Arm, Left 0-->No drift, limb holds 90 (or 45) degrees for full 10 secs   5b. Motor Arm, Right 0-->No drift, limb holds 90 (or 45) degrees for full 10 secs   6a. Motor Leg, Left 0-->No drift, leg holds 30 degree position for full 5 secs   6b. Motor Leg, right 0-->No drift, leg holds 30 degree position for full 5 secs   7.   Limb Ataxia 0-->Absent   8.   Sensory 0-->Normal, no sensory loss   9.   Best Language 0-->No aphasia, normal   10. Dysarthria 0-->Normal   11. Extinction and Inattention  0-->No abnormality   Total 0 (05/06/25 1813)       Modified Grandy Score (Pre-morbid)  1-No significant disability despite symptoms    Imaging  I personally reviewed all imaging; relevant findings per the HPI.    Lab Results Data   CBC  Recent Labs   Lab 05/06/25  1554   WBC 14.3*   RBC 5.25*   HGB 13.6   HCT 43.0   *     Basic Metabolic Panel   Recent Labs   Lab 05/06/25  1554      POTASSIUM 3.9   CHLORIDE 97*   CO2 23   BUN 18.1   CR 0.75   *   CRISTIAN 9.4     Liver Panel  Recent Labs   Lab 05/06/25  1554   PROTTOTAL 7.6   ALBUMIN 4.4   BILITOTAL 0.4   ALKPHOS 120   AST 24   ALT 19     INR  No lab results found.   Lipid Profile    Recent Labs   Lab Test 09/14/21  0819 04/19/21  1713   CHOL 200* 194   HDL 51 65   * 103*   TRIG 129 132     A1C    Recent Labs   Lab Test 02/11/22  0550 10/15/21  1008 09/06/21  0852   A1C 6.0* 6.6* 5.8*     Troponin  No results for input(s): \"CTROPT\", \"TROPONINIS\", \"TROPONINI\", \"GHTROP\" in the last 168 hours.       Stroke Code / Stroke Consult Data Data    Not a stroke code      "

## 2025-05-06 NOTE — ED TRIAGE NOTES
BIBA from home with  worsening ongoing Headache 7/10. Discharged from [facility] 5/5. Recently diagnosed with type 2 diabetes     Triage Assessment (Adult)       Row Name 05/06/25 1447          Triage Assessment    Airway WDL WDL        Respiratory WDL    Respiratory WDL WDL        Skin Circulation/Temperature WDL    Skin Circulation/Temperature WDL WDL        Cardiac WDL    Cardiac WDL WDL        Peripheral/Neurovascular WDL    Peripheral Neurovascular WDL WDL        Cognitive/Neuro/Behavioral WDL    Cognitive/Neuro/Behavioral WDL WDL

## 2025-05-06 NOTE — CONSULTS
"[facility]    Stroke Consult Note    Reason for Consult:  ***    Chief Complaint: Headache       HPI  Amy Choudhury is a 65 year old female with history of DM2, anxiety, tobacco dependence, GERD, s/p gastric bypass, who presents to the ED via EMS with worsening headache and blurred vision.    Patient was admitted to [facility] on 4/28/2025 with headache and blurry vision, was found to be hypertensive with SBP > 200 and was evaluated for concerns for posterior reversible encephalopathy    {TIA, Stroke Summaries:346710}    Impression  {Stroke Diagnosis:603767}  ***    Recommendations   {consult recommendations:593494}    Patient Follow-up    {neurology follow-up recommendation:264303}    Thank you for this consult. {message to primary service:669926}    {Signature with [employer] link:945034}  _____________________________________________________    Clinically Significant Risk Factors Present on Admission   { TIP  This section helps capture the illness of the patient on admission.     - Review diagnoses highlighted in blue; right click, edit & delete if not appropriate   - If blank, no additional diagnoses identified   :46225}                        # Severe Obesity: Estimated body mass index is 35.51 kg/m  as calculated from the following:    Height as of this encounter: 1.676 m (5' 6\").    Weight as of this encounter: 99.8 kg (220 lb). 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 Authorizing Provider   sertraline (ZOLOFT) 50 MG tablet Take 50 mg by mouth daily.   Yes Reported, Patient   acetaminophen (TYLENOL) 500 MG tablet Take 1,000 mg by mouth 4 times daily 4/19/24   Reported, Patient   alum & mag hydroxide-simethicone (MAALOX) 200-200-20 MG/5ML SUSP suspension Take 30 mLs by mouth every 8 hours as needed for indigestion    Reported, Patient   calcium carbonate (TUMS) 500 MG chewable tablet Take 1 chew tab by mouth 2 times daily as needed for heartburn    Reported, Patient   methocarbamol (ROBAXIN) 500 MG tablet Take 500 mg by mouth 3 times daily 4/19/24   Reported, Patient   polyethylene glycol (MIRALAX) 17 GM/Dose powder Take 17 g by mouth daily 4/19/24   Reported, Patient   sennosides (SENOKOT) 8.6 MG tablet Take 1 tablet by mouth every 12 hours as needed for constipation 4/19/24   Reported, Patient       Scheduled Meds  No current facility-administered medications for this encounter.       Infusion Meds  No current facility-administered medications for this encounter.       Allergies   Allergies   Allergen Reactions    Nsaids Other (See Comments), Nausea and Vomiting and GI Disturbance     History of GI bleeding and ulcers    Codeine Sulfate GI Disturbance          PHYSICAL EXAMINATION   Temp:  [97.4  F (36.3  C)] 97.4  F (36.3  C)  Pulse:  [94] 94  Resp:  [17] 17  BP: (123)/(84) 123/84  SpO2:  [97 %] 97 %    {Choice of physical and neuro exams:681350}    Stroke Scales  {NIHSS, Hunt&Morris, ICH:263245}    Imaging  I personally reviewed all imaging; relevant findings per HPI.    Labs Data   CBC  Recent Labs   Lab 05/06/25  1554   WBC 14.3*   RBC 5.25*   HGB 13.6   HCT 43.0   *     Basic Metabolic Panel   No results for  "input(s): \"NA\", \"POTASSIUM\", \"CHLORIDE\", \"CO2\", \"BUN\", \"CR\", \"GLC\", \"CRISTIAN\" in the last 168 hours.  Liver Panel  No results for input(s): \"PROTTOTAL\", \"ALBUMIN\", \"BILITOTAL\", \"ALKPHOS\", \"AST\", \"ALT\", \"BILIDIRECT\" in the last 168 hours.  INR  No lab results found.        Stroke Consult Data Data   {Non-emergent Stroke Code Data:708107}  {Billing - Only Attendings and APPs to complete:333037}   "

## 2025-05-07 ENCOUNTER — APPOINTMENT (OUTPATIENT)
Dept: NEUROLOGY | Facility: CLINIC | Age: 65
DRG: 545 | End: 2025-05-07
Payer: COMMERCIAL

## 2025-05-07 ENCOUNTER — APPOINTMENT (OUTPATIENT)
Dept: MRI IMAGING | Facility: CLINIC | Age: 65
DRG: 545 | End: 2025-05-07
Attending: STUDENT IN AN ORGANIZED HEALTH CARE EDUCATION/TRAINING PROGRAM
Payer: COMMERCIAL

## 2025-05-07 LAB
ANION GAP SERPL CALCULATED.3IONS-SCNC: 14 MMOL/L (ref 7–15)
BUN SERPL-MCNC: 21.9 MG/DL (ref 8–23)
CALCIUM SERPL-MCNC: 9 MG/DL (ref 8.8–10.4)
CHLORIDE SERPL-SCNC: 101 MMOL/L (ref 98–107)
CREAT SERPL-MCNC: 0.68 MG/DL (ref 0.51–0.95)
EGFRCR SERPLBLD CKD-EPI 2021: >90 ML/MIN/1.73M2
ERYTHROCYTE [DISTWIDTH] IN BLOOD BY AUTOMATED COUNT: 15.4 % (ref 10–15)
GLUCOSE BLDC GLUCOMTR-MCNC: 172 MG/DL (ref 70–99)
GLUCOSE BLDC GLUCOMTR-MCNC: 181 MG/DL (ref 70–99)
GLUCOSE BLDC GLUCOMTR-MCNC: 185 MG/DL (ref 70–99)
GLUCOSE BLDC GLUCOMTR-MCNC: 185 MG/DL (ref 70–99)
GLUCOSE BLDC GLUCOMTR-MCNC: 210 MG/DL (ref 70–99)
GLUCOSE BLDC GLUCOMTR-MCNC: 259 MG/DL (ref 70–99)
GLUCOSE SERPL-MCNC: 180 MG/DL (ref 70–99)
HCO3 SERPL-SCNC: 25 MMOL/L (ref 22–29)
HCT VFR BLD AUTO: 41.9 % (ref 35–47)
HGB BLD-MCNC: 13.6 G/DL (ref 11.7–15.7)
MAGNESIUM SERPL-MCNC: 2.4 MG/DL (ref 1.7–2.3)
MCH RBC QN AUTO: 26.6 PG (ref 26.5–33)
MCHC RBC AUTO-ENTMCNC: 32.5 G/DL (ref 31.5–36.5)
MCV RBC AUTO: 82 FL (ref 78–100)
PLATELET # BLD AUTO: 468 10E3/UL (ref 150–450)
POTASSIUM SERPL-SCNC: 3.4 MMOL/L (ref 3.4–5.3)
RBC # BLD AUTO: 5.12 10E6/UL (ref 3.8–5.2)
SODIUM SERPL-SCNC: 140 MMOL/L (ref 135–145)
WBC # BLD AUTO: 12.3 10E3/UL (ref 4–11)

## 2025-05-07 PROCEDURE — 82565 ASSAY OF CREATININE: CPT

## 2025-05-07 PROCEDURE — 250N000009 HC RX 250: Performed by: STUDENT IN AN ORGANIZED HEALTH CARE EDUCATION/TRAINING PROGRAM

## 2025-05-07 PROCEDURE — 120N000002 HC R&B MED SURG/OB UMMC

## 2025-05-07 PROCEDURE — 70553 MRI BRAIN STEM W/O & W/DYE: CPT

## 2025-05-07 PROCEDURE — 70553 MRI BRAIN STEM W/O & W/DYE: CPT | Mod: 26 | Performed by: RADIOLOGY

## 2025-05-07 PROCEDURE — 999N000226 HC STATISTIC SLP IP EVAL DEFER

## 2025-05-07 PROCEDURE — 250N000011 HC RX IP 250 OP 636

## 2025-05-07 PROCEDURE — 83735 ASSAY OF MAGNESIUM: CPT

## 2025-05-07 PROCEDURE — 250N000013 HC RX MED GY IP 250 OP 250 PS 637

## 2025-05-07 PROCEDURE — A9585 GADOBUTROL INJECTION: HCPCS | Performed by: STUDENT IN AN ORGANIZED HEALTH CARE EDUCATION/TRAINING PROGRAM

## 2025-05-07 PROCEDURE — 80048 BASIC METABOLIC PNL TOTAL CA: CPT

## 2025-05-07 PROCEDURE — 258N000003 HC RX IP 258 OP 636: Performed by: STUDENT IN AN ORGANIZED HEALTH CARE EDUCATION/TRAINING PROGRAM

## 2025-05-07 PROCEDURE — 255N000002 HC RX 255 OP 636: Performed by: STUDENT IN AN ORGANIZED HEALTH CARE EDUCATION/TRAINING PROGRAM

## 2025-05-07 PROCEDURE — 82962 GLUCOSE BLOOD TEST: CPT

## 2025-05-07 PROCEDURE — 999N000147 HC STATISTIC PT IP EVAL DEFER

## 2025-05-07 PROCEDURE — 95718 EEG PHYS/QHP 2-12 HR W/VEEG: CPT | Performed by: INTERNAL MEDICINE

## 2025-05-07 PROCEDURE — 85027 COMPLETE CBC AUTOMATED: CPT

## 2025-05-07 PROCEDURE — 36415 COLL VENOUS BLD VENIPUNCTURE: CPT

## 2025-05-07 PROCEDURE — 95711 VEEG 2-12 HR UNMONITORED: CPT

## 2025-05-07 PROCEDURE — 999N000111 HC STATISTIC OT IP EVAL DEFER

## 2025-05-07 PROCEDURE — 250N000013 HC RX MED GY IP 250 OP 250 PS 637: Performed by: STUDENT IN AN ORGANIZED HEALTH CARE EDUCATION/TRAINING PROGRAM

## 2025-05-07 PROCEDURE — 99222 1ST HOSP IP/OBS MODERATE 55: CPT | Mod: GC | Performed by: STUDENT IN AN ORGANIZED HEALTH CARE EDUCATION/TRAINING PROGRAM

## 2025-05-07 RX ORDER — GADOBUTROL 604.72 MG/ML
10 INJECTION INTRAVENOUS ONCE
Status: COMPLETED | OUTPATIENT
Start: 2025-05-07 | End: 2025-05-07

## 2025-05-07 RX ORDER — KETOROLAC TROMETHAMINE 30 MG/ML
30 INJECTION, SOLUTION INTRAMUSCULAR; INTRAVENOUS ONCE
Status: COMPLETED | OUTPATIENT
Start: 2025-05-07 | End: 2025-05-07

## 2025-05-07 RX ORDER — LOSARTAN POTASSIUM 100 MG/1
100 TABLET ORAL DAILY
Status: ON HOLD | COMMUNITY

## 2025-05-07 RX ORDER — HYDROCHLOROTHIAZIDE 25 MG/1
12.5 TABLET ORAL DAILY
Status: ON HOLD | COMMUNITY

## 2025-05-07 RX ORDER — IBUPROFEN 600 MG/1
600 TABLET, FILM COATED ORAL EVERY 6 HOURS PRN
Status: ON HOLD | COMMUNITY

## 2025-05-07 RX ORDER — DIPHENHYDRAMINE HCL 25 MG
25 CAPSULE ORAL ONCE
Status: COMPLETED | OUTPATIENT
Start: 2025-05-07 | End: 2025-05-07

## 2025-05-07 RX ORDER — MAGNESIUM SULFATE HEPTAHYDRATE 40 MG/ML
2 INJECTION, SOLUTION INTRAVENOUS ONCE
Status: CANCELLED | OUTPATIENT
Start: 2025-05-07 | End: 2025-05-07

## 2025-05-07 RX ORDER — DIPHENHYDRAMINE HCL 25 MG
25 CAPSULE ORAL ONCE
Status: DISCONTINUED | OUTPATIENT
Start: 2025-05-07 | End: 2025-05-07

## 2025-05-07 RX ORDER — LORAZEPAM 2 MG/ML
1 INJECTION INTRAMUSCULAR ONCE
Status: COMPLETED | OUTPATIENT
Start: 2025-05-07 | End: 2025-05-07

## 2025-05-07 RX ORDER — AMLODIPINE BESYLATE 10 MG/1
10 TABLET ORAL AT BEDTIME
Status: ON HOLD | COMMUNITY

## 2025-05-07 RX ADMIN — ACETAMINOPHEN 650 MG: 325 TABLET ORAL at 02:44

## 2025-05-07 RX ADMIN — LORAZEPAM 1 MG: 2 INJECTION INTRAMUSCULAR; INTRAVENOUS at 17:53

## 2025-05-07 RX ADMIN — VALPROATE SODIUM 500 MG: 100 INJECTION, SOLUTION INTRAVENOUS at 15:17

## 2025-05-07 RX ADMIN — DIPHENHYDRAMINE HYDROCHLORIDE 25 MG: 25 CAPSULE ORAL at 08:56

## 2025-05-07 RX ADMIN — PROCHLORPERAZINE EDISYLATE 10 MG: 5 INJECTION INTRAMUSCULAR; INTRAVENOUS at 17:44

## 2025-05-07 RX ADMIN — DIPHENHYDRAMINE HYDROCHLORIDE 25 MG: 25 CAPSULE ORAL at 17:44

## 2025-05-07 RX ADMIN — ACETAMINOPHEN 650 MG: 325 TABLET ORAL at 07:04

## 2025-05-07 RX ADMIN — KETOROLAC TROMETHAMINE 30 MG: 30 INJECTION, SOLUTION INTRAMUSCULAR at 17:47

## 2025-05-07 RX ADMIN — GADOBUTROL 10 ML: 604.72 INJECTION INTRAVENOUS at 18:39

## 2025-05-07 RX ADMIN — ACETAMINOPHEN 650 MG: 325 TABLET ORAL at 17:58

## 2025-05-07 RX ADMIN — SERTRALINE HYDROCHLORIDE 50 MG: 50 TABLET ORAL at 08:56

## 2025-05-07 ASSESSMENT — ACTIVITIES OF DAILY LIVING (ADL)
ADLS_ACUITY_SCORE: 54
ADLS_ACUITY_SCORE: 61
ADLS_ACUITY_SCORE: 54
ADLS_ACUITY_SCORE: 54
ADLS_ACUITY_SCORE: 62
ADLS_ACUITY_SCORE: 54
ADLS_ACUITY_SCORE: 61
ADLS_ACUITY_SCORE: 55
ADLS_ACUITY_SCORE: 54
ADLS_ACUITY_SCORE: 58
ADLS_ACUITY_SCORE: 54
ADLS_ACUITY_SCORE: 62
ADLS_ACUITY_SCORE: 62
ADLS_ACUITY_SCORE: 54
ADLS_ACUITY_SCORE: 62
ADLS_ACUITY_SCORE: 54

## 2025-05-07 NOTE — PLAN OF CARE
"Occupational Therapy: Orders received. Chart reviewed and discussed with care team.? Occupational Therapy not indicated due to extensive discussion w/ pt on prior level of function. Pt appears to be at baseline with ADL/IADL participation and performance, mainly limited by \"visual hallucinations.\" Visual field screening done w/ pt and observed to be WNL. However, when asked to read the whiteboard, pt stating \"letting are switching places, the board is moving around.\" Pt also stating she came to ED in [facility] because \"all of my clothes were wash cloths.\" Discussion on OP OT, however, no IP needs at this time.? Defer discharge recommendations to medical team.? Will complete orders.        "

## 2025-05-07 NOTE — PLAN OF CARE
Deferral - SLP orders received as part of stroke order set. Chart reviewed and discussed with RN. Per RN, no facial droop or slurred speech, pt tolerating PO without c/f aspiration. SLP will defer evaluation at this time and complete orders. Please reconsult SLP with changes in swallow function or communication skills.

## 2025-05-07 NOTE — PROGRESS NOTES
Physical Therapy: Orders received. Chart reviewed and discussed with care team.? Physical Therapy not indicated due to conversation held with pt at bedside. Per pt, there are no concerns regarding her strength, balance, ambulation or functional mobility. She does have concerns with her vision and her ability to manage her ADLs. Message hand off to OT that PT will defer at this time and OT to eval and progress.?If there is a future need for skilled PT eval, provider to re-consult. Defer discharge recommendations to OT/medical providers.? Will complete orders.

## 2025-05-07 NOTE — PROGRESS NOTES
"RN    Vital signs: /85   Pulse 81   Temp 98.6  F (37  C) (Oral)   Resp 18   Ht 1.676 m (5' 6\")   Wt 99.8 kg (220 lb)   SpO2 98%   BMI 35.51 kg/m        Neuro: A&Ox4, reports having headaches along with vision changes. Providers are aware of the vision changes.   Pain/Nausea: reports 8/10 headache, provider notified for medication orders.   Cardiac: WDL, denies chest pain  Respiratory: WDL, denies SOB  Mobility: independent  Diet: regular  Labs: reviewed  LDAs: R PIV  GI/: last BM 5/7, voids spontaneously  New changes: Pt reported feeling new pressure in her ears. Dr. Hoffman notified of change and is aware.   Plan: continue with plan of care, notify provider with changes  Report given to RN on 6A.  "

## 2025-05-07 NOTE — PLAN OF CARE
"Goal Outcome Evaluation:  /63 (BP Location: Left arm)   Pulse 94   Temp 97.8  F (36.6  C) (Oral)   Resp 18   Ht 1.676 m (5' 6\")   Wt 99.8 kg (220 lb)   SpO2 96%   BMI 35.51 kg/m      PT noted to be comfortable on this shift, VSS, on tele, Neuro q 4 hrs, did c/o headache and pain, prn tylenol administer. BS done @ 0200. Voiding spon, slept comfortably, no acute events noted.         "

## 2025-05-07 NOTE — PHARMACY-ADMISSION MEDICATION HISTORY
Pharmacist Admission Medication History    Admission medication history is complete. The information provided in this note is only as accurate as the sources available at the time of the update.    Information Source(s): [facility] records, [facility], and Admission Medication History completed 4/28/25 at [facility]    Pertinent Information:   Patient was admitted to [facility] 4/28/25-4/30/25 & 5/2/25-5/5/25 for PRES (posterior reversible encephalopathy syndrome), hypertensive urgency, acute intractable headache, depression & diabetes mellitus. Patient was discharged with several new medications that had been started in the hospital:  AmLODIPine (NORVASC) 10 MG tablet  Hydrochlorothiazide (HYDRODIURIL) 25 MG tablet   Losartan (COZAAR) 100 MG tablet  MetFORMIN (GLUCOPHAGE) 500 MG tablet (was not started inpatient but patient was discharged with new prescription)    Of note: Patient does have a listed Allergy/Intolerance to NSAIDs documented in EPIC due to history of GI bleeding and ulcers.   Patient has ibuprofen listed as a prior to admission medication on the medication history completed by the pharmacist at [facility] 4/28/25.   Added to prior to admission medication list, but will need to closely monitor use given history of bleeds, ulcers & gastric bypass (~1989).   Patient had received several doses of both ibuprofen 400 mg (last documented dose 5/5/25 @ 1:56 pm) and Ketorolac IV (30 mg last administered 5/5/25 @ 2:22 AM) during admission to [facility]    Changes made to PTA medication list:  Added:   AmLODIPine (NORVASC) 10 MG tablet  Hydrochlorothiazide (HYDRODIURIL) 25 MG tablet   Ibuprofen (ADVIL/MOTRIN) 600 MG tablet  Losartan (COZAAR) 100 MG tablet  MetFORMIN (GLUCOPHAGE) 500 MG tablet     Deleted:   Alum & mag hydroxide-simethicone (MAALOX) 200-200-20 MG/5ML SUSP suspension  Calcium carbonate (TUMS) 500 MG chewable  tablet  Methocarbamol (ROBAXIN) 500 MG tablet  Polyethylene glycol (MIRALAX) 17 GM/Dose powder  Sennosides (SENOKOT) 8.6 MG tablet  Changed:   Acetaminophen (TYLENOL) 500 MG tablet: Take 1,000 mg by mouth 4 times daily->Take 1,000 mg by mouth every 6 hours as needed for other (Headache) Max acetaminophen dose: 4000mg in 24 hrs.    Allergies reviewed with patient and updates made in EHR: yes    Medication History Completed By: Elysia Marrufo [facility] 5/7/2025 5:14 PM    PTA Med List   Medication Sig Note Last Dose/Taking    amLODIPine (NORVASC) 10 MG tablet Take 10 mg by mouth at bedtime.  5/4/2025 at 10:39 PM    hydrochlorothiazide (HYDRODIURIL) 25 MG tablet Take 12.5 mg by mouth daily.  5/5/2025 at  7:49 AM    ibuprofen (ADVIL/MOTRIN) 600 MG tablet Take 600 mg by mouth every 6 hours as needed for other (Pain). Maximum of 3200 mg in 24 hours. 5/7/2025: Monitor use given hx of GI bleeds, ulcers & gastric bypass.  Taking As Needed    losartan (COZAAR) 100 MG tablet Take 100 mg by mouth daily.  5/5/2025 at  7:48 AM    metFORMIN (GLUCOPHAGE) 500 MG tablet Take 500 mg by mouth 2 times daily (with meals). 5/7/2025: Patient discharged from [facility] on 5/5/25 with a prescription for 30 tablets (15 day supply). Unknown    sertraline (ZOLOFT) 50 MG tablet Take 50 mg by mouth daily.  5/6/2025

## 2025-05-07 NOTE — PLAN OF CARE
"Status: Admitted with HA and vision changes   Vitals: HTN within parameters, CCM in place.   Neuros: AOx4. HA with photophobia. Vision changes reported as double or \"split\" vision, intermittently blurry. Intermittent reports of seeing things she knows are not there.  5/5 throughout. NIHSS 0.   IV: saline locked.   Labs/Electrolytes: pending magnesium results. Blood sugar ACHS  Resp: WNL on RA.   Diet: regular  : Voids without difficulty   GI: BM 5/6.   Skin: intact ex blanchable redness to sacrum   Pain: 9/10 pressure HA with photophobia. IV toradol, IV compazine, benadryl, tylenol given. IV mag if lab results WNL   Activity: SBA with gait belt   Plan: MRI completed. Plan to initiate EEG. Continue POC.   Education completed: Needs stroke ed, papers at bedside.     Goal Outcome Evaluation:    Plan of Care Reviewed With: patient  Overall Patient Progress: no change  Outcome Evaluation: Pain control, MRI complete, EEG to be initiated this evening.    Arrived from: ED  Belongings/meds: with patient  2 RN Skin Assessment Completed by: Madison Dickey  Skin Findings: blanchable redness to sacrum/coccyx         "

## 2025-05-07 NOTE — PROGRESS NOTES
[facility]    Vascular Neurology Progress Note    Interval History     RN notes reviewed. Ongoing headache overnight requiring some PRN benadryl. BP remains under good control. Otherwise NAEO.      Hospital Course     Chief complaint: Headache    Amy Choudhury is a 65 year old female with history of DM2, anxiety, tobacco dependence, GERD, s/p gastric bypass, who presents to the ED via EMS with worsening headache and blurred vision.     Patient was admitted to [facility] on 4/25/2025 with left-sided frontal headache for 3 weeks.  Her CT head was unremarkable . She was treated with migraine cocktail, improved and discharged home.     She presented to the [facility] ED again on 4/2825 with headache again and was found to have blood pressure of 220s/120s. MRI brain w/without contrast showed Interval development of patchy T2 FLAIR hyperintensity within the left posteroinferior cerebellum, with possible patchy enhancement. Additional new areas of subcortical T2 FLAIR hyperintensity involving the posterior parieto-occipital regions. Progressed scattered associated susceptibility artifact, most notably right occipital lobe.  She was treated as a case of posterior skull encephalopathy with gradual decrease in blood pressure and started on blood pressure medications to be taken at home.  Her UDS was also positive for methamphetamine.     She had another admission from 5/2/25-5/5/25 for headache and was again hypertensive.  Repeat MRI was similar to prior MRIs with small microhemorrhages and superficial siderosis along with the signal normalities through posterior cerebral and cerebellar hemispheres with leptomeningeal enhancement.  She was discharged yesterday.     On her way back, she lost her way while walking because she could not remember her way back home.  A bystander called EMS and she was dropped off home by them.  She slept fine.  She woke up this morning with persistent  "headache and blurred/quadrupled/choppy vision.  Throughout time since yesterday, she thinks she has been losing time.  She has 7/10 headache from front of her head but that keeps migrating.  She was very hungry and Eating food in the ED. she also made few remarks such as, \"the pen is turning into cigarette, or I have seen that lady jumping before\".    Assessment and Plan     #Posterior reversible encephalopathy syndrome  #C/f CAA vs hypertensive micro hemorrhages    Amy Choudhury is a 65 year old female with history of DM2, anxiety, tobacco dependence, GERD, s/p gastric bypass, who presents to the ED via EMS with worsening headache , blurred vision, sense of loss of time.  Patient had recent admission and workup at [facility] after presenting with headache and blurred vision and the MRI was concerning for PRES plus cerebral amyloid angiopathy versus hypertensive micro hemorrhages.  Her blood pressure here is 123 x 86.  She is also found to have UDS positive for methamphetamine and cannabis.  Her symptoms could be either continuation of her symptoms from PRES in addition to drug use or anything new such as intracranial bleed/subarachnoid hemorrhage, or the cerebral amyloid angiopathy is inflammatory in nature and has been untreated. Unfortunately, we do not have the images from outside hospital to review.  In the setting, will admit the patient to obs and would request outside images to be pushed to our PACS.  Meanwhile we will obtain CT head and CT head and neck and continue to watch her blood pressure and manage accordingly.  There events of loss of time may present amyloid spells versus seizures .     Called again today to have MRI studies pushed into PACS. Pt describes palinopsia this AM which does not appear to be documented prior to today. As we continue to be unable to see MRIs from prior hospitalizations and she does seem to potentially have new symptoms, we will repeat MRI brain with and without contrast " today.  Given her episodes of last time and confusion, we would also like to rule out seizures as her seizure threshold would certainly be lowered by PRES, and will hook her up to EEG after her MRI is completed.     - Get MRI brain and CT head pushed to PACS from [facility]  - Treat blood pressure if SBP >220  - LDL this admission of 72, goal < 70.  Will defer starting statin at this time.  - Migraine cocktail as needed for pain  - Repeat MRI brain with and without contrast  - vEEG overnight after MRI      Additional hospital problems:  # Leukocytosis  She is also found to have high leukocyte count 14.3.  But chest x-ray and UA are unremarkable  - Daily CBC  - Temperature monitoring       # Type 2 diabetes mellitus  - Medium dose sliding scale  - A1c this admission 8.7, goal < 7.0. Will need close PCP follow up     # History of hypertension  - Hydralazine/labetalol as needed for systolic blood pressure>220         Prophylaxis            For VTE Prevention:  - pneumatic compression device     For Acid Suppression:  - GI prophylaxis is not indicated         DVT Prophylaxis: SCDs    Patient Follow-up    - Neurology clinic follow up in 6-8 weeks  - PCP follow up within 7 days of discharge for HTN, diabetes management      Medically Ready for Discharge: Anticipated in 2-4 Days pending ongoing workup    The patient was discussed with the Stroke Staff Dr. Hoffman.     Dariusz Bang MD  Neurology Resident, PGY-2  05/07/2025 8:00 AM       Physical Examination     Temp: 97.8  F (36.6  C) Temp src: Oral BP: 119/63 Pulse: 94   Resp: 18 SpO2: 96 % O2 Device: None (Room air)      General:  patient lying in bed without any acute distress    HEENT:  normocephalic/atraumatic  Cardio:  RRR  Pulmonary:  no respiratory distress  Abdomen:  soft  Extremities:  no edema  Skin:  intact      Neurologic  Mental Status:  alert, oriented x 3, follows commands, speech clear and fluent, naming and repetition normal, reports palinopsia,  intermittent feeling of confusion and loss time  Cranial Nerves:  visual fields intact, PERRL, EOMI with normal smooth pursuit, facial sensation intact and symmetric, facial movements symmetric, hearing not formally tested but intact to conversation, palate elevation symmetric and uvula midline, no dysarthria, shoulder shrug strong bilaterally, tongue protrusion midline  Motor:  normal muscle tone and bulk, no abnormal movements, able to move all limbs spontaneously, strength 5/5 throughout upper and lower extremities, no pronator drift  Reflexes:  toes down-going  Sensory:  light touch sensation intact and symmetric throughout upper and lower extremities, no extinction on double simultaneous stimulation   Coordination:  normal finger-to-nose and heel-to-shin bilaterally without dysmetria, rapid alternating movements symmetric  Station/Gait:  deferred    Stroke Scales       NIHSS  1a. Level of Consciousness 0-->Alert, keenly responsive   1b. LOC Questions 0-->Answers both questions correctly   1c. LOC Commands 0-->Performs both tasks correctly   2.   Best Gaze 0-->Normal   3.   Visual 0-->No visual loss   4.   Facial Palsy 0-->Normal symmetrical movements   5a. Motor Arm, Left 0-->No drift, limb holds 90 (or 45) degrees for full 10 secs   5b. Motor Arm, Right 0-->No drift, limb holds 90 (or 45) degrees for full 10 secs   6a. Motor Leg, Left 0-->No drift, leg holds 30 degree position for full 5 secs   6b. Motor Leg, right 0-->No drift, leg holds 30 degree position for full 5 secs   7.   Limb Ataxia 0-->Absent   8.   Sensory 0-->Normal, no sensory loss   9.   Best Language 0-->No aphasia, normal   10. Dysarthria 0-->Normal   11. Extinction and Inattention  0-->No abnormality   Total 0 (05/07/25)        Imaging/Labs   (Bolded imaging and labs new and/or personally reviewed or re-reviewed by me today)    MRI/Head CT CT head:  IMPRESSION:  1.  No acute findings. No acute intracranial hemorrhage. No acute calvarial  fracture.    MRI: Pending   Intracranial Vasculature HEAD CTA:   No large vessel occlusion findings intracranially. No high-grade stenosis intracranially.   Cervical Vasculature NECK CTA:  1.  No acute vascular injury in the neck. No high-grade stenosis in the neck.     Echocardiogram Not obtained   EKG/Telemetry Not obtained     LDL  5/6/2025: 72 mg/dL   A1C  5/6/2025: 8.7 %   Troponin 5/6/2025: 11 ng/L     Other labs reviewed by me today:  Recent Results (from the past 24 hours)   Comprehensive metabolic panel    Collection Time: 05/06/25  3:54 PM   Result Value Ref Range    Sodium 137 135 - 145 mmol/L    Potassium 3.9 3.4 - 5.3 mmol/L    Carbon Dioxide (CO2) 23 22 - 29 mmol/L    Anion Gap 17 (H) 7 - 15 mmol/L    Urea Nitrogen 18.1 8.0 - 23.0 mg/dL    Creatinine 0.75 0.51 - 0.95 mg/dL    GFR Estimate 89 >60 mL/min/1.73m2    Calcium 9.4 8.8 - 10.4 mg/dL    Chloride 97 (L) 98 - 107 mmol/L    Glucose 275 (H) 70 - 99 mg/dL    Alkaline Phosphatase 120 40 - 150 U/L    AST 24 0 - 45 U/L    ALT 19 0 - 50 U/L    Protein Total 7.6 6.4 - 8.3 g/dL    Albumin 4.4 3.5 - 5.2 g/dL    Bilirubin Total 0.4 <=1.2 mg/dL   TSH with free T4 reflex    Collection Time: 05/06/25  3:54 PM   Result Value Ref Range    TSH 1.24 0.30 - 4.20 uIU/mL   Alcohol    Collection Time: 05/06/25  3:54 PM   Result Value Ref Range    Alcohol ethyl <0.01 <=0.01 g/dL   CBC with platelets and differential    Collection Time: 05/06/25  3:54 PM   Result Value Ref Range    WBC Count 14.3 (H) 4.0 - 11.0 10e3/uL    RBC Count 5.25 (H) 3.80 - 5.20 10e6/uL    Hemoglobin 13.6 11.7 - 15.7 g/dL    Hematocrit 43.0 35.0 - 47.0 %    MCV 82 78 - 100 fL    MCH 25.9 (L) 26.5 - 33.0 pg    MCHC 31.6 31.5 - 36.5 g/dL    RDW 15.2 (H) 10.0 - 15.0 %    Platelet Count 473 (H) 150 - 450 10e3/uL    % Neutrophils 80 %    % Lymphocytes 12 %    % Monocytes 7 %    % Eosinophils 1 %    % Basophils 1 %    % Immature Granulocytes 1 %    NRBCs per 100 WBC 0 <1 /100    Absolute Neutrophils  11.5 (H) 1.6 - 8.3 10e3/uL    Absolute Lymphocytes 1.7 0.8 - 5.3 10e3/uL    Absolute Monocytes 1.0 0.0 - 1.3 10e3/uL    Absolute Eosinophils 0.1 0.0 - 0.7 10e3/uL    Absolute Basophils 0.1 0.0 - 0.2 10e3/uL    Absolute Immature Granulocytes 0.1 <=0.4 10e3/uL    Absolute NRBCs 0.0 10e3/uL   Troponin T, High Sensitivity    Collection Time: 05/06/25  3:54 PM   Result Value Ref Range    Troponin T, High Sensitivity 11 <=14 ng/L   Hemoglobin A1c    Collection Time: 05/06/25  3:54 PM   Result Value Ref Range    Estimated Average Glucose 203 (H) <117 mg/dL    Hemoglobin A1C 8.7 (H) <5.7 %   Lipid panel reflex to direct LDL: Non-fasting    Collection Time: 05/06/25  3:54 PM   Result Value Ref Range    Cholesterol 167 <200 mg/dL    Triglycerides 198 (H) <150 mg/dL    Direct Measure HDL 55 >=50 mg/dL    LDL Cholesterol Calculated 72 <100 mg/dL    Non HDL Cholesterol 112 <130 mg/dL   Urine Drug Screen Panel    Collection Time: 05/06/25  4:08 PM   Result Value Ref Range    Amphetamines Urine Screen Positive (A) Screen Negative    Barbituates Urine Screen Negative Screen Negative    Benzodiazepine Urine Screen Positive (A) Screen Negative    Cannabinoids Urine Screen Positive (A) Screen Negative    Cocaine Urine Screen Negative Screen Negative    Fentanyl Qual Urine Screen Negative Screen Negative    Opiates Urine Screen Negative Screen Negative    PCP Urine Screen Negative Screen Negative   UA with Microscopic reflex to Culture    Collection Time: 05/06/25  4:08 PM    Specimen: Urine, Midstream   Result Value Ref Range    Color Urine Yellow Colorless, Straw, Light Yellow, Yellow    Appearance Urine Slightly Cloudy (A) Clear    Glucose Urine Negative Negative mg/dL    Bilirubin Urine Negative Negative    Ketones Urine Negative Negative mg/dL    Specific Gravity Urine 1.030 1.003 - 1.035    Blood Urine Negative Negative    pH Urine 6.0 5.0 - 7.0    Protein Albumin Urine Negative Negative mg/dL    Urobilinogen Urine 0.2 0.2, 1.0  mg/dL    Nitrite Urine Negative Negative    Leukocyte Esterase Urine Negative Negative    Bacteria Urine Few (A) None Seen /HPF    Mucus Urine Present (A) None Seen /LPF    RBC Urine 3 (H) <=2 /HPF    WBC Urine 3 <=5 /HPF    Squamous Epithelials Urine 93 (H) <=1 /HPF    Hyaline Casts Urine 55 (H) <=2 /LPF   Glucose by meter    Collection Time: 05/06/25  9:11 PM   Result Value Ref Range    GLUCOSE BY METER POCT 182 (H) 70 - 99 mg/dL   Glucose by meter    Collection Time: 05/07/25  2:32 AM   Result Value Ref Range    GLUCOSE BY METER POCT 185 (H) 70 - 99 mg/dL   CBC with platelets    Collection Time: 05/07/25  6:12 AM   Result Value Ref Range    WBC Count 12.3 (H) 4.0 - 11.0 10e3/uL    RBC Count 5.12 3.80 - 5.20 10e6/uL    Hemoglobin 13.6 11.7 - 15.7 g/dL    Hematocrit 41.9 35.0 - 47.0 %    MCV 82 78 - 100 fL    MCH 26.6 26.5 - 33.0 pg    MCHC 32.5 31.5 - 36.5 g/dL    RDW 15.4 (H) 10.0 - 15.0 %    Platelet Count 468 (H) 150 - 450 10e3/uL   Basic metabolic panel    Collection Time: 05/07/25  6:12 AM   Result Value Ref Range    Sodium 140 135 - 145 mmol/L    Potassium 3.4 3.4 - 5.3 mmol/L    Chloride 101 98 - 107 mmol/L    Carbon Dioxide (CO2) 25 22 - 29 mmol/L    Anion Gap 14 7 - 15 mmol/L    Urea Nitrogen 21.9 8.0 - 23.0 mg/dL    Creatinine 0.68 0.51 - 0.95 mg/dL    GFR Estimate >90 >60 mL/min/1.73m2    Calcium 9.0 8.8 - 10.4 mg/dL    Glucose 180 (H) 70 - 99 mg/dL

## 2025-05-07 NOTE — PROVIDER NOTIFICATION
"Page sent to neurology resident at 3712    \"ED 34 CP    Hi can we get some pain medicine for this pt. Her headache is an 8/10 and she said Tylenol does not help her.    Justyna RICHARDS RN  [phone]\"  "

## 2025-05-08 ENCOUNTER — APPOINTMENT (OUTPATIENT)
Dept: NEUROLOGY | Facility: CLINIC | Age: 65
DRG: 545 | End: 2025-05-08
Payer: COMMERCIAL

## 2025-05-08 ENCOUNTER — APPOINTMENT (OUTPATIENT)
Dept: CT IMAGING | Facility: CLINIC | Age: 65
DRG: 545 | End: 2025-05-08
Payer: COMMERCIAL

## 2025-05-08 VITALS
WEIGHT: 220 LBS | HEART RATE: 87 BPM | RESPIRATION RATE: 16 BRPM | TEMPERATURE: 98 F | SYSTOLIC BLOOD PRESSURE: 123 MMHG | HEIGHT: 66 IN | DIASTOLIC BLOOD PRESSURE: 73 MMHG | OXYGEN SATURATION: 95 % | BODY MASS INDEX: 35.36 KG/M2

## 2025-05-08 LAB
ANION GAP SERPL CALCULATED.3IONS-SCNC: 17 MMOL/L (ref 7–15)
APTT PPP: 23 SECONDS (ref 22–38)
BUN SERPL-MCNC: 19 MG/DL (ref 8–23)
CALCIUM SERPL-MCNC: 8.7 MG/DL (ref 8.8–10.4)
CHLORIDE SERPL-SCNC: 96 MMOL/L (ref 98–107)
CREAT SERPL-MCNC: 0.68 MG/DL (ref 0.51–0.95)
CRP SERPL-MCNC: 4.47 MG/L
D DIMER PPP FEU-MCNC: 0.4 UG/ML FEU (ref 0–0.5)
EGFRCR SERPLBLD CKD-EPI 2021: >90 ML/MIN/1.73M2
ERYTHROCYTE [DISTWIDTH] IN BLOOD BY AUTOMATED COUNT: 15.1 % (ref 10–15)
ERYTHROCYTE [SEDIMENTATION RATE] IN BLOOD BY WESTERGREN METHOD: 7 MM/HR (ref 0–30)
GLUCOSE BLDC GLUCOMTR-MCNC: 146 MG/DL (ref 70–99)
GLUCOSE BLDC GLUCOMTR-MCNC: 158 MG/DL (ref 70–99)
GLUCOSE BLDC GLUCOMTR-MCNC: 161 MG/DL (ref 70–99)
GLUCOSE BLDC GLUCOMTR-MCNC: 189 MG/DL (ref 70–99)
GLUCOSE BLDC GLUCOMTR-MCNC: 318 MG/DL (ref 70–99)
GLUCOSE SERPL-MCNC: 373 MG/DL (ref 70–99)
HCO3 SERPL-SCNC: 22 MMOL/L (ref 22–29)
HCT VFR BLD AUTO: 40.9 % (ref 35–47)
HGB BLD-MCNC: 13.3 G/DL (ref 11.7–15.7)
INR PPP: 0.88 (ref 0.85–1.15)
Lab: NORMAL
MAGNESIUM SERPL-MCNC: 2.1 MG/DL (ref 1.7–2.3)
MCH RBC QN AUTO: 26.7 PG (ref 26.5–33)
MCHC RBC AUTO-ENTMCNC: 32.5 G/DL (ref 31.5–36.5)
MCV RBC AUTO: 82 FL (ref 78–100)
PERFORMING LABORATORY: NORMAL
PHOSPHATE SERPL-MCNC: 3.8 MG/DL (ref 2.5–4.5)
PLATELET # BLD AUTO: 432 10E3/UL (ref 150–450)
POTASSIUM SERPL-SCNC: 3.2 MMOL/L (ref 3.4–5.3)
POTASSIUM SERPL-SCNC: 4.3 MMOL/L (ref 3.4–5.3)
POTASSIUM SERPL-SCNC: 4.4 MMOL/L (ref 3.4–5.3)
PROTHROMBIN TIME: 12.3 SECONDS (ref 11.8–14.8)
RBC # BLD AUTO: 4.99 10E6/UL (ref 3.8–5.2)
SODIUM SERPL-SCNC: 135 MMOL/L (ref 135–145)
SPECIMEN STATUS: NORMAL
TEST NAME: NORMAL
WBC # BLD AUTO: 9.7 10E3/UL (ref 4–11)

## 2025-05-08 PROCEDURE — 85610 PROTHROMBIN TIME: CPT

## 2025-05-08 PROCEDURE — 85379 FIBRIN DEGRADATION QUANT: CPT | Performed by: STUDENT IN AN ORGANIZED HEALTH CARE EDUCATION/TRAINING PROGRAM

## 2025-05-08 PROCEDURE — 74177 CT ABD & PELVIS W/CONTRAST: CPT | Mod: 26 | Performed by: RADIOLOGY

## 2025-05-08 PROCEDURE — 85014 HEMATOCRIT: CPT

## 2025-05-08 PROCEDURE — 71260 CT THORAX DX C+: CPT

## 2025-05-08 PROCEDURE — 250N000013 HC RX MED GY IP 250 OP 250 PS 637

## 2025-05-08 PROCEDURE — 120N000002 HC R&B MED SURG/OB UMMC

## 2025-05-08 PROCEDURE — 95720 EEG PHY/QHP EA INCR W/VEEG: CPT | Mod: GC | Performed by: INTERNAL MEDICINE

## 2025-05-08 PROCEDURE — 250N000011 HC RX IP 250 OP 636: Mod: JZ

## 2025-05-08 PROCEDURE — 95714 VEEG EA 12-26 HR UNMNTR: CPT

## 2025-05-08 PROCEDURE — 85652 RBC SED RATE AUTOMATED: CPT | Performed by: STUDENT IN AN ORGANIZED HEALTH CARE EDUCATION/TRAINING PROGRAM

## 2025-05-08 PROCEDURE — 84999 UNLISTED CHEMISTRY PROCEDURE: CPT | Performed by: STUDENT IN AN ORGANIZED HEALTH CARE EDUCATION/TRAINING PROGRAM

## 2025-05-08 PROCEDURE — 86255 FLUORESCENT ANTIBODY SCREEN: CPT | Performed by: STUDENT IN AN ORGANIZED HEALTH CARE EDUCATION/TRAINING PROGRAM

## 2025-05-08 PROCEDURE — 84182 PROTEIN WESTERN BLOT TEST: CPT | Performed by: STUDENT IN AN ORGANIZED HEALTH CARE EDUCATION/TRAINING PROGRAM

## 2025-05-08 PROCEDURE — 999N000128 HC STATISTIC PERIPHERAL IV START W/O US GUIDANCE

## 2025-05-08 PROCEDURE — 36415 COLL VENOUS BLD VENIPUNCTURE: CPT

## 2025-05-08 PROCEDURE — 82947 ASSAY GLUCOSE BLOOD QUANT: CPT

## 2025-05-08 PROCEDURE — 250N000013 HC RX MED GY IP 250 OP 250 PS 637: Performed by: STUDENT IN AN ORGANIZED HEALTH CARE EDUCATION/TRAINING PROGRAM

## 2025-05-08 PROCEDURE — 85730 THROMBOPLASTIN TIME PARTIAL: CPT

## 2025-05-08 PROCEDURE — 80048 BASIC METABOLIC PNL TOTAL CA: CPT

## 2025-05-08 PROCEDURE — 36415 COLL VENOUS BLD VENIPUNCTURE: CPT | Performed by: STUDENT IN AN ORGANIZED HEALTH CARE EDUCATION/TRAINING PROGRAM

## 2025-05-08 PROCEDURE — 84132 ASSAY OF SERUM POTASSIUM: CPT

## 2025-05-08 PROCEDURE — 71260 CT THORAX DX C+: CPT | Mod: 26 | Performed by: RADIOLOGY

## 2025-05-08 PROCEDURE — 250N000011 HC RX IP 250 OP 636

## 2025-05-08 PROCEDURE — 999N000248 HC STATISTIC IV INSERT WITH US BY RN

## 2025-05-08 PROCEDURE — 99232 SBSQ HOSP IP/OBS MODERATE 35: CPT | Mod: GC | Performed by: STUDENT IN AN ORGANIZED HEALTH CARE EDUCATION/TRAINING PROGRAM

## 2025-05-08 PROCEDURE — 84132 ASSAY OF SERUM POTASSIUM: CPT | Performed by: STUDENT IN AN ORGANIZED HEALTH CARE EDUCATION/TRAINING PROGRAM

## 2025-05-08 PROCEDURE — 85018 HEMOGLOBIN: CPT

## 2025-05-08 PROCEDURE — 82164 ANGIOTENSIN I ENZYME TEST: CPT | Performed by: STUDENT IN AN ORGANIZED HEALTH CARE EDUCATION/TRAINING PROGRAM

## 2025-05-08 PROCEDURE — 84100 ASSAY OF PHOSPHORUS: CPT | Performed by: STUDENT IN AN ORGANIZED HEALTH CARE EDUCATION/TRAINING PROGRAM

## 2025-05-08 PROCEDURE — 86140 C-REACTIVE PROTEIN: CPT | Performed by: STUDENT IN AN ORGANIZED HEALTH CARE EDUCATION/TRAINING PROGRAM

## 2025-05-08 PROCEDURE — 83735 ASSAY OF MAGNESIUM: CPT | Performed by: STUDENT IN AN ORGANIZED HEALTH CARE EDUCATION/TRAINING PROGRAM

## 2025-05-08 RX ORDER — ACETAMINOPHEN 325 MG/1
975 TABLET ORAL 3 TIMES DAILY
Status: ACTIVE | OUTPATIENT
Start: 2025-05-09

## 2025-05-08 RX ORDER — MAGNESIUM SULFATE 1 G/100ML
1 INJECTION INTRAVENOUS ONCE
Status: DISPENSED | OUTPATIENT
Start: 2025-05-09

## 2025-05-08 RX ORDER — PROCHLORPERAZINE MALEATE 10 MG
10 TABLET ORAL ONCE
Status: COMPLETED | OUTPATIENT
Start: 2025-05-08 | End: 2025-05-08

## 2025-05-08 RX ORDER — GABAPENTIN 300 MG/1
300 CAPSULE ORAL ONCE
Status: COMPLETED | OUTPATIENT
Start: 2025-05-09 | End: 2025-05-09

## 2025-05-08 RX ORDER — DIPHENHYDRAMINE HYDROCHLORIDE 50 MG/ML
25 INJECTION, SOLUTION INTRAMUSCULAR; INTRAVENOUS ONCE
Status: COMPLETED | OUTPATIENT
Start: 2025-05-08 | End: 2025-05-08

## 2025-05-08 RX ORDER — PROCHLORPERAZINE 25 MG
25 SUPPOSITORY, RECTAL RECTAL ONCE
Status: COMPLETED | OUTPATIENT
Start: 2025-05-08 | End: 2025-05-08

## 2025-05-08 RX ORDER — KETOROLAC TROMETHAMINE 30 MG/ML
30 INJECTION, SOLUTION INTRAMUSCULAR; INTRAVENOUS ONCE
Status: COMPLETED | OUTPATIENT
Start: 2025-05-08 | End: 2025-05-08

## 2025-05-08 RX ORDER — POTASSIUM CHLORIDE 750 MG/1
40 TABLET, EXTENDED RELEASE ORAL ONCE
Status: COMPLETED | OUTPATIENT
Start: 2025-05-08 | End: 2025-05-08

## 2025-05-08 RX ORDER — IOPAMIDOL 755 MG/ML
135 INJECTION, SOLUTION INTRAVASCULAR ONCE
Status: COMPLETED | OUTPATIENT
Start: 2025-05-08 | End: 2025-05-08

## 2025-05-08 RX ORDER — DIPHENHYDRAMINE HCL 25 MG
25 CAPSULE ORAL ONCE
Status: COMPLETED | OUTPATIENT
Start: 2025-05-08 | End: 2025-05-08

## 2025-05-08 RX ORDER — ACETAMINOPHEN 325 MG/1
650 TABLET ORAL ONCE
Status: COMPLETED | OUTPATIENT
Start: 2025-05-09 | End: 2025-05-08

## 2025-05-08 RX ADMIN — POTASSIUM CHLORIDE 40 MEQ: 750 TABLET, EXTENDED RELEASE ORAL at 09:52

## 2025-05-08 RX ADMIN — ACETAMINOPHEN 650 MG: 325 TABLET ORAL at 20:55

## 2025-05-08 RX ADMIN — PROCHLORPERAZINE MALEATE 10 MG: 10 TABLET, FILM COATED ORAL at 12:04

## 2025-05-08 RX ADMIN — Medication 3 MG: at 20:26

## 2025-05-08 RX ADMIN — ACETAMINOPHEN 650 MG: 325 TABLET ORAL at 23:47

## 2025-05-08 RX ADMIN — KETOROLAC TROMETHAMINE 30 MG: 30 INJECTION, SOLUTION INTRAMUSCULAR; INTRAVENOUS at 21:36

## 2025-05-08 RX ADMIN — DIPHENHYDRAMINE HYDROCHLORIDE 25 MG: 50 INJECTION, SOLUTION INTRAMUSCULAR; INTRAVENOUS at 21:32

## 2025-05-08 RX ADMIN — IOPAMIDOL 135 ML: 755 INJECTION, SOLUTION INTRAVASCULAR at 13:46

## 2025-05-08 RX ADMIN — ACETAMINOPHEN 650 MG: 325 TABLET ORAL at 12:14

## 2025-05-08 RX ADMIN — SERTRALINE HYDROCHLORIDE 50 MG: 50 TABLET ORAL at 08:13

## 2025-05-08 RX ADMIN — ACETAMINOPHEN 650 MG: 325 TABLET ORAL at 08:16

## 2025-05-08 RX ADMIN — ACETAMINOPHEN 650 MG: 325 TABLET ORAL at 04:01

## 2025-05-08 RX ADMIN — DIPHENHYDRAMINE HYDROCHLORIDE 25 MG: 25 CAPSULE ORAL at 12:04

## 2025-05-08 RX ADMIN — Medication 10 MG: at 21:30

## 2025-05-08 RX ADMIN — KETOROLAC TROMETHAMINE 30 MG: 30 INJECTION, SOLUTION INTRAMUSCULAR at 12:58

## 2025-05-08 RX ADMIN — ACETAMINOPHEN 650 MG: 325 TABLET ORAL at 17:12

## 2025-05-08 ASSESSMENT — ACTIVITIES OF DAILY LIVING (ADL)
ADLS_ACUITY_SCORE: 61
ADLS_ACUITY_SCORE: 59
ADLS_ACUITY_SCORE: 61
ADLS_ACUITY_SCORE: 59
ADLS_ACUITY_SCORE: 61
ADLS_ACUITY_SCORE: 59
ADLS_ACUITY_SCORE: 59
ADLS_ACUITY_SCORE: 61
ADLS_ACUITY_SCORE: 59
ADLS_ACUITY_SCORE: 61
ADLS_ACUITY_SCORE: 59
ADLS_ACUITY_SCORE: 59
ADLS_ACUITY_SCORE: 61

## 2025-05-08 NOTE — PRE-PROCEDURE
CONTINUOUS EEG MONITORING PRELIMINARY REPORT    EEG staff: Leonor Faith MD    BACKGROUND  Overall - Excess fast activity  Posterior dominant rhythm present at any time: Yes  Reactivity to stimulation or spontaneous state change at any time: Yes  N2 sleep transients: Yes      DISCHARGES AND SEIZURES  Interictal discharges present: No  Periodic discharge present: No  Electrographic seizures present: No    INTERPRETATION AND CLINICAL CORRELATE   The first 45 minutes of prolonged EEG in this patient with confusion in the context of suspected PRES is abnormal.  The awake recording is characterized by 12 Hz posterior dominant rhythm in the bilateral head region.  There is an excess of fast activity which is likely medication related.  Sleep recording shows features of N2 and N3 sleep.  There were no epileptiform discharges or seizures noted during this preliminary review.    Leonor Faith MD

## 2025-05-08 NOTE — PLAN OF CARE
"Goal Outcome Evaluation:      Plan of Care Reviewed With: patient    Overall Patient Progress: no change    Reason for Admission: Hx of headaches and vision changes over the past few weeks, multiple admissions. Now admitted 5/6 for headache and vision changes.    Status: stable    RN assumed cares at 1900    Vitals: VSS on RA.  Pain: 5/10 headache, denies intervention.  Neuro: Reporting double or \"split\" vision, intermittently blurry. Headache. Lethargic. Photophobia.   Cardiac: WDL, on tele.  Respiratory: WDL.  : WDL, voids spontaneously.   GI: WDL, LBM 5/7.  Diet: Regular.  IV/Drains: R PIV.  Activity: SBA.  Skin: X, red blanchable sacrum, pt repositioning independently.  Labs: Reviewed. GIOVANNA LUBIN.     Plan of Care:     Seizure precautions, EEG monitoring overnight to monitor for possible seizures. Pt quite lethargic during early part of shift, more awake in the AM (likely due to migraine cocktail pt received). Stroke education and admission questions to be completed. Continue with the POC.  "

## 2025-05-08 NOTE — PROGRESS NOTES
[facility]    Vascular Neurology Progress Note    Interval History     RN notes reviewed. Ongoing headache overnight requiring some PRN benadryl. BP remains under good control. Otherwise NAEO.    Today's changes:  - Calling [facility] again today for old MRI images  - CT CAP wwo  - LP: protein, cells, glucose, cytology, flow cytometry, ACE, IL 6 and 10, MEP, cultures, save extra sample  - Serum labs: paraneoplastic panel, CRP, ESR, DDimer  - Migraine cocktail x1  - Potassium replacement      Hospital Course     Chief complaint: Headache    Amy Choudhury is a 65 year old female with history of DM2, anxiety, tobacco dependence, GERD, s/p gastric bypass, who presents to the ED via EMS with worsening headache and blurred vision.     Patient was admitted to [facility] on 4/25/2025 with left-sided frontal headache for 3 weeks.  Her CT head was unremarkable . She was treated with migraine cocktail, improved and discharged home.     She presented to the [facility] ED again on 4/2825 with headache again and was found to have blood pressure of 220s/120s. MRI brain w/without contrast showed Interval development of patchy T2 FLAIR hyperintensity within the left posteroinferior cerebellum, with possible patchy enhancement. Additional new areas of subcortical T2 FLAIR hyperintensity involving the posterior parieto-occipital regions. Progressed scattered associated susceptibility artifact, most notably right occipital lobe.  She was treated as a case of posterior skull encephalopathy with gradual decrease in blood pressure and started on blood pressure medications to be taken at home.  Her UDS was also positive for methamphetamine.     She had another admission from 5/2/25-5/5/25 for headache and was again hypertensive.  Repeat MRI was similar to prior MRIs with small microhemorrhages and superficial siderosis along with the signal normalities through posterior cerebral and cerebellar  "hemispheres with leptomeningeal enhancement.  She was discharged yesterday.     On her way back, she lost her way while walking because she could not remember her way back home.  A bystander called EMS and she was dropped off home by them.  She slept fine.  She woke up this morning with persistent headache and blurred/quadrupled/choppy vision.  Throughout time since yesterday, she thinks she has been losing time.  She has 7/10 headache from front of her head but that keeps migrating.  She was very hungry and Eating food in the ED. she also made few remarks such as, \"the pen is turning into cigarette, or I have seen that lady jumping before\".    Assessment and Plan     #Posterior reversible encephalopathy syndrome  #Extensive posterior-predominant micro hemorrhages with associated contrast enhancement  #C/f Hematogenous malignancy vs hypertensive micro hemorrhages vs inflammatory CAA    Amy Choudhury is a 65 year old female with history of DM2, anxiety, tobacco dependence, GERD, s/p gastric bypass, who presents to the ED via EMS with worsening headache , blurred vision, sense of loss of time.  Patient had recent admission and workup at [facility] after presenting with headache and blurred vision and the MRI was concerning for PRES plus cerebral amyloid angiopathy versus hypertensive micro hemorrhages.  Her blood pressure here is 123 x 86.  She is also found to have UDS positive for methamphetamine and cannabis.  Her symptoms could be either continuation of her symptoms from PRES in addition to drug use or anything new such as intracranial bleed/subarachnoid hemorrhage, or the cerebral amyloid angiopathy is inflammatory in nature and has been untreated. Unfortunately, we do not have the images from outside hospital to review, which we have requested from OSH. Due to this delay and ongoing symptoms, we opted to repeat MRI brain ww, which was notable for interval decrease in T2 signal, but with increase in posterior " findings such as pachymeningial enhancement. Per radiology, these findings (considering posterior distribution) raises suspicion for a hematological malignancy in addition to our initial differential of PRES, inflammatory CAA, hypertensive microhemorrhage. We would thus opt to obtain CSF for further workup listed below, especially to r/o carcinomatosis/lymphoma. Discussing with the patient, she is very apprehensive, as she has a prior history of a daughter passing away iso neurological illness at this hospital, with one part of her hospitalization being a traumatic bedside LP, which she would like to avoid. Given this as well as difficult anatomy, we will ask rads to kindly consider obtaining CSF fluoro-guided. In the meantime will obtain CT CAP to screen for malignancy.    We have also opted to connect to EEG given symptoms of palinopsia, face dysmorphia, sensation of lost time, and intermittent confusion. Her seizure threshold could certainly be lowered by PRES. Will leave her connected for a minimum of 24 hours given ongoing symptoms.     - Get MRI brain and CT head pushed to PACS from [facility] (Will attempt to call again)  - Treat blood pressure if SBP >220  - LDL this admission of 72, goal < 70.  Will defer starting statin at this time.  - Migraine cocktail as needed for pain  - vEEG  - LP: protein, cells, glucose, cytology, flow cytometry, ACE, IL 6 and 10, MEP, cultures, save extra sample  - Serum labs: paraneoplastic panel, CRP, ESR, Ddimer  - CT CAP wwo    Additional hospital problems:  # Leukocytosis  She is also found to have high leukocyte count 14.3.  But chest x-ray and UA are unremarkable  - Daily CBC  - Temperature monitoring       # Type 2 diabetes mellitus  - Medium dose sliding scale  - A1c this admission 8.7, goal < 7.0. Will need close PCP follow up     # History of hypertension  - Hydralazine/labetalol as needed for systolic blood pressure>220         Prophylaxis            For VTE  Prevention:  - pneumatic compression device     For Acid Suppression:  - GI prophylaxis is not indicated         DVT Prophylaxis: SCDs    Patient Follow-up    - Neurology clinic follow up in 6-8 weeks  - PCP follow up within 7 days of discharge for HTN, diabetes management      Medically Ready for Discharge: Anticipated in 2-4 Days pending ongoing workup    The patient was discussed with the Stroke Staff Dr. Hoffman.     Dariusz Bang MD  Neurology Resident, PGY-2  05/08/2025 8:00 AM       Physical Examination     Temp: 98.2  F (36.8  C) Temp src: Oral BP: 134/84 Pulse: 88   Resp: 18 SpO2: 96 % O2 Device: None (Room air)      General:  patient lying in bed without any acute distress    HEENT:  normocephalic/atraumatic  Cardio:  RRR  Pulmonary:  no respiratory distress  Abdomen:  soft  Extremities:  no edema  Skin:  intact      Neurologic  Mental Status:  alert, oriented x 3, follows commands, speech clear and fluent, naming and repetition normal, reports palinopsia, intermittent feeling of confusion and loss time  Cranial Nerves:  visual fields intact, PERRL, EOMI with normal smooth pursuit, facial sensation intact and symmetric, facial movements symmetric, hearing not formally tested but intact to conversation, palate elevation symmetric and uvula midline, no dysarthria, shoulder shrug strong bilaterally, tongue protrusion midline  Motor:  normal muscle tone and bulk, no abnormal movements, able to move all limbs spontaneously, strength 5/5 throughout upper and lower extremities, no pronator drift  Reflexes:  toes down-going  Sensory:  light touch sensation intact and symmetric throughout upper and lower extremities, no extinction on double simultaneous stimulation   Coordination:  normal finger-to-nose and heel-to-shin bilaterally without dysmetria, rapid alternating movements symmetric  Station/Gait:  deferred    Stroke Scales       NIHSS  1a. Level of Consciousness 0-->Alert, keenly responsive   1b. LOC  Questions 0-->Answers both questions correctly   1c. LOC Commands 0-->Performs both tasks correctly   2.   Best Gaze 0-->Normal   3.   Visual 0-->No visual loss   4.   Facial Palsy 0-->Normal symmetrical movements   5a. Motor Arm, Left 0-->No drift, limb holds 90 (or 45) degrees for full 10 secs   5b. Motor Arm, Right 0-->No drift, limb holds 90 (or 45) degrees for full 10 secs   6a. Motor Leg, Left 0-->No drift, leg holds 30 degree position for full 5 secs   6b. Motor Leg, right 0-->No drift, leg holds 30 degree position for full 5 secs   7.   Limb Ataxia 0-->Absent   8.   Sensory 0-->Normal, no sensory loss   9.   Best Language 0-->No aphasia, normal   10. Dysarthria 0-->Normal   11. Extinction and Inattention  0-->No abnormality   Total 0 (05/07/25)        Imaging/Labs   (Bolded imaging and labs new and/or personally reviewed or re-reviewed by me today)    MRI/Head CT CT head:  IMPRESSION:  1.  No acute findings. No acute intracranial hemorrhage. No acute calvarial fracture.    MRI:   IMPRESSION:  1. There is somewhat decreased cortical T2 hyperintense signal with increased conspicuity of leptomeningeal enhancement and numerous presumed microhemorrhages within the occipital, temporal parietal regions bilaterally. These findings are nonspecific, although may be seen with process or hematogenous malignancy such as melanoma.  Findings are less likely related to cerebral amyloid  angiopathy given the distribution. The distribution could be seen with hypertensive angiopathy, however these are extremely great number, much more than expected.   2. 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  3. Chronic small vessel ischemic changes.   Intracranial Vasculature HEAD CTA:   No large vessel occlusion findings intracranially. No high-grade stenosis intracranially.   Cervical Vasculature NECK CTA:  1.  No acute vascular injury in the neck. No high-grade stenosis in the neck.      Echocardiogram Not obtained   EKG/Telemetry Not obtained     LDL  5/6/2025: 72 mg/dL   A1C  5/6/2025: 8.7 %   Troponin 5/6/2025: 11 ng/L     Other labs reviewed by me today:  Recent Results (from the past 24 hours)   Glucose by meter    Collection Time: 05/07/25  9:02 PM   Result Value Ref Range    GLUCOSE BY METER POCT 181 (H) 70 - 99 mg/dL   Glucose by meter    Collection Time: 05/07/25 10:27 PM   Result Value Ref Range    GLUCOSE BY METER POCT 185 (H) 70 - 99 mg/dL   Glucose by meter    Collection Time: 05/08/25  1:54 AM   Result Value Ref Range    GLUCOSE BY METER POCT 161 (H) 70 - 99 mg/dL   CBC with platelets    Collection Time: 05/08/25  7:07 AM   Result Value Ref Range    WBC Count 9.7 4.0 - 11.0 10e3/uL    RBC Count 4.99 3.80 - 5.20 10e6/uL    Hemoglobin 13.3 11.7 - 15.7 g/dL    Hematocrit 40.9 35.0 - 47.0 %    MCV 82 78 - 100 fL    MCH 26.7 26.5 - 33.0 pg    MCHC 32.5 31.5 - 36.5 g/dL    RDW 15.1 (H) 10.0 - 15.0 %    Platelet Count 432 150 - 450 10e3/uL   Basic metabolic panel    Collection Time: 05/08/25  7:07 AM   Result Value Ref Range    Sodium 135 135 - 145 mmol/L    Potassium 3.2 (L) 3.4 - 5.3 mmol/L    Chloride 96 (L) 98 - 107 mmol/L    Carbon Dioxide (CO2) 22 22 - 29 mmol/L    Anion Gap 17 (H) 7 - 15 mmol/L    Urea Nitrogen 19.0 8.0 - 23.0 mg/dL    Creatinine 0.68 0.51 - 0.95 mg/dL    GFR Estimate >90 >60 mL/min/1.73m2    Calcium 8.7 (L) 8.8 - 10.4 mg/dL    Glucose 373 (H) 70 - 99 mg/dL   Erythrocyte sedimentation rate auto    Collection Time: 05/08/25  7:07 AM   Result Value Ref Range    Erythrocyte Sedimentation Rate 7 0 - 30 mm/hr   CRP inflammation    Collection Time: 05/08/25  7:07 AM   Result Value Ref Range    CRP Inflammation 4.47 <5.00 mg/L   Magnesium    Collection Time: 05/08/25  7:07 AM   Result Value Ref Range    Magnesium 2.1 1.7 - 2.3 mg/dL   Phosphorus    Collection Time: 05/08/25  7:07 AM   Result Value Ref Range    Phosphorus 3.8 2.5 - 4.5 mg/dL   Glucose by meter     Collection Time: 05/08/25  7:41 AM   Result Value Ref Range    GLUCOSE BY METER POCT 318 (H) 70 - 99 mg/dL   [facility]; [id number]; Paraneoplastic Reflexive Panel (Laboratory Miscellaneous Order)    Collection Time: 05/08/25 11:52 AM   Result Value Ref Range    Specimen Status       Send out testing result report sent directly to provider by external performing laboratory.    Performing Laboratory [facility]     Test Name Paraneoplastic Reflexive Panel     Test Code [id number]    INR    Collection Time: 05/08/25 11:52 AM   Result Value Ref Range    INR 0.88 0.85 - 1.15    PT 12.3 11.8 - 14.8 Seconds   Partial thromboplastin time    Collection Time: 05/08/25 11:52 AM   Result Value Ref Range    aPTT 23 22 - 38 Seconds   D dimer quantitative    Collection Time: 05/08/25 11:52 AM   Result Value Ref Range    D-Dimer Quantitative 0.40 0.00 - 0.50 ug/mL FEU   Glucose by meter    Collection Time: 05/08/25 12:12 PM   Result Value Ref Range    GLUCOSE BY METER POCT 189 (H) 70 - 99 mg/dL   Potassium    Collection Time: 05/08/25  2:38 PM   Result Value Ref Range    Potassium 4.4 3.4 - 5.3 mmol/L

## 2025-05-08 NOTE — PLAN OF CARE
"tatus: Admitted with HA and vision changes   Vitals: HTN within parameters, CCM in place.   Neuros: AOx4. HA with photophobia. Vision changes reported as intermittent double or \"split\" vision, intermittently blurry. 5/5 throughout. NIHSS 0.   IV: saline locked.   Labs/Electrolytes: Potassium replaced. Blood glucose checks ACHS  Resp: WNL on RA.   Diet: regular  : Voids without difficulty   GI: BM 5/6.   Skin: intact ex blanchable redness to sacrum   Pain: 5-7/10 pressure HA with photophobia. Migraine cocktail and tylenol given.   Activity: SBA with gait belt, offered walks throughout day pt refused.   Plan: CT chest/abd/pelvis done today. Lumbar puncture ordered. Continue POC.   Education completed: Needs stroke ed, papers at bedside    Goal Outcome Evaluation:   Plan of Care Reviewed With: patient  Overall Patient Progress: no changeOverall Patient Progress: no change  Outcome Evaluation: Headache control          "

## 2025-05-09 ENCOUNTER — APPOINTMENT (OUTPATIENT)
Dept: NEUROLOGY | Facility: CLINIC | Age: 65
DRG: 545 | End: 2025-05-09
Payer: COMMERCIAL

## 2025-05-09 LAB
ACE SERPL-CCNC: <10 U/L
ANION GAP SERPL CALCULATED.3IONS-SCNC: 9 MMOL/L (ref 7–15)
BUN SERPL-MCNC: 21.5 MG/DL (ref 8–23)
CALCIUM SERPL-MCNC: 8.3 MG/DL (ref 8.8–10.4)
CHLORIDE SERPL-SCNC: 104 MMOL/L (ref 98–107)
CREAT SERPL-MCNC: 0.64 MG/DL (ref 0.51–0.95)
EGFRCR SERPLBLD CKD-EPI 2021: >90 ML/MIN/1.73M2
ERYTHROCYTE [DISTWIDTH] IN BLOOD BY AUTOMATED COUNT: 14.7 % (ref 10–15)
GLUCOSE BLDC GLUCOMTR-MCNC: 155 MG/DL (ref 70–99)
GLUCOSE BLDC GLUCOMTR-MCNC: 183 MG/DL (ref 70–99)
GLUCOSE BLDC GLUCOMTR-MCNC: 204 MG/DL (ref 70–99)
GLUCOSE BLDC GLUCOMTR-MCNC: 207 MG/DL (ref 70–99)
GLUCOSE BLDC GLUCOMTR-MCNC: 214 MG/DL (ref 70–99)
GLUCOSE BLDC GLUCOMTR-MCNC: 346 MG/DL (ref 70–99)
GLUCOSE SERPL-MCNC: 164 MG/DL (ref 70–99)
HCO3 SERPL-SCNC: 26 MMOL/L (ref 22–29)
HCT VFR BLD AUTO: 39 % (ref 35–47)
HGB BLD-MCNC: 12.3 G/DL (ref 11.7–15.7)
MCH RBC QN AUTO: 25.9 PG (ref 26.5–33)
MCHC RBC AUTO-ENTMCNC: 31.5 G/DL (ref 31.5–36.5)
MCV RBC AUTO: 82 FL (ref 78–100)
PLATELET # BLD AUTO: 376 10E3/UL (ref 150–450)
POTASSIUM SERPL-SCNC: 4.2 MMOL/L (ref 3.4–5.3)
RBC # BLD AUTO: 4.75 10E6/UL (ref 3.8–5.2)
SODIUM SERPL-SCNC: 139 MMOL/L (ref 135–145)
WBC # BLD AUTO: 7.6 10E3/UL (ref 4–11)

## 2025-05-09 PROCEDURE — 250N000013 HC RX MED GY IP 250 OP 250 PS 637

## 2025-05-09 PROCEDURE — 80048 BASIC METABOLIC PNL TOTAL CA: CPT

## 2025-05-09 PROCEDURE — 250N000011 HC RX IP 250 OP 636

## 2025-05-09 PROCEDURE — 120N000002 HC R&B MED SURG/OB UMMC

## 2025-05-09 PROCEDURE — 95718 EEG PHYS/QHP 2-12 HR W/VEEG: CPT | Mod: GC | Performed by: INTERNAL MEDICINE

## 2025-05-09 PROCEDURE — 999N000127 HC STATISTIC PERIPHERAL IV START W US GUIDANCE

## 2025-05-09 PROCEDURE — 99232 SBSQ HOSP IP/OBS MODERATE 35: CPT | Mod: GC | Performed by: STUDENT IN AN ORGANIZED HEALTH CARE EDUCATION/TRAINING PROGRAM

## 2025-05-09 PROCEDURE — 36415 COLL VENOUS BLD VENIPUNCTURE: CPT

## 2025-05-09 PROCEDURE — 95711 VEEG 2-12 HR UNMONITORED: CPT

## 2025-05-09 PROCEDURE — 85027 COMPLETE CBC AUTOMATED: CPT

## 2025-05-09 PROCEDURE — 250N000012 HC RX MED GY IP 250 OP 636 PS 637

## 2025-05-09 RX ORDER — GABAPENTIN 300 MG/1
300 CAPSULE ORAL 3 TIMES DAILY
Status: DISCONTINUED | OUTPATIENT
Start: 2025-05-09 | End: 2025-05-10

## 2025-05-09 RX ORDER — LORAZEPAM 2 MG/ML
1 INJECTION INTRAMUSCULAR ONCE
Status: COMPLETED | OUTPATIENT
Start: 2025-05-09 | End: 2025-05-09

## 2025-05-09 RX ORDER — HYDROCHLOROTHIAZIDE 12.5 MG/1
12.5 TABLET ORAL DAILY
Status: DISCONTINUED | OUTPATIENT
Start: 2025-05-09 | End: 2025-05-21

## 2025-05-09 RX ORDER — LORAZEPAM 2 MG/ML
1 INJECTION INTRAMUSCULAR
Status: DISCONTINUED | OUTPATIENT
Start: 2025-05-09 | End: 2025-05-11

## 2025-05-09 RX ORDER — KETOROLAC TROMETHAMINE 30 MG/ML
30 INJECTION, SOLUTION INTRAMUSCULAR; INTRAVENOUS ONCE
Status: COMPLETED | OUTPATIENT
Start: 2025-05-09 | End: 2025-05-09

## 2025-05-09 RX ORDER — QUETIAPINE FUMARATE 25 MG/1
25 TABLET, FILM COATED ORAL AT BEDTIME
Status: DISCONTINUED | OUTPATIENT
Start: 2025-05-09 | End: 2025-05-21 | Stop reason: HOSPADM

## 2025-05-09 RX ORDER — QUETIAPINE FUMARATE 25 MG/1
25 TABLET, FILM COATED ORAL AT BEDTIME
Status: DISCONTINUED | OUTPATIENT
Start: 2025-05-09 | End: 2025-05-09

## 2025-05-09 RX ORDER — LIDOCAINE HYDROCHLORIDE 10 MG/ML
30 INJECTION, SOLUTION EPIDURAL; INFILTRATION; INTRACAUDAL; PERINEURAL ONCE
Status: COMPLETED | OUTPATIENT
Start: 2025-05-09 | End: 2025-05-09

## 2025-05-09 RX ORDER — QUETIAPINE FUMARATE 25 MG/1
25 TABLET, FILM COATED ORAL ONCE
Status: COMPLETED | OUTPATIENT
Start: 2025-05-09 | End: 2025-05-09

## 2025-05-09 RX ORDER — DIPHENHYDRAMINE HCL 25 MG
25 CAPSULE ORAL ONCE
Status: COMPLETED | OUTPATIENT
Start: 2025-05-09 | End: 2025-05-09

## 2025-05-09 RX ADMIN — HYDROCHLOROTHIAZIDE 12.5 MG: 12.5 TABLET ORAL at 12:05

## 2025-05-09 RX ADMIN — ACETAMINOPHEN 975 MG: 325 TABLET ORAL at 16:32

## 2025-05-09 RX ADMIN — SERTRALINE HYDROCHLORIDE 50 MG: 50 TABLET ORAL at 07:52

## 2025-05-09 RX ADMIN — GABAPENTIN 300 MG: 300 CAPSULE ORAL at 16:13

## 2025-05-09 RX ADMIN — ACETAMINOPHEN 975 MG: 325 TABLET ORAL at 21:10

## 2025-05-09 RX ADMIN — LORAZEPAM 1 MG: 2 INJECTION INTRAMUSCULAR; INTRAVENOUS at 13:08

## 2025-05-09 RX ADMIN — KETOROLAC TROMETHAMINE 30 MG: 30 INJECTION, SOLUTION INTRAMUSCULAR at 15:38

## 2025-05-09 RX ADMIN — SENNOSIDES AND DOCUSATE SODIUM 1 TABLET: 50; 8.6 TABLET ORAL at 19:54

## 2025-05-09 RX ADMIN — QUETIAPINE FUMARATE 25 MG: 25 TABLET ORAL at 19:54

## 2025-05-09 RX ADMIN — ACETAMINOPHEN 975 MG: 325 TABLET ORAL at 07:52

## 2025-05-09 RX ADMIN — MAGNESIUM SULFATE 1 G: 1 INJECTION INTRAVENOUS at 00:47

## 2025-05-09 RX ADMIN — PROCHLORPERAZINE EDISYLATE 10 MG: 5 INJECTION INTRAMUSCULAR; INTRAVENOUS at 15:37

## 2025-05-09 RX ADMIN — DIPHENHYDRAMINE HYDROCHLORIDE 25 MG: 25 CAPSULE ORAL at 15:37

## 2025-05-09 RX ADMIN — GABAPENTIN 300 MG: 300 CAPSULE ORAL at 00:47

## 2025-05-09 RX ADMIN — INSULIN ASPART 1 UNITS: 100 INJECTION, SOLUTION INTRAVENOUS; SUBCUTANEOUS at 21:09

## 2025-05-09 RX ADMIN — QUETIAPINE FUMARATE 25 MG: 25 TABLET ORAL at 01:30

## 2025-05-09 RX ADMIN — ONDANSETRON 4 MG: 4 TABLET, ORALLY DISINTEGRATING ORAL at 11:32

## 2025-05-09 RX ADMIN — Medication 3 MG: at 19:54

## 2025-05-09 ASSESSMENT — ACTIVITIES OF DAILY LIVING (ADL)
ADLS_ACUITY_SCORE: 58
ADLS_ACUITY_SCORE: 60
ADLS_ACUITY_SCORE: 59
ADLS_ACUITY_SCORE: 58
ADLS_ACUITY_SCORE: 59
ADLS_ACUITY_SCORE: 58
ADLS_ACUITY_SCORE: 59
ADLS_ACUITY_SCORE: 58
ADLS_ACUITY_SCORE: 59
ADLS_ACUITY_SCORE: 58
ADLS_ACUITY_SCORE: 59
ADLS_ACUITY_SCORE: 59
ADLS_ACUITY_SCORE: 60
ADLS_ACUITY_SCORE: 59
ADLS_ACUITY_SCORE: 60
ADLS_ACUITY_SCORE: 58
ADLS_ACUITY_SCORE: 60
ADLS_ACUITY_SCORE: 59
ADLS_ACUITY_SCORE: 59

## 2025-05-09 NOTE — PLAN OF CARE
Goal Outcome Evaluation:      Plan of Care Reviewed With: patient    Overall Patient Progress: no change    Status: Pt admitted 5/6 with HA and vision changes. Contact precautions.  Vitals: VSS ex HTN w/in parameters.   Neuros: A&Ox4. Frontal HA. Photophobia and intermittent blurry/double vision. Strengths 5/5 t/o.   IV: R PIV SL  Labs/Electrolytes: BG ACHS  Resp: WNL  Diet: Regular  : Voiding spontaneously to bathroom  GI: LBM 5/7, bowel regimen followed.  Skin: Bruising t/o.  Pain: Controlled with migraine cocktail and Tylenol.  Activity: Up ad sylvester.  Plan: Scheduled LP on Monday.

## 2025-05-09 NOTE — PLAN OF CARE
Vitals: HTN within parameters, BP meds re-started  Neuros: Unchanged; photophobia. Intermittent double/blurry vision.   IV: PIV SLd  Resp: WNL   Diet: Regular - good po. Emesis x 2, Zofran given with good relief.   : Voiding spontaneously   GI: BM 5/7   Skin: Bruising throughout   Pain: Pt complained of headache. Migraine cocktail ordered. Before meds were given pt had ativan for LP. After the ativan pt did not complain of headache. Will keep migraine cocktail available until later.   Activity: Indep in room  Plan: EEG leads removed. Needs LP. Went down earlier in day but refused procedure d/t not being able to contact family. Pt was then able to contact family and agreeable to try again. Waiting for new time.   Education completed: Needs stroke ED, papers at bedside    Goal Outcome Evaluation:      Plan of Care Reviewed With: patient          Outcome Evaluation: [id number]

## 2025-05-09 NOTE — PROGRESS NOTES
[facility]    Vascular Neurology Progress Note    Interval History     RN notes reviewed. Ongoing headache overnight more refractory to medication. Otherwise NAEO. Planning for fluoro-guided LP today, discussed at the bedside this AM and patient was agreeable. However, just prior to going down for the LP, she refused as she wanted to first talk to her daughter who is currently hospitalized at [facility]. She was able to eventually get in contact with her daughter, but by the time this was figured out, radiology techs were out for the day.     Today's changes:  - Talked to general neuro regarding refractory headache    - Starting gabapentin 300mg TID  - Disconnect EEG  - LP today, labs ordered   - Pt refused, will likely need to attempt at bedside in the AM  - Resumed hydrochlorothiazide  - Migraine cocktail x1      Hospital Course     Chief complaint: Headache    Amy Choudhury is a 65 year old female with history of DM2, anxiety, tobacco dependence, GERD, s/p gastric bypass, who presents to the ED via EMS with worsening headache and blurred vision.     Patient was admitted to [facility] on 4/25/2025 with left-sided frontal headache for 3 weeks.  Her CT head was unremarkable . She was treated with migraine cocktail, improved and discharged home.     She presented to the [facility] ED again on 4/2825 with headache again and was found to have blood pressure of 220s/120s. MRI brain w/without contrast showed Interval development of patchy T2 FLAIR hyperintensity within the left posteroinferior cerebellum, with possible patchy enhancement. Additional new areas of subcortical T2 FLAIR hyperintensity involving the posterior parieto-occipital regions. Progressed scattered associated susceptibility artifact, most notably right occipital lobe.  She was treated as a case of posterior skull encephalopathy with gradual decrease in blood pressure and started on blood pressure medications to  "be taken at home.  Her UDS was also positive for methamphetamine.     She had another admission from 5/2/25-5/5/25 for headache and was again hypertensive.  Repeat MRI was similar to prior MRIs with small microhemorrhages and superficial siderosis along with the signal normalities through posterior cerebral and cerebellar hemispheres with leptomeningeal enhancement.  She was discharged yesterday.     On her way back, she lost her way while walking because she could not remember her way back home.  A bystander called EMS and she was dropped off home by them.  She slept fine.  She woke up this morning with persistent headache and blurred/quadrupled/choppy vision.  Throughout time since yesterday, she thinks she has been losing time.  She has 7/10 headache from front of her head but that keeps migrating.  She was very hungry and Eating food in the ED. she also made few remarks such as, \"the pen is turning into cigarette, or I have seen that lady jumping before\".    Assessment and Plan     #Extensive posterior-predominant micro hemorrhages with associated contrast enhancement  #Refractory headache associated with photophobia, palinopsia  #C/f Hematogenous malignancy vs hypertensive micro hemorrhages vs inflammatory CAA vs PRES    Amy Choudhury is a 65 year old female with history of DM2, anxiety, tobacco dependence, GERD, s/p gastric bypass, who presents to the ED via EMS with worsening headache , blurred vision, sense of loss of time.  Patient had recent admission and workup at [facility] after presenting with headache and blurred vision and the MRI was concerning for PRES plus cerebral amyloid angiopathy versus hypertensive micro hemorrhages.  Her blood pressure here is 123 x 86.  She is also found to have UDS positive for methamphetamine and cannabis.  Her symptoms could be either continuation of her symptoms from PRES in addition to drug use or anything new such as intracranial bleed/subarachnoid hemorrhage, or the " cerebral amyloid angiopathy is inflammatory in nature and has been untreated. Unfortunately, we do not have the images from outside hospital to review, which we have requested from OSH. Due to this delay and ongoing symptoms, we opted to repeat MRI brain wwo, which was notable for interval decrease in T2 signal, but with increase in posterior findings such as pachymeningial enhancement. Per radiology, these findings (considering posterior distribution) raises suspicion for a hematological malignancy in addition to our initial differential of PRES, inflammatory CAA, hypertensive microhemorrhage. We would thus opt to obtain CSF for further workup listed below, especially to r/o carcinomatosis/lymphoma. Discussing with the patient, she is very apprehensive, as she has a prior history of a daughter passing away iso neurological illness at this hospital, with one part of her hospitalization being a traumatic bedside LP, which she would like to avoid. Given this as well as difficult anatomy, we will ask rads to kindly consider obtaining CSF fluoro-guided. In the meantime will obtain CT CAP to screen for malignancy. Of note, were able to see prior MRIs from OSH, notably without impressive FLAIR signal change, seemingly lowering now-recovering PRES on the differential.     We have also opted to connect to EEG given symptoms of palinopsia, face dysmorphia, sensation of lost time, and intermittent confusion. Her seizure threshold could certainly be lowered by PRES. EEG has remained negative and will be discontinued today. Unfortunately, pt declined fluoro-guided LP today. Will attempt at the bedside 5/10 if agreed upon by the patient.     Discussed refractory headache with general neurology today and will trial scheduled gabapentin TID today.      - Treat blood pressure if SBP >220  - LDL this admission of 72, goal < 70.  Will defer starting statin at this time.  - Migraine cocktail as needed for pain  - Discontinue vEEG   -  LP: protein, cells, glucose, cytology, flow cytometry, ACE, IL 6, MEP, cultures, HSV 1 and 2, save extra sample  - Serum labs: paraneoplastic panel, CRP, ESR, Ddimer  - Start gabapentin 300mg TID    Additional hospital problems:  # Leukocytosis  She is also found to have high leukocyte count 14.3.  But chest x-ray and UA are unremarkable  - Daily CBC  - Temperature monitoring       # Type 2 diabetes mellitus  - Medium dose sliding scale  - A1c this admission 8.7, goal < 7.0. Will need close PCP follow up     # History of hypertension  - Hydralazine/labetalol as needed for systolic blood pressure>220         Prophylaxis            For VTE Prevention:  - pneumatic compression device     For Acid Suppression:  - GI prophylaxis is not indicated         DVT Prophylaxis: SCDs    Patient Follow-up    - Neurology clinic follow up in 6-8 weeks  - PCP follow up within 7 days of discharge for HTN, diabetes management      Medically Ready for Discharge: Anticipated in 2-4 Days pending ongoing workup    The patient was discussed with the Stroke Staff Dr. Hoffman.     Dariusz Bang MD  Neurology Resident, PGY-2  05/09/2025 8:00 AM       Physical Examination     Temp: 98.3  F (36.8  C) Temp src: Oral BP: (!) 151/86 Pulse: 72   Resp: (P) 16 SpO2: 97 % O2 Device: None (Room air)      General:  patient lying in bed without any acute distress    HEENT:  normocephalic/atraumatic  Cardio:  RRR  Pulmonary:  no respiratory distress  Abdomen:  soft  Extremities:  no edema  Skin:  intact      Neurologic  Mental Status:  alert, oriented x 3, follows commands, speech clear and fluent, naming and repetition normal, reports palinopsia, intermittent feeling of confusion and loss time  Cranial Nerves:  visual fields intact, PERRL, EOMI with normal smooth pursuit, facial sensation intact and symmetric, facial movements symmetric, hearing not formally tested but intact to conversation, palate elevation symmetric and uvula midline, no dysarthria,  shoulder shrug strong bilaterally, tongue protrusion midline  Motor:  normal muscle tone and bulk, no abnormal movements, able to move all limbs spontaneously, strength 5/5 throughout upper and lower extremities, no pronator drift  Reflexes:  toes down-going  Sensory:  light touch sensation intact and symmetric throughout upper and lower extremities, no extinction on double simultaneous stimulation   Coordination:  normal finger-to-nose and heel-to-shin bilaterally without dysmetria, rapid alternating movements symmetric  Station/Gait:  deferred    Stroke Scales       NIHSS  1a. Level of Consciousness 0-->Alert, keenly responsive   1b. LOC Questions 0-->Answers both questions correctly   1c. LOC Commands 0-->Performs both tasks correctly   2.   Best Gaze 0-->Normal   3.   Visual 0-->No visual loss   4.   Facial Palsy 0-->Normal symmetrical movements   5a. Motor Arm, Left 0-->No drift, limb holds 90 (or 45) degrees for full 10 secs   5b. Motor Arm, Right 0-->No drift, limb holds 90 (or 45) degrees for full 10 secs   6a. Motor Leg, Left 0-->No drift, leg holds 30 degree position for full 5 secs   6b. Motor Leg, right 0-->No drift, leg holds 30 degree position for full 5 secs   7.   Limb Ataxia 0-->Absent   8.   Sensory 0-->Normal, no sensory loss   9.   Best Language 0-->No aphasia, normal   10. Dysarthria 0-->Normal   11. Extinction and Inattention  0-->No abnormality   Total 0 (05/07/25)        Imaging/Labs   (Bolded imaging and labs new and/or personally reviewed or re-reviewed by me today)    MRI/Head CT CT head:  IMPRESSION:  1.  No acute findings. No acute intracranial hemorrhage. No acute calvarial fracture.    MRI:   IMPRESSION:  1. There is somewhat decreased cortical T2 hyperintense signal with increased conspicuity of leptomeningeal enhancement and numerous presumed microhemorrhages within the occipital, temporal parietal regions bilaterally. These findings are nonspecific, although may be seen with process  or hematogenous malignancy such as melanoma.  Findings are less likely related to cerebral amyloid  angiopathy given the distribution. The distribution could be seen with hypertensive angiopathy, however these are extremely great number, much more than expected.   2. 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  3. Chronic small vessel ischemic changes.   Intracranial Vasculature HEAD CTA:   No large vessel occlusion findings intracranially. No high-grade stenosis intracranially.   Cervical Vasculature NECK CTA:  1.  No acute vascular injury in the neck. No high-grade stenosis in the neck.     Echocardiogram Not obtained   EKG/Telemetry Not obtained     LDL  5/6/2025: 72 mg/dL   A1C  5/6/2025: 8.7 %   Troponin No lab value available in past 48 hrs     Other labs reviewed by me today:  Recent Results (from the past 24 hours)   Potassium    Collection Time: 05/08/25  4:21 PM   Result Value Ref Range    Potassium 4.3 3.4 - 5.3 mmol/L   Glucose by meter    Collection Time: 05/08/25  5:12 PM   Result Value Ref Range    GLUCOSE BY METER POCT 146 (H) 70 - 99 mg/dL   Glucose by meter    Collection Time: 05/08/25 10:28 PM   Result Value Ref Range    GLUCOSE BY METER POCT 158 (H) 70 - 99 mg/dL   Glucose by meter    Collection Time: 05/09/25  2:07 AM   Result Value Ref Range    GLUCOSE BY METER POCT 207 (H) 70 - 99 mg/dL   CBC with platelets    Collection Time: 05/09/25  6:27 AM   Result Value Ref Range    WBC Count 7.6 4.0 - 11.0 10e3/uL    RBC Count 4.75 3.80 - 5.20 10e6/uL    Hemoglobin 12.3 11.7 - 15.7 g/dL    Hematocrit 39.0 35.0 - 47.0 %    MCV 82 78 - 100 fL    MCH 25.9 (L) 26.5 - 33.0 pg    MCHC 31.5 31.5 - 36.5 g/dL    RDW 14.7 10.0 - 15.0 %    Platelet Count 376 150 - 450 10e3/uL   Basic metabolic panel    Collection Time: 05/09/25  6:27 AM   Result Value Ref Range    Sodium 139 135 - 145 mmol/L    Potassium 4.2 3.4 - 5.3 mmol/L    Chloride 104 98 - 107 mmol/L    Carbon  Dioxide (CO2) 26 22 - 29 mmol/L    Anion Gap 9 7 - 15 mmol/L    Urea Nitrogen 21.5 8.0 - 23.0 mg/dL    Creatinine 0.64 0.51 - 0.95 mg/dL    GFR Estimate >90 >60 mL/min/1.73m2    Calcium 8.3 (L) 8.8 - 10.4 mg/dL    Glucose 164 (H) 70 - 99 mg/dL   Glucose by meter    Collection Time: 05/09/25  7:51 AM   Result Value Ref Range    GLUCOSE BY METER POCT 155 (H) 70 - 99 mg/dL   Glucose by meter    Collection Time: 05/09/25 11:42 AM   Result Value Ref Range    GLUCOSE BY METER POCT 183 (H) 70 - 99 mg/dL

## 2025-05-09 NOTE — PROGRESS NOTES
Care Management Follow Up    Additional information    5/9 - RN requested CHW to locate contacts for pt as she doesn't have her cell phone and needs to ensure her family is aware she is in the hospital and going for a procedure.    Chart review revealed a granddaughter's phone number from a September 2021 encounter. I wrote this down and brought it to pt at bedside.     Upon explaining what I found to pt, she was unhappy and began explaining what it is she needs. Pt stated her daughter Bea Downwind is at [facility], room 5559. She needs to contact her daughter specifically to let her know that she is admitted to the hospital and going for a procedure. I told pt I will find the hospital information and see what I can do for her.     I was able to find the main number for [facility]: [phone]. I brought a phone into the room as pt does not have hers. We called the hospital and after a few tries, were successful in contacting Bea. Pt and her daughter were able to speak for a while and pt was very relieved.     Provider entered the room and explained that if we are going to try and squeeze her in for the procedure, we have to get that done now. Pt gave consent and provider stated that it is Friday at the end of the day but the team will do their best, pt was agreeable.     Once pt was done speaking to Bea, I provided her with the main hospital number for [facility] that we called to reach Bea, as well as her bed phone number just in case.     Pt was very appreciative and had no further questions or concerns at this time. Phone cleaned and returned to dock.    Provider aware, no further action taken.       Annetta Estrada  Community Health Worker  6A, 6B, 6C  P: [phone]  F: [phone]

## 2025-05-09 NOTE — CONSULTS
Brief SW Note:    See CHW note for needed details regarding getting in contact with pt's family member.     No further care management intervention anticipated at this time.  Please re-consult if further needs arise.  Care management signing off.       SB Slagado, LGSW  6B   Ph: [phone]  Vocera: Kwasi Aburto or 6B [facility] SW

## 2025-05-10 LAB
ANION GAP SERPL CALCULATED.3IONS-SCNC: 9 MMOL/L (ref 7–15)
BUN SERPL-MCNC: 18.7 MG/DL (ref 8–23)
CALCIUM SERPL-MCNC: 9.2 MG/DL (ref 8.8–10.4)
CHLORIDE SERPL-SCNC: 101 MMOL/L (ref 98–107)
CREAT SERPL-MCNC: 0.53 MG/DL (ref 0.51–0.95)
EGFRCR SERPLBLD CKD-EPI 2021: >90 ML/MIN/1.73M2
ERYTHROCYTE [DISTWIDTH] IN BLOOD BY AUTOMATED COUNT: 14.8 % (ref 10–15)
GLUCOSE BLDC GLUCOMTR-MCNC: 186 MG/DL (ref 70–99)
GLUCOSE BLDC GLUCOMTR-MCNC: 189 MG/DL (ref 70–99)
GLUCOSE BLDC GLUCOMTR-MCNC: 266 MG/DL (ref 70–99)
GLUCOSE BLDC GLUCOMTR-MCNC: 268 MG/DL (ref 70–99)
GLUCOSE BLDC GLUCOMTR-MCNC: 271 MG/DL (ref 70–99)
GLUCOSE SERPL-MCNC: 285 MG/DL (ref 70–99)
HCO3 SERPL-SCNC: 25 MMOL/L (ref 22–29)
HCT VFR BLD AUTO: 38 % (ref 35–47)
HGB BLD-MCNC: 12.1 G/DL (ref 11.7–15.7)
MAGNESIUM SERPL-MCNC: 1.9 MG/DL (ref 1.7–2.3)
MCH RBC QN AUTO: 25.9 PG (ref 26.5–33)
MCHC RBC AUTO-ENTMCNC: 31.8 G/DL (ref 31.5–36.5)
MCV RBC AUTO: 81 FL (ref 78–100)
PLATELET # BLD AUTO: 358 10E3/UL (ref 150–450)
POTASSIUM SERPL-SCNC: 4.6 MMOL/L (ref 3.4–5.3)
RBC # BLD AUTO: 4.67 10E6/UL (ref 3.8–5.2)
SODIUM SERPL-SCNC: 135 MMOL/L (ref 135–145)
WBC # BLD AUTO: 8.6 10E3/UL (ref 4–11)

## 2025-05-10 PROCEDURE — 80048 BASIC METABOLIC PNL TOTAL CA: CPT

## 2025-05-10 PROCEDURE — 99232 SBSQ HOSP IP/OBS MODERATE 35: CPT | Mod: GC | Performed by: STUDENT IN AN ORGANIZED HEALTH CARE EDUCATION/TRAINING PROGRAM

## 2025-05-10 PROCEDURE — 250N000011 HC RX IP 250 OP 636: Mod: JZ

## 2025-05-10 PROCEDURE — 82435 ASSAY OF BLOOD CHLORIDE: CPT

## 2025-05-10 PROCEDURE — 85027 COMPLETE CBC AUTOMATED: CPT

## 2025-05-10 PROCEDURE — 83735 ASSAY OF MAGNESIUM: CPT

## 2025-05-10 PROCEDURE — 250N000013 HC RX MED GY IP 250 OP 250 PS 637

## 2025-05-10 PROCEDURE — 250N000011 HC RX IP 250 OP 636

## 2025-05-10 PROCEDURE — 999N000127 HC STATISTIC PERIPHERAL IV START W US GUIDANCE

## 2025-05-10 PROCEDURE — 120N000002 HC R&B MED SURG/OB UMMC

## 2025-05-10 PROCEDURE — 36415 COLL VENOUS BLD VENIPUNCTURE: CPT

## 2025-05-10 RX ORDER — GABAPENTIN 300 MG/1
600 CAPSULE ORAL 3 TIMES DAILY
Status: DISCONTINUED | OUTPATIENT
Start: 2025-05-10 | End: 2025-05-20

## 2025-05-10 RX ORDER — DIPHENHYDRAMINE HCL 25 MG
25 CAPSULE ORAL EVERY 6 HOURS PRN
Status: DISCONTINUED | OUTPATIENT
Start: 2025-05-10 | End: 2025-05-11

## 2025-05-10 RX ORDER — KETOROLAC TROMETHAMINE 30 MG/ML
30 INJECTION, SOLUTION INTRAMUSCULAR; INTRAVENOUS EVERY 8 HOURS PRN
Status: DISCONTINUED | OUTPATIENT
Start: 2025-05-10 | End: 2025-05-12

## 2025-05-10 RX ORDER — MAGNESIUM SULFATE HEPTAHYDRATE 40 MG/ML
2 INJECTION, SOLUTION INTRAVENOUS ONCE
Status: COMPLETED | OUTPATIENT
Start: 2025-05-10 | End: 2025-05-10

## 2025-05-10 RX ORDER — HYDRALAZINE HYDROCHLORIDE 20 MG/ML
10 INJECTION INTRAMUSCULAR; INTRAVENOUS EVERY 6 HOURS PRN
Status: DISCONTINUED | OUTPATIENT
Start: 2025-05-10 | End: 2025-05-12

## 2025-05-10 RX ORDER — PROPRANOLOL HYDROCHLORIDE 60 MG/1
60 CAPSULE, EXTENDED RELEASE ORAL DAILY
Status: DISCONTINUED | OUTPATIENT
Start: 2025-05-10 | End: 2025-05-21 | Stop reason: HOSPADM

## 2025-05-10 RX ORDER — KETOROLAC TROMETHAMINE 30 MG/ML
30 INJECTION, SOLUTION INTRAMUSCULAR; INTRAVENOUS EVERY 6 HOURS PRN
Status: DISCONTINUED | OUTPATIENT
Start: 2025-05-10 | End: 2025-05-10

## 2025-05-10 RX ORDER — DIPHENHYDRAMINE HYDROCHLORIDE 50 MG/ML
25 INJECTION, SOLUTION INTRAMUSCULAR; INTRAVENOUS EVERY 6 HOURS PRN
Status: DISCONTINUED | OUTPATIENT
Start: 2025-05-10 | End: 2025-05-11

## 2025-05-10 RX ADMIN — ACETAMINOPHEN 975 MG: 325 TABLET ORAL at 12:11

## 2025-05-10 RX ADMIN — GABAPENTIN 300 MG: 300 CAPSULE ORAL at 00:50

## 2025-05-10 RX ADMIN — PROPRANOLOL HYDROCHLORIDE 60 MG: 60 CAPSULE, EXTENDED RELEASE ORAL at 08:41

## 2025-05-10 RX ADMIN — HYDROCHLOROTHIAZIDE 12.5 MG: 12.5 TABLET ORAL at 08:00

## 2025-05-10 RX ADMIN — ONDANSETRON 4 MG: 2 INJECTION, SOLUTION INTRAMUSCULAR; INTRAVENOUS at 21:46

## 2025-05-10 RX ADMIN — SERTRALINE HYDROCHLORIDE 50 MG: 50 TABLET ORAL at 08:00

## 2025-05-10 RX ADMIN — GABAPENTIN 600 MG: 300 CAPSULE ORAL at 12:11

## 2025-05-10 RX ADMIN — KETOROLAC TROMETHAMINE 30 MG: 30 INJECTION, SOLUTION INTRAMUSCULAR at 21:21

## 2025-05-10 RX ADMIN — QUETIAPINE FUMARATE 25 MG: 25 TABLET ORAL at 19:22

## 2025-05-10 RX ADMIN — ACETAMINOPHEN 975 MG: 325 TABLET ORAL at 21:25

## 2025-05-10 RX ADMIN — DIPHENHYDRAMINE HYDROCHLORIDE 25 MG: 25 CAPSULE ORAL at 21:25

## 2025-05-10 RX ADMIN — SENNOSIDES AND DOCUSATE SODIUM 1 TABLET: 50; 8.6 TABLET ORAL at 08:00

## 2025-05-10 RX ADMIN — KETOROLAC TROMETHAMINE 30 MG: 30 INJECTION, SOLUTION INTRAMUSCULAR at 09:13

## 2025-05-10 RX ADMIN — SENNOSIDES AND DOCUSATE SODIUM 1 TABLET: 50; 8.6 TABLET ORAL at 19:22

## 2025-05-10 RX ADMIN — DIPHENHYDRAMINE HYDROCHLORIDE 25 MG: 50 INJECTION, SOLUTION INTRAMUSCULAR; INTRAVENOUS at 09:13

## 2025-05-10 RX ADMIN — PROCHLORPERAZINE EDISYLATE 10 MG: 5 INJECTION INTRAMUSCULAR; INTRAVENOUS at 21:22

## 2025-05-10 RX ADMIN — GABAPENTIN 600 MG: 300 CAPSULE ORAL at 17:50

## 2025-05-10 RX ADMIN — Medication 3 MG: at 21:25

## 2025-05-10 RX ADMIN — PROCHLORPERAZINE EDISYLATE 10 MG: 5 INJECTION INTRAMUSCULAR; INTRAVENOUS at 09:12

## 2025-05-10 RX ADMIN — ACETAMINOPHEN 975 MG: 325 TABLET ORAL at 15:43

## 2025-05-10 RX ADMIN — MAGNESIUM SULFATE HEPTAHYDRATE 2 G: 2 INJECTION, SOLUTION INTRAVENOUS at 14:48

## 2025-05-10 ASSESSMENT — ACTIVITIES OF DAILY LIVING (ADL)
ADLS_ACUITY_SCORE: 32
ADLS_ACUITY_SCORE: 56
ADLS_ACUITY_SCORE: 32
ADLS_ACUITY_SCORE: 56
ADLS_ACUITY_SCORE: 56
ADLS_ACUITY_SCORE: 58
ADLS_ACUITY_SCORE: 56
ADLS_ACUITY_SCORE: 58
ADLS_ACUITY_SCORE: 32
ADLS_ACUITY_SCORE: 56
ADLS_ACUITY_SCORE: 32
ADLS_ACUITY_SCORE: 56
ADLS_ACUITY_SCORE: 56
ADLS_ACUITY_SCORE: 32

## 2025-05-10 NOTE — PLAN OF CARE
Status: Admitted 5/6 with HA and vision changes. Contact precautions   Vitals: VSS on RA ex HTN within parameters. Continuous pulse ox in place. CCM  Neuros: A/Ox4. 5/5 t/o. Word finding difficulty. Bilateral photophobia. Intermittent double/blurred vision, none reported this shift. NIHSS 0  IV: PIV SL  Labs/Electrolytes: BG ACHS  Resp: Infrequent congested cough  Diet: Regular  : Voiding spontaneously to bathroom  GI: LBM 5/7  Skin: Bruising t/o  Pain: Frontal headache, gabapentin given  Activity: Up ad sylvester  Plan: Scheduled LP on Monday 5/12  Updates this shift: MD messaged for additional pain meds    Goal Outcome Evaluation:      Plan of Care Reviewed With: patient    Overall Patient Progress: no change    Outcome Evaluation: Resting between cares

## 2025-05-10 NOTE — PLAN OF CARE
Status: Admitted 5/6 with HA and vision changes.  Vitals: VSS on RA ex HTN within parameters. Continuous pulse ox in place. CCM  Neuros: A/Ox4. Labile, 5/5 strength. Bilateral photophobia. Intermittent double/blurred vision, none reported this shift. NIHSS 0  IV: PIV SL  Labs/Electrolytes: BG ACHS, Mg replaced this afternoon.   Resp: Infrequent congested cough  Diet: Regular  : Voiding   GI: LBM 5/7. Bowel meds given  Skin: Bruising t/o  Pain: Consistent HA. Migraine cocktail given this AM with some relief, Scheduled tylenol, Gapabentin given  Activity: Up ad sylvester  Plan: Scheduled LP on Monday 5/12  Updates this shift: Continue to monitor for HA and follow POC     Goal Outcome Evaluation:       Plan of Care Reviewed With: patient     Overall Patient Progress: no change     Outcome Evaluation: Resting between cares

## 2025-05-10 NOTE — PROGRESS NOTES
[facility]    Vascular Neurology Progress Note    Interval History     RN notes reviewed. Ongoing headache overnight despite addition of gabapentin. Given the complications of flow cytomtery analysis on the weekends and pts hesitance for LP at bedside, the plan is for F-G LP Monday Am.     Today's changes:  - Fluoro guided LP refused on Friday, now agreeable to having it done. Refused bedside LP today, in addition to flow likely being unable to run today, will see if fluoro-guided remains an option on Monday  - Edited BP parameters:   - Notify provider for SBP > 180   - PRN antihypertensives for SBP > 160  - Start propranolol ER 60mg daily  - Pain consulted, appreciate recommendations  - Increase gabapentin to 600 TID  - Scheduled compazine and benadryl q6h PRN  - Scheduled ketolorac q8h PRN  - 2g IV Mag today x1    Hospital Course     Chief complaint: Headache    Amy Choudhury is a 65 year old female with history of DM2, anxiety, tobacco dependence, GERD, s/p gastric bypass, who presents to the ED via EMS with worsening headache and blurred vision.     Patient was admitted to [facility] on 4/25/2025 with left-sided frontal headache for 3 weeks.  Her CT head was unremarkable . She was treated with migraine cocktail, improved and discharged home.     She presented to the [facility] ED again on 4/2825 with headache again and was found to have blood pressure of 220s/120s. MRI brain w/without contrast showed Interval development of patchy T2 FLAIR hyperintensity within the left posteroinferior cerebellum, with possible patchy enhancement. Additional new areas of subcortical T2 FLAIR hyperintensity involving the posterior parieto-occipital regions. Progressed scattered associated susceptibility artifact, most notably right occipital lobe.  She was treated as a case of posterior skull encephalopathy with gradual decrease in blood pressure and started on blood pressure  "medications to be taken at home.  Her UDS was also positive for methamphetamine.     She had another admission from 5/2/25-5/5/25 for headache and was again hypertensive.  Repeat MRI was similar to prior MRIs with small microhemorrhages and superficial siderosis along with the signal normalities through posterior cerebral and cerebellar hemispheres with leptomeningeal enhancement.  She was discharged yesterday.     On her way back, she lost her way while walking because she could not remember her way back home.  A bystander called EMS and she was dropped off home by them.  She slept fine.  She woke up this morning with persistent headache and blurred/quadrupled/choppy vision.  Throughout time since yesterday, she thinks she has been losing time.  She has 7/10 headache from front of her head but that keeps migrating.  She was very hungry and Eating food in the ED. she also made few remarks such as, \"the pen is turning into cigarette, or I have seen that lady jumping before\".    Assessment and Plan     #Extensive posterior-predominant micro hemorrhages with associated contrast enhancement  #Refractory headache associated with photophobia, palinopsia  #C/f Hematogenous malignancy vs hypertensive micro hemorrhages vs inflammatory CAA vs PRES    Amy Choudhury is a 65 year old female with history of DM2, anxiety, tobacco dependence, GERD, s/p gastric bypass, who presents to the ED via EMS with worsening headache , blurred vision, sense of loss of time.  Patient had recent admission and workup at [facility] after presenting with headache and blurred vision and the MRI was concerning for PRES plus cerebral amyloid angiopathy versus hypertensive micro hemorrhages.  Her blood pressure here is 123 x 86.  She is also found to have UDS positive for methamphetamine and cannabis.  Her symptoms could be either continuation of her symptoms from PRES in addition to drug use or anything new such as intracranial bleed/subarachnoid " hemorrhage, or the cerebral amyloid angiopathy is inflammatory in nature and has been untreated. Unfortunately, we do not have the images from outside hospital to review, which we have requested from OSH. Due to this delay and ongoing symptoms, we opted to repeat MRI brain wwo, which was notable for interval decrease in T2 signal, but with increase in posterior findings such as pachymeningial enhancement. Per radiology, these findings (considering posterior distribution) raises suspicion for a hematological malignancy in addition to our initial differential of PRES, inflammatory CAA, hypertensive microhemorrhage. We would thus opt to obtain CSF for further workup listed below, especially to r/o carcinomatosis/lymphoma. Discussing with the patient, she is very apprehensive, as she has a prior history of a daughter passing away iso neurological illness at this hospital, with one part of her hospitalization being a traumatic bedside LP, which she would like to avoid. Given this as well as difficult anatomy, we will ask rads to kindly consider obtaining CSF fluoro-guided. In the meantime will obtain CT CAP to screen for malignancy. Of note, were able to see prior MRIs from OSH, notably without impressive FLAIR signal change, seemingly lowering now-recovering PRES on the differential.     We have also opted to connect to EEG given symptoms of palinopsia, face dysmorphia, sensation of lost time, and intermittent confusion. Her seizure threshold could certainly be lowered by PRES. EEG has remained negative and was discontinued. Unfortunately, pt declined fluoro-guided LP 5/9, though is now again amenable. Given the complications of flow cytomtery analysis on the weekends and pts hesitance for LP at bedside, the plan is for F-G LP Monday Am.     Discussed refractory headache with general neurology yesterday and started on gabapentin TID. Given the persistent severe headache, will increase gabapentin today and start on  propranolol for prevention. Will also consult pain management.      - LDL this admission of 72, goal < 70.  Will defer starting statin at this time.  - Migraine cocktail as needed for pain  - Discontinue vEEG   - LP: protein, cells, glucose, cytology, flow cytometry, ACE, IL 6, MEP, cultures, HSV 1 and 2, save extra sample  - Serum labs: paraneoplastic panel, CRP, ESR, Ddimer  - Increase gabapentin to 600mg TID  - Edited BP parameters:   - Notify provider for SBP > 180   - PRN antihypertensives for SBP > 160  - Start propranolol ER 60mg daily  - Pain consulted, appreciate recommendations  - Scheduled compazine and benadryl q6h PRN  - Scheduled ketolorac q8h PRN  - 2g IV Mag today x1      Additional hospital problems:  # Leukocytosis, resolved  She is also found to have high leukocyte count 14.3.  But chest x-ray and UA are unremarkable, and counts have returned to normal.   - Daily CBC  - Temperature monitoring       # Type 2 diabetes mellitus  - Medium dose sliding scale  - A1c this admission 8.7, goal < 7.0. Will need close PCP follow up     # History of hypertension  - Edited BP parameters:   - Notify provider for SBP > 180   - PRN antihypertensives for SBP > 160  - Hydralazine/labetalol as needed for systolic blood pressure>160  - Continue PTA hydrochlorothiazide 12.5mg daily  - Consider addition of 5mg amlodipine if she requires PRNs this afternoon      Prophylaxis            For VTE Prevention:  - pneumatic compression device     For Acid Suppression:  - GI prophylaxis is not indicated         DVT Prophylaxis: SCDs    Patient Follow-up    - Neurology clinic follow up in 6-8 weeks  - PCP follow up within 7 days of discharge for HTN, diabetes management      Medically Ready for Discharge: Anticipated in 2-4 Days pending ongoing workup    The patient was discussed with the Stroke Staff Dr. Hoffman.     Dariusz Bang MD  Neurology Resident, PGY-2  05/10/2025 8:00 AM       Physical Examination     Temp: 98.2  F  "(36.8  C) Temp src: Oral BP: (!) 145/85 Pulse: 65   Resp: 16 SpO2: 95 % O2 Device: None (Room air)      General:  patient lying in bed without any acute distress    HEENT:  normocephalic/atraumatic  Cardio:  RRR  Pulmonary:  no respiratory distress  Abdomen:  soft  Extremities:  no edema  Skin:  intact     Neurologic  Mental Status:  alert, oriented x 3, follows commands, speech clear and fluent, naming and repetition normal, reports intermittent visual distortions such as \"graffiti on the walls\"  Cranial Nerves:  visual fields intact, PERRL, EOMI with normal smooth pursuit, facial sensation intact and symmetric, facial movements symmetric, hearing not formally tested but intact to conversation, palate elevation symmetric and uvula midline, no dysarthria, shoulder shrug strong bilaterally, tongue protrusion midline  Motor:  normal muscle tone and bulk, no abnormal movements, able to move all limbs spontaneously, strength 5/5 throughout upper and lower extremities, no pronator drift  Reflexes:  toes down-going  Sensory:  light touch sensation intact and symmetric throughout upper and lower extremities, no extinction on double simultaneous stimulation   Coordination:  normal finger-to-nose and heel-to-shin bilaterally without dysmetria, rapid alternating movements symmetric  Station/Gait:  deferred    Stroke Scales       NIHSS  1a. Level of Consciousness 0-->Alert, keenly responsive   1b. LOC Questions 0-->Answers both questions correctly   1c. LOC Commands 0-->Performs both tasks correctly   2.   Best Gaze 0-->Normal   3.   Visual 0-->No visual loss   4.   Facial Palsy 0-->Normal symmetrical movements   5a. Motor Arm, Left 0-->No drift, limb holds 90 (or 45) degrees for full 10 secs   5b. Motor Arm, Right 0-->No drift, limb holds 90 (or 45) degrees for full 10 secs   6a. Motor Leg, Left 0-->No drift, leg holds 30 degree position for full 5 secs   6b. Motor Leg, right 0-->No drift, leg holds 30 degree position for " full 5 secs   7.   Limb Ataxia 0-->Absent   8.   Sensory 0-->Normal, no sensory loss   9.   Best Language 0-->No aphasia, normal   10. Dysarthria 0-->Normal   11. Extinction and Inattention  0-->No abnormality   Total 0 (05/07/25)        Imaging/Labs   (Bolded imaging and labs new and/or personally reviewed or re-reviewed by me today)    MRI/Head CT CT head:  IMPRESSION:  1.  No acute findings. No acute intracranial hemorrhage. No acute calvarial fracture.    MRI:   IMPRESSION:  1. There is somewhat decreased cortical T2 hyperintense signal with increased conspicuity of leptomeningeal enhancement and numerous presumed microhemorrhages within the occipital, temporal parietal regions bilaterally. These findings are nonspecific, although may be seen with process or hematogenous malignancy such as melanoma.  Findings are less likely related to cerebral amyloid  angiopathy given the distribution. The distribution could be seen with hypertensive angiopathy, however these are extremely great number, much more than expected.   2. 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  3. Chronic small vessel ischemic changes.   Intracranial Vasculature HEAD CTA:   No large vessel occlusion findings intracranially. No high-grade stenosis intracranially.   Cervical Vasculature NECK CTA:  1.  No acute vascular injury in the neck. No high-grade stenosis in the neck.     Echocardiogram Not obtained   EKG/Telemetry Not obtained     LDL  5/6/2025: 72 mg/dL   A1C  5/6/2025: 8.7 %   Troponin No lab value available in past 48 hrs     Other labs reviewed by me today:  Recent Results (from the past 24 hours)   Glucose by meter    Collection Time: 05/09/25  5:12 PM   Result Value Ref Range    GLUCOSE BY METER POCT 346 (H) 70 - 99 mg/dL   Glucose by meter    Collection Time: 05/09/25  9:07 PM   Result Value Ref Range    GLUCOSE BY METER POCT 214 (H) 70 - 99 mg/dL   Glucose by meter    Collection  Time: 05/09/25  9:56 PM   Result Value Ref Range    GLUCOSE BY METER POCT 204 (H) 70 - 99 mg/dL   Glucose by meter    Collection Time: 05/10/25  1:11 AM   Result Value Ref Range    GLUCOSE BY METER POCT 186 (H) 70 - 99 mg/dL   CBC with platelets    Collection Time: 05/10/25  7:36 AM   Result Value Ref Range    WBC Count 8.6 4.0 - 11.0 10e3/uL    RBC Count 4.67 3.80 - 5.20 10e6/uL    Hemoglobin 12.1 11.7 - 15.7 g/dL    Hematocrit 38.0 35.0 - 47.0 %    MCV 81 78 - 100 fL    MCH 25.9 (L) 26.5 - 33.0 pg    MCHC 31.8 31.5 - 36.5 g/dL    RDW 14.8 10.0 - 15.0 %    Platelet Count 358 150 - 450 10e3/uL   Basic metabolic panel    Collection Time: 05/10/25  7:36 AM   Result Value Ref Range    Sodium 135 135 - 145 mmol/L    Potassium 4.6 3.4 - 5.3 mmol/L    Chloride 101 98 - 107 mmol/L    Carbon Dioxide (CO2) 25 22 - 29 mmol/L    Anion Gap 9 7 - 15 mmol/L    Urea Nitrogen 18.7 8.0 - 23.0 mg/dL    Creatinine 0.53 0.51 - 0.95 mg/dL    GFR Estimate >90 >60 mL/min/1.73m2    Calcium 9.2 8.8 - 10.4 mg/dL    Glucose 285 (H) 70 - 99 mg/dL   Magnesium    Collection Time: 05/10/25  7:36 AM   Result Value Ref Range    Magnesium 1.9 1.7 - 2.3 mg/dL   Glucose by meter    Collection Time: 05/10/25  8:43 AM   Result Value Ref Range    GLUCOSE BY METER POCT 266 (H) 70 - 99 mg/dL   Glucose by meter    Collection Time: 05/10/25 12:02 PM   Result Value Ref Range    GLUCOSE BY METER POCT 268 (H) 70 - 99 mg/dL

## 2025-05-11 ENCOUNTER — APPOINTMENT (OUTPATIENT)
Dept: CT IMAGING | Facility: CLINIC | Age: 65
DRG: 545 | End: 2025-05-11
Payer: COMMERCIAL

## 2025-05-11 LAB
ANION GAP SERPL CALCULATED.3IONS-SCNC: 9 MMOL/L (ref 7–15)
BUN SERPL-MCNC: 21.8 MG/DL (ref 8–23)
CALCIUM SERPL-MCNC: 8.5 MG/DL (ref 8.8–10.4)
CHLORIDE SERPL-SCNC: 99 MMOL/L (ref 98–107)
CORTIS SERPL-MCNC: 30.7 UG/DL
CREAT SERPL-MCNC: 0.55 MG/DL (ref 0.51–0.95)
EGFRCR SERPLBLD CKD-EPI 2021: >90 ML/MIN/1.73M2
ERYTHROCYTE [DISTWIDTH] IN BLOOD BY AUTOMATED COUNT: 14.3 % (ref 10–15)
GLUCOSE BLDC GLUCOMTR-MCNC: 191 MG/DL (ref 70–99)
GLUCOSE BLDC GLUCOMTR-MCNC: 201 MG/DL (ref 70–99)
GLUCOSE BLDC GLUCOMTR-MCNC: 202 MG/DL (ref 70–99)
GLUCOSE BLDC GLUCOMTR-MCNC: 221 MG/DL (ref 70–99)
GLUCOSE BLDC GLUCOMTR-MCNC: 224 MG/DL (ref 70–99)
GLUCOSE BLDC GLUCOMTR-MCNC: 224 MG/DL (ref 70–99)
GLUCOSE SERPL-MCNC: 205 MG/DL (ref 70–99)
HCO3 SERPL-SCNC: 23 MMOL/L (ref 22–29)
HCT VFR BLD AUTO: 39.2 % (ref 35–47)
HGB BLD-MCNC: 12.6 G/DL (ref 11.7–15.7)
MAGNESIUM SERPL-MCNC: 1.9 MG/DL (ref 1.7–2.3)
MCH RBC QN AUTO: 26.3 PG (ref 26.5–33)
MCHC RBC AUTO-ENTMCNC: 32.1 G/DL (ref 31.5–36.5)
MCV RBC AUTO: 82 FL (ref 78–100)
OSMOLALITY SERPL: 285 MMOL/KG (ref 280–301)
OSMOLALITY UR: 862 MMOL/KG (ref 100–1200)
PHOSPHATE SERPL-MCNC: 3.5 MG/DL (ref 2.5–4.5)
PLATELET # BLD AUTO: 362 10E3/UL (ref 150–450)
POTASSIUM SERPL-SCNC: 4.8 MMOL/L (ref 3.4–5.3)
RBC # BLD AUTO: 4.79 10E6/UL (ref 3.8–5.2)
SODIUM SERPL-SCNC: 126 MMOL/L (ref 135–145)
SODIUM SERPL-SCNC: 128 MMOL/L (ref 135–145)
SODIUM SERPL-SCNC: 131 MMOL/L (ref 135–145)
SODIUM UR-SCNC: 219 MMOL/L
TSH SERPL DL<=0.005 MIU/L-ACNC: 0.67 UIU/ML (ref 0.3–4.2)
WBC # BLD AUTO: 11 10E3/UL (ref 4–11)

## 2025-05-11 PROCEDURE — 80048 BASIC METABOLIC PNL TOTAL CA: CPT

## 2025-05-11 PROCEDURE — 99232 SBSQ HOSP IP/OBS MODERATE 35: CPT | Mod: GC | Performed by: STUDENT IN AN ORGANIZED HEALTH CARE EDUCATION/TRAINING PROGRAM

## 2025-05-11 PROCEDURE — 258N000003 HC RX IP 258 OP 636

## 2025-05-11 PROCEDURE — 84295 ASSAY OF SERUM SODIUM: CPT

## 2025-05-11 PROCEDURE — 250N000013 HC RX MED GY IP 250 OP 250 PS 637

## 2025-05-11 PROCEDURE — 120N000002 HC R&B MED SURG/OB UMMC

## 2025-05-11 PROCEDURE — 70450 CT HEAD/BRAIN W/O DYE: CPT | Mod: 26 | Performed by: RADIOLOGY

## 2025-05-11 PROCEDURE — 250N000011 HC RX IP 250 OP 636

## 2025-05-11 PROCEDURE — 36415 COLL VENOUS BLD VENIPUNCTURE: CPT

## 2025-05-11 PROCEDURE — 80051 ELECTROLYTE PANEL: CPT

## 2025-05-11 PROCEDURE — 250N000011 HC RX IP 250 OP 636: Mod: JZ

## 2025-05-11 PROCEDURE — 84300 ASSAY OF URINE SODIUM: CPT

## 2025-05-11 PROCEDURE — 84155 ASSAY OF PROTEIN SERUM: CPT | Performed by: STUDENT IN AN ORGANIZED HEALTH CARE EDUCATION/TRAINING PROGRAM

## 2025-05-11 PROCEDURE — 84443 ASSAY THYROID STIM HORMONE: CPT

## 2025-05-11 PROCEDURE — 999N000127 HC STATISTIC PERIPHERAL IV START W US GUIDANCE

## 2025-05-11 PROCEDURE — 83930 ASSAY OF BLOOD OSMOLALITY: CPT

## 2025-05-11 PROCEDURE — 80061 LIPID PANEL: CPT | Performed by: STUDENT IN AN ORGANIZED HEALTH CARE EDUCATION/TRAINING PROGRAM

## 2025-05-11 PROCEDURE — 70450 CT HEAD/BRAIN W/O DYE: CPT

## 2025-05-11 PROCEDURE — 83935 ASSAY OF URINE OSMOLALITY: CPT

## 2025-05-11 PROCEDURE — 84100 ASSAY OF PHOSPHORUS: CPT | Performed by: STUDENT IN AN ORGANIZED HEALTH CARE EDUCATION/TRAINING PROGRAM

## 2025-05-11 PROCEDURE — 83735 ASSAY OF MAGNESIUM: CPT | Performed by: STUDENT IN AN ORGANIZED HEALTH CARE EDUCATION/TRAINING PROGRAM

## 2025-05-11 PROCEDURE — 85041 AUTOMATED RBC COUNT: CPT

## 2025-05-11 PROCEDURE — 85018 HEMOGLOBIN: CPT

## 2025-05-11 PROCEDURE — 250N000009 HC RX 250

## 2025-05-11 PROCEDURE — 82533 TOTAL CORTISOL: CPT

## 2025-05-11 RX ORDER — MAGNESIUM SULFATE HEPTAHYDRATE 40 MG/ML
2 INJECTION, SOLUTION INTRAVENOUS ONCE
Status: COMPLETED | OUTPATIENT
Start: 2025-05-11 | End: 2025-05-11

## 2025-05-11 RX ORDER — KETOROLAC TROMETHAMINE 30 MG/ML
30 INJECTION, SOLUTION INTRAMUSCULAR; INTRAVENOUS ONCE
Status: COMPLETED | OUTPATIENT
Start: 2025-05-11 | End: 2025-05-11

## 2025-05-11 RX ORDER — DIPHENHYDRAMINE HYDROCHLORIDE 50 MG/ML
25 INJECTION, SOLUTION INTRAMUSCULAR; INTRAVENOUS ONCE
Status: COMPLETED | OUTPATIENT
Start: 2025-05-11 | End: 2025-05-11

## 2025-05-11 RX ORDER — DIPHENHYDRAMINE HCL 25 MG
25 CAPSULE ORAL EVERY 6 HOURS SCHEDULED
Status: CANCELLED | OUTPATIENT
Start: 2025-05-11

## 2025-05-11 RX ORDER — DIPHENHYDRAMINE HYDROCHLORIDE 50 MG/ML
25 INJECTION, SOLUTION INTRAMUSCULAR; INTRAVENOUS EVERY 6 HOURS SCHEDULED
Status: CANCELLED | OUTPATIENT
Start: 2025-05-11

## 2025-05-11 RX ORDER — CALCIUM CARBONATE 500 MG/1
500 TABLET, CHEWABLE ORAL DAILY PRN
Status: DISCONTINUED | OUTPATIENT
Start: 2025-05-11 | End: 2025-05-21 | Stop reason: HOSPADM

## 2025-05-11 RX ORDER — DIPHENHYDRAMINE HYDROCHLORIDE 50 MG/ML
25 INJECTION, SOLUTION INTRAMUSCULAR; INTRAVENOUS EVERY 6 HOURS SCHEDULED
Status: DISCONTINUED | OUTPATIENT
Start: 2025-05-11 | End: 2025-05-12

## 2025-05-11 RX ORDER — SODIUM CHLORIDE 1 G/1
1 TABLET ORAL 3 TIMES DAILY
Status: DISCONTINUED | OUTPATIENT
Start: 2025-05-11 | End: 2025-05-14

## 2025-05-11 RX ORDER — LORAZEPAM 2 MG/ML
1 INJECTION INTRAMUSCULAR
Status: COMPLETED | OUTPATIENT
Start: 2025-05-11 | End: 2025-05-12

## 2025-05-11 RX ORDER — DIPHENHYDRAMINE HCL 25 MG
25 CAPSULE ORAL EVERY 6 HOURS SCHEDULED
Status: DISCONTINUED | OUTPATIENT
Start: 2025-05-11 | End: 2025-05-12

## 2025-05-11 RX ORDER — POLYETHYLENE GLYCOL 3350 17 G/17G
17 POWDER, FOR SOLUTION ORAL DAILY
Status: DISCONTINUED | OUTPATIENT
Start: 2025-05-11 | End: 2025-05-21 | Stop reason: HOSPADM

## 2025-05-11 RX ADMIN — GABAPENTIN 600 MG: 300 CAPSULE ORAL at 18:42

## 2025-05-11 RX ADMIN — POLYETHYLENE GLYCOL 3350 17 G: 17 POWDER, FOR SOLUTION ORAL at 15:42

## 2025-05-11 RX ADMIN — Medication 3 MG: at 21:13

## 2025-05-11 RX ADMIN — MAGNESIUM SULFATE HEPTAHYDRATE 2 G: 2 INJECTION, SOLUTION INTRAVENOUS at 10:11

## 2025-05-11 RX ADMIN — SERTRALINE HYDROCHLORIDE 50 MG: 50 TABLET ORAL at 08:17

## 2025-05-11 RX ADMIN — QUETIAPINE FUMARATE 25 MG: 25 TABLET ORAL at 19:37

## 2025-05-11 RX ADMIN — SENNOSIDES AND DOCUSATE SODIUM 2 TABLET: 50; 8.6 TABLET ORAL at 08:17

## 2025-05-11 RX ADMIN — PROCHLORPERAZINE EDISYLATE 10 MG: 5 INJECTION INTRAMUSCULAR; INTRAVENOUS at 04:19

## 2025-05-11 RX ADMIN — KETOROLAC TROMETHAMINE 30 MG: 30 INJECTION, SOLUTION INTRAMUSCULAR at 10:08

## 2025-05-11 RX ADMIN — PROCHLORPERAZINE EDISYLATE 10 MG: 5 INJECTION INTRAMUSCULAR; INTRAVENOUS at 15:40

## 2025-05-11 RX ADMIN — PROPRANOLOL HYDROCHLORIDE 60 MG: 60 CAPSULE, EXTENDED RELEASE ORAL at 08:17

## 2025-05-11 RX ADMIN — HYDROCHLOROTHIAZIDE 12.5 MG: 12.5 TABLET ORAL at 08:17

## 2025-05-11 RX ADMIN — ONDANSETRON 4 MG: 2 INJECTION, SOLUTION INTRAMUSCULAR; INTRAVENOUS at 14:22

## 2025-05-11 RX ADMIN — PROCHLORPERAZINE EDISYLATE 10 MG: 5 INJECTION INTRAMUSCULAR; INTRAVENOUS at 10:10

## 2025-05-11 RX ADMIN — ACETAMINOPHEN 975 MG: 325 TABLET ORAL at 10:14

## 2025-05-11 RX ADMIN — SODIUM CHLORIDE 1 G: 1 TABLET ORAL at 22:43

## 2025-05-11 RX ADMIN — CALCIUM CARBONATE (ANTACID) CHEW TAB 500 MG 500 MG: 500 CHEW TAB at 13:36

## 2025-05-11 RX ADMIN — DIPHENHYDRAMINE HYDROCHLORIDE 25 MG: 25 CAPSULE ORAL at 04:16

## 2025-05-11 RX ADMIN — VALPROATE SODIUM 500 MG: 100 INJECTION, SOLUTION INTRAVENOUS at 16:40

## 2025-05-11 RX ADMIN — DIPHENHYDRAMINE HYDROCHLORIDE 25 MG: 50 INJECTION, SOLUTION INTRAMUSCULAR; INTRAVENOUS at 10:06

## 2025-05-11 RX ADMIN — KETOROLAC TROMETHAMINE 30 MG: 30 INJECTION, SOLUTION INTRAMUSCULAR at 17:46

## 2025-05-11 RX ADMIN — PROCHLORPERAZINE EDISYLATE 10 MG: 5 INJECTION INTRAMUSCULAR; INTRAVENOUS at 21:12

## 2025-05-11 RX ADMIN — DIPHENHYDRAMINE HYDROCHLORIDE 25 MG: 25 CAPSULE ORAL at 15:42

## 2025-05-11 RX ADMIN — DIPHENHYDRAMINE HYDROCHLORIDE 25 MG: 25 CAPSULE ORAL at 21:13

## 2025-05-11 RX ADMIN — GABAPENTIN 600 MG: 300 CAPSULE ORAL at 00:27

## 2025-05-11 RX ADMIN — KETOROLAC TROMETHAMINE 30 MG: 30 INJECTION, SOLUTION INTRAMUSCULAR at 05:07

## 2025-05-11 RX ADMIN — ONDANSETRON 4 MG: 2 INJECTION, SOLUTION INTRAMUSCULAR; INTRAVENOUS at 21:29

## 2025-05-11 RX ADMIN — ACETAMINOPHEN 975 MG: 325 TABLET ORAL at 15:42

## 2025-05-11 RX ADMIN — GABAPENTIN 600 MG: 300 CAPSULE ORAL at 12:00

## 2025-05-11 RX ADMIN — Medication 500 MG: at 08:17

## 2025-05-11 RX ADMIN — ACETAMINOPHEN 975 MG: 325 TABLET ORAL at 21:17

## 2025-05-11 ASSESSMENT — ACTIVITIES OF DAILY LIVING (ADL)
ADLS_ACUITY_SCORE: 32
ADLS_ACUITY_SCORE: 31
ADLS_ACUITY_SCORE: 35
ADLS_ACUITY_SCORE: 31
ADLS_ACUITY_SCORE: 35
ADLS_ACUITY_SCORE: 35
ADLS_ACUITY_SCORE: 31
ADLS_ACUITY_SCORE: 35
ADLS_ACUITY_SCORE: 31
ADLS_ACUITY_SCORE: 35
ADLS_ACUITY_SCORE: 31
ADLS_ACUITY_SCORE: 35
ADLS_ACUITY_SCORE: 35
ADLS_ACUITY_SCORE: 33
ADLS_ACUITY_SCORE: 35

## 2025-05-11 NOTE — PLAN OF CARE
Status: Admitted 5/6 with HA and vision changes. Contact precautions   Vitals: VSS on RA ex HTN within SBP < 180 parameters. Continuous pulse ox in place. CCM  Neuros: A/Ox4. 5/5 t/o. Bilateral photophobia. Intermittent blurred/double vision, none reported this shift. Intermittent nausea and dizziness. NIHSS 0  IV: PIV SL  Labs/Electrolytes: BG ACHS. 0200   Resp: Infrequent congested cough  Diet: Regular  : Voiding spontaneously to bathroom  GI: LBM 5/9 per pt. Passing gas  Skin: Bruising t/o  Pain: Frontal headache, tylenol, gabapentin, and migraine cocktail given  Activity: SBA  Plan: LP scheduled for Monday 5/12    Goal Outcome Evaluation:      Plan of Care Reviewed With: patient    Overall Patient Progress: improvingOverall Patient Progress: improving    Outcome Evaluation: Managing pain/nausea

## 2025-05-11 NOTE — INTERIM SUMMARY
Afternoon upudates:    CTH stable, no hemorrhagic conversion.   Patient still endorsing 8/10 headache, ordered 500mg IV depakote  Na downtrending, now to 128. Ordered hyponatremia labs. Consider SIADH, CSW +/- medication side effect (thiazide) Pending results, consider salt tabs, hypertonics.   Fluid restriction in place 1500ml limit  Strict I/os.     Discussed with vascular neurology attending Dr. Yasmin Wade MD

## 2025-05-11 NOTE — PLAN OF CARE
Status: Admitted 5/6 for HA and blurred vision. Etiology unclear PRES vs heme malignancy vs inflam CAA. Recent admission at OSH for similar symptoms with possible PRES diagnosis and microbleed in occipital regions. Hx of DM, anxiety, tobaaco use, poly substance abuse.   Precautions: Contact for MRSA  Vitals: Hypertensive outside of params that resolved with scheduled meds.   Neuros: Drowsy, arouse to voice or touch. Ox4. PERRLA. Denies blurry and double vision. Constant photophobia. Speech intact. 5/5 t/o. Denies N/T. Consistent HA.   IV: PIV SL.   Labs/Electrolytes: - team aware. BG ACHS. CT head done on shift for increased HA- no changes.   Resp: On RA, intermittent cough.   Diet: Changed to low consistant carb diet. Poor appetite d/t nausea and belching. PRN tums and IV compazine and zofran given.   GI/: Voids to BR. LBM per pt, but nothing charted. Not passing gas per pt, abdomen soft, audible sounds on all quadrants.   Skin: Scattered bruising. Redness to bottox and coccys.   Pain: Continuous HA 8/10 not managed with migraine cocktail- team made aware.   Activity: SBA. Did not move much today d/t pain.   Social: No calls or visitors.   Plan: IV depakote this hafsa. Check 15h00 NA redraw. Hepatic panel in AM. Potential for F-G LP tomorrow.   Updates this shift: Worsening HA. Changed diet. CT head done.   Education completed: Stroke education needed to be done.

## 2025-05-11 NOTE — PROGRESS NOTES
[facility]    Vascular Neurology Progress Note    Interval History     No acute overnight events per nursing notes. This morning, Patient endorses frontal headache spreading to bilateral temples this morning, as well as nausea, photophobia and intermittent blurry/doubled vision. She was unable to order food this morning due her symptoms. She feels worse this morning compared to yesterday since uptitration of her headache prophylactic regimen.     Today's orders:  -one time order of: benadryl 25mg, ketorolac 30mg, compazine 10mg, mg 2g.   CT Head 1200 as patient reported no relief with headache medication regimen administered this morning.   Plan for IV depakote 500mg later this afternoon if patient still symptomatic.  -hepatic panel in tomorrow's AM labs  -miralax added to bowel regimen (last self-reported BM 5/9)  -trend Na, new hyponatremia possibly 2/2 poor po intake  Hospital Course     Chief complaint: Headache    66 yo female with DM, anxiety, tobacco use, polysubstance use (+meth UDS) presented with headaches and vision changes for 3 to 4 weeks.  Patient hospitalized x 2 at outside hospital for similar symptoms and workup include MRI brain with a working diagnosis of possible PRES, with systolics in 200s during one of the hospitalizations. Patient reports visual disturbances, distortions and palinopsia. Patient also described spells of lost time.  MRI from OSH showed patient has microbleed's in the bilateral parietotemporal occipital regions.  There was also significant leptomeningeal enhancement in the cerebellar regions and bilateral cerebral hemisphere sparing some of the part of frontal regions. Case discussed in multidisciplinary neuroradiology rounds.   Leptomeningeal enhancement appears to be more prominent than the most recent MRI with FLAIR hyperintensity not very prominent.  Continuous EEG done for 24 hours without any abnormal epileptiform  discharges.  CT chest abdominal pelvis completed to rule out any malignancy was unremarkable.  D-dimer, ESR, CRP within normal limits.  Patient refusing bedside lumbar puncture at bedside given prior traumatic experience with her daughter. Agreeable for lumbar puncture under fluoroscopy guidance.  Attempt made on Friday however she refused but now willing to undergo. Plan for LP under fluoroscopy 5/12.     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 Differential diagnoses include inflammatory cerebral amyloid angiopathy, CNS lymphoma, neoplastic/autoimmune process, less likely PRES given significant leptomeningeal enhancement and less prominent FLAIR changes though could be recovering PRES.   - LDL 72, goal < 70.  Will defer starting statin at this time.  - LP with IR 5/12: protein, cells, glucose, cytology, flow cytometry, ACE, IL 6, MEP, cultures, HSV 1 and 2, freeze sample  - Serum labs: paraneoplastic panel, CRP, ESR, Ddimer  - gabapentin 600mg TID  - BP parameters:              - Notify provider for SBP > 180              - PRN antihypertensives for SBP > 160  - propranolol ER 60mg daily  - Scheduled compazine and benadryl q6h , tylenol 975mg TID  - ketolorac q8h PRN  -magnesium 500mg daily  -plan to add IV depakote if patient symptomatic 5/11 PM  -repeat CTH 5/11 given increased headache severity  -LFTs  -plan steroid challenge for abortive treatment after CSF sample collection    #hyponatremia, new  Na 131  -trend BMP, repeat this afternoon  -will perform sodium studies for further evaluation if still hyponatremic, downtrending including:  urine osm, urine  sodium, serum osm.   -strict I/Os  -encourage adequate PO intake    #incidental finding- Ill-defined 1.6 cm area of enhancement within the left inferior thyroid lobe.   -TSH normal. Consider outpatient  nonemergent thyroid ultrasound for further Assessment, will defer to PCP.    #incidental 3. 5 mm solid nodule in the right upper lung lobe  -Can consider optional follow-up CT in 12 months to document stability, per PCP  # Leukocytosis, resolved  She is also found to have high leukocyte count 14.3.  But chest x-ray and UA are unremarkable, and counts have returned to normal.   - Daily CBC  - Temperature monitoring     # Type 2 diabetes mellitus  - A1c this admission 8.7, goal < 7.0. Will need close PCP follow up  - Medium dose sliding scale  -low consistent carb diet       #hypertension   cautious re-introduction of PTA BP meds to avoid fluctuations  - BP parameters:              - Notify provider for SBP > 180              - PRN antihypertensives for SBP > 160  - Hydralazine/labetalol as needed for systolic blood pressure>160  - Continue PTA hydrochlorothiazide 12.5mg daily  - Start PTA losartan at half dose-50mg daily. Slowly uptitrate to home dose 100mg in coming days.  -holding PTA amlodipine      Prophylaxis            For VTE Prevention:  - pneumatic compression device     For Acid Suppression:  - GI prophylaxis is not indicated    DVT Prophylaxis: SCDs    Patient Follow-up    - Neurology clinic follow up in 6-8 weeks  - PCP follow up within 7 days of discharge for HTN, diabetes management    Medically Ready for Discharge:   Medically Ready for Discharge: Anticipated in 2-4 Days pending ongoing workup     The patient was discussed with the Stroke Staff Dr. Hoffman.    Jazmine Wade MD  Neurology PGY-2    Physical Examination     Temp: 97.8  F (36.6  C) Temp src: Oral BP: (!) 152/87 Pulse: 56   Resp: 18 SpO2: 94 % O2 Device: None (Room air)      General:  patient lying in bed looks in pain   HEENT:   normocephalic/atraumatic  Cardio:  RRR  Pulmonary:  no respiratory distress on room air  Skin:  intact      Neurologic  Mental Status:  alert, oriented to self, place, date, situation, follows commands, speech clear and fluent, naming and repetition normal  Cranial Nerves: EOMI though difficulty keeping eyes open due to headache, facial sensation intact and symmetric, facial movements symmetric, hearing not formally tested but intact to conversation, no dysarthria, shoulder shrug strong bilaterally, tongue protrusion midline  Motor:  normal muscle tone and bulk, no abnormal movements, able to move all limbs spontaneously, strength 5/5 throughout upper and lower extremities, no pronator drift  Reflexes:  deferred  Sensory:  light touch sensation intact and symmetric throughout upper and lower extremities, no extinction on double simultaneous stimulation   Coordination:  normal finger-to-nose and heel-to-shin bilaterally without dysmetria   Station/Gait:  deferred       Stroke Scales       NIHSS  1a. Level of Consciousness 0-->Alert, keenly responsive   1b. LOC Questions 0-->Answers both questions correctly   1c. LOC Commands 0-->Performs both tasks correctly   2.   Best Gaze 0-->Normal   3.   Visual 0-->No visual loss   4.   Facial Palsy 0-->Normal symmetrical movements   5a. Motor Arm, Left 0-->No drift, limb holds 90 (or 45) degrees for full 10 secs   5b. Motor Arm, Right 0-->No drift, limb holds 90 (or 45) degrees for full 10 secs   6a. Motor Leg, Left 0-->No drift, leg holds 30 degree position for full 5 secs   6b. Motor Leg, right 0-->No drift, leg holds 30 degree position for full 5 secs   7.   Limb Ataxia 0-->Absent   8.   Sensory 0-->Normal, no sensory loss   9.   Best Language 0-->No aphasia, normal   10. Dysarthria 0-->Normal   11. Extinction and Inattention  0-->No abnormality   Total 0 (05/06/25 1813)       Imaging/Labs      MRI/Head CT CT head:  IMPRESSION:  1.  No acute findings. No acute intracranial  hemorrhage. No acute calvarial fracture.     MRI:   IMPRESSION:  1. There is somewhat decreased cortical T2 hyperintense signal with increased conspicuity of leptomeningeal enhancement and numerous presumed microhemorrhages within the occipital, temporal parietal regions bilaterally. These findings are nonspecific, although may be seen with process or hematogenous malignancy such as melanoma.  Findings are less likely related to cerebral amyloid  angiopathy given the distribution. The distribution could be seen with hypertensive angiopathy, however these are extremely great number, much more than expected.   2. 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  3. Chronic small vessel ischemic changes.   Intracranial Vasculature HEAD CTA:   No large vessel occlusion findings intracranially. No high-grade stenosis intracranially.   Cervical Vasculature NECK CTA:  1.  No acute vascular injury in the neck. No high-grade stenosis in the neck.      Echocardiogram Not obtained   EKG/Telemetry Not obtained      LDL  5/6/2025: 72 mg/dL   A1C  5/6/2025: 8.7 %   Troponin No lab value available in past 48 hrs   TSH 1.24  CT CAP: IMPRESSION:   1. No evidence of primary malignancy throughout the chest, abdomen or  pelvis.  2. Ill-defined 1.6 cm area of enhancement within the left inferior  thyroid lobe. Consider nonemergent thyroid ultrasound for further  assessment.  3. 5 mm solid nodule in the right upper lobe. Can consider optional  follow-up CT in 12 months to document stability.  4. 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 The gastrojejunal anastomosis appears  patent, however, inspissated debris may suggest delayed  gastrointestinal transit.  5. Hepatic steatosis.  6. Additional chronic and incidental findings, as described above.       SUMMARY OF 3 DAYS OF VIDEO EEG:  This 3 day video EEG is abnormal due to the presence  increased superimposed fast activity, likely related to sedating medications employed. No seizures or epileptiform discharges were seen. Clinical correlation is recommended.     Other labs reviewed by me today:  Recent Results (from the past 24 hours)   Glucose by meter    Collection Time: 05/10/25  5:22 PM   Result Value Ref Range    GLUCOSE BY METER POCT 271 (H) 70 - 99 mg/dL   Glucose by meter    Collection Time: 05/10/25  9:30 PM   Result Value Ref Range    GLUCOSE BY METER POCT 189 (H) 70 - 99 mg/dL   Glucose by meter    Collection Time: 05/11/25  1:12 AM   Result Value Ref Range    GLUCOSE BY METER POCT 221 (H) 70 - 99 mg/dL   CBC with platelets    Collection Time: 05/11/25  7:08 AM   Result Value Ref Range    WBC Count 11.0 4.0 - 11.0 10e3/uL    RBC Count 4.79 3.80 - 5.20 10e6/uL    Hemoglobin 12.6 11.7 - 15.7 g/dL    Hematocrit 39.2 35.0 - 47.0 %    MCV 82 78 - 100 fL    MCH 26.3 (L) 26.5 - 33.0 pg    MCHC 32.1 31.5 - 36.5 g/dL    RDW 14.3 10.0 - 15.0 %    Platelet Count 362 150 - 450 10e3/uL   Basic metabolic panel    Collection Time: 05/11/25  7:08 AM   Result Value Ref Range    Sodium 131 (L) 135 - 145 mmol/L    Potassium 4.8 3.4 - 5.3 mmol/L    Chloride 99 98 - 107 mmol/L    Carbon Dioxide (CO2) 23 22 - 29 mmol/L    Anion Gap 9 7 - 15 mmol/L    Urea Nitrogen 21.8 8.0 - 23.0 mg/dL    Creatinine 0.55 0.51 - 0.95 mg/dL    GFR Estimate >90 >60 mL/min/1.73m2    Calcium 8.5 (L) 8.8 - 10.4 mg/dL    Glucose 205 (H) 70 - 99 mg/dL   Glucose by meter    Collection Time: 05/11/25  9:13 AM   Result Value Ref Range    GLUCOSE BY METER POCT 224 (H) 70 - 99 mg/dL

## 2025-05-12 ENCOUNTER — APPOINTMENT (OUTPATIENT)
Dept: GENERAL RADIOLOGY | Facility: CLINIC | Age: 65
DRG: 545 | End: 2025-05-12
Payer: COMMERCIAL

## 2025-05-12 ENCOUNTER — DOCUMENTATION ONLY (OUTPATIENT)
Dept: FAMILY MEDICINE | Facility: CLINIC | Age: 65
End: 2025-05-12
Payer: COMMERCIAL

## 2025-05-12 LAB
ALBUMIN SERPL BCG-MCNC: 3.9 G/DL (ref 3.5–5.2)
ALBUMIN SERPL BCG-MCNC: 4.2 G/DL (ref 3.5–5.2)
ALP SERPL-CCNC: 98 U/L (ref 40–150)
ALT SERPL W P-5'-P-CCNC: 21 U/L (ref 0–50)
ANION GAP SERPL CALCULATED.3IONS-SCNC: 10 MMOL/L (ref 7–15)
APPEARANCE CSF: CLEAR
APPEARANCE CSF: CLEAR
APTT PPP: 24 SECONDS (ref 22–38)
AST SERPL W P-5'-P-CCNC: 21 U/L (ref 0–45)
BILIRUB DIRECT SERPL-MCNC: 0.16 MG/DL (ref 0–0.3)
BILIRUB SERPL-MCNC: 0.3 MG/DL
BUN SERPL-MCNC: 17.6 MG/DL (ref 8–23)
C GATTII+NEOFOR DNA CSF QL NAA+NON-PROBE: NEGATIVE
CALCIUM SERPL-MCNC: 8.5 MG/DL (ref 8.8–10.4)
CHLORIDE SERPL-SCNC: 93 MMOL/L (ref 98–107)
CHOLEST SERPL-MCNC: 186 MG/DL
CMV DNA CSF QL NAA+NON-PROBE: NEGATIVE
COLOR CSF: COLORLESS
COLOR CSF: COLORLESS
CREAT SERPL-MCNC: 0.65 MG/DL (ref 0.51–0.95)
CRP SERPL-MCNC: <3 MG/L
E COLI K1 AG CSF QL: NEGATIVE
EGFRCR SERPLBLD CKD-EPI 2021: >90 ML/MIN/1.73M2
ERYTHROCYTE [DISTWIDTH] IN BLOOD BY AUTOMATED COUNT: 14.2 % (ref 10–15)
ERYTHROCYTE [SEDIMENTATION RATE] IN BLOOD BY WESTERGREN METHOD: 10 MM/HR (ref 0–30)
EV RNA SPEC QL NAA+PROBE: NEGATIVE
GLUCOSE BLDC GLUCOMTR-MCNC: 149 MG/DL (ref 70–99)
GLUCOSE BLDC GLUCOMTR-MCNC: 183 MG/DL (ref 70–99)
GLUCOSE BLDC GLUCOMTR-MCNC: 183 MG/DL (ref 70–99)
GLUCOSE BLDC GLUCOMTR-MCNC: 205 MG/DL (ref 70–99)
GLUCOSE BLDC GLUCOMTR-MCNC: 273 MG/DL (ref 70–99)
GLUCOSE CSF-MCNC: 102 MG/DL (ref 40–70)
GLUCOSE SERPL-MCNC: 207 MG/DL (ref 70–99)
GP B STREP DNA CSF QL NAA+NON-PROBE: NEGATIVE
HAEM INFLU DNA CSF QL NAA+NON-PROBE: NEGATIVE
HCO3 SERPL-SCNC: 26 MMOL/L (ref 22–29)
HCT VFR BLD AUTO: 38.4 % (ref 35–47)
HDLC SERPL-MCNC: 49 MG/DL
HGB BLD-MCNC: 12.7 G/DL (ref 11.7–15.7)
HHV6 DNA CSF QL NAA+NON-PROBE: NEGATIVE
HSV1 DNA CSF QL NAA+NON-PROBE: NEGATIVE
HSV2 DNA CSF QL NAA+NON-PROBE: NEGATIVE
INR PPP: 0.87 (ref 0.85–1.15)
L MONOCYTOG DNA CSF QL NAA+NON-PROBE: NEGATIVE
LDLC SERPL CALC-MCNC: 100 MG/DL
LYMPH ABN NFR CSF MANUAL: 72 %
LYMPH ABN NFR CSF MANUAL: 79 %
MCH RBC QN AUTO: 26.1 PG (ref 26.5–33)
MCHC RBC AUTO-ENTMCNC: 33.1 G/DL (ref 31.5–36.5)
MCV RBC AUTO: 79 FL (ref 78–100)
MONOS+MACROS NFR CSF MANUAL: 21 %
MONOS+MACROS NFR CSF MANUAL: 27 %
N MEN DNA CSF QL NAA+NON-PROBE: NEGATIVE
NEUTROPHILS NFR CSF MANUAL: 1 %
NEUTROPHILS NFR CSF MANUAL: NORMAL %
NONHDLC SERPL-MCNC: 137 MG/DL
PARECHOVIRUS A RNA CSF QL NAA+NON-PROBE: NEGATIVE
PLATELET # BLD AUTO: 376 10E3/UL (ref 150–450)
POTASSIUM SERPL-SCNC: 4.1 MMOL/L (ref 3.4–5.3)
PROT CSF-MCNC: 74.9 MG/DL (ref 15–45)
PROT SERPL-MCNC: 6.7 G/DL (ref 6.4–8.3)
PROT SERPL-MCNC: 6.9 G/DL (ref 6.4–8.3)
PROTHROMBIN TIME: 12.2 SECONDS (ref 11.8–14.8)
RBC # BLD AUTO: 4.86 10E6/UL (ref 3.8–5.2)
RBC # CSF MANUAL: 5 /UL (ref 0–2)
RBC # CSF MANUAL: 9 /UL (ref 0–2)
S PNEUM DNA CSF QL NAA+NON-PROBE: NEGATIVE
SODIUM SERPL-SCNC: 129 MMOL/L (ref 135–145)
SODIUM SERPL-SCNC: 129 MMOL/L (ref 135–145)
SODIUM SERPL-SCNC: 130 MMOL/L (ref 135–145)
SODIUM SERPL-SCNC: 135 MMOL/L (ref 135–145)
TRIGL SERPL-MCNC: 185 MG/DL
TUBE # CSF: 1
TUBE # CSF: 4
VZV DNA CSF QL NAA+NON-PROBE: NEGATIVE
WBC # BLD AUTO: 9.9 10E3/UL (ref 4–11)
WBC # CSF MANUAL: 14 /UL (ref 0–5)
WBC # CSF MANUAL: 32 /UL (ref 0–5)

## 2025-05-12 PROCEDURE — 84520 ASSAY OF UREA NITROGEN: CPT

## 2025-05-12 PROCEDURE — 250N000011 HC RX IP 250 OP 636: Mod: JZ

## 2025-05-12 PROCEDURE — 86255 FLUORESCENT ANTIBODY SCREEN: CPT

## 2025-05-12 PROCEDURE — 88185 FLOWCYTOMETRY/TC ADD-ON: CPT

## 2025-05-12 PROCEDURE — 82310 ASSAY OF CALCIUM: CPT

## 2025-05-12 PROCEDURE — 87070 CULTURE OTHR SPECIMN AEROBIC: CPT

## 2025-05-12 PROCEDURE — 83529 ASAY OF INTERLEUKIN-6 (IL-6): CPT

## 2025-05-12 PROCEDURE — 86140 C-REACTIVE PROTEIN: CPT

## 2025-05-12 PROCEDURE — 99232 SBSQ HOSP IP/OBS MODERATE 35: CPT | Mod: GC | Performed by: PSYCHIATRY & NEUROLOGY

## 2025-05-12 PROCEDURE — 62328 DX LMBR SPI PNXR W/FLUOR/CT: CPT | Performed by: STUDENT IN AN ORGANIZED HEALTH CARE EDUCATION/TRAINING PROGRAM

## 2025-05-12 PROCEDURE — 87205 SMEAR GRAM STAIN: CPT

## 2025-05-12 PROCEDURE — 120N000002 HC R&B MED SURG/OB UMMC

## 2025-05-12 PROCEDURE — 250N000013 HC RX MED GY IP 250 OP 250 PS 637

## 2025-05-12 PROCEDURE — 85730 THROMBOPLASTIN TIME PARTIAL: CPT

## 2025-05-12 PROCEDURE — 82164 ANGIOTENSIN I ENZYME TEST: CPT

## 2025-05-12 PROCEDURE — 009U3ZX DRAINAGE OF SPINAL CANAL, PERCUTANEOUS APPROACH, DIAGNOSTIC: ICD-10-PCS | Performed by: STUDENT IN AN ORGANIZED HEALTH CARE EDUCATION/TRAINING PROGRAM

## 2025-05-12 PROCEDURE — 86341 ISLET CELL ANTIBODY: CPT

## 2025-05-12 PROCEDURE — 84295 ASSAY OF SERUM SODIUM: CPT

## 2025-05-12 PROCEDURE — 36415 COLL VENOUS BLD VENIPUNCTURE: CPT

## 2025-05-12 PROCEDURE — 89050 BODY FLUID CELL COUNT: CPT

## 2025-05-12 PROCEDURE — 82945 GLUCOSE OTHER FLUID: CPT

## 2025-05-12 PROCEDURE — 88108 CYTOPATH CONCENTRATE TECH: CPT | Mod: TC

## 2025-05-12 PROCEDURE — 250N000009 HC RX 250: Performed by: STUDENT IN AN ORGANIZED HEALTH CARE EDUCATION/TRAINING PROGRAM

## 2025-05-12 PROCEDURE — 88188 FLOWCYTOMETRY/READ 9-15: CPT | Performed by: STUDENT IN AN ORGANIZED HEALTH CARE EDUCATION/TRAINING PROGRAM

## 2025-05-12 PROCEDURE — 87483 CNS DNA AMP PROBE TYPE 12-25: CPT

## 2025-05-12 PROCEDURE — 250N000011 HC RX IP 250 OP 636

## 2025-05-12 PROCEDURE — 88108 CYTOPATH CONCENTRATE TECH: CPT | Mod: 26 | Performed by: PATHOLOGY

## 2025-05-12 PROCEDURE — 87529 HSV DNA AMP PROBE: CPT

## 2025-05-12 PROCEDURE — 84155 ASSAY OF PROTEIN SERUM: CPT

## 2025-05-12 PROCEDURE — 250N000009 HC RX 250

## 2025-05-12 PROCEDURE — 85610 PROTHROMBIN TIME: CPT

## 2025-05-12 PROCEDURE — 85014 HEMATOCRIT: CPT

## 2025-05-12 PROCEDURE — 85652 RBC SED RATE AUTOMATED: CPT

## 2025-05-12 PROCEDURE — 84157 ASSAY OF PROTEIN OTHER: CPT

## 2025-05-12 PROCEDURE — 62328 DX LMBR SPI PNXR W/FLUOR/CT: CPT

## 2025-05-12 RX ORDER — HYDRALAZINE HYDROCHLORIDE 20 MG/ML
10 INJECTION INTRAMUSCULAR; INTRAVENOUS EVERY 6 HOURS PRN
Status: DISCONTINUED | OUTPATIENT
Start: 2025-05-12 | End: 2025-05-16

## 2025-05-12 RX ORDER — ENOXAPARIN SODIUM 100 MG/ML
40 INJECTION SUBCUTANEOUS EVERY 24 HOURS
Status: DISCONTINUED | OUTPATIENT
Start: 2025-05-12 | End: 2025-05-21 | Stop reason: HOSPADM

## 2025-05-12 RX ORDER — NICOTINE POLACRILEX 4 MG
15-30 LOZENGE BUCCAL
Status: DISCONTINUED | OUTPATIENT
Start: 2025-05-12 | End: 2025-05-21 | Stop reason: HOSPADM

## 2025-05-12 RX ORDER — FLUMAZENIL 0.1 MG/ML
0.2 INJECTION, SOLUTION INTRAVENOUS ONCE
Status: COMPLETED | OUTPATIENT
Start: 2025-05-12 | End: 2025-05-12

## 2025-05-12 RX ORDER — HYDRALAZINE HYDROCHLORIDE 20 MG/ML
10 INJECTION INTRAMUSCULAR; INTRAVENOUS EVERY 6 HOURS PRN
Status: DISCONTINUED | OUTPATIENT
Start: 2025-05-12 | End: 2025-05-12

## 2025-05-12 RX ORDER — DIVALPROEX SODIUM 500 MG/1
500 TABLET, FILM COATED, EXTENDED RELEASE ORAL 2 TIMES DAILY
Status: DISCONTINUED | OUTPATIENT
Start: 2025-05-12 | End: 2025-05-21 | Stop reason: HOSPADM

## 2025-05-12 RX ORDER — LIDOCAINE HYDROCHLORIDE 10 MG/ML
10 INJECTION, SOLUTION EPIDURAL; INFILTRATION; INTRACAUDAL; PERINEURAL ONCE
Status: COMPLETED | OUTPATIENT
Start: 2025-05-12 | End: 2025-05-12

## 2025-05-12 RX ORDER — SENNOSIDES 8.6 MG
8.6 TABLET ORAL DAILY PRN
Status: DISCONTINUED | OUTPATIENT
Start: 2025-05-12 | End: 2025-05-21 | Stop reason: HOSPADM

## 2025-05-12 RX ORDER — DEXTROSE MONOHYDRATE 25 G/50ML
25-50 INJECTION, SOLUTION INTRAVENOUS
Status: DISCONTINUED | OUTPATIENT
Start: 2025-05-12 | End: 2025-05-21 | Stop reason: HOSPADM

## 2025-05-12 RX ORDER — DIVALPROEX SODIUM 500 MG/1
500 TABLET, FILM COATED, EXTENDED RELEASE ORAL DAILY
Status: DISCONTINUED | OUTPATIENT
Start: 2025-05-12 | End: 2025-05-12

## 2025-05-12 RX ORDER — ACETAMINOPHEN 325 MG/1
975 TABLET ORAL EVERY 8 HOURS PRN
Status: DISCONTINUED | OUTPATIENT
Start: 2025-05-12 | End: 2025-05-14

## 2025-05-12 RX ORDER — AMOXICILLIN 250 MG
1 CAPSULE ORAL 2 TIMES DAILY
Status: DISCONTINUED | OUTPATIENT
Start: 2025-05-12 | End: 2025-05-21 | Stop reason: HOSPADM

## 2025-05-12 RX ADMIN — SODIUM CHLORIDE 1 G: 1 TABLET ORAL at 19:06

## 2025-05-12 RX ADMIN — DIPHENHYDRAMINE HYDROCHLORIDE 25 MG: 25 CAPSULE ORAL at 10:52

## 2025-05-12 RX ADMIN — PROCHLORPERAZINE EDISYLATE 10 MG: 5 INJECTION INTRAMUSCULAR; INTRAVENOUS at 04:46

## 2025-05-12 RX ADMIN — GABAPENTIN 600 MG: 300 CAPSULE ORAL at 01:18

## 2025-05-12 RX ADMIN — ACETAMINOPHEN 975 MG: 325 TABLET ORAL at 10:52

## 2025-05-12 RX ADMIN — LORAZEPAM 1 MG: 2 INJECTION INTRAMUSCULAR; INTRAVENOUS at 12:51

## 2025-05-12 RX ADMIN — PROCHLORPERAZINE EDISYLATE 10 MG: 5 INJECTION INTRAMUSCULAR; INTRAVENOUS at 10:54

## 2025-05-12 RX ADMIN — LIDOCAINE HYDROCHLORIDE 30 ML: 10 INJECTION, SOLUTION EPIDURAL; INFILTRATION; INTRACAUDAL; PERINEURAL at 14:27

## 2025-05-12 RX ADMIN — DIPHENHYDRAMINE HYDROCHLORIDE 25 MG: 25 CAPSULE ORAL at 04:51

## 2025-05-12 RX ADMIN — PROPRANOLOL HYDROCHLORIDE 60 MG: 60 CAPSULE, EXTENDED RELEASE ORAL at 09:46

## 2025-05-12 RX ADMIN — ENOXAPARIN SODIUM 40 MG: 40 INJECTION SUBCUTANEOUS at 15:57

## 2025-05-12 RX ADMIN — GABAPENTIN 600 MG: 300 CAPSULE ORAL at 12:51

## 2025-05-12 RX ADMIN — KETOROLAC TROMETHAMINE 30 MG: 30 INJECTION, SOLUTION INTRAMUSCULAR at 04:48

## 2025-05-12 RX ADMIN — FLUMAZENIL 0.2 MG: 0.1 INJECTION, SOLUTION INTRAVENOUS at 14:01

## 2025-05-12 RX ADMIN — SENNOSIDES AND DOCUSATE SODIUM 1 TABLET: 50; 8.6 TABLET ORAL at 19:06

## 2025-05-12 RX ADMIN — SERTRALINE HYDROCHLORIDE 50 MG: 50 TABLET ORAL at 09:47

## 2025-05-12 RX ADMIN — QUETIAPINE FUMARATE 25 MG: 25 TABLET ORAL at 19:06

## 2025-05-12 RX ADMIN — GABAPENTIN 600 MG: 300 CAPSULE ORAL at 17:41

## 2025-05-12 RX ADMIN — SODIUM CHLORIDE 1 G: 1 TABLET ORAL at 09:47

## 2025-05-12 RX ADMIN — SODIUM CHLORIDE 1 G: 1 TABLET ORAL at 15:56

## 2025-05-12 RX ADMIN — DIVALPROEX SODIUM 500 MG: 500 TABLET, FILM COATED, EXTENDED RELEASE ORAL at 19:06

## 2025-05-12 RX ADMIN — Medication 500 MG: at 10:52

## 2025-05-12 ASSESSMENT — ACTIVITIES OF DAILY LIVING (ADL)
ADLS_ACUITY_SCORE: 33
ADLS_ACUITY_SCORE: 35
ADLS_ACUITY_SCORE: 36
ADLS_ACUITY_SCORE: 35
ADLS_ACUITY_SCORE: 36
ADLS_ACUITY_SCORE: 33
ADLS_ACUITY_SCORE: 36
ADLS_ACUITY_SCORE: 33
ADLS_ACUITY_SCORE: 36
ADLS_ACUITY_SCORE: 33
ADLS_ACUITY_SCORE: 35
ADLS_ACUITY_SCORE: 33
ADLS_ACUITY_SCORE: 35
ADLS_ACUITY_SCORE: 33
ADLS_ACUITY_SCORE: 36
ADLS_ACUITY_SCORE: 36
ADLS_ACUITY_SCORE: 33

## 2025-05-12 NOTE — PROGRESS NOTES
"When opening a documentation only encounter, be sure to enter in \"Chief Complaint\" Forms and in \" Comments\" Title of form, description if needed.    Amy is a 65 year old  female  Form received via: Fax  Form now resides in: Provider Ready    Care Transition    Tl Farah MA                "

## 2025-05-12 NOTE — PROGRESS NOTES
[facility]    Vascular Neurology Progress Note    Interval History     No acute overnight events per nursing notes.    Patient was reminded about the LP scheduled for today and started crying. She was then informed that it will be flouroscopy guided - this made her feel better and she reluctantly said okay. Also this morning, Patient endorsed a headache which she reports is everywhere and similar to how it felt before. Patient reported associated photophobia and intermittent blurry/doubled vision. Patient also reported seeing people in her room intermittently. Checked the patients back to assess for possible skin lesions - some mild rashs were noted but nothing related to melanoma presentation.      Hospital Course     Chief complaint: Headache    66 yo female with DM, anxiety, tobacco use, polysubstance use (+meth UDS) presented with headaches and vision changes for 3 to 4 weeks.  Patient hospitalized x 2 at outside hospital for similar symptoms and workup include MRI brain with a working diagnosis of possible PRES, with systolics in 200s during one of the hospitalizations. Patient reports visual disturbances, distortions and palinopsia. Patient also described spells of lost time.    MRI from OSH showed patient has microbleed's in the bilateral parietotemporal occipital regions.  There was also significant leptomeningeal enhancement in the cerebellar regions and bilateral cerebral hemisphere sparing some of the part of frontal regions. Case discussed in multidisciplinary neuroradiology rounds. Leptomeningeal enhancement appears to be more prominent than the most recent MRI with FLAIR hyperintensity not very prominent.    Continuous EEG done for 3 days without any abnormal epileptiform discharges.  CT chest abdominal pelvis completed to rule out any malignancy was unremarkable.  D-dimer, ESR, CRP within normal limits.  Patient refusing bedside lumbar puncture at  bedside given prior traumatic experience with her daughter. Agreeable for lumbar puncture under fluoroscopy guidance.  Attempt made on Friday however she refused but now willing to undergo. 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 Differential diagnoses include inflammatory cerebral amyloid angiopathy, CNS lymphoma, neoplastic/autoimmune process, vasculitis less likely PRES given significant leptomeningeal enhancement and less prominent FLAIR changes though could be recovering PRES.   - LDL 72, goal < 70.  Will defer starting statin at this time.  - LP with IR 5/12: protein, cells, glucose, cytology, flow cytometry, ACE, IL 6, MEP, cultures, HSV 1 and 2, freeze sample  - Serum labs: paraneoplastic panel, CRP, ESR, Ddimer  - gabapentin 600mg TID  - BP parameters:              - Notify provider for SBP > 180              - PRN antihypertensives for SBP > 160  - SBP goal <160  - propranolol ER 60 mg daily for migraine prevention   - Changed to PRN compazine & tylenol 975mg TID  -magnesium 500mg daily  -repeat CTH 5/11 given increased headache severity - no acute findings  -plan steroid challenge for abortive treatment after CSF sample collection  - ordered ESR and CRP to assess for inflammatory markers   - PT/OT consults ordered (5/12)  - discontinued benadryl (5/12)    #Hyponatremia  Workup as of 5/11 serum osmol 285, urine osmol 862, urine sodium 219, glucose at time of urine studies was 224, serum sodium 128. Workup most consistent with SIADH vs thiazide induced hyponatremia. Holding thiazide since 5/11 and repeating  levels. TSH WNL.   -trend BMP, repeat this afternoon Q6H   -will perform sodium studies for further evaluation if still hyponatremic, downtrending including:  urine osm, urine sodium, serum osm.   -strict I/Os  -encourage adequate PO intake    #incidental finding- Ill-defined 1.6 cm area of enhancement within the left inferior thyroid lobe.   -TSH normal. Consider outpatient  nonemergent thyroid ultrasound for further Assessment, will defer to PCP.    #incidental 3. 5 mm solid nodule in the right upper lung lobe  -Can consider optional follow-up CT in 12 months to document stability, per PCP    # Leukocytosis, resolved  She is also found to have high leukocyte count 14.3.  But chest x-ray and UA are unremarkable, and counts have returned to normal.   - Daily CBC  - Temperature monitoring     # Type 2 diabetes mellitus  - A1c this admission 8.7, goal < 7.0. Will need close PCP follow up  - Medium dose sliding scale -> high dose sliding scale  -low consistent carb diet       #hypertension   cautious re-introduction of PTA BP meds to avoid fluctuations  - BP parameters:              - Notify provider for SBP > 180              - PRN antihypertensives for SBP > 160  - Hydralazine/labetalol as needed for systolic blood pressure>160  - Holding PTA hydrochlorothiazide 12.5 mg daily since 5/11   - Holding PTA losartan 100 mg daily since 5/5  - Holding PTA amlodipine 10 mg daily since 5/5     Prophylaxis            For VTE Prevention:  - pneumatic compression device and started lovenox DVT ppx 5/12      For Acid Suppression:  - GI prophylaxis is not indicated    Patient Follow-up    - Neurology clinic follow up in 6-8 weeks  - PCP follow up within 7 days of discharge for HTN, diabetes management    Medically Ready for Discharge:   Medically Ready for Discharge: Anticipated in 2-4 Days pending ongoing workup     The patient was discussed with the Stroke Staff Dr. Greene.    Adalid LIRA, am serving as a scribe at 11:50 on  Monday May 12th 2025 to document services personally performed by Mara Ceron based on my observations and the provider's statements to me.    Resident/Fellow Attestation   I, Karen Burt MD, was present with the medical/ELBERT student who participated in the service and in the documentation of the note.  I have verified the history and personally performed the physical exam and medical decision making.  I agree with the assessment and plan of care as documented in the note.      Karen Burt MD  PGY3  Date of Service (when I saw the patient): 05/12/25      Physical Examination     Temp: 98.1  F (36.7  C) Temp src: Oral BP: 129/62 Pulse: 68   Resp: 16 SpO2: 96 % O2 Device: None (Room air)      General:  patient lying in bed looks in pain   HEENT:  normocephalic/atraumatic  Cardio:  RRR  Pulmonary:  no respiratory distress on room air  Skin:  some skin lesions across middle region of back     Neurologic  Mental Status:  alert, oriented to self, place, date, situation, follows commands, speech clear and fluent, naming and repetition normal  Cranial Nerves: EOMI though difficulty keeping eyes open due to headache, facial sensation intact and symmetric, facial movements symmetric, hearing not formally tested but intact to conversation, no dysarthria, shoulder shrug strong bilaterally, tongue protrusion midline  Motor:  normal muscle tone and bulk, no abnormal movements, able to move all limbs spontaneously, strength 5/5 throughout upper and lower extremities, no pronator drift  Reflexes:  deferred  Sensory:  light touch sensation intact and symmetric throughout upper and lower extremities, no extinction on double simultaneous stimulation   Coordination:  normal finger-to-nose and heel-to-shin bilaterally without dysmetria   Station/Gait:  deferred       Stroke Scales       NIHSS  1a. Level of Consciousness 0-->Alert, keenly responsive   1b. LOC Questions 0-->Answers both questions correctly   1c. LOC Commands  0-->Performs both tasks correctly   2.   Best Gaze 0-->Normal   3.   Visual 0-->No visual loss   4.   Facial Palsy 0-->Normal symmetrical movements   5a. Motor Arm, Left 0-->No drift, limb holds 90 (or 45) degrees for full 10 secs   5b. Motor Arm, Right 0-->No drift, limb holds 90 (or 45) degrees for full 10 secs   6a. Motor Leg, Left 0-->No drift, leg holds 30 degree position for full 5 secs   6b. Motor Leg, right 0-->No drift, leg holds 30 degree position for full 5 secs   7.   Limb Ataxia 0-->Absent   8.   Sensory 0-->Normal, no sensory loss   9.   Best Language 0-->No aphasia, normal   10. Dysarthria 0-->Normal   11. Extinction and Inattention  0-->No abnormality   Total 0 (05/12/25)       Imaging/Labs      MRI/Head CT CT head:  IMPRESSION:  1.  No acute findings. No acute intracranial hemorrhage. No acute calvarial fracture.     MRI:   IMPRESSION:  1. There is somewhat decreased cortical T2 hyperintense signal with increased conspicuity of leptomeningeal enhancement and numerous presumed microhemorrhages within the occipital, temporal parietal regions bilaterally. These findings are nonspecific, although may be seen with process or hematogenous malignancy such as melanoma.  Findings are less likely related to cerebral amyloid  angiopathy given the distribution. The distribution could be seen with hypertensive angiopathy, however these are extremely great number, much more than expected.   2. 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  3. Chronic small vessel ischemic changes.   Intracranial Vasculature HEAD CTA:   No large vessel occlusion findings intracranially. No high-grade stenosis intracranially.   Cervical Vasculature NECK CTA:  1.  No acute vascular injury in the neck. No high-grade stenosis in the neck.      Echocardiogram Not obtained   EKG/Telemetry Not obtained      LDL  5/6/2025: 72 mg/dL   A1C  5/6/2025: 8.7 %   Troponin No lab value available in  past 48 hrs   TSH 1.24  CT CAP: IMPRESSION:   1. No evidence of primary malignancy throughout the chest, abdomen or  pelvis.  2. Ill-defined 1.6 cm area of enhancement within the left inferior  thyroid lobe. Consider nonemergent thyroid ultrasound for further  assessment.  3. 5 mm solid nodule in the right upper lobe. Can consider optional  follow-up CT in 12 months to document stability.  4. 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 The gastrojejunal anastomosis appears  patent, however, inspissated debris may suggest delayed  gastrointestinal transit.  5. Hepatic steatosis.  6. Additional chronic and incidental findings, as described above.       SUMMARY OF 3 DAYS OF VIDEO EEG:  This 3 day video EEG is abnormal due to the presence increased superimposed fast activity, likely related to sedating medications employed. No seizures or epileptiform discharges were seen. Clinical correlation is recommended.     Last Comprehensive Metabolic Panel:  Lab Results   Component Value Date     (L) 05/12/2025     (L) 05/12/2025    POTASSIUM 4.1 05/12/2025    CHLORIDE 93 (L) 05/12/2025    CO2 26 05/12/2025    ANIONGAP 10 05/12/2025     (H) 05/12/2025    BUN 17.6 05/12/2025    CR 0.65 05/12/2025    GFRESTIMATED >90 05/12/2025    CRISTIAN 8.5 (L) 05/12/2025       CBC RESULTS:   Recent Labs   Lab Test 05/12/25  0651   WBC 9.9   RBC 4.86   HGB 12.7   HCT 38.4   MCV 79   MCH 26.1*   MCHC 33.1   RDW 14.2        Sed Rate   Date Value Ref Range Status   01/14/2014 8 0 - 30 mm/h Final     Erythrocyte Sedimentation Rate   Date Value Ref Range Status   05/08/2025 7 0 - 30 mm/hr Final     CRP Inflammation   Date Value Ref Range Status   05/12/2025 <3.00 <5.00 mg/L Final

## 2025-05-12 NOTE — PROCEDURES
[facility], [place]     Neuroradiology Procedure Note     Lumbar Puncture Under Fluoroscopic Guidance:      5/12/2025 3:27 PM    Performed by: Vladimir Vázquez MD MD  Authorized by:     Vladimir Vázquez MD MD    Indications: abnormal neurologic exam    Consent given by: Patient who states understanding of the procedure being performed after discussing the risks, benefits and alternatives.    Prior to the start of the procedure and with procedural staff participation, I verbally confirmed the patient s identity using two indicators, relevant allergies, that the procedure was appropriate and matched the consent or emergent situation, and that the correct equipment/implants were available. Immediately prior to starting the procedure I conducted the Time Out with the procedural staff and re-confirmed the patient s name, procedure, and site/side. (The Joint Commission universal protocol was followed.) Yes    Procedure:    Under sterile conditions the patient was positioned lateral decubitus. Using fluoroscopy, needle entrance side was defined.  Betadine solution and sterile drapes were utilized.  Local anesthetic at the site: 10 ml of lidocaine 1% without epinephrine.    A 22 gauge 3.5 inch spinal needle was inserted at the L3-L4 interspace.  Opening Pressure 25.5cm H2O pressure.  A total of 14 mL of clear and colorless spinal fluid was obtained and sent to the laboratory.   After the needle was removed, a bandaid and pressure were applied and the patient was instructed to stay horizontal until the results were back.    Complications:  None  Patient tolerance: Patient tolerated the procedure well with no immediate complications.    Condition: Stable Patient is transferred to Inpatient unit for post procedure observation.    Vladimir Vázquez MD 5/12/2025 3:27 PM

## 2025-05-12 NOTE — PLAN OF CARE
Goal Outcome Evaluation:  Plan of Care Reviewed With: patient    Status:  Admitted 5/6 for HA and blurred vision. Hx of DM, anxiety, tobacco use, polysubstance abuse. Contact precautions for MRSA.   Vitals: VSS on RA. CCM.  Neuros: A/Ox4, strengths 5/5 t/o. Unchanged bilateral photophobia and blurred vision.  IV: PIV SL  Labs/Electrolytes: Na 135, trending Q6 hrs. BG ACHS.  Resp: LSC  Diet: Low consistent carb diet. 1500 ml fluid restriction.   GI/: Voiding spontaneously to bathroom. LBM 5/11. Strict I&Os.  Skin: Bruising t/o. LP site on lower back with band-aid.  Pain: Denies this shift.  Activity: SBA. On strict bedrest for the rest of the day post-procedure.  Plan: Continue w POC.  Updates this shift: LP completed today.

## 2025-05-12 NOTE — PLAN OF CARE
Vitals: VSS   Neuros: Unchanged; Intermittently anxious. Bilateral photophobia.   IV: PIV SL  Labs/Electrolytes: BG ACHS. , 130 trending every 6 huors   Resp: Infrequent congested cough  Diet: Low consistent carb diet. 1500 mL fluid restriction  : Voiding spontaneously, missed hat today   GI: LBM 5/11 per pt. Passing gas  Skin: Bruising throughout   Pain: Headache managed with  tylenol, gabapentin, and migraine cocktail   Activity: Indep in room. OT/PT evals. ordered. Had shower today.   Plan: Pt currently getting LP done in xray. 1mg ativan given prior d/t pt anxiety about test. Float RN had to give reversal agent down in XRAY d/t drowsiness so pt could be consented    Goal Outcome Evaluation:      Plan of Care Reviewed With: patient    Overall Patient Progress: no changeOverall Patient Progress: no change    Outcome Evaluation: [id number]

## 2025-05-12 NOTE — PLAN OF CARE
Status: Admitted 5/6 with HA and vision changes. PMHx DM, anxiety, tobacco use, polysubstance abuse. Contact precautions   Vitals: VSS on RA ex HTN within SBP < 180 parameters. Continuous pulse ox in place. CCM  Neuros: A/Ox4. 5/5 t/o. Intermittently anxious. Bilateral photophobia. Intermittent double/blurred vision, denied this shift. Intermittent nausea/dizziness. Gen weak. NIHSS 0  IV: PIV SL  Labs/Electrolytes: BG ACHS. 0200 . Na 126, redraw in AM then Q 6 hr redraws  Resp: Infrequent congested cough  Diet: Low consistent carb diet. 1500 mL fluid restriction  : Voiding spontaneously to bathroom. Strict I+O  GI: LBM 5/9 per pt. Passing gas  Skin: Bruising t/o  Pain: Frontal headache, tylenol, gabapentin, and migraine cocktail given  Activity: SBA  Plan: LP scheduled for today Monday 5/12    Goal Outcome Evaluation:      Plan of Care Reviewed With: patient    Overall Patient Progress: no change    Outcome Evaluation: Pt feeling relief of pain/nausea

## 2025-05-12 NOTE — PLAN OF CARE
Goal Outcome Evaluation:    Status: Admitted 5/6 for HA and blurred vision. Hx of DM, anxiety, tobacco use, polysubstance abuse. Contact precautions for MRSA.  Vitals: HTN w/in parameters, cont pulse ox in place, CCM. Bradycardia in 50s, team aware.  Neuros: A&Ox4. Photophobia and intermittent blurred vision. 5/5 t/o. Continuous HA/nausea.  IV: PIV SL  Labs/Electrolytes: Na 128 at 1530, next draw AM.   Resp: Infrequent congested cough.  Diet: Low carb diet, poor intake. 1500 ml fluid restriction. Continuous nausea, treated with PRN Zofran/ scheduled compazine.  GI/: Voiding spontaneously to bathroom. Strict I&Os. LBM PTA, pt refused bowel meds this shift.  Skin: Bruising t/o.  Pain: HA treated with IV depakote/PRNS/scheduled Benadryl/Tylenol/compazine to little effect.  Activity: SBA line management  Plan: Scheduled LP tomorrow 5/12.  Updates this shift: 1500ml fluid restriction/strict I&Os in place due to hyponatremia.

## 2025-05-13 ENCOUNTER — APPOINTMENT (OUTPATIENT)
Dept: OCCUPATIONAL THERAPY | Facility: CLINIC | Age: 65
DRG: 545 | End: 2025-05-13
Payer: COMMERCIAL

## 2025-05-13 LAB
ANION GAP SERPL CALCULATED.3IONS-SCNC: 11 MMOL/L (ref 7–15)
BUN SERPL-MCNC: 23.5 MG/DL (ref 8–23)
CALCIUM SERPL-MCNC: 8.7 MG/DL (ref 8.8–10.4)
CHLORIDE SERPL-SCNC: 101 MMOL/L (ref 98–107)
CREAT SERPL-MCNC: 0.72 MG/DL (ref 0.51–0.95)
EGFRCR SERPLBLD CKD-EPI 2021: >90 ML/MIN/1.73M2
ERYTHROCYTE [DISTWIDTH] IN BLOOD BY AUTOMATED COUNT: 14.9 % (ref 10–15)
GLUCOSE BLDC GLUCOMTR-MCNC: 143 MG/DL (ref 70–99)
GLUCOSE BLDC GLUCOMTR-MCNC: 164 MG/DL (ref 70–99)
GLUCOSE BLDC GLUCOMTR-MCNC: 240 MG/DL (ref 70–99)
GLUCOSE BLDC GLUCOMTR-MCNC: 278 MG/DL (ref 70–99)
GLUCOSE SERPL-MCNC: 145 MG/DL (ref 70–99)
HCO3 SERPL-SCNC: 26 MMOL/L (ref 22–29)
HCT VFR BLD AUTO: 40.7 % (ref 35–47)
HGB BLD-MCNC: 13.1 G/DL (ref 11.7–15.7)
HSV1 DNA CSF QL NAA+PROBE: NOT DETECTED
HSV2 DNA CSF QL NAA+PROBE: NOT DETECTED
MCH RBC QN AUTO: 25.8 PG (ref 26.5–33)
MCHC RBC AUTO-ENTMCNC: 32.2 G/DL (ref 31.5–36.5)
MCV RBC AUTO: 80 FL (ref 78–100)
MISCELLANEOUS TEST 1 (ARUP): NORMAL
PATH REPORT.COMMENTS IMP SPEC: NORMAL
PATH REPORT.FINAL DX SPEC: NORMAL
PATH REPORT.FINAL DX SPEC: NORMAL
PATH REPORT.GROSS SPEC: NORMAL
PATH REPORT.MICROSCOPIC SPEC OTHER STN: NORMAL
PATH REPORT.MICROSCOPIC SPEC OTHER STN: NORMAL
PATH REPORT.RELEVANT HX SPEC: NORMAL
PATH REPORT.RELEVANT HX SPEC: NORMAL
PLATELET # BLD AUTO: 418 10E3/UL (ref 150–450)
POTASSIUM SERPL-SCNC: 4.6 MMOL/L (ref 3.4–5.3)
RBC # BLD AUTO: 5.07 10E6/UL (ref 3.8–5.2)
SODIUM SERPL-SCNC: 136 MMOL/L (ref 135–145)
SODIUM SERPL-SCNC: 136 MMOL/L (ref 135–145)
SODIUM SERPL-SCNC: 138 MMOL/L (ref 135–145)
SODIUM SERPL-SCNC: 138 MMOL/L (ref 135–145)
VIT B12 SERPL-MCNC: 232 PG/ML (ref 232–1245)
WBC # BLD AUTO: 9.2 10E3/UL (ref 4–11)

## 2025-05-13 PROCEDURE — 36415 COLL VENOUS BLD VENIPUNCTURE: CPT

## 2025-05-13 PROCEDURE — 97530 THERAPEUTIC ACTIVITIES: CPT | Mod: GO

## 2025-05-13 PROCEDURE — 999N000147 HC STATISTIC PT IP EVAL DEFER

## 2025-05-13 PROCEDURE — 82607 VITAMIN B-12: CPT

## 2025-05-13 PROCEDURE — 250N000013 HC RX MED GY IP 250 OP 250 PS 637

## 2025-05-13 PROCEDURE — 84295 ASSAY OF SERUM SODIUM: CPT

## 2025-05-13 PROCEDURE — 120N000002 HC R&B MED SURG/OB UMMC

## 2025-05-13 PROCEDURE — 85027 COMPLETE CBC AUTOMATED: CPT

## 2025-05-13 PROCEDURE — 97165 OT EVAL LOW COMPLEX 30 MIN: CPT | Mod: GO

## 2025-05-13 PROCEDURE — 250N000011 HC RX IP 250 OP 636

## 2025-05-13 PROCEDURE — 99232 SBSQ HOSP IP/OBS MODERATE 35: CPT | Mod: GC | Performed by: PSYCHIATRY & NEUROLOGY

## 2025-05-13 PROCEDURE — 84425 ASSAY OF VITAMIN B-1: CPT

## 2025-05-13 PROCEDURE — 80048 BASIC METABOLIC PNL TOTAL CA: CPT

## 2025-05-13 PROCEDURE — 83921 ORGANIC ACID SINGLE QUANT: CPT

## 2025-05-13 RX ORDER — DIPHENHYDRAMINE HCL 25 MG
25 CAPSULE ORAL ONCE
Status: COMPLETED | OUTPATIENT
Start: 2025-05-13 | End: 2025-05-13

## 2025-05-13 RX ORDER — LIDOCAINE 40 MG/G
CREAM TOPICAL
OUTPATIENT
Start: 2025-05-13

## 2025-05-13 RX ORDER — MAGNESIUM SULFATE HEPTAHYDRATE 40 MG/ML
2 INJECTION, SOLUTION INTRAVENOUS ONCE
Status: COMPLETED | OUTPATIENT
Start: 2025-05-13 | End: 2025-05-13

## 2025-05-13 RX ORDER — DIPHENHYDRAMINE HYDROCHLORIDE 50 MG/ML
25 INJECTION, SOLUTION INTRAMUSCULAR; INTRAVENOUS ONCE
Status: COMPLETED | OUTPATIENT
Start: 2025-05-13 | End: 2025-05-13

## 2025-05-13 RX ADMIN — GABAPENTIN 600 MG: 300 CAPSULE ORAL at 00:38

## 2025-05-13 RX ADMIN — DIPHENHYDRAMINE HYDROCHLORIDE 25 MG: 25 CAPSULE ORAL at 18:45

## 2025-05-13 RX ADMIN — ACETAMINOPHEN 975 MG: 325 TABLET ORAL at 16:20

## 2025-05-13 RX ADMIN — DIVALPROEX SODIUM 500 MG: 500 TABLET, FILM COATED, EXTENDED RELEASE ORAL at 08:26

## 2025-05-13 RX ADMIN — QUETIAPINE FUMARATE 25 MG: 25 TABLET ORAL at 19:59

## 2025-05-13 RX ADMIN — DIVALPROEX SODIUM 500 MG: 500 TABLET, FILM COATED, EXTENDED RELEASE ORAL at 19:59

## 2025-05-13 RX ADMIN — ONDANSETRON 4 MG: 4 TABLET, ORALLY DISINTEGRATING ORAL at 16:33

## 2025-05-13 RX ADMIN — MAGNESIUM SULFATE HEPTAHYDRATE 2 G: 2 INJECTION, SOLUTION INTRAVENOUS at 18:47

## 2025-05-13 RX ADMIN — ACETAMINOPHEN 975 MG: 325 TABLET ORAL at 08:26

## 2025-05-13 RX ADMIN — PROPRANOLOL HYDROCHLORIDE 60 MG: 60 CAPSULE, EXTENDED RELEASE ORAL at 08:26

## 2025-05-13 RX ADMIN — PROCHLORPERAZINE EDISYLATE 10 MG: 5 INJECTION INTRAMUSCULAR; INTRAVENOUS at 18:44

## 2025-05-13 RX ADMIN — GABAPENTIN 600 MG: 300 CAPSULE ORAL at 17:59

## 2025-05-13 RX ADMIN — SERTRALINE HYDROCHLORIDE 50 MG: 50 TABLET ORAL at 08:26

## 2025-05-13 RX ADMIN — Medication 500 MG: at 08:26

## 2025-05-13 RX ADMIN — ENOXAPARIN SODIUM 40 MG: 40 INJECTION SUBCUTANEOUS at 16:20

## 2025-05-13 RX ADMIN — GABAPENTIN 600 MG: 300 CAPSULE ORAL at 12:37

## 2025-05-13 RX ADMIN — ACETAMINOPHEN 975 MG: 325 TABLET ORAL at 00:46

## 2025-05-13 ASSESSMENT — ACTIVITIES OF DAILY LIVING (ADL)
ADLS_ACUITY_SCORE: 34
ADLS_ACUITY_SCORE: 35
ADLS_ACUITY_SCORE: 36
ADLS_ACUITY_SCORE: 36
ADLS_ACUITY_SCORE: 35
ADLS_ACUITY_SCORE: 35
ADLS_ACUITY_SCORE: 36
ADLS_ACUITY_SCORE: 36
ADLS_ACUITY_SCORE: 35
ADLS_ACUITY_SCORE: 36
ADLS_ACUITY_SCORE: 34
ADLS_ACUITY_SCORE: 34
ADLS_ACUITY_SCORE: 36
ADLS_ACUITY_SCORE: 34
ADLS_ACUITY_SCORE: 35
ADLS_ACUITY_SCORE: 36
ADLS_ACUITY_SCORE: 34
ADLS_ACUITY_SCORE: 36
ADLS_ACUITY_SCORE: 34
ADLS_ACUITY_SCORE: 36
ADLS_ACUITY_SCORE: 35

## 2025-05-13 NOTE — PLAN OF CARE
Vitals: VSS   Neuros: Unchanged; Intermittently anxious. Bilateral photophobia.   IV: PIV SL  Labs/Electrolytes: BG ACHS. , 136  Resp: Infrequent congested cough  Diet: Low consistent carb diet. 1500 mL fluid restriction  : Voiding spontaneously - having trouble voiding in hat for strict I/O, MD aware.   GI: LBM 5/12  Skin: Bruising throughout   Pain: Headache managed with tylenol   Activity: Indep. Walked around unit multiple times today. Worked with OT, PT deferred.   Plan: Neuro IR consulted, possible DSA Thursday      Goal Outcome Evaluation:      Plan of Care Reviewed With: patient    Overall Patient Progress: no changeOverall Patient Progress: no change    Outcome Evaluation: [id number]

## 2025-05-13 NOTE — PROGRESS NOTES
"   05/13/25 0900   Appointment Info   Signing Clinician's Name / Credentials (OT) MANDO Segovia   Student Supervision On-site supervision provided   Rehab Comments (OT) blurry vision   Living Environment   People in Home alone   Current Living Arrangements apartment   Home Accessibility no concerns   Transportation Anticipated family or friend will provide   Living Environment Comments Pt reports living in apartment alone, no conerns with accessibility. Pt reports has a tub shower, no shower chair.   Self-Care   Usual Activity Tolerance good   Current Activity Tolerance moderate   Equipment Currently Used at Home none   Fall history within last six months yes   Number of times patient has fallen within last six months 2   Activity/Exercise/Self-Care Comment Pt reports PLOF as IND with cares, however, reports has PCA as needed for cares, but generally doesn't need them.   Instrumental Activities of Daily Living (IADL)   IADL Comments Pt reports PLOF as IND with IADL tasks, reports uses [employer] drop-off services for grocery shopping as needed.   General Information   Onset of Illness/Injury or Date of Surgery 05/10/25   Referring Physician Radha Hoffman MD   Patient/Family Therapy Goal Statement (OT) To return home safely   Additional Occupational Profile Info/Pertinent History of Current Problem Per EMR: \"65 female with past medical history of DM, anxiety, tobacco use, polysubstance use presented with headaches and vision changes ongoing for 3 to 4 weeks.  Patient hospitalized x 2 at outside hospital for similar symptoms and workup include MRI brain with a working diagnosis of possible PRES\"   Existing Precautions/Restrictions fall   Limitations/Impairments visual;other (see comments)  (blurred vision)   Left Upper Extremity (Weight-bearing Status) full weight-bearing (FWB)   Right Upper Extremity (Weight-bearing Status) full weight-bearing (FWB)   Left Lower Extremity (Weight-bearing Status) full weight-bearing " (FWB)   Right Lower Extremity (Weight-bearing Status) full weight-bearing (FWB)   General Observations and Info Activity: up with assist   Cognitive Status Examination   Orientation Status orientation to person, place and time   Follows Commands WFL   Cognitive Status Comments Pt able to answer orientation questions with 100% accuracy, however, presents with some memory deficit. Pt requested to go down to ICU to see her daughter, however, pt also reports daughter passed last year. Continue to assess cognition,   Visual Perception   Visual Impairment/Limitations blurry vision   Impact of Vision Impairment on Function (Vision) Pt reports sees blurry vision when watching TV   Sensory   Sensory Quick Adds sensation intact   Pain Assessment   Patient Currently in Pain Yes, see Vital Sign flowsheet   Posture   Posture not impaired   Range of Motion Comprehensive   General Range of Motion no range of motion deficits identified   Strength Comprehensive (MMT)   General Manual Muscle Testing (MMT) Assessment no strength deficits identified   Muscle Tone Assessment   Muscle Tone Quick Adds No deficits were identified   Coordination   Upper Extremity Coordination No deficits were identified   Bed Mobility   Bed Mobility sit-supine   Sit-Supine [place] (Bed Mobility) supervision   Transfers   Transfers sit-stand transfer   Sit-Stand Transfer   Sit-Stand [place] (Transfers) supervision   Balance   Balance Assessment standing dynamic balance   Standing Balance: Dynamic supervision   Activities of Daily Living   BADL Assessment/Intervention bathing;lower body dressing   Bathing Assessment/Intervention   [place] Level (Bathing) supervision   Comment, (Bathing) Per clincial judgement   Lower Body Dressing Assessment/Training   [place] Level (Lower Body Dressing) supervision   Comment, (Lower Body Dressing) Per clinical judgement   Clinical Impression   Criteria for Skilled Therapeutic Interventions Met (OT) Yes,  treatment indicated   OT Diagnosis Decrease IND in ADL function   OT Problem List-Impairments impacting ADL problems related to;cognition;activity tolerance impaired;pain;vision   Assessment of Occupational Performance 3-5 Performance Deficits   Identified Performance Deficits Dressing, Vision, pain,. activity tolerance   Planned Therapy Interventions (OT) ADL retraining;IADL retraining;cognition;visual perception;home program guidelines;progressive activity/exercise;risk factor education   Risk & Benefits of therapy have been explained evaluation/treatment results reviewed;care plan/treatment goals reviewed;risks/benefits reviewed;current/potential barriers reviewed;participants voiced agreement with care plan;participants included;patient   Clinical Impression Comments Pt will benefit from skilled OT treatment during IP stay to progress IND return to PLOF   OT Total Evaluation Time   OT Eval, Low Complexity Minutes (74669) 8   OT Goals   Therapy Frequency (OT) 5 times/week   OT Predicted Duration/Target Date for Goal Attainment 05/27/25   OT Goals Hygiene/Grooming;Lower Body Dressing;Toilet Transfer/Toileting;Cognition;Upper Body Dressing;Upper Body Bathing;Lower Body Bathing   OT: Hygiene/Grooming modified independent   OT: Upper Body Dressing Modified independent   OT: Lower Body Dressing Modified independent   OT: Upper Body Bathing Modified independent   OT: Lower Body Bathing Modified independent   OT: Toilet Transfer/Toileting Modified independent   OT: Cognitive Patient/caregiver will verbalize understanding of cognitive assessment results/recommendations as needed for safe discharge planning   Therapeutic Activities   Therapeutic Activity Minutes (09086) 25   Symptoms noted during/after treatment increased pain;dizziness   Treatment Detail/Skilled Intervention OT: Facilaited funtional mobiolity for increase IND in safety and mobility. Pt greeted in 6A hallway with RN, RN ok'ed session. Pt agreeable to OT  "treatment. Eval completeand treatment indicated.  recommended gait belt for optimal safety in mobility, however, pt denied gait belt, reported \"I don't want that thing, I will not fall\". Th acknowledged, RN aware. Pt ambualtes 6A hallway approx. 175 ft with SBA-CGA throughout for safety. Upon ambualtion, pt requests to go down to ICU to see her daughter, th redirects request, however, pt reports daughter has passed recently and says \"I need to go see her\", indicating some cognitive deficit. Once in room and seated EOB, pt appeared tearful d/t daughters passing, th used theraputic use of self throughout discussion for emotional support. Pt reports blurry vision and mild headache, Th suggests to lay down for safety. Th educates pt on pain managment strategies and EC/WC strategies for increase IND in ADL task and activity tolerance. Once complete, pt denied further therapies, Th acknowledged. Pt left supine in bed, call light in reach all needs met.   OT Discharge Planning   OT Plan Cog assessment, visual exercises, progress activity tolernace, standing ADL, LB dressing,   OT Discharge Recommendation (DC Rec) home with assist   OT Rationale for DC Rec Pt below baseline, limited by activity tolerance, cognition, pain and blurred vision. Pt currently moving SBA for mobility. Anticipate, once medically ready, will be able to d/c home with assist. Will continue to monitor.   OT Brief overview of current status SBA mobility   OT Total Distance Amb During Session (feet) 150   Total Session Time   Timed Code Treatment Minutes 25   Total Session Time (sum of timed and untimed services) 33     "
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
## 2025-05-13 NOTE — PROGRESS NOTES
CLINICAL NUTRITION SERVICES - ASSESSMENT NOTE    RECOMMENDATIONS FOR MDs/PROVIDERS TO ORDER:  - Total daily fluids/adjustments per MD   - Recommend liberalizing diet to regular (pt with decreased appetite/PO and reports ability to self manage DM and CHO intakes on own)  - Do not start B12, thiamine, or multivitamin prior to drawing lab    Regarding surgical history (distant history of gastric bypass), the following could be considered in an OP setting/post-discharge as indicated considering distant history:  - Multivitamin/minerals: adult dose 1-2 times daily  Ideally one that provides:   5,000 - 10,000 international units vitamin A daily   800 mg oral folate daily  8 - 22 mg zinc and 1 - 2 mg copper daily  12 mg Thiamin  - Iron: 45-60 mg elemental (18-36 mg if low risk) - may partly or fully be covered in multivitamin   - Calcium Citrate containing vitamin D: 500 mg 3 times daily or 600 mg 2 times daily     - Separate the calcium from your multivitamin or iron by at least 2 hours.     - Must be a chewable calcium citrate until post-op 3 months     - Options for calcium citrate: Jose L calcium citrate chewable, bariatric advantage calcium citrate chewable, Celebrate vitamins calcium citrate chewable, Bariatric Fusion calcium citrate chewable  - Vitamin D - at least 3,000 international units/day between all supplements  - Vitamin B12: sublingual form of at least 500 mcg daily or injection of 1000 mcg monthly      Registered Dietitian Interventions:  - Ordered B12, MMA, thiamine labs    Future/Additional Recommendations:  - PO adequacy   - Micronutrient labs  - Weight trends      REASON FOR ASSESSMENT  Provider order: poor po intake, hyponatremia     Medical History  history of DM2, anxiety, tobacco dependence, GERD, s/p gastric bypass, who presents to the ED via EMS with worsening headache.     INFORMATION OBTAINED  Assessed patient in room.    NUTRITION HISTORY  Per pt, due to long family history of diabetes, she has  "followed a \"DM diet\" at home for her lifetime. She is aware of importance of balanced meals for blood sugar control and is able to verbalize CHO containing foods. Pt does not avoid any food groups and denies decreased appetite/PO, rather she eats small amounts more often; \"grazer.\" Pt endorses h/o gastric bypass several years ago and does not currently following with bariatric program. She does not take any micronutrients or other supplements at home.     CURRENT NUTRITION ORDERS  Diet: Low Consistent Carbohydrate and 1500 mL Fluid Restriction  Snacks/Supplements: Provider ordered snack with salt tab in PM      CURRENT INTAKE/TOLERANCE  Pt reports tolerating PO with normal appetite. She reports PO may be decreased due to boredom and being restricted from her usual PO. For example, she usually eats oatmeal, cottage cheese, and fruit in the morning but is not able to order these things on her current restricted diet.     LABS  Nutrition-relevant labs:   A1C: 8.7 (H)  Glucose: 145  T (H)  H/o micronutrient checks (; )  Thiamine was low (not whole blood level)  Vitamin D was low  AEK were WNL    MEDICATIONS  Nutrition-relevant medications:   Insulin  Miralax  Senokot     ANTHROPOMETRICS  Height: 167.6 cm (5' 6\") 66\"  Admission Weight: 99.8 kg (220 lb) (25 1445)   Most Recent Weight: 99.8 kg (220 lb) (25 1445)  IBW: 59 kg  BMI: Body mass index is 35.51 kg/m .   Weight History:   Wt Readings from Last 8 Encounters:   25 99.8 kg (220 lb)   24 111.6 kg (246 lb)   24 112 kg (247 lb)   24 112.4 kg (247 lb 14.4 oz)   24 112 kg (247 lb)   22 102.1 kg (225 lb 1.4 oz)   03/15/22 102.1 kg (225 lb)   10/15/21 102.7 kg (226 lb 6.4 oz)   11% weight loss over ~ 1 year    Dosing Weight: 69 kg, based on adjusted wt using most recent weight of 100 kg and IBW of 59 kg)    ASSESSED NUTRITION NEEDS  Estimated Energy Needs: 1725 - 2070 kcals/day (25 - 30 kcals/kg)  Justification: " "Maintenance --> lower end with BMI status   Estimated Protein Needs: 69-83 grams protein/day (1 - 1.2 grams of pro/kg)  Justification: Maintenance  Estimated Fluid Needs: (per fluid restriction)  Justification: team ordered fluid restriction     SYSTEM AND PHYSICAL FINDINGS    From exam  GI symptoms: Abd round/obese; denies GI s/s  Skin/wounds: ecchymotic, dry  Hair: dry, possible thinning  Nails: brittle, lackluster, chipped  Lips: no obvious issues noted  No obvious fat/muscle loss    MALNUTRITION  % Intake: No decreased intake noted per pt   % Weight Loss: Weight loss does not meet criteria   Subcutaneous Fat Loss: None observed  Muscle Loss: None observed  Fluid Accumulation/Edema: None noted  Malnutrition Diagnosis: Patient does not meet two of the established criteria necessary for diagnosing malnutrition  Malnutrition Present on Admission: No    NUTRITION DIAGNOSIS  Predicted inadequate nutrient intake   related to hospitalization as evidenced by pt report of possible decreased PO from baseline due to boredom and restricted diet     INTERVENTIONS  Encouraged adequate PO  Nutrition education application  Vitamin and mineral supplement therapy -- supplement pending lab results    GOALS  Patient to consume % of nutritionally adequate meal trays TID, or the equivalent with supplements/snacks.     MONITORING/EVALUATION  Progress toward goals will be monitored and evaluated per policy.     Lenin Kaur, RD, LD, [facility]  Neuro ICU Dietitian; 6A Neuro  Vocera \"4E Clinical Dietitian\"  Weekend/holiday \"Weekend Clinical Dietitian\"    "

## 2025-05-13 NOTE — PROGRESS NOTES
Physical Therapy: Orders received. Chart reviewed and discussed with care team.? Physical Therapy not indicated due to lack of mobility deficits.? Per OT, pt ambulating with up to SBA, no AD. No mobility concerns at this time. Defer discharge recommendations to OT.? Will complete orders.

## 2025-05-13 NOTE — PLAN OF CARE
Goal Outcome Evaluation:      Plan of Care Reviewed With: patient    Overall Patient Progress: no changeOverall Patient Progress: no change    Outcome Evaluation: [id number]        Status: Admitted 5/6 for HA and blurred vision. Hx of DM, anxiety, tobacco use, polysubstance abuse. Contact precautions for MRSA  Vitals: VSS on RA.  CCM  Neuros: A&Ox4.  Strengths 5/5 t/o.  Unchanged bilateral photophobia and blurred vision.  DND order for overnight.    IV: PIV SL  Labs/Electrolytes: Trending Na+ levels, Q6hrs.  Na+ = 136 at 0000  Resp: WDL  Diet: Low consistent carb diet.  1500 mL fluid restriction.  GI/: voiding spont to bathroom.  LBM 5/11.  Strict I&O  Skin: bruising t/o.  LP site on lower back, with band-aid in place.    Pain: denies pain  Activity: SBA.

## 2025-05-14 LAB
ACE CSF-CCNC: 1.1 U/L
ANION GAP SERPL CALCULATED.3IONS-SCNC: 12 MMOL/L (ref 7–15)
BUN SERPL-MCNC: 17.9 MG/DL (ref 8–23)
CALCIUM SERPL-MCNC: 8.8 MG/DL (ref 8.8–10.4)
CHLORIDE SERPL-SCNC: 99 MMOL/L (ref 98–107)
CREAT SERPL-MCNC: 0.7 MG/DL (ref 0.51–0.95)
EGFRCR SERPLBLD CKD-EPI 2021: >90 ML/MIN/1.73M2
ERYTHROCYTE [DISTWIDTH] IN BLOOD BY AUTOMATED COUNT: 15 % (ref 10–15)
GLUCOSE BLDC GLUCOMTR-MCNC: 144 MG/DL (ref 70–99)
GLUCOSE BLDC GLUCOMTR-MCNC: 145 MG/DL (ref 70–99)
GLUCOSE BLDC GLUCOMTR-MCNC: 163 MG/DL (ref 70–99)
GLUCOSE BLDC GLUCOMTR-MCNC: 260 MG/DL (ref 70–99)
GLUCOSE BLDC GLUCOMTR-MCNC: 327 MG/DL (ref 70–99)
GLUCOSE SERPL-MCNC: 265 MG/DL (ref 70–99)
HCO3 SERPL-SCNC: 25 MMOL/L (ref 22–29)
HCT VFR BLD AUTO: 41.8 % (ref 35–47)
HGB BLD-MCNC: 12.9 G/DL (ref 11.7–15.7)
INTERLEUKIN 6 BODY FLUID OR CSF: 17.92 PG/ML
MAGNESIUM SERPL-MCNC: 2.2 MG/DL (ref 1.7–2.3)
MCH RBC QN AUTO: 26.1 PG (ref 26.5–33)
MCHC RBC AUTO-ENTMCNC: 30.9 G/DL (ref 31.5–36.5)
MCV RBC AUTO: 84 FL (ref 78–100)
METHYLMALONATE SERPL-SCNC: 0.39 UMOL/L (ref 0–0.4)
PHOSPHATE SERPL-MCNC: 4.1 MG/DL (ref 2.5–4.5)
PLATELET # BLD AUTO: 386 10E3/UL (ref 150–450)
POTASSIUM SERPL-SCNC: 4.8 MMOL/L (ref 3.4–5.3)
RBC # BLD AUTO: 4.95 10E6/UL (ref 3.8–5.2)
RHEUMATOID FACT SERPL-ACNC: <10 IU/ML
SODIUM SERPL-SCNC: 136 MMOL/L (ref 135–145)
WBC # BLD AUTO: 8.7 10E3/UL (ref 4–11)

## 2025-05-14 PROCEDURE — 80048 BASIC METABOLIC PNL TOTAL CA: CPT

## 2025-05-14 PROCEDURE — 84100 ASSAY OF PHOSPHORUS: CPT | Performed by: PSYCHIATRY & NEUROLOGY

## 2025-05-14 PROCEDURE — 86235 NUCLEAR ANTIGEN ANTIBODY: CPT

## 2025-05-14 PROCEDURE — 250N000013 HC RX MED GY IP 250 OP 250 PS 637

## 2025-05-14 PROCEDURE — 86431 RHEUMATOID FACTOR QUANT: CPT

## 2025-05-14 PROCEDURE — 83735 ASSAY OF MAGNESIUM: CPT | Performed by: PSYCHIATRY & NEUROLOGY

## 2025-05-14 PROCEDURE — 85014 HEMATOCRIT: CPT

## 2025-05-14 PROCEDURE — 250N000011 HC RX IP 250 OP 636

## 2025-05-14 PROCEDURE — 85018 HEMOGLOBIN: CPT

## 2025-05-14 PROCEDURE — 82784 ASSAY IGA/IGD/IGG/IGM EACH: CPT

## 2025-05-14 PROCEDURE — 86036 ANCA SCREEN EACH ANTIBODY: CPT

## 2025-05-14 PROCEDURE — 86038 ANTINUCLEAR ANTIBODIES: CPT

## 2025-05-14 PROCEDURE — 83516 IMMUNOASSAY NONANTIBODY: CPT | Performed by: STUDENT IN AN ORGANIZED HEALTH CARE EDUCATION/TRAINING PROGRAM

## 2025-05-14 PROCEDURE — 86225 DNA ANTIBODY NATIVE: CPT

## 2025-05-14 PROCEDURE — 36415 COLL VENOUS BLD VENIPUNCTURE: CPT

## 2025-05-14 PROCEDURE — 120N000002 HC R&B MED SURG/OB UMMC

## 2025-05-14 PROCEDURE — 99232 SBSQ HOSP IP/OBS MODERATE 35: CPT | Mod: GC | Performed by: PSYCHIATRY & NEUROLOGY

## 2025-05-14 PROCEDURE — 82787 IGG 1 2 3 OR 4 EACH: CPT

## 2025-05-14 PROCEDURE — 82565 ASSAY OF CREATININE: CPT

## 2025-05-14 RX ORDER — TRIAMCINOLONE ACETONIDE 1 MG/G
CREAM TOPICAL 2 TIMES DAILY PRN
Status: DISCONTINUED | OUTPATIENT
Start: 2025-05-14 | End: 2025-05-21 | Stop reason: HOSPADM

## 2025-05-14 RX ORDER — ACETAMINOPHEN 325 MG/1
650 TABLET ORAL EVERY 4 HOURS PRN
Status: DISCONTINUED | OUTPATIENT
Start: 2025-05-14 | End: 2025-05-21 | Stop reason: HOSPADM

## 2025-05-14 RX ADMIN — ACETAMINOPHEN 650 MG: 325 TABLET ORAL at 19:19

## 2025-05-14 RX ADMIN — DIVALPROEX SODIUM 500 MG: 500 TABLET, FILM COATED, EXTENDED RELEASE ORAL at 19:20

## 2025-05-14 RX ADMIN — GABAPENTIN 600 MG: 300 CAPSULE ORAL at 18:18

## 2025-05-14 RX ADMIN — ACETAMINOPHEN 650 MG: 325 TABLET ORAL at 14:47

## 2025-05-14 RX ADMIN — ACETAMINOPHEN 975 MG: 325 TABLET ORAL at 07:47

## 2025-05-14 RX ADMIN — QUETIAPINE FUMARATE 25 MG: 25 TABLET ORAL at 19:20

## 2025-05-14 RX ADMIN — ONDANSETRON 4 MG: 4 TABLET, ORALLY DISINTEGRATING ORAL at 19:20

## 2025-05-14 RX ADMIN — GABAPENTIN 600 MG: 300 CAPSULE ORAL at 00:44

## 2025-05-14 RX ADMIN — SENNOSIDES AND DOCUSATE SODIUM 1 TABLET: 50; 8.6 TABLET ORAL at 19:20

## 2025-05-14 RX ADMIN — SERTRALINE HYDROCHLORIDE 50 MG: 50 TABLET ORAL at 07:48

## 2025-05-14 RX ADMIN — DIVALPROEX SODIUM 500 MG: 500 TABLET, FILM COATED, EXTENDED RELEASE ORAL at 07:48

## 2025-05-14 RX ADMIN — ENOXAPARIN SODIUM 40 MG: 40 INJECTION SUBCUTANEOUS at 16:25

## 2025-05-14 RX ADMIN — ACETAMINOPHEN 975 MG: 325 TABLET ORAL at 00:49

## 2025-05-14 RX ADMIN — GABAPENTIN 600 MG: 300 CAPSULE ORAL at 12:30

## 2025-05-14 RX ADMIN — ONDANSETRON 4 MG: 4 TABLET, ORALLY DISINTEGRATING ORAL at 12:57

## 2025-05-14 RX ADMIN — PROPRANOLOL HYDROCHLORIDE 60 MG: 60 CAPSULE, EXTENDED RELEASE ORAL at 07:48

## 2025-05-14 RX ADMIN — Medication 3 MG: at 22:14

## 2025-05-14 RX ADMIN — PROCHLORPERAZINE EDISYLATE 10 MG: 5 INJECTION INTRAMUSCULAR; INTRAVENOUS at 14:38

## 2025-05-14 RX ADMIN — Medication 500 MG: at 07:48

## 2025-05-14 ASSESSMENT — ACTIVITIES OF DAILY LIVING (ADL)
ADLS_ACUITY_SCORE: 34
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
## 2025-05-14 NOTE — PLAN OF CARE
Status: Admitted 5/6 for HA and blurred vision. Hx of DM, anxiety, tobacco use, polysubstance abuse. Contact precautions for MRSA   Vitals: VSS  Neuros: A&Ox4, 5/5 t/o, vision blurry  IV: PIV SL  Labs/Electrolytes: BG ACHS. Recent Na 136  Resp: WNL on RA  Diet: low consitent CHO diet. 1500 ml fluid restriction  GI/: voiding spont to BR- hat in toilet but pt misses sometimes. LBM today per pt  Skin: dry skin on scalp and leftover residue from previous EEG leads, declined shower. LP site covered w/ band-aid  Pain: 7-8/10 HA - migraine cocktail given w/ relief  Activity: up ad sylvester  Social: grandchild visited today  Plan: DSA 5/15 w/ neuro IR. Lab workup for vasculitis tomorrow AM  Updates this shift: Q6 Na checks discontinued

## 2025-05-14 NOTE — PLAN OF CARE
Goal Outcome Evaluation:      Plan of Care Reviewed With: patient    Overall Patient Progress: improvingOverall Patient Progress: improving    Outcome Evaluation: [id number]      Status: Admitted 5/6 for HA and blurred vision. Hx of DM, anxiety, tobacco use, polysubstance abuse. Contact precautions for MRSA  Vitals: VSS on RA.  Neuros: A&Ox4.  Strengths 5/5 t/o.  Unchanged bilateral photophobia and blurred vision.  DND order for overnight.    IV: PIV SL  Labs/Electrolytes: BG ACHS  Resp: WDL  Diet: Low consistent carb diet.  1500 mL fluid restriction.  GI/: voiding spont to bathroom, hat in toilet, patient misses it sometimes.    LBM 5/13.  Strict I&O  Skin: bruising t/o.  LP site on lower back, with band-aid in place.    Pain: mild headache managed with scheduled Tylenol.    Activity: SBA.   Plan:  5/15 0900  IR Carotid Cerebral angiogram.  Start NPO at 0001 on 5/15.

## 2025-05-15 ENCOUNTER — APPOINTMENT (OUTPATIENT)
Dept: OCCUPATIONAL THERAPY | Facility: CLINIC | Age: 65
DRG: 545 | End: 2025-05-15
Payer: COMMERCIAL

## 2025-05-15 ENCOUNTER — TELEPHONE (OUTPATIENT)
Dept: OPHTHALMOLOGY | Facility: CLINIC | Age: 65
End: 2025-05-15
Payer: COMMERCIAL

## 2025-05-15 ENCOUNTER — APPOINTMENT (OUTPATIENT)
Dept: MRI IMAGING | Facility: CLINIC | Age: 65
DRG: 545 | End: 2025-05-15
Payer: COMMERCIAL

## 2025-05-15 VITALS
OXYGEN SATURATION: 95 % | HEIGHT: 66 IN | DIASTOLIC BLOOD PRESSURE: 62 MMHG | TEMPERATURE: 98 F | WEIGHT: 230.5 LBS | RESPIRATION RATE: 16 BRPM | SYSTOLIC BLOOD PRESSURE: 112 MMHG | HEART RATE: 59 BPM | BODY MASS INDEX: 37.04 KG/M2

## 2025-05-15 LAB
ANA SER QL IF: NEGATIVE
ANCA AB PATTERN SER IF-IMP: NORMAL
ANION GAP SERPL CALCULATED.3IONS-SCNC: 13 MMOL/L (ref 7–15)
BACTERIA CSF CULT: NORMAL
BUN SERPL-MCNC: 14.6 MG/DL (ref 8–23)
C-ANCA TITR SER IF: NORMAL {TITER}
CALCIUM SERPL-MCNC: 9 MG/DL (ref 8.8–10.4)
CHLORIDE SERPL-SCNC: 102 MMOL/L (ref 98–107)
CREAT SERPL-MCNC: 0.63 MG/DL (ref 0.51–0.95)
EGFRCR SERPLBLD CKD-EPI 2021: >90 ML/MIN/1.73M2
ERYTHROCYTE [DISTWIDTH] IN BLOOD BY AUTOMATED COUNT: 14.7 % (ref 10–15)
GLUCOSE BLDC GLUCOMTR-MCNC: 149 MG/DL (ref 70–99)
GLUCOSE BLDC GLUCOMTR-MCNC: 150 MG/DL (ref 70–99)
GLUCOSE BLDC GLUCOMTR-MCNC: 151 MG/DL (ref 70–99)
GLUCOSE BLDC GLUCOMTR-MCNC: 153 MG/DL (ref 70–99)
GLUCOSE BLDC GLUCOMTR-MCNC: 254 MG/DL (ref 70–99)
GLUCOSE SERPL-MCNC: 140 MG/DL (ref 70–99)
GRAM STAIN RESULT: NORMAL
GRAM STAIN RESULT: NORMAL
HCO3 SERPL-SCNC: 23 MMOL/L (ref 22–29)
HCT VFR BLD AUTO: 39.8 % (ref 35–47)
HGB BLD-MCNC: 12.5 G/DL (ref 11.7–15.7)
MAGNESIUM SERPL-MCNC: 2.3 MG/DL (ref 1.7–2.3)
MCH RBC QN AUTO: 26 PG (ref 26.5–33)
MCHC RBC AUTO-ENTMCNC: 31.4 G/DL (ref 31.5–36.5)
MCV RBC AUTO: 83 FL (ref 78–100)
PHOSPHATE SERPL-MCNC: 4.5 MG/DL (ref 2.5–4.5)
PLATELET # BLD AUTO: 396 10E3/UL (ref 150–450)
POTASSIUM SERPL-SCNC: 4.8 MMOL/L (ref 3.4–5.3)
RBC # BLD AUTO: 4.81 10E6/UL (ref 3.8–5.2)
SODIUM SERPL-SCNC: 138 MMOL/L (ref 135–145)
WBC # BLD AUTO: 10.6 10E3/UL (ref 4–11)

## 2025-05-15 PROCEDURE — 250N000011 HC RX IP 250 OP 636

## 2025-05-15 PROCEDURE — A9585 GADOBUTROL INJECTION: HCPCS

## 2025-05-15 PROCEDURE — 999N000248 HC STATISTIC IV INSERT WITH US BY RN

## 2025-05-15 PROCEDURE — 272N000192 HC ACCESSORY CR2

## 2025-05-15 PROCEDURE — 99255 IP/OBS CONSLTJ NEW/EST HI 80: CPT | Performed by: STUDENT IN AN ORGANIZED HEALTH CARE EDUCATION/TRAINING PROGRAM

## 2025-05-15 PROCEDURE — 99418 PROLNG IP/OBS E/M EA 15 MIN: CPT | Performed by: STUDENT IN AN ORGANIZED HEALTH CARE EDUCATION/TRAINING PROGRAM

## 2025-05-15 PROCEDURE — 99254 IP/OBS CNSLTJ NEW/EST MOD 60: CPT | Mod: GC | Performed by: INTERNAL MEDICINE

## 2025-05-15 PROCEDURE — 272N000506 HC NEEDLE CR6

## 2025-05-15 PROCEDURE — 36415 COLL VENOUS BLD VENIPUNCTURE: CPT

## 2025-05-15 PROCEDURE — 255N000002 HC RX 255 OP 636

## 2025-05-15 PROCEDURE — 83735 ASSAY OF MAGNESIUM: CPT | Performed by: PSYCHIATRY & NEUROLOGY

## 2025-05-15 PROCEDURE — 120N000002 HC R&B MED SURG/OB UMMC

## 2025-05-15 PROCEDURE — 250N000013 HC RX MED GY IP 250 OP 250 PS 637

## 2025-05-15 PROCEDURE — 97535 SELF CARE MNGMENT TRAINING: CPT | Mod: GO | Performed by: OCCUPATIONAL THERAPIST

## 2025-05-15 PROCEDURE — C1769 GUIDE WIRE: HCPCS

## 2025-05-15 PROCEDURE — 70553 MRI BRAIN STEM W/O & W/DYE: CPT

## 2025-05-15 PROCEDURE — 85027 COMPLETE CBC AUTOMATED: CPT

## 2025-05-15 PROCEDURE — 99223 1ST HOSP IP/OBS HIGH 75: CPT | Mod: GC | Performed by: OPHTHALMOLOGY

## 2025-05-15 PROCEDURE — 80048 BASIC METABOLIC PNL TOTAL CA: CPT

## 2025-05-15 PROCEDURE — 99232 SBSQ HOSP IP/OBS MODERATE 35: CPT | Mod: GC | Performed by: PSYCHIATRY & NEUROLOGY

## 2025-05-15 PROCEDURE — 272N000566 HC SHEATH CR3

## 2025-05-15 PROCEDURE — 82374 ASSAY BLOOD CARBON DIOXIDE: CPT

## 2025-05-15 PROCEDURE — 84100 ASSAY OF PHOSPHORUS: CPT | Performed by: PSYCHIATRY & NEUROLOGY

## 2025-05-15 PROCEDURE — 70553 MRI BRAIN STEM W/O & W/DYE: CPT | Mod: 26 | Performed by: RADIOLOGY

## 2025-05-15 RX ORDER — GADOBUTROL 604.72 MG/ML
10 INJECTION INTRAVENOUS ONCE
Status: COMPLETED | OUTPATIENT
Start: 2025-05-15 | End: 2025-05-15

## 2025-05-15 RX ADMIN — GADOBUTROL 10 ML: 604.72 INJECTION INTRAVENOUS at 17:19

## 2025-05-15 RX ADMIN — SENNOSIDES AND DOCUSATE SODIUM 1 TABLET: 50; 8.6 TABLET ORAL at 19:44

## 2025-05-15 RX ADMIN — ACETAMINOPHEN 650 MG: 325 TABLET ORAL at 13:12

## 2025-05-15 RX ADMIN — DIVALPROEX SODIUM 500 MG: 500 TABLET, FILM COATED, EXTENDED RELEASE ORAL at 19:44

## 2025-05-15 RX ADMIN — ACETAMINOPHEN 650 MG: 325 TABLET ORAL at 00:44

## 2025-05-15 RX ADMIN — SERTRALINE HYDROCHLORIDE 50 MG: 50 TABLET ORAL at 07:50

## 2025-05-15 RX ADMIN — GABAPENTIN 600 MG: 300 CAPSULE ORAL at 13:12

## 2025-05-15 RX ADMIN — ONDANSETRON 4 MG: 4 TABLET, ORALLY DISINTEGRATING ORAL at 19:44

## 2025-05-15 RX ADMIN — GABAPENTIN 600 MG: 300 CAPSULE ORAL at 00:44

## 2025-05-15 RX ADMIN — PROPRANOLOL HYDROCHLORIDE 60 MG: 60 CAPSULE, EXTENDED RELEASE ORAL at 07:49

## 2025-05-15 RX ADMIN — ONDANSETRON 4 MG: 4 TABLET, ORALLY DISINTEGRATING ORAL at 07:50

## 2025-05-15 RX ADMIN — QUETIAPINE FUMARATE 25 MG: 25 TABLET ORAL at 19:44

## 2025-05-15 RX ADMIN — DIVALPROEX SODIUM 500 MG: 500 TABLET, FILM COATED, EXTENDED RELEASE ORAL at 07:50

## 2025-05-15 RX ADMIN — Medication 500 MG: at 07:49

## 2025-05-15 RX ADMIN — ENOXAPARIN SODIUM 40 MG: 40 INJECTION SUBCUTANEOUS at 17:48

## 2025-05-15 RX ADMIN — SENNOSIDES AND DOCUSATE SODIUM 1 TABLET: 50; 8.6 TABLET ORAL at 07:50

## 2025-05-15 RX ADMIN — ACETAMINOPHEN 650 MG: 325 TABLET ORAL at 19:44

## 2025-05-15 RX ADMIN — GABAPENTIN 600 MG: 300 CAPSULE ORAL at 17:48

## 2025-05-15 RX ADMIN — ACETAMINOPHEN 650 MG: 325 TABLET ORAL at 07:50

## 2025-05-15 ASSESSMENT — ACTIVITIES OF DAILY LIVING (ADL)
ADLS_ACUITY_SCORE: 34
DEPENDENT_IADLS:: INDEPENDENT
ADLS_ACUITY_SCORE: 34

## 2025-05-15 NOTE — CONSULTS
"  Inpatient Diabetes Management Service : New Consult Note     Patient: Amy Choudhury   YOB: 1960    MRN: [id number]     Date of Consult : 05/15/2025   Date of Admission: 5/6/2025     Reason for Consult: \"patient with type 2 diabetes with hyperglycemia; anticipate need for high dose steroids; please assist with management\"  Consult Requestor: Mara Ceron MD            History of Present Illness:   65 female with past medical history of DM, anxiety, tobacco use, polysubstance use presented 5/6/25 with headaches and vision changes ongoing for 3 to 4 weeks.  Patient hospitalized x 2 at outside hospital for similar symptoms and workup include MRI brain with a working diagnosis of possible PRES. patient was hypertensive in the 200s in 1 of those encounters. Patient also described spells of losing time and missing part of her video while watching TV.  On comparison to the most recent MRI patient has microbleed's in the bilateral parietotemporal occipital regions.  Significant leptomeningeal enhancement in the cerebellar regions and bilateral cerebral hemisphere sparing some of the part of frontal regions.  Leptomeningeal enhancement appears to be more prominent than the most recent MRI.  FLAIR hyperintensity can be seen however not very prominent.  Continuous EEG done for 24 hours without any abnormal epileptiform discharges.  CT chest abdominal pelvis completed to rule out any malignancy however unremarkable.  D-dimer, ESR, CRP within normal limits. Differential diagnoses include inflammatory cerebral amyloid angiopathy, PRES, CNS lymphoma, neoplastic/autoimmune process or small vessel vasculitis. Inpatient Diabetes Service consulted 5/15/25 for assistance with glycemic control in setting of starting high dose steroids.     History obtained via the patient, chart review and discussion with primary team.       Additional Historian:  none    Patient is not known to the Inpatient Diabetes Service " from past admission(s).    Last dose insulin PTA: none, N/A  Current inpatient regimen:  Novolog high intensity correction scale TID AC, HS     BG at time of consult: 153 mg/dL   Planned Procedures/Surgeries: NPO 5/16 for diagnostic angiogram.     Inpatient Glucose Trends:           Diabetes History:   Diabetes Type and Duration: T2DM, per patient was told she has T2DM 2 weeks ago at OSH. Per chart review, diagnosed 4/2021 with A1c 6.7%     GAD65 antibody, zinc transporter 8 antibody, islet antibody, insulin antibody, and c-peptide not available on epic search     PTA Medication Regimen: none    Missing doses?: N/A    Historical Diabetes Medications:   - reports taking Metformin 500 mg BID recently at OSH but wasn't taking at home. Has never been on insulin before.      Glucose monitoring device/frequency/trends: has used glucometer randomly before because she has family members with diabetes.    Hypoglycemia PTA: none    Outpatient Diabetes Provider: Has PCP but hasn't seen him since 2021 - Randal Castellanos (with [facility])     Formal Diabetes Education/Educator: no    ------------------------  Complications:  no peripheral neuropathy, no retinopathy (last eye exam: not in many years), no nephropathy, no gastroparesis, + macrovascular disease      Most recent A1c: 8.7% (5/6/25)     Hemoglobin at time of most recent A1c: normal, 13.6   RBC transfusion 3 months prior to most recent A1c: none      History of DKA: no      Diet/Lifestyle/Living Situation: lives alone but children or grandchildren are frequently at her home. Retired. Fixed income, frequently gets food from [employer] food shelf. Eats 2 meals a day, likes to cook. Beverages: diet soda, water  Ability to [place] Prescribed Regimen:  unknown   Food/Housing Insecurity: no          Medications Impacting Glycemia:    Steroids: tentatively MP 1,000 mg daily x 5 days  D5W containing solutions/medications: none  Other medications impacting glucose: quetiapine          Diet/Nutrition:    Orders Placed This Encounter      NPO for Procedure/Surgery per Anesthesia Guidelines Except for: Meds; Clear liquids before procedure/surgery: ADULT (Age GREATER than or Equal to 18 years) - Clear liquids 2 hours before procedure/surgery  Supplements:  none  TF: none  TPN: none         Review of Systems:    Constitutional: No fever or chills. + Headache   Eyes: No vision changes, uses readers.   Cardiac: No chest pain or palpitations.   Respiratory: No shortness of breath or cough.     GI: No abdominal pain, some nausea, no emesis, no diarrhea, no constipation  Neuro: No numbness, tingling, or burning pain in feet/hands.   MSK/Integumentary: No swelling/edema. No rash, non-healing sores, or lipohypertrophy at injection sites.   Endocrine: No polyuria or polydipsia.          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    Depressive disorder     Gastro-oesophageal reflux disease     bleeding ulcers    Hiatal hernia     NEWLY DIAGNOSISED    Hyperparathyroidism, unspecified 03/29/2012    Peptic ulcer, unspecified site, unspecified as acute or chronic, without mention of hemorrhage or perforation     Pneumonia     NOVEMBER    PONV (postoperative nausea and vomiting)     TMJ (temporomandibular joint syndrome) 05/08/2014    Vitamin D deficiency 06/07/2012             Past Surgical History:      Past Surgical History:   Procedure Laterality Date    APPENDECTOMY      CHOLECYSTECTOMY      DILATION AND CURETTAGE, OPERATIVE HYSTEROSCOPY WITH MORCELLATOR, COMBINED  11/21/2012    Procedure: COMBINED DILATION AND CURETTAGE, OPERATIVE HYSTEROSCOPY WITH MORCELLATOR;  Hysteroscopy, Polypectomy, Dilation and Curettage  ;  Surgeon: Marta Montejo MD;  Location: [facility] OR    GI SURGERY      gastric bypass at age 31    ORTHOPEDIC SURGERY      back surgery at age 31  "   PARATHYROIDECTOMY Right 2015    Procedure: PARATHYROIDECTOMY;  Surgeon: Gregory Coleman MD;  Location: [facility]      SPINE SURGERY               Social History:      Social History     Tobacco Use    Smoking status: Every Day     Current packs/day: 0.50     Average packs/day: 0.5 packs/day for 30.0 years (15.0 ttl pk-yrs)     Types: Cigarettes    Smokeless tobacco: Never   Substance Use Topics    Alcohol use: Yes     Comment: occasionally        History   Sexual Activity    Sexual activity: Yes    Partners: Male    Birth control/ protection: None             Family History:    Family History of Diabetes: Reports daughter had T1DM diagnosed at 8yoa but didn't start insulin until 26 yoa? So likely not T1DM. Another daughter with T2DM. Mother with T2DM has been on insulin since GDM while pregnant with patient.     Family History   Problem Relation Age of Onset    Thyroid Disease Mother     Breast Cancer Mother 57         65 y.o.    Other - See Comments Mother         cystic acne    Cancer Brother     Diabetes Daughter         Type 1; insulin               Physical Exam:    /85 (BP Location: Left arm)   Pulse 60   Temp 98.2  F (36.8  C) (Oral)   Resp 18   Ht 1.676 m (5' 6\")   Wt 104.6 kg (230 lb 8 oz)   SpO2 98%   BMI 37.20 kg/m     General:  well appearing, NAD, sitting in library   Lungs: unlabored breathing on RA.  Mental Status:  Alert and oriented x3  Psych:   Cooperative, good eye contact, full/appropriate affect         Laboratory Data:      Lab Results   Component Value Date    A1C 8.7 (H) 2025    A1C 6.0 (H) 2022    A1C 6.6 (H) 10/15/2021    A1C 5.8 (H) 2021    A1C 6.7 (H) 2021    A1C 5.0 2015     Recent Labs   Lab Test 05/15/25  0703   WBC 10.6   RBC 4.81   HGB 12.5   HCT 39.8   MCV 83   MCH 26.0*   MCHC 31.4*   RDW 14.7        Recent Labs   Lab Test 05/15/25  0748 05/15/25  0703 25  0728 25  0650 "   NA  --  138  --  136   POTASSIUM  --  4.8  --  4.8   CHLORIDE  --  102  --  99   CO2  --  23  --  25   ANIONGAP  --  13  --  12   * 140*   < > 265*   BUN  --  14.6  --  17.9   CR  --  0.63  --  0.70   CRISTIAN  --  9.0  --  8.8    < > = values in this interval not displayed.     Recent Labs   Lab Test 05/12/25  0651   PROTTOTAL 6.7   ALBUMIN 3.9   BILITOTAL 0.3   ALKPHOS 98   AST 21   ALT 21            Assessment and Recommendations:       Assessment:   Type 2 Diabetes Mellitus, without known complications. 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    Plan/Recommendations:    - Give NPH 20 units with methylprednisolone 1,000 mg whenever MP is given (do not give if MP not given).   - start Novolog Meal Coverage: 1 units per 15 (starting at lunch) g CHO, TID AC and PRN with snacks/supplements   - continue Novolog Correction Scale: high resistance 1/25 >140 TID AC, 1/25 >200 HS & 0200   - *Primary team to start IV insulin overnight if BG trending >250 mg/dL despite correction scale after receiving Methylprednisolone   - BG monitoring: TID AC, HS, 0200   - Hypoglycemia protocol    - Carb counting protocol     Discussion: Tentatively starting Methylprednisolone 1,000 mg daily x 5 days starting this evening. Will also be NPO at midnight for procedure tomorrow. Will add Novolog carbohydrate coverage starting with lunch and NPH insulin to given when/if the Methylpred is given. No Lantus as this time as fasting BG at goal this morning. Primary team to start IV insulin overnight if BG trending > 250 mg/dL despite correction scale after receiving MP.     Discharge Planning: (tentative)    Medications: TBD    Test Claims: none needed.  Education: will be needed if not discharging   Outpatient  Follow-up: recommend [facility] vs PCP       Thank you for this consult. IDS will continue to follow.      Please notify Inpatient Diabetes Service if changes are planned to steroids, nutrition, TPN/TF and anticipated procedures requiring prolonged NPO status.     Andreea Taylor PA-C  Inpatient Diabetes Service  Pager: [phone]  Available on Ali    To contact Inpatient Diabetes Service:     7 AM - 5 PM: Page the IDS ELBERT following the patient that day (see filed or incomplete progress notes/consult notes under Endocrinology)    OR if uncertain of provider assignment: page job code [phone]    5 PM - 7 AM: First call after hours is to primary service.    For urgent after-hours questions, page job code for on call fellow: [phone]     I spent a total of 80 minutes on the date of the encounter doing prep/post-work, chart review, history and exam, documentation and further activities per the note including lab review, multidisciplinary communication, counseling the patient and/or coordinating care regarding acute hyper/hypoglycemic management, as well as discharge management and planning/communication.  See note for details.

## 2025-05-15 NOTE — PROGRESS NOTES
Care Management Initial Consult    General Information  Assessment completed with: PatientAmy  Type of CM/SW Visit: Initial Assessment    Primary Care Provider verified and updated as needed: Yes   Readmission within the last 30 days: no previous admission in last 30 days   Reason for Consult: discharge planning  Advance Care Planning: Advance Care Planning Reviewed: no concerns identified        Communication Assessment  Patient's communication style: spoken language (English or Bilingual)    Hearing Difficulty or Deaf: no   Wear Glasses or Blind: no    Cognitive  Cognitive/Neuro/Behavioral: WDL  Level of Consciousness: alert  Arousal Level: opens eyes spontaneously  Orientation: oriented x 4  Mood/Behavior: calm, cooperative  Best Language: 0 - No aphasia  Speech: clear, logical, spontaneous    Living Environment:   People in home: alone, child(vidya), adult (daughter and grandson come and go)     Current living Arrangements: apartment      Able to return to prior arrangements: yes     Family/Social Support:  Care provided by: self  Provides care for: no one     Support system: Children          Description of Support System: Involved, Supportive    Support Assessment: Adequate family and caregiver support    Current Resources:   Patient receiving home care services: No     Community Resources: Other (see comment) (CADI waiver)  Equipment currently used at home: none  Supplies currently used at home: None    Employment/Financial:  Employment Status:          Financial Concerns:     Referral to Financial Worker: No     Does the patient's insurance plan have a 3 day qualifying hospital stay waiver?  Yes   Which insurance plan 3 day waiver is available? Alternative insurance waiver  Will the waiver be used for post-acute placement? No    Lifestyle & Psychosocial Needs:  Social Drivers of Health     Food Insecurity: No Food Insecurity (5/4/2025)    Received from [facility]    [employer]  Insecurity     Do you worry your food will run out before you are able to buy more?: 1   Depression: At risk (11/4/2021)    PHQ-2     PHQ-2 Score: 5   Housing Stability: Low Risk  (5/4/2025)    Received from [employer] [facility]    Housing Stability     What is your housing situation today?: 1   Tobacco Use: High Risk (5/8/2025)    Patient History     Smoking Tobacco Use: Every Day     Smokeless Tobacco Use: Never     Passive Exposure: Not on file   Financial Resource Strain: Low Risk  (4/29/2025)    Received from [employer] [facility]    Financial Resource Strain     Difficulty of Paying Living Expenses: 3     Difficulty of Paying Living Expenses: Not on file   Alcohol Use: Not on file   Transportation Needs: Unmet Transportation Needs (5/4/2025)    [facility]    Transportation Needs     Does lack of transportation keep you from medical appointments?: 2     Does lack of transportation keep you from work, meetings or getting things that you need?: 2   Physical Activity: Not on file   Interpersonal Safety: Low Risk  (5/10/2025)    Interpersonal Safety     Do you feel physically and emotionally safe where you currently live?: Yes     Within the past 12 months, have you been hit, slapped, kicked or otherwise physically hurt by someone?: No     Within the past 12 months, have you been humiliated or emotionally abused in other ways by your partner or ex-partner?: No   Stress: Not on file   Social Connections: Socially Integrated (5/4/2025)    Received from [employer] [facility]    Social Connections     Do you often feel lonely or isolated from those around you?: 0   Health Literacy: Not on file     Functional Status:  Prior to admission patient needed assistance:   Dependent ADLs:: Independent  Dependent IADLs:: Independent     Mental Health Status:  Mental Health Status: No Current Concerns        Chemical Dependency Status:  Chemical Dependency Status: No Current Concerns           Values/Beliefs:  Spiritual, Cultural Beliefs, Jainism Practices, Values that affect care: no             Discussed  Partnership in Safe Discharge Planning  document with patient/family: No    Additional Information:  Met with patient to complete initial care management assessment. Patient currently lives in an apartment in [place]. She states she lives alone but her daughter and grandson often come stay with her. She states she is independent with ADLs/IADLs at baseline. She states her daughter, grandson, neighbors are able to provide some support at discharge. She anticipates using her [place] benefits for discharge transportation.    OT is recommending home with assist. Patient may require assistance with arranging a ride home at discharge.    Next Steps:   RNCC will continue to follow.  - Provide assistance in arranging ride home at discharge as needed.    Landy Dixon RN, BSN  6A RN Care Coordinator  Ph: [phone]   Juani: 6A Neuro RNCC

## 2025-05-15 NOTE — TELEPHONE ENCOUNTER
Pt to follow up in about 3-4 weeks with optometry for complete eye exam/likely refraction.      Pt currently in patient.    Scheduled June 2nd at 0900 with Dr. Pena at [facility] location.    Information will be on discharge instrucations and also viewable on [employer].    Brendan Henderson RN 1:29 PM 05/15/25

## 2025-05-15 NOTE — PROGRESS NOTES
[facility]    Vascular Neurology Progress Note    Interval History     No acute overnight events per nursing notes.    The patient was seen this morning walking back to her bed. She reports no pain or new complaints aside from mild headache (much improved) and bruising on her right forearm.   Hospital Course     Chief complaint: Headache    64 yo female with DM, anxiety, tobacco use, polysubstance use (+meth UDS) presented with headaches and vision changes for 3 to 4 weeks.  Patient hospitalized x 2 at outside hospital for similar symptoms and workup include MRI brain with a working diagnosis of possible PRES, with systolics in 200s during one of the hospitalizations. Patient reports visual disturbances, distortions and palinopsia. Patient also described spells of lost time.    MRI from OSH showed patient has microbleed's in the bilateral parietotemporal occipital regions.  There was also significant leptomeningeal enhancement in the cerebellar regions and bilateral cerebral hemisphere sparing some of the part of frontal regions. Case discussed in multidisciplinary neuroradiology rounds. Leptomeningeal enhancement appears to be more prominent than the most recent MRI with FLAIR hyperintensity not very prominent.    Continuous EEG done for 3 days without any abnormal epileptiform discharges.  CT chest abdominal pelvis completed to rule out any malignancy was unremarkable.  D-dimer, ESR, CRP within normal limits.  Patient refusing bedside lumbar puncture at bedside given prior traumatic experience with her daughter. Agreeable for lumbar puncture under fluoroscopy guidance.  Attempt made on Friday however she refused but now willing to undergo.  LP underwent with fluoroscopy (5/12). LP consistent with inflammatory process. On further examination, do wonder if the patient has simultanagnosia and some degree of ocularmotor apraxia, but no evidence of optic ataxia,  however her visual symptoms could also be in the setting of inflammation in her bilateral occipital lobes.  The patient noted auditory hallucinations which have not been noted in the past, therefore we will plan to repeat MRI today (5/15). Additional plan for further evaluation includes rheumatology consult for vasculitis, ophthalmology consult to rule out intraocular pathology, endocrine consult for diabetes management and DSA planned for 5/16.     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 Differential diagnoses include inflammatory cerebral amyloid angiopathy, CNS lymphoma, neoplastic/autoimmune process, vasculitis less likely PRES given significant leptomeningeal enhancement and less prominent FLAIR changes though could be recovering PRES. LP with findings suggestive of inflammatory process. We will plan for further evaluation with serum workup for vasculitis and consult to neuro-interventional team for consideration of DSA.  On further examination, do wonder if the patient has Balint syndrome as the patient appears to have simultanagnosia, possible optic ataxia, and some degree of ocularmotor apraxia, however her visual symptoms could also be in the setting of inflammation in her bilateral occipital lobes.  The patient noted auditory hallucinations which have not been noted in the past, therefore we will plan to repeat MRI today.  - LDL 72, goal < 100.  Will defer starting statin at this time.  For headache:   - gabapentin 600mg TID  - propranolol ER 60 mg daily for migraine prevention   - depakote 500mg BID   - Changed to PRN  compazine & tylenol 975mg TID  -magnesium 500mg daily  - BP parameters:              - Notify provider for SBP > 180              - PRN antihypertensives for SBP > 160  - SBP goal <160  - PT/OT consults who recommended home with assist  - LP with IR 5/12: findings suggestive of inflammatory process  - serum workup pending:    - dsDNA, PEDRO, SSA, SSB, Scleroderma, ANCA  - Repeat Brain MRI w+w/o contrast - new auditory hallucinations   - DSA rescheduled for 5/16  - ophthalmology consult to rule out intraocular pathology and papilledema  - rheumatology consult for recommendations regarding possible vasculitis    #Hyponatremia, resolved  Workup as of 5/11 serum osmol 285, urine osmol 862, urine sodium 219, glucose at time of urine studies was 224, serum sodium 128. Workup most consistent with SIADH vs thiazide induced hyponatremia. Holding thiazide since 5/11 and repeating levels. TSH WNL.   -trend BMP  -strict I/Os  -encourage adequate PO intake  - liberalize fluid restriction to 2L    #incidental finding- Ill-defined 1.6 cm area of enhancement within the left inferior thyroid lobe.   -TSH normal. Consider outpatient  nonemergent thyroid ultrasound for further Assessment, will defer to PCP.    #incidental 3. 5 mm solid nodule in the right upper lung lobe  -Can consider optional follow-up CT in 12 months to document stability, per PCP    # Leukocytosis, resolved  She is also found to have high leukocyte count 14.3.  But chest x-ray and UA are unremarkable, and counts have returned to normal.   - Daily CBC  - Temperature monitoring     # Type 2 diabetes mellitus  - A1c this admission 8.7, goal < 7.0. Will need close PCP follow up  - Medium dose sliding scale -> high dose sliding scale switched on 5/12  -low consistent carb diet  - Endocrine consult for diabetes management, in anticipation of possible need for high dose steroids    #hypertension  cautious re-introduction of PTA BP meds to avoid fluctuations, BP has been  normotensive   - BP parameters:              - Notify provider for SBP > 180              - PRN antihypertensives for SBP > 160  - Hydralazine/labetalol as needed for systolic blood pressure>160  - Holding PTA hydrochlorothiazide 12.5 mg daily since 5/11 due to hyponatremia  - Holding PTA losartan 100 mg daily since 5/5  - Holding PTA amlodipine 10 mg daily since 5/5     #Eczema  - PRN triamcinolone cream that the patient has used in the past    Prophylaxis            For VTE Prevention:  - pneumatic compression device and started lovenox DVT ppx 5/12      For Acid Suppression:  - GI prophylaxis is not indicated    Patient Follow-up    - Neurology clinic follow up in 6-8 weeks  - PCP follow up within 7 days of discharge for HTN, diabetes management    Medically Ready for Discharge: Anticipated in 5+ Days    The patient was discussed with the Stroke Staff Dr. Greene.    IAdalid, am serving as a scribe at 11:34 on Thursday 15th 2025 to document services personally performed by Mara Killian based on my observations and the provider's statements to me.    Resident/Fellow Attestation   I, MARA KILLIAN MD, was present with the research fellow who participated in the service and in the documentation of the note.  I have verified the history and personally performed the physical exam and medical decision making.  I agree with the assessment and plan of care as documented in the note.      MARA KILLIAN MD  Neurology Resident, PGY-2    Note: Chart documentation done in part with Dragon Voice Recognition software. Although reviewed after completion, some word and grammatical errors may remain.      Physical Examination     Temp: 98.2  F (36.8  C) Temp src: Oral BP: 131/85 Pulse: 60   Resp: 18 SpO2: 98 % O2 Device: None (Room air)      General:  patient sitting up in bed, in no acute distress  HEENT:  normocephalic/atraumatic  Cardio:  RRR  Pulmonary:  no respiratory distress on room air  Skin:  some skin lesions  across middle region of back, scattered bruising     Neurologic  Mental Status:  alert, oriented to self, place, date, situation, follows commands, speech clear and fluent, naming and repetition normal  Cranial Nerves: PERRL, possible left superior quandrantanopia vs left visual neglect, simultanagnosia, possible ocular apraxia, the patient seems to have difficulty with smooth pursuit and maintaining looking one way or the other but able to bury sclera, facial sensation intact and symmetric, facial movements symmetric, hearing not formally tested but intact to conversation, no dysarthria, shoulder shrug strong bilaterally, tongue protrusion midline  Motor:  normal muscle tone and bulk, no abnormal movements, able to move all limbs spontaneously, strength 5/5 throughout upper and lower extremities, no pronator drift  Reflexes:  no ankle clonus bilaterally  Sensory:  light touch sensation intact and symmetric throughout upper and lower extremities, no extinction on double simultaneous stimulation   Coordination:  normal finger-to-nose and heel-to-shin bilaterally without dysmetria   Station/Gait:  steady gait      Imaging/Labs      MRI/Head CT CT head:  IMPRESSION:  1.  No acute findings. No acute intracranial hemorrhage. No acute calvarial fracture.     MRI:   IMPRESSION:  1. There is somewhat decreased cortical T2 hyperintense signal with increased conspicuity of leptomeningeal enhancement and numerous presumed microhemorrhages within the occipital, temporal parietal regions bilaterally. These findings are nonspecific, although may be seen with process or hematogenous malignancy such as melanoma.  Findings are less likely related to cerebral amyloid  angiopathy given the distribution. The distribution could be seen with hypertensive angiopathy, however these are extremely great number, much more than expected.   2. 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  3.  Chronic small vessel ischemic changes.   Intracranial Vasculature HEAD CTA:   No large vessel occlusion findings intracranially. No high-grade stenosis intracranially.   Cervical Vasculature NECK CTA:  1.  No acute vascular injury in the neck. No high-grade stenosis in the neck.      Echocardiogram Not obtained   EKG/Telemetry Not obtained      LDL  5/6/2025: 72 mg/dL   A1C  5/6/2025: 8.7 %   Troponin No lab value available in past 48 hrs   TSH 1.24  CT CAP: IMPRESSION:   1. No evidence of primary malignancy throughout the chest, abdomen or  pelvis.  2. Ill-defined 1.6 cm area of enhancement within the left inferior  thyroid lobe. Consider nonemergent thyroid ultrasound for further  assessment.  3. 5 mm solid nodule in the right upper lobe. Can consider optional  follow-up CT in 12 months to document stability.  4. 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 The gastrojejunal anastomosis appears  patent, however, inspissated debris may suggest delayed  gastrointestinal transit.  5. Hepatic steatosis.  6. Additional chronic and incidental findings, as described above.       SUMMARY OF 3 DAYS OF VIDEO EEG:  This 3 day video EEG is abnormal due to the presence increased superimposed fast activity, likely related to sedating medications employed. No seizures or epileptiform discharges were seen. Clinical correlation is recommended.     Last Comprehensive Metabolic Panel:  Lab Results   Component Value Date     05/15/2025    POTASSIUM 4.8 05/15/2025    CHLORIDE 102 05/15/2025    CO2 23 05/15/2025    ANIONGAP 13 05/15/2025     (H) 05/15/2025    BUN 14.6 05/15/2025    CR 0.63 05/15/2025    GFRESTIMATED >90 05/15/2025    CRISTIAN 9.0 05/15/2025     CSF (5/12)  Protein total CSF (74.9), Glucose CSF (102), RBC (5), Appearance (clear) Lymphocyte%, Mono %, Neutrophil % (all WNL)    Sed Rate   Date Value Ref Range Status   01/14/2014 8 0 - 30 mm/h Final     Erythrocyte  Sedimentation Rate   Date Value Ref Range Status   05/12/2025 10 0 - 30 mm/hr Final     CRP Inflammation   Date Value Ref Range Status   05/12/2025 <3.00 <5.00 mg/L Final

## 2025-05-15 NOTE — PLAN OF CARE
Pt admitted for intermittent vision changes MRI from OSH showed patient has microbleed's in the bilateral parietotemporal occipital regions , vs ex HTN within parameters, neuros include: intact. PIV SL, low CHO diet w/CC, voids spont, LBM 5/14, up ad sylvester. Pt's diagnostic angiogram was cancelled and rescheduled for Friday 5/16, pt aware. Pt has consistent HA with intermittent nausea, Tylenol administered round-the-clock, Zofran x1. Pt ambulating frequently throughout hospital, always comes back and knows to only be off floor for 30mins d/t frequent medical teams needing her. Opthalmology, endocrine and rheumatology consults placed today. Continue to care per orders.

## 2025-05-15 NOTE — PLAN OF CARE
Goal Outcome Evaluation:      Plan of Care Reviewed With: patient    Overall Patient Progress: no changeOverall Patient Progress: no change    Outcome Evaluation: [id number]      Status: Admitted 5/6 for HA and blurred vision. Hx of DM, anxiety, tobacco use, polysubstance abuse. Contact precautions for MRSA  Vitals: VSS on RA.  Neuros: A&Ox4.  Strengths 5/5 t/o.  Unchanged bilateral photophobia and blurred vision.  DND order for overnight.    IV: PIV SL  Labs/Electrolytes: BG ACHS  Resp: WDL  Diet:  Has been NPO since 0000.  Previous diet: Low consistent carb diet.  1500 mL fluid restriction.  GI/: voiding spont to bathroom, hat in toilet, patient misses it sometimes.    LBM 5/13.  Strict I&O  Skin: bruising t/o.  LP site on lower back, with band-aid in place.    Pain:  headache managed with scheduled Tylenol and scheduled meds  Activity: Independent   Plan:  5/15 0900  IR Carotid Cerebral angiogram.    CHG bath done & Linen changed  at 0430

## 2025-05-15 NOTE — CONSULTS
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 Patient wears only OTC readers for glasses with no record of prior refraction. Contributions from both most likely account for visual acuity deficit.      No evidence of agueda nor intraocular inflammation or other pathologic changes. No obvious involvement of the optic nerve on imaging. No diabetic retinopathy.    RECOMMENDATIONS:  - Follow up with outpatient optometry for refraction, consider referral for cataract surgery  - Annual diabetic eye exams    It is our pleasure to participate in this patient's care and treatment. Ophthalmology will sign off.  Please contact us with any further questions or concerns.      Patient seen and discussed with Dr. Nixon.    Jed Beth MD  Resident Physician, PGY-2  Department of Ophthalmology    HISTORY OF PRESENTING ILLNESS:     Amy Choudhury is a 65 year old female  with PMH of DM, anxiety, tobacco use, polysubstance use who presented who presented on 5/6/25 with headache and vision changes for the prior 3-4 weeks. Previously hospitalized at OSH for similar symptoms with concern for PRES due to HTN to 200's. On MRI at [facility] patient has microbleeds in bilateral parietotemporal occipital regions. LP performed significant for lymphocytic pleocytosis. Vision changes reported by neurology include metamorphopsia, face distortions, and palinopsia. DDX at this time includes inflammatory cerebral amyloid angiopathy, PRES, CNS lymphoma, neoplastic/autoimmune process and small vessel vasculitis. Bilateral cerebral angiogram planned for 5/16/25 and rheumatology consulted on 5/15/25.     Ophthalmology  "consulted to rule out intraocular involvement/pathology possibly contributing to visual changes. No prior records located in [facility] for previous eye exams. Patient endorses that last eye exam was \"about 7 years ago.\" Says that she just learned she was diabetic, but A1C has been diabetic range back to 2021.    Review of systems were otherwise negative except for that which has been stated above.      OCULAR/MEDICAL/SURGICAL HISTORIES:     Past Ocular History:  Last eye exam: >7 years ago  Prior eye surgery/laser: None  Contact lens wear: None  Glasses: Readers   Eyedrops: None    Pertinent Systemic Medications:     Current Facility-Administered Medications:     acetaminophen (TYLENOL) tablet 650 mg, 650 mg, Oral, Q4H PRN, Mara Ceron MD, 650 mg at 05/15/25 0750    calcium carbonate (TUMS) chewable tablet 500 mg, 500 mg, Oral, Daily PRN, Jazmine Wade MD, 500 mg at 05/11/25 1336    glucose gel 15-30 g, 15-30 g, Oral, Q15 Min PRN **OR** dextrose 50 % injection 25-50 mL, 25-50 mL, Intravenous, Q15 Min PRN **OR** glucagon injection 1 mg, 1 mg, Subcutaneous, Q15 Min PRN, Karen Burt MD    divalproex sodium extended-release (DEPAKOTE ER) 24 hr tablet 500 mg, 500 mg, Oral, BID, Karen Burt MD, 500 mg at 05/15/25 0750    enoxaparin ANTICOAGULANT (LOVENOX) injection 40 mg, 40 mg, Subcutaneous, Q24H, Karen Burt MD, 40 mg at 05/14/25 1625    gabapentin (NEURONTIN) capsule 600 mg, 600 mg, Oral, TID, Dariusz Bang MD, 600 mg at 05/15/25 0044    hydrALAZINE (APRESOLINE) injection 10 mg, 10 mg, Intravenous, Q6H PRN, Karen Burt MD    [Held by provider] hydroCHLOROthiazide tablet 12.5 mg, 12.5 mg, Oral, Daily, Dariusz Bang MD, 12.5 mg at 05/11/25 0817    insulin aspart (NovoLOG) injection (RAPID ACTING), 1-10 Units, Subcutaneous, TID AC, Karen Burt MD, 1 Units at 05/14/25 1818    insulin aspart (NovoLOG) injection (RAPID ACTING), 1-7 Units, Subcutaneous, At Bedtime, [place], " MD Karen, 6 Units at 05/14/25 2214    lidocaine (LMX4) cream, , Topical, Q1H PRN, Litzy Kulkarni MD    lidocaine 1 % 0.1-1 mL, 0.1-1 mL, Other, Q1H PRN, Litzy Kulkarni MD    magnesium gluconate (MAGONATE) tablet 500 mg, 500 mg, Oral, Daily, Dariusz Bang MD, 500 mg at 05/15/25 0749    melatonin tablet 3 mg, 3 mg, Oral, At Bedtime, Alejandro Villalobos MD, 3 mg at 05/14/25 2214    ondansetron (ZOFRAN ODT) ODT tab 4 mg, 4 mg, Oral, Q6H PRN, 4 mg at 05/15/25 0750 **OR** ondansetron (ZOFRAN) injection 4 mg, 4 mg, Intravenous, Q6H PRN, Litzy Kulkarni MD, 4 mg at 05/11/25 2129    polyethylene glycol (MIRALAX) Packet 17 g, 17 g, Oral, Daily, Jazmine Wade MD, 17 g at 05/11/25 1542    prochlorperazine (COMPAZINE) injection 10 mg, 10 mg, Intravenous, Q6H PRN, Karen Burt MD, 10 mg at 05/14/25 1438    propranolol ER (INDERAL LA) 24 hr capsule 60 mg, 60 mg, Oral, Daily, Dariusz Bang MD, 60 mg at 05/15/25 0749    QUEtiapine (SEROquel) tablet 25 mg, 25 mg, Oral, At Bedtime, Alejandro Villalobos MD, 25 mg at 05/14/25 1920    senna-docusate (SENOKOT-S/PERICOLACE) 8.6-50 MG per tablet 1 tablet, 1 tablet, Oral, BID, Karen Burt MD, 1 tablet at 05/15/25 0750    sennosides (SENOKOT) tablet 8.6 mg, 8.6 mg, Oral, Daily PRN, Karen Burt MD    sertraline (ZOLOFT) tablet 50 mg, 50 mg, Oral or Feeding Tube, Daily, Litzy Kulkarni MD, 50 mg at 05/15/25 0750    sodium chloride (PF) 0.9% PF flush 3 mL, 3 mL, Intracatheter, Q8H KHUSHBU, Litzy Kulkarni MD, 3 mL at 05/15/25 0750    sodium chloride (PF) 0.9% PF flush 3 mL, 3 mL, Intracatheter, q1 min prn, Litzy Kulkarni MD, 3 mL at 05/08/25 2046    triamcinolone (KENALOG) 0.1 % cream, , Topical, BID PRN, Mara Ceron MD      Past Medical History:  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    Depressive disorder     Gastro-oesophageal reflux disease     bleeding  ulcers    Hiatal hernia     NEWLY DIAGNOSISED    Hyperparathyroidism, unspecified 03/29/2012    Peptic ulcer, unspecified site, unspecified as acute or chronic, without mention of hemorrhage or perforation     Pneumonia     NOVEMBER    PONV (postoperative nausea and vomiting)     TMJ (temporomandibular joint syndrome) 05/08/2014    Vitamin D deficiency 06/07/2012       Past Surgical History:   Past Surgical History:   Procedure Laterality Date    APPENDECTOMY      CHOLECYSTECTOMY      DILATION AND CURETTAGE, OPERATIVE HYSTEROSCOPY WITH MORCELLATOR, COMBINED  11/21/2012    Procedure: COMBINED DILATION AND CURETTAGE, OPERATIVE HYSTEROSCOPY WITH MORCELLATOR;  Hysteroscopy, Polypectomy, Dilation and Curettage  ;  Surgeon: Marta Montejo MD;  Location: [facility] OR    GI SURGERY      gastric bypass at age 31    ORTHOPEDIC SURGERY      back surgery at age 31    PARATHYROIDECTOMY Right 12/14/2015    Procedure: PARATHYROIDECTOMY;  Surgeon: Gregory Coleman MD;  Location: [facility]      SPINE SURGERY         Family History:  No history of macular degeneration or glaucoma    Social History:  Current 7-10 cigarettes per day - self roll    EXAMINATION:     Base Eye Exam       Visual Acuity (Snellen - Linear)         Right Left    Near cc 20/50-1 20/40-1              Tonometry (Tonopen, 11:37 AM)         Right Left    Pressure 17 15              Pupils         Dark Light Shape React APD    Right 3 2 Round Brisk None    Left 3 2 Round Brisk None              Visual Fields         Left Right     Full Full              Extraocular Movement         Right Left     Full, Ortho Full, Ortho              Neuro/Psych       Oriented x3: Yes    Mood/Affect: Normal              Dilation       Both eyes: 1.0% Mydriacyl, 2.5% Morteza Synephrine @ 11:33 AM                  Slit Lamp and Fundus Exam       Slit Lamp Exam         Right Left    Lids/Lashes Normal Normal    Conjunctiva/Sclera White and quiet White  and quiet    Cornea Clear Clear    Anterior Chamber Deep and quiet Deep and quiet    Iris Round and reactive Round and reactive    Lens 2+ NS 2+ NS    Anterior Vitreous Normal Normal              Fundus Exam         Right Left    Disc Normal Normal    C/D Ratio 0.3 0.3    Macula Normal Normal    Vessels Normal Normal    Periphery Normal Normal                    Labs/Studies/Imaging Performed:    ANCA IgG by IFA with Reflex to Titer (05/14/2025 6:50 AM): Pending  Anti Nuclear Sandra IgG by IFA with Reflex (05/14/2025 6:50 AM): Pending  Scleroderma Antibody Scl70 KEMAL IgG (05/14/2025 6:50 AM): Pending  SSB La KEMAL Antibody IgG (05/14/2025 6:50 AM): Pending  SSA Ro KEMAL Antibody IgG (05/14/2025 6:50 AM): Pending  DNA double stranded antibodies (05/14/2025 6:50 AM) : Pending  RF: Negative  B12: Nml  ACE CSF: Negative  ESR: 10  CRP: < 3    Hemoglobin A1C   Date Value Ref Range Status   05/06/2025 8.7 (H) <5.7 % Final     Comment:     Normal <5.7%   Prediabetes 5.7-6.4%    Diabetes 6.5% or higher     Note: Adopted from ADA consensus guidelines.   02/11/2022 6.0 (H) 0.0 - 5.6 % Final     Comment:     Normal <5.7%   Prediabetes 5.7-6.4%    Diabetes 6.5% or higher     Note: Adopted from ADA consensus guidelines.   10/15/2021 6.6 (H) 0.0 - 5.6 % Final     Comment:     Normal <5.7%   Prediabetes 5.7-6.4%    Diabetes 6.5% or higher     Note: Adopted from ADA consensus guidelines.   04/19/2021 6.7 (H) 4.1 - 5.7 % Final   02/11/2015 5.0 4.3 - 6.0 % Final     IR Carotid Cerebral Angiogram Bilateral (05/13/2025 1:54 PM)     CT Head w/o Contrast (05/11/2025 1:51 PM)    IMPRESSION:  1.  No acute intracranial findings.  2.  Extensive microhemorrhages and leptomeningeal disease described on  prior MRI of 5/7/2025 are not visualized on CT exam.    MR Brain w/o & w Contrast (05/07/2025 6:45 PM)   IMPRESSION:  1. There is somewhat decreased cortical T2 hyperintense signal with  increased conspicuity of leptomeningeal enhancement and  numerous  presumed microhemorrhages within the occipital, temporal parietal  regions bilaterally. These findings are nonspecific, although may be  seen with process or hematogenous malignancy such as melanoma.  Findings are less likely related to cerebral amyloid  angiopathy given  the distribution. The distribution could be seen with hypertensive  angiopathy, however these are extremely great number, much more than  expected.   2. 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  3. Chronic small vessel ischemic changes.    CTA Head Neck w Contrast (05/06/2025 8:26 PM)   HEAD CTA:   No large vessel occlusion findings intracranially. No high-grade stenosis intracranially.     NECK CTA:  1.  No acute vascular injury in the neck. No high-grade stenosis in the neck.

## 2025-05-15 NOTE — PLAN OF CARE
Status: Admitted 5/6 for HA and blurred vision. Hx of DM, anxiety, tobacco use, polysubstance abuse. Contact precautions for MRSA  Vitals: VSS on RA.  Neuros: A&Ox4.  Strengths 5/5 t/o.  Unchanged bilateral photophobia and blurred vision.  DND order for overnight.    IV: PIV SL  Labs/Electrolytes: BG ACHS  Resp: WDL  Diet: NPO  1500 mL fluid restriction.  GI/: voiding spont to bathroom, hat in toilet, patient misses it sometimes.    LBM 5/13.  Strict I&O  Skin: bruising t/o.  LP site on lower back, with band-aid in place.    Pain: mild headache managed with scheduled Tylenol.    Activity: SBA.   Plan:  5/15 0900  IR Carotid Cerebral angiogram.  Start NPO at 0000 on 5/15.

## 2025-05-16 ENCOUNTER — APPOINTMENT (OUTPATIENT)
Dept: CT IMAGING | Facility: CLINIC | Age: 65
DRG: 545 | End: 2025-05-16
Payer: COMMERCIAL

## 2025-05-16 ENCOUNTER — APPOINTMENT (OUTPATIENT)
Dept: MRI IMAGING | Facility: CLINIC | Age: 65
DRG: 545 | End: 2025-05-16
Payer: COMMERCIAL

## 2025-05-16 ENCOUNTER — APPOINTMENT (OUTPATIENT)
Dept: INTERVENTIONAL RADIOLOGY/VASCULAR | Facility: CLINIC | Age: 65
DRG: 545 | End: 2025-05-16
Attending: STUDENT IN AN ORGANIZED HEALTH CARE EDUCATION/TRAINING PROGRAM
Payer: COMMERCIAL

## 2025-05-16 PROBLEM — D36.9 BENIGN NEOPLASTIC DISEASE: Status: ACTIVE | Noted: 2024-04-18

## 2025-05-16 PROBLEM — Z90.89 ACQUIRED ABSENCE OF ORGAN: Status: ACTIVE | Noted: 2024-04-18

## 2025-05-16 PROBLEM — F41.9 ANXIETY DISORDER: Status: ACTIVE | Noted: 2024-04-18

## 2025-05-16 PROBLEM — M26.609 TEMPOROMANDIBULAR JOINT DISORDER: Status: ACTIVE | Noted: 2024-04-18

## 2025-05-16 PROBLEM — S42.213A FRACTURE OF SURGICAL NECK OF HUMERUS: Status: ACTIVE | Noted: 2024-04-18
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
## 2025-05-16 PROBLEM — Z98.84 STATUS POST BARIATRIC SURGERY: Status: ACTIVE | Noted: 2024-04-18

## 2025-05-16 PROBLEM — G43.911 INTRACTABLE MIGRAINE WITH STATUS MIGRAINOSUS: Status: ACTIVE | Noted: 2025-05-03

## 2025-05-16 PROBLEM — K92.9 DIGESTIVE SYSTEM DISORDER: Status: ACTIVE | Noted: 2024-04-18

## 2025-05-16 PROBLEM — K59.00 CONSTIPATION: Status: ACTIVE | Noted: 2024-04-18

## 2025-05-16 PROBLEM — K21.9 GASTROESOPHAGEAL REFLUX DISEASE WITHOUT ESOPHAGITIS: Status: ACTIVE | Noted: 2024-04-18

## 2025-05-16 PROBLEM — M62.81 MUSCLE WEAKNESS: Status: ACTIVE | Noted: 2024-04-18

## 2025-05-16 PROBLEM — I16.1 HYPERTENSIVE EMERGENCY: Status: ACTIVE | Noted: 2025-04-28

## 2025-05-16 PROBLEM — R51.9 HEADACHE: Status: ACTIVE | Noted: 2025-04-28

## 2025-05-16 PROBLEM — D50.9 IRON DEFICIENCY ANEMIA: Status: ACTIVE | Noted: 2024-04-18

## 2025-05-16 PROBLEM — L02.92 FURUNCLE: Status: ACTIVE | Noted: 2024-04-18

## 2025-05-16 LAB
ANION GAP SERPL CALCULATED.3IONS-SCNC: 12 MMOL/L (ref 7–15)
ANION GAP SERPL CALCULATED.3IONS-SCNC: 12 MMOL/L (ref 7–15)
APTT PPP: 25 SECONDS (ref 22–38)
BUN SERPL-MCNC: 15.4 MG/DL (ref 8–23)
BUN SERPL-MCNC: 15.5 MG/DL (ref 8–23)
CALCIUM SERPL-MCNC: 8.5 MG/DL (ref 8.8–10.4)
CALCIUM SERPL-MCNC: 8.8 MG/DL (ref 8.8–10.4)
CHLORIDE SERPL-SCNC: 100 MMOL/L (ref 98–107)
CHLORIDE SERPL-SCNC: 101 MMOL/L (ref 98–107)
CREAT SERPL-MCNC: 0.56 MG/DL (ref 0.51–0.95)
CREAT SERPL-MCNC: 0.65 MG/DL (ref 0.51–0.95)
DSDNA AB SER-ACNC: 1 IU/ML
EGFRCR SERPLBLD CKD-EPI 2021: >90 ML/MIN/1.73M2
EGFRCR SERPLBLD CKD-EPI 2021: >90 ML/MIN/1.73M2
ENA SCL70 IGG SER IA-ACNC: <0.6 U/ML
ENA SCL70 IGG SER IA-ACNC: NEGATIVE
ENA SS-A AB SER IA-ACNC: <0.5 U/ML
ENA SS-A AB SER IA-ACNC: NEGATIVE
ENA SS-B IGG SER IA-ACNC: <0.6 U/ML
ENA SS-B IGG SER IA-ACNC: NEGATIVE
ERYTHROCYTE [DISTWIDTH] IN BLOOD BY AUTOMATED COUNT: 15 % (ref 10–15)
ERYTHROCYTE [DISTWIDTH] IN BLOOD BY AUTOMATED COUNT: 15.2 % (ref 10–15)
GLUCOSE BLDC GLUCOMTR-MCNC: 127 MG/DL (ref 70–99)
GLUCOSE BLDC GLUCOMTR-MCNC: 147 MG/DL (ref 70–99)
GLUCOSE BLDC GLUCOMTR-MCNC: 190 MG/DL (ref 70–99)
GLUCOSE BLDC GLUCOMTR-MCNC: 218 MG/DL (ref 70–99)
GLUCOSE BLDC GLUCOMTR-MCNC: 222 MG/DL (ref 70–99)
GLUCOSE BLDC GLUCOMTR-MCNC: 232 MG/DL (ref 70–99)
GLUCOSE BLDC GLUCOMTR-MCNC: 256 MG/DL (ref 70–99)
GLUCOSE SERPL-MCNC: 145 MG/DL (ref 70–99)
GLUCOSE SERPL-MCNC: 167 MG/DL (ref 70–99)
HCO3 SERPL-SCNC: 23 MMOL/L (ref 22–29)
HCO3 SERPL-SCNC: 25 MMOL/L (ref 22–29)
HCT VFR BLD AUTO: 39.2 % (ref 35–47)
HCT VFR BLD AUTO: 39.8 % (ref 35–47)
HGB BLD-MCNC: 12.6 G/DL (ref 11.7–15.7)
HGB BLD-MCNC: 12.7 G/DL (ref 11.7–15.7)
INR PPP: 0.97 (ref 0.85–1.15)
MAGNESIUM SERPL-MCNC: 2.2 MG/DL (ref 1.7–2.3)
MCH RBC QN AUTO: 26 PG (ref 26.5–33)
MCH RBC QN AUTO: 26.6 PG (ref 26.5–33)
MCHC RBC AUTO-ENTMCNC: 31.7 G/DL (ref 31.5–36.5)
MCHC RBC AUTO-ENTMCNC: 32.4 G/DL (ref 31.5–36.5)
MCV RBC AUTO: 82 FL (ref 78–100)
MCV RBC AUTO: 82 FL (ref 78–100)
MYELOPEROXIDASE AB SER IA-ACNC: <0.3 U/ML
MYELOPEROXIDASE AB SER IA-ACNC: NEGATIVE
PHOSPHATE SERPL-MCNC: 4.3 MG/DL (ref 2.5–4.5)
PLATELET # BLD AUTO: 402 10E3/UL (ref 150–450)
PLATELET # BLD AUTO: 420 10E3/UL (ref 150–450)
POTASSIUM SERPL-SCNC: 4.5 MMOL/L (ref 3.4–5.3)
POTASSIUM SERPL-SCNC: 4.7 MMOL/L (ref 3.4–5.3)
PROTEINASE3 AB SER IA-ACNC: <1 U/ML
PROTEINASE3 AB SER IA-ACNC: NEGATIVE
PROTHROMBIN TIME: 12.9 SECONDS (ref 11.8–14.8)
RADIOLOGIST FLAGS: ABNORMAL
RBC # BLD AUTO: 4.78 10E6/UL (ref 3.8–5.2)
RBC # BLD AUTO: 4.85 10E6/UL (ref 3.8–5.2)
SODIUM SERPL-SCNC: 136 MMOL/L (ref 135–145)
SODIUM SERPL-SCNC: 137 MMOL/L (ref 135–145)
TROPONIN T SERPL HS-MCNC: 6 NG/L
TROPONIN T SERPL HS-MCNC: 6 NG/L
VIT B1 PYROPHOSHATE BLD-SCNC: 139 NMOL/L
WBC # BLD AUTO: 10.2 10E3/UL (ref 4–11)
WBC # BLD AUTO: 8.9 10E3/UL (ref 4–11)

## 2025-05-16 PROCEDURE — 250N000009 HC RX 250

## 2025-05-16 PROCEDURE — 85730 THROMBOPLASTIN TIME PARTIAL: CPT

## 2025-05-16 PROCEDURE — 250N000013 HC RX MED GY IP 250 OP 250 PS 637

## 2025-05-16 PROCEDURE — 255N000002 HC RX 255 OP 636: Performed by: STUDENT IN AN ORGANIZED HEALTH CARE EDUCATION/TRAINING PROGRAM

## 2025-05-16 PROCEDURE — 70498 CT ANGIOGRAPHY NECK: CPT | Mod: 26 | Performed by: STUDENT IN AN ORGANIZED HEALTH CARE EDUCATION/TRAINING PROGRAM

## 2025-05-16 PROCEDURE — 36224 PLACE CATH CAROTD ART: CPT | Mod: 50

## 2025-05-16 PROCEDURE — 36415 COLL VENOUS BLD VENIPUNCTURE: CPT

## 2025-05-16 PROCEDURE — 999N000175 HC STATISTIC STROKE CODE W/ ACCESS

## 2025-05-16 PROCEDURE — C1769 GUIDE WIRE: HCPCS

## 2025-05-16 PROCEDURE — C1887 CATHETER, GUIDING: HCPCS

## 2025-05-16 PROCEDURE — 85610 PROTHROMBIN TIME: CPT

## 2025-05-16 PROCEDURE — B315YZZ FLUOROSCOPY OF BILATERAL COMMON CAROTID ARTERIES USING OTHER CONTRAST: ICD-10-PCS | Performed by: STUDENT IN AN ORGANIZED HEALTH CARE EDUCATION/TRAINING PROGRAM

## 2025-05-16 PROCEDURE — 36226 PLACE CATH VERTEBRAL ART: CPT

## 2025-05-16 PROCEDURE — 70450 CT HEAD/BRAIN W/O DYE: CPT

## 2025-05-16 PROCEDURE — 250N000011 HC RX IP 250 OP 636

## 2025-05-16 PROCEDURE — 272N000122 HC CATH CR8

## 2025-05-16 PROCEDURE — B312YZZ FLUOROSCOPY OF LEFT SUBCLAVIAN ARTERY USING OTHER CONTRAST: ICD-10-PCS | Performed by: STUDENT IN AN ORGANIZED HEALTH CARE EDUCATION/TRAINING PROGRAM

## 2025-05-16 PROCEDURE — 258N000003 HC RX IP 258 OP 636: Performed by: STUDENT IN AN ORGANIZED HEALTH CARE EDUCATION/TRAINING PROGRAM

## 2025-05-16 PROCEDURE — 99153 MOD SED SAME PHYS/QHP EA: CPT

## 2025-05-16 PROCEDURE — 36224 PLACE CATH CAROTD ART: CPT | Mod: 50 | Performed by: STUDENT IN AN ORGANIZED HEALTH CARE EDUCATION/TRAINING PROGRAM

## 2025-05-16 PROCEDURE — 76937 US GUIDE VASCULAR ACCESS: CPT | Mod: 26 | Performed by: STUDENT IN AN ORGANIZED HEALTH CARE EDUCATION/TRAINING PROGRAM

## 2025-05-16 PROCEDURE — 250N000012 HC RX MED GY IP 250 OP 636 PS 637: Performed by: STUDENT IN AN ORGANIZED HEALTH CARE EDUCATION/TRAINING PROGRAM

## 2025-05-16 PROCEDURE — 93010 ELECTROCARDIOGRAM REPORT: CPT | Mod: XU | Performed by: INTERNAL MEDICINE

## 2025-05-16 PROCEDURE — 99232 SBSQ HOSP IP/OBS MODERATE 35: CPT | Performed by: STUDENT IN AN ORGANIZED HEALTH CARE EDUCATION/TRAINING PROGRAM

## 2025-05-16 PROCEDURE — B31FYZZ FLUOROSCOPY OF LEFT VERTEBRAL ARTERY USING OTHER CONTRAST: ICD-10-PCS | Performed by: STUDENT IN AN ORGANIZED HEALTH CARE EDUCATION/TRAINING PROGRAM

## 2025-05-16 PROCEDURE — 84100 ASSAY OF PHOSPHORUS: CPT | Performed by: PSYCHIATRY & NEUROLOGY

## 2025-05-16 PROCEDURE — 84484 ASSAY OF TROPONIN QUANT: CPT

## 2025-05-16 PROCEDURE — 85027 COMPLETE CBC AUTOMATED: CPT

## 2025-05-16 PROCEDURE — 85018 HEMOGLOBIN: CPT

## 2025-05-16 PROCEDURE — 258N000003 HC RX IP 258 OP 636

## 2025-05-16 PROCEDURE — 70551 MRI BRAIN STEM W/O DYE: CPT

## 2025-05-16 PROCEDURE — 99232 SBSQ HOSP IP/OBS MODERATE 35: CPT | Mod: GC | Performed by: PSYCHIATRY & NEUROLOGY

## 2025-05-16 PROCEDURE — 70496 CT ANGIOGRAPHY HEAD: CPT | Mod: 26 | Performed by: STUDENT IN AN ORGANIZED HEALTH CARE EDUCATION/TRAINING PROGRAM

## 2025-05-16 PROCEDURE — 85048 AUTOMATED LEUKOCYTE COUNT: CPT

## 2025-05-16 PROCEDURE — 70498 CT ANGIOGRAPHY NECK: CPT

## 2025-05-16 PROCEDURE — 272N000566 HC SHEATH CR3

## 2025-05-16 PROCEDURE — 120N000002 HC R&B MED SURG/OB UMMC

## 2025-05-16 PROCEDURE — 80048 BASIC METABOLIC PNL TOTAL CA: CPT

## 2025-05-16 PROCEDURE — 83735 ASSAY OF MAGNESIUM: CPT | Performed by: PSYCHIATRY & NEUROLOGY

## 2025-05-16 PROCEDURE — B318YZZ FLUOROSCOPY OF BILATERAL INTERNAL CAROTID ARTERIES USING OTHER CONTRAST: ICD-10-PCS | Performed by: STUDENT IN AN ORGANIZED HEALTH CARE EDUCATION/TRAINING PROGRAM

## 2025-05-16 PROCEDURE — 250N000009 HC RX 250: Performed by: STUDENT IN AN ORGANIZED HEALTH CARE EDUCATION/TRAINING PROGRAM

## 2025-05-16 PROCEDURE — 93005 ELECTROCARDIOGRAM TRACING: CPT

## 2025-05-16 PROCEDURE — 120N000003 HC R&B IMCU UMMC

## 2025-05-16 PROCEDURE — 999N000128 HC STATISTIC PERIPHERAL IV START W/O US GUIDANCE

## 2025-05-16 PROCEDURE — 250N000011 HC RX IP 250 OP 636: Mod: JZ | Performed by: STUDENT IN AN ORGANIZED HEALTH CARE EDUCATION/TRAINING PROGRAM

## 2025-05-16 PROCEDURE — 36226 PLACE CATH VERTEBRAL ART: CPT | Mod: LT | Performed by: STUDENT IN AN ORGANIZED HEALTH CARE EDUCATION/TRAINING PROGRAM

## 2025-05-16 PROCEDURE — C1760 CLOSURE DEV, VASC: HCPCS

## 2025-05-16 PROCEDURE — 250N000013 HC RX MED GY IP 250 OP 250 PS 637: Performed by: PSYCHIATRY & NEUROLOGY

## 2025-05-16 PROCEDURE — 70551 MRI BRAIN STEM W/O DYE: CPT | Mod: 26 | Performed by: RADIOLOGY

## 2025-05-16 PROCEDURE — 99152 MOD SED SAME PHYS/QHP 5/>YRS: CPT | Mod: GC | Performed by: STUDENT IN AN ORGANIZED HEALTH CARE EDUCATION/TRAINING PROGRAM

## 2025-05-16 PROCEDURE — 250N000011 HC RX IP 250 OP 636: Performed by: PSYCHIATRY & NEUROLOGY

## 2025-05-16 RX ORDER — NALOXONE HYDROCHLORIDE 0.4 MG/ML
0.4 INJECTION, SOLUTION INTRAMUSCULAR; INTRAVENOUS; SUBCUTANEOUS
Status: DISCONTINUED | OUTPATIENT
Start: 2025-05-16 | End: 2025-05-16 | Stop reason: HOSPADM

## 2025-05-16 RX ORDER — IOPAMIDOL 755 MG/ML
67 INJECTION, SOLUTION INTRAVASCULAR ONCE
Status: COMPLETED | OUTPATIENT
Start: 2025-05-16 | End: 2025-05-16

## 2025-05-16 RX ORDER — IODIXANOL 320 MG/ML
150 INJECTION, SOLUTION INTRAVASCULAR ONCE
Status: COMPLETED | OUTPATIENT
Start: 2025-05-16 | End: 2025-05-16

## 2025-05-16 RX ORDER — HYDRALAZINE HYDROCHLORIDE 20 MG/ML
10 INJECTION INTRAMUSCULAR; INTRAVENOUS EVERY 6 HOURS PRN
Status: DISCONTINUED | OUTPATIENT
Start: 2025-05-16 | End: 2025-05-21 | Stop reason: HOSPADM

## 2025-05-16 RX ORDER — NALOXONE HYDROCHLORIDE 0.4 MG/ML
0.2 INJECTION, SOLUTION INTRAMUSCULAR; INTRAVENOUS; SUBCUTANEOUS
Status: DISCONTINUED | OUTPATIENT
Start: 2025-05-16 | End: 2025-05-16 | Stop reason: HOSPADM

## 2025-05-16 RX ORDER — FLUMAZENIL 0.1 MG/ML
0.2 INJECTION, SOLUTION INTRAVENOUS
Status: DISCONTINUED | OUTPATIENT
Start: 2025-05-16 | End: 2025-05-16 | Stop reason: HOSPADM

## 2025-05-16 RX ORDER — ACETAMINOPHEN 650 MG/1
650 SUPPOSITORY RECTAL ONCE
Status: COMPLETED | OUTPATIENT
Start: 2025-05-16 | End: 2025-05-16

## 2025-05-16 RX ORDER — PANTOPRAZOLE SODIUM 40 MG/1
40 TABLET, DELAYED RELEASE ORAL
Status: DISCONTINUED | OUTPATIENT
Start: 2025-05-16 | End: 2025-05-21 | Stop reason: HOSPADM

## 2025-05-16 RX ORDER — HEPARIN SODIUM 1000 [USP'U]/ML
3000 INJECTION, SOLUTION INTRAVENOUS; SUBCUTANEOUS
Status: COMPLETED | OUTPATIENT
Start: 2025-05-16 | End: 2025-05-16

## 2025-05-16 RX ORDER — DIPHENHYDRAMINE HYDROCHLORIDE 50 MG/ML
50 INJECTION, SOLUTION INTRAMUSCULAR; INTRAVENOUS ONCE
Status: COMPLETED | OUTPATIENT
Start: 2025-05-16 | End: 2025-05-16

## 2025-05-16 RX ORDER — ACETAMINOPHEN 325 MG/1
650 TABLET ORAL ONCE
Status: COMPLETED | OUTPATIENT
Start: 2025-05-16 | End: 2025-05-16

## 2025-05-16 RX ORDER — ASPIRIN 81 MG/1
81 TABLET, CHEWABLE ORAL DAILY
Status: DISCONTINUED | OUTPATIENT
Start: 2025-05-16 | End: 2025-05-21 | Stop reason: HOSPADM

## 2025-05-16 RX ORDER — SODIUM CHLORIDE 9 MG/ML
INJECTION, SOLUTION INTRAVENOUS CONTINUOUS
Status: DISCONTINUED | OUTPATIENT
Start: 2025-05-16 | End: 2025-05-19

## 2025-05-16 RX ORDER — DEXAMETHASONE SODIUM PHOSPHATE 4 MG/ML
6 INJECTION, SOLUTION INTRA-ARTICULAR; INTRALESIONAL; INTRAMUSCULAR; INTRAVENOUS; SOFT TISSUE ONCE
Status: COMPLETED | OUTPATIENT
Start: 2025-05-16 | End: 2025-05-16

## 2025-05-16 RX ORDER — HEPARIN SODIUM 200 [USP'U]/100ML
1 INJECTION, SOLUTION INTRAVENOUS EVERY 5 MIN PRN
Status: DISCONTINUED | OUTPATIENT
Start: 2025-05-16 | End: 2025-05-16 | Stop reason: HOSPADM

## 2025-05-16 RX ORDER — IOPAMIDOL 755 MG/ML
117 INJECTION, SOLUTION INTRAVASCULAR ONCE
Status: DISCONTINUED | OUTPATIENT
Start: 2025-05-16 | End: 2025-05-16

## 2025-05-16 RX ORDER — FENTANYL CITRATE 50 UG/ML
25-50 INJECTION, SOLUTION INTRAMUSCULAR; INTRAVENOUS EVERY 5 MIN PRN
Status: DISCONTINUED | OUTPATIENT
Start: 2025-05-16 | End: 2025-05-16 | Stop reason: HOSPADM

## 2025-05-16 RX ADMIN — MIDAZOLAM 0.5 MG: 1 INJECTION INTRAMUSCULAR; INTRAVENOUS at 09:10

## 2025-05-16 RX ADMIN — IODIXANOL 80 ML: 320 INJECTION, SOLUTION INTRAVASCULAR at 10:02

## 2025-05-16 RX ADMIN — MIDAZOLAM 0.5 MG: 1 INJECTION INTRAMUSCULAR; INTRAVENOUS at 08:48

## 2025-05-16 RX ADMIN — MIDAZOLAM 1 MG: 1 INJECTION INTRAMUSCULAR; INTRAVENOUS at 08:41

## 2025-05-16 RX ADMIN — HEPARIN SODIUM 4 BAG: 200 INJECTION, SOLUTION INTRAVENOUS at 08:58

## 2025-05-16 RX ADMIN — DIVALPROEX SODIUM 500 MG: 500 TABLET, FILM COATED, EXTENDED RELEASE ORAL at 10:26

## 2025-05-16 RX ADMIN — FENTANYL CITRATE 50 MCG: 50 INJECTION, SOLUTION INTRAMUSCULAR; INTRAVENOUS at 08:41

## 2025-05-16 RX ADMIN — LIDOCAINE HYDROCHLORIDE 20 ML: 10 INJECTION, SOLUTION EPIDURAL; INFILTRATION; INTRACAUDAL; PERINEURAL at 08:50

## 2025-05-16 RX ADMIN — SODIUM CHLORIDE, PRESERVATIVE FREE 100 ML: 5 INJECTION INTRAVENOUS at 10:57

## 2025-05-16 RX ADMIN — HEPARIN SODIUM 3000 UNITS: 1000 INJECTION, SOLUTION INTRAVENOUS; SUBCUTANEOUS at 09:49

## 2025-05-16 RX ADMIN — IOPAMIDOL 67 ML: 755 INJECTION, SOLUTION INTRAVENOUS at 10:57

## 2025-05-16 RX ADMIN — ENOXAPARIN SODIUM 40 MG: 40 INJECTION SUBCUTANEOUS at 16:28

## 2025-05-16 RX ADMIN — FENTANYL CITRATE 25 MCG: 50 INJECTION, SOLUTION INTRAMUSCULAR; INTRAVENOUS at 09:36

## 2025-05-16 RX ADMIN — GABAPENTIN 600 MG: 300 CAPSULE ORAL at 19:00

## 2025-05-16 RX ADMIN — FENTANYL CITRATE 25 MCG: 50 INJECTION, SOLUTION INTRAMUSCULAR; INTRAVENOUS at 09:58

## 2025-05-16 RX ADMIN — FENTANYL CITRATE 25 MCG: 50 INJECTION, SOLUTION INTRAMUSCULAR; INTRAVENOUS at 09:05

## 2025-05-16 RX ADMIN — ONDANSETRON 4 MG: 4 TABLET, ORALLY DISINTEGRATING ORAL at 06:36

## 2025-05-16 RX ADMIN — SODIUM CHLORIDE: 0.9 INJECTION, SOLUTION INTRAVENOUS at 10:22

## 2025-05-16 RX ADMIN — DIPHENHYDRAMINE HYDROCHLORIDE 50 MG: 50 INJECTION, SOLUTION INTRAMUSCULAR; INTRAVENOUS at 11:44

## 2025-05-16 RX ADMIN — INSULIN HUMAN 20 UNITS: 100 INJECTION, SUSPENSION SUBCUTANEOUS at 15:01

## 2025-05-16 RX ADMIN — GABAPENTIN 600 MG: 300 CAPSULE ORAL at 00:30

## 2025-05-16 RX ADMIN — ACETAMINOPHEN 650 MG: 325 TABLET ORAL at 05:43

## 2025-05-16 RX ADMIN — PROCHLORPERAZINE EDISYLATE 5 MG: 5 INJECTION INTRAMUSCULAR; INTRAVENOUS at 16:27

## 2025-05-16 RX ADMIN — PROCHLORPERAZINE EDISYLATE 10 MG: 5 INJECTION INTRAMUSCULAR; INTRAVENOUS at 11:08

## 2025-05-16 RX ADMIN — DIVALPROEX SODIUM 500 MG: 500 TABLET, FILM COATED, EXTENDED RELEASE ORAL at 20:15

## 2025-05-16 RX ADMIN — PANTOPRAZOLE SODIUM 40 MG: 40 TABLET, DELAYED RELEASE ORAL at 11:08

## 2025-05-16 RX ADMIN — FENTANYL CITRATE 25 MCG: 50 INJECTION, SOLUTION INTRAMUSCULAR; INTRAVENOUS at 09:10

## 2025-05-16 RX ADMIN — PROCHLORPERAZINE EDISYLATE 10 MG: 5 INJECTION INTRAMUSCULAR; INTRAVENOUS at 22:30

## 2025-05-16 RX ADMIN — PROPRANOLOL HYDROCHLORIDE 60 MG: 60 CAPSULE, EXTENDED RELEASE ORAL at 10:26

## 2025-05-16 RX ADMIN — ACETAMINOPHEN 650 MG: 325 TABLET ORAL at 11:08

## 2025-05-16 RX ADMIN — METHYLPREDNISOLONE SODIUM SUCCINATE 1000 MG: 1 INJECTION INTRAMUSCULAR; INTRAVENOUS at 11:24

## 2025-05-16 RX ADMIN — QUETIAPINE FUMARATE 25 MG: 25 TABLET ORAL at 22:45

## 2025-05-16 RX ADMIN — GABAPENTIN 600 MG: 300 CAPSULE ORAL at 11:34

## 2025-05-16 RX ADMIN — MIDAZOLAM 0.5 MG: 1 INJECTION INTRAMUSCULAR; INTRAVENOUS at 09:05

## 2025-05-16 RX ADMIN — ASPIRIN 81 MG CHEWABLE TABLET 81 MG: 81 TABLET CHEWABLE at 19:01

## 2025-05-16 RX ADMIN — MIDAZOLAM 0.5 MG: 1 INJECTION INTRAMUSCULAR; INTRAVENOUS at 09:35

## 2025-05-16 RX ADMIN — ACETAMINOPHEN 650 MG: 325 TABLET ORAL at 16:28

## 2025-05-16 RX ADMIN — Medication 500 MCG: at 16:28

## 2025-05-16 RX ADMIN — ONDANSETRON 4 MG: 4 TABLET, ORALLY DISINTEGRATING ORAL at 19:01

## 2025-05-16 RX ADMIN — FENTANYL CITRATE 25 MCG: 50 INJECTION, SOLUTION INTRAMUSCULAR; INTRAVENOUS at 08:48

## 2025-05-16 ASSESSMENT — ACTIVITIES OF DAILY LIVING (ADL)
ADLS_ACUITY_SCORE: 34
ADLS_ACUITY_SCORE: 36
ADLS_ACUITY_SCORE: 32
ADLS_ACUITY_SCORE: 34
ADLS_ACUITY_SCORE: 32
ADLS_ACUITY_SCORE: 34
ADLS_ACUITY_SCORE: 32
ADLS_ACUITY_SCORE: 34
ADLS_ACUITY_SCORE: 34
ADLS_ACUITY_SCORE: 36
ADLS_ACUITY_SCORE: 34
ADLS_ACUITY_SCORE: 36
ADLS_ACUITY_SCORE: 32
ADLS_ACUITY_SCORE: 34
ADLS_ACUITY_SCORE: 34
ADLS_ACUITY_SCORE: 36
ADLS_ACUITY_SCORE: 34
ADLS_ACUITY_SCORE: 36
ADLS_ACUITY_SCORE: 36

## 2025-05-16 NOTE — PROGRESS NOTES
Rheumatology Follow Up Note     Amy Choudhury MRN# [id number]   YOB: 1960 Age: 65 year old     Date of Service: May 16, 2025    Assessment :     Amy Choudhury is a 65 year old female with a PMHx of DM2, anxiety, tobacco dependence, GERD, s/p gastric bypass that presents with several weeks of headaches, visual changes, confusion, hypertension, and visual/auditory hallucinations. Rheumatology was consulted for recommendations regarding evaluation for vasculitis. Brain MRI (5/7) showed somewhat decreased cortical T2 hyperintense signal with increased conspicuity of leptomeningeal enhancement and numerous presumed microhemorrhages within the occipital, temporal, and parietal regions bilaterally. EEG on 5/7 was unremarkable. Lumbar puncture was done on 5/12, and cerebrospinal fluid analysis show elevated protein and glucose and lymphocytic pleocytosis, suggestive of an inflammatory process. Digital Subtraction Angiography is planned for tomorrow (5/16) to further evaluate for vascular abnormalities. Additionally, rheumatologic workup has been sent including: Scl-70, SSA, SSB, dsDNA, ANCA, MPO, and PR3. History and examination does not show clinical signs of a systemic rheumatic disease. Additionally CRP and ESR have been normal as well as CSF IL-6 levels.  Suspect a vasculopathy is at play with some contribution likely from HTN, T2DM, tobacco use and substance use. Of note, she has had cocaine use documented in the past but currently denies use. If an autoimmune or inflammatory explanation becomes more probable we have considered differentials including drug-induced vasculitis, ANCA vasculitis, IgG-4 related disease, primary angiitis of the CNS, amyloid beta-related angiitis and sarcoidosis.     PEDRO was negative and ANCA was < 1:10. Scl-70, SSA, SSB, dsDNA, MPO, and PR3 are all negative. Repeat MRI from 5/15 showed slight increase in extent of leptomeningeal enhancement and unchanged numerous  microhemorrhages in the occipital, temporal, and parietal regions bilaterally, a tiny new infarct in the left cerebellum, and superficial siderosis within the right occipital lobe. Carotid cerebral angiogram done today (5/16) showed no aneurysms, vascular malformation or any areas of stenosis, irregularity or beading to suggest inflammatory vasculopathy in anterior and posterior circulation. Following her angiogram, she began to experience new left arm weakness with hemisensory neglect. A stroke code was called and CT head without contrast showed no acute intracranial hemorrhage or infarct. A CTA of the head and neck was then done. Head CTA demonstrates no aneurysm or stenosis of the major intracranial arteries. Neck CTA demonstrates no critical stenosis of the major cervical arteries. There was mild short segment narrowing of the left vertebral artery at the distal V1/proximal V2 segment without loss of distal perfusion.    Given the negative vasculitis workup and lack of clinical signs of a systemic rheumatic disease, a vasculitis is unlikely. 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 Cannon who agrees with assessment and plan.    April Bales, MS4    Resident/Fellow Attestation   I, Rigoberto Carranza MD, was present with the medical/ELBERT student who participated in the service and in the documentation of the note.  I have verified the history and personally performed the physical exam and medical decision making.  I agree with the assessment and plan of care as documented in the note.      Rigoberto Carranza MD  PGY2  Date  "of Service (when I saw the patient): 05/16/25     Subjective:  Patient is very somnolent when seen today following her angiogram. She reports that her headache is \"not bad.\" She denies any new symptoms.     ROS: Negative, otherwise as above.    Patient Active Problem List   Diagnosis    Hyperparathyroidism    Anxiety    Obesity    Status post gastric bypass for obesity    Microcytic anemia    Vitamin D deficiency    Benign neoplasm of adrenal gland    TMJ (temporomandibular joint syndrome)    Sciatica    Tobacco dependence syndrome    Adjustment disorder with mixed anxiety and depressed mood    Diabetes mellitus, type 2 (H)    Morbid obesity (H)    Suicidal ideation    Hypokalemia    Pneumonia due to 2019 novel coronavirus    Closed fracture of proximal end of right humerus, unspecified fracture morphology, initial encounter    Eczema    Menorrhagia    Panic attacks    Recurrent boils    Posterior reversible encephalopathy syndrome    Memory changes    Benign neoplastic disease    Constipation    Digestive system disorder    Fall    Fracture of surgical neck of humerus    Gastroesophageal reflux disease without esophagitis    Headache    Hypertensive emergency    Intractable migraine with status migrainosus    Muscle weakness    Status post bariatric surgery    Anxiety disorder    Cellulitis of abdominal wall    Iron deficiency anemia    Furuncle    Acquired absence of organ    Temporomandibular joint disorder      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    Depressive disorder     Gastro-oesophageal reflux disease     bleeding ulcers    Hiatal hernia     NEWLY DIAGNOSISED    Hyperparathyroidism, unspecified 03/29/2012    Peptic ulcer, unspecified site, unspecified as acute or chronic, without mention of hemorrhage or perforation     Pneumonia     NOVEMBER    PONV (postoperative nausea and " vomiting)     TMJ (temporomandibular joint syndrome) 05/08/2014    Vitamin D deficiency 06/07/2012     Past Surgical History:   Procedure Laterality Date    APPENDECTOMY      CHOLECYSTECTOMY      DILATION AND CURETTAGE, OPERATIVE HYSTEROSCOPY WITH MORCELLATOR, COMBINED  11/21/2012    Procedure: COMBINED DILATION AND CURETTAGE, OPERATIVE HYSTEROSCOPY WITH MORCELLATOR;  Hysteroscopy, Polypectomy, Dilation and Curettage  ;  Surgeon: Marta Montejo MD;  Location: [facility] OR    GI SURGERY      gastric bypass at age 31    ORTHOPEDIC SURGERY      back surgery at age 31    PARATHYROIDECTOMY Right 12/14/2015    Procedure: PARATHYROIDECTOMY;  Surgeon: Gregory Coleman MD;  Location: [facility]    [facility]      SPINE SURGERY       Social History     Socioeconomic History    Marital status:      Spouse name: Not on file    Number of children: Not on file    Years of education: Not on file    Highest education level: Not on file   Occupational History    Not on file   Tobacco Use    Smoking status: Every Day     Current packs/day: 0.50     Average packs/day: 0.5 packs/day for 30.0 years (15.0 ttl pk-yrs)     Types: Cigarettes    Smokeless tobacco: Never   Substance and Sexual Activity    Alcohol use: Yes     Comment: occasionally    Drug use: No    Sexual activity: Yes     Partners: Male     Birth control/protection: None   Other Topics Concern    Parent/sibling w/ CABG, MI or angioplasty before 65F 55M? Not Asked   Social History Narrative    Not on file     Social Drivers of Health     Financial Resource Strain: Low Risk  (4/29/2025)    Received from [employer]    Financial Resource Strain     Difficulty of Paying Living Expenses: 3     Difficulty of Paying Living Expenses: Not on file   Food Insecurity: No Food Insecurity (5/4/2025)    Received from [facility]    Food Insecurity     Do you worry your food will run out before  you are able to buy more?: 1   Transportation Needs: Unmet Transportation Needs (2025)    [facility]    Transportation Needs     Does lack of transportation keep you from medical appointments?: 2     Does lack of transportation keep you from work, meetings or getting things that you need?: 2   Physical Activity: Not on file   Stress: Not on file   Social Connections: Socially Integrated (2025)    Received from [facility]    Social Connections     Do you often feel lonely or isolated from those around you?: 0   Interpersonal Safety: Low Risk  (5/10/2025)    Interpersonal Safety     Do you feel physically and emotionally safe where you currently live?: Yes     Within the past 12 months, have you been hit, slapped, kicked or otherwise physically hurt by someone?: No     Within the past 12 months, have you been humiliated or emotionally abused in other ways by your partner or ex-partner?: No   Housing Stability: Low Risk  (2025)    [facility]    Housing Stability     What is your housing situation today?: 1     Family History   Problem Relation Age of Onset    Thyroid Disease Mother     Breast Cancer Mother 57         65 y.o.    Other - See Comments Mother         cystic acne    Cancer Brother     Diabetes Daughter         Type 1; insulin     No current outpatient medications on file.       PE: Temp:  [98  F (36.7  C)-98.9  F (37.2  C)] 98.7  F (37.1  C)  Pulse:  [57-82] 62  Resp:  [10-20] 18  BP: (112-196)/() 163/108  SpO2:  [93 %-100 %] 96 %  Constitutional: somnolent  Eyes: nl EOM, PERRLA, conjunctiva, sclera  ENT: Poor dentition, nl external ears, hearing, throat, No mucous membrane lesions, normal saliva pool  MS: Shoulder, elbow, wrist, MCP/PIP/DIP, spine, hip, knee, ankle, and foot MTP/IP joints were examined and  found normal. No active synovitis or deformity.  Full ROM. Normal  strength  Skin: no nail pitting, alopecia, rash, nodules or lesions    MEDS/LABS/IMAGING: reviewed, of note:    Lab Results   Component Value Date    WBC 8.9 05/16/2025    WBC 5.7 02/11/2015     Lab Results   Component Value Date    RBC 4.85 05/16/2025    RBC 5.35 02/11/2015     Lab Results   Component Value Date    HGB 12.6 05/16/2025    HGB 14.1 04/19/2021     Lab Results   Component Value Date    HCT 39.8 05/16/2025    HCT 42.8 12/08/2015     Lab Results   Component Value Date    MCV 82 05/16/2025    MCV 90.8 12/08/2015     Lab Results   Component Value Date    MCH 26.0 05/16/2025    MCH 28.0 12/08/2015     Lab Results   Component Value Date    MCHC 31.7 05/16/2025    MCHC 30.8 12/08/2015     Lab Results   Component Value Date    RDW 15.2 05/16/2025    RDW 14.3 02/11/2015     Lab Results   Component Value Date     05/16/2025     02/11/2015        Last Basic Metabolic Panel:  Lab Results   Component Value Date     05/16/2025    .9 12/08/2015      Lab Results   Component Value Date    POTASSIUM 4.5 05/16/2025    POTASSIUM 4.1 02/12/2022    POTASSIUM 3.9 12/08/2015     Lab Results   Component Value Date    CHLORIDE 100 05/16/2025    CHLORIDE 111 02/12/2022    CHLORIDE 105.1 12/08/2015     Lab Results   Component Value Date    CRISTIAN 8.8 05/16/2025    CRISTIAN 8.1 12/15/2015     Lab Results   Component Value Date    CO2 25 05/16/2025    CO2 24 02/12/2022    CO2 25.9 12/08/2015     Lab Results   Component Value Date    BUN 15.5 05/16/2025    BUN 11 02/12/2022    BUN 15.3 12/08/2015     Lab Results   Component Value Date    CR 0.65 05/16/2025    CR 0.8 12/08/2015     Lab Results   Component Value Date     05/16/2025     05/16/2025     02/12/2022    GLC 95.8 12/08/2015

## 2025-05-16 NOTE — PLAN OF CARE
Vitals: HTN - resolved on own after medications for headache   Neuros: Stroke code called after returning from angio at 1030. Pt unable to shrug left shoulder, left arm 2/5. Can wiggle fingers at times, weak grasp LUE. Groin site intact, + CMS. Continues to have photophobia that was baseline prior to angio. Hard to assess neuros, pt needs encouragement to participate.   IV: PIV infusing NS at 75 ml/hr.   Labs/Electrolytes: Last . NPH given late, unable to get from pharmacy   Resp: WNL   Diet:  Passed nursing bedside swallow. Low carb diet.   GI/: Voided this am prior to angio.   Skin: Bruising throughout. Left groin site CDI, right groin attempt CDI. Redness to Right Forearm   Pain:  Headache managed with benadryl, compazine and tylenol. MD at bedside during stroke code when pt rated pain 9/10.   Activity: Pt was on bedrest until 1200pm, has not been oob since. Was up indep prior. Repositioned in bed with assist of 2.   Plan:  Started on IV Solu-Medrol. MRI checklist sent - unsure time.     Goal Outcome Evaluation:      Plan of Care Reviewed With: patient          Outcome Evaluation: [phone]

## 2025-05-16 NOTE — CODE/RAPID RESPONSE
Stroke Code Nurse-Responder Note    Arrival Time to Stroke Code: 1037    Stroke Code Team interventions:   - De-escalated at 1103 by Stroke Team    ED/Bedside Nurse providing handoff: Karis    Time left for CT: 1040    Time arrived to next location (Unit): 1100    ED/Bedside Nurse given handoff (name/time): Karis Cervantes RN

## 2025-05-16 NOTE — PLAN OF CARE
Status: Admitted 5/6 for HA and blurred vision. Hx of DM, anxiety, tobacco use, polysubstance abuse. Contact precautions for MRSA  Vitals: VSS on RA, bradycardia in high 50's  Neuros: DND overnight so formal neuro not completed, most recent includes: A&Ox4. Strengths 5/5 t/o.  Unchanged bilateral photophobia and blurred vision.    IV: PIV SL  Labs/Electrolytes: BG at 0200 was 127  Resp: WNL   Diet:  NPO since midnight. PRN PO zofran given for nausea  GI/: voiding spont to bathroom  Skin: bruising t/o.  LP site on lower back with band-aid in place.    Pain:  HA pain, declined any intervention other than scheduled gabapentin   Activity: Independent   Plan:  5/16 0900  IR Carotid Cerebral angiogram. OT recommending HWA

## 2025-05-16 NOTE — PROGRESS NOTES
[facility]     Endovascular Surgical Neuroradiology Pre-Procedure Note      HPI:  Amy Choudhury is a 65yoF with history of Diabetes, HTN, GERD, s/p gastric bypass, with ongoing headache, blurred vision, presenting for diagnostic cerebral angiogram to evaluate for vasculopathy. She had an LP which showed protein of 74.9, glu 102.    Medical History:  Reviewed    Surgical History:  Reviewed    Family History:  Reviewed    Social History:  Reviewed    Allergies:  Reviewed    Is there a contrast allergy?  No    Medications:  I have reviewed this patient's current medications  Current Facility-Administered Medications   Medication Dose Route Frequency Provider Last Rate Last Admin    acetaminophen (TYLENOL) tablet 650 mg  650 mg Oral Q4H PRN Mara Ceron MD   650 mg at 05/16/25 0543    calcium carbonate (TUMS) chewable tablet 500 mg  500 mg Oral Daily PRN Jazmine Wade MD   500 mg at 05/11/25 1336    glucose gel 15-30 g  15-30 g Oral Q15 Min PRN Karen Burt MD        Or    dextrose 50 % injection 25-50 mL  25-50 mL Intravenous Q15 Min PRN Karen Burt MD        Or    glucagon injection 1 mg  1 mg Subcutaneous Q15 Min PRN Karen Burt MD        divalproex sodium extended-release (DEPAKOTE ER) 24 hr tablet 500 mg  500 mg Oral BID Karen Burt MD   500 mg at 05/15/25 1944    enoxaparin ANTICOAGULANT (LOVENOX) injection 40 mg  40 mg Subcutaneous Q24H Karen Burt MD   40 mg at 05/15/25 1748    gabapentin (NEURONTIN) capsule 600 mg  600 mg Oral TID Dariusz Bang MD   600 mg at 05/16/25 0030    hydrALAZINE (APRESOLINE) injection 10 mg  10 mg Intravenous Q6H PRN Karen Burt MD        [Held by provider] hydroCHLOROthiazide tablet 12.5 mg  12.5 mg Oral Daily Dariusz Bang MD   12.5 mg at 05/11/25 0817    insulin aspart (NovoLOG) injection (RAPID ACTING)   Subcutaneous TID w/meals Andreea Taylor PA-C   Given at 05/15/25 2032     insulin aspart (NovoLOG) injection (RAPID ACTING)   Subcutaneous With Snacks or Supplements Andreea Taylor PA-C        insulin aspart (NovoLOG) injection (RAPID ACTING)  1-7 Units Subcutaneous BID Andreea Taylor PA-C        insulin aspart (NovoLOG) injection (RAPID ACTING)  1-10 Units Subcutaneous TID AC Karen Burt MD   1 Units at 05/15/25 1748    insulin NPH injection 20 Units  20 Units Subcutaneous Once Andreea Taylor PA-C        lidocaine (LMX4) cream   Topical Q1H PRN Litzy Kulkarni MD        lidocaine 1 % 0.1-1 mL  0.1-1 mL Other Q1H PRN Litzy Kulkarni MD        magnesium gluconate (MAGONATE) tablet 500 mg  500 mg Oral Daily Dariusz Bang MD   500 mg at 05/15/25 0749    melatonin tablet 3 mg  3 mg Oral At Bedtime Alejandro Villalobos MD   3 mg at 05/14/25 2214    ondansetron (ZOFRAN ODT) ODT tab 4 mg  4 mg Oral Q6H PRN Litzy Kulkarni MD   4 mg at 05/16/25 0636    Or    ondansetron (ZOFRAN) injection 4 mg  4 mg Intravenous Q6H PRN Litzy Kulkarni MD   4 mg at 05/11/25 2129    polyethylene glycol (MIRALAX) Packet 17 g  17 g Oral Daily Jazmine Wade MD   17 g at 05/11/25 1542    prochlorperazine (COMPAZINE) injection 10 mg  10 mg Intravenous Q6H PRN Karen Burt MD   10 mg at 05/14/25 1438    propranolol ER (INDERAL LA) 24 hr capsule 60 mg  60 mg Oral Daily Dariusz Bang MD   60 mg at 05/15/25 0749    QUEtiapine (SEROquel) tablet 25 mg  25 mg Oral At Bedtime Alejandro Villalobos MD   25 mg at 05/15/25 1944    senna-docusate (SENOKOT-S/PERICOLACE) 8.6-50 MG per tablet 1 tablet  1 tablet Oral BID Karen Burt MD   1 tablet at 05/15/25 1944    sennosides (SENOKOT) tablet 8.6 mg  8.6 mg Oral Daily PRN Karen Burt MD        sertraline (ZOLOFT) tablet 50 mg  50 mg Oral or Feeding Tube Daily Litzy Kulkarni MD   50 mg at 05/15/25 0750    sodium chloride (PF) 0.9% PF flush 3 mL  3 mL Intracatheter Q8H [facility] Litzy Kulkarni MD   3 mL at 05/16/25 0031    sodium chloride (PF) 0.9% PF flush 3 mL  3 mL Intracatheter q1 min  prn Litzy Kulkarni MD   3 mL at 05/08/25 2046    triamcinolone (KENALOG) 0.1 % cream   Topical BID PRN Mara Ceron MD       .    ROS:  The 5 point Review of Systems is negative other than noted in the HPI or here.     PHYSICAL EXAMINATION  Vital Signs:  B/P: 112/62,  T: 98,  P: 59,  R: 16    Cardio:  RRR  Pulmonary:  no respiratory distress  Abdomen:  soft    Neurologic  Mental Status:  fully alert, attentive and oriented, follows commands, speech clear and fluent  Cranial Nerves:  PERRL, BTT + (did not allow for detailed exam), face appears symmetric, hearing intact, tongue midline  Motor:  no abnormal movements, no pronator drift  Sensory:  intact to light touch  Coordination:  finger to nose without ataxia    Pre-procedure National Institutes of Health Stroke Scale:   Not applicable    LABS  (most recent Cr, BUN, GFR, PLT, INR, PTT within the past 7 days):  Recent Labs   Lab 05/15/25  0703 05/13/25  0643 05/12/25  1002   CR 0.63   < >  --    BUN 14.6   < >  --    GFRESTIMATED >90   < >  --       < >  --    INR  --   --  0.87   PTT  --   --  24    < > = values in this interval not displayed.        Platelet Function P2Y12 (PRU):  Not applicable      ASSESSMENT: 65yoF with history of Diabetes, HTN, GERD, s/p gastric bypass, with ongoing headache, blurred vision, presenting for diagnostic cerebral angiogram to evaluate for vasculopathy. She had an LP which showed protein of 74.9, glu 102.     PLAN: diagnostic cerebral angiogram        PRE-PROCEDURE SEDATION ASSESSMENT     Pre-Procedure Sedation Assessment done at 0730.    Expected Level:  Moderate Sedation    Indication:  Sedation is required to allow for neurointerventional procedure.    Consent obtained from patient after discussing the risks, benefits and alternatives.     PO Intake:  Appropriately NPO for procedure    ASA Class:  Class 2 - MILD SYSTEMIC DISEASE, NO ACUTE PROBLEMS, NO FUNCTIONAL LIMITATIONS.    Mallampati:  Grade 2:  Soft palate,  base of uvula, tonsillar pillars, and portion of posterior pharyngeal wall visible    History and physical reviewed and no updates needed. I have reviewed the lab findings, diagnostic data, medications, and the plan for sedation. I have determined this patient to be an appropriate candidate for the planned sedation and procedure and have reassessed the patient IMMEDIATELY PRIOR to sedation and procedure.    Patient was discussed with the Attending, Dr. Romano, who agrees with the plan.    Aleah Barrera MD   Fellow, Endovascular Surgical Neuroradiology
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
## 2025-05-16 NOTE — PROGRESS NOTES
"CLINICAL NUTRITION SERVICES - BRIEF NOTE    Nutrition Prescription    RECOMMENDATIONS FOR MDs/PROVIDERS TO ORDER:  - See full assessment for details     Recommendations already ordered by Registered Dietitian (RD):  - Ordered sublingual B12 (low normal serum level with high normal MMA with h/o gastric bypass)    Future/Additional Recommendations:  - Continue to monitor PO   - micronutrient status      Nutrition Progress Note - f/u for progress towards previous nutrition POC (see previous reassessment for details)      Lenin Kaur RD, LD, [facility]  Neuro ICU Dietitian; 6A Neuro  Vocera \"4E Clinical Dietitian\"  Weekend/holiday \"Weekend Clinical Dietitian\"    "

## 2025-05-16 NOTE — CONSULTS
Please see note dated 5/16.    Landy Dixon, RN, BSN  6A RN Care Coordinator  Ph: [phone]   Vocera: 6A Neuro RNCC

## 2025-05-16 NOTE — PROCEDURES
[facility], [place]     Endovascular Surgical Neuroradiology Post-Procedure Note    Pre-Procedure Diagnosis: headache, blurred vision  Post-Procedure Diagnosis: same    Procedure:   Diagnostic cervicocerebral angiography    Reason for delay:  Not applicable    Findings:   No inflammatory vasculopathic changes, aneurysms or vascular malformations noted in the anterior or posterior circulation    Plan:    Bedrest 2 hours  Rest per stroke neurology    Primary Surgeon: Dr. Zayra Romano  Secondary Surgeon: Not applicable  Secondary Surgeon Review: None  Fellow: Aleah Barrera MD; Felicia Caputo MD  Additional Assistants: N/A    Prior to the start of the procedure and with procedural staff participation, I verbally confirmed: the patient s identity using two indicators, relevant allergies, that the procedure was appropriate and matched the consent or emergent situation, and that the correct equipment/implants were available. Immediately prior to starting the procedure I conducted the Time Out with the procedural staff and re-confirmed the patient s name, procedure, and site/side. (The Joint Commission universal protocol was followed.)  Yes    PRU value: Not applicable    Anesthesia: Conscious Sedation  Medications: fentanyl 175mcg IV, versed 3mg IV, heparin 3000u IV, lidocaine 1% topical 20cc  Puncture site: Left Femoral Artery    Fluoroscopy time (minutes): 31.4  Radiation dose (mGy): 733.7    Contrast amount (mL): 80     Estimated blood loss (mL): 15    Closure: 6 F Angio-seal    Bedrest start time 1001 and 2 hours bedrest completed at 1201    Disposition: Will be followed in hospital by the Neuro Critical Care/Stroke team.        Sedation Post-Procedure Summary    The patient was reevaluated immediately before administering moderate or deep sedation or anesthesia.    Sedatives: see above    Vital signs and pulse oximetry were monitored and remained stable throughout the procedure,  and sedation was maintained until the procedure was complete.  The patient was monitored by staff until sedation discharge criteria were met.    Patient tolerance: Patient tolerated the procedure well with no immediate complications.    Time of sedation in minutes: 75 minutes from beginning to end of physician one to one monitoring.    Aleah Barrera MD  Fellow, Endovascular Surgical Neuroradiology    Use Vocera to contact: Neuro IR Fellow ([place])

## 2025-05-16 NOTE — IR NOTE
Patient Name: Amy Choudhury  Medical Record Number: [id number]  Today's Date: 5/16/2025    Procedure: Diagnostic cerebral angiogram  Proceduralist: Dr. Romano, Dr. Barrera, Dr. Caputo  Pathology present: NA    Procedure Start: 0846  Procedure end: 1001  Sedation medications administered: 175 mcg fentanyl, 3 mg midazolam   Other: 3000 units intravenous heparin    Report given to:  RN  : SIDNEY    Other Notes: Pt arrived to IR room 3 from . Consent reviewed. Pt denies any questions or concerns regarding procedure. Pt positioned supine and monitored per protocol. Pt tolerated procedure without any noted complications. Pt transferred back to .    LFA sheath removed at 1000. Angioseal deployed at this time. 2 hour flat bedrest ending at 1200.

## 2025-05-16 NOTE — PLAN OF CARE
Status: Admitted 5/6 for HA and blurred vision. Hx of DM, anxiety, tobacco use, polysubstance abuse. Contact precautions for MRSA  Vitals: VSS on RA.  Neuros: A&Ox4.  Strengths 5/5 t/o.  Unchanged bilateral photophobia and blurred vision.  DND order for overnight.    IV: PIV SL  Labs/Electrolytes: BG ACHS  Resp: WDL  Diet:  Npo at 0000, diet: Low consistent carb diet.  1  GI/: voiding spont to bathroom  Skin: bruising t/o.  LP site on lower back, with band-aid in place.    Pain:  headache managed with scheduled Tylenol and scheduled meds  Activity: Independent   Plan:  5/16 0900  IR Carotid Cerebral angiogram.

## 2025-05-16 NOTE — PROGRESS NOTES
Inpatient Diabetes Management Service: Daily Progress Note     HPI: 65 female with past medical history of DM, anxiety, tobacco use, polysubstance use presented 5/6/25 with headaches and vision changes ongoing for 3 to 4 weeks.  Patient hospitalized x 2 at outside hospital for similar symptoms and workup include MRI brain with a working diagnosis of possible PRES. patient was hypertensive in the 200s in 1 of those encounters. Patient also described spells of losing time and missing part of her video while watching TV.  On comparison to the most recent MRI patient has microbleed's in the bilateral parietotemporal occipital regions.  Significant leptomeningeal enhancement in the cerebellar regions and bilateral cerebral hemisphere sparing some of the part of frontal regions.  Leptomeningeal enhancement appears to be more prominent than the most recent MRI.  FLAIR hyperintensity can be seen however not very prominent.  Continuous EEG done for 24 hours without any abnormal epileptiform discharges.  CT chest abdominal pelvis completed to rule out any malignancy however unremarkable.  D-dimer, ESR, CRP within normal limits. Differential diagnoses include inflammatory cerebral amyloid angiopathy, PRES, CNS lymphoma, neoplastic/autoimmune process or small vessel vasculitis. Inpatient Diabetes Service consulted 5/15/25 for assistance with glycemic control in setting of starting high dose steroids.          Assessment/Plan:     Assessment:   Type 2 Diabetes Mellitus, without known complications. 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     Plan/Recommendations:    - start NPH 20 units at 1200 with methylprednisolone 1,000 mg (do not give if MP not given).   - increase  Novolog Meal Coverage: 1 units per 15 --> 8 g CHO TID AC and 1 unit per 15 --> 10 g cho PRN with snacks/supplements   - Novolog Correction Scale: high resistance 1/25 >140 TID AC, 1/25 >200 HS & 0200   - *Primary team to start IV insulin overnight if BG trending >250 mg/dL despite correction scale after receiving Methylprednisolone   - BG monitoring: TID AC, HS, 0200   - Hypoglycemia protocol    - Carb counting protocol      Discussion: BG trending well after starting Novolog carb coverage yesterday with lunch. Was never given steroids yesterday as anticipated. NPO today for 9am procedure. Fasting  mg/dL. Starting Methylprednisolone 1,000 mg daily x 5 days at 1130 today, start NPH with MP and increase Novolog ICR.  No Lantus as this time as fasting BG at goal this morning. Primary team to start IV insulin overnight if BG trending > 250 mg/dL despite correction scale after receiving MP.      Discharge Planning: (tentative)    Medications: TBD    Test Claims: none needed.  Education: will be needed if not discharging   Outpatient Follow-up: recommend [facility] Endocrinology vs PCP    Please notify Inpatient Diabetes Service if changes are planned to steroids, nutrition, TPN/TF and anticipated procedures requiring prolonged NPO status.         Interval History/Review of Systems :   - The last 24 hours progress and nursing notes reviewed.  - Had diagnostic cerebral angiogram this morning, then had stroke code.  - Feeling upset about stroke code during visit. Hasn't eaten anything this morning yet, having some nausea, no emesis.      Planned Procedures/Surgeries: NPO midnight 5/16 for 9AM IR diagnostic cerebral angiogram.    Inpatient Glucose Control:       Recent Labs   Lab 05/16/25  0626 05/16/25  0153 05/15/25  2244 05/15/25  1601 05/15/25  1311 05/15/25  0748   * 127* 149* 150* 254* 153*             Medications Impacting Glycemia:   Steroids:  Methylprednisolone 1,000 mg daily x 5 days at 1130 5/16 then  "0900 5/17-5/20  D5W-containing solutions/medications: none   Other medications impacting glucose: none         Nutrition:   Orders Placed This Encounter      NPO for Procedure/Surgery per Anesthesia Guidelines Except for: Meds; Clear liquids before procedure/surgery: ADULT (Age GREATER than or Equal to 18 years) - Clear liquids 2 hours before procedure/surgery  Supplements: none   TF: none   TPN: none         Diabetes History: see full consult note for complete diabetes history   Diabetes Type and Duration: T2DM, per patient was told she has T2DM 2 weeks ago at OSH. Per chart review, diagnosed 4/2021 with A1c 6.7%      GAD65 antibody, zinc transporter 8 antibody, islet antibody, insulin antibody, and c-peptide not available on epic search      PTA Medication Regimen: none     Missing doses?: N/A     Historical Diabetes Medications:   - reports taking Metformin 500 mg BID recently at OSH but wasn't taking at home. Has never been on insulin before.       Glucose monitoring device/frequency/trends: has used glucometer randomly before because she has family members with diabetes.     Hypoglycemia PTA: none     Outpatient Diabetes Provider: Has PCP but hasn't seen him since 2021 - Randal Castellanos (with [place])      Formal Diabetes Education/Educator: no        Physical Exam:   /81 (BP Location: Right arm)   Pulse 78   Temp 98.5  F (36.9  C) (Oral)   Resp 14   Ht 1.676 m (5' 6\")   Wt 104.6 kg (230 lb 8 oz)   SpO2 99%   BMI 37.20 kg/m    General:  well appearing, NAD, resting flat in bed with washcloth over eyes.   Lungs: unlabored breathing on RA.  Mental Status:  Alert and oriented x3  Psych:  withdrawn, sad         Data:     Lab Results   Component Value Date    A1C 8.7 (H) 05/06/2025    A1C 6.0 (H) 02/11/2022    A1C 6.6 (H) 10/15/2021    A1C 5.8 (H) 09/06/2021    A1C 6.7 (H) 04/19/2021    A1C 5.0 02/11/2015       ROUTINE IP LABS (Last four results)  BMP  Recent Labs   Lab 05/16/25  0626 05/16/25  0153 " 05/15/25  2244 05/15/25  1601 05/15/25  0748 05/15/25  0703 05/14/25  0728 05/14/25  0650 05/13/25  1201 05/13/25  1044 05/13/25  0712 05/13/25  0643     --   --   --   --  138  --  136  --  136  --  138  138   POTASSIUM 4.5  --   --   --   --  4.8  --  4.8  --   --   --  4.6   CHLORIDE 100  --   --   --   --  102  --  99  --   --   --  101   CRISTIAN 8.8  --   --   --   --  9.0  --  8.8  --   --   --  8.7*   CO2 25  --   --   --   --  23  --  25  --   --   --  26   BUN 15.5  --   --   --   --  14.6  --  17.9  --   --   --  23.5*   CR 0.65  --   --   --   --  0.63  --  0.70  --   --   --  0.72   * 127* 149* 150*   < > 140*   < > 265*   < >  --    < > 145*    < > = values in this interval not displayed.     CBC  Recent Labs   Lab 05/16/25  0626 05/15/25  0703 05/14/25  0650 05/13/25  0643   WBC 8.9 10.6 8.7 9.2   RBC 4.85 4.81 4.95 5.07   HGB 12.6 12.5 12.9 13.1   HCT 39.8 39.8 41.8 40.7   MCV 82 83 84 80   MCH 26.0* 26.0* 26.1* 25.8*   MCHC 31.7 31.4* 30.9* 32.2   RDW 15.2* 14.7 15.0 14.9    [phone]     Inpatient Diabetes Service will continue to follow, please don't hesitate to contact the team with any questions or concerns.     Andreea Taylor PA-C  Inpatient Diabetes Service  Pager: [phone]  Available on vocera    Plan discussed with patient, bedside RN, and primary team via this note.    To contact Inpatient Diabetes Service:     7 AM - 5 PM: Page the IDS ELBERT following the patient that day (see filed or incomplete progress notes/consult notes under Endocrinology)    OR if uncertain of provider assignment: page job code [phone]    5 PM - 7 AM: First call after hours is to primary service.    For urgent after-hours questions, page job code for on call fellow: [phone]     I spent a total of 40 minutes on the date of the encounter doing prep/post-work, chart review, history and exam, documentation and further activities per the note including lab review, multidisciplinary communication, counseling the  patient and/or coordinating care regarding acute hyper/hypoglycemic management, as well as discharge management and planning/communication.

## 2025-05-17 ENCOUNTER — APPOINTMENT (OUTPATIENT)
Dept: OCCUPATIONAL THERAPY | Facility: CLINIC | Age: 65
DRG: 545 | End: 2025-05-17
Payer: COMMERCIAL

## 2025-05-17 LAB
ANION GAP SERPL CALCULATED.3IONS-SCNC: 11 MMOL/L (ref 7–15)
ATRIAL RATE - MUSE: 67 BPM
BACTERIA CSF CULT: NO GROWTH
BUN SERPL-MCNC: 14.7 MG/DL (ref 8–23)
CALCIUM SERPL-MCNC: 8.3 MG/DL (ref 8.8–10.4)
CHLORIDE SERPL-SCNC: 100 MMOL/L (ref 98–107)
CREAT SERPL-MCNC: 0.57 MG/DL (ref 0.51–0.95)
DIASTOLIC BLOOD PRESSURE - MUSE: NORMAL MMHG
EGFRCR SERPLBLD CKD-EPI 2021: >90 ML/MIN/1.73M2
ERYTHROCYTE [DISTWIDTH] IN BLOOD BY AUTOMATED COUNT: 14.6 % (ref 10–15)
GLUCOSE BLDC GLUCOMTR-MCNC: 153 MG/DL (ref 70–99)
GLUCOSE BLDC GLUCOMTR-MCNC: 206 MG/DL (ref 70–99)
GLUCOSE BLDC GLUCOMTR-MCNC: 222 MG/DL (ref 70–99)
GLUCOSE BLDC GLUCOMTR-MCNC: 226 MG/DL (ref 70–99)
GLUCOSE BLDC GLUCOMTR-MCNC: 256 MG/DL (ref 70–99)
GLUCOSE SERPL-MCNC: 165 MG/DL (ref 70–99)
GRAM STAIN RESULT: NORMAL
GRAM STAIN RESULT: NORMAL
HBV CORE AB SERPL QL IA: NONREACTIVE
HBV SURFACE AB SERPL IA-ACNC: <3.5 M[IU]/ML
HBV SURFACE AB SERPL IA-ACNC: NONREACTIVE M[IU]/ML
HBV SURFACE AG SERPL QL IA: NONREACTIVE
HCO3 SERPL-SCNC: 22 MMOL/L (ref 22–29)
HCT VFR BLD AUTO: 35.7 % (ref 35–47)
HCV AB SERPL QL IA: NONREACTIVE
HGB BLD-MCNC: 11.4 G/DL (ref 11.7–15.7)
HIV 1+2 AB+HIV1 P24 AG SERPL QL IA: NONREACTIVE
INTERPRETATION ECG - MUSE: NORMAL
MAGNESIUM SERPL-MCNC: 2.3 MG/DL (ref 1.7–2.3)
MCH RBC QN AUTO: 25.9 PG (ref 26.5–33)
MCHC RBC AUTO-ENTMCNC: 31.9 G/DL (ref 31.5–36.5)
MCV RBC AUTO: 81 FL (ref 78–100)
P AXIS - MUSE: 70 DEGREES
PHOSPHATE SERPL-MCNC: 4.1 MG/DL (ref 2.5–4.5)
PLATELET # BLD AUTO: 382 10E3/UL (ref 150–450)
POTASSIUM SERPL-SCNC: 4.3 MMOL/L (ref 3.4–5.3)
PR INTERVAL - MUSE: 184 MS
QRS DURATION - MUSE: 86 MS
QT - MUSE: 414 MS
QTC - MUSE: 437 MS
R AXIS - MUSE: -12 DEGREES
RBC # BLD AUTO: 4.41 10E6/UL (ref 3.8–5.2)
SODIUM SERPL-SCNC: 133 MMOL/L (ref 135–145)
SYSTOLIC BLOOD PRESSURE - MUSE: NORMAL MMHG
T AXIS - MUSE: 51 DEGREES
VENTRICULAR RATE- MUSE: 67 BPM
WBC # BLD AUTO: 14.7 10E3/UL (ref 4–11)

## 2025-05-17 PROCEDURE — 250N000013 HC RX MED GY IP 250 OP 250 PS 637

## 2025-05-17 PROCEDURE — 99233 SBSQ HOSP IP/OBS HIGH 50: CPT | Mod: GC | Performed by: INTERNAL MEDICINE

## 2025-05-17 PROCEDURE — 36415 COLL VENOUS BLD VENIPUNCTURE: CPT

## 2025-05-17 PROCEDURE — 86803 HEPATITIS C AB TEST: CPT

## 2025-05-17 PROCEDURE — 86704 HEP B CORE ANTIBODY TOTAL: CPT

## 2025-05-17 PROCEDURE — 258N000003 HC RX IP 258 OP 636

## 2025-05-17 PROCEDURE — 82595 ASSAY OF CRYOGLOBULIN: CPT

## 2025-05-17 PROCEDURE — 250N000011 HC RX IP 250 OP 636

## 2025-05-17 PROCEDURE — 97535 SELF CARE MNGMENT TRAINING: CPT | Mod: GO | Performed by: OCCUPATIONAL THERAPIST

## 2025-05-17 PROCEDURE — 258N000003 HC RX IP 258 OP 636: Performed by: STUDENT IN AN ORGANIZED HEALTH CARE EDUCATION/TRAINING PROGRAM

## 2025-05-17 PROCEDURE — 97168 OT RE-EVAL EST PLAN CARE: CPT | Mod: GO | Performed by: OCCUPATIONAL THERAPIST

## 2025-05-17 PROCEDURE — 85018 HEMOGLOBIN: CPT

## 2025-05-17 PROCEDURE — 87389 HIV-1 AG W/HIV-1&-2 AB AG IA: CPT

## 2025-05-17 PROCEDURE — 80048 BASIC METABOLIC PNL TOTAL CA: CPT

## 2025-05-17 PROCEDURE — 120N000003 HC R&B IMCU UMMC

## 2025-05-17 PROCEDURE — 84100 ASSAY OF PHOSPHORUS: CPT | Performed by: PSYCHIATRY & NEUROLOGY

## 2025-05-17 PROCEDURE — 87340 HEPATITIS B SURFACE AG IA: CPT

## 2025-05-17 PROCEDURE — 99232 SBSQ HOSP IP/OBS MODERATE 35: CPT | Mod: GC | Performed by: PSYCHIATRY & NEUROLOGY

## 2025-05-17 PROCEDURE — 120N000002 HC R&B MED SURG/OB UMMC

## 2025-05-17 PROCEDURE — 83735 ASSAY OF MAGNESIUM: CPT | Performed by: PSYCHIATRY & NEUROLOGY

## 2025-05-17 PROCEDURE — 86341 ISLET CELL ANTIBODY: CPT

## 2025-05-17 PROCEDURE — 86255 FLUORESCENT ANTIBODY SCREEN: CPT

## 2025-05-17 PROCEDURE — 250N000012 HC RX MED GY IP 250 OP 636 PS 637: Performed by: INTERNAL MEDICINE

## 2025-05-17 PROCEDURE — 86706 HEP B SURFACE ANTIBODY: CPT

## 2025-05-17 RX ORDER — PROMETHAZINE HYDROCHLORIDE 25 MG/1
25 TABLET ORAL
Status: DISCONTINUED | OUTPATIENT
Start: 2025-05-17 | End: 2025-05-21 | Stop reason: HOSPADM

## 2025-05-17 RX ORDER — TRAMADOL HYDROCHLORIDE 50 MG/1
50 TABLET ORAL
Status: DISCONTINUED | OUTPATIENT
Start: 2025-05-17 | End: 2025-05-17

## 2025-05-17 RX ORDER — TRAMADOL HYDROCHLORIDE 50 MG/1
50 TABLET ORAL ONCE
Status: DISCONTINUED | OUTPATIENT
Start: 2025-05-17 | End: 2025-05-17

## 2025-05-17 RX ORDER — TRAMADOL HYDROCHLORIDE 50 MG/1
50 TABLET ORAL EVERY 6 HOURS PRN
Status: DISCONTINUED | OUTPATIENT
Start: 2025-05-17 | End: 2025-05-21

## 2025-05-17 RX ADMIN — Medication 3 MG: at 20:38

## 2025-05-17 RX ADMIN — GABAPENTIN 600 MG: 300 CAPSULE ORAL at 02:13

## 2025-05-17 RX ADMIN — PROPRANOLOL HYDROCHLORIDE 60 MG: 60 CAPSULE, EXTENDED RELEASE ORAL at 09:39

## 2025-05-17 RX ADMIN — ACETAMINOPHEN 650 MG: 325 TABLET ORAL at 06:46

## 2025-05-17 RX ADMIN — DIVALPROEX SODIUM 500 MG: 500 TABLET, FILM COATED, EXTENDED RELEASE ORAL at 20:38

## 2025-05-17 RX ADMIN — INSULIN ASPART 9 UNITS: 100 INJECTION, SOLUTION INTRAVENOUS; SUBCUTANEOUS at 18:00

## 2025-05-17 RX ADMIN — SERTRALINE HYDROCHLORIDE 50 MG: 50 TABLET ORAL at 09:38

## 2025-05-17 RX ADMIN — ACETAMINOPHEN 650 MG: 325 TABLET ORAL at 00:04

## 2025-05-17 RX ADMIN — SODIUM CHLORIDE: 0.9 INJECTION, SOLUTION INTRAVENOUS at 01:11

## 2025-05-17 RX ADMIN — METHYLPREDNISOLONE SODIUM SUCCINATE 1000 MG: 1 INJECTION INTRAMUSCULAR; INTRAVENOUS at 09:42

## 2025-05-17 RX ADMIN — PANTOPRAZOLE SODIUM 40 MG: 40 TABLET, DELAYED RELEASE ORAL at 09:40

## 2025-05-17 RX ADMIN — INSULIN ASPART 9 UNITS: 100 INJECTION, SOLUTION INTRAVENOUS; SUBCUTANEOUS at 13:01

## 2025-05-17 RX ADMIN — ASPIRIN 81 MG CHEWABLE TABLET 81 MG: 81 TABLET CHEWABLE at 09:39

## 2025-05-17 RX ADMIN — INSULIN HUMAN 28 UNITS: 100 INJECTION, SUSPENSION SUBCUTANEOUS at 11:21

## 2025-05-17 RX ADMIN — QUETIAPINE FUMARATE 25 MG: 25 TABLET ORAL at 20:38

## 2025-05-17 RX ADMIN — SENNOSIDES 8.6 MG: 8.6 TABLET, FILM COATED ORAL at 02:13

## 2025-05-17 RX ADMIN — INSULIN ASPART 6 UNITS: 100 INJECTION, SOLUTION INTRAVENOUS; SUBCUTANEOUS at 09:35

## 2025-05-17 RX ADMIN — Medication 500 MG: at 09:38

## 2025-05-17 RX ADMIN — GABAPENTIN 600 MG: 300 CAPSULE ORAL at 12:26

## 2025-05-17 RX ADMIN — Medication 500 MCG: at 09:40

## 2025-05-17 RX ADMIN — ENOXAPARIN SODIUM 40 MG: 40 INJECTION SUBCUTANEOUS at 15:56

## 2025-05-17 RX ADMIN — ONDANSETRON 4 MG: 2 INJECTION, SOLUTION INTRAMUSCULAR; INTRAVENOUS at 01:06

## 2025-05-17 RX ADMIN — DIVALPROEX SODIUM 500 MG: 500 TABLET, FILM COATED, EXTENDED RELEASE ORAL at 09:38

## 2025-05-17 RX ADMIN — GABAPENTIN 600 MG: 300 CAPSULE ORAL at 18:02

## 2025-05-17 ASSESSMENT — ACTIVITIES OF DAILY LIVING (ADL)
ADLS_ACUITY_SCORE: 48
ADLS_ACUITY_SCORE: 49
ADLS_ACUITY_SCORE: 48
ADLS_ACUITY_SCORE: 49
ADLS_ACUITY_SCORE: 47
ADLS_ACUITY_SCORE: 49
ADLS_ACUITY_SCORE: 48
ADLS_ACUITY_SCORE: 36
ADLS_ACUITY_SCORE: 47
ADLS_ACUITY_SCORE: 48
ADLS_ACUITY_SCORE: 47
ADLS_ACUITY_SCORE: 48
ADLS_ACUITY_SCORE: 49
ADLS_ACUITY_SCORE: 47
ADLS_ACUITY_SCORE: 49
ADLS_ACUITY_SCORE: 48

## 2025-05-17 NOTE — PROVIDER NOTIFICATION
"\"GILMAR Choudhury rm 0998 6A Neuro Stroke    Pt having persistent nausea, already gave ordered Zofran and Compazine - can she have scopolamine patch or any other antiemetic? leland Mitchell 6A Charge [phone]\"        Paged neuro overnight resident via [facility] at [phone]      Addendum: MD called back and placed antiemetic orders    "

## 2025-05-17 NOTE — CONSULTS
[facility]     Stroke Code Note          History of Present Illness     Chief Complaint: Headache    See progress note on same date for full details of the patient's history.    The patient underwent DSA this morning.  Following DSA, on repeat examination, the patient was noted to have new onset left-sided weakness.  Her last known well was prior to the DSA at 8:45 AM.  A stroke code was called due to new onset left-sided weakness.  The patient also noted that she had a severe headache.  Her blood pressure was elevated in the 180s.  Glucose was 190.     CT head without evidence of hemorrhage.  CTA head and neck without evidence of large vessel occlusion there is concern that the patient may have had an acute ischemic stroke and will plan for MRI.                   Assessment and Plan       Complete plan in progress note on 5/16.   Plan for MRI brain to assess for acute ischemic stroke  Permissive HTN SBP<180     ___________________________________________________________________    The patient was seen and discussed with neurology attending, Dr. Greene.     Mara Ceron MD  Neurology Resident, PGY-2  ___________________________________________________________________        Imaging/Labs   (personally reviewed )    CT head:   Impression:   1. No acute intracranial hemorrhage or infarct.  2. ASPECT Score: 10/10.  3. Unchanged swelling and edema in the bilateral occipital lobes.    CTA head/neck:   Impression:    1. Head CTA demonstrates no aneurysm or stenosis of the major  intracranial arteries.   2. Neck CTA demonstrates no critical stenosis of the major cervical  arteries.            Physical Examination     BP: (!) 157/102   Pulse: 55   Resp: 16   Temp: 98.7  F (37.1  C)   Temp src: Oral   SpO2: 92 %   O2 Device: None (Room air)   Weight: 104.6 kg (230 lb 8 oz)    Wt Readings from Last 2 Encounters:   05/14/25 104.6 kg (230 lb 8 oz)   04/29/24 111.6 kg (246 lb)        Neurologic  Mental Status:  alert, oriented to self, place, date, situation, follows commands, speech clear and fluent, naming and repetition normal  Cranial Nerves: PERRL, left hemianopia, possible right gaze preference but able to cross midline, facial sensation intact and symmetric, facial movements symmetric, hearing not formally tested but intact to conversation, no dysarthria,  tongue protrusion midline  Motor:  normal muscle tone and bulk, no abnormal movements, left arm weakness, not able to lift antigravity or squeeze hand, left leg drift to bed, no weakness or drift on right arm or leg  Reflexes:  no ankle clonus bilaterally  Sensory: Sensation on left arm and leg compared to right side, extinction on bilateral double simultaneous stimulation of the left  Coordination: Finger-nose-finger on right without ataxia or dysmetria, unable to assess on left  Station/Gait:  deferred        Stroke Scales       NIHSS  1a. Level of Consciousness 0-->Alert, keenly responsive   1b. LOC Questions 0-->Answers both questions correctly   1c. LOC Commands 0-->Performs both tasks correctly   2.   Best Gaze 1-->Partial gaze palsy, gaze is abnormal in one or both eyes, but forced deviation or total gaze paresis is not present   3.   Visual 0-->No visual loss   4.   Facial Palsy 0-->Normal symmetrical movements   5a. Motor Arm, Left 3-->No effort against gravity, limb falls   5b. Motor Arm, Right 0-->No drift, limb holds 90 (or 45) degrees for full 10 secs   6a. Motor Leg, Left 3-->No effort against gravity, leg falls to bed immediately (unable to assess due to groin access)   6b. Motor Leg, right 0-->No drift, leg holds 30 degree position for full 5 secs   7.   Limb Ataxia 0-->Absent   8.   Sensory 1-->Mild-to-moderate sensory loss, patient feels pinprick is less sharp or is dull on the affected side, or there is a loss of superficial pain with pinprick, but patient is aware of being touched   9.   Best Language 0-->No  Arm band on/IV intact "aphasia, normal   10. Dysarthria 0-->Normal   11. Extinction and Inattention  1-->Visual, tactile, auditory, spatial, or personal inattention or extinction to bilateral simultaneous stimulation in one of the sensory modalities   Total 9 (05/16/25 1045)            Labs     CBC  Lab Results   Component Value Date    HGB 12.7 05/16/2025    HCT 39.2 05/16/2025    WBC 10.2 05/16/2025     05/16/2025       BMP  Lab Results   Component Value Date     05/16/2025    POTASSIUM 4.7 05/16/2025    CHLORIDE 101 05/16/2025    CO2 23 05/16/2025    BUN 15.4 05/16/2025    CR 0.56 05/16/2025     (H) 05/16/2025    CRISTIAN 8.5 (L) 05/16/2025       INR  INR   Date Value Ref Range Status   05/16/2025 0.97 0.85 - 1.15 Final   05/12/2025 0.87 0.85 - 1.15 Final   05/08/2025 0.88 0.85 - 1.15 Final       Data   Stroke Code Data  (for stroke code without tele)  Stroke code activated 05/16/25  1035   First stroke provider response 05/16/25  1035   Last known normal 05/16/25  0845   Time of discovery (or onset of symptoms)        Head CT read by Stroke Neuro Provider 05/16/25  1055   Was stroke code de-escalated? Yes  05/16/25  1103        Clinically Significant Risk Factors                   # Hypertension: Noted on problem list           # DMII: A1C = 8.7 % (Ref range: <5.7 %) within past 6 months # Severe Obesity: Estimated body mass index is 37.2 kg/m  as calculated from the following:    Height as of this encounter: 1.676 m (5' 6\").    Weight as of this encounter: 104.6 kg (230 lb 8 oz). with complications              "

## 2025-05-17 NOTE — PLAN OF CARE
Status: Admitted with ongoing headaches and blurred vision, now s/p diagnostic cerebral angiogram today 5/16 with significant neuro changes afterwards. MRI showed multiple new acute infarcts.   Vitals: HTN within 180 parameters and bradycardia in 50's while resting. CCM.   Neuros: Alert to Lethargic. Arouses to voice. Depressed about situation and minimally cooperative with assessment at times. Moving RUE/RLE 5/5.  LUE absent to weak grasp 0-1/5, LLE 4/5   IV: PIV x2, one infusing NS at 75 mL/hr and other SL   Labs/Electrolytes: BG check at 0200 was 206  Resp: Continuous pulse ox in place   Diet: Low consistent carb. PRN IV zofran and aromastick in use for intermittent nausea   GI: LBM 5/14, BS+. Has scheduled senna and miralax. Additional PRN senna dose given this shift   : Purewick placed overnight. Bedpan and BSC available at bedside   Skin: Bruising throughout most prominent to R forearm, LP site to lower back, L groin site WNL-CMS intact, pulses marked this shift  Pain: Headache pain managed with PRN tylenol, cold packs to head   Activity: Not OOB since completion of bedrest after angiogram. Walker and BSC ordered.   Plan/Updates this shift: Start methylprednisolone 1000mg IV for 5 days. Continue to monitor and follow POC  Education completed: Needs stroke education        Goal Outcome Evaluation:      Plan of Care Reviewed With: patient    Overall Patient Progress: declining    Outcome Evaluation: MRI with new infarts, pt. needs a lot of encouragement to participate with neuro assessments. Stroke education needed

## 2025-05-17 NOTE — PLAN OF CARE
Vitals: SBP within parameters; goal <180  Neuros: A&Ox4. LUE 0/5 and decreased sensation to L side. Distorted vision per patient.   IV: PIV x2 SL  Resp: RA   Diet: Consistent carb diet with carb coverage  GI: LBM 5/14  : Voiding spont  Skin: Scattered bruising, bilateral groin sites   Pain: Denies   Activity: Up with assist of 1-2 GB and walker. Up in chair x1 for dinner  Plan: Steroids for 5 days  Education completed: Needs stroke education

## 2025-05-17 NOTE — PLAN OF CARE
Vitals: VSS on RA except HTN within parameters.   Neuros: Alert and oriented x4. R side 5/5, LLE 4/5, LUE 0-1/5. Pt reported distorted vision-neuro team aware. Decreased sensation to L side with some N/T. L neglect.   IV: PIV x2 SL'd  Labs/Electrolytes: K, MG and Phos WNL  Resp: WNL on RA  Diet: Low carb diet with carb coverage.   GI: Last BM 5/14-declined bowel meds.   : Voiding.  Skin: Bruising throughout.   Pain: Denied  Activity: Up with assist of 1-2 GB and walker. Up in chair this shift.   Plan: Methylprednisolone 1000mg IV for 5 days, 2/5 done.   Education completed: Needs stroke education.              Goal Outcome Evaluation:      Plan of Care Reviewed With: patient    Overall Patient Progress: no changeOverall Patient Progress: no change    Outcome Evaluation: Pt up ambulating and in chair today. Stroke education needed.

## 2025-05-17 NOTE — PROGRESS NOTES
Inpatient Diabetes Management Service: Daily Progress Note     HPI: 65 female with past medical history of DM, anxiety, tobacco use, polysubstance use presented 5/6/25 with headaches and vision changes ongoing for 3 to 4 weeks.  Patient hospitalized x 2 at outside hospital for similar symptoms and workup include MRI brain with a working diagnosis of possible PRES. patient was hypertensive in the 200s in 1 of those encounters. Patient also described spells of losing time and missing part of her video while watching TV.  On comparison to the most recent MRI patient has microbleed's in the bilateral parietotemporal occipital regions.  Significant leptomeningeal enhancement in the cerebellar regions and bilateral cerebral hemisphere sparing some of the part of frontal regions.  Leptomeningeal enhancement appears to be more prominent than the most recent MRI.  FLAIR hyperintensity can be seen however not very prominent.  Continuous EEG done for 24 hours without any abnormal epileptiform discharges.  CT chest abdominal pelvis completed to rule out any malignancy however unremarkable.  D-dimer, ESR, CRP within normal limits. Differential diagnoses include inflammatory cerebral amyloid angiopathy, PRES, CNS lymphoma, neoplastic/autoimmune process or small vessel vasculitis. Inpatient Diabetes Service consulted 5/15/25 for assistance with glycemic control in setting of starting high dose steroids.          Assessment/Plan:     Assessment:   Type 2 Diabetes Mellitus, without known complications. 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     Plan/Recommendations:    - Increase NPH to 28 units at 0900 with methylprednisolone 1,000 mg (do not give if MP not given).   - Increase  Novolog Meal Coverage: 1 units per 5 g CHO TID AC and snacks  - Novolog Correction Scale: high resistance 1/25 >140 TID AC, 1/25 >200 HS & 0200   - BG monitoring: TID AC, HS, 0200   - Hypoglycemia protocol    - Carb counting protocol      Discussion: She was hyperglycemic following NPH insulin. Limited oral intake yesterday but today she reports having had resolution of nausea and having  a strong appetite.     Will increase NPH insulin and strengthen insulin to carb ratio.      Discharge Planning: (tentative)    Medications: TBD    Test Claims: none needed.  Education: will be needed if not discharging   Outpatient Follow-up: recommend [facility] vs PCP    Please notify Inpatient Diabetes Service if changes are planned to steroids, nutrition, TPN/TF and anticipated procedures requiring prolonged NPO status.         Interval History/Review of Systems :   - The last 24 hours progress and nursing notes reviewed.  Nausea has improved, was able to tolerate breakfast today. Very good appetite.     Planned Procedures/Surgeries: Inpatient Glucose Control:       Recent Labs   Lab 05/17/25  0901 05/17/25  0729 05/17/25  0209 05/16/25  2219 05/16/25  2047 05/16/25 2002   * 165* 206* 232* 222* 256*             Medications Impacting Glycemia:   Steroids:  Methylprednisolone 1,000 mg daily x 5 days at 1130 5/16 then 0900 5/17-5/20  D5W-containing solutions/medications: none   Other medications impacting glucose: none         Nutrition:   Orders Placed This Encounter      Low Consistent Carb (45 g CHO per Meal) Diet  Supplements: none   TF: none   TPN: none         Diabetes History: see full consult note for complete diabetes history   Diabetes Type and Duration: T2DM, per patient was told she has T2DM 2 weeks ago at OSH. Per chart review, diagnosed 4/2021 with A1c 6.7%      GAD65 antibody, zinc transporter 8 antibody, islet antibody, insulin antibody, and c-peptide not available on epic search      PTA  "Medication Regimen: none     Missing doses?: N/A     Historical Diabetes Medications:   - reports taking Metformin 500 mg BID recently at OSH but wasn't taking at home. Has never been on insulin before.       Glucose monitoring device/frequency/trends: has used glucometer randomly before because she has family members with diabetes.     Hypoglycemia PTA: none     Outpatient Diabetes Provider: Has PCP but hasn't seen him since 2021 - Rnadal Castellanos (with [place])      Formal Diabetes Education/Educator: no        Physical Exam:   BP (!) 153/85 (BP Location: Right arm)   Pulse 70   Temp 97  F (36.1  C) (Axillary)   Resp 16   Ht 1.676 m (5' 6\")   Wt 104.6 kg (230 lb 8 oz)   SpO2 94%   BMI 37.20 kg/m    General:  well appearing, NAD  Lungs: unlabored breathing on RA.  Mental Status:  Alert and oriented x3  Psych:  withdrawn         Data:     Lab Results   Component Value Date    A1C 8.7 (H) 05/06/2025    A1C 6.0 (H) 02/11/2022    A1C 6.6 (H) 10/15/2021    A1C 5.8 (H) 09/06/2021    A1C 6.7 (H) 04/19/2021    A1C 5.0 02/11/2015       ROUTINE IP LABS (Last four results)  BMP  Recent Labs   Lab 05/17/25  0901 05/17/25  0729 05/17/25  0209 05/16/25  2219 05/16/25  1459 05/16/25  1311 05/16/25  0756 05/16/25  0626 05/15/25  0748 05/15/25  0703   NA  --  133*  --   --   --  136  --  137  --  138   POTASSIUM  --  4.3  --   --   --  4.7  --  4.5  --  4.8   CHLORIDE  --  100  --   --   --  101  --  100  --  102   CRISTIAN  --  8.3*  --   --   --  8.5*  --  8.8  --  9.0   CO2  --  22  --   --   --  23  --  25  --  23   BUN  --  14.7  --   --   --  15.4  --  15.5  --  14.6   CR  --  0.57  --   --   --  0.56  --  0.65  --  0.63   * 165* 206* 232*   < > 167*   < > 145*   < > 140*    < > = values in this interval not displayed.     CBC  Recent Labs   Lab 05/17/25  0729 05/16/25  1311 05/16/25  0626 05/15/25  0703   WBC 14.7* 10.2 8.9 10.6   RBC 4.41 4.78 4.85 4.81   HGB 11.4* 12.7 12.6 12.5   HCT 35.7 39.2 39.8 39.8   MCV 81 " 82 82 83   MCH 25.9* 26.6 26.0* 26.0*   MCHC 31.9 32.4 31.7 31.4*   RDW 14.6 15.0 15.2* 14.7    [phone]     Inpatient Diabetes Service will continue to follow, please don't hesitate to contact the team with any questions or concerns.     Low Hu MD  Endocrinology Fellow     To contact Inpatient Diabetes Service:     7 AM - 5 PM: Page the IDS ELBERT following the patient that day (see filed or incomplete progress notes/consult notes under Endocrinology)    OR if uncertain of provider assignment: page job code [phone]    5 PM - 7 AM: First call after hours is to primary service.    For urgent after-hours questions, page job code for on call fellow: [phone]       I, Loreta Clemente, was present with the fellow who participated in the service and in the documentation of the note.  I have verified the history and personally performed the physical exam and medical decision making.  I agree with the assessment and plan of care as documented in the note.     Loreta Clemente MD

## 2025-05-17 NOTE — PLAN OF CARE
"Goal Outcome Evaluation:      Plan of Care Reviewed With: patient    Overall Patient Progress: decliningOverall Patient Progress: declining    Outcome Evaluation: MRI showed new acute infarcts    Status: Admitted with ongoing headaches and blurred vision, now s/p diagnostic cerebral angiogram today 5/16 with significant neuro changes afterwards. MRI showed multiple new acute infarcts.   Vitals: HTN within parameters of SBP <180, bradycardic with HR as low as 55, CCM and pulse ox on   Neuros: Lethargic, oriented x4, strengths R side 5/5 LUE 0/5 LLE 4/5, decreased sensation to L side with some N/T, severe L neglect, bilateral photophobia   IV: PIV infusing NS at 75 mL/hr   Labs/Electrolytes: BG checks ACHS with carb coverage   Resp: LSC on RA   Diet: Low consistent carb, poor appetite this shift, intermittent nausea managed with PRN meds    GI: LBM 5/14, BS+  : Voiding spontaneously  Skin: Bruising throughout most prominent to R forearm, LP site to lower back, L groin site WNL   Pain: Headache pain unmanaged with tylenol and IV compazine   Activity: Not OOB since completion of bedrest after angiogram   Plan/Updates this shift: Pt very frustrated with their situation right now, needs a lot of encouragement to participate in assessment and frequent re-education on why they are important. RN attempted to do an assessment at 2130 and pt was not willing to participate and frequently answering questions with \"I don't know\" Team notified, no new orders.   Education completed: Needs stroke education           "

## 2025-05-17 NOTE — PROGRESS NOTES
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 Initially differential included hypertensive vasculopathy/ PRES which is now thought to be less likely per primary team and leading differential is inflammatory cerebral amyloid angiopathy.  She had an LP which showed RBC 5, TNC 32, lymphocytes 72%, protein of 74.9, glu 102.   She had a repeat MRI brain on 5/15/25 (for new auditory hallucinations) showing slight increase in leptomeningeal enhancement and unchanged microhemorrhages, along with a tiny infarct in the left cerebellum.  Patient underwent diagnostic cerebral angiogram on 5/16/25 which did not show any aneurysms, vascular malformation or any areas of stenosis, irregularity or bleeding to suggest inflammatory vasculopathy in anterior and posterior circulation.  Postprocedure, she was found to have left arm weakness along with left arm hemisensory neglect when examined in her room.  CT head and CTA head and neck were obtained which were unrevealing.  She had an MRI overnight which showed right parietal and right occipital lobe infarcts along with evolving infarct in the left cerebellum as noted on the prior MRI.  She also had worsened marked abnormal leptomeningeal FLAIR signal.    When seen this morning, she reported to me that her headache had resolved.  We discussed MRI results.  I answered all the questions she had.  She said she was motivated to get better.  She was seen sitting in the chair and said she was trying to move her left arm as much as possible.  She reported that she had significantly improved strength in her left leg.  She also reported that her daughter had suffered from a stroke, and requested that I speak with Bea.   "She noted that Bea was getting transferred to a group home today.   I attempted to call Bea today at the number listed in the chart-[phone], however did not leave a voicemail because the voicemail was addressed to someone by the name of Amada.  I discussed this with Amy, who was attempting to find her phone so she can provide with other phone numbers.  I will check back with her again tomorrow for an alternate number.    Physical exam:   BP (!) 152/81 (BP Location: Right arm)   Pulse 54   Temp 97.6  F (36.4  C) (Oral)   Resp 14   Ht 1.676 m (5' 6\")   Wt 104.6 kg (230 lb 8 oz)   SpO2 93%   BMI 37.20 kg/m    General: Awake and alert and in no acute distress.  Pulm: Breathing comfortably on room air  Heart: RRR  Stomach: soft    A&O x4  Speech without aphasia or dysarthria  CN- EOMI, left VF cut, face symmetric, hearing intact to conversation, tongue midline  Motor- antigravity on the right, LLE drift, LUE 2/5 proximally and 0-1 distally  Sensation- left hemisensory neglect  Coordination- finger to nose without ataxia on the right  Gait- deferred    Imaging:  All previous imaging pertinent to this encounter reviewed.    Assessment/Plan:  66yo F with vascular risk factors, presented with headache and visual changes for a few weeks at least, found to have microbleeds mostly in the occipital/temporal/parietal regions bilaterally with associated leptomeningeal enhancement, initially concerning for hypertensive vasculopathy/PRES however now inflammatory cerebral amyloid angiopathy higher on the differential per primary team. She had an LP showing lymphocytic pleocytosis. She underwent diagnostic cerebral angiogram which did not show any evidence of irregularity, narrowing or beading suggestive of inflammatory vasculopathy. Post-procedure, she was found to have left sided weakness and neglect, with repeat MRI brain revealing right parieto-occipital infarcts along with worsening leptomeningeal signal. " Though there was no significant plaque or luminal irregularity visualized on angiography, it is possible that an angiographically occult eccentric atheromatous plaque (which was not known to significantly encroach on the vessel lumen) was dislodged at some point resulting in an embolic event.  - aspirin 81mg daily  - vascular risk factor control  - physical, occupational therapies  - rest per stroke neurology  - we will continue to follow along    Patient discussed with Dr. Romano.  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    Depressive disorder     Gastro-oesophageal reflux disease     bleeding ulcers    Hiatal hernia     NEWLY DIAGNOSISED    Hyperparathyroidism, unspecified 03/29/2012    Peptic ulcer, unspecified site, unspecified as acute or chronic, without mention of hemorrhage or perforation     Pneumonia     NOVEMBER    PONV (postoperative nausea and vomiting)     TMJ (temporomandibular joint syndrome) 05/08/2014    Vitamin D deficiency 06/07/2012       Surgical History:   Past Surgical History:   Procedure Laterality Date    APPENDECTOMY      CHOLECYSTECTOMY      DILATION AND CURETTAGE, OPERATIVE HYSTEROSCOPY WITH MORCELLATOR, COMBINED  11/21/2012    Procedure: COMBINED DILATION AND CURETTAGE, OPERATIVE HYSTEROSCOPY WITH MORCELLATOR;  Hysteroscopy, Polypectomy, Dilation and Curettage  ;  Surgeon: Marta Montejo MD;  Location: [facility] OR    GI SURGERY      gastric bypass at age 31    ORTHOPEDIC SURGERY      back surgery at age 31    PARATHYROIDECTOMY Right 12/14/2015    Procedure: PARATHYROIDECTOMY;  Surgeon: Gregory Coleman MD;  [facility]      SPINE SURGERY          Social history:   Social History     Tobacco Use    Smoking status: Every Day     Current packs/day: 0.50     Average packs/day: 0.5 packs/day for 30.0 years (15.0 ttl pk-yrs)     Types: Cigarettes    Smokeless tobacco: Never   Substance Use Topics    Alcohol use: Yes     Comment: occasionally    Drug use: No       Family history:   Family History   Problem Relation Age of Onset    Thyroid Disease Mother     Breast Cancer Mother 57         65 y.o.    Other - See Comments Mother         cystic acne    Cancer Brother     Diabetes Daughter         Type 1; insulin       Medications:  Current Facility-Administered Medications   Medication Dose Route Frequency Provider Last Rate Last Admin    acetaminophen (TYLENOL) tablet 650 mg  650 mg Oral Q4H PRN Mara Ceron MD   650 mg at 25 0646    aspirin (ASA) chewable tablet 81 mg  81 mg Oral Daily Mara Ceron MD   81 mg at 25 1901    calcium carbonate (TUMS) chewable tablet 500 mg  500 mg Oral Daily PRN Jazmine Wade MD   500 mg at 25 1336    cyanocobalamin (VITAMIN B-12) sublingual tablet 500 mcg  500 mcg Sublingual Daily Oren Villeda MD   500 mcg at 25 1628    glucose gel 15-30 g  15-30 g Oral Q15 Min PRN Karen Burt MD        Or    dextrose 50 % injection 25-50 mL  25-50 mL Intravenous Q15 Min PRN Karen Burt MD        Or    glucagon injection 1 mg  1 mg Subcutaneous Q15 Min PRN Karen Burt MD        divalproex sodium extended-release (DEPAKOTE ER) 24 hr tablet 500 mg  500 mg Oral BID Karen Burt MD   500 mg at 25    enoxaparin ANTICOAGULANT (LOVENOX) injection 40 mg  40 mg Subcutaneous Q24H Karen Burt MD   40 mg at 25 162    gabapentin (NEURONTIN) capsule 600 mg  600 mg Oral TID Dariusz Bang MD   600 mg at 25 0213    hydrALAZINE (APRESOLINE) injection 10 mg  10 mg Intravenous Q6H PRN Mara Ceron MD        [Held by provider] hydroCHLOROthiazide tablet  12.5 mg  12.5 mg Oral Daily Dariusz Bang MD   12.5 mg at 05/11/25 0817    insulin aspart (NovoLOG) injection (RAPID ACTING)   Subcutaneous TID w/meals Andreea Taylor PA-C        insulin aspart (NovoLOG) injection (RAPID ACTING)   Subcutaneous With Snacks or Supplements Andreea Taylor PA-C   2 Units at 05/17/25 0648    insulin aspart (NovoLOG) injection (RAPID ACTING)  1-7 Units Subcutaneous BID Andreea Taylor PA-C   1 Units at 05/17/25 0222    insulin aspart (NovoLOG) injection (RAPID ACTING)  1-10 Units Subcutaneous TID AC Karen Burt MD   4 Units at 05/16/25 1513    lidocaine (LMX4) cream   Topical Q1H PRN Litzy Kulkarni MD        lidocaine 1 % 0.1-1 mL  0.1-1 mL Other Q1H PRN Litzy Kulkarni MD        magnesium gluconate (MAGONATE) tablet 500 mg  500 mg Oral Daily Dariusz Bang MD   500 mg at 05/15/25 0749    melatonin tablet 3 mg  3 mg Oral At Bedtime Alejandro Villalobos MD   3 mg at 05/14/25 2214    methylPREDNISolone sodium succinate (solu-MEDROL) 1,000 mg in sodium chloride 0.9 % 283 mL intermittent infusion  1,000 mg Intravenous Q24H Mara Ceron MD        ondansetron (ZOFRAN ODT) ODT tab 4 mg  4 mg Oral Q6H PRN Litzy Kulkarni MD   4 mg at 05/16/25 1901    Or    ondansetron (ZOFRAN) injection 4 mg  4 mg Intravenous Q6H PRN Litzy Kulkarni MD   4 mg at 05/17/25 0106    pantoprazole (PROTONIX) EC tablet 40 mg  40 mg Oral QAM AC Mara Ceron MD   40 mg at 05/16/25 1108    polyethylene glycol (MIRALAX) Packet 17 g  17 g Oral Daily Jazmine Wade MD   17 g at 05/11/25 1542    prochlorperazine (COMPAZINE) injection 10 mg  10 mg Intravenous Q6H PRN Karen Burt MD   10 mg at 05/16/25 2230    promethazine (PHENERGAN) tablet 25 mg  25 mg Oral Once PRN Wicho Miller MD        propranolol ER (INDERAL LA) 24 hr capsule 60 mg  60 mg Oral Daily Dariusz Bang MD   60 mg at 05/16/25 1026    QUEtiapine (SEROquel) tablet 25 mg  25 mg Oral At Bedtime Alejandro Villalobos MD   25 mg at 05/16/25 4248     senna-docusate (SENOKOT-S/PERICOLACE) 8.6-50 MG per tablet 1 tablet  1 tablet Oral BID Karen Burt MD   1 tablet at 05/15/25 1944    sennosides (SENOKOT) tablet 8.6 mg  8.6 mg Oral Daily PRN Karen Burt MD   8.6 mg at 05/17/25 0213    sertraline (ZOLOFT) tablet 50 mg  50 mg Oral or Feeding Tube Daily Litzy Kulkarni MD   50 mg at 05/15/25 0750    sodium chloride (PF) 0.9% PF flush 3 mL  3 mL Intracatheter Q8H KHUSHBU Litzy Kulkarni MD   3 mL at 05/17/25 0219    sodium chloride (PF) 0.9% PF flush 3 mL  3 mL Intracatheter q1 min prn Litzy Kulkarni MD   3 mL at 05/08/25 2046    sodium chloride 0.9 % infusion   Intravenous Continuous Felicia Caputo MD 75 mL/hr at 05/17/25 0111 New Bag at 05/17/25 0111    triamcinolone (KENALOG) 0.1 % cream   Topical BID PRN Mara Ceron MD           Allergies:     Allergies   Allergen Reactions    Nsaids Other (See Comments), Nausea and Vomiting and GI Disturbance     History of GI bleeding and ulcers    Codeine Sulfate GI Disturbance

## 2025-05-17 NOTE — PROGRESS NOTES
[facility]    Vascular Neurology Progress Note    Interval History     Overnight: Complaints of nausea - zofran and compazine given    The patient was upset this morning, processing that her left side is weak and feelings of frustration arising with this.  She notes that she continues to have a headache.  Medication has been helping.    I did attempt to contact the patient's daughter but there was no answer on one number, and another number was not in service.     Hospital Course     Chief complaint: Headache    66 yo female with DM, anxiety, tobacco use, polysubstance use (+meth UDS) presented with headaches and vision changes for 3 to 4 weeks.  Patient hospitalized x 2 at outside hospital for similar symptoms and workup include MRI brain with a working diagnosis of possible PRES, with systolics in 200s during one of the hospitalizations. Patient reports visual disturbances, distortions and palinopsia. Patient also described spells of lost time.    MRI from OSH showed patient has microbleed's in the bilateral parietotemporal occipital regions.  There was also significant leptomeningeal enhancement in the cerebellar regions and bilateral cerebral hemisphere sparing some of the part of frontal regions. Case discussed in multidisciplinary neuroradiology rounds. Leptomeningeal enhancement appears to be more prominent than the most recent MRI with FLAIR hyperintensity not very prominent.    Continuous EEG done for 3 days without any abnormal epileptiform discharges.  CT chest abdominal pelvis completed to rule out any malignancy was unremarkable.  D-dimer, ESR, CRP within normal limits.  Patient refusing bedside lumbar puncture at bedside given prior traumatic experience with her daughter. Agreeable for lumbar puncture under fluoroscopy guidance. Attempt made on Friday however she refused but now willing to undergo. LP underwent with fluoroscopy (5/12). LP consistent  with inflammatory process. On further examination, do wonder if the patient has simultanagnosia and some degree of ocularmotor apraxia, but no evidence of optic ataxia, however her visual symptoms could also be in the setting of inflammation in her bilateral occipital lobes.  The patient noted auditory hallucinations which have not been noted in the past, therefore we will plan to repeat MRI today (5/15).  Rheumatology was consulted for recommendations regarding vasculitis workup, ophthalmology was consulted to rule out intraocular pathology and noted that there were no abnormal findings other than cataracts.  Endocrine was consulted for assistance with diabetes management in anticipation for need for high-dose steroids.    The patient underwent a DSA on 5/16, was no evidence of vasculitis on DSA.  Following DSA, the patient had new onset left-sided weakness, most predominantly in her left arm and left-sided neglect, which a stroke code was called.  CT head no evidence of hemorrhage, CTA head and neck without evidence of a large vessel occlusion. MRI brain showing multiple new infarcts, notably in right parietal lobe and right occipital lobe.      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 Differential diagnoses include inflammatory cerebral amyloid angiopathy, CNS lymphoma, neoplastic/autoimmune process, vasculitis less likely PRES given significant leptomeningeal enhancement and less prominent FLAIR changes though could be recovering  PRES. LP with findings suggestive of inflammatory process. We will plan for further evaluation with serum workup for vasculitis and consult to neuro-interventional team for consideration of DSA.  On further examination, do wonder if the patient has Balint syndrome as the patient appears to have simultanagnosia, possible optic ataxia, and some degree of ocularmotor apraxia, however her visual symptoms could also be in the setting of inflammation in her bilateral occipital lobes.  Repeat MRI brain with and without contrast noted slight increased extent of leptomeningeal enhancement, and unchanged microhemorrhages.  DSA on 5/16 without evidence of vasculitis.  Following DSA, the patient had new onset left-sided weakness and left neglect, with concern for new acute ischemic stroke. CT head no evidence of hemorrhage, CTA head and neck without evidence of a large vessel occlusion. MRI brain showing multiple new infarcts, notably in right parietal lobe and right occipital lobe.  - LDL 72, goal < 100.  Will defer starting statin at this time.  - For headache:   - gabapentin 600mg TID  - propranolol ER 60 mg daily for migraine prevention   - depakote 500mg BID   - Changed to PRN compazine & tylenol 975mg TID  -magnesium 500mg daily  - if all PRN medications do not relieve headache, will try PRN tramadol 50mg Q6hr  - BP parameters:              - Notify provider for SBP > 180              - PRN antihypertensives for SBP > 180  - PT/OT consults who recommended home with assist  - LP with IR 5/12: findings suggestive of inflammatory process  - vasculitis serum workup: RF (-), PEDRO (-), ANCA (-), dsDNA (-), SSA (-), SSB (-), Scleroderma (-), MPO (-)  - rheumatology recs:   -- No objection to treatment with glucocorticoids per neurology with IV Solumedrol 1g daily x 5 days   -- Following pending labs: Scl-70, SSA, SSB, dsDNA, ANCA, MPO, and PR3  -- Check IgG subclasses to evaluate for potential IgG-4 related disease  -- Check  Hepatitis B/C, HIV, Cryoglobulins, and Quantiferon-TB Gold  - methylprednisolone 1000mg IV for 5 days (started 5/17)  - aspirin started 5/16    #Hyponatremia  Workup as of 5/11 serum osmol 285, urine osmol 862, urine sodium 219, glucose at time of urine studies was 224, serum sodium 128. Workup most consistent with SIADH vs thiazide induced hyponatremia. Holding thiazide since 5/11 hyponatremia improved. Hyponatremic again to 133 on 5/17, will continue to monitor closely and if continues to downtrend, will reorder fluid restriction.   -trend BMP  -strict I/Os  -encourage adequate PO intake    #incidental finding- Ill-defined 1.6 cm area of enhancement within the left inferior thyroid lobe.   -TSH normal. Consider outpatient  nonemergent thyroid ultrasound for further Assessment, will defer to PCP.    #incidental 3. 5 mm solid nodule in the right upper lung lobe  -Can consider optional follow-up CT in 12 months to document stability, per PCP    # Leukocytosis  WBC elevated today, no other symptoms or signs of infection, suspect likely related to initiation of steroids but will continue to closely monitor.   - Daily CBC     # Type 2 diabetes mellitus  - A1c this admission 8.7, goal < 7.0. Will need close PCP follow up  - Medium dose sliding scale -> high dose sliding scale switched on 5/12  -low consistent carb diet  - Endocrine consult for diabetes management   - Increase NPH to 28 units at 0900 with methylprednisolone 1,000 mg (do not give if MP not given).   - Increase Novolog Meal Coverage: 1 units per 5 g CHO TID AC and snacks  - Novolog Correction Scale: high resistance 1/25 >140 TID AC, 1/25 >200 HS & 0200   - BG monitoring: TID AC, HS, 0200   - Hypoglycemia protocol    - Carb counting protocol     #Hypertension   cautious re-introduction of PTA BP meds to avoid fluctuations, and permissive HTN in setting of new stroke on 5/16, will continue to monitor closely and re-introduce blood pressure medication as  needed  - BP parameters:              - Notify provider for SBP > 180              - PRN antihypertensives for SBP > 180  - Hydralazine/labetalol as needed for systolic blood pressure>180  - Holding PTA hydrochlorothiazide 12.5 mg daily since 5/11 due to hyponatremia  - Holding PTA losartan 100 mg daily since 5/5  - Holding PTA amlodipine 10 mg daily since 5/5     #Eczema  - PRN triamcinolone cream that the patient has used in the past    Prophylaxis            For VTE Prevention:  - pneumatic compression device and started lovenox DVT ppx 5/12      For Acid Suppression:  - GI prophylaxis is not indicated    Patient Follow-up    - Neurology clinic follow up in 6-8 weeks  - PCP follow up within 7 days of discharge for HTN, diabetes management    Medically Ready for Discharge: Anticipated in 2-4 Days    The patient was discussed with the Stroke Staff Dr. Greene.    Mara Ceron MD  Neurology Resident, PGY-2    Note: Chart documentation done in part with Dragon Voice Recognition software. Although reviewed after completion, some word and grammatical errors may remain.      Physical Examination     Temp: 97.6  F (36.4  C) Temp src: Oral BP: (!) 152/81 Pulse: 54   Resp: 14 SpO2: 93 % O2 Device: None (Room air) Oxygen Delivery: 3 LPM    General:  patient laying in bed, tearful  HEENT:  normocephalic/atraumatic  Cardio:  RRR  Pulmonary:  no respiratory distress on room air  Skin:  some skin lesions across middle region of back, scattered bruising    Neurologic  Mental Status:  alert, oriented to self, place, date, situation, follows commands, speech clear and fluent, naming and repetition normal  Cranial Nerves: PERRL, left hemianopia, possible right gaze preference but able to cross midline, facial sensation intact and symmetric, facial movements symmetric, hearing not formally tested but intact to conversation, no dysarthria,  tongue protrusion midline  Motor:  normal muscle tone and bulk, no abnormal movements, left  arm no effort against gravity, left leg drift but not to bed, no drift in right arm or leg  Reflexes:  no ankle clonus bilaterally  Sensory: Sensation on left arm and leg reduced compared to right side, extinction on bilateral double simultaneous stimulation of the left  Coordination: Finger-nose-finger on right without ataxia or dysmetria, unable to assess on left  Station/Gait:  deferred    Imaging/Labs      MRI/Head CT MRI Brain 5/16  IMPRESSION:  1. Multiple new acute infarcts since the MRI 24 hours ago, most  notably in the right parietal lobe and right occipital lobe. Tiny  evolving infarct in the left cerebellum.  2. Worsened marked abnormal leptomeningeal FLAIR signal, greatest in  the posterior cerebral hemispheres    MRI Brain (5/15)  IMPRESSION:  1. Slight increase in extent of leptomeningeal enhancement and  unchanged numerous microhemorrhages in the occipital, temporal, and  parietal regions bilaterally. These findings are nonspecific and could  be seen with angiopathy and/or inflammation.  2. Tiny new infarct in the left cerebellum.  3. Superficial siderosis within the right occipital lobe, likely  related to prior hemorrhage.  4. Moderate sequelae of chronic small vessel ischemic disease.    CT head:  IMPRESSION:  1.  No acute findings. No acute intracranial hemorrhage. No acute calvarial fracture.     MRI: (5/7)  IMPRESSION:  1. There is somewhat decreased cortical T2 hyperintense signal with increased conspicuity of leptomeningeal enhancement and numerous presumed microhemorrhages within the occipital, temporal parietal regions bilaterally. These findings are nonspecific, although may be seen with process or hematogenous malignancy such as melanoma.  Findings are less likely related to cerebral amyloid  angiopathy given the distribution. The distribution could be seen with hypertensive angiopathy, however these are extremely great number, much more than expected.   2. Interstitial siderosis within  the right occipital lobe, likely  related to prior hemorrhage.  3. Chronic small vessel ischemic changes.   Intracranial Vasculature HEAD CTA:   No large vessel occlusion findings intracranially. No high-grade stenosis intracranially.   Cervical Vasculature NECK CTA:  1.  No acute vascular injury in the neck. No high-grade stenosis in the neck.      Echocardiogram Not obtained   EKG/Telemetry Not obtained      LDL  5/6/2025: 72 mg/dL   A1C  5/6/2025: 8.7 %   Troponin No lab value available in past 48 hrs   TSH 1.24  CT CAP: IMPRESSION:   1. No evidence of primary malignancy throughout the chest, abdomen or  pelvis.  2. Ill-defined 1.6 cm area of enhancement within the left inferior  thyroid lobe. Consider nonemergent thyroid ultrasound for further  assessment.  3. 5 mm solid nodule in the right upper lobe. Can consider optional  follow-up CT in 12 months to document stability.  4. 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 The gastrojejunal anastomosis appears  patent, however, inspissated debris may suggest delayed  gastrointestinal transit.  5. Hepatic steatosis.  6. Additional chronic and incidental findings, as described above.       SUMMARY OF 3 DAYS OF VIDEO EEG:  This 3 day video EEG is abnormal due to the presence increased superimposed fast activity, likely related to sedating medications employed. No seizures or epileptiform discharges were seen. Clinical correlation is recommended.    CSF (5/12)  Protein total CSF (74.9), Glucose CSF (102), RBC (5), Appearance (clear) Lymphocyte%, Mono %, Neutrophil % (all WNL)

## 2025-05-18 ENCOUNTER — APPOINTMENT (OUTPATIENT)
Dept: PHYSICAL THERAPY | Facility: CLINIC | Age: 65
DRG: 545 | End: 2025-05-18
Payer: COMMERCIAL

## 2025-05-18 ENCOUNTER — APPOINTMENT (OUTPATIENT)
Dept: OCCUPATIONAL THERAPY | Facility: CLINIC | Age: 65
DRG: 545 | End: 2025-05-18
Payer: COMMERCIAL

## 2025-05-18 LAB
ANION GAP SERPL CALCULATED.3IONS-SCNC: 11 MMOL/L (ref 7–15)
BUN SERPL-MCNC: 15.2 MG/DL (ref 8–23)
CALCIUM SERPL-MCNC: 8.6 MG/DL (ref 8.8–10.4)
CHLORIDE SERPL-SCNC: 107 MMOL/L (ref 98–107)
CREAT SERPL-MCNC: 0.68 MG/DL (ref 0.51–0.95)
EGFRCR SERPLBLD CKD-EPI 2021: >90 ML/MIN/1.73M2
ERYTHROCYTE [DISTWIDTH] IN BLOOD BY AUTOMATED COUNT: 15.2 % (ref 10–15)
GLUCOSE BLDC GLUCOMTR-MCNC: 154 MG/DL (ref 70–99)
GLUCOSE BLDC GLUCOMTR-MCNC: 181 MG/DL (ref 70–99)
GLUCOSE BLDC GLUCOMTR-MCNC: 244 MG/DL (ref 70–99)
GLUCOSE BLDC GLUCOMTR-MCNC: 307 MG/DL (ref 70–99)
GLUCOSE BLDC GLUCOMTR-MCNC: 407 MG/DL (ref 70–99)
GLUCOSE BLDC GLUCOMTR-MCNC: 91 MG/DL (ref 70–99)
GLUCOSE SERPL-MCNC: 96 MG/DL (ref 70–99)
HCO3 SERPL-SCNC: 24 MMOL/L (ref 22–29)
HCT VFR BLD AUTO: 36 % (ref 35–47)
HGB BLD-MCNC: 11.5 G/DL (ref 11.7–15.7)
MAGNESIUM SERPL-MCNC: 2.3 MG/DL (ref 1.7–2.3)
MCH RBC QN AUTO: 25.9 PG (ref 26.5–33)
MCHC RBC AUTO-ENTMCNC: 31.9 G/DL (ref 31.5–36.5)
MCV RBC AUTO: 81 FL (ref 78–100)
PHOSPHATE SERPL-MCNC: 3.5 MG/DL (ref 2.5–4.5)
PLATELET # BLD AUTO: 391 10E3/UL (ref 150–450)
POTASSIUM SERPL-SCNC: 4 MMOL/L (ref 3.4–5.3)
RBC # BLD AUTO: 4.44 10E6/UL (ref 3.8–5.2)
SODIUM SERPL-SCNC: 142 MMOL/L (ref 135–145)
WBC # BLD AUTO: 16.6 10E3/UL (ref 4–11)

## 2025-05-18 PROCEDURE — 120N000003 HC R&B IMCU UMMC

## 2025-05-18 PROCEDURE — 97535 SELF CARE MNGMENT TRAINING: CPT | Mod: GO | Performed by: OCCUPATIONAL THERAPIST

## 2025-05-18 PROCEDURE — 250N000013 HC RX MED GY IP 250 OP 250 PS 637

## 2025-05-18 PROCEDURE — 97161 PT EVAL LOW COMPLEX 20 MIN: CPT | Mod: GP

## 2025-05-18 PROCEDURE — 97129 THER IVNTJ 1ST 15 MIN: CPT | Mod: GO | Performed by: OCCUPATIONAL THERAPIST

## 2025-05-18 PROCEDURE — 99232 SBSQ HOSP IP/OBS MODERATE 35: CPT | Mod: GC | Performed by: PSYCHIATRY & NEUROLOGY

## 2025-05-18 PROCEDURE — 120N000002 HC R&B MED SURG/OB UMMC

## 2025-05-18 PROCEDURE — 80048 BASIC METABOLIC PNL TOTAL CA: CPT

## 2025-05-18 PROCEDURE — 99232 SBSQ HOSP IP/OBS MODERATE 35: CPT | Mod: GC | Performed by: INTERNAL MEDICINE

## 2025-05-18 PROCEDURE — 84100 ASSAY OF PHOSPHORUS: CPT | Performed by: PSYCHIATRY & NEUROLOGY

## 2025-05-18 PROCEDURE — 97116 GAIT TRAINING THERAPY: CPT | Mod: GP

## 2025-05-18 PROCEDURE — 97530 THERAPEUTIC ACTIVITIES: CPT | Mod: GP

## 2025-05-18 PROCEDURE — 85027 COMPLETE CBC AUTOMATED: CPT

## 2025-05-18 PROCEDURE — 82310 ASSAY OF CALCIUM: CPT

## 2025-05-18 PROCEDURE — 250N000011 HC RX IP 250 OP 636

## 2025-05-18 PROCEDURE — 83735 ASSAY OF MAGNESIUM: CPT | Performed by: PSYCHIATRY & NEUROLOGY

## 2025-05-18 PROCEDURE — 258N000003 HC RX IP 258 OP 636

## 2025-05-18 PROCEDURE — 36415 COLL VENOUS BLD VENIPUNCTURE: CPT

## 2025-05-18 RX ADMIN — Medication 3 MG: at 21:18

## 2025-05-18 RX ADMIN — GABAPENTIN 600 MG: 300 CAPSULE ORAL at 13:09

## 2025-05-18 RX ADMIN — ASPIRIN 81 MG CHEWABLE TABLET 81 MG: 81 TABLET CHEWABLE at 08:16

## 2025-05-18 RX ADMIN — INSULIN ASPART 9 UNITS: 100 INJECTION, SOLUTION INTRAVENOUS; SUBCUTANEOUS at 17:13

## 2025-05-18 RX ADMIN — ENOXAPARIN SODIUM 40 MG: 40 INJECTION SUBCUTANEOUS at 16:20

## 2025-05-18 RX ADMIN — SERTRALINE HYDROCHLORIDE 50 MG: 50 TABLET ORAL at 08:14

## 2025-05-18 RX ADMIN — GABAPENTIN 600 MG: 300 CAPSULE ORAL at 01:40

## 2025-05-18 RX ADMIN — PANTOPRAZOLE SODIUM 40 MG: 40 TABLET, DELAYED RELEASE ORAL at 08:14

## 2025-05-18 RX ADMIN — DIVALPROEX SODIUM 500 MG: 500 TABLET, FILM COATED, EXTENDED RELEASE ORAL at 08:14

## 2025-05-18 RX ADMIN — INSULIN ASPART 9 UNITS: 100 INJECTION, SOLUTION INTRAVENOUS; SUBCUTANEOUS at 13:11

## 2025-05-18 RX ADMIN — QUETIAPINE FUMARATE 25 MG: 25 TABLET ORAL at 21:18

## 2025-05-18 RX ADMIN — Medication 500 MCG: at 08:14

## 2025-05-18 RX ADMIN — METHYLPREDNISOLONE SODIUM SUCCINATE 1000 MG: 1 INJECTION INTRAMUSCULAR; INTRAVENOUS at 08:18

## 2025-05-18 RX ADMIN — GABAPENTIN 600 MG: 300 CAPSULE ORAL at 17:11

## 2025-05-18 RX ADMIN — DIVALPROEX SODIUM 500 MG: 500 TABLET, FILM COATED, EXTENDED RELEASE ORAL at 21:18

## 2025-05-18 RX ADMIN — PROPRANOLOL HYDROCHLORIDE 60 MG: 60 CAPSULE, EXTENDED RELEASE ORAL at 08:19

## 2025-05-18 RX ADMIN — Medication 500 MG: at 08:14

## 2025-05-18 RX ADMIN — INSULIN ASPART 10 UNITS: 100 INJECTION, SOLUTION INTRAVENOUS; SUBCUTANEOUS at 08:44

## 2025-05-18 ASSESSMENT — ACTIVITIES OF DAILY LIVING (ADL)
ADLS_ACUITY_SCORE: 40
ADLS_ACUITY_SCORE: 40
ADLS_ACUITY_SCORE: 48
ADLS_ACUITY_SCORE: 40
ADLS_ACUITY_SCORE: 48
ADLS_ACUITY_SCORE: 40
ADLS_ACUITY_SCORE: 48
ADLS_ACUITY_SCORE: 40
ADLS_ACUITY_SCORE: 40
ADLS_ACUITY_SCORE: 48
ADLS_ACUITY_SCORE: 40
ADLS_ACUITY_SCORE: 48
ADLS_ACUITY_SCORE: 40
ADLS_ACUITY_SCORE: 48
ADLS_ACUITY_SCORE: 48
ADLS_ACUITY_SCORE: 40

## 2025-05-18 NOTE — PROGRESS NOTES
[facility]    Vascular Neurology Progress Note    Interval History     Overnight: unable to sleep + visual distortions/hallucinations     The patient's mood much improved compared to yesterday. She says she is coming to terms with her condition and also motivated by her improvement over the first day. She reports being able to stand up and walk with support from the nurses. She reports no longer having any headache or nausea symptoms. She reports seeing people walking around the room, and also seeing a cat - she understands these are not real. She also reports hearing those people talk to her. She expressed strong desire to attend her daughter's memorial which is on 5/21/2025. She understands this will be difficult but hopes that some kind of arrangement could be made.    Patient also reports that she or the nurses will share new phone numbers for us to be able to contact her daughters and inform them about her current situation.    Hospital Course     Chief complaint: Headache    64 yo female with DM, anxiety, tobacco use, polysubstance use (+meth UDS) presented with headaches and vision changes for 3 to 4 weeks.  Patient hospitalized x 2 at outside hospital for similar symptoms and workup include MRI brain with a working diagnosis of possible PRES, with systolics in 200s during one of the hospitalizations. Patient reports visual disturbances, distortions and palinopsia. Patient also described spells of lost time.    MRI from OSH showed patient has microbleed's in the bilateral parietotemporal occipital regions.  There was also significant leptomeningeal enhancement in the cerebellar regions and bilateral cerebral hemisphere sparing some of the part of frontal regions. Case discussed in multidisciplinary neuroradiology rounds. Leptomeningeal enhancement appears to be more prominent than the most recent MRI with FLAIR hyperintensity not very  prominent.    Continuous EEG done for 3 days without any abnormal epileptiform discharges.  CT chest abdominal pelvis completed to rule out any malignancy was unremarkable.  D-dimer, ESR, CRP within normal limits.  Patient refusing bedside lumbar puncture at bedside given prior traumatic experience with her daughter. Agreeable for lumbar puncture under fluoroscopy guidance. Attempt made on Friday however she refused but now willing to undergo. LP underwent with fluoroscopy (5/12). LP consistent with inflammatory process. On further examination, do wonder if the patient has simultanagnosia and some degree of ocularmotor apraxia, but no evidence of optic ataxia, however her visual symptoms could also be in the setting of inflammation in her bilateral occipital lobes. The patient noted auditory hallucinations which have not been noted in the past, therefore we will plan to repeat MRI today (5/15).  Rheumatology was consulted for recommendations regarding vasculitis workup, ophthalmology was consulted to rule out intraocular pathology and noted that there were no abnormal findings other than cataracts.  Endocrine was consulted for assistance with diabetes management in anticipation for need for high-dose steroids.    The patient underwent a DSA on 5/16, was no evidence of vasculitis on DSA.  Following DSA, the patient had new onset left-sided weakness, most predominantly in her left arm and left-sided neglect, which a stroke code was called.  CT head no evidence of hemorrhage, CTA head and neck without evidence of a large vessel occlusion. MRI brain showing multiple new infarcts, notably in right parietal lobe and right occipital lobe. Likely agueda procedural stroke, started on aspirin 5/16, main deficits are LUE, LLE weakness which have improved in the past 1-2 days. 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 Differential diagnoses include inflammatory cerebral amyloid angiopathy, CNS lymphoma, neoplastic/autoimmune process, vasculitis less likely PRES given significant leptomeningeal enhancement and less prominent FLAIR changes though could be recovering PRES. LP with findings suggestive of inflammatory process. We will plan for further evaluation with serum workup for vasculitis and consult to neuro-interventional team for consideration of DSA.  On further examination, do wonder if the patient has Balint syndrome as the patient appears to have simultanagnosia, possible optic ataxia, and some degree of ocularmotor apraxia, however her visual symptoms could also be in the setting of inflammation in her bilateral occipital lobes.  Repeat MRI brain with and without contrast noted slight increased extent of leptomeningeal enhancement, and unchanged microhemorrhages.  DSA on 5/16 without evidence of vasculitis.  Following DSA, the patient had new onset left-sided weakness and left neglect, with concern for new acute ischemic stroke. CT head no evidence of hemorrhage, CTA head and neck without evidence of a large vessel occlusion. MRI brain showing multiple new infarcts, notably in right parietal lobe and right occipital lobe.  - LDL 72, goal < 100.  Will defer starting statin at this time.  - Headache is being managed well - continue:   - gabapentin 600mg TID  - propranolol ER 60 mg daily for migraine prevention   - depakote 500mg BID   - Changed to PRN compazine & tylenol 975mg TID  -  magnesium 500mg daily  - if all PRN medications do not relieve headache, will try PRN tramadol 50mg Q6hr  - BP parameters:              - Notify provider for SBP > 180              - PRN antihypertensives for SBP > 180  - PT/OT consults who recommended home with assist  - LP with IR 5/12: findings suggestive of inflammatory process  - vasculitis serum workup: RF (-), PEDRO (-), ANCA (-), dsDNA (-), SSA (-), SSB (-), Scleroderma (-), MPO (-)  - rheumatology recs:   -- No objection to treatment with glucocorticoids per neurology with IV Solumedrol 1g daily x 5 days   -- Pending: IgG subclasses to evaluate for potential IgG-4 related disease  -- Hepatitis B/C, HIV all non-reactive   -- Pending: Cryoglobulins, and Quantiferon-TB Gold  - methylprednisolone 1000mg IV for 5 days then switch to oral taper  - aspirin started 5/16  - PT/OT recommend ARU    #Hyponatremia  Workup as of 5/11 serum osmol 285, urine osmol 862, urine sodium 219, glucose at time of urine studies was 224, serum sodium 128. Workup most consistent with SIADH vs thiazide induced hyponatremia. Holding thiazide since 5/11 hyponatremia improved. 5/17 was 133 and 5/18 was 142.  -trend BMP  -strict I/Os  -encourage adequate PO intake    #incidental finding- Ill-defined 1.6 cm area of enhancement within the left inferior thyroid lobe.   -TSH normal. Consider outpatient  nonemergent thyroid ultrasound for further Assessment, will defer to PCP.    #incidental 3. 5 mm solid nodule in the right upper lung lobe  -Can consider optional follow-up CT in 12 months to document stability, per PCP    # Leukocytosis  - Likely reactionary to steroids  - Daily CBC     # Type 2 diabetes mellitus  - A1c this admission 8.7, goal < 7.0. Will need close PCP follow up  - Medium dose sliding scale -> high dose sliding scale switched on 5/12  -low consistent carb diet  - Endocrine consult for diabetes management   - Increase NPH to 35 units at 0900 with methylprednisolone 1,000 mg  (do not give if MP not given).   - Increase Novolog Meal Coverage: 1 units per 5 g CHO TID AC and snacks  - Novolog Correction Scale: high resistance 1/25 >140 TID AC, 1/25 >200 HS & 0200   - BG monitoring: TID AC, HS, 0200   - Hypoglycemia protocol    - Carb counting protocol     #Hypertension   cautious re-introduction of PTA BP meds to avoid fluctuations, and permissive HTN in setting of new stroke on 5/16, will continue to monitor closely and re-introduce blood pressure medication as needed  - BP parameters:              - Notify provider for SBP > 180              - PRN antihypertensives for SBP > 180  - Hydralazine/labetalol as needed for systolic blood pressure>180  - Holding PTA hydrochlorothiazide 12.5 mg daily since 5/11 due to hyponatremia  - Holding PTA losartan 100 mg daily since 5/5  - Holding PTA amlodipine 10 mg daily since 5/5     #Eczema  - PRN triamcinolone cream that the patient has used in the past    Prophylaxis            For VTE Prevention:  - pneumatic compression device and started lovenox DVT ppx 5/12      For Acid Suppression:  - GI prophylaxis is not indicated    Patient Follow-up    - Neurology clinic follow up in 6-8 weeks  - PCP follow up within 7 days of discharge for HTN, diabetes management    Medically Ready for Discharge: Anticipated in 2-4 Days    The patient was discussed with the Stroke Staff Dr. Greene.    I, Adalid Tovar, am serving as a scribe at 1:10 pm on Sunday 18th November to document services personally performed by Ronnie Snow based on my observations and the provider's statements to me.     Ronnie Snow MD  Neurology Resident PGY-2      Physical Examination     Temp: 98.6  F (37  C) Temp src: Oral BP: (!) 156/74 Pulse: 78   Resp: 16 SpO2: 98 % O2 Device: None (Room air)      General:  patient laying in bed, tearful  HEENT:  normocephalic/atraumatic  Cardio:  RRR  Pulmonary:  no respiratory distress on room air  Skin:  some skin lesions across middle region of back,  scattered bruising    Neurologic  Mental Status:  mild neglect of left side which can be overcome (prefers referencing people and objects in right side of visual field), otherwise alert, oriented to self, place, date, situation, follows commands, speech clear and fluent, naming and repetition normal  Cranial Nerves: PERRL, left homonymous hemianopia, possible right gaze preference but able to cross midline, facial sensation intact and symmetric, facial movements symmetric, hearing not formally tested but intact to conversation, no dysarthria,  tongue protrusion midline  Motor: Left arm has minimal antigravity movement at the shoulder, no movement of bicep, triceps, wrist extension,  strength. 5/5 strength in right arm and legs bilaterally. normal muscle tone and bulk, no abnormal movements.  Reflexes:  no ankle clonus bilaterally  Sensory: Sensation intact and full on both sides. Double simultaneous stimulation produces extinction of left leg  Coordination: Finger-nose-finger on right without ataxia or dysmetria, unable to assess on left. Heel to shin intact bilaterally  Station/Gait:  deferred    [employer] Stroke Scale   Score    Level of consciousness: (0)   Alert, keenly responsive    LOC questions: (0)   Answers both questions correctly    LOC commands: (0)   Performs both tasks correctly    Best gaze: (0)   Normal    Visual: (1)   Partial hemianopia    Facial palsy: (0)   Normal symmetrical movements    Motor arm (left): (0)   No drift    Motor arm (right): (3)   No effort against gravity    Motor leg (left): (0)   No drift    Motor leg (right): (0)   No drift    Limb ataxia: (0)   Absent    Sensory: (0)   Normal- no sensory loss    Best language: (0)   Normal- no aphasia    Dysarthria: (0)   Normal    Extinction and inattention: (1)   Visual, tacile, auditory, spatial, person inattention        Total Score:  5 (5/18)        Imaging/Labs      MRI/Head CT MRI Brain 5/16  IMPRESSION:  1.  Multiple new acute infarcts since the MRI 24 hours ago, most  notably in the right parietal lobe and right occipital lobe. Tiny  evolving infarct in the left cerebellum.  2. Worsened marked abnormal leptomeningeal FLAIR signal, greatest in  the posterior cerebral hemispheres    MRI Brain (5/15)  IMPRESSION:  1. Slight increase in extent of leptomeningeal enhancement and  unchanged numerous microhemorrhages in the occipital, temporal, and  parietal regions bilaterally. These findings are nonspecific and could  be seen with angiopathy and/or inflammation.  2. Tiny new infarct in the left cerebellum.  3. Superficial siderosis within the right occipital lobe, likely  related to prior hemorrhage.  4. Moderate sequelae of chronic small vessel ischemic disease.    CT head:  IMPRESSION:  1.  No acute findings. No acute intracranial hemorrhage. No acute calvarial fracture.     MRI: (5/7)  IMPRESSION:  1. There is somewhat decreased cortical T2 hyperintense signal with increased conspicuity of leptomeningeal enhancement and numerous presumed microhemorrhages within the occipital, temporal parietal regions bilaterally. These findings are nonspecific, although may be seen with process or hematogenous malignancy such as melanoma.  Findings are less likely related to cerebral amyloid  angiopathy given the distribution. The distribution could be seen with hypertensive angiopathy, however these are extremely great number, much more than expected.   2. 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  3. Chronic small vessel ischemic changes.   Intracranial Vasculature HEAD CTA:   No large vessel occlusion findings intracranially. No high-grade stenosis intracranially.   Cervical Vasculature NECK CTA:  1.  No acute vascular injury in the neck. No high-grade stenosis in the neck.      Echocardiogram Not obtained   EKG/Telemetry Not obtained      LDL  5/6/2025: 72 mg/dL   A1C  5/6/2025: 8.7  %   Troponin No lab value available in past 48 hrs   TSH 1.24  CT CAP: IMPRESSION:   1. No evidence of primary malignancy throughout the chest, abdomen or  pelvis.  2. Ill-defined 1.6 cm area of enhancement within the left inferior  thyroid lobe. Consider nonemergent thyroid ultrasound for further  assessment.  3. 5 mm solid nodule in the right upper lobe. Can consider optional  follow-up CT in 12 months to document stability.  4. 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 The gastrojejunal anastomosis appears  patent, however, inspissated debris may suggest delayed  gastrointestinal transit.  5. Hepatic steatosis.  6. Additional chronic and incidental findings, as described above.       SUMMARY OF 3 DAYS OF VIDEO EEG:  This 3 day video EEG is abnormal due to the presence increased superimposed fast activity, likely related to sedating medications employed. No seizures or epileptiform discharges were seen. Clinical correlation is recommended.    CSF (5/12)  Protein total CSF (74.9), Glucose CSF (102), RBC (5), Appearance (clear) Lymphocyte%, Mono %, Neutrophil % (all WNL)

## 2025-05-18 NOTE — PLAN OF CARE
Vitals: VSS on RA except HTN within parameters.   Neuros: Alert and oriented x4. R side 5/5, LLE 5/5, LUE 0-1/5. Pt reported distorted vision-neuro team aware. Decreased sensation to L side with some N/T. L neglect.   IV: PIV Sl'd  Labs/Electrolytes: K, MG and Phos WNL  Resp: WNL on RA  Diet: Low carb diet with carb coverage. Pt has carb coverage for snacks. Pt did not notify nursing that she ate extra sandwich from family so unable to cover carbs. Education provided to patient and daughter that we need to know about all things she is eating so that we can cover her carbs.   GI: Last BM 5/17-declined bowel meds.   : Voiding.  Skin: Bruising throughout. Bilateral groin site CDI-CMS intact.   Pain: Denied  Activity: Up with assist of 1 and GB. Up in chair this shift. Ambulated in hallway.  Plan: Methylprednisolone 1000mg IV for 5 days, 3/5 done.   Education completed: Needs stroke education.            Goal Outcome Evaluation:      Plan of Care Reviewed With: patient    Overall Patient Progress: no changeOverall Patient Progress: no change    Outcome Evaluation: Denied pain and nausea. Ambulating in hallway and up in chair.

## 2025-05-18 NOTE — PLAN OF CARE
Goal Outcome Evaluation:      Plan of Care Reviewed With: patient    Overall Patient Progress: improvingOverall Patient Progress: improving    Outcome Evaluation: Visual distortion/hallucinations. Denies pain and nausea      Vitals: HTN within parameters. OVSS on RA  Neuros: AOX4. LUE 0/5; AOE 5/5. Decreased sensation to L side. Visual distortions/hallucinations. L neglect. Mild WFD  IV: PIV SL  Labs/Electrolytes: ACHS BG  Diet: Low consistent carb diet (45 g CHO/meal) with carb coverage  GI: LBM 5/17 per pt- declining bowel meds  : VDSP to BR  Skin: Bilateral groin sites-CDI. Bruising throughout   Pain: Eye pain when tracking L  Activity: A1/GB/Walker  Plan: Methylprednisolone 1000 mg IV for 5 days, 2/5 done.   Updates this shift: DND from [id number]  Education completed: Needs stroke education

## 2025-05-18 NOTE — PROGRESS NOTES
5/18/2025  Neuro-Endovascular progress note    Subjective-    Amy Choudhury is a 65yoF with history of Diabetes, HTN, GERD, s/p gastric bypass, with ongoing headache, visual hallucinations, found to have leptomeningeal enhancement and numerous microhemorrhages in the occipital, temporal and parietal regions bilaterally. Initially differential included hypertensive vasculopathy/ PRES which is now thought to be less likely per primary team and leading differential is inflammatory cerebral amyloid angiopathy.  She had an LP which showed RBC 5, TNC 32, lymphocytes 72%, protein of 74.9, glu 102.   She had a repeat MRI brain on 5/15/25 (for new auditory hallucinations) showing slight increase in leptomeningeal enhancement and unchanged microhemorrhages, along with a tiny infarct in the left cerebellum.  Patient underwent diagnostic cerebral angiogram on 5/16/25 which did not show any aneurysms, vascular malformation or any areas of stenosis, irregularity or bleeding to suggest inflammatory vasculopathy in anterior and posterior circulation.  Postprocedure, she was found to have left arm weakness along with left arm hemisensory neglect when examined in her room.  CT head and CTA head and neck were obtained which were unrevealing.  She had an MRI overnight which showed right parietal and right occipital lobe infarcts along with evolving infarct in the left cerebellum as noted on the prior MRI.  She also had worsened marked abnormal leptomeningeal FLAIR signal.    Pt reports not having slept at all overnight. She is stronger in the left arm today. She reports that she is ambulating. She recalls that one of her daughters who had a stroke unfortunately was not able to ambulate after the event.  I discussed with her that I was unable to get in touch with Bea due to incorrect number in the chart. She will get help from nursing staff at some point in the afternoon and find phone numbers for either Missy (oldest daughter) and  "Santiago (grandson).       Physical exam:   BP (!) 156/74 (BP Location: Right arm, Patient Position: Supine)   Pulse 78   Temp 98.6  F (37  C) (Oral)   Resp 16   Ht 1.676 m (5' 6\")   Wt 104.6 kg (230 lb 8 oz)   SpO2 98%   BMI 37.20 kg/m    General: Awake and alert and in no acute distress.  A&O x4  Speech without aphasia or dysarthria  CN- EOMI, left VF cut, face symmetric, hearing intact to conversation, tongue midline  Motor- antigravity on the right, LLE drift, LUE 2-3/5 proximally and 0-1 distally  Sensation- left hemisensory neglect  Coordination- finger to nose without ataxia on the right  Gait- deferred    Imaging:  No new imaging    Assessment/Plan:  64yo F with vascular risk factors, presented with headache and visual changes for a few weeks at least, found to have microbleeds mostly in the occipital/temporal/parietal regions bilaterally with associated leptomeningeal enhancement, initially concerning for hypertensive vasculopathy/PRES however now inflammatory cerebral amyloid angiopathy higher on the differential per primary team. She had an LP showing lymphocytic pleocytosis. She underwent diagnostic cerebral angiogram which did not show any evidence of irregularity, narrowing or beading suggestive of inflammatory vasculopathy. Post-procedure, she was found to have left sided weakness and neglect, with repeat MRI brain revealing right parieto-occipital infarcts along with worsening leptomeningeal signal. Though there was no significant plaque or luminal irregularity visualized on angiography, it is possible that an angiographically occult eccentric atheromatous plaque (which was not known to significantly encroach on the vessel lumen) was dislodged at some point resulting in an embolic event.  - aspirin 81mg daily  - vascular risk factor control  - physical, occupational therapies  - rest per stroke neurology  - we will continue to follow along. I discussed with the patient and bedside nursing, that I " will check back around the afternoon to see if there are any updated alternative phone numbers to reach her family members at.    Patient discussed with Dr. Romano.  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    Depressive disorder     Gastro-oesophageal reflux disease     bleeding ulcers    Hiatal hernia     NEWLY DIAGNOSISED    Hyperparathyroidism, unspecified 03/29/2012    Peptic ulcer, unspecified site, unspecified as acute or chronic, without mention of hemorrhage or perforation     Pneumonia     NOVEMBER    PONV (postoperative nausea and vomiting)     TMJ (temporomandibular joint syndrome) 05/08/2014    Vitamin D deficiency 06/07/2012       Surgical History:   Past Surgical History:   Procedure Laterality Date    APPENDECTOMY      CHOLECYSTECTOMY      DILATION AND CURETTAGE, OPERATIVE HYSTEROSCOPY WITH MORCELLATOR, COMBINED  11/21/2012    Procedure: COMBINED DILATION AND CURETTAGE, OPERATIVE HYSTEROSCOPY WITH MORCELLATOR;  Hysteroscopy, Polypectomy, Dilation and Curettage  ;  Surgeon: Marta Montejo MD;  Location: [facility] OR    GI SURGERY      gastric bypass at age 31    ORTHOPEDIC SURGERY      back surgery at age 31    PARATHYROIDECTOMY Right 12/14/2015    Procedure: PARATHYROIDECTOMY;  Surgeon: Gregory Coleman MD;  Location: [facility]      SPINE SURGERY         Social history:   Social History     Tobacco Use    Smoking status: Every Day     Current packs/day: 0.50     Average packs/day: 0.5 packs/day for 30.0 years (15.0 ttl pk-yrs)     Types: Cigarettes    Smokeless tobacco: Never   Substance Use Topics    Alcohol use: Yes     Comment: occasionally    Drug use: No       Family  history:   Family History   Problem Relation Age of Onset    Thyroid Disease Mother     Breast Cancer Mother 57         65 y.o.    Other - See Comments Mother         cystic acne    Cancer Brother     Diabetes Daughter         Type 1; insulin       Medications:  Current Facility-Administered Medications   Medication Dose Route Frequency Provider Last Rate Last Admin    acetaminophen (TYLENOL) tablet 650 mg  650 mg Oral Q4H PRN Mara Ceron MD   650 mg at 25 0646    aspirin (ASA) chewable tablet 81 mg  81 mg Oral Daily Mara Ceron MD   81 mg at 25 0939    calcium carbonate (TUMS) chewable tablet 500 mg  500 mg Oral Daily PRN Jazmine Wade MD   500 mg at 25 1336    cyanocobalamin (VITAMIN B-12) sublingual tablet 500 mcg  500 mcg Sublingual Daily Oren Villeda MD   500 mcg at 25 0940    glucose gel 15-30 g  15-30 g Oral Q15 Min PRN Karen Burt MD        Or    dextrose 50 % injection 25-50 mL  25-50 mL Intravenous Q15 Min PRN Karen Burt MD        Or    glucagon injection 1 mg  1 mg Subcutaneous Q15 Min PRN Karen Burt MD        divalproex sodium extended-release (DEPAKOTE ER) 24 hr tablet 500 mg  500 mg Oral BID Karen Burt MD   500 mg at 25 2038    enoxaparin ANTICOAGULANT (LOVENOX) injection 40 mg  40 mg Subcutaneous Q24H Karen Burt MD   40 mg at 25 1556    gabapentin (NEURONTIN) capsule 600 mg  600 mg Oral TID Dariusz Bang MD   600 mg at 25 0140    hydrALAZINE (APRESOLINE) injection 10 mg  10 mg Intravenous Q6H PRN Mara Ceron MD        [Held by provider] hydroCHLOROthiazide tablet 12.5 mg  12.5 mg Oral Daily Dariusz Bang MD   12.5 mg at 25 0817    insulin aspart (NovoLOG) injection (RAPID ACTING)   Subcutaneous TID w/meals Low Macario MD   9 Units at 25 1800    insulin aspart (NovoLOG) injection (RAPID ACTING)   Subcutaneous With Snacks or Supplements Low Macario MD    Given at 05/18/25 0248    insulin aspart (NovoLOG) injection (RAPID ACTING)  1-7 Units Subcutaneous BID Andreea Taylor PA-C   3 Units at 05/17/25 2218    insulin aspart (NovoLOG) injection (RAPID ACTING)  1-10 Units Subcutaneous TID AC Karen Burt MD   4 Units at 05/17/25 1758    insulin NPH injection 28 Units  28 Units Subcutaneous Q24H Low Macario MD   28 Units at 05/17/25 1121    lidocaine (LMX4) cream   Topical Q1H PRN Litzy Kulkarni MD        lidocaine 1 % 0.1-1 mL  0.1-1 mL Other Q1H PRN Litzy Kulkarni MD        magnesium gluconate (MAGONATE) tablet 500 mg  500 mg Oral Daily Dariusz Bang MD   500 mg at 05/17/25 0938    melatonin tablet 3 mg  3 mg Oral At Bedtime Alejandro Villalobos MD   3 mg at 05/17/25 2038    methylPREDNISolone sodium succinate (solu-MEDROL) 1,000 mg in sodium chloride 0.9 % 283 mL intermittent infusion  1,000 mg Intravenous Q24H Mara Ceron  mL/hr at 05/17/25 0942 1,000 mg at 05/17/25 0942    ondansetron (ZOFRAN ODT) ODT tab 4 mg  4 mg Oral Q6H PRN Litzy Kulkarni MD   4 mg at 05/16/25 1901    Or    ondansetron (ZOFRAN) injection 4 mg  4 mg Intravenous Q6H PRN Litzy Kulkarni MD   4 mg at 05/17/25 0106    pantoprazole (PROTONIX) EC tablet 40 mg  40 mg Oral QAM AC Mara Ceron MD   40 mg at 05/17/25 0940    polyethylene glycol (MIRALAX) Packet 17 g  17 g Oral Daily Jazmine Wade MD   17 g at 05/11/25 1542    prochlorperazine (COMPAZINE) injection 10 mg  10 mg Intravenous Q6H PRN Karen Burt MD   10 mg at 05/16/25 2230    promethazine (PHENERGAN) tablet 25 mg  25 mg Oral Once PRN Wicho Miller MD        propranolol ER (INDERAL LA) 24 hr capsule 60 mg  60 mg Oral Daily Dariusz Bang MD   60 mg at 05/17/25 0939    QUEtiapine (SEROquel) tablet 25 mg  25 mg Oral At Bedtime Alejandro Villalobos MD   25 mg at 05/17/25 2038    senna-docusate (SENOKOT-S/PERICOLACE) 8.6-50 MG per tablet 1 tablet  1 tablet Oral BID Karen Burt MD   1 tablet at 05/15/25 1944     sennosides (SENOKOT) tablet 8.6 mg  8.6 mg Oral Daily PRN Karen Burt MD   8.6 mg at 05/17/25 0213    sertraline (ZOLOFT) tablet 50 mg  50 mg Oral or Feeding Tube Daily Litzy Kulkarni MD   50 mg at 05/17/25 0938    sodium chloride (PF) 0.9% PF flush 3 mL  3 mL Intracatheter Q8H KHUSHBU Litzy Kulkarni MD   3 mL at 05/17/25 1556    sodium chloride (PF) 0.9% PF flush 3 mL  3 mL Intracatheter q1 min prn Litzy Kulkarni MD   3 mL at 05/17/25 2059    sodium chloride 0.9 % infusion   Intravenous Continuous Felicia Caputo MD   Stopped at 05/17/25 1405    traMADol (ULTRAM) tablet 50 mg  50 mg Oral Q6H PRN Mara Ceron MD        triamcinolone (KENALOG) 0.1 % cream   Topical BID PRN Mara Ceron MD           Allergies:     Allergies   Allergen Reactions    Nsaids Other (See Comments), Nausea and Vomiting and GI Disturbance     History of GI bleeding and ulcers    Codeine Sulfate GI Disturbance

## 2025-05-18 NOTE — PROGRESS NOTES
Inpatient Diabetes Management Service: Daily Progress Note     HPI: 65 female with past medical history of DM, anxiety, tobacco use, polysubstance use presented 5/6/25 with headaches and vision changes ongoing for 3 to 4 weeks.  Patient hospitalized x 2 at outside hospital for similar symptoms and workup include MRI brain with a working diagnosis of possible PRES. patient was hypertensive in the 200s in 1 of those encounters. Patient also described spells of losing time and missing part of her video while watching TV.  On comparison to the most recent MRI patient has microbleed's in the bilateral parietotemporal occipital regions.  Significant leptomeningeal enhancement in the cerebellar regions and bilateral cerebral hemisphere sparing some of the part of frontal regions.  Leptomeningeal enhancement appears to be more prominent than the most recent MRI.  FLAIR hyperintensity can be seen however not very prominent.  Continuous EEG done for 24 hours without any abnormal epileptiform discharges.  CT chest abdominal pelvis completed to rule out any malignancy however unremarkable.  D-dimer, ESR, CRP within normal limits. Differential diagnoses include inflammatory cerebral amyloid angiopathy, PRES, CNS lymphoma, neoplastic/autoimmune process or small vessel vasculitis. Inpatient Diabetes Service consulted 5/15/25 for assistance with glycemic control in setting of starting high dose steroids.          Assessment/Plan:     Assessment:   Type 2 Diabetes Mellitus, without known complications. 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     Plan/Recommendations:    - Increase NPH to 35 units at 0900 with methylprednisolone 1,000 mg (do not give if MP not given).   - Continue  Novolog Meal Coverage: 1 units per 5 g CHO TID AC and snacks  - Novolog Correction Scale: high resistance 1/25 >140 TID AC, 1/25 >200 HS & 0200   - BG monitoring: TID AC, HS, 0200   - Hypoglycemia protocol    - Carb counting protocol      Discussion: With post prandial hyperglycemia despite dose increase in NPH insulin. Will further increase NPH to provide better glycemic control    Will increase NPH insulin and strengthen insulin to carb ratio.      Discharge Planning: (tentative)    Medications: TBD    Test Claims: none needed.  Education: will be needed if not discharging   Outpatient Follow-up: recommend [facility] Endocr[facility]ology vs PCP    Please notify Inpatient Diabetes Service if changes are planned to steroids, nutrition, TPN/TF and anticipated procedures requiring prolonged NPO status.         Interval History/Review of Systems :   - The last 24 hours progress and nursing notes reviewed.  No further nausea. Good appetite.      Planned Procedures/Surgeries: Inpatient Glucose Control:     Recent Labs   Lab 05/18/25  0809 05/18/25  0708 05/18/25  0138 05/17/25  2203 05/17/25  1732 05/17/25  1248   GLC 91 96 154* 256* 222* 153*             Medications Impacting Glycemia:   Steroids:  Methylprednisolone 1,000 mg daily x 5 days at 1130 5/16 then 0900 5/17-5/20  D5W-containing solutions/medications: none   Other medications impacting glucose: none         Nutrition:   Orders Placed This Encounter      Low Consistent Carb (45 g CHO per Meal) Diet  Supplements: none   TF: none   TPN: none         Diabetes History: see full consult note for complete diabetes history   Diabetes Type and Duration: T2DM, per patient was told she has T2DM 2 weeks ago at OSH. Per chart review, diagnosed 4/2021 with A1c 6.7%      GAD65 antibody, zinc transporter 8 antibody, islet antibody, insulin antibody, and c-peptide not available on epic search      PTA Medication Regimen: none     Missing doses?: N/A     Historical Diabetes Medications:  "  - reports taking Metformin 500 mg BID recently at OSH but wasn't taking at home. Has never been on insulin before.       Glucose monitoring device/frequency/trends: has used glucometer randomly before because she has family members with diabetes.     Hypoglycemia PTA: none     Outpatient Diabetes Provider: Has PCP but hasn't seen him since 2021 - Randal Castellanos (with [facility])      Formal Diabetes Education/Educator: no        Physical Exam:   BP (!) 160/66 (BP Location: Right arm)   Pulse 61   Temp 97.7  F (36.5  C) (Oral)   Resp 16   Ht 1.676 m (5' 6\")   Wt 104.6 kg (230 lb 8 oz)   SpO2 99%   BMI 37.20 kg/m    General:  well appearing, NAD  Lungs: unlabored breathing on RA.  Mental Status:  Alert and oriented x3  Psych:  withdrawn         Data:     Lab Results   Component Value Date    A1C 8.7 (H) 05/06/2025    A1C 6.0 (H) 02/11/2022    A1C 6.6 (H) 10/15/2021    A1C 5.8 (H) 09/06/2021    A1C 6.7 (H) 04/19/2021    A1C 5.0 02/11/2015       ROUTINE IP LABS (Last four results)  BMP  Recent Labs   Lab 05/18/25  0809 05/18/25  0708 05/18/25  0138 05/17/25  2203 05/17/25  0901 05/17/25  0729 05/16/25  1459 05/16/25  1311 05/16/25  0756 05/16/25  0626   NA  --  142  --   --   --  133*  --  136  --  137   POTASSIUM  --  4.0  --   --   --  4.3  --  4.7  --  4.5   CHLORIDE  --  107  --   --   --  100  --  101  --  100   CRISTIAN  --  8.6*  --   --   --  8.3*  --  8.5*  --  8.8   CO2  --  24  --   --   --  22  --  23  --  25   BUN  --  15.2  --   --   --  14.7  --  15.4  --  15.5   CR  --  0.68  --   --   --  0.57  --  0.56  --  0.65   GLC 91 96 154* 256*   < > 165*   < > 167*   < > 145*    < > = values in this interval not displayed.     CBC  Recent Labs   Lab 05/18/25  0708 05/17/25  0729 05/16/25  1311 05/16/25  0626   WBC 16.6* 14.7* 10.2 8.9   RBC 4.44 4.41 4.78 4.85   HGB 11.5* 11.4* 12.7 12.6   HCT 36.0 35.7 39.2 39.8   MCV 81 81 82 82   MCH 25.9* 25.9* 26.6 26.0*   MCHC 31.9 31.9 32.4 31.7   RDW 15.2* 14.6 15.0 " 15.2*    [phone]     Inpatient Diabetes Service will continue to follow, please don't hesitate to contact the team with any questions or concerns.     Low Hu MD  Endocrinology Fellow     To contact Inpatient Diabetes Service:     7 AM - 5 PM: Page the IDS ELBERT following the patient that day (see filed or incomplete progress notes/consult notes under Endocrinology)    OR if uncertain of provider assignment: page job code [phone]    5 PM - 7 AM: First call after hours is to primary service.    For urgent after-hours questions, page job code for on call fellow: [id number]     I, Loreta Clemente, was present with the fellow who participated in the service and in the documentation of the note.  I have verified the history and personally performed the physical exam and medical decision making.  I agree with the assessment and plan of care as documented in the note.     Loreta Clemente MD

## 2025-05-19 ENCOUNTER — APPOINTMENT (OUTPATIENT)
Dept: OCCUPATIONAL THERAPY | Facility: CLINIC | Age: 65
DRG: 545 | End: 2025-05-19
Payer: COMMERCIAL

## 2025-05-19 ENCOUNTER — APPOINTMENT (OUTPATIENT)
Dept: PHYSICAL THERAPY | Facility: CLINIC | Age: 65
DRG: 545 | End: 2025-05-19
Payer: COMMERCIAL

## 2025-05-19 LAB
ANION GAP SERPL CALCULATED.3IONS-SCNC: 12 MMOL/L (ref 7–15)
BUN SERPL-MCNC: 20.1 MG/DL (ref 8–23)
CALCIUM SERPL-MCNC: 8.3 MG/DL (ref 8.8–10.4)
CHLORIDE SERPL-SCNC: 107 MMOL/L (ref 98–107)
CREAT SERPL-MCNC: 0.7 MG/DL (ref 0.51–0.95)
EGFRCR SERPLBLD CKD-EPI 2021: >90 ML/MIN/1.73M2
ERYTHROCYTE [DISTWIDTH] IN BLOOD BY AUTOMATED COUNT: 15.1 % (ref 10–15)
GLUCOSE BLDC GLUCOMTR-MCNC: 124 MG/DL (ref 70–99)
GLUCOSE BLDC GLUCOMTR-MCNC: 133 MG/DL (ref 70–99)
GLUCOSE BLDC GLUCOMTR-MCNC: 198 MG/DL (ref 70–99)
GLUCOSE BLDC GLUCOMTR-MCNC: 218 MG/DL (ref 70–99)
GLUCOSE BLDC GLUCOMTR-MCNC: 228 MG/DL (ref 70–99)
GLUCOSE BLDC GLUCOMTR-MCNC: 88 MG/DL (ref 70–99)
GLUCOSE BLDC GLUCOMTR-MCNC: 95 MG/DL (ref 70–99)
GLUCOSE SERPL-MCNC: 106 MG/DL (ref 70–99)
HCO3 SERPL-SCNC: 25 MMOL/L (ref 22–29)
HCT VFR BLD AUTO: 34.1 % (ref 35–47)
HGB BLD-MCNC: 10.5 G/DL (ref 11.7–15.7)
IGG SERPL-MCNC: 762 MG/DL (ref 610–1616)
MAGNESIUM SERPL-MCNC: 2.3 MG/DL (ref 1.7–2.3)
MCH RBC QN AUTO: 26.3 PG (ref 26.5–33)
MCHC RBC AUTO-ENTMCNC: 30.8 G/DL (ref 31.5–36.5)
MCV RBC AUTO: 85 FL (ref 78–100)
PHOSPHATE SERPL-MCNC: 3.7 MG/DL (ref 2.5–4.5)
PLATELET # BLD AUTO: 336 10E3/UL (ref 150–450)
POTASSIUM SERPL-SCNC: 4 MMOL/L (ref 3.4–5.3)
RBC # BLD AUTO: 4 10E6/UL (ref 3.8–5.2)
SODIUM SERPL-SCNC: 144 MMOL/L (ref 135–145)
WBC # BLD AUTO: 11.9 10E3/UL (ref 4–11)

## 2025-05-19 PROCEDURE — 36415 COLL VENOUS BLD VENIPUNCTURE: CPT

## 2025-05-19 PROCEDURE — 84100 ASSAY OF PHOSPHORUS: CPT | Performed by: PSYCHIATRY & NEUROLOGY

## 2025-05-19 PROCEDURE — 97750 PHYSICAL PERFORMANCE TEST: CPT | Mod: GP

## 2025-05-19 PROCEDURE — 97530 THERAPEUTIC ACTIVITIES: CPT | Mod: GO

## 2025-05-19 PROCEDURE — 250N000013 HC RX MED GY IP 250 OP 250 PS 637

## 2025-05-19 PROCEDURE — 120N000002 HC R&B MED SURG/OB UMMC

## 2025-05-19 PROCEDURE — 99233 SBSQ HOSP IP/OBS HIGH 50: CPT | Mod: GC | Performed by: STUDENT IN AN ORGANIZED HEALTH CARE EDUCATION/TRAINING PROGRAM

## 2025-05-19 PROCEDURE — 250N000011 HC RX IP 250 OP 636

## 2025-05-19 PROCEDURE — 85014 HEMATOCRIT: CPT

## 2025-05-19 PROCEDURE — 258N000003 HC RX IP 258 OP 636

## 2025-05-19 PROCEDURE — 83735 ASSAY OF MAGNESIUM: CPT | Performed by: PSYCHIATRY & NEUROLOGY

## 2025-05-19 PROCEDURE — 250N000012 HC RX MED GY IP 250 OP 636 PS 637: Performed by: INTERNAL MEDICINE

## 2025-05-19 PROCEDURE — 120N000003 HC R&B IMCU UMMC

## 2025-05-19 PROCEDURE — 80048 BASIC METABOLIC PNL TOTAL CA: CPT

## 2025-05-19 PROCEDURE — 99232 SBSQ HOSP IP/OBS MODERATE 35: CPT | Performed by: STUDENT IN AN ORGANIZED HEALTH CARE EDUCATION/TRAINING PROGRAM

## 2025-05-19 RX ORDER — LOSARTAN POTASSIUM 25 MG/1
25 TABLET ORAL DAILY
Status: DISCONTINUED | OUTPATIENT
Start: 2025-05-19 | End: 2025-05-20

## 2025-05-19 RX ADMIN — INSULIN ASPART 6 UNITS: 100 INJECTION, SOLUTION INTRAVENOUS; SUBCUTANEOUS at 08:11

## 2025-05-19 RX ADMIN — INSULIN ASPART 8 UNITS: 100 INJECTION, SOLUTION INTRAVENOUS; SUBCUTANEOUS at 12:17

## 2025-05-19 RX ADMIN — GABAPENTIN 600 MG: 300 CAPSULE ORAL at 12:17

## 2025-05-19 RX ADMIN — ENOXAPARIN SODIUM 40 MG: 40 INJECTION SUBCUTANEOUS at 16:09

## 2025-05-19 RX ADMIN — METHYLPREDNISOLONE SODIUM SUCCINATE 1000 MG: 1 INJECTION INTRAMUSCULAR; INTRAVENOUS at 10:46

## 2025-05-19 RX ADMIN — GABAPENTIN 600 MG: 300 CAPSULE ORAL at 02:05

## 2025-05-19 RX ADMIN — LOSARTAN POTASSIUM 25 MG: 25 TABLET, FILM COATED ORAL at 12:17

## 2025-05-19 RX ADMIN — Medication 500 MG: at 07:58

## 2025-05-19 RX ADMIN — PROPRANOLOL HYDROCHLORIDE 60 MG: 60 CAPSULE, EXTENDED RELEASE ORAL at 07:58

## 2025-05-19 RX ADMIN — Medication 3 MG: at 21:24

## 2025-05-19 RX ADMIN — PANTOPRAZOLE SODIUM 40 MG: 40 TABLET, DELAYED RELEASE ORAL at 07:59

## 2025-05-19 RX ADMIN — Medication 500 MCG: at 07:59

## 2025-05-19 RX ADMIN — INSULIN ASPART 13 UNITS: 100 INJECTION, SOLUTION INTRAVENOUS; SUBCUTANEOUS at 17:40

## 2025-05-19 RX ADMIN — ASPIRIN 81 MG CHEWABLE TABLET 81 MG: 81 TABLET CHEWABLE at 07:58

## 2025-05-19 RX ADMIN — SERTRALINE HYDROCHLORIDE 50 MG: 50 TABLET ORAL at 07:59

## 2025-05-19 RX ADMIN — ACETAMINOPHEN 650 MG: 325 TABLET ORAL at 07:59

## 2025-05-19 RX ADMIN — GABAPENTIN 600 MG: 300 CAPSULE ORAL at 17:41

## 2025-05-19 RX ADMIN — SENNOSIDES AND DOCUSATE SODIUM 1 TABLET: 50; 8.6 TABLET ORAL at 07:59

## 2025-05-19 RX ADMIN — ACETAMINOPHEN 650 MG: 325 TABLET ORAL at 19:22

## 2025-05-19 RX ADMIN — DIVALPROEX SODIUM 500 MG: 500 TABLET, FILM COATED, EXTENDED RELEASE ORAL at 07:58

## 2025-05-19 RX ADMIN — QUETIAPINE FUMARATE 25 MG: 25 TABLET ORAL at 21:24

## 2025-05-19 RX ADMIN — DIVALPROEX SODIUM 500 MG: 500 TABLET, FILM COATED, EXTENDED RELEASE ORAL at 19:22

## 2025-05-19 ASSESSMENT — ACTIVITIES OF DAILY LIVING (ADL)
ADLS_ACUITY_SCORE: 40
ADLS_ACUITY_SCORE: 35
ADLS_ACUITY_SCORE: 37
ADLS_ACUITY_SCORE: 35
ADLS_ACUITY_SCORE: 37
ADLS_ACUITY_SCORE: 40
ADLS_ACUITY_SCORE: 37
ADLS_ACUITY_SCORE: 40
ADLS_ACUITY_SCORE: 35
ADLS_ACUITY_SCORE: 40
ADLS_ACUITY_SCORE: 37
ADLS_ACUITY_SCORE: 40
ADLS_ACUITY_SCORE: 37

## 2025-05-19 NOTE — PROGRESS NOTES
"Care Management Follow Up    Length of Stay (days): 13    Expected Discharge Date:   5/21/2025     Concerns to be Addressed: Discharge planning     Patient plan of care discussed at interdisciplinary rounds: Yes    Anticipated Discharge Disposition:   Per rounds report, pt had a stroke on 5/16/2025.  Therapy's have changed their recommendation from home to ARU     Anticipated Discharge Services:    Per rounds report, pt had a stroke on 5/16/2025.  Therapy's have changed their recommendation from home to ARU  Anticipated Discharge DME: Not applicable at this time    Patient/family educated on Medicare website which has current facility and service quality ratings: Yes, SW provided a \"Medicare Care Compare\" document  Education Provided on the Discharge Plan: Yes  Patient/Family in Agreement with the Plan: Yes    Referrals Placed by CM/SW:  Post acute care facility's  Private pay costs discussed:   Not applicable at this time    Discussed  Partnership in Safe Discharge Planning  document with patient/family: No     Handoff Completed: No, handoff not indicated or clinically appropriate    Additional Information:  Per rounds report, pt had a stroke on 5/16/2025.  Therapy's have changed their recommendation from home to ARU.  Pt needs a private room for MRSA.  Patient will received her last dose of IV Methylprednisolone on 5/21/2025 and will then be medically ready for discharge.   KULWANT met with pt and introduced role of KULWANT.  SW informed pt of ARU recommendation and anticipated discharge date of 5/21/2025.  Pt states that she is agreeable to a rehab stay and anxious for ARU placement stating \"let's get this show on the road.\"    SW provided pt with a \"Medicare Care Compare\" document.  Pt identify's the following ARU facility preferences (listed in order of  preference):   - [facility], KULWANT phoned Admissions ([facility]) and referred pt  - [facility], KULWANT faxed referral via [facility]  - [facility] KULWANT faxed referral via " EPIC.     KULWANT phoned RN Transitions Care Coordinator from [facility] at [phone] and left a message updating in regards to discharge planning.    Next Steps:   SW will await decisions from the above ARU's in regards to acceptance. KULWANT will follow for discharge planning.    CARLINE Padgett  Social Work, 6A  Phone:  [phone]  Pager:  [phone]  5/19/2025       TANG Harrison

## 2025-05-19 NOTE — PROGRESS NOTES
Inpatient Diabetes Management Service: Daily Progress Note     HPI: 65 female with past medical history of DM, anxiety, tobacco use, polysubstance use presented 5/6/25 with headaches and vision changes ongoing for 3 to 4 weeks.  Patient hospitalized x 2 at outside hospital for similar symptoms and workup include MRI brain with a working diagnosis of possible PRES. patient was hypertensive in the 200s in 1 of those encounters. Patient also described spells of losing time and missing part of her video while watching TV.  On comparison to the most recent MRI patient has microbleed's in the bilateral parietotemporal occipital regions.  Significant leptomeningeal enhancement in the cerebellar regions and bilateral cerebral hemisphere sparing some of the part of frontal regions.  Leptomeningeal enhancement appears to be more prominent than the most recent MRI.  FLAIR hyperintensity can be seen however not very prominent.  Continuous EEG done for 24 hours without any abnormal epileptiform discharges.  CT chest abdominal pelvis completed to rule out any malignancy however unremarkable.  D-dimer, ESR, CRP within normal limits. Differential diagnoses include inflammatory cerebral amyloid angiopathy, PRES, CNS lymphoma, neoplastic/autoimmune process or small vessel vasculitis. Inpatient Diabetes Service consulted 5/15/25 for assistance with glycemic control in setting of starting high dose steroids.          Assessment/Plan:     Assessment:   Type 2 Diabetes Mellitus, without known complications. 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     Plan/Recommendations:    - Increase NPH 35 --> 44 units at 0900 with methylprednisolone 1,000 mg (do not give if MP not given).   -  Novolog Meal Coverage: 1 units per 5 g CHO TID AC    -adjust PRN snacks pending time of day:  0900 - 2059: Dose = 1 units per 5 grams of carbohydrate  2100 - 0859: Dose = 1 units per 10 grams of carbohydrate  - Novolog Correction Scale: high resistance 1/25 >140 TID AC, relax 1/50 >200 HS & 0200   - BG monitoring: TID AC, HS, 0200   - Hypoglycemia protocol    - Carb counting protocol      Discussion: Significant hyperglycemia pre-dinner and post-dinner yesterday. Appears patient appropriately given carb coverage with snacks yesterday afternoon. Further increase NPH with MP. Tighter BG overnight, relax correction scale and prn carb coverage overnight. Spoke with primary team, see updated steroid taper plan below (long taper). Patient not medically ready for discharge yet but when ready will plan to go to ARU.       Discharge Planning: (tentative)  --> plan for ARU   Medications: TBD    Test Claims: ordered 5/19 for insulins, glucometer, CGM   Education: will be needed if not discharging   Outpatient Follow-up: recommend [facility] Endocrinology vs PCP    Please notify Inpatient Diabetes Service if changes are planned to steroids, nutrition, TPN/TF and anticipated procedures requiring prolonged NPO status.         Interval History/Review of Systems :   - The last 24 hours progress and nursing notes reviewed.  - Having a head ache this morning.   - Had nausea and emesis after breakfast today, attributes to the texture of the eggs. Feeling upset by limited carb diet, agreed to relax from 45 g cho to 60 g cho today.      Planned Procedures/Surgeries: none    Inpatient Glucose Control:       Recent Labs   Lab 05/19/25  0205 05/18/25  2123 05/18/25  1933 05/18/25  1649 05/18/25  1239 05/18/25  0809   * 244* 307* 407* 181* 91             Medications Impacting Glycemia:   Steroids:    Methylprednisolone 1,000 mg daily x 5 days at 1130 5/16 then 0900 5/17-5/20.  5/21: Prednisone 60 mg daily x 2 weeks, then tentatively  "taper by 10 mg every week pending results of brain MRI   D5W-containing solutions/medications: none   Other medications impacting glucose: none         Nutrition:   Orders Placed This Encounter      Low Consistent Carb (45 g CHO per Meal) Diet  Supplements: none   TF: none   TPN: none         Diabetes History: see full consult note for complete diabetes history   Diabetes Type and Duration: T2DM, per patient was told she has T2DM 2 weeks ago at OSH. Per chart review, diagnosed 4/2021 with A1c 6.7%      GAD65 antibody, zinc transporter 8 antibody, islet antibody, insulin antibody, and c-peptide not available on epic search      PTA Medication Regimen: none     Missing doses?: N/A     Historical Diabetes Medications:   - reports taking Metformin 500 mg BID recently at OSH but wasn't taking at home. Has never been on insulin before.    --> reports having gastric bypass surgery ~30 years ago, this shouldn't limit ability to use a GLP1a since it has been so long ago.      Glucose monitoring device/frequency/trends: has used glucometer randomly before because she has family members with diabetes.     Hypoglycemia PTA: none     Outpatient Diabetes Provider: Has PCP but hasn't seen him since 2021 - Randal Castellanos (with [facility])      Formal Diabetes Education/Educator: no        Physical Exam:   BP (!) 143/104   Pulse 58   Temp 98.1  F (36.7  C) (Oral)   Resp 18   Ht 1.676 m (5' 6\")   Wt 104.6 kg (230 lb 8 oz)   SpO2 100%   BMI 37.20 kg/m    General:  well appearing, NAD, sitting up in chair.  Lungs: unlabored breathing on RA.  Mental Status:  Alert and oriented x3  Psych:  normal mood and affect          Data:     Lab Results   Component Value Date    A1C 8.7 (H) 05/06/2025    A1C 6.0 (H) 02/11/2022    A1C 6.6 (H) 10/15/2021    A1C 5.8 (H) 09/06/2021    A1C 6.7 (H) 04/19/2021    A1C 5.0 02/11/2015       ROUTINE IP LABS (Last four results)  BMP  Recent Labs   Lab 05/19/25  0205 05/18/25 2123 05/18/25  1933 " 05/18/25  1649 05/18/25  0809 05/18/25  0708 05/17/25  0901 05/17/25  0729 05/16/25  1459 05/16/25  1311 05/16/25  0756 05/16/25  0626   NA  --   --   --   --   --  142  --  133*  --  136  --  137   POTASSIUM  --   --   --   --   --  4.0  --  4.3  --  4.7  --  4.5   CHLORIDE  --   --   --   --   --  107  --  100  --  101  --  100   CRISTIAN  --   --   --   --   --  8.6*  --  8.3*  --  8.5*  --  8.8   CO2  --   --   --   --   --  24  --  22  --  23  --  25   BUN  --   --   --   --   --  15.2  --  14.7  --  15.4  --  15.5   CR  --   --   --   --   --  0.68  --  0.57  --  0.56  --  0.65   * 244* 307* 407*   < > 96   < > 165*   < > 167*   < > 145*    < > = values in this interval not displayed.     CBC  Recent Labs   Lab 05/18/25  0708 05/17/25  0729 05/16/25  1311 05/16/25  0626   WBC 16.6* 14.7* 10.2 8.9   RBC 4.44 4.41 4.78 4.85   HGB 11.5* 11.4* 12.7 12.6   HCT 36.0 35.7 39.2 39.8   MCV 81 81 82 82   MCH 25.9* 25.9* 26.6 26.0*   MCHC 31.9 31.9 32.4 31.7   RDW 15.2* 14.6 15.0 15.2*    [phone]     Inpatient Diabetes Service will continue to follow, please don't hesitate to contact the team with any questions or concerns.     Andreea Taylor PA-C  Inpatient Diabetes Service  Pager: [phone]  Available on [employer]    To contact Inpatient Diabetes Service:     7 AM - 5 PM: Page the IDS ELBERT following the patient that day (see filed or incomplete progress notes/consult notes under Endocrinology)    OR if uncertain of provider assignment: page job code [phone]    5 PM - 7 AM: First call after hours is to primary service.    For urgent after-hours questions, page job code for on call fellow: [phone]     I spent a total of 45 minutes on the date of the encounter doing prep/post-work, chart review, history and exam, documentation and further activities per the note including lab review, multidisciplinary communication, counseling the patient and/or coordinating care regarding acute hyper/hypoglycemic management, as well as  discharge management and planning/communication.  See note for details.
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
## 2025-05-19 NOTE — PROVIDER NOTIFICATION
Provider Notified: Mark Miller  Concern: New intermittent disorientation to place and situation/confusion  Reponse summary: Acknowledged page. No new orders at this time  Time Notified: 2676  Time of Response: 4353

## 2025-05-19 NOTE — PROGRESS NOTES
Functional Gait Assessment (FGA): The FGA assesses postural stability during various walking tasks.   Gait assistive device used: None     05/19/25 1123   Signing Clinician's Name / Credentials   Signing clinician's name / credentials MYKE Garduno   Functional Gait Assessment (ROSSY Melo, JANEEN Flores, et al. (2004))   1. GAIT LEVEL SURFACE 3   2. CHANGE IN GAIT SPEED 2   3. GAIT WITH HORIZONTAL HEAD TURNS 1   4. GAIT WITH VERTICAL HEAD TURNS 2   5. GAIT AND PIVOT TURN 2   6. STEP OVER OBSTACLE 0   7. GAIT WITH NARROW BASE OF SUPPORT 0   8. GAIT WITH EYES CLOSED 0   9. AMBULATING BACKWARDS 1   10. STEPS 2   Total Functional Gait Assessment Score   TOTAL SCORE: (MAXIMUM SCORE 30) 13       Scores of <22 /30 have been correlated with predicting falls in community-dwelling older adults according to Kameron & John 2010.   Scores of <18 /30 have been correlated with increased risk for falls in patients with Parkinsons Disease according to Sherif Wade Zhou et al 2014.  Minimal Detectable Change for patients with acute/chronic stroke = 4.2 according to Thikj & Ritschel 2009  Minimal Detectable Change for patients with vestibular disorder = 8 according to Kameron & John 2010    Assessment (rationale for performing, application to patient s function & care plan): Pt displayed most difficulty with stepping over an object, narrow TOVA, gait with eyes closed, and ambulating backwards. Score of 13/30 indicates that the patient is at an increased risk of falling and thus has further rehabilitation needs.

## 2025-05-19 NOTE — PROGRESS NOTES
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 Initially differential included hypertensive vasculopathy/ PRES which is now thought to be less likely per primary team and leading differential is inflammatory cerebral amyloid angiopathy.  She had an LP which showed RBC 5, TNC 32, lymphocytes 72%, protein of 74.9, glu 102.   She had a repeat MRI brain on 5/15/25 (for new auditory hallucinations) showing slight increase in leptomeningeal enhancement and unchanged microhemorrhages, along with a tiny infarct in the left cerebellum.  Patient underwent diagnostic cerebral angiogram on 5/16/25 which did not show any aneurysms, vascular malformation or any areas of stenosis, irregularity or bleeding to suggest inflammatory vasculopathy in anterior and posterior circulation.  Postprocedure, she was found to have left arm weakness along with left arm hemisensory neglect when examined in her room.  CT head and CTA head and neck were obtained which were unrevealing.  She had an MRI overnight which showed right parietal and right occipital lobe infarcts along with evolving infarct in the left cerebellum as noted on the prior MRI.  She also had worsened marked abnormal leptomeningeal FLAIR signal.    Pt reports doing well however she expresses that she would like to discharge. I called Valentino today however his voicemail was not intialized. I spoke with Missy briefly who asked me to return the call at a later point because she was at work.   She is stronger in the left arm today.        Physical exam:   BP (!) 143/104   Pulse 58   Temp 98.1  F (36.7  C) (Oral)   Resp 18   Ht 1.676 m (5' 6\")   Wt 104.6 kg (230 lb 8 oz)   SpO2 100%   BMI 37.20 kg/m  "   General: Awake and alert and in no acute distress.  A&O x4  Speech without aphasia or dysarthria  CN- EOMI, left VF cut, face symmetric, hearing intact to conversation, tongue midline  Motor- antigravity on the right, LLE drift, LUE 2-3/5 proximally and 0-1 distally  Sensation- left hemisensory neglect  Coordination- finger to nose without ataxia on the right  Gait- deferred    Imaging:  No new imaging    Assessment/Plan:  66yo F with vascular risk factors, presented with headache and visual changes for a few weeks at least, found to have microbleeds mostly in the occipital/temporal/parietal regions bilaterally with associated leptomeningeal enhancement, initially concerning for hypertensive vasculopathy/PRES however now inflammatory cerebral amyloid angiopathy higher on the differential per primary team. She had an LP showing lymphocytic pleocytosis. She underwent diagnostic cerebral angiogram which did not show any evidence of irregularity, narrowing or beading suggestive of inflammatory vasculopathy. Post-procedure, she was found to have left sided weakness and neglect, with repeat MRI brain revealing right parieto-occipital infarcts along with worsening leptomeningeal signal. Though there was no significant plaque or luminal irregularity visualized on angiography, it is possible that an angiographically occult eccentric atheromatous plaque (which was not known to significantly encroach on the vessel lumen) was dislodged at some point resulting in an embolic event.  - aspirin 81mg daily  - vascular risk factor control  - physical, occupational therapies  - rest per stroke neurology  - we will continue to follow along    Patient discussed with Dr. Romano.  Aleah Barrera MD  Fellow, Endovascular Surgical Neuroradiology          REVIEWED ASSOCIATED CLINICAL DATA AND HISTORY FOUND IN THE MEDICAL RECORD:  Past Medical History:   Past Medical History:   Diagnosis Date    Anemia     Anxiety     Axillary abscess      chronic, recurring    Backache     Benign neoplasm of adrenal gland 2012    Depressive disorder     Gastro-oesophageal reflux disease     bleeding ulcers    Hiatal hernia     NEWLY DIAGNOSISED    Hyperparathyroidism, unspecified 2012    Peptic ulcer, unspecified site, unspecified as acute or chronic, without mention of hemorrhage or perforation     Pneumonia     NOVEMBER    PONV (postoperative nausea and vomiting)     TMJ (temporomandibular joint syndrome) 2014    Vitamin D deficiency 2012       Surgical History:   Past Surgical History:   Procedure Laterality Date    APPENDECTOMY      CHOLECYSTECTOMY      DILATION AND CURETTAGE, OPERATIVE HYSTEROSCOPY WITH MORCELLATOR, COMBINED  2012    Procedure: COMBINED DILATION AND CURETTAGE, OPERATIVE HYSTEROSCOPY WITH MORCELLATOR;  Hysteroscopy, Polypectomy, Dilation and Curettage  ;  Surgeon: Marta Montejo MD;  Location: [facility] OR    GI SURGERY      gastric bypass at age 31    ORTHOPEDIC SURGERY      back surgery at age 31    PARATHYROIDECTOMY Right 2015    Procedure: PARATHYROIDECTOMY;  Surgeon: Gregory Coleman MD;  Location: [facility]      SPINE SURGERY         Social history:   Social History     Tobacco Use    Smoking status: Every Day     Current packs/day: 0.50     Average packs/day: 0.5 packs/day for 30.0 years (15.0 ttl pk-yrs)     Types: Cigarettes    Smokeless tobacco: Never   Substance Use Topics    Alcohol use: Yes     Comment: occasionally    Drug use: No       Family history:   Family History   Problem Relation Age of Onset    Thyroid Disease Mother     Breast Cancer Mother 57         65 y.o.    Other - See Comments Mother         cystic acne    Cancer Brother     Diabetes Daughter         Type 1; insulin       Medications:  Current Facility-Administered Medications   Medication Dose Route Frequency Provider Last Rate Last Admin    acetaminophen (TYLENOL) tablet 650 mg  650 mg  Oral Q4H PRN Mara Ceron MD   650 mg at 05/17/25 0646    aspirin (ASA) chewable tablet 81 mg  81 mg Oral Daily Mara Ceron MD   81 mg at 05/18/25 0816    calcium carbonate (TUMS) chewable tablet 500 mg  500 mg Oral Daily PRN Jazmine Wade MD   500 mg at 05/11/25 1336    cyanocobalamin (VITAMIN B-12) sublingual tablet 500 mcg  500 mcg Sublingual Daily Oren Villeda MD   500 mcg at 05/18/25 0814    glucose gel 15-30 g  15-30 g Oral Q15 Min PRN Karen Burt MD        Or    dextrose 50 % injection 25-50 mL  25-50 mL Intravenous Q15 Min PRN Karen Burt MD        Or    glucagon injection 1 mg  1 mg Subcutaneous Q15 Min PRN Karen Burt MD        divalproex sodium extended-release (DEPAKOTE ER) 24 hr tablet 500 mg  500 mg Oral BID Karen Burt MD   500 mg at 05/18/25 2118    enoxaparin ANTICOAGULANT (LOVENOX) injection 40 mg  40 mg Subcutaneous Q24H Karen Burt MD   40 mg at 05/18/25 1620    gabapentin (NEURONTIN) capsule 600 mg  600 mg Oral TID Dariusz Bang MD   600 mg at 05/19/25 0205    hydrALAZINE (APRESOLINE) injection 10 mg  10 mg Intravenous Q6H PRN Mara Ceron MD        [Held by provider] hydroCHLOROthiazide tablet 12.5 mg  12.5 mg Oral Daily Dariusz Bang MD   12.5 mg at 05/11/25 0817    insulin aspart (NovoLOG) injection (RAPID ACTING)   Subcutaneous TID w/meals Low Macario MD   9 Units at 05/18/25 1713    insulin aspart (NovoLOG) injection (RAPID ACTING)   Subcutaneous With Snacks or Supplements Low Macario MD   2 Units at 05/19/25 0243    insulin aspart (NovoLOG) injection (RAPID ACTING)  1-7 Units Subcutaneous BID Andreea Taylor PA-C   2 Units at 05/19/25 0206    insulin aspart (NovoLOG) injection (RAPID ACTING)  1-10 Units Subcutaneous TID AC Karen Burt MD   10 Units at 05/18/25 1712    insulin NPH injection 44 Units  44 Units Subcutaneous Q24H Andreea Taylor PA-C        lidocaine (LMX4) cream   Topical Q1H PRN Cayla  MD Litzy        lidocaine 1 % 0.1-1 mL  0.1-1 mL Other Q1H PRN Litzy Kulkarni MD        magnesium gluconate (MAGONATE) tablet 500 mg  500 mg Oral Daily Dariusz Bang MD   500 mg at 05/18/25 0814    melatonin tablet 3 mg  3 mg Oral At Bedtime Alejandro Villalobos MD   3 mg at 05/18/25 2118    methylPREDNISolone sodium succinate (solu-MEDROL) 1,000 mg in sodium chloride 0.9 % 283 mL intermittent infusion  1,000 mg Intravenous Q24H Mara Ceron  mL/hr at 05/18/25 0818 1,000 mg at 05/18/25 0818    ondansetron (ZOFRAN ODT) ODT tab 4 mg  4 mg Oral Q6H PRN Litzy Kulkarni MD   4 mg at 05/16/25 1901    Or    ondansetron (ZOFRAN) injection 4 mg  4 mg Intravenous Q6H PRN Litzy Kulkarni MD   4 mg at 05/17/25 0106    pantoprazole (PROTONIX) EC tablet 40 mg  40 mg Oral QAM AC Mara Ceron MD   40 mg at 05/18/25 0814    polyethylene glycol (MIRALAX) Packet 17 g  17 g Oral Daily Jazmine Wade MD   17 g at 05/11/25 1542    prochlorperazine (COMPAZINE) injection 10 mg  10 mg Intravenous Q6H PRN Karen Burt MD   10 mg at 05/16/25 2230    promethazine (PHENERGAN) tablet 25 mg  25 mg Oral Once PRN Wicho Miller MD        propranolol ER (INDERAL LA) 24 hr capsule 60 mg  60 mg Oral Daily Dariusz Bang MD   60 mg at 05/18/25 0819    QUEtiapine (SEROquel) tablet 25 mg  25 mg Oral At Bedtime Alejandro Villalobos MD   25 mg at 05/18/25 2118    senna-docusate (SENOKOT-S/PERICOLACE) 8.6-50 MG per tablet 1 tablet  1 tablet Oral BID Karen Burt MD   1 tablet at 05/15/25 1944    sennosides (SENOKOT) tablet 8.6 mg  8.6 mg Oral Daily PRN Karen Burt MD   8.6 mg at 05/17/25 0213    sertraline (ZOLOFT) tablet 50 mg  50 mg Oral or Feeding Tube Daily Litzy Kulkarni MD   50 mg at 05/18/25 0814    sodium chloride (PF) 0.9% PF flush 3 mL  3 mL Intracatheter Q8H KHUSHBU Litzy Kulkarni MD   3 mL at 05/19/25 0205    sodium chloride (PF) 0.9% PF flush 3 mL  3 mL Intracatheter q1 min prn Litzy Kulkarni MD   3 mL at 05/17/25 6107     sodium chloride 0.9 % infusion   Intravenous Continuous Felicia Caputo MD   Stopped at 05/17/25 6903    traMADol (ULTRAM) tablet 50 mg  50 mg Oral Q6H PRN Mara Ceron MD        triamcinolone (KENALOG) 0.1 % cream   Topical BID PRN Mara Ceron MD           Allergies:     Allergies   Allergen Reactions    Nsaids Other (See Comments), Nausea and Vomiting and GI Disturbance     History of GI bleeding and ulcers    Codeine Sulfate GI Disturbance

## 2025-05-19 NOTE — PLAN OF CARE
Goal Outcome Evaluation:      Plan of Care Reviewed With: patient    Overall Patient Progress: improvingOverall Patient Progress: improving    Outcome Evaluation: Pt reports improvement with visual distortion/hallucinations. Able to shrug shoulder and wiggle fingers on L side this shift      Vitals: HTN within parameters. OVSS on RA  Neuros: AOX3-4. Intermittently confused overnight-MD aware. Forgetful. LUE 1/5; AOE 5/5. Decreased sensation to L side. Visual distortions/hallucinations- pt reporting improvement. L neglect.  IV: PIV SL- dressing reinforced  Labs/Electrolytes: ACHS BG. 0200 glucose 228  Diet: Low consistent carb diet (45 g CHO/meal) with carb coverage  GI: LBM 5/18 per pt- declining bowel meds  : VDSP to BR  Skin: Bilateral groin sites-CDI. Bruising throughout   Pain: Denies  Activity: A1/GB  Plan: Methylprednisolone 1000 mg IV for 5 days, 3/5 done.   Updates this shift: DND from 0708-2530  Education completed: Needs stroke education when family is present

## 2025-05-19 NOTE — PLAN OF CARE
"Status: Admitted 5/6 with persistent headache and vision changes. Found to have microhemorrhages in occipital, temporal, and parietal lobes. S/p diagnostic angiogram 5/16 c/b L sided weakness and R parietal, R occipital, and L cerebellum infarcts.   Vitals: HTN. SBP < 180. RA.   Neuros: A&O x4. Forgetful. LUE 1, AOE 5. Decreased sensation to L side. Tingling to LUE @ 1200 but went away. L neglect & L cut. Very mild visual hallucinations this shift, no audio hallucinations.  IV: PIV SL  Labs/Electrolytes: WNL. MRI ordered for 5/20  Resp: WNL  Diet: Mod CHO diet (60g). Good PO  GI: LBM 5/18. Emesis x1 because patient made herself throw up due to her stomach hurting from the \"rubbery eggs\"   : Voiding spontaneously   Skin: Bruising throughout.  Pain: Mild headache managed with PRN Tylenol  Activity: A1. Intermittently refusing GB and walker. Up in recliner throughout the day  Social: No calls or visitors this shift  Plan: Dose 5 of 5 of IV Methylprednisolone tomorrow 5/20 then switch to PO.   Updates this shift: Writer fixed patient's TV so she could watch DVD's. Writer able to hook up [employer] but unable to setup Wifi for the [employer] to work.     Goal Outcome Evaluation:      Plan of Care Reviewed With: patient    Overall Patient Progress: no change    Outcome Evaluation: Mild hallucinations. Upset today about food and TV. 5 of 5 dose of IV Methylpred tomorrow 5/20      "

## 2025-05-19 NOTE — CONSULTS
Discharge Pharmacy Test Claim    Patient has pharmacy benefits through [employer] prepaid medical assistance plan. Requested test claims and expected copays listed below. If patient is unable to use NPH vials, contact pharmacy liaison to initiate a PA for NPH pens.    Addendum 5/20: novolin N flexpen PA submitted.    Addendum 5/21: novolin N flexpen PA denied. Patient must try and fail or have a contraindication to formulary alternative, novolin N vials. Contact pharmacy liaison to initiate an appeal.    Test Claim Copay   accu-chek guide 0.00   dexcom G7 0.00   freestyle carito 3 plus 0.00   humalog kwikpens 0.00   novolin N flexpens not covered   novolin N vials 0.00   novolog flexpens 0.00     Susan Olivera  [facility] Pharmacy Liaison, [place]  Ph: [phone]  Fax: [phone]  Available on Teams and [employer]  Disclaimer: Pharmacy test claims are estimates and may not reflect final costs. Suggested alternatives aim to be cost-effective and may not be therapeutically equivalent. This consult is informational and does not constitute medical advice. Clinical decisions should be made by qualified healthcare providers.

## 2025-05-20 ENCOUNTER — HOSPITAL ENCOUNTER (INPATIENT)
Facility: CLINIC | Age: 65
End: 2025-05-20
Payer: COMMERCIAL

## 2025-05-20 ENCOUNTER — DOCUMENTATION ONLY (OUTPATIENT)
Dept: NURSING | Facility: CLINIC | Age: 65
End: 2025-05-20

## 2025-05-20 ENCOUNTER — APPOINTMENT (OUTPATIENT)
Dept: MRI IMAGING | Facility: CLINIC | Age: 65
DRG: 545 | End: 2025-05-20
Payer: COMMERCIAL

## 2025-05-20 LAB
ANION GAP SERPL CALCULATED.3IONS-SCNC: 13 MMOL/L (ref 7–15)
BUN SERPL-MCNC: 24 MG/DL (ref 8–23)
CALCIUM SERPL-MCNC: 8.1 MG/DL (ref 8.8–10.4)
CHLORIDE SERPL-SCNC: 103 MMOL/L (ref 98–107)
CREAT SERPL-MCNC: 0.74 MG/DL (ref 0.51–0.95)
EGFRCR SERPLBLD CKD-EPI 2021: 90 ML/MIN/1.73M2
ERYTHROCYTE [DISTWIDTH] IN BLOOD BY AUTOMATED COUNT: 15.3 % (ref 10–15)
GLUCOSE BLDC GLUCOMTR-MCNC: 119 MG/DL (ref 70–99)
GLUCOSE BLDC GLUCOMTR-MCNC: 126 MG/DL (ref 70–99)
GLUCOSE BLDC GLUCOMTR-MCNC: 163 MG/DL (ref 70–99)
GLUCOSE BLDC GLUCOMTR-MCNC: 228 MG/DL (ref 70–99)
GLUCOSE BLDC GLUCOMTR-MCNC: 249 MG/DL (ref 70–99)
GLUCOSE SERPL-MCNC: 217 MG/DL (ref 70–99)
HCO3 SERPL-SCNC: 23 MMOL/L (ref 22–29)
HCT VFR BLD AUTO: 34.4 % (ref 35–47)
HGB BLD-MCNC: 11 G/DL (ref 11.7–15.7)
IGG SERPL-MCNC: 755 MG/DL (ref 610–1616)
IGG1 SER-MCNC: 412 MG/DL (ref 382–929)
IGG2 SER-MCNC: 291 MG/DL (ref 242–700)
IGG3 SER-MCNC: 43 MG/DL (ref 22–176)
IGG4 SER-MCNC: 20 MG/DL (ref 4–86)
LAB ORDER RESULT STATUS: NORMAL
Lab: NORMAL
MAGNESIUM SERPL-MCNC: 2.2 MG/DL (ref 1.7–2.3)
MCH RBC QN AUTO: 26.1 PG (ref 26.5–33)
MCHC RBC AUTO-ENTMCNC: 32 G/DL (ref 31.5–36.5)
MCV RBC AUTO: 82 FL (ref 78–100)
PERFORMING LABORATORY: NORMAL
PHOSPHATE SERPL-MCNC: 4.1 MG/DL (ref 2.5–4.5)
PLATELET # BLD AUTO: 352 10E3/UL (ref 150–450)
POTASSIUM SERPL-SCNC: 4 MMOL/L (ref 3.4–5.3)
RBC # BLD AUTO: 4.22 10E6/UL (ref 3.8–5.2)
SODIUM SERPL-SCNC: 139 MMOL/L (ref 135–145)
TEST NAME: NORMAL
WBC # BLD AUTO: 12.9 10E3/UL (ref 4–11)

## 2025-05-20 PROCEDURE — 70553 MRI BRAIN STEM W/O & W/DYE: CPT

## 2025-05-20 PROCEDURE — 250N000011 HC RX IP 250 OP 636

## 2025-05-20 PROCEDURE — 250N000013 HC RX MED GY IP 250 OP 250 PS 637

## 2025-05-20 PROCEDURE — 82565 ASSAY OF CREATININE: CPT

## 2025-05-20 PROCEDURE — A9585 GADOBUTROL INJECTION: HCPCS

## 2025-05-20 PROCEDURE — 85018 HEMOGLOBIN: CPT

## 2025-05-20 PROCEDURE — 255N000002 HC RX 255 OP 636

## 2025-05-20 PROCEDURE — 85014 HEMATOCRIT: CPT

## 2025-05-20 PROCEDURE — 80048 BASIC METABOLIC PNL TOTAL CA: CPT

## 2025-05-20 PROCEDURE — 99232 SBSQ HOSP IP/OBS MODERATE 35: CPT | Mod: GC | Performed by: STUDENT IN AN ORGANIZED HEALTH CARE EDUCATION/TRAINING PROGRAM

## 2025-05-20 PROCEDURE — 85049 AUTOMATED PLATELET COUNT: CPT

## 2025-05-20 PROCEDURE — 99232 SBSQ HOSP IP/OBS MODERATE 35: CPT | Performed by: STUDENT IN AN ORGANIZED HEALTH CARE EDUCATION/TRAINING PROGRAM

## 2025-05-20 PROCEDURE — 83735 ASSAY OF MAGNESIUM: CPT | Performed by: PSYCHIATRY & NEUROLOGY

## 2025-05-20 PROCEDURE — 36415 COLL VENOUS BLD VENIPUNCTURE: CPT

## 2025-05-20 PROCEDURE — 84100 ASSAY OF PHOSPHORUS: CPT | Performed by: PSYCHIATRY & NEUROLOGY

## 2025-05-20 PROCEDURE — 70553 MRI BRAIN STEM W/O & W/DYE: CPT | Mod: 26 | Performed by: STUDENT IN AN ORGANIZED HEALTH CARE EDUCATION/TRAINING PROGRAM

## 2025-05-20 PROCEDURE — 120N000002 HC R&B MED SURG/OB UMMC

## 2025-05-20 PROCEDURE — 258N000003 HC RX IP 258 OP 636

## 2025-05-20 RX ORDER — LOSARTAN POTASSIUM 25 MG/1
25 TABLET ORAL ONCE
Status: COMPLETED | OUTPATIENT
Start: 2025-05-20 | End: 2025-05-20

## 2025-05-20 RX ORDER — SULFAMETHOXAZOLE AND TRIMETHOPRIM 800; 160 MG/1; MG/1
1 TABLET ORAL
Status: DISCONTINUED | OUTPATIENT
Start: 2025-05-21 | End: 2025-05-21 | Stop reason: HOSPADM

## 2025-05-20 RX ORDER — GADOBUTROL 604.72 MG/ML
10 INJECTION INTRAVENOUS ONCE
Status: COMPLETED | OUTPATIENT
Start: 2025-05-20 | End: 2025-05-20

## 2025-05-20 RX ORDER — LOSARTAN POTASSIUM 50 MG/1
50 TABLET ORAL DAILY
Status: DISCONTINUED | OUTPATIENT
Start: 2025-05-21 | End: 2025-05-21

## 2025-05-20 RX ORDER — CALCIUM CARBONATE/VITAMIN D3 600 MG-10
1 TABLET ORAL 2 TIMES DAILY WITH MEALS
Status: DISCONTINUED | OUTPATIENT
Start: 2025-05-20 | End: 2025-05-21 | Stop reason: HOSPADM

## 2025-05-20 RX ORDER — GABAPENTIN 300 MG/1
300 CAPSULE ORAL 3 TIMES DAILY
Status: DISCONTINUED | OUTPATIENT
Start: 2025-05-20 | End: 2025-05-21 | Stop reason: HOSPADM

## 2025-05-20 RX ADMIN — DIVALPROEX SODIUM 500 MG: 500 TABLET, FILM COATED, EXTENDED RELEASE ORAL at 21:51

## 2025-05-20 RX ADMIN — GADOBUTROL 10 ML: 604.72 INJECTION INTRAVENOUS at 21:15

## 2025-05-20 RX ADMIN — PANTOPRAZOLE SODIUM 40 MG: 40 TABLET, DELAYED RELEASE ORAL at 08:28

## 2025-05-20 RX ADMIN — QUETIAPINE FUMARATE 25 MG: 25 TABLET ORAL at 21:51

## 2025-05-20 RX ADMIN — SENNOSIDES AND DOCUSATE SODIUM 1 TABLET: 50; 8.6 TABLET ORAL at 08:28

## 2025-05-20 RX ADMIN — GABAPENTIN 600 MG: 300 CAPSULE ORAL at 11:56

## 2025-05-20 RX ADMIN — ASPIRIN 81 MG CHEWABLE TABLET 81 MG: 81 TABLET CHEWABLE at 08:28

## 2025-05-20 RX ADMIN — ACETAMINOPHEN 650 MG: 325 TABLET ORAL at 12:46

## 2025-05-20 RX ADMIN — Medication 500 MCG: at 08:28

## 2025-05-20 RX ADMIN — ACETAMINOPHEN 650 MG: 325 TABLET ORAL at 02:30

## 2025-05-20 RX ADMIN — INSULIN ASPART 12 UNITS: 100 INJECTION, SOLUTION INTRAVENOUS; SUBCUTANEOUS at 15:58

## 2025-05-20 RX ADMIN — CALCIUM CARBONATE 600 MG (1,500 MG)-VITAMIN D3 400 UNIT TABLET 1 TABLET: at 08:28

## 2025-05-20 RX ADMIN — Medication 3 MG: at 21:51

## 2025-05-20 RX ADMIN — GABAPENTIN 600 MG: 300 CAPSULE ORAL at 02:26

## 2025-05-20 RX ADMIN — PROPRANOLOL HYDROCHLORIDE 60 MG: 60 CAPSULE, EXTENDED RELEASE ORAL at 08:28

## 2025-05-20 RX ADMIN — ENOXAPARIN SODIUM 40 MG: 40 INJECTION SUBCUTANEOUS at 16:00

## 2025-05-20 RX ADMIN — GABAPENTIN 300 MG: 300 CAPSULE ORAL at 17:26

## 2025-05-20 RX ADMIN — SERTRALINE HYDROCHLORIDE 50 MG: 50 TABLET ORAL at 08:28

## 2025-05-20 RX ADMIN — METHYLPREDNISOLONE SODIUM SUCCINATE 1000 MG: 1 INJECTION INTRAMUSCULAR; INTRAVENOUS at 09:10

## 2025-05-20 RX ADMIN — INSULIN ASPART 13 UNITS: 100 INJECTION, SOLUTION INTRAVENOUS; SUBCUTANEOUS at 09:11

## 2025-05-20 RX ADMIN — DIVALPROEX SODIUM 500 MG: 500 TABLET, FILM COATED, EXTENDED RELEASE ORAL at 08:28

## 2025-05-20 RX ADMIN — LOSARTAN POTASSIUM 25 MG: 25 TABLET, FILM COATED ORAL at 13:54

## 2025-05-20 RX ADMIN — CALCIUM CARBONATE 600 MG (1,500 MG)-VITAMIN D3 400 UNIT TABLET 1 TABLET: at 17:26

## 2025-05-20 RX ADMIN — Medication 500 MG: at 08:28

## 2025-05-20 RX ADMIN — LOSARTAN POTASSIUM 25 MG: 25 TABLET, FILM COATED ORAL at 08:28

## 2025-05-20 RX ADMIN — INSULIN ASPART 12 UNITS: 100 INJECTION, SOLUTION INTRAVENOUS; SUBCUTANEOUS at 12:47

## 2025-05-20 ASSESSMENT — ACTIVITIES OF DAILY LIVING (ADL)
ADLS_ACUITY_SCORE: 35
ADLS_ACUITY_SCORE: 35
ADLS_ACUITY_SCORE: 37
ADLS_ACUITY_SCORE: 35
ADLS_ACUITY_SCORE: 37
ADLS_ACUITY_SCORE: 35
ADLS_ACUITY_SCORE: 37
ADLS_ACUITY_SCORE: 35
ADLS_ACUITY_SCORE: 37
ADLS_ACUITY_SCORE: 35
ADLS_ACUITY_SCORE: 35
ADLS_ACUITY_SCORE: 37
ADLS_ACUITY_SCORE: 37
ADLS_ACUITY_SCORE: 35
ADLS_ACUITY_SCORE: 37
ADLS_ACUITY_SCORE: 35

## 2025-05-20 NOTE — PLAN OF CARE
Goal Outcome Evaluation:      Plan of Care Reviewed With: patient    Overall Patient Progress: no changeOverall Patient Progress: no change    Status: Admitted 5/6 with persistent headache and vision changes. Found to have microhemorrhages in occipital, temporal, and parietal lobes. S/p diagnostic angiogram 5/16 c/b L sided weakness and R parietal, R occipital, and L cerebellum infarcts.   Vitals: HTN. SBP < 180. RA.   Neuros: A&O x4. Forgetful. LUE 1, AOE 5. Decreased sensation to L side. L neglect & L cut. No visual hallucinations noted this shift, no audio hallucinations.  IV: PIV SL  Labs/Electrolytes: WNL. MRI ordered for 5/20  Resp: WNL  Diet: Mod CHO diet (60g). Good PO. Snacking throughout the night.   GI: LBM 5/18.  : Voiding spontaneously   Skin: Bruising throughout.  Pain: Mild headache managed with PRN Tylenol  Activity: Up SBA  Social: No calls or visitors this shift  Plan: Dose 5 of 5 of IV Methylprednisolone today 5/20 then switch to PO.

## 2025-05-20 NOTE — PROGRESS NOTES
[facility]    Vascular Neurology Progress Note    Interval History     No acute event overnight. Headaches have mostly resolved, however patient is complaining of blurry vision, but notes that it is not worse than yesterday.     Hospital Course     Chief complaint: Headache    65-year-old female with a PMH of DM, anxiety, tobacco use, and polysubstance use (positive for methamphetamine on urine drug screen) presented with a 3-4 week history of worsening headaches and visual disturbances. She had two prior hospitalizations for similar symptoms, during which MRI imaging at an outside hospital (OSH) revealed diffuse cerebral microbleeds predominantly in the parietal and occipital regions, along with significant leptomeningeal enhancement in the cerebellum and bilateral cerebral hemispheres, sparing parts of the frontal lobes. The initial differential included posterior reversible encephalopathy syndrome (PRES) and hypertensive vasculopathy. On 05/06, she presented to [facility] with worsening symptoms of headaches and visual disturbances. MRI Brain showed new and extensive leptomeningeal enhancement, sulcal hyperintensities, and evidence of microbleeds/superficial siderosis in the bilateral parieto-occipital regions. Continuous EEG done for 3 days without any abnormal epileptiform discharges. CT chest abdominal pelvis completed to rule out any malignancy was unremarkable. D-dimer, ESR, CRP within normal limits.  Lumbar puncture revealed lymphocytic pleocytosis and elevated protein, with no evidence of infection. Rheumatology was consulted, serum inflammatory workup negative thus far. Given the imaging findings, inflammatory amyloid angiopathy was considered as a leading differential. To further evaluate for vasculopathy vs vasculitis, a digital subtraction angiogram (DSA) was performed on 05/16 and was negative. Following the DSA procedure, the patient developed new left  upper extremity weakness and left hemineglect. Repeat imaging demonstrated multiple new infarcts, suspect most likely periprocedural. The patient was started on high dose methylprednisolone for five days and plan for oral steroid taper.     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 Differential diagnoses include inflammatory amyloid angiopathy, CNS lymphoma, neoplastic/autoimmune process, vasculitis, less likely PRES given significant leptomeningeal enhancement and less prominent FLAIR changes though could be recovering PRES. LP with findings suggestive of inflammatory process. Serum workup for vasculitis was negative for inflammatory markers. Repeat MRI (05/12) brain with and without contrast noted slight increased extent of leptomeningeal enhancement, and unchanged microhemorrhages. She underwent a DSA on 05/16 that didn't show any evidence of vasculopathy. However she developed new LUE extremity weakness post-procedure with MRI showing multiple infarcts in the R parietal and occipital lobes. Based on imaging findings, the most probable diagnosis remains inflammatory cerebral amyloid angiopathy. She was started on IV methylprednisolone 1000mg daily for 5 days, and is getting her final dose today.  -Repeat MRI brain wwo to assess for any changes  -Continue headaches management with               -Gabapentin 300 mg TID  - propranolol ER 60 mg daily for migraine prevention   - depakote 500mg BID   - PRN compazine & tylenol   - magnesium 500mg daily  - if all PRN  medications do not relieve headache, will try PRN tramadol 50mg Q6hr  - completed IV methylprednisolone 1000mg 5 day course 5/20  - starting 5/21 switch to oral prednisone 60 mg daily for 1 month   - taper by 10mg each week down to 10mg daily. After that, duration of steroids treatment will be determined during follow-up visit with neurology  - while on prolonged steroid course, prophylaxis with Protonix daily, vit D/calcium daily, and bactrim DS three times weekly  - repeat MRI wwo today  - serum and CSF autoimmune encephalopathy panel pending  -Continue Aspirin 81mg  -Normotensive BP goal  -Follow-up MRI wwo in one month    #Hyponatremia, resolved  Workup as of 5/11 serum osmol 285, urine osmol 862, urine sodium 219, glucose at time of urine studies was 224, serum sodium 128. Workup most consistent with SIADH vs thiazide induced hyponatremia. Holding thiazide since 5/11 hyponatremia improved. 5/17 was 133 then improved to 142 on 5/18.   -trend BMP  -strict I/Os  -encourage adequate PO intake     #incidental finding- Ill-defined 1.6 cm area of enhancement within the left inferior thyroid lobe.   -TSH normal. Consider outpatient  nonemergent thyroid ultrasound for further Assessment, will defer to PCP.     #incidental 3. 5 mm solid nodule in the right upper lung lobe  -Can consider optional follow-up CT in 12 months to document stability, per PCP     # Leukocytosis, stable  - Likely reactionary to steroids  - Daily CBC     # Type 2 diabetes mellitus  - A1c this admission 8.7, goal < 7.0. Will need close PCP follow up  - Medium dose sliding scale -> high dose sliding scale switched on 5/12  -low consistent carb diet  - Endocrine consult for diabetes management              - NPH 44 units at 0900 with methylprednisolone 1,000 mg (do not give if MP not given).   - Novolog Meal Coverage: 1 units per 5 g CHO TID AC                 -PRN snacks pending time of day:  0900 - 2059: Dose = 1 units per 5 grams of  carbohydrate  2100 - 0859: Dose = 1 units per 10 grams of carbohydrate  - Novolog Correction Scale: high resistance 1/25 >140 TID AC, relax 1/50 >200 HS & 0200   - BG monitoring: TID AC, HS, 0200   - Hypoglycemia protocol    - Carb counting protocol      #Hypertension   cautious re-introduction of PTA BP meds to avoid fluctuations, and permissive HTN in setting of new stroke on 5/16, will start slow initiation of PTA meds  - BP parameters:              - Notify provider for SBP > 180              - PRN antihypertensives for SBP > 180  - Hydralazine/labetalol as needed for systolic blood pressure>180  - Holding PTA hydrochlorothiazide 12.5 mg daily since 5/11 due to hyponatremia  - increase losartan to 50mg daily (PTA dose losartan 100mg)   - Holding PTA amlodipine 10 mg daily since 5/5      #Eczema  - PRN triamcinolone cream that the patient has used in the past     Prophylaxis            For VTE Prevention:  - pneumatic compression device and started lovenox DVT ppx 5/12      For Acid Suppression:  - GI prophylaxis is not indicated     Patient Follow-up    - Neurology clinic follow up in 6-8 weeks  - PCP follow up within 7 days of discharge for HTN, diabetes management    Patient Follow-up      Medically Ready for Discharge: Anticipated Tomorrow    The patient was discussed with neurology attending, Dr. Puente    Note was written in conjunction with Emelia Lewis, research fellow. I have seen and examined the patient and have edited documentation as appropriate.     BART KILLIAN MD  Neurology Resident, PGY-2    Physical Examination     Temp: 98.4  F (36.9  C) Temp src: Oral BP: (!) 174/77 Pulse: 65   Resp: 18 SpO2: 97 % O2 Device: None (Room air)      General:  patient sitting in chair, no acute distress  HEENT:  normocephalic/atraumatic  Cardio:  RRR  Pulmonary:  no respiratory distress on room air  Skin:  bruising present    Neurologic  Mental Status:  alert, oriented x 3, follows commands, speech  clear and fluent, naming and repetition normal  Cranial Nerves:  L superior  quadrantanopia,PERRL, EOMI with normal smooth pursuit, facial sensation intact and symmetric, facial movements symmetric, hearing not formally tested but intact to conversation, palate elevation symmetric and uvula midline, no dysarthria, shoulder shrug strong bilaterally, tongue protrusion midline  Motor:  LUE drift without hitting the bed. Left shoulder abduction 3/5, elbow extension 3/5, elbow flexion 3/5, wrist extension 2/5, finger extension 0/5, finger flexion 3/5.  5/5 strength in right arm and legs bilaterally. normal muscle tone and bulk, no abnormal movements.   Reflexes:  No ankle clonus bilaterally  Sensory:  light touch sensation intact and symmetric throughout upper and lower extremities, no extinction on double simultaneous stimulation   Coordination:  Finger-nose-finger on right without ataxia or dysmetria, unable to assess on left. Heel to shin intact bilaterally   Station/Gait:  deferred    Stroke Scales       NIHSS  1a. Level of Consciousness 0-->Alert, keenly responsive   1b. LOC Questions 0-->Answers both questions correctly   1c. LOC Commands 0-->Performs both tasks correctly   2.   Best Gaze 0-->Normal   3.   Visual 1-->Partial hemianopia   4.   Facial Palsy 0-->Normal symmetrical movements   5a. Motor Arm, Left 1-->Drift, limb holds 90 (or 45) degrees, but drifts down before full 10 seconds, does not hit bed or other support   5b. Motor Arm, Right 0-->No drift, limb holds 90 (or 45) degrees for full 10 secs   6a. Motor Leg, Left 0-->No drift, leg holds 30 degree position for full 5 secs   6b. Motor Leg, right 0-->No drift, leg holds 30 degree position for full 5 secs   7.   Limb Ataxia 0-->Absent   8.   Sensory 0-->Normal, no sensory loss   9.   Best Language 0-->No aphasia, normal   10. Dysarthria 0-->Normal   11. Extinction and Inattention  1-->Visual, tactile, auditory, spatial, or personal inattention or  extinction to bilateral simultaneous stimulation in one of the sensory modalities   Total 3 (05/20/25 2296)       Imaging/Labs   (Bolded imaging and labs new and/or personally reviewed or re-reviewed by me today)    MRI/Head CT MRI Brain 5/16  IMPRESSION:  1. Multiple new acute infarcts since the MRI 24 hours ago, most  notably in the right parietal lobe and right occipital lobe. Tiny  evolving infarct in the left cerebellum.  2. Worsened marked abnormal leptomeningeal FLAIR signal, greatest in  the posterior cerebral hemispheres     MRI Brain (5/15)  IMPRESSION:  1. Slight increase in extent of leptomeningeal enhancement and  unchanged numerous microhemorrhages in the occipital, temporal, and  parietal regions bilaterally. These findings are nonspecific and could  be seen with angiopathy and/or inflammation.  2. Tiny new infarct in the left cerebellum.  3. Superficial siderosis within the right occipital lobe, likely  related to prior hemorrhage.  4. Moderate sequelae of chronic small vessel ischemic disease.     CT head:  IMPRESSION:  1.  No acute findings. No acute intracranial hemorrhage. No acute calvarial fracture.     MRI: (5/7)  IMPRESSION:  1. There is somewhat decreased cortical T2 hyperintense signal with increased conspicuity of leptomeningeal enhancement and numerous presumed microhemorrhages within the occipital, temporal parietal regions bilaterally. These findings are nonspecific, although may be seen with process or hematogenous malignancy such as melanoma.  Findings are less likely related to cerebral amyloid  angiopathy given the distribution. The distribution could be seen with hypertensive angiopathy, however these are extremely great number, much more than expected.   2. 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  3. Chronic small vessel ischemic changes.   Intracranial Vasculature HEAD CTA:   No large vessel occlusion findings intracranially.  No high-grade stenosis intracranially.   Cervical Vasculature NECK CTA:  1.  No acute vascular injury in the neck. No high-grade stenosis in the neck.      Echocardiogram Not obtained   EKG/Telemetry Not obtained      LDL  5/6/2025: 72 mg/dL   A1C  5/6/2025: 8.7 %   Troponin No lab value available in past 48 hrs   TSH 1.24  CT CAP: IMPRESSION:   1. No evidence of primary malignancy throughout the chest, abdomen or  pelvis.  2. Ill-defined 1.6 cm area of enhancement within the left inferior  thyroid lobe. Consider nonemergent thyroid ultrasound for further  assessment.  3. 5 mm solid nodule in the right upper lobe. Can consider optional  follow-up CT in 12 months to document stability.  4. 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 The gastrojejunal anastomosis appears  patent, however, inspissated debris may suggest delayed  gastrointestinal transit.  5. Hepatic steatosis.  6. Additional chronic and incidental findings, as described above.       SUMMARY OF 3 DAYS OF VIDEO EEG:  This 3 day video EEG is abnormal due to the presence increased superimposed fast activity, likely related to sedating medications employed. No seizures or epileptiform discharges were seen. Clinical correlation is recommended.     CSF (5/12)  Protein total CSF (74.9), Glucose CSF (102), RBC (5), Appearance (clear) Lymphocyte%, Mono %, Neutrophil % (all WNL)

## 2025-05-20 NOTE — PROGRESS NOTES
Inpatient Diabetes Management Service: Daily Progress Note     HPI: 65 female with past medical history of DM, anxiety, tobacco use, polysubstance use presented 5/6/25 with headaches and vision changes ongoing for 3 to 4 weeks.  Patient hospitalized x 2 at outside hospital for similar symptoms and workup include MRI brain with a working diagnosis of possible PRES. patient was hypertensive in the 200s in 1 of those encounters. Patient also described spells of losing time and missing part of her video while watching TV.  On comparison to the most recent MRI patient has microbleed's in the bilateral parietotemporal occipital regions.  Significant leptomeningeal enhancement in the cerebellar regions and bilateral cerebral hemisphere sparing some of the part of frontal regions.  Leptomeningeal enhancement appears to be more prominent than the most recent MRI.  FLAIR hyperintensity can be seen however not very prominent.  Continuous EEG done for 24 hours without any abnormal epileptiform discharges.  CT chest abdominal pelvis completed to rule out any malignancy however unremarkable.  D-dimer, ESR, CRP within normal limits. Differential diagnoses include inflammatory cerebral amyloid angiopathy, PRES, CNS lymphoma, neoplastic/autoimmune process or small vessel vasculitis. Inpatient Diabetes Service consulted 5/15/25 for assistance with glycemic control in setting of starting high dose steroids.          Assessment/Plan:     Assessment:   Type 2 Diabetes Mellitus, without known complications. 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     Plan/Recommendations:    - NPH 44 units at 0900 with methylprednisolone 1,000 mg (do not give if MP not given).   - Novolog Meal  Coverage: 1 units per 5 g CHO TID AC    -PRN snacks pending time of day:  0900 - 2059: Dose = 1 units per 5 grams of carbohydrate  2100 - 0859: Dose = 1 units per 10 grams of carbohydrate  - Novolog Correction Scale: high resistance 1/25 >140 TID AC, relax 1/50 >200 HS & 0200   - BG monitoring: TID AC, HS, 0200   - Hypoglycemia protocol    - Carb counting protocol      Discussion: BG trended better yesterday, elevated HS but improved overnight. Today is last day of MP 1,000 mg (MP 1,000 bioequivalent to prednisone 1,250 mg), steroid will taper significantly tomorrow to prednisone 60 mg daily. Continue current diabetes regimen today, will reduce insulins tomorrow.  Tentatively discharging to ARU tomorrow, will make ARU plan tomorrow if discharging pending which ARU.      Discharge Planning: (tentative)  --> plan for ARU (either [place] or [place])  Medications: TBD    Test Claims: completed 5/19--> ordered PA for NPH flexpens 5/20.     Education: will be needed if not discharging   Outpatient Follow-up: recommend [facility] Endocrinology vs PCP    Please notify Inpatient Diabetes Service if changes are planned to steroids, nutrition, TPN/TF and anticipated procedures requiring prolonged NPO status.         Interval History/Review of Systems :   - The last 24 hours progress and nursing notes reviewed.  - Having blurry vision today, no headache. Strong appetite without nausea or emesis. She is happy that she is able to eat more carbohydrates.   - Discussed CGM, she would be interested in this (can be pursued outpatient or at the end of her ARU stay)    Planned Procedures/Surgeries: none    Inpatient Glucose Control:       Recent Labs   Lab 05/20/25  0159 05/19/25  2123 05/19/25  1632 05/19/25  1221 05/19/25  1030 05/19/25  0939   * 218* 133* 198* 124* 95             Medications Impacting Glycemia:   Steroids:    5/16 - 5/20: Methylprednisolone 1,000 mg daily at 0900   5/21: Prednisone 60 mg daily x 1 month, then  "tentatively taper by 10 mg every week pending results of brain MRI   D5W-containing solutions/medications: none   Other medications impacting glucose: none         Nutrition:   Orders Placed This Encounter      Moderate Consistent Carb (60 g CHO per Meal) Diet  Supplements: none   TF: none   TPN: none         Diabetes History: see full consult note for complete diabetes history   Diabetes Type and Duration: T2DM, per patient was told she has T2DM 2 weeks ago at OSH. Per chart review, diagnosed 4/2021 with A1c 6.7%      GAD65 antibody, zinc transporter 8 antibody, islet antibody, insulin antibody, and c-peptide not available on epic search      PTA Medication Regimen: none     Missing doses?: N/A     Historical Diabetes Medications:   - reports taking Metformin 500 mg BID recently at OSH but wasn't taking at home. Has never been on insulin before.    --> reports having gastric bypass surgery ~30 years ago, this shouldn't limit ability to use a GLP1a since it has been so long ago.      Glucose monitoring device/frequency/trends: has used glucometer randomly before because she has family members with diabetes.     Hypoglycemia PTA: none     Outpatient Diabetes Provider: Has PCP but hasn't seen him since 2021 - Randal Castellanos (with [place])      Formal Diabetes Education/Educator: no        Physical Exam:   BP (!) 159/80 (BP Location: Right arm)   Pulse 65   Temp 97.7  F (36.5  C) (Oral)   Resp 17   Ht 1.676 m (5' 6\")   Wt 104.6 kg (230 lb 8 oz)   SpO2 96%   BMI 37.20 kg/m    General:  well appearing, NAD, sitting up in chair.  Lungs: unlabored breathing on RA.  Mental Status:  Alert and oriented x3  Psych:  normal mood and affect          Data:     Lab Results   Component Value Date    A1C 8.7 (H) 05/06/2025    A1C 6.0 (H) 02/11/2022    A1C 6.6 (H) 10/15/2021    A1C 5.8 (H) 09/06/2021    A1C 6.7 (H) 04/19/2021    A1C 5.0 02/11/2015       ROUTINE IP LABS (Last four results)  BMP  Recent Labs   Lab 05/20/25  0159 " 05/19/25  2123 05/19/25  1632 05/19/25  1221 05/19/25  0757 05/19/25  0636 05/18/25  0809 05/18/25  0708 05/17/25  0901 05/17/25  0729 05/16/25  1459 05/16/25  1311   NA  --   --   --   --   --  144  --  142  --  133*  --  136   POTASSIUM  --   --   --   --   --  4.0  --  4.0  --  4.3  --  4.7   CHLORIDE  --   --   --   --   --  107  --  107  --  100  --  101   CRISTIAN  --   --   --   --   --  8.3*  --  8.6*  --  8.3*  --  8.5*   CO2  --   --   --   --   --  25  --  24  --  22  --  23   BUN  --   --   --   --   --  20.1  --  15.2  --  14.7  --  15.4   CR  --   --   --   --   --  0.70  --  0.68  --  0.57  --  0.56   * 218* 133* 198*   < > 106*   < > 96   < > 165*   < > 167*    < > = values in this interval not displayed.     CBC  Recent Labs   Lab 05/19/25  0636 05/18/25  0708 05/17/25  0729 05/16/25  1311   WBC 11.9* 16.6* 14.7* 10.2   RBC 4.00 4.44 4.41 4.78   HGB 10.5* 11.5* 11.4* 12.7   HCT 34.1* 36.0 35.7 39.2   MCV 85 81 81 82   MCH 26.3* 25.9* 25.9* 26.6   MCHC 30.8* 31.9 31.9 32.4   RDW 15.1* 15.2* 14.6 15.0    [phone]     Inpatient Diabetes Service will continue to follow, please don't hesitate to contact the team with any questions or concerns.     Andreea Taylor PA-C  Inpatient Diabetes Service  Pager: [phone]  Available on [employer]    To contact Inpatient Diabetes Service:     7 AM - 5 PM: Page the IDS ELBERT following the patient that day (see filed or incomplete progress notes/consult notes under Endocrinology)    OR if uncertain of provider assignment: page job code [phone]    5 PM - 7 AM: First call after hours is to primary service.    For urgent after-hours questions, page job code for on call fellow: [phone]     I spent a total of 35 minutes on the date of the encounter doing prep/post-work, chart review, history and exam, documentation and further activities per the note including lab review, multidisciplinary communication, counseling the patient and/or coordinating care regarding acute  hyper/hypoglycemic management, as well as discharge management and planning/communication.  See note for details.

## 2025-05-20 NOTE — PLAN OF CARE
"Status: Admitted 5/6 with persistent headache and vision changes. Found to have microhemorrhages in occipital, temporal, and parietal lobes. S/p diagnostic angiogram 5/16 c/b L sided weakness and R parietal, R occipital, and L cerebellum infarcts.   Vitals: HTN within parameters. SBP < 180. RA.   Neuros: A&O x4. Forgetful. LUE 3, AOE 5. Decreased sensation to L side. L neglect & L cut. No hallucinations this shift. Bilateral blurry vision.  IV: PIV SL  Labs/Electrolytes: WNL. Awaiting MRI today  Resp: WNL  Diet: Mod CHO diet (60g). Good PO  GI: LBM 5/20 per patient, Writer did not witness.  : Voiding spontaneously   Skin: Bruising throughout.  Pain: Mild headache managed with PRN Tylenol  Activity: A1. Intermittently refusing GB and walker. Up in recliner throughout the day. Walked to [address] Floor Library with Writer.  Social: No calls or visitors this shift  Plan: IV Solumedrol completed. Medically ready for discharge to ARU. SW following.  Updates this shift: This morning patient stated she \"lost a tooth last night and put it on the bedside table.\" Patient said that tooth was really loose already so she wasn't concerned about it falling out. Tooth was not on bedside table and Writer could not find it in the room. Team aware    Goal Outcome Evaluation:      Plan of Care Reviewed With: patient    Overall Patient Progress: no change    Outcome Evaluation: No hallucinations this shift. IV Solumedrol completed.          "

## 2025-05-20 NOTE — PROGRESS NOTES
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 Initially differential included hypertensive vasculopathy/ PRES which is now thought to be less likely per primary team and leading differential is inflammatory cerebral amyloid angiopathy.  She had an LP which showed RBC 5, TNC 32, lymphocytes 72%, protein of 74.9, glu 102.   She had a repeat MRI brain on 5/15/25 (for new auditory hallucinations) showing slight increase in leptomeningeal enhancement and unchanged microhemorrhages, along with a tiny infarct in the left cerebellum.  Patient underwent diagnostic cerebral angiogram on 5/16/25 which did not show any aneurysms, vascular malformation or any areas of stenosis, irregularity or bleeding to suggest inflammatory vasculopathy in anterior and posterior circulation.  Postprocedure, she was found to have left arm weakness along with left arm hemisensory neglect when examined in her room.  CT head and CTA head and neck were obtained which were unrevealing.  She had an MRI overnight which showed right parietal and right occipital lobe infarcts along with evolving infarct in the left cerebellum as noted on the prior MRI.  She also had worsened marked abnormal leptomeningeal FLAIR signal.    Pt reports doing well. She slept better. Left side is getting stronger. Today is the last day of steroids for her.        Physical exam:   BP (!) 159/80 (BP Location: Right arm)   Pulse 65   Temp 97.7  F (36.5  C) (Oral)   Resp 17   Ht 1.676 m (5' 6\")   Wt 104.6 kg (230 lb 8 oz)   SpO2 96%   BMI 37.20 kg/m    General: Awake and alert and in no acute distress.  A&O x4  Speech without aphasia or dysarthria  CN- EOMI, left VF cut, face symmetric, hearing " intact to conversation, tongue midline  Motor- antigravity on the right, LLE drift, LUE 2-3/5 proximally and 0-1 distally  Sensation- left hemisensory neglect  Coordination- finger to nose without ataxia on the right  Gait- deferred    Imaging:  No new imaging    Assessment/Plan:  64yo F with vascular risk factors, presented with headache and visual changes for a few weeks at least, found to have microbleeds mostly in the occipital/temporal/parietal regions bilaterally with associated leptomeningeal enhancement, initially concerning for hypertensive vasculopathy/PRES however now inflammatory cerebral amyloid angiopathy higher on the differential per primary team. She had an LP showing lymphocytic pleocytosis. She underwent diagnostic cerebral angiogram which did not show any evidence of irregularity, narrowing or beading suggestive of inflammatory vasculopathy. Post-procedure, she was found to have left sided weakness and neglect, with repeat MRI brain revealing right parieto-occipital infarcts along with worsening leptomeningeal signal. Though there was no significant plaque or luminal irregularity visualized on angiography, it is possible that an angiographically occult eccentric atheromatous plaque (which was not known to significantly encroach on the vessel lumen) was dislodged at some point resulting in an embolic event.  - aspirin 81mg daily  - vascular risk factor control  - physical, occupational therapies  - rest per stroke neurology  - we will continue to follow along    Patient discussed with Dr. Romano.  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    Depressive disorder     Gastro-oesophageal reflux disease      bleeding ulcers    Hiatal hernia     NEWLY DIAGNOSISED    Hyperparathyroidism, unspecified 2012    Peptic ulcer, unspecified site, unspecified as acute or chronic, without mention of hemorrhage or perforation     Pneumonia     NOVEMBER    PONV (postoperative nausea and vomiting)     TMJ (temporomandibular joint syndrome) 2014    Vitamin D deficiency 2012       Surgical History:   Past Surgical History:   Procedure Laterality Date    APPENDECTOMY      CHOLECYSTECTOMY      DILATION AND CURETTAGE, OPERATIVE HYSTEROSCOPY WITH MORCELLATOR, COMBINED  2012    Procedure: COMBINED DILATION AND CURETTAGE, OPERATIVE HYSTEROSCOPY WITH MORCELLATOR;  Hysteroscopy, Polypectomy, Dilation and Curettage  ;  Surgeon: Marta Montejo MD;  Location: [facility] OR    GI SURGERY      gastric bypass at age 31    ORTHOPEDIC SURGERY      back surgery at age 31    PARATHYROIDECTOMY Right 2015    Procedure: PARATHYROIDECTOMY;  Surgeon: Gregory Coleman MD;  [facility]      SPINE SURGERY         Social history:   Social History     Tobacco Use    Smoking status: Every Day     Current packs/day: 0.50     Average packs/day: 0.5 packs/day for 30.0 years (15.0 ttl pk-yrs)     Types: Cigarettes    Smokeless tobacco: Never   Substance Use Topics    Alcohol use: Yes     Comment: occasionally    Drug use: No       Family history:   Family History   Problem Relation Age of Onset    Thyroid Disease Mother     Breast Cancer Mother 57         65 y.o.    Other - See Comments Mother         cystic acne    Cancer Brother     Diabetes Daughter         Type 1; insulin       Medications:  Current Facility-Administered Medications   Medication Dose Route Frequency Provider Last Rate Last Admin    acetaminophen (TYLENOL) tablet 650 mg  650 mg Oral Q4H PRN Mara Ceron MD   650 mg at 25 0230    aspirin (ASA) chewable tablet 81 mg  81 mg Oral Daily Mara Ceron MD   81  mg at 05/19/25 0758    calcium carbonate (TUMS) chewable tablet 500 mg  500 mg Oral Daily PRN Jazmine Wade MD   500 mg at 05/11/25 1336    cyanocobalamin (VITAMIN B-12) sublingual tablet 500 mcg  500 mcg Sublingual Daily Oren Villeda MD   500 mcg at 05/19/25 0759    glucose gel 15-30 g  15-30 g Oral Q15 Min PRN Karen Burt MD        Or    dextrose 50 % injection 25-50 mL  25-50 mL Intravenous Q15 Min PRN Karen Burt MD        Or    glucagon injection 1 mg  1 mg Subcutaneous Q15 Min PRN Karen Burt MD        divalproex sodium extended-release (DEPAKOTE ER) 24 hr tablet 500 mg  500 mg Oral BID Karen Burt MD   500 mg at 05/19/25 1922    enoxaparin ANTICOAGULANT (LOVENOX) injection 40 mg  40 mg Subcutaneous Q24H Karen Burt MD   40 mg at 05/19/25 1609    gabapentin (NEURONTIN) capsule 600 mg  600 mg Oral TID Dariusz Bang MD   600 mg at 05/20/25 0226    hydrALAZINE (APRESOLINE) injection 10 mg  10 mg Intravenous Q6H PRN Mara Ceron MD        [Held by provider] hydroCHLOROthiazide tablet 12.5 mg  12.5 mg Oral Daily Dariusz Bang MD   12.5 mg at 05/11/25 0817    insulin aspart (NovoLOG) injection (RAPID ACTING)  1-5 Units Subcutaneous BID Andreea Taylor PA-C   1 Units at 05/19/25 2124    insulin aspart (NovoLOG) injection (RAPID ACTING)   Subcutaneous TID w/meals Low Macario MD   13 Units at 05/19/25 1740    insulin aspart (NovoLOG) injection (RAPID ACTING)   Subcutaneous With Snacks or Supplements Andreea Taylor PA-C   4 Units at 05/20/25 0236    insulin aspart (NovoLOG) injection (RAPID ACTING)  1-10 Units Subcutaneous TID AC Karen Burt MD   3 Units at 05/19/25 1221    insulin NPH injection 44 Units  44 Units Subcutaneous Q24H Andreea Taylor PA-C   44 Units at 05/19/25 0811    lidocaine (LMX4) cream   Topical Q1H PRN Litzy Kulkarin MD        lidocaine 1 % 0.1-1 mL  0.1-1 mL Other Q1H PRN Litzy Kulkarni MD        losartan (COZAAR) tablet 25 mg  25 mg Oral  Daily Mara Ceron MD   25 mg at 05/19/25 1217    magnesium gluconate (MAGONATE) tablet 500 mg  500 mg Oral Daily Dariusz Bang MD   500 mg at 05/19/25 0758    melatonin tablet 3 mg  3 mg Oral At Bedtime Alejandro Villalobos MD   3 mg at 05/19/25 2124    methylPREDNISolone sodium succinate (solu-MEDROL) 1,000 mg in sodium chloride 0.9 % 283 mL intermittent infusion  1,000 mg Intravenous Q24H Mara Ceron  mL/hr at 05/19/25 1046 1,000 mg at 05/19/25 1046    ondansetron (ZOFRAN ODT) ODT tab 4 mg  4 mg Oral Q6H PRN Litzy Kulkarni MD   4 mg at 05/16/25 1901    Or    ondansetron (ZOFRAN) injection 4 mg  4 mg Intravenous Q6H PRN Litzy Kulkarni MD   4 mg at 05/17/25 0106    pantoprazole (PROTONIX) EC tablet 40 mg  40 mg Oral QAM AC Mara Ceron MD   40 mg at 05/19/25 0759    polyethylene glycol (MIRALAX) Packet 17 g  17 g Oral Daily Jazmine Wade MD   17 g at 05/11/25 1542    prochlorperazine (COMPAZINE) injection 10 mg  10 mg Intravenous Q6H PRN Karen Burt MD   10 mg at 05/16/25 2230    promethazine (PHENERGAN) tablet 25 mg  25 mg Oral Once PRN Wicho Miller MD        propranolol ER (INDERAL LA) 24 hr capsule 60 mg  60 mg Oral Daily Dariusz Bang MD   60 mg at 05/19/25 0758    QUEtiapine (SEROquel) tablet 25 mg  25 mg Oral At Bedtime Alejandro Villalobos MD   25 mg at 05/19/25 2124    senna-docusate (SENOKOT-S/PERICOLACE) 8.6-50 MG per tablet 1 tablet  1 tablet Oral BID Karen Burt MD   1 tablet at 05/19/25 0759    sennosides (SENOKOT) tablet 8.6 mg  8.6 mg Oral Daily PRN Karen Burt MD   8.6 mg at 05/17/25 0213    sertraline (ZOLOFT) tablet 50 mg  50 mg Oral or Feeding Tube Daily Litzy Kulkarni MD   50 mg at 05/19/25 0759    sodium chloride (PF) 0.9% PF flush 3 mL  3 mL Intracatheter Q8H KHUSHBU Litzy Kulkarni MD   3 mL at 05/19/25 1609    sodium chloride (PF) 0.9% PF flush 3 mL  3 mL Intracatheter q1 min prn Litzy Kulkarni MD   3 mL at 05/19/25 1922    traMADol (ULTRAM) tablet 50 mg   50 mg Oral Q6H PRN Mara Ceron MD        triamcinolone (KENALOG) 0.1 % cream   Topical BID PRN Mara Ceron MD           Allergies:     Allergies   Allergen Reactions    Nsaids Other (See Comments), Nausea and Vomiting and GI Disturbance     History of GI bleeding and ulcers    Codeine Sulfate GI Disturbance

## 2025-05-20 NOTE — PROGRESS NOTES
Disregard this entry which was made in error.    CARLINE Padgett  Social Work, 6A  Phone:  [phone]  Pager:  [phone]  5/20/2025

## 2025-05-20 NOTE — PROGRESS NOTES
CLINICAL NUTRITION SERVICES - REASSESSMENT NOTE     RECOMMENDATIONS FOR MDs/PROVIDERS TO ORDER:  Regarding surgical history (distant history of gastric bypass), the following could be considered in an OP setting/post-discharge as indicated considering distant history:  - Multivitamin/minerals: adult dose 1-2 times daily  Ideally one that provides:   5,000 - 10,000 international units vitamin A daily              800 mg oral folate daily  8 - 22 mg zinc and 1 - 2 mg copper daily  12 mg Thiamin  - Iron: 45-60 mg elemental (18-36 mg if low risk) - may partly or fully be covered in multivitamin   - Calcium Citrate containing vitamin D: 500 mg 3 times daily or 600 mg 2 times daily              - Separate the calcium from your multivitamin or iron by at least 2 hours.               - Must be a chewable calcium citrate until post-op 3 months               - Options for calcium citrate: Jose L calcium citrate chewable, bariatric advantage calcium citrate chewable, Celebrate vitamins calcium citrate chewable, Bariatric Fusion calcium citrate chewable  - Vitamin D - at least 3,000 international units/day between all supplements  - Vitamin B12: sublingual form of at least 500 mcg daily or injection of 1000 mcg monthly      Future/Additional Recommendations:  - PO adequacy   - Weight trends     INFORMATION OBTAINED  Patient not available for interview due to patient not available in room. Chart was reviewed     CURRENT NUTRITION ORDERS  Diet: Moderate Consistent Carbohydrate    CURRENT INTAKE/TOLERANCE  PO intake per nursing documentation 100% of meal trays  Per [employer] system Amy is ordering [id number] kcals/daily and  g protein/daily      NEW FINDINGS  GI symptoms: LBM (5/16), + emesis (5/19)    Skin/wounds: Reviewed, Mynor score 17  Nutrition-relevant labs: Reviewed - (5/13) 139 nmol/L  Nutrition-relevant medications:   Vitamin B12  Insulin aspart  Insulin NPH  Magonate  Weight: weight elevated since  admission weight. Continue current dosing weight.     MALNUTRITION  % Intake: No decreased intake noted  % Weight Loss: None noted  Subcutaneous Fat Loss: None observed - per previous RD note   Muscle Loss: None observed - per previous RD note   Fluid Accumulation/Edema: None noted - pr chart review   Malnutrition Diagnosis: Patient does not meet two of the established criteria necessary for diagnosing malnutrition but is at risk for malnutrition  Malnutrition Present on Admission: No    EVALUATION OF THE PROGRESS TOWARD GOALS   Previous Goals  Patient to consume % of nutritionally adequate meal trays TID, or the equivalent with supplements/snacks.  Evaluation: Progressing    Previous Nutrition Diagnosis  Predicted inadequate nutrient intake   Evaluation: No change    NUTRITION DIAGNOSIS  Predicted inadequate nutrient intake (energy/protein) related to hospitalization as evidenced by pt report of possible decreased PO from baseline due to boredom, restricted diet and interruptions to diet order     INTERVENTIONS  None at this time     GOALS  Patient to consume % of nutritionally adequate meal trays TID, or the equivalent with supplements/snacks.     MONITORING/EVALUATION  Progress toward goals will be monitored and evaluated per policy.    Dayami Pelayo MS/RD/LD/CNSC  Available on [employer]   M-F (7am-3:30pm) - Neuro/6A Clinical Dietitian  Weekend/Holiday Dietitian (7am-3:30pm)    ** Clinical Dietitians no longer available on pager

## 2025-05-20 NOTE — PROGRESS NOTES
Care Management Follow Up    Length of Stay (days): 14    Expected Discharge Date: 05/21/2025     Concerns to be Addressed: Discharge planning     Patient plan of care discussed at interdisciplinary rounds: Yes    Anticipated Discharge Disposition:   Occupational and Physical Therapy are recommending acute rehab placement     Anticipated Discharge Services: Occupational and Physical Therapy are recommending acute rehab placement  Anticipated Discharge DME: Not applicable at this time    Patient/family educated on Medicare website which has current facility and service quality ratings: Yes  Education Provided on the Discharge Plan: Yes  Patient/Family in Agreement with the Plan: Yes    Referrals Placed by CM/SW:  Post acute care facility's  Private pay costs discussed: Not applicable at this time    Discussed  Partnership in Safe Discharge Planning  document with patient/family: No     Handoff Completed: No, handoff not indicated or clinically appropriate    Additional Information:  KULWANT is following pt for discharge planning.  Occupational and Physical Therapy are recommending acute rehab placement.  Pt needs a private room for MRSA today  KULWANT spoke with Dr. Ceron who states that pt will receive her last dose of IV Methylprednisolone today.  Dr. Ceron states that pt will have a MRI today.  Dr. Ceron states that she anticipates that pt will be medically ready for discharge on 5/21/2025.      KULWANT placed follow up calls to the following ARU's:  - [facility], KULWANT left a message for Admissions (Renata) informing of the above.   KULWANT asked that Renata call to indicate the likelihood of  ARU having bed availability/acceptance for a 5/21/2025 admit.  - [facility] ARKULWANT left a message for Admissions to call in regards to acceptance.  - [facility], KULWANT received a call from Admissions (Janice) stating that both the [facility] and [facility] locations are full until mid next week (? 5/28/2025).    [facility] stating that pt scored a 15/30 on the [facility].  Renata asked whether or not pt would have an available 24/7 support person upon completion of a ARU stay and if not, Renata would recommend a PMR eval.  KULWANT met with pt who states that her 22 year old grandson Miladys is unemployed and would be available to provide 24/7 support.  KULWANT relayed this information to Renata.      Next Steps:   KULWANT will await decisions from [facility] and [facility] in regards to acceptance.    KULWANT will continue to follow for discharge  planning.    CARLINE Padgett  Social Work, 6A  Phone:  [phone]  Pager:  [phone]  5/20/2025       TANG Harrison

## 2025-05-21 ENCOUNTER — APPOINTMENT (OUTPATIENT)
Dept: OCCUPATIONAL THERAPY | Facility: CLINIC | Age: 65
DRG: 545 | End: 2025-05-21
Payer: COMMERCIAL

## 2025-05-21 ENCOUNTER — HOSPITAL ENCOUNTER (INPATIENT)
Facility: CLINIC | Age: 65
End: 2025-05-21
Attending: EMERGENCY MEDICINE | Admitting: STUDENT IN AN ORGANIZED HEALTH CARE EDUCATION/TRAINING PROGRAM
Payer: COMMERCIAL

## 2025-05-21 VITALS
RESPIRATION RATE: 18 BRPM | OXYGEN SATURATION: 97 % | WEIGHT: 230.5 LBS | BODY MASS INDEX: 37.04 KG/M2 | DIASTOLIC BLOOD PRESSURE: 93 MMHG | HEIGHT: 66 IN | SYSTOLIC BLOOD PRESSURE: 174 MMHG | HEART RATE: 68 BPM | TEMPERATURE: 97.5 F

## 2025-05-21 DIAGNOSIS — E11.9 INSULIN DEPENDENT TYPE 2 DIABETES MELLITUS (H): ICD-10-CM

## 2025-05-21 DIAGNOSIS — I63.9 ISCHEMIC STROKE (H): ICD-10-CM

## 2025-05-21 DIAGNOSIS — E11.65 TYPE 2 DIABETES MELLITUS WITH HYPERGLYCEMIA, WITHOUT LONG-TERM CURRENT USE OF INSULIN (H): Primary | ICD-10-CM

## 2025-05-21 DIAGNOSIS — G43.709 CHRONIC MIGRAINE WITHOUT AURA WITHOUT STATUS MIGRAINOSUS, NOT INTRACTABLE: ICD-10-CM

## 2025-05-21 DIAGNOSIS — Z79.4 INSULIN DEPENDENT TYPE 2 DIABETES MELLITUS (H): ICD-10-CM

## 2025-05-21 DIAGNOSIS — Z71.89 OTHER SPECIFIED COUNSELING: Chronic | ICD-10-CM

## 2025-05-21 LAB
ANION GAP SERPL CALCULATED.3IONS-SCNC: 10 MMOL/L (ref 7–15)
BUN SERPL-MCNC: 18.9 MG/DL (ref 8–23)
CALCIUM SERPL-MCNC: 7.8 MG/DL (ref 8.8–10.4)
CHLORIDE SERPL-SCNC: 107 MMOL/L (ref 98–107)
CREAT SERPL-MCNC: 0.65 MG/DL (ref 0.51–0.95)
CREAT SERPL-MCNC: 0.68 MG/DL (ref 0.51–0.95)
EGFRCR SERPLBLD CKD-EPI 2021: >90 ML/MIN/1.73M2
EGFRCR SERPLBLD CKD-EPI 2021: >90 ML/MIN/1.73M2
ERYTHROCYTE [DISTWIDTH] IN BLOOD BY AUTOMATED COUNT: 15.1 % (ref 10–15)
GLUCOSE BLDC GLUCOMTR-MCNC: 145 MG/DL (ref 70–99)
GLUCOSE BLDC GLUCOMTR-MCNC: 183 MG/DL (ref 70–99)
GLUCOSE BLDC GLUCOMTR-MCNC: 253 MG/DL (ref 70–99)
GLUCOSE BLDC GLUCOMTR-MCNC: 367 MG/DL (ref 70–99)
GLUCOSE BLDC GLUCOMTR-MCNC: 68 MG/DL (ref 70–99)
GLUCOSE BLDC GLUCOMTR-MCNC: 85 MG/DL (ref 70–99)
GLUCOSE SERPL-MCNC: 100 MG/DL (ref 70–99)
HCO3 SERPL-SCNC: 25 MMOL/L (ref 22–29)
HCT VFR BLD AUTO: 32.8 % (ref 35–47)
HGB BLD-MCNC: 10.6 G/DL (ref 11.7–15.7)
MAGNESIUM SERPL-MCNC: 2.2 MG/DL (ref 1.7–2.3)
MCH RBC QN AUTO: 26.8 PG (ref 26.5–33)
MCHC RBC AUTO-ENTMCNC: 32.3 G/DL (ref 31.5–36.5)
MCV RBC AUTO: 83 FL (ref 78–100)
MCV RBC AUTO: 84 FL (ref 78–100)
PHOSPHATE SERPL-MCNC: 3.3 MG/DL (ref 2.5–4.5)
PLATELET # BLD AUTO: 322 10E3/UL (ref 150–450)
PLATELET # BLD AUTO: 322 10E3/UL (ref 150–450)
POTASSIUM SERPL-SCNC: 3.6 MMOL/L (ref 3.4–5.3)
RBC # BLD AUTO: 3.96 10E6/UL (ref 3.8–5.2)
SODIUM SERPL-SCNC: 142 MMOL/L (ref 135–145)
WBC # BLD AUTO: 11.7 10E3/UL (ref 4–11)

## 2025-05-21 PROCEDURE — 80048 BASIC METABOLIC PNL TOTAL CA: CPT

## 2025-05-21 PROCEDURE — 99232 SBSQ HOSP IP/OBS MODERATE 35: CPT | Mod: GC | Performed by: STUDENT IN AN ORGANIZED HEALTH CARE EDUCATION/TRAINING PROGRAM

## 2025-05-21 PROCEDURE — 36415 COLL VENOUS BLD VENIPUNCTURE: CPT

## 2025-05-21 PROCEDURE — 83735 ASSAY OF MAGNESIUM: CPT | Performed by: PSYCHIATRY & NEUROLOGY

## 2025-05-21 PROCEDURE — 99418 PROLNG IP/OBS E/M EA 15 MIN: CPT | Mod: GC | Performed by: STUDENT IN AN ORGANIZED HEALTH CARE EDUCATION/TRAINING PROGRAM

## 2025-05-21 PROCEDURE — 97110 THERAPEUTIC EXERCISES: CPT | Mod: GO | Performed by: OCCUPATIONAL THERAPIST

## 2025-05-21 PROCEDURE — 99285 EMERGENCY DEPT VISIT HI MDM: CPT | Performed by: EMERGENCY MEDICINE

## 2025-05-21 PROCEDURE — 97530 THERAPEUTIC ACTIVITIES: CPT | Mod: GO | Performed by: OCCUPATIONAL THERAPIST

## 2025-05-21 PROCEDURE — 250N000012 HC RX MED GY IP 250 OP 636 PS 637

## 2025-05-21 PROCEDURE — 250N000013 HC RX MED GY IP 250 OP 250 PS 637

## 2025-05-21 PROCEDURE — 99255 IP/OBS CONSLTJ NEW/EST HI 80: CPT | Mod: GC | Performed by: STUDENT IN AN ORGANIZED HEALTH CARE EDUCATION/TRAINING PROGRAM

## 2025-05-21 PROCEDURE — 99233 SBSQ HOSP IP/OBS HIGH 50: CPT | Performed by: STUDENT IN AN ORGANIZED HEALTH CARE EDUCATION/TRAINING PROGRAM

## 2025-05-21 PROCEDURE — 85027 COMPLETE CBC AUTOMATED: CPT

## 2025-05-21 PROCEDURE — 97535 SELF CARE MNGMENT TRAINING: CPT | Mod: GO | Performed by: OCCUPATIONAL THERAPIST

## 2025-05-21 PROCEDURE — 84100 ASSAY OF PHOSPHORUS: CPT | Performed by: PSYCHIATRY & NEUROLOGY

## 2025-05-21 RX ORDER — AMLODIPINE BESYLATE 5 MG/1
5 TABLET ORAL DAILY
Qty: 30 TABLET | Refills: 2 | Status: CANCELLED | OUTPATIENT
Start: 2025-05-22

## 2025-05-21 RX ORDER — LOSARTAN POTASSIUM 50 MG/1
50 TABLET ORAL ONCE
Status: COMPLETED | OUTPATIENT
Start: 2025-05-21 | End: 2025-05-21

## 2025-05-21 RX ORDER — SERTRALINE HYDROCHLORIDE 25 MG/1
75 TABLET, FILM COATED ORAL DAILY
Qty: 30 TABLET | Refills: 2 | Status: CANCELLED | OUTPATIENT
Start: 2025-05-22

## 2025-05-21 RX ORDER — LOSARTAN POTASSIUM 100 MG/1
100 TABLET ORAL DAILY
Status: DISCONTINUED | OUTPATIENT
Start: 2025-05-22 | End: 2025-05-21 | Stop reason: HOSPADM

## 2025-05-21 RX ORDER — PREDNISONE 20 MG/1
TABLET ORAL
Qty: 184 TABLET | Refills: 0 | Status: CANCELLED | OUTPATIENT
Start: 2025-05-22 | End: 2025-10-17

## 2025-05-21 RX ORDER — SULFAMETHOXAZOLE AND TRIMETHOPRIM 800; 160 MG/1; MG/1
1 TABLET ORAL
Qty: 30 TABLET | Refills: 1 | Status: CANCELLED | OUTPATIENT
Start: 2025-05-23

## 2025-05-21 RX ORDER — ASPIRIN 81 MG/1
81 TABLET, CHEWABLE ORAL DAILY
Qty: 30 TABLET | Refills: 2 | Status: CANCELLED | OUTPATIENT
Start: 2025-05-22

## 2025-05-21 RX ORDER — MAGNESIUM GLUCONATE 27 MG(500)
500 TABLET ORAL DAILY
Qty: 30 TABLET | Refills: 2 | Status: CANCELLED | OUTPATIENT
Start: 2025-05-22

## 2025-05-21 RX ORDER — DIVALPROEX SODIUM 500 MG/1
500 TABLET, FILM COATED, EXTENDED RELEASE ORAL 2 TIMES DAILY
Qty: 60 TABLET | Refills: 2 | Status: CANCELLED | OUTPATIENT
Start: 2025-05-21

## 2025-05-21 RX ORDER — AMLODIPINE BESYLATE 5 MG/1
5 TABLET ORAL DAILY
Status: DISCONTINUED | OUTPATIENT
Start: 2025-05-21 | End: 2025-05-21 | Stop reason: HOSPADM

## 2025-05-21 RX ORDER — GABAPENTIN 300 MG/1
300 CAPSULE ORAL 3 TIMES DAILY
Qty: 90 CAPSULE | Refills: 3 | Status: CANCELLED | OUTPATIENT
Start: 2025-05-21

## 2025-05-21 RX ORDER — PANTOPRAZOLE SODIUM 40 MG/1
40 TABLET, DELAYED RELEASE ORAL
Qty: 30 TABLET | Refills: 5 | Status: CANCELLED | OUTPATIENT
Start: 2025-05-22

## 2025-05-21 RX ORDER — INSULIN ASPART 100 [IU]/ML
INJECTION, SOLUTION INTRAVENOUS; SUBCUTANEOUS
Qty: 15 ML | Status: CANCELLED | OUTPATIENT
Start: 2025-05-21

## 2025-05-21 RX ORDER — INSULIN ASPART 100 [IU]/ML
INJECTION, SOLUTION INTRAVENOUS; SUBCUTANEOUS
Qty: 15 ML | Refills: 1 | Status: CANCELLED | OUTPATIENT
Start: 2025-05-21

## 2025-05-21 RX ORDER — AMOXICILLIN 250 MG
1 CAPSULE ORAL 2 TIMES DAILY
Qty: 60 TABLET | Refills: 3 | Status: CANCELLED | OUTPATIENT
Start: 2025-05-21

## 2025-05-21 RX ORDER — PROPRANOLOL HYDROCHLORIDE 60 MG/1
60 CAPSULE, EXTENDED RELEASE ORAL DAILY
Qty: 30 CAPSULE | Refills: 3 | Status: CANCELLED | OUTPATIENT
Start: 2025-05-22

## 2025-05-21 RX ORDER — CALCIUM CARBONATE/VITAMIN D3 600 MG-10
1 TABLET ORAL 2 TIMES DAILY WITH MEALS
Qty: 60 TABLET | Refills: 3 | Status: CANCELLED | OUTPATIENT
Start: 2025-05-21

## 2025-05-21 RX ORDER — POLYETHYLENE GLYCOL 3350 17 G/17G
17 POWDER, FOR SOLUTION ORAL DAILY
Qty: 510 G | Refills: 2 | Status: CANCELLED | OUTPATIENT
Start: 2025-05-21

## 2025-05-21 RX ADMIN — INSULIN ASPART 6 UNITS: 100 INJECTION, SOLUTION INTRAVENOUS; SUBCUTANEOUS at 08:21

## 2025-05-21 RX ADMIN — INSULIN ASPART 6 UNITS: 100 INJECTION, SOLUTION INTRAVENOUS; SUBCUTANEOUS at 13:13

## 2025-05-21 RX ADMIN — ASPIRIN 81 MG CHEWABLE TABLET 81 MG: 81 TABLET CHEWABLE at 08:16

## 2025-05-21 RX ADMIN — SERTRALINE HYDROCHLORIDE 50 MG: 50 TABLET ORAL at 08:16

## 2025-05-21 RX ADMIN — Medication 500 MCG: at 08:16

## 2025-05-21 RX ADMIN — PANTOPRAZOLE SODIUM 40 MG: 40 TABLET, DELAYED RELEASE ORAL at 08:16

## 2025-05-21 RX ADMIN — CALCIUM CARBONATE 600 MG (1,500 MG)-VITAMIN D3 400 UNIT TABLET 1 TABLET: at 08:16

## 2025-05-21 RX ADMIN — GABAPENTIN 300 MG: 300 CAPSULE ORAL at 17:21

## 2025-05-21 RX ADMIN — DIVALPROEX SODIUM 500 MG: 500 TABLET, FILM COATED, EXTENDED RELEASE ORAL at 08:16

## 2025-05-21 RX ADMIN — AMLODIPINE BESYLATE 5 MG: 5 TABLET ORAL at 10:45

## 2025-05-21 RX ADMIN — INSULIN ASPART 4 UNITS: 100 INJECTION, SOLUTION INTRAVENOUS; SUBCUTANEOUS at 17:22

## 2025-05-21 RX ADMIN — LOSARTAN POTASSIUM 50 MG: 50 TABLET, FILM COATED ORAL at 08:16

## 2025-05-21 RX ADMIN — Medication 500 MG: at 08:16

## 2025-05-21 RX ADMIN — SULFAMETHOXAZOLE AND TRIMETHOPRIM 1 TABLET: 800; 160 TABLET ORAL at 08:32

## 2025-05-21 RX ADMIN — PROPRANOLOL HYDROCHLORIDE 60 MG: 60 CAPSULE, EXTENDED RELEASE ORAL at 08:16

## 2025-05-21 RX ADMIN — LOSARTAN POTASSIUM 50 MG: 50 TABLET, FILM COATED ORAL at 10:45

## 2025-05-21 RX ADMIN — PREDNISONE 60 MG: 10 TABLET ORAL at 08:16

## 2025-05-21 RX ADMIN — GABAPENTIN 300 MG: 300 CAPSULE ORAL at 12:08

## 2025-05-21 RX ADMIN — ACETAMINOPHEN 650 MG: 325 TABLET ORAL at 03:49

## 2025-05-21 ASSESSMENT — ACTIVITIES OF DAILY LIVING (ADL)
ADLS_ACUITY_SCORE: 37
ADLS_ACUITY_SCORE: 59
ADLS_ACUITY_SCORE: 37

## 2025-05-21 NOTE — PLAN OF CARE
Status: Admitted 5/6 with persistent headache and vision changes. Found to have microhemorrhages in occipital, temporal, and parietal lobes. S/p diagnostic angiogram 5/16 c/b L sided weakness and R parietal, R occipital, and L cerebellum infarcts.   Vitals:  HTN within parameters. SBP < 180. On RA.  Neuros: Aox4. Forgetful. LUE 3/5. Decreased sensation to L side. L neglect & L cut. Denied blurry vision.   IV: PIV SL   Labs: MRI complete.   Resp: WNL  Diet: Mod CHO diet (60g) - has had 20g in AM.   GI: LBM 5/20 per pt.   : voids spont   Skin: Bruising throughout.   Pain: Denies  Activity:  Ax1. Refuses GB.  Social: Grandson came to visit.  Plan:  Medically ready for discharge to ARU. SW following.   Updates this shift: Refused 0200 gabapentin d/t pt feeling like it makes her more confused and have blurry vision. MRI complete.   Eduction: Needs stroke education     Goal Outcome Evaluation:      Plan of Care Reviewed With: patient    Overall Patient Progress: improvingOverall Patient Progress: improving

## 2025-05-21 NOTE — PROGRESS NOTES
PRIOR AUTHORIZATION DENIED    Medication: NOVOLIN N FLEXPEN 100 UNIT/ML SC SUPN  PA Initiated: 5/20/2025  PA Type: Non-Formulary    Insurance Company: [employer] - Phone [phone] Fax [phone]  [facility] Key / Reference #: [id number] / [id number]   Denial Date: 5/21/2025  Appeal Information: Patient must try and fail or have a contraindication to NPH vials. To appeal, send a letter of medical necessity to Clinical Appeals Department [employer], Ph: [phone], Fax: [phone].            Susan Olivera  [facility] Liaison, M-Z  Ph: [phone]  Fax: [phone]  Available on Teams and Vocera

## 2025-05-21 NOTE — DISCHARGE SUMMARY
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   incidental 3. 5 mm solid nodule in the right upper lung lobe  Leukocytosis, improved  Type 2 diabetes, insulin depedent  Hypertension  Eczema  Polysubstance Use    Problem Leading to Hospitalization (from HPI):   From H&P by Dr. Kulkarni on 5/6/25:  \"Amy Choudhury is a 65 year old female with history of DM2, anxiety, tobacco dependence, GERD, s/p gastric bypass, who presents to the ED via EMS with worsening headache and blurred vision.     Patient was admitted to [facility] on 4/25/2025 with left-sided frontal headache for 3 weeks.  Her CT head was unremarkable . She was treated with migraine cocktail, improved and discharged home.     She presented to the [facility] ED again on 4/2825 with headache again and was found to have blood pressure of 220s/120s. MRI brain w/without contrast showed Interval " "development of patchy T2 FLAIR hyperintensity within the left posteroinferior cerebellum, with possible patchy enhancement. Additional new areas of subcortical T2 FLAIR hyperintensity involving the posterior parieto-occipital regions. Progressed scattered associated susceptibility artifact, most notably right occipital lobe.  She was treated as a case of posterior skull encephalopathy with gradual decrease in blood pressure and started on blood pressure medications to be taken at home.  Her UDS was also positive for methamphetamine.     She had another admission from 5/2/25-5/5/25 for headache and was again hypertensive.  Repeat MRI was similar to prior MRIs with small microhemorrhages and superficial siderosis along with the signal normalities through posterior cerebral and cerebellar hemispheres with leptomeningeal enhancement.  She was discharged yesterday.     On her way back, she lost her way while walking because she could not remember her way back home.  A bystander called EMS and she was dropped off home by them.  She slept fine.  She woke up this morning with persistent headache and blurred/quadrupled/choppy vision.  Throughout time since yesterday, she thinks she has been losing time.  She has 7/10 headache from front of her head but that keeps migrating.  She was very hungry and Eating food in the ED. she also made few remarks such as, \"the pen is turning into cigarette, or I have seen that lady jumping before\".\"    Please see H&P dated 5/6/2025 for further details about presentation.    Brief Hospital Course:   65-year-old female with a PMH of DM, anxiety, tobacco use, and polysubstance use (positive for methamphetamine on urine drug screen) presented with a 3-4 week history of worsening headaches and visual disturbances. She had two prior hospitalizations for similar symptoms, during which MRI imaging at an outside hospital (OSH) revealed diffuse cerebral microbleeds predominantly in the parietal and " occipital regions, along with significant leptomeningeal enhancement in the cerebellum and bilateral cerebral hemispheres, sparing parts of the frontal lobes. The initial differential included posterior reversible encephalopathy syndrome (PRES) and hypertensive vasculopathy. On 05/06, she presented to [facility] with worsening symptoms of headaches and visual disturbances. MRI Brain showed new and extensive leptomeningeal enhancement, sulcal hyperintensities, and evidence of microbleeds/superficial siderosis in the bilateral parieto-occipital regions. Continuous EEG done for 3 days without any abnormal epileptiform discharges. CT chest abdominal pelvis completed to rule out any malignancy was unremarkable. D-dimer, ESR, CRP within normal limits.  Lumbar puncture revealed lymphocytic pleocytosis and elevated protein, with no evidence of infection. Rheumatology was consulted, serum inflammatory workup negative thus far. Given the imaging findings, inflammatory amyloid angiopathy was considered as a leading differential. To further evaluate for vasculopathy vs vasculitis, a digital subtraction angiogram (DSA) was performed on 05/16 and was negative. Following the DSA procedure, the patient developed new left upper extremity weakness and left hemineglect. Repeat imaging demonstrated multiple new infarcts, suspect most likely periprocedural. She has completed 5 day course of IV methylprednisolone 1000mg daily and was switched to oral prednisone.     Given ongoing left upper extremity weakness and neglect, PT, and OT had recommended discharge to ARU.  Throughout the day on 5/21/2025, the patient noted that she wanted to go home.  The neurology team had multiple conversations with the patient regarding her desire to go home, specifically if there were any particular reasons why she wanted to go home, she noted that she was frustrated with the delays in discharge, she was frustrated about the need for fall precautions and that  "staff were assisting her when she would get up.  She also noted frustrations about diet restrictions and noted that she \"just wanted to go home.\"  We discussed with the patient that given her ongoing left upper extremity weakness and neglect, our recommendation remained that she discharge to ARU, when a bed becomes available.  The patient expressed understanding but continued to note that she wanted to discharge today.  Our team did discuss with nursing staff and following discussion regarding the patient's mobility, decided that bed and chair alarm could be turned off.  However the patient was still frustrated with her hospital stay.  Additionally our team discussed with the patient concern about discharging before being able to complete diabetes education and confirming competence with administration of insulin.  Discussed with the patient the risk of hyperglycemia and hypoglycemia and the complications that can be associated with both, including death.  The patient expressed understanding regarding risks for her diabetes management as well as her ongoing neurologic symptoms including suspected inflammatory amyloid angiopathy and recent acute ischemic stroke, but the patient noted that she still wanted to leave.  I discussed with the patient that if she were to leave the hospital, this would be against medical advice.  The patient was able to repeat back to me the benefits of staying in the hospital and the risks of leaving against medical advice, including ongoing neurologic deficit and complications related to hyperglycemia or hypoglycemia. On my assessment, the patient had insight regarding her condition and the risks of leaving against medical advice and felt that the patient had capacity at the time of my assessment.  I was working to order medications for the patient to discharge as we wanted to have her continue her medications that she was receiving despite leaving against medical advice, but the patient " left before these medications could be ordered.    Other problems addressed during this hospitalization:    Of note, all medications were not ordered on discharge as the patient left against medical advice before orders could be placed. Below are the medications she was receiving and we would have planned to order.    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  #left sided weakness, neglect on left  #Acute ischemic stroke of right parietal lobe and right occipital lobe suspect 2/2 periprocedural  -Continue headache management with               - Gabapentin 300 mg TID (decreased from 600mg TID on 5/20)  - propranolol ER 60 mg daily for migraine prevention   - depakote 500mg BID   - PRN compazine & tylenol   - magnesium 500mg daily  - if all PRN medications do not relieve headache, will try PRN tramadol 50mg Q6hr  - s/p IV methylprednisolone 1000mg 5 day course 5(5/16-5/20)  -  oral prednisone 60 mg daily for 1 month (she received one dose prior to leaving [facility])  - After one month, taper by 10mg each week down to 10mg daily. After that, duration of steroids treatment will be determined during follow-up visit with neurology  - while on prolonged steroid course, prophylaxis with Protonix daily, vit D/calcium daily, and bactrim DS three times weekly  - serum and CSF autoimmune encephalopathy panel pending  -Continue Aspirin 81mg  -Follow-up MRI brain wwo in one month, then plan for repeat MRI wwo in 3 months  - follow up with neurology with Dr. Puente in 6 weeks     #Hyponatremia, resolved  Workup as of 5/11 serum osmol 285, urine osmol 862, urine sodium 219, glucose at time of urine studies was 224, serum sodium 128. Workup most consistent with SIADH vs thiazide induced hyponatremia. Holding thiazide since  "5/11 hyponatremia improved. 5/17 was 133 then improved to 142 on 5/18.      #incidental finding- Ill-defined 1.6 cm area of enhancement within the left inferior thyroid lobe.   -TSH normal. Consider outpatient  nonemergent thyroid ultrasound for further Assessment, will defer to PCP.     #incidental 3. 5 mm solid nodule in the right upper lung lobe  -Can consider optional follow-up CT in 12 months to document stability, per PCP     # Leukocytosis, stable  - Likely reactionary to steroids     # Type 2 diabetes mellitus  - A1c this admission 8.7, goal < 7.0. Will need close PCP follow up  - Medium dose sliding scale -> high dose sliding scale switched on 5/12  -low consistent carb diet  - Endocrine consulted for diabetes management, appreciate assistance, had created plan for discharge while on prednisone 60mg daily but the patient left against medical advice before medications or supplies could be ordered or filled.     Diabetes Management Discharge Plan (printed a simplified version that is larger font in AVS and reviewed with patient)  Instructions to patient were posted in AVS and discussed on day of discharge.   Medications and supplies are to be ordered by primary service on discharge.   *please use the DIAB non-branded discharge supply order set ([id number])*     Patient will need the following supplies prescribed:   Novolog Flexpens  \"BD\" (32G x 4mm) insulin pen needles  Glucometer (if brand of meter not known, can be ordered \"no brand specified\" and note to pharmacy: \"can substitute per insurance coverage\")  Lancets  Test strips  Sharps container  Alcohol swabs      Blood glucose monitoring: Three times daily before meals, and at bedtime.     Blood Glucose (BG) goals:  mg/dL before meals     Glucose Control Regimen:     1) Rapid-acting insulin: aspart (Novolog) carbohydrate coverage/mealtime insulin (with prednisone 60 mg daily in the morning)  Take 10 units before average meals that contain " carbohydrates                     OR  Take 5 units before small meals or snacks that contain carbohydrates     2) Rapid-acting insulin: aspart (Novolog) correction - see chart below. Take for high blood glucoses three times daily before meals  Add the correction dose to the carbohydrate coverage/mealtime insulin dose and give in one injection--ideally 10-15 minutes before a meal.  You may take the correction dose even if you skip a meal (as long as it has been 4 hours since previous correction dose).      Pre-meal correction scale:     Blood Glucose Insulin Novolog Before Meals:   Less than 140 0 units   140 -164  1 units   165 -189 2 units   190 - 214 3 units   215 - 239 4 units    240 - 264  5 units   265 - 289 6 units   290 - 314 7 units   315 - 339 8 units   340 - 364 9 units   365 or more 10 units         Outpatient Diabetes Follow-Up: Follow up with your Primary Care Provider (PCP) in 1-2 weeks, bring glucose data to your appointment. Follow up sooner if blood glucose runs consistently greater than 200 mg/dL or if having more than two episodes less than 70 mg/dL.    #Hypertension   cautious re-introduction of PTA BP meds to avoid fluctuations, and permissive HTN in setting of new stroke on 5/16, will slowly reintroducing PTA meds  - discontinue PTA hydrochlorothiazide 12.5 mg daily due to hyponatremia  - losartan to 100mg every day  - amlodipine 5mg daily (PTA dose 10mg)     #Eczema  - PRN triamcinolone cream     #Substance use history  - addiction medicine consult, appreciate recs  - Continue gabapentin. Patient states it helps with anxiety but doesn't like side effects at higher doses. Trial dosing strategies that work best with patient.  - Increase Sertraline to 75 mg daily (previously tolerated 150 mg). Keep at present dose for 4 weeks and reassess  - Psychiatry and Psychotherapy referrals sent for outpatient  - Our peer  will meet the patient if agreeable and still hospitalized to provide additional  outpatient resources  - To contact David Purcell  from [facility] ([facility]): call or text: [phone]    PERTINENT INVESTIGATIONS    Labs  Lipid Panel:   Recent Labs   Lab Test 05/11/25  2151   CHOL 186   HDL 49*   *   TRIG 185*     A1C:   Lab Results   Component Value Date    A1C 8.7 05/06/2025    A1C 6.7 04/19/2021     INR:   Recent Labs   Lab 05/16/25  1311   INR 0.97      Coag Panel / Hypercoag Workup: Not indicated  Pending test results: serum and CSF encephalopathy panel    MRI/Head CT MRI brain/MRA brain 5/20/25  IMPRESSION:  No arterial stenosis or concentric enhancement of the arterial walls to suggest middle sized arterial vasculitis. However there is persistent leptomeningeal enhancement which can be seen with small peripheral vasculitis for example KIRK. The leptomeningeal enhancement  appears relatively improved compared with 5/15/2025, indeterminant if this represents treatment effect or due to differences in T1 sequence technique.     MRI Brain 5/16  IMPRESSION:  1. Multiple new acute infarcts since the MRI 24 hours ago, most  notably in the right parietal lobe and right occipital lobe. Tiny  evolving infarct in the left cerebellum.  2. Worsened marked abnormal leptomeningeal FLAIR signal, greatest in  the posterior cerebral hemispheres     MRI Brain (5/15)  IMPRESSION:  1. Slight increase in extent of leptomeningeal enhancement and  unchanged numerous microhemorrhages in the occipital, temporal, and  parietal regions bilaterally. These findings are nonspecific and could  be seen with angiopathy and/or inflammation.  2. Tiny new infarct in the left cerebellum.  3. Superficial siderosis within the right occipital lobe, likely  related to prior hemorrhage.  4. Moderate sequelae of chronic small vessel ischemic disease.     CT head:  IMPRESSION:  1.  No acute findings. No acute intracranial hemorrhage. No acute calvarial fracture.     MRI: (5/7)  IMPRESSION:  1. There is  somewhat decreased cortical T2 hyperintense signal with increased conspicuity of leptomeningeal enhancement and numerous presumed microhemorrhages within the occipital, temporal parietal regions bilaterally. These findings are nonspecific, although may be seen with process or hematogenous malignancy such as melanoma.  Findings are less likely related to cerebral amyloid  angiopathy given the distribution. The distribution could be seen with hypertensive angiopathy, however these are extremely great number, much more than expected.   2. 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  3. Chronic small vessel ischemic changes.      Intracranial Vasculature HEAD CTA:   No large vessel occlusion findings intracranially. No high-grade stenosis intracranially.   Cervical Vasculature NECK CTA:  1.  No acute vascular injury in the neck. No high-grade stenosis in the neck.      Echocardiogram Not obtained   EKG/Telemetry Not obtained      LDL  5/6/2025: 72 mg/dL   A1C  5/6/2025: 8.7 %   Troponin No lab value available in past 48 hrs     CT CAP: IMPRESSION:   1. No evidence of primary malignancy throughout the chest, abdomen or  pelvis.  2. Ill-defined 1.6 cm area of enhancement within the left inferior  thyroid lobe. Consider nonemergent thyroid ultrasound for further  assessment.  3. 5 mm solid nodule in the right upper lobe. Can consider optional  follow-up CT in 12 months to document stability.  4. 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 The gastrojejunal anastomosis appears  patent, however, inspissated debris may suggest delayed  gastrointestinal transit.  5. Hepatic steatosis.  6. Additional chronic and incidental findings, as described above.       SUMMARY OF 3 DAYS OF VIDEO EEG:  This 3 day video EEG is abnormal due to the presence increased superimposed fast activity, likely related to sedating medications employed.  No seizures or epileptiform discharges were seen. Clinical correlation is recommended.     CSF (5/12)  Protein total CSF (74.9), Glucose CSF (102), RBC (5), Appearance (clear) Lymphocyte%, Mono %, Neutrophil % (all WNL)     Endovascular procedure: DSA     Cardiac Monitoring: Patient had > 24 hrs of cardiac monitor while in hospital.    Findings: No atrial fibrillation was found.    Sleep Apnea Screen:   Unable to screen ischemic stroke patient due to left AMA before questions could be asked    PHQ-9 Depression Screen Score: Unable to assess due to left AMA before questions could be asked    PHYSICAL EXAMINATION  Vital Signs:  B/P: 174/93, T: 97.5, P: 68, R: 18    General:  patient sitting in chair, no acute distress  HEENT:  normocephalic/atraumatic  Cardio:  RRR  Pulmonary:  no respiratory distress on room air  Skin:  bruising present     Neurologic  Mental Status:  alert, oriented x 3, follows commands, speech clear and fluent, naming and repetition normal  Cranial Nerves:  L superior quadrantanopia, EOMI with normal smooth pursuit, facial sensation intact and symmetric, facial movements symmetric, hearing not formally tested but intact to conversation, palate elevation symmetric and uvula midline, no dysarthria, shoulder shrug strong bilaterally, tongue protrusion midline  Motor:  LUE drift without hitting the bed. Left shoulder abduction 3+/5, elbow extension 4+/5, elbow flexion 4+/5, wrist extension 2/5, finger extension 1/5, finger flexion 3/5.  5/5 strength in right arm and legs bilaterally. normal muscle tone and bulk, no abnormal movements.   Reflexes:  No ankle clonus bilaterally  Sensory:  light touch sensation intact and symmetric throughout upper and lower extremities, no extinction on double simultaneous stimulation   Coordination:  Finger-nose-finger on right without ataxia or dysmetria, unable to assess on left. Heel to shin intact bilaterally   Station/Gait:  deferred    National Institutes of  Health Stroke Scale (on day of discharge)  NIHSS Total Score: 3    Modified [place] Score (Discharge)  2-Slight disability; unable to carry out all previous activities, but able to look after own affairs    Medications  See plan above, the patient left AMA before medications could be ordered      Additional recommendations and follow up:       MR Brain w/o & w Contrast     Primary Care - Care Coordination Referral      [facility]  Referral      [facility]      Stroke Hospital Follow Up (for neurologist use only)    Please be aware that coverage of these services is subject to the terms and limitations of your health insurance plan.  Call member services at your health plan with any benefit or coverage questions.  [facility] will call you to coordinate care as prescribed by your provider. If you don t hear from a representative within 2 business days, please call [phone].         Patient was seen and discussed with the neurology attending, Dr. Puente.    BART KILLIAN MD  Neurology Resident, PGY-2    Note: Chart documentation done in part with Dragon Voice Recognition software. Although reviewed after completion, some word and grammatical errors may remain.

## 2025-05-21 NOTE — PLAN OF CARE
"Status: Admitted 5/6 with persistent headache and vision changes. Found to have microhemorrhages in occipital, temporal, and parietal lobes. S/p diagnostic angiogram 5/16 c/b L sided weakness and R parietal, R occipital, and L cerebellum infarcts.   Vitals: HTN within parameters. SBP < 180. RA.   Neuros: A&O x4. Forgetful. LUE 3, AOE 5. Decreased sensation to L side. L neglect & L cut. No hallucinations this shift. Bilateral blurry vision. Refusing 1600 assessment.  IV: PIV removed  Labs/Electrolytes: WNL  Resp: WNL  Diet: Mod CHO diet (60g). Good PO  GI: LBM 5/21 per patient, Writer did not witness.  : Voiding spontaneously   Skin: Bruising throughout.  Pain: Mild headache managed with PRN Tylenol  Activity: Up ad sylvetser in room. Patient refusing safety measures (gait belt, bed alarm, chair alarm). Door alarm in place.  Social: Grandyudi Rich visited this shift for ~10 minutes and then left.   Plan: Medically ready for discharge to ARU. SW following.    Throughout the shift patient has been frustrated and tearful about the hospital stay and the staff here. Patient requested multiple times for us to \"get her medications together\" because she is going home. Primary team and Diabetes team discussed at length the risks of going home and not waiting for a bed at ARU. Patient verbalized understanding of the risks and was able to repeat them back to staff.     Patient unable to correctly state how to read sliding scale for insulin and determine how much insulin to give. Patient declined to practice taking a blood sugar when Writer offered. Patient declined practicing insulin injection when Writer offered.   Writer told patient we are unable to setup a ride for her if she is leaving [place] since the recommendation is for her to stay at the hospital until ARU placement.    Writer asked patient multiple times if she had a ride setup and if she called her daughter or grandson. Patient just stated \"no\" and would not give a " reason why she wouldn't call them. Patient stated her daughter gets off work around [id number] but declined to call her.    Patient began packing her things around 1730. Patient agreed for Writer to give insulin with dinner before leaving.  Writer told patient that paperwork and medications at discharge pharmacy were not ready. Patient understood and stated she was leaving anyway.   Patient left 6A AMA @ 1740. Writer told patient it is not too late to stay if she wants and that once she leaves the unit and is in the lobby she is considered discharged and would need to go through the Emergency Department if she were to need extra care. Patient verbalized understanding. All belongings were placed on a wheelchair and patient wheeled her belongings off the unit.     Goal Outcome Evaluation:      Plan of Care Reviewed With: patient    Overall Patient Progress: no change    Outcome Evaluation: Patient wanting to leave AMA

## 2025-05-21 NOTE — DISCHARGE INSTRUCTIONS
Diabetes Management Discharge Plan    Blood glucose monitoring: Three times daily before meals, and at bedtime.  Blood Glucose (BG) goals:  mg/dL before meals, less than 200 at bedtime.    Glucose Control Regimen:     1) Rapid-acting insulin: aspart (Novolog) (with prednisone 60 mg daily in the morning)  Take 10 units before average meals that contain carbohydrates     OR  Take 5 units before small meals or snacks that contain carbohydrates    PLUS correction scale three times daily before meals:  Add the correction dose to the set meal dose above and give in one injection--ideally 10-15 minutes before a meal.    Do Not give Correction Insulin if Pre-Meal Blood Glucose (BG) less than 140.   For Pre-Meal  - 164 give 1 unit.   For Pre-Meal  - 189 give 2 units.   For Pre-Meal  - 214 give 3 units.   For Pre-Meal  - 239 give 4 units.   For Pre-Meal  - 264 give 5 units.   For Pre-Meal  - 289 give 6 units.   For Pre-Meal  - 314 give 7 units.   For Pre-Meal  - 339 give 8 units.   For Pre-Meal  - 364 give 9 units.  For Pre-Meal BG greater than or equal to 365 give 10 units.      Outpatient Diabetes Follow-Up: Follow up with your Primary Care Provider (PCP) in 1-2 weeks, bring glucose data to your appointment. Follow up sooner if blood glucose runs consistently greater than 200 mg/dL or if having more than two episodes less than 70 mg/dL.     Your target A1c value is less than 7% to help prevent future complications from diabetes. Your most recent A1c is 8.7%.      If you have urgent questions or concerns regarding your blood sugars or insulin, you may contact [phone] (the main hospital ). Ask to speak with the Endocrinologist on call.          Hypoglycemia (Low Blood Glucose) Management:  Signs/symptoms:  Shaking, sweating, fast heartbeat  Feeling dizzy, tired, or weak   Feeling anxious and easy to irritate  Feeling nervous, crabby, or  confused  Hunger  Vision problems, headache  Numb or tingling mouth    If blood glucose is 51 to 70:   Eat or drink 15 grams of carbohydrate. Examples:   1/2 cup (4 ounces) apple juice or regular soda pop, or   1 cup (8 ounces) milk, or   15 skittles, or   3 to 4 glucose tablets.   Re-check your blood glucose in 15 minutes.   Repeat these steps every 15 minutes until your blood glucose is above 80.       If blood glucose is 50 or less:   Eat or drink 30 grams of carbohydrate. Examples:   1 cup (8 ounces) apple juice or regular soda pop, or   2 cups (16 ounces) milk, or   1 banana, or   6 to 8 glucose tablets.   Re-check your blood glucose in 15 minutes.   Repeat these steps every 15 minutes until your blood glucose is above 80.

## 2025-05-21 NOTE — PROGRESS NOTES
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 Initially differential included hypertensive vasculopathy/ PRES which is now thought to be less likely per primary team and leading differential is inflammatory cerebral amyloid angiopathy.  She had an LP which showed RBC 5, TNC 32, lymphocytes 72%, protein of 74.9, glu 102.   She had a repeat MRI brain on 5/15/25 (for new auditory hallucinations) showing slight increase in leptomeningeal enhancement and unchanged microhemorrhages, along with a tiny infarct in the left cerebellum.  Patient underwent diagnostic cerebral angiogram on 5/16/25 which did not show any aneurysms, vascular malformation or any areas of stenosis, irregularity or bleeding to suggest inflammatory vasculopathy in anterior and posterior circulation.  Postprocedure, she was found to have left arm weakness along with left arm hemisensory neglect when examined in her room.  CT head and CTA head and neck were obtained which were unrevealing.  She had an MRI overnight which showed right parietal and right occipital lobe infarcts along with evolving infarct in the left cerebellum as noted on the prior MRI.  She also had worsened marked abnormal leptomeningeal FLAIR signal.    I saw Amy in the afternoon today, at the time she was wanting to leave the hospital AMA.  I encouraged her to stay so that she is able to get into an inpatient rehab facility to get stronger and for vision therapy as well.        Physical exam:   BP (!) 162/84 (BP Location: Right arm)   Pulse 78   Temp 98.3  F (36.8  C) (Oral)   Resp 16   Ht 1.676 m (5' 6\")   Wt 104.6 kg (230 lb 8 oz)   SpO2 97%   BMI 37.20 kg/m    General: Awake and alert and in no " acute distress.  A&O x4  Speech without aphasia or dysarthria  CN- EOMI, left VF cut, face symmetric, hearing intact to conversation, tongue midline  Motor- antigravity on the right, LLE drift, LUE 2-3/5 proximally and 1-2 distally  Sensation- left hemisensory neglect  Coordination- finger to nose without ataxia on the right  Gait- deferred    Imaging:  No new imaging    Assessment/Plan:  64yo F with vascular risk factors, presented with headache and visual changes for a few weeks at least, found to have microbleeds mostly in the occipital/temporal/parietal regions bilaterally with associated leptomeningeal enhancement, initially concerning for hypertensive vasculopathy/PRES however now inflammatory cerebral amyloid angiopathy higher on the differential per primary team. She had an LP showing lymphocytic pleocytosis. She underwent diagnostic cerebral angiogram which did not show any evidence of irregularity, narrowing or beading suggestive of inflammatory vasculopathy. Post-procedure, she was found to have left sided weakness and neglect, with repeat MRI brain revealing right parieto-occipital infarcts along with worsening leptomeningeal signal. Though there was no significant plaque or luminal irregularity visualized on angiography, it is possible that an angiographically occult eccentric atheromatous plaque (which was not known to significantly encroach on the vessel lumen) was dislodged at some point resulting in an embolic event.  - aspirin 81mg daily  - vascular risk factor control  - physical, occupational therapies  - rest per stroke neurology  - we will continue to follow along    Patient discussed with Dr. Romano.  Aleah Barrera MD  Fellow, Endovascular Surgical Neuroradiology          REVIEWED ASSOCIATED CLINICAL DATA AND HISTORY FOUND IN THE MEDICAL RECORD:  Past Medical History:   Past Medical History:   Diagnosis Date    Anemia     Anxiety     Axillary abscess     chronic, recurring    Backache      Benign neoplasm of adrenal gland 2012    Depressive disorder     Gastro-oesophageal reflux disease     bleeding ulcers    Hiatal hernia     NEWLY DIAGNOSISED    Hyperparathyroidism, unspecified 2012    Peptic ulcer, unspecified site, unspecified as acute or chronic, without mention of hemorrhage or perforation     Pneumonia     NOVEMBER    PONV (postoperative nausea and vomiting)     TMJ (temporomandibular joint syndrome) 2014    Vitamin D deficiency 2012       Surgical History:   Past Surgical History:   Procedure Laterality Date    APPENDECTOMY      CHOLECYSTECTOMY      DILATION AND CURETTAGE, OPERATIVE HYSTEROSCOPY WITH MORCELLATOR, COMBINED  2012    Procedure: COMBINED DILATION AND CURETTAGE, OPERATIVE HYSTEROSCOPY WITH MORCELLATOR;  Hysteroscopy, Polypectomy, Dilation and Curettage  ;  Surgeon: Marta Montejo MD;  Location: [facility] OR    GI SURGERY      gastric bypass at age 31    IR CAROTID CEREBRAL ANGIOGRAM BILATERAL  2025    ORTHOPEDIC SURGERY      back surgery at age 31    PARATHYROIDECTOMY Right 2015    Procedure: PARATHYROIDECTOMY;  Surgeon: Gregory Coleman MD;  Location:  [facility]    [facility] MARSUP BARTHOLIN GLAND CYST      SPINE SURGERY         Social history:   Social History     Tobacco Use    Smoking status: Every Day     Current packs/day: 0.50     Average packs/day: 0.5 packs/day for 30.0 years (15.0 ttl pk-yrs)     Types: Cigarettes    Smokeless tobacco: Never   Substance Use Topics    Alcohol use: Yes     Comment: occasionally    Drug use: No       Family history:   Family History   Problem Relation Age of Onset    Thyroid Disease Mother     Breast Cancer Mother 57         65 y.o.    Other - See Comments Mother         cystic acne    Cancer Brother     Diabetes Daughter         Type 1; insulin       Medications:  Current Facility-Administered Medications   Medication Dose Route Frequency Provider Last Rate Last Admin    acetaminophen (TYLENOL)  tablet 650 mg  650 mg Oral Q4H PRN Mara Ceron MD   650 mg at 05/21/25 0349    amLODIPine (NORVASC) tablet 5 mg  5 mg Oral Daily Mara Ceron MD   5 mg at 05/21/25 1045    aspirin (ASA) chewable tablet 81 mg  81 mg Oral Daily Mara Ceron MD   81 mg at 05/21/25 0816    calcium carbonate (TUMS) chewable tablet 500 mg  500 mg Oral Daily PRN Jazmine Wade MD   500 mg at 05/11/25 1336    calcium carbonate-vitamin D (CALTRATE) 600-10 MG-MCG per tablet 1 tablet  1 tablet Oral BID w/meals Mara Ceron MD   1 tablet at 05/21/25 0816    cyanocobalamin (VITAMIN B-12) sublingual tablet 500 mcg  500 mcg Sublingual Daily Oren Villeda MD   500 mcg at 05/21/25 0816    glucose gel 15-30 g  15-30 g Oral Q15 Min PRN Karen Burt MD        Or    dextrose 50 % injection 25-50 mL  25-50 mL Intravenous Q15 Min PRN Karen Burt MD        Or    glucagon injection 1 mg  1 mg Subcutaneous Q15 Min PRN Karen Burt MD        divalproex sodium extended-release (DEPAKOTE ER) 24 hr tablet 500 mg  500 mg Oral BID Karen Burt MD   500 mg at 05/21/25 0816    enoxaparin ANTICOAGULANT (LOVENOX) injection 40 mg  40 mg Subcutaneous Q24H Karen Burt MD   40 mg at 05/20/25 1600    gabapentin (NEURONTIN) capsule 300 mg  300 mg Oral TID Mara Ceron MD   300 mg at 05/21/25 1208    hydrALAZINE (APRESOLINE) injection 10 mg  10 mg Intravenous Q6H PRN Mara Ceron MD        insulin aspart (NovoLOG) injection (RAPID ACTING)  1-5 Units Subcutaneous BID Andreea Taylor PA-C   4 Units at 05/21/25 0208    insulin aspart (NovoLOG) injection (RAPID ACTING)   Subcutaneous TID w/meals Andreea Taylor PA-C   6 Units at 05/21/25 1313    insulin aspart (NovoLOG) injection (RAPID ACTING)   Subcutaneous With Snacks or Supplements Andreea Taylor PA-C   4 Units at 05/21/25 1046    insulin aspart (NovoLOG) injection (RAPID ACTING)  1-10 Units Subcutaneous TID AC Karen Burt MD   5 Units at 05/21/25 1208     insulin NPH injection 10 Units  10 Units Subcutaneous Q24H Andreea Taylor PA-C   10 Units at 05/21/25 0816    lidocaine (LMX4) cream   Topical Q1H PRN Litzy Kulkarni MD        lidocaine 1 % 0.1-1 mL  0.1-1 mL Other Q1H PRN Litzy Kulkarni MD        [START ON 5/22/2025] losartan (COZAAR) tablet 100 mg  100 mg Oral Daily Mara Ceron MD        magnesium gluconate (MAGONATE) tablet 500 mg  500 mg Oral Daily Dariusz Bang MD   500 mg at 05/21/25 0816    melatonin tablet 3 mg  3 mg Oral At Bedtime Alejandro Villalobos MD   3 mg at 05/20/25 2151    ondansetron (ZOFRAN ODT) ODT tab 4 mg  4 mg Oral Q6H PRN Litzy Kulkarni MD   4 mg at 05/16/25 1901    Or    ondansetron (ZOFRAN) injection 4 mg  4 mg Intravenous Q6H PRN Litzy Kulkarni MD   4 mg at 05/17/25 0106    pantoprazole (PROTONIX) EC tablet 40 mg  40 mg Oral QAM AC Mara Ceron MD   40 mg at 05/21/25 0816    polyethylene glycol (MIRALAX) Packet 17 g  17 g Oral Daily Jazmine Wade MD   17 g at 05/11/25 1542    predniSONE (DELTASONE) tablet 60 mg  60 mg Oral Daily Mara Ceron MD   60 mg at 05/21/25 0816    prochlorperazine (COMPAZINE) injection 10 mg  10 mg Intravenous Q6H PRN Karen Burt MD   10 mg at 05/16/25 2230    promethazine (PHENERGAN) tablet 25 mg  25 mg Oral Once PRN Wicho Miller MD        propranolol ER (INDERAL LA) 24 hr capsule 60 mg  60 mg Oral Daily Dariusz Bang MD   60 mg at 05/21/25 0816    QUEtiapine (SEROquel) tablet 25 mg  25 mg Oral At Bedtime Alejandro Villalobos MD   25 mg at 05/20/25 2151    senna-docusate (SENOKOT-S/PERICOLACE) 8.6-50 MG per tablet 1 tablet  1 tablet Oral BID Karen Burt MD   1 tablet at 05/20/25 0828    sennosides (SENOKOT) tablet 8.6 mg  8.6 mg Oral Daily PRN Karen Burt MD   8.6 mg at 05/17/25 0213    [START ON 5/22/2025] sertraline (ZOLOFT) tablet 75 mg  75 mg Oral or Feeding Tube Daily Reyes Zheng MD PhD        sodium chloride (PF) 0.9% PF flush 3 mL  3 mL Intracatheter Q8H  KHUSHBU Litzy Kulkarni MD   3 mL at 05/21/25 0824    sodium chloride (PF) 0.9% PF flush 3 mL  3 mL Intracatheter q1 min prn Litzy Kulkarni MD   3 mL at 05/19/25 1922    sulfamethoxazole-trimethoprim (BACTRIM DS) 800-160 MG per tablet 1 tablet  1 tablet Oral Once per day on Monday Wednesday Friday Mara Ceron MD   1 tablet at 05/21/25 0832    triamcinolone (KENALOG) 0.1 % cream   Topical BID PRN Mara Ceron MD           Allergies:     Allergies   Allergen Reactions    Nsaids Other (See Comments), Nausea and Vomiting and GI Disturbance     History of GI bleeding and ulcers    Codeine Sulfate GI Disturbance

## 2025-05-21 NOTE — PROGRESS NOTES
Inpatient Diabetes Management Service: Daily Progress Note     HPI: 65 female with past medical history of DM, anxiety, tobacco use, polysubstance use presented 5/6/25 with headaches and vision changes ongoing for 3 to 4 weeks.  Patient hospitalized x 2 at outside hospital for similar symptoms and workup include MRI brain with a working diagnosis of possible PRES. patient was hypertensive in the 200s in 1 of those encounters. Patient also described spells of losing time and missing part of her video while watching TV.  On comparison to the most recent MRI patient has microbleed's in the bilateral parietotemporal occipital regions.  Significant leptomeningeal enhancement in the cerebellar regions and bilateral cerebral hemisphere sparing some of the part of frontal regions.  Leptomeningeal enhancement appears to be more prominent than the most recent MRI.  FLAIR hyperintensity can be seen however not very prominent.  Continuous EEG done for 24 hours without any abnormal epileptiform discharges.  CT chest abdominal pelvis completed to rule out any malignancy however unremarkable.  D-dimer, ESR, CRP within normal limits. Differential diagnoses include inflammatory cerebral amyloid angiopathy, PRES, CNS lymphoma, neoplastic/autoimmune process or small vessel vasculitis. Inpatient Diabetes Service consulted 5/15/25 for assistance with glycemic control in setting of starting high dose steroids.          Assessment/Plan:     Assessment:   Type 2 Diabetes Mellitus, without known complications. 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     Plan/Recommendations:    - decrease NPH 44 --> 10 units at 0900 with prednisone 60 mg (do not give if pred not given).  - decrease  Novolog Meal Coverage: 1 units per 5 --> 10 g CHO TID AC    -PRN snacks pending time of day:  0900 - 2059: Dose = 1 units per 5 --> 10 grams of carbohydrate  2100 - 0859: Dose = 1 units per 10 --> 15 grams of carbohydrate  - Novolog Correction Scale: high resistance 1/25 >140 TID AC, 1/50 >200 HS & 0200   - BG monitoring: TID AC, HS, 0200   - Hypoglycemia protocol    - Carb counting protocol      Discussion: Significant hyperglycemia overnight which is not a trend, suspect patient eating overnight and may have missed some carb coverage but BG then dropped significantly this morning to 68 mg/dL. Steroid tapering significantly today from MP 1,000 mg to prednisone 60 mg daily  (MP 1,000 bioequivalent to prednisone 1,250 mg). Reduce all insulins.     Unfortunately, insurance denied Prior Auth for NPH flexpens (they only want to cover vials), discussed this with patient and will plan to manage with Novolog set meal doses + correction scale at discharge. Discontinue NPH for tomorrow or if not discharging could ask Pharmacy to appeal the Prior Auth denial. Patient snacking frequently and states she does not snack at home (she attributes snacking to the steroids). She is agreeable to not snack at home and will plan to eat 2-3 meals/day. Patient frustrated and wanting to discharge home today. Therapies recommending ARU. Spoke with primary team, they are trying to have patient stay and go to ARU. Patient will need to meet with diabetes educator while inpatient if discharging home, new to insulin and glucometer (though expresses that others in her life have diabetes).     ADDENDUM 1430: call from primary team, patient likely leaving AMA today and will need diabetes discharge plan. Made tentative plan below, printed and reviewed with patient. Diabetes education did not go well, patient was very agreeable to everything and appeared to be understanding the plan. When doing an example, patient made up an insulin dose of 4 units  "and stated she added 2+2. We reviewed the plan again and she became frustrated and said that her family would help her. Strongly recommended that she not discharge today and wait until she is able to meet with a diabetes educator and include one of her family members in the education. Patient states that she has used an insulin pen on her daughter before and has used a glucometer a long time ago. Would still recommend RN re-teach patient how to use glucometer and insulin pen.     Diabetes Management Discharge Plan (printed a simplified version that is larger font in AVS and reviewed with patient)  Instructions to patient were posted in AVS and discussed on day of discharge.   Medications and supplies are to be ordered by primary service on discharge.   *please use the [employer] non-branded discharge supply order set ([id number])*    Patient will need the following supplies prescribed:   Novolog Flexpens  \"BD\" (32G x 4mm) insulin pen needles  Glucometer (if brand of meter not known, can be ordered \"no brand specified\" and note to pharmacy: \"can substitute per insurance coverage\")  Lancets  Test strips  Sharps container  Alcohol swabs     Blood glucose monitoring: Three times daily before meals, and at bedtime.    Blood Glucose (BG) goals:  mg/dL before meals    Glucose Control Regimen:     1) Rapid-acting insulin: aspart (Novolog) carbohydrate coverage/mealtime insulin (with prednisone 60 mg daily in the morning)  Take 10 units before average meals that contain carbohydrates     OR  Take 5 units before small meals or snacks that contain carbohydrates    2) Rapid-acting insulin: aspart (Novolog) correction - see chart below. Take for high blood glucoses three times daily before meals  Add the correction dose to the carbohydrate coverage/mealtime insulin dose and give in one injection--ideally 10-15 minutes before a meal.  You may take the correction dose even if you skip a meal (as long as it has been 4 hours since " previous correction dose).     Pre-meal correction scale:    Blood Glucose Insulin Novolog Before Meals:   Less than 140 0 units   140 -164  1 units   165 -189 2 units   190 - 214 3 units   215 - 239 4 units    240 - 264  5 units   265 - 289 6 units   290 - 314 7 units   315 - 339 8 units   340 - 364 9 units   365 or more 10 units       Outpatient Diabetes Follow-Up: Follow up with your Primary Care Provider (PCP) in 1-2 weeks, bring glucose data to your appointment. Follow up sooner if blood glucose runs consistently greater than 200 mg/dL or if having more than two episodes less than 70 mg/dL.     Your target A1c value is less than 7% to help prevent future complications from diabetes. Your most recent A1c is 8.7%.      Thank you for letting the Diabetes Management Team be involved in your care!    Discharge Planning: (tentative)  --> plan for ARU (either [facility] or [facility])  Medications: TBD    Test Claims: completed 5/19--> PA for NPH flexpens DENIED 5/21 (has to use vials).     Education: will be needed if not discharging   Outpatient Follow-up: recommend PCP      Please notify Inpatient Diabetes Service if changes are planned to steroids, nutrition, TPN/TF and anticipated procedures requiring prolonged NPO status.         Interval History/Review of Systems :   - The last 24 hours progress and nursing notes reviewed.  - Frustrated that she isn't allowed to get out of bed without assistance (fall risk)  - Feels hungry all the time. No nausea or emesis.       5/20: Discussed CGM, she would be interested in this (can be pursued outpatient or at the end of her ARU stay)    Planned Procedures/Surgeries: none    Inpatient Glucose Control:       Recent Labs   Lab 05/21/25  0201 05/20/25  2148 05/20/25  1556 05/20/25  1156 05/20/25  0826 05/20/25  0659   * 249* 228* 119* 163* 217*             Medications Impacting Glycemia:   Steroids:    5/16 - 5/20: Methylprednisolone 1,000 mg daily at 0900   5/21: Prednisone  "60 mg daily x 1 month, then tentatively taper by 10 mg every week pending results of brain MRI   D5W-containing solutions/medications: none   Other medications impacting glucose: none         Nutrition:   Orders Placed This Encounter      Moderate Consistent Carb (60 g CHO per Meal) Diet  Supplements: none   TF: none   TPN: none         Diabetes History: see full consult note for complete diabetes history   Diabetes Type and Duration: T2DM, per patient was told she has T2DM 2 weeks ago at OSH. Per chart review, diagnosed 4/2021 with A1c 6.7%      GAD65 antibody, zinc transporter 8 antibody, islet antibody, insulin antibody, and c-peptide not available on epic search      PTA Medication Regimen: none     Missing doses?: N/A     Historical Diabetes Medications:   - reports taking Metformin 500 mg BID recently at OSH but wasn't taking at home. Has never been on insulin before.    --> reports having gastric bypass surgery ~30 years ago, this shouldn't limit ability to use a GLP1a since it has been so long ago.      Glucose monitoring device/frequency/trends: has used glucometer randomly before because she has family members with diabetes.     Hypoglycemia PTA: none     Outpatient Diabetes Provider: Has PCP but hasn't seen him since 2021 - Randal Castellanos (with [facility])      Formal Diabetes Education/Educator: no        Physical Exam:   BP (!) 168/76 (BP Location: Right arm, Cuff Size: Adult Large)   Pulse 77   Temp 98  F (36.7  C) (Oral)   Resp 20   Ht 1.676 m (5' 6\")   Wt 104.6 kg (230 lb 8 oz)   SpO2 98%   BMI 37.20 kg/m    General:  well appearing, NAD, L arm weakness.  Lungs: unlabored breathing on RA.  Mental Status:  Alert and oriented x3  Psych:  frustrated         Data:     Lab Results   Component Value Date    A1C 8.7 (H) 05/06/2025    A1C 6.0 (H) 02/11/2022    A1C 6.6 (H) 10/15/2021    A1C 5.8 (H) 09/06/2021    A1C 6.7 (H) 04/19/2021    A1C 5.0 02/11/2015       ROUTINE IP LABS (Last four " results)  BMP  Recent Labs   Lab 05/21/25  0201 05/20/25  2352 05/20/25  2148 05/20/25  1556 05/20/25  1156 05/20/25  0826 05/20/25  0659 05/19/25  0757 05/19/25  0636 05/18/25  0809 05/18/25  0708 05/17/25  0901 05/17/25  0729   NA  --   --   --   --   --   --  139  --  144  --  142  --  133*   POTASSIUM  --   --   --   --   --   --  4.0  --  4.0  --  4.0  --  4.3   CHLORIDE  --   --   --   --   --   --  103  --  107  --  107  --  100   CRISTIAN  --   --   --   --   --   --  8.1*  --  8.3*  --  8.6*  --  8.3*   CO2  --   --   --   --   --   --  23  --  25  --  24  --  22   BUN  --   --   --   --   --   --  24.0*  --  20.1  --  15.2  --  14.7   CR  --  0.68  --   --   --   --  0.74  --  0.70  --  0.68  --  0.57   *  --  249* 228* 119*   < > 217*   < > 106*   < > 96   < > 165*    < > = values in this interval not displayed.     CBC  Recent Labs   Lab 05/21/25  0646 05/20/25 2352 05/20/25  0659 05/19/25  0636 05/18/25  0708   WBC 11.7*  --  12.9* 11.9* 16.6*   RBC 3.96  --  4.22 4.00 4.44   HGB 10.6*  --  11.0* 10.5* 11.5*   HCT 32.8*  --  34.4* 34.1* 36.0   MCV 83 84 82 85 81   MCH 26.8  --  26.1* 26.3* 25.9*   MCHC 32.3  --  32.0 30.8* 31.9   RDW 15.1*  --  15.3* 15.1* 15.2*    [phone]     Inpatient Diabetes Service will continue to follow, please don't hesitate to contact the team with any questions or concerns.     Andreea Taylor PA-C  Inpatient Diabetes Service  Pager: [phone]  Available on eSoft    To contact Inpatient Diabetes Service:     7 AM - 5 PM: Page the IDS [facility] following the patient that day (see filed or incomplete progress notes/consult notes under Endocrinology)    OR if uncertain of provider assignment: page job code [phone]    5 PM - 7 AM: First call after hours is to primary service.    For urgent after-hours questions, page job code for on call fellow: [phone]     I spent a total of 70 minutes on the date of the encounter doing prep/post-work, chart review, history and exam,  documentation and further activities per the note including lab review, multidisciplinary communication, counseling the patient and/or coordinating care regarding acute hyper/hypoglycemic management, as well as discharge management and planning/communication.  See note for details.

## 2025-05-21 NOTE — PROGRESS NOTES
"Patient incredibly upset throughout the day about being in the hospital, the complications from procedures, her daughter passing away, staff, not being able to see the TV, and the bed/chair alarm. Patient stated she would rather \"trip and fall on her face than listen to an alarm going off.\" Patient has been refusing gait belt and was not reliable on calling appropriately when getting up and would set alarms off.     After discussing extensively with other RN's, OT, and Stroke team, it was decided bed and chair alarm could be turned off. Door alarm was placed so staff could walk with patient if she wants to walk in the halls or off the unit.   "

## 2025-05-21 NOTE — PROGRESS NOTES
Rehab Admissions:  Met with patient today to discuss acute inpatient rehabilitation. Discussed PT/OT/SLP needs and estimated length of stay. Discussed current barriers for admission to [facility] include bed availability and pending insurance authorization. All questions answered.     Thank you for the referral, we will continue to follow this patient for post acute placement.      Determination of admission is based upon the patient's need for an intensive, interdisciplinary approach to rehabilitation, their ability to progress, their ability to tolerate intensive therapies, their need for daily physician supervision, their need for twenty four hour nursing assistance, and their ability and willingness to participate in such a program.     Renata Bahena, PT, DPT  Rehab Liaison/  [facility] [place] and Transitional Care Unit

## 2025-05-21 NOTE — CONSULTS
[facility]   Consult Note - Addiction Service     Date of Admission:  5/6/2025    Consult Requested by:   Reason for Consult: History of polysubstance use; assistance with addiction management and linkage to outpatient care.    Assessment & Plan   Amy Choudhury is a 64 yo woman with DM2, anxiety, GERD, s/p gastric bypass, and polysubstance use most notably methamphetamine who was admitted with blurred and visions and head and ultimately treated with high dose steroids out of concern for inflammatory amyloid angiopathy with ongoing left sided hemineglect pending placement to [facility]U. Addiction medicine was consulted for substance abuse management which appears to largely related to methamphetamine use with triggers related to severe anxiety 2/2 loss of her two daughters.    # Methamphetamine use disorder   # Anxiety  # Depression  # Suspected PTSD  Patient has long standing relationships with various drugs starting in the 70s. She has tried nearly everything but states she does not inject. Primary drug of choice now is methamphetamine. Patient lost two daughters in the past and has severe anxiety with panic attacks. She has been self medicating with methamphetamine as it causes her to calm down rather than become energetic and excited. She uses it nearly daily if not daily. Last period without meth use was several months ago lasting approximately a week. Tried Hydroxyzine in the past but did not work well for her anxiety. Her triggers to use meth are anxiety and panic attacks, she states she has no cravings and doesn't use out of need or boredom. Overall, her addiction treatment should be approached with treating anxiety, depression, and likely PTSD with combination of pharmacotherapy and psychotherapy. She has some signs of ADD with inattentiveness, losing train of thought, calming sensation rather than agitation with meth use however will defer any Neuropsychiatric testing  with outpatient follow up with Psychiatry to assess and consider referral.   - Continue gabapentin. Patient states it helps with anxiety but doesn't like side effects at higher doses. Trial dosing strategies that work best with patient.  - Increase Sertraline to 75 mg daily (previously tolerated 150 mg). Keep at present dose for 4 weeks and reassess  - Psychiatry and Psychotherapy referrals sent for outpatient  - Our peer  will meet the patient if agreeable and still hospitalized to provide additional outpatient resources  - To contact Jazzy Peer  from [facility] ([facility]): call or text: [phone]    # Hx Opioid Use Disorder  She has previously used opioids with history of back surgery in the distant past. Was on methadone around 2014 and 2015 then stopped abruptly with daughters death. Does not use opioids on regular basis now. Her UDS was negative for     # Addiction Social Worker:   Our addiction social worker Rafi Puentes can be contacted if needed, on her pager [phone] or texted/called at [phone]    # Linkage to Care:   - Follow up with PCP Dr. Castellanos   - Outpatient psychiatry and counseling referral placed     The patient's care was discussed with Dr. Eli.    I spent 120 minutes on the unit/floor managing the care of Amy Choudhury. Over 50% of my time was spent on the following:   Significant education and counseling spent on: how substance use disorders and dependence occur, and how it can become a chronic relapsing and remitting medical condition.  In addition, the pharmacology of medical treatments including sertraline for anxiety, the importance of follow up.     Reyes Zheng MD PhD  [facility]   Contact information available via [facility] Paging/Directory  Please see sign in/sign out for up to date coverage information    ChAT team (Addiction Consult Team): Coverage daily 8-4pm      ______________________________________________________________________    Chief Complaint   History of polysubstance use; assistance with addiction management and linkage to outpatient care.    History is obtained from the patient and chart review    History of Present Illness   Amy Choudhury with history of polysubstance abuse who was admitted to [facility] on 5/6/2025 with worsening headache and visual disturbances, including distortions, blurry vision, hallucinations, and pallinopsia.    Addiction medicine consult was placed for substance use, and patient was interested in speaking with addiction medicine.    Summary of present hospitalization, in brief, based on the constellation of findings, there was concern for inflammatory amyloid angiopathy. MRI Brain showed new and extensive leptomeningeal enhancement, sulcal hyperintensities, and microbleeds/superficial siderosis in both parieto-occipital regions. Left cerebellar hyperintense lesions remained stable. A CSF analysis on 5/12 showed a lymphocytic pleocytosis (32) and elevated protein (74.9), though panels for meningitis and flow cytometry/cytology were negative. Serum inflammatory workup were negative. Imaging including CT Chest/Abd/Pelvis and CTA and Cerebral angiogram on 5/16 showed no evidence of vasculopathy. Following a DSA (presumably during the angiogram) found multifocal infarcts and patient unfortunately developed left hemiparesis and hemineglect. She has been gradually improving from these. During this admission, her headache resolved. However, she continues to have blurry vision and hallucinations. Her exam is notable for a left superior quadrantonopia, severe left arm paresis, intact strength in the left lower extremity, and possibly some inattention on the left, which is improving. Completed 5-day course of IV Solumedrol 1g daily (5/17 - 5/21). Plans were made to start a prolonged steroid taper with PO Prednisone 60 mg daily for 1 month,  "followed by gradual reduction. The plan also included repeating MRI Brain scans at 1 month and 3 months. Follow-up serum and CSF encephalopathy panels.    Patient has a history of polysubstance use, with methamphetamine being the primary drug of choice. She reports using meth most days. Last period without meth use was several months ago lasting approximately a week. She always smokes, denies snorting or injecting. During her admission starting 2025 at [facility], her UDS was positive for methamphetamine. Last use was prior to this hospitalization. Patient became emotional and tearful, talking about stress related to her daughter who was also hospitalized for addiction problems. She also mentioned having recently lost two daughters from medical reasons. She uses meth as a calming mechanism for severe anxiety. She does not use opioids on any regular basis. She previously was on methodone in  however stopped abruptly after her daughters death.     Drug/Substance of Choice:  A lot of drugs of choice in the past. Pain pills, gabapentin, smoking meth, marijuana. Meth would be the drug of choice now. Uses substances for treat her anxiety overall.    Opioid use history: Had back surgery in distant past several decades and sent home with ten refills and was addicted to opioids but does not use them regularly now. Was on Methadone in  but stopped after daughters death.   Alcohol use history: Drinks \"monthly at this point\". Occasion drinker. Now theres less occasions. Doesn't drink alone.    Withdrawal history: Opiates and gabapentin. Felt like it was in \"my head, like panic attacks\" from gabapentin. When daughter  quit methadone \"cold turkey (was on methadone for a year at that time) then had withdrawals in approximately in  and has not used methadone since that time and has only taken occasional opiates since that time only for a \"broken bone\". Describes withdrawal as \"hair hurting\" with nausea and " "vomiting.    If injecting: what type of needles? Not injecting     Prior MAT history: Methadone around 2014 - 2015 for a year. Took suboxone \"once\" got sick.    Other substances used: Smokes meth because it calms her down. Had horrible panic attacks due to her daughters dying. Smoking meth calms her down. States her mind slows she smokes meth.     Ever traded goods for sex: No    History of nursing home or [facility]? Went to [facility] once for court.     Active or prior justice related issues secondary to substance use: None    History of physical or sex abuse: No    Identified race/ethnicity/important cultural/spiritual/ethnic identities: [place].  Employed: Not employed   Housing status/insecurity: Near [facility] on [address].  Where patient lives/what town: Near [facility].   Transportation status/insecurity: Uses public transportation   Telephone status/insecurity: Has cell phone  Additional family member or friend who can support health goals: Daughter Missy is a drug and alcohol counselor but she doesn't really go to anyone for problem.   If patient is a person who injections drugs:  - Needle use (how often shared or reused): None  - Injection technique: None  - Prior history of IV drug related infections: Does not inject  Sexually active: Yes, has a boyfriend  Birth control: No      Review of Systems   The 10 point Review of Systems is negative other than noted in the HPI or here.     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    Depressive disorder     Gastro-oesophageal reflux disease     bleeding ulcers    Hiatal hernia     NEWLY DIAGNOSISED    Hyperparathyroidism, unspecified 03/29/2012    Peptic ulcer, unspecified site, unspecified as acute or chronic, without mention of hemorrhage or perforation     Pneumonia     NOVEMBER    PONV (postoperative nausea and vomiting)  "    TMJ (temporomandibular joint syndrome) 05/08/2014    Vitamin D deficiency 06/07/2012       Past Surgical History:   Procedure Laterality Date    APPENDECTOMY      CHOLECYSTECTOMY      DILATION AND CURETTAGE, OPERATIVE HYSTEROSCOPY WITH MORCELLATOR, COMBINED  11/21/2012    Procedure: COMBINED DILATION AND CURETTAGE, OPERATIVE HYSTEROSCOPY WITH MORCELLATOR;  Hysteroscopy, Polypectomy, Dilation and Curettage  ;  Surgeon: Marta Montejo MD;  Location: [facility] OR    GI SURGERY      gastric bypass at age 31    IR CAROTID CEREBRAL ANGIOGRAM BILATERAL  5/16/2025    ORTHOPEDIC SURGERY      back surgery at age 31    PARATHYROIDECTOMY Right 12/14/2015    Procedure: PARATHYROIDECTOMY;  Surgeon: Gregory Coleman MD;  Location: [facility]    WA MARSUP BARTHOLIN GLAND CYST      SPINE SURGERY         Social History   Social History     Socioeconomic History    Marital status:      Spouse name: Not on file    Number of children: Not on file    Years of education: Not on file    Highest education level: Not on file   Occupational History    Not on file   Tobacco Use    Smoking status: Every Day     Current packs/day: 0.50     Average packs/day: 0.5 packs/day for 30.0 years (15.0 ttl pk-yrs)     Types: Cigarettes    Smokeless tobacco: Never   Substance and Sexual Activity    Alcohol use: Yes     Comment: occasionally    Drug use: No    Sexual activity: Yes     Partners: Male     Birth control/protection: None   Other Topics Concern    Parent/sibling w/ CABG, MI or angioplasty before 65F 55M? Not Asked   Social History Narrative    Not on file     Social Drivers of Health     Financial Resource Strain: Low Risk  (5/18/2025)    Financial Resource Strain     Within the past 12 months, have you or your family members you live with been unable to get utilities (heat, electricity) when it was really needed?: No   Food Insecurity: High Risk (5/18/2025)    Food Insecurity     Within the past 12 months, did you worry that your  food would run out before you got money to buy more?: Yes     Within the past 12 months, did the food you bought just not last and you didn t have money to get more?: Yes   Transportation Needs: High Risk (2025)    Transportation Needs     Within the past 12 months, has lack of transportation kept you from medical appointments, getting your medicines, non-medical meetings or appointments, work, or from getting things that you need?: Yes   Physical Activity: Not on file   Stress: Not on file   Social Connections: Socially Integrated (2025)    Received from [facility] & [facility]    Social Connections     Do you often feel lonely or isolated from those around you?: 0   Interpersonal Safety: Low Risk  (5/10/2025)    Interpersonal Safety     Do you feel physically and emotionally safe where you currently live?: Yes     Within the past 12 months, have you been hit, slapped, kicked or otherwise physically hurt by someone?: No     Within the past 12 months, have you been humiliated or emotionally abused in other ways by your partner or ex-partner?: No   Housing Stability: Low Risk  (2025)    Housing Stability     Do you have housing? : Yes     Are you worried about losing your housing?: No       Family History   I have reviewed this patient's family history and updated it with pertinent information if needed.  Family History   Problem Relation Age of Onset    Thyroid Disease Mother     Breast Cancer Mother 57         65 y.o.    Other - See Comments Mother         cystic acne    Cancer Brother     Diabetes Daughter         Type 1; insulin         Medications   I have reviewed this patient's current medications    Allergies   No Known Allergies    Physical Exam   Temp: 98  F (36.7  C) Temp src: Oral BP: (!) 158/61 Pulse: 57   Resp: 16 SpO2: 99 % O2 Device: None (Room air)      Gen: no acute distress, well nourished, well developed  CV: Extremities WWP, pulses assumed   Resp: breathing  comfortably on RA  Abd: +non-tender, non-distended, no guarding or rebound tenderness  Ext: No significant deformities or trauma, moving all ext freely  Skin: No erythema, no lesions or rashes.   Psych: Forgetful, distracted, congruent affect, intermittently tearful when discussing daughters    Due to regulation of Title 42 of the Code of Federal Regulations (CFR) Part 2: Confidentiality laws apply to this note and the information wherein.  Thus, this note cannot be copy and pasted into any other health care staff's note nor can it be included in general medical records sent to ANY outside agency without the patient's written consent.

## 2025-05-21 NOTE — INTERIM SUMMARY
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 She had two prior hospitalizations for similar symptoms, during which MRI imaging at an outside hospital (OSH) revealed diffuse cerebral microbleeds predominantly in the parietal and occipital regions, along with significant leptomeningeal enhancement in the cerebellum and bilateral cerebral hemispheres, sparing parts of the frontal lobes. The initial differential included posterior reversible encephalopathy syndrome (PRES) and hypertensive vasculopathy. On 05/06, she presented to [facility] with worsening symptoms of headaches and visual disturbances. MRI Brain showed new and extensive leptomeningeal enhancement, sulcal hyperintensities, and evidence of microbleeds/superficial siderosis in the bilateral parieto-occipital regions. Continuous EEG done for 3 days without any abnormal epileptiform discharges. CT chest abdominal pelvis completed to rule out any malignancy was unremarkable. D-dimer, ESR, CRP within normal limits.  Lumbar puncture revealed lymphocytic pleocytosis and elevated protein, with no evidence of infection. Rheumatology was consulted, serum inflammatory workup negative thus far. Given the imaging findings, inflammatory amyloid angiopathy was considered as a leading differential. To further  evaluate for vasculopathy vs vasculitis, a digital subtraction angiogram (DSA) was performed on 05/16 and was negative. Following the DSA procedure, the patient developed new left upper extremity weakness and left hemineglect. Repeat imaging demonstrated multiple new infarcts, suspect most likely periprocedural. The patient was started on high dose methylprednisolone for five days and plan for oral steroid taper.     The patient requires transfer to acute inpatient rehabilitation for intensive therapies not available in a lesser level of care including PT/OT/SLP, ongoing medical management at least three days per week, and rehabilitative nursing cares. At baseline Amy is independent with mobility and ADL. Currently needing Ax1 with mobility and ADL.Patient requires an intensive inpatient rehab program to address the following acute impairments: L UE weakness, L field cut, L neglect, impaired balance, decreased activity tolerance, impaired cognition, impaired judgement/safety awareness. These impairments are impacting the patients ability to safely and independently manage functional transfers, ambulation, gait, stairs and ADL tasks.     Current Active Medical Management Needs/Risks for Clinical Complications  The patient requires the high level of rehabilitation physician supervision that accompanies the provision of intensive rehabilitation therapy.  The patient needs the services of the rehabilitation physician to assess the patient medically and functionally and to modify the course of treatment as needed to maximize the patient's capacity to benefit from the rehabilitation process.  The patient requires physician oversight at least 3x/wk to medically manage and assess:  Neurologic status: assess for neurologic recovery. s/p IV methylprednisolone 1000mg 5 day course 5(5/16-5/20). Transitioned to oral on 5/21. Continue taper. While on prolonged steroid course, prophylaxis with Protonix daily, vit D/calcium  daily, and bactrim DS three times weekly.  Provide stroke education and risk factor reduction strategies (modifiable risk factors include HTN, DM). Pt is at risk for recurrent strokes and pain in setting of CVA. The patient is at risk for falls w/ injury in setting of stroke.   Pain: Headache mgmt. Patient will need ongoing assessment and adjustment of pain medications for optimal participation in therapies. Gabapentin 300 mg TID (decreased from 600mg TID on 5/20), propranolol ER 60 mg daily for migraine prevention, depakote 500mg BID.   Hyponatremia: Trend BMP, strict I/Os.  Leukocytosis: Likely reaction to steroids. Daily CBC.   Endocrine, DM II: Sub-optimal control (A1c 8.7%, Hgb: normal)  Will need close PCP follow up. Has needed endocrinology to follow. Continue to titrate/adjust medications. At risk for steroid induced hyperglycemia. .arc  HTN: SBP <180. discontinue PTA hydrochlorothiazide 12.5 mg daily due to hyponatremia. increase losartan to 100mg every day. start amlodipine 5mg daily (PTA dose 10mg). Pt is at risk for fluctuations in BP, uncontrolled BP, dizziness.   BMI: 37.2. Increased body habitus places the patient at increased risk for complications and mortality. Obesity is clinically significant d/t increased RN cares, use of resources, and specialty equipment.   Substance use hx: Continue gabapentin. Patient states it helps with anxiety but doesn't like side effects at higher doses. Trial dosing strategies that work best with patient. Increase Sertraline to 75 mg daily (previously tolerated 150 mg). Keep at present dose for 4 weeks and reassess. Monitor for need for health psych consult while on ARC.     Past Medical/Surgical History  Surgery in the past 100 days: Unknown  Additional relevant past medical history: DM, anxiety, tobacco use, and polysubstance use (positive for methamphetamine on urine drug screen)     Level of Functioning Prior to Admission:    LIVING ENVIRONMENT  People in Home:  "alone  Current Living Arrangements: apartment  Home Accessibility: no concerns  Transportation Anticipated: health plan transportation  Living Environment Comments: Pt reports living in apartment alone, no conerns with accessibility. Pt reports has a tub shower, no shower chair.    SELF-CARE  Usual Activity Tolerance: good  Equipment Currently Used at Home: none  Activity/Exercise/Self-Care Comment: Pt reports PLOF as IND with cares, however, reports has PCA as needed for cares, but generally doesn't need them.    Level of Function: GG Scale (Section GG Functional Ability and Goals; CMS's SORIA Version 3.0 Manual effective 10.1.2019):  PT Current Function Goals for Rehab   Bed Rolling {GG Scale:050602::\"6 Independent\"} 6 Independent   Supine to Sit {GG Scale:632919::\"6 Independent\"} 6 Independent   Sit to Stand {GG Scale:454462::\"6 Independent\"} 6 Independent   Transfer {GG Scale:350852::\"6 Independent\"} 6 Independent   Ambulation {GG Scale:124886::\"6 Independent\"} 6 Independent   Stairs Not completed 6 Independent     OT Current Function Goals for Rehab   Feeding 5 Setup or clean-up assistance 6 Independent   Grooming {GG Scale:238550::\"6 Independent\"} 6 Independent   Bathing {GG Scale:245430::\"6 Independent\"} 6 Independent   Upper Body Dressing {GG Scale:895600::\"6 Independent\"} 6 Independent   Lower Body Dressing {GG Scale:867907::\"6 Independent\"} 6 Independent   Toileting {GG Scale:826320::\"6 Independent\"} 6 Independent   Toilet Transfer {GG Scale:234271::\"6 Independent\"} 6 Independent   Tub/Shower Transfer Not completed 6 Independent   Cognition Impaired Supervision     SLP Current Function Goals for Rehab   Swallow Not Impaired Not applicable   Communication Not Impaired Not applicable       Current Diet:  0-Thin and 7-Regular    Summary Statement:  ***Amy currently has intensive physical therapy, occupational therapy and speech therapy needs. At baseline Amy is independent with mobility and ADL. Currently " needing Ax1 with mobility and ADL.Patient requires an intensive inpatient rehab program to address the following acute impairments: L UE weakness, L field cut, L neglect, impaired balance, decreased activity tolerance, impaired cognition, impaired judgement/safety awareness. ***    Expected Therapies and Services Required During Inpatient Rehab Admission  Intensity of Therapy: Patient requires intensive therapies not available in a lesser level of care. Patient is motivated, making gains, and can tolerate 3 hours of therapy a day.  Physical Therapy: 60 minutes per day, 6 days a week for 7 days  Occupational Therapy: 60 minutes per day, 6 days a week for 7 days  Speech and Language Therapy: 60 minutes per day, 6 days a week for 7 days  Rehabilitation Nursing Needs: Patient requires 24 hour Rehab Nursing to manage vitals, medication education, positioning, carryover of new rehab techniques, care coordination, blood sugar management, diabetes education, assess neurologic status, pain management, stroke education, and provide safe environment for patient at falls risk.    Precautions/restrictions/special needs:   Precautions: fall precautions and isolation precautions   Restrictions: N/A   Special Needs: N/A    Expected Level of Improvement: Anticipate with intensive therapies, close medical management, and rehabilitative nursing care the patient will improve strength, balance, tolerance to activity, safety to ensure Mod I with basic mobility and ADL performance to allow return home with family assistance.   Expected Length of time to achieve: 7 days    Anticipated Discharge Needs:  Anticipated Discharge Destination: Home  Anticipated Discharge Support: Family member  24/7 support available : Yes  Identified caregiver(s):  Hilario Jj  Anticipated Discharge Needs: Home with outpatient therapy    Identified challenges/barriers:  None anticipated.     Liaison signature/date/time: ***    Physician statement of review  and agreement:  I have reviewed and am in agreement of the need for IRF stay to address above functional and medical needs. In addition to above statements address, Patient requires intensive active and ongoing therapeutic intervention and multiple therapies; Patient requires medical supervision; Expected to actively participate in the intensive rehab program; Sufficiently stable to actively participate; Expectation for measurable improvement in functional capacity or adaption to impairments.    MD signature/date/time:

## 2025-05-21 NOTE — PROGRESS NOTES
Care Management Follow Up    Length of Stay (days): 15    Expected Discharge Date: 05/21/2025     Concerns to be Addressed: Discharge planning     Patient plan of care discussed at interdisciplinary rounds: Yes    Anticipated Discharge Disposition: Occupational and Physical Therapy are recommending acute rehab placement               Anticipated Discharge Services:  Occupational and Physical Therapy are recommending acute rehab placement   Anticipated Discharge DME: Not applicable at this time    Patient/family educated on Medicare website which has current facility and service quality ratings: Yes  Education Provided on the Discharge Plan: Yes  Patient/Family in Agreement with the Plan: Yes    Referrals Placed by CM/SW:   Post acute care facility's   Private pay costs discussed: Not applicable at this time    Discussed  Partnership in Safe Discharge Planning  document with patient/family: No     Handoff Completed: No, handoff not indicated or clinically appropriate    Additional Information:  SW is following pt for discharge planning.  Occupational and Physical Therapy are recommending acute rehab placement.  Pt needs a private room for MRSA today  Pt received her last dose of IV Methylprednisolone on 5/20/2025.   KULWANT spoke with Dr. Ceron who confirmed readiness for discharge.    The following ARU's are not able to accommodate pt for admit:  - [facility], SW received a return call from Christina stateing that they are full this week to external referrals.  - [facility] L[facility] at [facility],   Per Admissions (Janice) both the [facility] and [facility] locations are full until mid next week (? 5/28/2025).    The following ARU is still assessing:  - [facility], SW sent a message (via Teams) to Admissions (Renata) asking whether or not they can accept pt for admit today.  KULWANT received a response from Renata indicating that she does not anticipate bed availability today and will need to seek prior auth from pt's insurance.    KULWANT phoned  pt's grandson, Hilario to confirm whether or not he is able to provide 24/7 support to pt following pt's ARU stay.  Hilario states that he is available to provide pt with 24/7 support following pt's ARU stay.      Next Steps: Await bed availability and [facility].    CARLINE Padgett  Social Work, 6A  Phone:  [phone]  Pager:  [phone]  5/21/2025       TANG Harrison

## 2025-05-21 NOTE — PROGRESS NOTES
[facility]    Vascular Neurology Progress Note    Interval History     No acute event overnight. The patient notes ongoing visual symptoms today, but not clearly worse than prior.     Hospital Course     Chief complaint: Headache    65-year-old female with a PMH of DM, anxiety, tobacco use, and polysubstance use (positive for methamphetamine on urine drug screen) presented with a 3-4 week history of worsening headaches and visual disturbances. She had two prior hospitalizations for similar symptoms, during which MRI imaging at an outside hospital (OSH) revealed diffuse cerebral microbleeds predominantly in the parietal and occipital regions, along with significant leptomeningeal enhancement in the cerebellum and bilateral cerebral hemispheres, sparing parts of the frontal lobes. The initial differential included posterior reversible encephalopathy syndrome (PRES) and hypertensive vasculopathy. On 05/06, she presented to [facility] with worsening symptoms of headaches and visual disturbances. MRI Brain showed new and extensive leptomeningeal enhancement, sulcal hyperintensities, and evidence of microbleeds/superficial siderosis in the bilateral parieto-occipital regions. Continuous EEG done for 3 days without any abnormal epileptiform discharges. CT chest abdominal pelvis completed to rule out any malignancy was unremarkable. D-dimer, ESR, CRP within normal limits.  Lumbar puncture revealed lymphocytic pleocytosis and elevated protein, with no evidence of infection. Rheumatology was consulted, serum inflammatory workup negative thus far. Given the imaging findings, inflammatory amyloid angiopathy was considered as a leading differential. To further evaluate for vasculopathy vs vasculitis, a digital subtraction angiogram (DSA) was performed on 05/16 and was negative. Following the DSA procedure, the patient developed new left upper extremity weakness and left hemineglect.  Repeat imaging demonstrated multiple new infarcts, suspect most likely periprocedural. She has completed 5 day course of IV methylprednisolone 1000mg daily and was switched to oral prednisone.     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 Differential diagnoses include inflammatory amyloid angiopathy, CNS lymphoma, neoplastic/autoimmune process, vasculitis, less likely PRES given significant leptomeningeal enhancement and less prominent FLAIR changes though could be recovering PRES. LP with findings suggestive of inflammatory process. Serum workup for vasculitis was negative for inflammatory markers. Repeat MRI (05/12) brain with and without contrast noted slight increased extent of leptomeningeal enhancement, and unchanged microhemorrhages. She underwent a DSA on 05/16 that didn't show any evidence of vasculopathy. However she developed new LUE extremity weakness post-procedure with MRI showing multiple infarcts in the R parietal and occipital lobes. Based on imaging findings, the most probable diagnosis remains inflammatory cerebral amyloid angiopathy. She has completed  5 day course of IV methylprednisolone 1000mg daily and was switched to oral prednisone.   -Continue headache management with               - Gabapentin 300 mg TID (decreased from 600mg TID on 5/20)  - propranolol ER 60 mg daily for migraine prevention   - depakote 500mg BID   - PRN compazine & tylenol   - magnesium 500mg daily  - if all PRN medications do not relieve headache, will try PRN tramadol  50mg Q6hr  - s/p IV methylprednisolone 1000mg 5 day course 5(5/16-5/20)  - starting oral prednisone 60 mg daily for 1 month   - After one month, taper by 10mg each week down to 10mg daily. After that, duration of steroids treatment will be determined during follow-up visit with neurology  - while on prolonged steroid course, prophylaxis with Protonix daily, vit D/calcium daily, and bactrim DS three times weekly  - serum and CSF autoimmune encephalopathy panel pending  -Continue Aspirin 81mg  -Normotensive BP goal  -Follow-up MRI wwo in one month, then plan for repeat MRI wwo in 3 months    #Hyponatremia, resolved  Workup as of 5/11 serum osmol 285, urine osmol 862, urine sodium 219, glucose at time of urine studies was 224, serum sodium 128. Workup most consistent with SIADH vs thiazide induced hyponatremia. Holding thiazide since 5/11 hyponatremia improved. 5/17 was 133 then improved to 142 on 5/18.   -trend BMP  -strict I/Os  -encourage adequate PO intake     #incidental finding- Ill-defined 1.6 cm area of enhancement within the left inferior thyroid lobe.   -TSH normal. Consider outpatient  nonemergent thyroid ultrasound for further Assessment, will defer to PCP.     #incidental 3. 5 mm solid nodule in the right upper lung lobe  -Can consider optional follow-up CT in 12 months to document stability, per PCP     # Leukocytosis, stable  - Likely reactionary to steroids  - Daily CBC     # Type 2 diabetes mellitus  - A1c this admission 8.7, goal < 7.0. Will need close PCP follow up  - Medium dose sliding scale -> high dose sliding scale switched on 5/12  -low consistent carb diet  - Endocrine following for diabetes management, appreciate assistance     #Hypertension   cautious re-introduction of PTA BP meds to avoid fluctuations, and permissive HTN in setting of new stroke on 5/16, will slowly reintroducing PTA meds  - BP parameters:              - Notify provider for SBP > 180              - PRN antihypertensives  for SBP > 180  - Hydralazine/labetalol as needed for systolic blood pressure>180  - discontinue PTA hydrochlorothiazide 12.5 mg daily due to hyponatremia  - increase losartan to 100mg every day  - start amlodipine 5mg daily (PTA dose 10mg)     #Eczema  - PRN triamcinolone cream that the patient has used in the past    #Substance use history  - addiction medicine consult, appreciate recs  - Continue gabapentin. Patient states it helps with anxiety but doesn't like side effects at higher doses. Trial dosing strategies that work best with patient.  - Increase Sertraline to 75 mg daily (previously tolerated 150 mg). Keep at present dose for 4 weeks and reassess  - Psychiatry and Psychotherapy referrals sent for outpatient  - Our peer  will meet the patient if agreeable and still hospitalized to provide additional outpatient resources  - To contact Jazzy Peer  from [facility] ([facility]): call or text: [phone]     Prophylaxis            For VTE Prevention:  - pneumatic compression device and started lovenox DVT ppx 5/12      For Acid Suppression:  - GI prophylaxis is not indicated     Patient Follow-up    - Neurology clinic follow up in 6-8 weeks  - PCP follow up within 7 days of discharge for HTN, diabetes management    Patient Follow-up      Medically Ready for Discharge: Ready Now    The patient was discussed with neurology attending, Dr. Kwame KILLIAN MD  Neurology Resident, PGY-2    Physical Examination     Temp: 98  F (36.7  C) Temp src: Oral BP: (!) 158/61 Pulse: 57   Resp: 16 SpO2: 99 % O2 Device: None (Room air)      General:  patient sitting in chair, no acute distress  HEENT:  normocephalic/atraumatic  Cardio:  RRR  Pulmonary:  no respiratory distress on room air  Skin:  bruising present    Neurologic  Mental Status:  alert, oriented x 3, follows commands, speech clear and fluent, naming and repetition normal  Cranial Nerves:  L superior quadrantanopia, EOMI with  normal smooth pursuit, facial sensation intact and symmetric, facial movements symmetric, hearing not formally tested but intact to conversation, palate elevation symmetric and uvula midline, no dysarthria, shoulder shrug strong bilaterally, tongue protrusion midline  Motor:  LUE drift without hitting the bed. Left shoulder abduction 3+/5, elbow extension 4+/5, elbow flexion 4+/5, wrist extension 2/5, finger extension 1/5, finger flexion 3/5.  5/5 strength in right arm and legs bilaterally. normal muscle tone and bulk, no abnormal movements.   Reflexes:  No ankle clonus bilaterally  Sensory:  light touch sensation intact and symmetric throughout upper and lower extremities, no extinction on double simultaneous stimulation   Coordination:  Finger-nose-finger on right without ataxia or dysmetria, unable to assess on left. Heel to shin intact bilaterally   Station/Gait:  deferred    Stroke Scales       NIHSS  1a. Level of Consciousness 0-->Alert, keenly responsive   1b. LOC Questions 0-->Answers both questions correctly   1c. LOC Commands 0-->Performs both tasks correctly   2.   Best Gaze 0-->Normal   3.   Visual 1-->Partial hemianopia   4.   Facial Palsy 0-->Normal symmetrical movements   5a. Motor Arm, Left 1-->Drift, limb holds 90 (or 45) degrees, but drifts down before full 10 seconds, does not hit bed or other support   5b. Motor Arm, Right 0-->No drift, limb holds 90 (or 45) degrees for full 10 secs   6a. Motor Leg, Left 0-->No drift, leg holds 30 degree position for full 5 secs   6b. Motor Leg, right 0-->No drift, leg holds 30 degree position for full 5 secs   7.   Limb Ataxia 0-->Absent   8.   Sensory 0-->Normal, no sensory loss   9.   Best Language 0-->No aphasia, normal   10. Dysarthria 0-->Normal   11. Extinction and Inattention  1-->Visual, tactile, auditory, spatial, or personal inattention or extinction to bilateral simultaneous stimulation in one of the sensory modalities   Total 3 (05/21/25)        Imaging/Labs   (Bolded imaging and labs new and/or personally reviewed or re-reviewed by me today)    MRI/Head CT MRI brain/MRA brain 5/20/25  IMPRESSION:  No arterial stenosis or concentric enhancement of the arterial walls to suggest middle sized arterial vasculitis. However there is persistent leptomeningeal enhancement which can be seen with small peripheral vasculitis for example KIRK. The leptomeningeal enhancement  appears relatively improved compared with 5/15/2025, indeterminant if this represents treatment effect or due to differences in T1 sequence technique.    MRI Brain 5/16  IMPRESSION:  1. Multiple new acute infarcts since the MRI 24 hours ago, most  notably in the right parietal lobe and right occipital lobe. Tiny  evolving infarct in the left cerebellum.  2. Worsened marked abnormal leptomeningeal FLAIR signal, greatest in  the posterior cerebral hemispheres     MRI Brain (5/15)  IMPRESSION:  1. Slight increase in extent of leptomeningeal enhancement and  unchanged numerous microhemorrhages in the occipital, temporal, and  parietal regions bilaterally. These findings are nonspecific and could  be seen with angiopathy and/or inflammation.  2. Tiny new infarct in the left cerebellum.  3. Superficial siderosis within the right occipital lobe, likely  related to prior hemorrhage.  4. Moderate sequelae of chronic small vessel ischemic disease.     CT head:  IMPRESSION:  1.  No acute findings. No acute intracranial hemorrhage. No acute calvarial fracture.     MRI: (5/7)  IMPRESSION:  1. There is somewhat decreased cortical T2 hyperintense signal with increased conspicuity of leptomeningeal enhancement and numerous presumed microhemorrhages within the occipital, temporal parietal regions bilaterally. These findings are nonspecific, although may be seen with process or hematogenous malignancy such as melanoma.  Findings are less likely related to cerebral amyloid  angiopathy given the distribution.  The distribution could be seen with hypertensive angiopathy, however these are extremely great number, much more than expected.   2. 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  3. Chronic small vessel ischemic changes.     Intracranial Vasculature HEAD CTA:   No large vessel occlusion findings intracranially. No high-grade stenosis intracranially.   Cervical Vasculature NECK CTA:  1.  No acute vascular injury in the neck. No high-grade stenosis in the neck.      Echocardiogram Not obtained   EKG/Telemetry Not obtained      LDL  5/6/2025: 72 mg/dL   A1C  5/6/2025: 8.7 %   Troponin No lab value available in past 48 hrs   TSH 1.24  CT CAP: IMPRESSION:   1. No evidence of primary malignancy throughout the chest, abdomen or  pelvis.  2. Ill-defined 1.6 cm area of enhancement within the left inferior  thyroid lobe. Consider nonemergent thyroid ultrasound for further  assessment.  3. 5 mm solid nodule in the right upper lobe. Can consider optional  follow-up CT in 12 months to document stability.  4. 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 The gastrojejunal anastomosis appears  patent, however, inspissated debris may suggest delayed  gastrointestinal transit.  5. Hepatic steatosis.  6. Additional chronic and incidental findings, as described above.       SUMMARY OF 3 DAYS OF VIDEO EEG:  This 3 day video EEG is abnormal due to the presence increased superimposed fast activity, likely related to sedating medications employed. No seizures or epileptiform discharges were seen. Clinical correlation is recommended.     CSF (5/12)  Protein total CSF (74.9), Glucose CSF (102), RBC (5), Appearance (clear) Lymphocyte%, Mono %, Neutrophil % (all WNL)

## 2025-05-22 ENCOUNTER — APPOINTMENT (OUTPATIENT)
Dept: PHYSICAL THERAPY | Facility: CLINIC | Age: 65
End: 2025-05-22
Payer: COMMERCIAL

## 2025-05-22 ENCOUNTER — APPOINTMENT (OUTPATIENT)
Dept: OCCUPATIONAL THERAPY | Facility: CLINIC | Age: 65
End: 2025-05-22
Payer: COMMERCIAL

## 2025-05-22 ENCOUNTER — PATIENT OUTREACH (OUTPATIENT)
Dept: CARE COORDINATION | Facility: CLINIC | Age: 65
End: 2025-05-22

## 2025-05-22 PROBLEM — I63.9 ISCHEMIC STROKE (H): Status: ACTIVE | Noted: 2025-05-22

## 2025-05-22 LAB
ANION GAP SERPL CALCULATED.3IONS-SCNC: 14 MMOL/L (ref 7–15)
BASOPHILS # BLD AUTO: 0 10E3/UL (ref 0–0.2)
BASOPHILS NFR BLD AUTO: 0 %
BUN SERPL-MCNC: 21.8 MG/DL (ref 8–23)
CALCIUM SERPL-MCNC: 8.4 MG/DL (ref 8.8–10.4)
CHLORIDE SERPL-SCNC: 106 MMOL/L (ref 98–107)
CREAT SERPL-MCNC: 0.71 MG/DL (ref 0.51–0.95)
EGFRCR SERPLBLD CKD-EPI 2021: >90 ML/MIN/1.73M2
EOSINOPHIL # BLD AUTO: 0 10E3/UL (ref 0–0.7)
EOSINOPHIL NFR BLD AUTO: 0 %
ERYTHROCYTE [DISTWIDTH] IN BLOOD BY AUTOMATED COUNT: 15.3 % (ref 10–15)
GLUCOSE BLDC GLUCOMTR-MCNC: 127 MG/DL (ref 70–99)
GLUCOSE BLDC GLUCOMTR-MCNC: 242 MG/DL (ref 70–99)
GLUCOSE BLDC GLUCOMTR-MCNC: 389 MG/DL (ref 70–99)
GLUCOSE BLDC GLUCOMTR-MCNC: 68 MG/DL (ref 70–99)
GLUCOSE SERPL-MCNC: 89 MG/DL (ref 70–99)
HCO3 SERPL-SCNC: 25 MMOL/L (ref 22–29)
HCT VFR BLD AUTO: 37.7 % (ref 35–47)
HGB BLD-MCNC: 12.2 G/DL (ref 11.7–15.7)
IMM GRANULOCYTES # BLD: 0.2 10E3/UL
IMM GRANULOCYTES NFR BLD: 1 %
LYMPHOCYTES # BLD AUTO: 2.7 10E3/UL (ref 0.8–5.3)
LYMPHOCYTES NFR BLD AUTO: 20 %
MCH RBC QN AUTO: 26.5 PG (ref 26.5–33)
MCHC RBC AUTO-ENTMCNC: 32.4 G/DL (ref 31.5–36.5)
MCV RBC AUTO: 82 FL (ref 78–100)
MONOCYTES # BLD AUTO: 1.2 10E3/UL (ref 0–1.3)
MONOCYTES NFR BLD AUTO: 9 %
NEUTROPHILS # BLD AUTO: 9.4 10E3/UL (ref 1.6–8.3)
NEUTROPHILS NFR BLD AUTO: 69 %
NRBC # BLD AUTO: 0 10E3/UL
NRBC BLD AUTO-RTO: 0 /100
PLATELET # BLD AUTO: 381 10E3/UL (ref 150–450)
POTASSIUM SERPL-SCNC: 3.4 MMOL/L (ref 3.4–5.3)
RBC # BLD AUTO: 4.6 10E6/UL (ref 3.8–5.2)
SODIUM SERPL-SCNC: 145 MMOL/L (ref 135–145)
WBC # BLD AUTO: 13.5 10E3/UL (ref 4–11)

## 2025-05-22 PROCEDURE — 85025 COMPLETE CBC W/AUTO DIFF WBC: CPT | Performed by: EMERGENCY MEDICINE

## 2025-05-22 PROCEDURE — 250N000013 HC RX MED GY IP 250 OP 250 PS 637

## 2025-05-22 PROCEDURE — 99254 IP/OBS CNSLTJ NEW/EST MOD 60: CPT

## 2025-05-22 PROCEDURE — 250N000011 HC RX IP 250 OP 636

## 2025-05-22 PROCEDURE — 99222 1ST HOSP IP/OBS MODERATE 55: CPT | Mod: GC | Performed by: STUDENT IN AN ORGANIZED HEALTH CARE EDUCATION/TRAINING PROGRAM

## 2025-05-22 PROCEDURE — 250N000012 HC RX MED GY IP 250 OP 636 PS 637

## 2025-05-22 PROCEDURE — 82962 GLUCOSE BLOOD TEST: CPT

## 2025-05-22 PROCEDURE — 97165 OT EVAL LOW COMPLEX 30 MIN: CPT | Mod: GO | Performed by: OCCUPATIONAL THERAPIST

## 2025-05-22 PROCEDURE — 36415 COLL VENOUS BLD VENIPUNCTURE: CPT | Performed by: EMERGENCY MEDICINE

## 2025-05-22 PROCEDURE — 80048 BASIC METABOLIC PNL TOTAL CA: CPT | Performed by: EMERGENCY MEDICINE

## 2025-05-22 PROCEDURE — 97161 PT EVAL LOW COMPLEX 20 MIN: CPT | Mod: GP

## 2025-05-22 PROCEDURE — 120N000002 HC R&B MED SURG/OB UMMC

## 2025-05-22 RX ORDER — GABAPENTIN 300 MG/1
300 CAPSULE ORAL 3 TIMES DAILY
Status: DISCONTINUED | OUTPATIENT
Start: 2025-05-22 | End: 2025-05-25

## 2025-05-22 RX ORDER — PREDNISONE 20 MG/1
40 TABLET ORAL DAILY
Status: DISCONTINUED | OUTPATIENT
Start: 2025-06-27 | End: 2025-05-28 | Stop reason: HOSPADM

## 2025-05-22 RX ORDER — GABAPENTIN 300 MG/1
300 CAPSULE ORAL 3 TIMES DAILY
COMMUNITY

## 2025-05-22 RX ORDER — ACETAMINOPHEN 325 MG/10.15ML
650 LIQUID ORAL EVERY 4 HOURS PRN
Status: DISCONTINUED | OUTPATIENT
Start: 2025-05-22 | End: 2025-05-22

## 2025-05-22 RX ORDER — HYDRALAZINE HYDROCHLORIDE 20 MG/ML
10 INJECTION INTRAMUSCULAR; INTRAVENOUS EVERY 30 MIN PRN
Status: DISCONTINUED | OUTPATIENT
Start: 2025-05-22 | End: 2025-05-28 | Stop reason: HOSPADM

## 2025-05-22 RX ORDER — PROPRANOLOL HYDROCHLORIDE 60 MG/1
60 CAPSULE, EXTENDED RELEASE ORAL DAILY
Status: DISCONTINUED | OUTPATIENT
Start: 2025-05-22 | End: 2025-05-28 | Stop reason: HOSPADM

## 2025-05-22 RX ORDER — CALCIUM CARBONATE/VITAMIN D3 600 MG-10
1 TABLET ORAL 2 TIMES DAILY WITH MEALS
Status: DISCONTINUED | OUTPATIENT
Start: 2025-05-22 | End: 2025-05-28 | Stop reason: HOSPADM

## 2025-05-22 RX ORDER — LOSARTAN POTASSIUM 100 MG/1
100 TABLET ORAL DAILY
Status: DISCONTINUED | OUTPATIENT
Start: 2025-05-22 | End: 2025-05-28 | Stop reason: HOSPADM

## 2025-05-22 RX ORDER — TRIAMCINOLONE ACETONIDE 1 MG/G
CREAM TOPICAL 2 TIMES DAILY PRN
Status: DISCONTINUED | OUTPATIENT
Start: 2025-05-22 | End: 2025-05-28 | Stop reason: HOSPADM

## 2025-05-22 RX ORDER — DIVALPROEX SODIUM 250 MG/1
500 TABLET, DELAYED RELEASE ORAL 2 TIMES DAILY
Status: DISCONTINUED | OUTPATIENT
Start: 2025-05-22 | End: 2025-05-28 | Stop reason: HOSPADM

## 2025-05-22 RX ORDER — LIDOCAINE 40 MG/G
CREAM TOPICAL
Status: DISCONTINUED | OUTPATIENT
Start: 2025-05-22 | End: 2025-05-28 | Stop reason: HOSPADM

## 2025-05-22 RX ORDER — PANTOPRAZOLE SODIUM 40 MG/1
40 TABLET, DELAYED RELEASE ORAL
Status: DISCONTINUED | OUTPATIENT
Start: 2025-05-22 | End: 2025-05-28 | Stop reason: HOSPADM

## 2025-05-22 RX ORDER — PREDNISONE 10 MG/1
10 TABLET ORAL DAILY
Status: DISCONTINUED | OUTPATIENT
Start: 2025-07-18 | End: 2025-05-28 | Stop reason: HOSPADM

## 2025-05-22 RX ORDER — PREDNISONE 50 MG/1
50 TABLET ORAL DAILY
Status: DISCONTINUED | OUTPATIENT
Start: 2025-06-20 | End: 2025-05-28 | Stop reason: HOSPADM

## 2025-05-22 RX ORDER — ACETAMINOPHEN 650 MG/1
650 SUPPOSITORY RECTAL EVERY 4 HOURS PRN
Status: DISCONTINUED | OUTPATIENT
Start: 2025-05-22 | End: 2025-05-28 | Stop reason: HOSPADM

## 2025-05-22 RX ORDER — PROCHLORPERAZINE MALEATE 10 MG
10 TABLET ORAL EVERY 6 HOURS PRN
Status: DISCONTINUED | OUTPATIENT
Start: 2025-05-22 | End: 2025-05-28 | Stop reason: HOSPADM

## 2025-05-22 RX ORDER — ACETAMINOPHEN 325 MG/1
650 TABLET ORAL EVERY 4 HOURS PRN
Status: DISCONTINUED | OUTPATIENT
Start: 2025-05-22 | End: 2025-05-28 | Stop reason: HOSPADM

## 2025-05-22 RX ORDER — PROPRANOLOL HYDROCHLORIDE 60 MG/1
60 CAPSULE, EXTENDED RELEASE ORAL DAILY
COMMUNITY

## 2025-05-22 RX ORDER — DIVALPROEX SODIUM 500 MG/1
500 TABLET, DELAYED RELEASE ORAL 2 TIMES DAILY
COMMUNITY

## 2025-05-22 RX ORDER — ASPIRIN 81 MG/1
81 TABLET, CHEWABLE ORAL DAILY
Status: DISCONTINUED | OUTPATIENT
Start: 2025-05-22 | End: 2025-05-28 | Stop reason: HOSPADM

## 2025-05-22 RX ORDER — SULFAMETHOXAZOLE AND TRIMETHOPRIM 800; 160 MG/1; MG/1
1 TABLET ORAL
Status: DISCONTINUED | OUTPATIENT
Start: 2025-05-23 | End: 2025-05-28 | Stop reason: HOSPADM

## 2025-05-22 RX ORDER — PREDNISONE 20 MG/1
20 TABLET ORAL DAILY
Status: DISCONTINUED | OUTPATIENT
Start: 2025-07-11 | End: 2025-05-28 | Stop reason: HOSPADM

## 2025-05-22 RX ORDER — LABETALOL HYDROCHLORIDE 5 MG/ML
10 INJECTION, SOLUTION INTRAVENOUS EVERY 10 MIN PRN
Status: DISCONTINUED | OUTPATIENT
Start: 2025-05-22 | End: 2025-05-28 | Stop reason: HOSPADM

## 2025-05-22 RX ORDER — ENOXAPARIN SODIUM 100 MG/ML
40 INJECTION SUBCUTANEOUS EVERY 24 HOURS
Status: DISCONTINUED | OUTPATIENT
Start: 2025-05-22 | End: 2025-05-28 | Stop reason: HOSPADM

## 2025-05-22 RX ORDER — ASPIRIN 81 MG/1
81 TABLET ORAL DAILY
COMMUNITY

## 2025-05-22 RX ORDER — DEXTROSE MONOHYDRATE 25 G/50ML
25-50 INJECTION, SOLUTION INTRAVENOUS
Status: DISCONTINUED | OUTPATIENT
Start: 2025-05-22 | End: 2025-05-28 | Stop reason: HOSPADM

## 2025-05-22 RX ORDER — AMLODIPINE BESYLATE 5 MG/1
5 TABLET ORAL AT BEDTIME
Status: DISCONTINUED | OUTPATIENT
Start: 2025-05-22 | End: 2025-05-28 | Stop reason: HOSPADM

## 2025-05-22 RX ORDER — PANTOPRAZOLE SODIUM 40 MG/1
40 TABLET, DELAYED RELEASE ORAL DAILY
COMMUNITY

## 2025-05-22 RX ORDER — NICOTINE POLACRILEX 4 MG
15-30 LOZENGE BUCCAL
Status: DISCONTINUED | OUTPATIENT
Start: 2025-05-22 | End: 2025-05-28 | Stop reason: HOSPADM

## 2025-05-22 RX ADMIN — LOSARTAN POTASSIUM 100 MG: 100 TABLET, FILM COATED ORAL at 08:55

## 2025-05-22 RX ADMIN — PROCHLORPERAZINE MALEATE 10 MG: 10 TABLET, FILM COATED ORAL at 04:18

## 2025-05-22 RX ADMIN — GABAPENTIN 300 MG: 300 CAPSULE ORAL at 08:59

## 2025-05-22 RX ADMIN — ASPIRIN 81 MG CHEWABLE TABLET 81 MG: 81 TABLET CHEWABLE at 08:57

## 2025-05-22 RX ADMIN — ACETAMINOPHEN 650 MG: 325 TABLET, FILM COATED ORAL at 04:18

## 2025-05-22 RX ADMIN — ACETAMINOPHEN 650 MG: 325 TABLET, FILM COATED ORAL at 08:59

## 2025-05-22 RX ADMIN — CALCIUM CARBONATE 600 MG (1,500 MG)-VITAMIN D3 400 UNIT TABLET 1 TABLET: at 08:56

## 2025-05-22 RX ADMIN — GABAPENTIN 300 MG: 300 CAPSULE ORAL at 19:31

## 2025-05-22 RX ADMIN — PANTOPRAZOLE SODIUM 40 MG: 40 TABLET, DELAYED RELEASE ORAL at 08:56

## 2025-05-22 RX ADMIN — DIVALPROEX SODIUM 500 MG: 250 TABLET, DELAYED RELEASE ORAL at 19:31

## 2025-05-22 RX ADMIN — PROPRANOLOL HYDROCHLORIDE 60 MG: 60 CAPSULE, EXTENDED RELEASE ORAL at 08:55

## 2025-05-22 RX ADMIN — DIVALPROEX SODIUM 500 MG: 250 TABLET, DELAYED RELEASE ORAL at 08:58

## 2025-05-22 RX ADMIN — PREDNISONE 60 MG: 20 TABLET ORAL at 08:56

## 2025-05-22 RX ADMIN — INSULIN HUMAN 10 UNITS: 100 INJECTION, SUSPENSION SUBCUTANEOUS at 08:53

## 2025-05-22 RX ADMIN — SERTRALINE 75 MG: 25 TABLET, FILM COATED ORAL at 08:58

## 2025-05-22 RX ADMIN — Medication 500 MG: at 08:58

## 2025-05-22 RX ADMIN — CALCIUM CARBONATE 600 MG (1,500 MG)-VITAMIN D3 400 UNIT TABLET 1 TABLET: at 18:12

## 2025-05-22 RX ADMIN — ACETAMINOPHEN 650 MG: 325 TABLET, FILM COATED ORAL at 19:17

## 2025-05-22 RX ADMIN — INSULIN ASPART 10 UNITS: 100 INJECTION, SOLUTION INTRAVENOUS; SUBCUTANEOUS at 18:39

## 2025-05-22 RX ADMIN — AMLODIPINE BESYLATE 5 MG: 5 TABLET ORAL at 21:32

## 2025-05-22 RX ADMIN — ENOXAPARIN SODIUM 40 MG: 40 INJECTION SUBCUTANEOUS at 08:53

## 2025-05-22 RX ADMIN — GABAPENTIN 300 MG: 300 CAPSULE ORAL at 14:10

## 2025-05-22 ASSESSMENT — ACTIVITIES OF DAILY LIVING (ADL)
ADLS_ACUITY_SCORE: 59
ADLS_ACUITY_SCORE: 63
ADLS_ACUITY_SCORE: 59
ADLS_ACUITY_SCORE: 63
ADLS_ACUITY_SCORE: 59
ADLS_ACUITY_SCORE: 59
DEPENDENT_IADLS:: INDEPENDENT
ADLS_ACUITY_SCORE: 59
ADLS_ACUITY_SCORE: 63
ADLS_ACUITY_SCORE: 59

## 2025-05-22 NOTE — PROGRESS NOTES
Arrived from: ED  Belongings/meds: Bag and clothes.   2 RN Skin Assessment Completed by: Dori Caba  Skin Findings: Bruising scattered throughout on R and L arms, Abdomen, and R and L thighs.   Non-intact findings documented (yes/no/NA): Yes    Vitals: VSS -RA. Cardiac monitoring.   Neuros: A&Ox4. Blurry vision bilaterally. 3/5 LUE, 5/5 all other extremities. Drift present in LUE. Absent grasp of L hand.   IV: R PIV SL  Labs/Electrolytes: WNL  Resp: WNL  Diet: Consistent carb diet 60g. Good appetite.  GI/: Voiding spon in BR. LBM PTA - passing gas.   Skin: Scattered bruising t/o.   Pain: Headache pain managed with tylenol.   Activity: SBA.  Plan/Updates this shift: Continue POC.

## 2025-05-22 NOTE — CONSULTS
"  Inpatient Diabetes Management Service : New Consult Note     Patient: Amy Choudhury   YOB: 1960    MRN: [id number]     Date of Consult : 05/22/2025   Date of Admission: 5/21/2025     Reason for Consult: \"Reconsulted after representation, after leaving AMA\"   Consult Requestor: Wicho Miller MD            History of Present Illness:   65 female with past medical history of DM, anxiety, tobacco use, polysubstance use presented 5/6/25 with headaches and vision changes ongoing for 3 to 4 weeks.  Patient hospitalized x 2 at outside hospital for similar symptoms and workup include MRI brain with a working diagnosis of possible PRES. She was admitted to [facility] 5/6 with worsening headache and visual disturbance (blurry vision, vague visual distortions, hallucinations, palinopsia, this time without evidence of hypertension.  MRI brain revealed new, extensive leptomeningeal enhancement, sulcal FLAIR signal, and microbleeds /superficial siderosis in the bilateral parieto-occipital areas.  CSF 5/12 revealed lymphocytic pleocytosis (32), elevated protein (75), but studies were otherwise negative including MEP and flow cytometry/cytology.  Serum inflammatory workup was pursued in consultation with rheumatology, and is so far been negative.  CT C/A/P nondiagnostic.  CTA and DSA have showed no evidence of vasculopathy. 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     The evening of 5/21, apparently due to multiple frustrations about her prolonged hospitalization, she discharged AMA. She re-presented later in the night 5/21 requesting re-admission with no new symptoms. Inpatient Diabetes Service consulted 5/21/25 for assistance with glycemic control with steroid use.    History obtained via the patient, chart review and discussion with " primary team.       Additional Historian:  none    Patient is known to the Inpatient Diabetes Service from past admission(s).    Last dose insulin PTA: did not take any insulin doses outside of hospital admissions; last Novolog dose 6 units at 1720.  Current inpatient regimen:     - NPH 10 units q24 hours at 0800  - Novolog ICR 1:10 TID AC, PRN with snacks/supplements  - Novolog correction 1:25>140 TID AC, >200 HS   BG at time of consult: 89 mg/dL (serum)  Planned Procedures/Surgeries: none known    Relevant Labs on Admission:  if contributory, otherwise see labs below    Inpatient Glucose Trends:           Diabetes History:   Diabetes Type and Duration: T2DM, per patient was told she has T2DM 2 weeks ago at OSH. Per chart review, diagnosed 4/2021 with A1c 6.7%      GAD65 antibody, zinc transporter 8 antibody, islet antibody, insulin antibody, and c-peptide not available on epic search      PTA Medication Regimen: none; discharged 5/21 AMA with recommendations of Novolog 10 units before regular meals, 5 units with small meals/snacks plus correction 1:25>140 TID AC with Prednisone 60mg daily.      Missing doses?: did not take any insulin after leaving hospital 5/21     Historical Diabetes Medications:   - reports taking Metformin 500 mg BID recently at OSH but wasn't taking at home. Has never been on insulin before.       Glucose monitoring device/frequency/trends: has used glucometer randomly before because she has family members with diabetes.     Hypoglycemia PTA: none     Outpatient Diabetes Provider: Has PCP but hasn't seen him since 2021 - Randal Castellanos (with [facility])      Formal Diabetes Education/Educator: no     ------------------------  Complications:  no peripheral neuropathy, no retinopathy (last eye exam: not in many years), no nephropathy, no gastroparesis, + macrovascular disease       Most recent A1c: 8.7% (5/6/25)     Hemoglobin at time of most recent A1c: normal, 13.6   RBC transfusion 3 months  prior to most recent A1c: none       History of DKA: no       Diet/Lifestyle/Living Situation: lives alone but children or grandchildren are frequently at her home. Retired. Fixed income, frequently gets food from [employer]. Eats 2 meals a day, likes to cook. Beverages: diet soda, water  Ability to [place] Prescribed Regimen:  unknown   Food/Housing Insecurity: no          Medications Impacting Glycemia:    Steroids:   5/16 - 5/20: Methylprednisolone 1,000 mg daily at 0900   5/21-present: Prednisone 60 mg daily x 1 month, then tentatively taper by 10 mg every week pending results of brain MRI   D5W containing solutions/medications: none  Other medications impacting glucose: none         Diet/Nutrition:    Orders Placed This Encounter      Consistent Carbohydrate Diet Moderate Consistent Carb (60 g CHO per Meal) Diet     Supplements:  none  TF: none  TPN: none          Review of Systems:    HEENT: denies changes in vision  Respiratory: denies SOB  GI: +nausea, emesis last night.   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    Depressive disorder     Gastro-oesophageal reflux disease     bleeding ulcers    Hiatal hernia     NEWLY DIAGNOSISED    Hyperparathyroidism, unspecified 03/29/2012    Peptic ulcer, unspecified site, unspecified as acute or chronic, without mention of hemorrhage or perforation     Pneumonia     NOVEMBER    PONV (postoperative nausea and vomiting)     TMJ (temporomandibular joint syndrome) 05/08/2014    Vitamin D deficiency 06/07/2012             Past Surgical History:      Past Surgical History:   Procedure Laterality Date    APPENDECTOMY      CHOLECYSTECTOMY      DILATION AND CURETTAGE, OPERATIVE HYSTEROSCOPY WITH MORCELLATOR, COMBINED  11/21/2012    Procedure: COMBINED DILATION AND  CURETTAGE, OPERATIVE HYSTEROSCOPY WITH MORCELLATOR;  Hysteroscopy, Polypectomy, Dilation and Curettage  ;  Surgeon: Marta Montejo MD;  Location: [facility] OR    GI SURGERY      gastric bypass at age 31    IR CAROTID CEREBRAL ANGIOGRAM BILATERAL  2025    ORTHOPEDIC SURGERY      back surgery at age 31    PARATHYROIDECTOMY Right 2015    Procedure: PARATHYROIDECTOMY;  Surgeon: Gregory Coleman MD;  Location:  [facility]    NV MARSUP BARTHOLIN GLAND CYST      SPINE SURGERY               Social History:      Social History     Tobacco Use    Smoking status: Every Day     Current packs/day: 0.50     Average packs/day: 0.5 packs/day for 30.0 years (15.0 ttl pk-yrs)     Types: Cigarettes    Smokeless tobacco: Never   Substance Use Topics    Alcohol use: Yes     Comment: occasionally        History   Sexual Activity    Sexual activity: Yes    Partners: Male    Birth control/ protection: None             Family History:      Family History   Problem Relation Age of Onset    Thyroid Disease Mother     Breast Cancer Mother 57         65 y.o.    Other - See Comments Mother         cystic acne    Cancer Brother     Diabetes Daughter         Type 1; insulin               Physical Exam:    BP (!) 159/100 (BP Location: Right arm)   Pulse 64   Temp 98.2  F (36.8  C) (Oral)   Resp 18   SpO2 98%    General:  tired appearing, in no acute distress.  HEENT:  sclera not injected  Lungs:  no cough, no SOB  ABD:  rounded  Skin:  warm and dry, no obvious lesions  Mental Status:  Alert and oriented x3  Psych:   Cooperative, good eye contact, full/appropriate affect         Laboratory Data:      Lab Results   Component Value Date    A1C 8.7 (H) 2025    A1C 6.0 (H) 2022    A1C 6.6 (H) 10/15/2021    A1C 5.8 (H) 2021    A1C 6.7 (H) 2021    A1C 5.0 2015     Recent Labs   Lab Test 25  0211   WBC 13.5*   RBC 4.60   HGB 12.2   HCT 37.7   MCV 82   MCH 26.5   MCHC 32.4   RDW 15.3*         Recent Labs   Lab Test 05/22/25  0211 05/22/25  0111 05/21/25  0739 05/21/25  0646     --   --  142   POTASSIUM 3.4  --   --  3.6   CHLORIDE 106  --   --  107   CO2 25  --   --  25   ANIONGAP 14  --   --  10   GLC 89 68*   < > 100*   BUN 21.8  --   --  18.9   CR 0.71  --   --  0.65   CRISTIAN 8.4*  --   --  7.8*    < > = values in this interval not displayed.     Recent Labs   Lab Test 05/12/25  0651   PROTTOTAL 6.7   ALBUMIN 3.9   BILITOTAL 0.3   ALKPHOS 98   AST 21   ALT 21            Assessment and Recommendations:       Assessment:   Type 2 Diabetes Mellitus, without known complications. 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    Plan/Recommendations:    - NPH 10 units q 24 hrs at 0800 with prednisone. Hold if prednisone on hold.    - Hold: Novolog Meal Coverage:   1 units per 10 g CHO with breakfast and lunch  1 units per 15 g CHO with dinner  1 units per 10 g CHO PRN with snacks/supplements ([id number])  1 units per 15 g CHO PRN with snacks/supplements ([id number])  - Novolog Correction Scale: 1:25>140 TID AC (high resistance), 1:50>200 HS, 0200 (medium resistance)   - BG monitoring: TID AC, HS, 0200   - Hypoglycemia protocol    - Carb counting protocol     Discussion:  Tight glycemic control overnight despite last dose of insulin at 1720. Pt endorsed emesis last night and denies any use of insulin outside of hospital. Pt started eating this AM prior to BG being able to be checked and ate only ~30g cho. Instructed RN to skip AM carb coverage given low carb amount and nearing time for noon BG check. Noon check in target at 127 mg/dL. Given good control, recommend holding carb coverage today to see if pt needs this. Suspect pt snacking yesterday leading to elevated blood sugars.       Discharge Planning: (tentative)   --> plan for ARU (either [facility] or [facility])  Medications: TBD. See note from 5/21 for previous d/c plan. 5/20: Discussed CGM, she would be interested in this (can be pursued outpatient or at the end of her ARU stay)   Test Claims: completed 5/19--> PA for NPH flexpens DENIED 5/21 (has to use vials).     Education: will be needed if not discharging   Outpatient Follow-up: recommend PCP      Thank you for this consult. [facility] will continue to follow.      Please notify Inpatient Diabetes Service if changes are planned to steroids, nutrition, TPN/TF and anticipated procedures requiring prolonged NPO status.     Ila Vazquez PA-C  Inpatient Diabetes Service  Available on [employer] or Secure Chat     To contact Inpatient Diabetes Service:     7 AM - 5 PM: Page the [facility] following the patient that day (see filed or incomplete progress notes/consult notes under Endocrinology)    OR if uncertain of provider assignment: page job code [phone]    5 PM - 7 AM: First call after hours is to primary service.    For urgent after-hours questions, page job code for on call fellow: [phone]     I spent a total of 75 minutes on the date of the encounter doing prep/post-work, chart review, history and exam, documentation and further activities per the note including lab review, multidisciplinary communication, counseling the patient and/or coordinating care regarding acute hyper/hypoglycemic management, as well as discharge management and planning/communication.  See note for details.

## 2025-05-22 NOTE — H&P
[facility]    Stroke Admission Note    Chief Complaint  Regrets discharging AMA yesterday evening    HPI  Amy Choudhury is a 65F h/o T2DM, tobacco and polysubstance use disorder, who presented to an outside hospital 4/28 with headache and visual changes for approximately the preceding 3 to 4 weeks.  She was admitted to [facility]28-[facility]/30, thought to have findings on MRI concerning for PRES in the setting of significant hypertension to the 200s systolic.  She ultimately left AMA, was readmitted 5/2-5/5 with worsening headaches, again in the setting of significant hypertension to the 180s systolic with MRI findings concerning for press versus inflammatory amyloid.  Headache was managed symptomatically and she was discharged.    She was admitted to [facility] 5/6 with worsening headache and visual disturbance (blurry vision, vague visual distortions, hallucinations, palinopsia, this time without evidence of hypertension.  MRI brain revealed new, extensive leptomeningeal enhancement, sulcal FLAIR signal, and microbleeds /superficial siderosis in the bilateral parieto-occipital areas.  CSF 5/12 revealed lymphocytic pleocytosis (32), elevated protein (75), but studies were otherwise negative including MEP and flow cytometry/cytology.  Serum inflammatory workup was pursued in consultation with rheumatology, and is so far been negative.  CT C/A/P nondiagnostic.  CTA and DSA have showed no evidence of vasculopathy. 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      The evening of 5/21, apparently due to multiple frustrations about her prolonged hospitalization, she discharged AMA.  She now re-presents requesting re-admission.      On discussion with Amy, she tells me she has no new symptoms or concerns  "whatsoever.  On getting home, attempting to use her arm, get her medications organized (which she did not have), she tells me she realized she may have \"rushed out.\" Exam is stable vs discharge, notable for known LUE weakness and sensory loss, mild luiz-neglect, and hemianopia.      Intravenous Thrombolysis  Not given due to:   - head trauma/stroke within the past 3 months    Endovascular Treatment  Not initiated due to absence of proximal vessel occlusion    Impression and Plan    #New onset headache with intermittent vision disturbances and migrainous features  #Spells of impaired awareness  #New auditory hallucinations  #Intracerebral microbleeds with diffuse leptomeningeal enhancement  #left sided weakness, neglect on left  #New acute infarcts - right parietal lobe and right occipital lobe  65 year old female with history of DM2, anxiety, tobacco dependence, GERD, s/p gastric bypass, who presented with worsening headaches and blurred vision. Differential diagnoses include inflammatory amyloid angiopathy, CNS lymphoma, neoplastic/autoimmune process, vasculitis, less likely PRES given significant leptomeningeal enhancement and less prominent FLAIR changes though could be recovering PRES. LP with findings suggestive of inflammatory process, which appears to have been steroid responsive. Serum workup for vasculitis was negative for inflammatory markers. Repeat MRI (05/12) brain with and without contrast noted slight increased extent of leptomeningeal enhancement, and unchanged microhemorrhages. She underwent a DSA on 05/16 that didn't show any evidence of vasculopathy. However she developed new LUE extremity weakness post-procedure with MRI showing multiple infarcts in the R parietal and occipital lobes. Based on imaging findings, the most probable diagnosis remains inflammatory cerebral amyloid angiopathy. She has completed  5 day course of IV methylprednisolone 1000mg daily and was switched to oral prednisone. "   -Continue headache management with               - Gabapentin 300 mg TID (decreased from 600mg TID on 5/20)  - propranolol ER 60 mg daily for migraine prevention   - depakote 500mg BID   - PRN compazine & tylenol   - magnesium 500mg daily  - if all PRN medications do not relieve headache, will try PRN tramadol 50mg Q6hr  - s/p IV methylprednisolone 1000mg 5 day course 5(5/16-5/20)  - Continue oral prednisone 60 mg daily for 1 month (s/p single dose yesterday)  - After one month, taper by 10mg each week down to 10mg daily. After that, duration of steroids treatment will be determined during follow-up visit with neurology  - while on prolonged steroid course, prophylaxis with Protonix daily, vit D/calcium daily, and bactrim DS three times weekly  - serum and CSF autoimmune encephalopathy panel pending  -Continue Aspirin 81mg  -Normotensive BP goal  -Follow-up MRI wwo in one month, then plan for repeat MRI wwo in 3 months     #Hyponatremia, resolved  Workup as of 5/11 serum osmol 285, urine osmol 862, urine sodium 219, glucose at time of urine studies was 224, serum sodium 128. Workup most consistent with SIADH vs thiazide induced hyponatremia. Holding thiazide since 5/11 hyponatremia improved. 5/17 was 133 then improved to 142 on 5/18.   -trend BMP  -strict I/Os  -encourage adequate PO intake     #incidental finding- Ill-defined 1.6 cm area of enhancement within the left inferior thyroid lobe.   -TSH normal. Consider outpatient  nonemergent thyroid ultrasound for further Assessment, will defer to PCP.     #incidental 3. 5 mm solid nodule in the right upper lung lobe  -Can consider optional follow-up CT in 12 months to document stability, per PCP     # Leukocytosis, stable  - Likely reactionary to steroids  - Daily CBC     # Type 2 diabetes mellitus  - A1c this admission 8.7, goal < 7.0. Will need close PCP follow up  - Medium dose sliding scale -> high dose sliding scale switched on 5/12  -low consistent carb  diet  - Endocrine following for diabetes management, appreciate assistance     #Hypertension   cautious re-introduction of PTA BP meds to avoid fluctuations, and permissive HTN in setting of new stroke on 5/16, will slowly reintroducing PTA meds  - BP parameters:              - Notify provider for SBP > 180              - PRN antihypertensives for SBP > 180  - Hydralazine/labetalol as needed for systolic blood pressure>180  - discontinue PTA hydrochlorothiazide 12.5 mg daily due to hyponatremia  - increase losartan to 100mg every day  - start amlodipine 5mg daily (PTA dose 10mg)     #Eczema  - PRN triamcinolone cream that the patient has used in the past     #Substance use history  - addiction medicine consult, appreciate recs  - Continue gabapentin. Patient states it helps with anxiety but doesn't like side effects at higher doses. Trial dosing strategies that work best with patient.  - Increase Sertraline to 75 mg daily (previously tolerated 150 mg). Keep at present dose for 4 weeks and reassess  - Psychiatry and Psychotherapy referrals sent for outpatient  - Our peer  will meet the patient if agreeable and still hospitalized to provide additional outpatient resources  - To contact Jazzy Peer  from [facility] ([facility]): call or text: [phone]     Prophylaxis            For VTE Prevention:  - pneumatic compression device and started lovenox DVT ppx 5/12      For Acid Suppression:  - GI prophylaxis is not indicated     Patient Follow-up    - Neurology clinic follow up in 6-8 weeks  - PCP follow up within 7 days of discharge for HTN, diabetes management     Patient Follow-up       Medically Ready for Discharge: Ready Now       Code Status  Full Code    During initial physical assessment, the plan of care was discussed and developed with patient.  Plan of care includes: admission for rehabilitation placement.    Patient was admitted via [facility] ED ([place])    The  "patient will be admitted to the Neuro Critical Care/Stroke team..     The patient was discussed with Stroke Staff Dr. Cadet.  Patient will be seen by Dr. Puente in the AM.    Wicho Miller MD  Neurology Resident  ___________________________________________________    Nutrition: Moderate consistent carb  None    Clinically Significant Risk Factors Present on Admission                   # Hypertension: Noted on problem list      # Anemia: based on hgb <11      # DMII: A1C = 8.7 % (Ref range: <5.7 %) within past 6 months  # Severe Obesity: Estimated body mass index is 37.2 kg/m  as calculated from the following:    Height as of 5/14/25: 1.676 m (5' 6\").    Weight as of 5/14/25: 104.6 kg (230 lb 8 oz). 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    Depressive disorder     Gastro-oesophageal reflux disease     bleeding ulcers    Hiatal hernia     NEWLY DIAGNOSISED    Hyperparathyroidism, unspecified 03/29/2012    Peptic ulcer, unspecified site, unspecified as acute or chronic, without mention of hemorrhage or perforation     Pneumonia     NOVEMBER    PONV (postoperative nausea and vomiting)     TMJ (temporomandibular joint syndrome) 05/08/2014    Vitamin D deficiency 06/07/2012     Past Surgical History   Past Surgical History:   Procedure Laterality Date    APPENDECTOMY      CHOLECYSTECTOMY      DILATION AND CURETTAGE, OPERATIVE HYSTEROSCOPY WITH MORCELLATOR, COMBINED  11/21/2012    Procedure: COMBINED DILATION AND CURETTAGE, OPERATIVE HYSTEROSCOPY WITH MORCELLATOR;  Hysteroscopy, Polypectomy, Dilation and Curettage  ;  Surgeon: Marta Montejo MD;  Location: [facility] OR    GI SURGERY      gastric bypass at age 31    IR CAROTID CEREBRAL ANGIOGRAM BILATERAL  5/16/2025    ORTHOPEDIC SURGERY      back surgery at age 31    PARATHYROIDECTOMY " Right 2015    Procedure: PARATHYROIDECTOMY;  Surgeon: Gregory Coleman MD;  Location: [facility]      SPINE SURGERY       Medications   Home Meds  Prior to Admission medications    Medication Sig Start Date End Date Taking? Authorizing Provider   acetaminophen (TYLENOL) 500 MG tablet Take 1,000 mg by mouth every 6 hours as needed for other (Headache). Max acetaminophen dose: 4000mg in 24 hrs. 24   Reported, Patient   amLODIPine (NORVASC) 10 MG tablet Take 10 mg by mouth at bedtime.    Unknown, Entered By History   hydrochlorothiazide (HYDRODIURIL) 25 MG tablet Take 12.5 mg by mouth daily.    Unknown, Entered By History   ibuprofen (ADVIL/MOTRIN) 600 MG tablet Take 600 mg by mouth every 6 hours as needed for other (Pain). Maximum of 3200 mg in 24 hours.    Unknown, Entered By History   losartan (COZAAR) 100 MG tablet Take 100 mg by mouth daily.    Unknown, Entered By History   metFORMIN (GLUCOPHAGE) 500 MG tablet Take 500 mg by mouth 2 times daily (with meals).    Unknown, Entered By History   sertraline (ZOLOFT) 50 MG tablet Take 50 mg by mouth daily.    Reported, Patient       Scheduled Meds  No current facility-administered medications for this encounter.       Infusion Meds  No current facility-administered medications for this encounter.       PRN Meds  No current facility-administered medications for this encounter.       Allergies   Allergies   Allergen Reactions    Nsaids Other (See Comments), Nausea and Vomiting and GI Disturbance     History of GI bleeding and ulcers    Codeine Sulfate GI Disturbance     Family History   Family History   Problem Relation Age of Onset    Thyroid Disease Mother     Breast Cancer Mother 57         65 y.o.    Other - See Comments Mother         cystic acne    Cancer Brother     Diabetes Daughter         Type 1; insulin     Social History   Social History     Tobacco Use    Smoking status: Every Day     Current packs/day: 0.50      Average packs/day: 0.5 packs/day for 30.0 years (15.0 ttl pk-yrs)     Types: Cigarettes    Smokeless tobacco: Never   Substance Use Topics    Alcohol use: Yes     Comment: occasionally    Drug use: No       Review of Systems   The 10 point Review of Systems is negative other than noted in the HPI or here.        PHYSICAL EXAMINATION  Temp:  [97.5  F (36.4  C)-98.3  F (36.8  C)] 98.2  F (36.8  C)  Pulse:  [57-78] 58  Resp:  [16-20] 18  BP: (158-174)/(61-93) 160/70  SpO2:  [97 %-99 %] 98 %    General:  patient lying in bed, no acute distress  HEENT:  normocephalic/atraumatic  Cardio:  RRR  Pulmonary:  no respiratory distress on room air  Skin:  bruising present     Neurologic  Mental Status:  alert, oriented x 3, follows commands, speech clear and fluent, naming and repetition normal  Cranial Nerves:  L superior quadrantanopia, EOMI with normal smooth pursuit, facial sensation intact and symmetric, facial movements symmetric, hearing not formally tested but intact to conversation, palate elevation symmetric and uvula midline, no dysarthria, shoulder shrug strong bilaterally, tongue protrusion midline  Motor:  LUE drift without hitting the bed. Left shoulder abduction 3+/5, elbow extension 4+/5, elbow flexion 4+/5, wrist extension 2/5, finger extension 1/5, finger flexion 3/5.  5/5 strength in right arm and legs bilaterally. normal muscle bulk, subtly increased tone on the LUE. no abnormal movements.   Reflexes:  No ankle clonus bilaterally  Sensory:  Dullness to LT in LUE, no extinction on double simultaneous stimulation   Coordination:  Finger-nose-finger on right without ataxia or dysmetria, unable to assess on left. Heel to shin intact bilaterally   Station/Gait:  deferred    Dysphagia Screen  Per Nursing    Stroke Scales    NIHSS  1a. Level of Consciousness 0-->Alert, keenly responsive   1b. LOC Questions 0-->Answers both questions correctly   1c. LOC Commands 0-->Performs both tasks correctly   2.   Best Gaze  0-->Normal   3.   Visual 1-->Partial hemianopia   4.   Facial Palsy 0-->Normal symmetrical movements   5a. Motor Arm, Left 1-->Drift, limb holds 90 (or 45) degrees, but drifts down before full 10 seconds, does not hit bed or other support   5b. Motor Arm, Right 0-->No drift, limb holds 90 (or 45) degrees for full 10 secs   6a. Motor Leg, Left 0-->No drift, leg holds 30 degree position for full 5 secs   6b. Motor Leg, right 0-->No drift, leg holds 30 degree position for full 5 secs   7.   Limb Ataxia 0-->Absent   8.   Sensory 1-->Mild-to-moderate sensory loss, patient feels pinprick is less sharp or is dull on the affected side, or there is a loss of superficial pain with pinprick, but patient is aware of being touched   9.   Best Language 0-->No aphasia, normal   10. Dysarthria 0-->Normal   11. Extinction and Inattention  1-->Visual, tactile, auditory, spatial, or personal inattention or extinction to bilateral simultaneous stimulation in one of the sensory modalities   Total 4 (05/22/25 0309)       Modified Ninfa Score (Pre-morbid)  2 - Slight disability.  Able to look after own affairs without assistance, but unable to carry out all previous activities.    Imaging  I personally reviewed all imaging; relevant findings per the HPI.    Lab Results Data   CBC  Recent Labs   Lab 05/21/25  0646 05/20/25  2352 05/20/25  0659 05/19/25  0636   WBC 11.7*  --  12.9* 11.9*   RBC 3.96  --  4.22 4.00   HGB 10.6*  --  11.0* 10.5*   HCT 32.8*  --  34.4* 34.1*    322 352 336     Basic Metabolic Panel   Recent Labs   Lab 05/22/25  0111 05/21/25  1629 05/21/25  1135 05/21/25  0739 05/21/25  0646 05/21/25  0201 05/20/25  2352 05/20/25  0826 05/20/25  0659 05/19/25  0757 05/19/25  0636   NA  --   --   --   --  142  --   --   --  139  --  144   POTASSIUM  --   --   --   --  3.6  --   --   --  4.0  --  4.0   CHLORIDE  --   --   --   --  107  --   --   --  103  --  107   CO2  --   --   --   --  25  --   --   --  23  --  25   BUN   "--   --   --   --  18.9  --   --   --  24.0*  --  20.1   CR  --   --   --   --  0.65  --  0.68  --  0.74  --  0.70   GLC 68* 183* 253*   < > 100*   < >  --    < > 217*   < > 106*   CRISTIAN  --   --   --   --  7.8*  --   --   --  8.1*  --  8.3*    < > = values in this interval not displayed.     Liver Panel  No results for input(s): \"PROTTOTAL\", \"ALBUMIN\", \"BILITOTAL\", \"ALKPHOS\", \"AST\", \"ALT\", \"BILIDIRECT\" in the last 168 hours.  INR    Recent Labs   Lab Test 05/16/25  1311 05/12/25  1002 05/08/25  1152   INR 0.97 0.87 0.88      Lipid Profile    Recent Labs   Lab Test 05/11/25  2151 05/06/25  1554 09/14/21  0819   CHOL 186 167 200*   HDL 49* 55 51   * 72 123*   TRIG 185* 198* 129     A1C    Recent Labs   Lab Test 05/06/25  1554 02/11/22  0550 10/15/21  1008   A1C 8.7* 6.0* 6.6*     Troponin    Recent Labs   Lab 05/16/25  1547 05/16/25  1311   CTROPT 6 6          Stroke Code / Stroke Consult Data Data    Not a stroke code      "

## 2025-05-22 NOTE — ED PROVIDER NOTES
History     Chief Complaint   Patient presents with    arm paralysis    left upper extremity edema     HPI  Amy Choudhury is a 65 year old female with PMH notable for recent stroke with residual left-sided deficits, was admitted 5/6- and left AMA this past evening, insulin-dependent type 2 diabetes who presents to the ED with arm weakness and home safety concern.  Patient reports that it was advised that she stay in the hospital until a rehab unit bed was ready.  However the patient had been in the hospital for about 2 weeks, and strongly wanted to be home.  She thought that she would be able to manage at home safely.  Upon getting back home, she realized she did not have her medications, did not think that she had any insulin.  She ate a meal that her neighbor prepared, did not have insulin afterward as she normally would.  Patient's grandson who usually stays with her was also not present tonight.  Patient realized it was not safe to have left the hospital and returned.     Physical Exam   BP: (!) 160/70  Pulse: 58  Temp: 98.2  F (36.8  C)  Resp: 18  SpO2: 98 %    Physical Exam  General: no acute distress. Appears stated age.   HENT: MMM, no oropharyngeal lesions  Eyes: PERRL, normal sclerae   Cardio: Mildly bradycardic rate. Regular rhythm. Extremities well perfused  Resp: Normal work of breathing, Normal respiratory rate.   Neuro: alert without signs of confusion. CN II-XII grossly intact. Grossly normal strength and sensation in right upper, right lower, and left lower extremities.  Decreased sensation and 4/5 strength of , elbow flexion, and elbow extension in the left upper extremity (reported somewhat improved since the stroke per the patient.  Psych: normal affect, normal behavior      ED Course      Procedures              Labs Ordered and Resulted from Time of ED Arrival to Time of ED Departure   GLUCOSE BY METER - Abnormal       Result Value    GLUCOSE BY METER POCT 68 (*)    BASIC  METABOLIC PANEL     No orders to display            Medical Decision Making  The patient's presentation was of moderate complexity (a chronic illness mild to moderate exacerbation, progression, or side effect of treatment).    The patient's evaluation involved:  review of external note(s) from 1 sources (neurostroke discharge summary)  ordering and/or review of 3+ test(s) in this encounter (see separate area of note for details)  discussion of management or test interpretation with another health professional (neurology)    The patient's management necessitated high risk (a decision regarding hospitalization).      Assessments & Plan   Patient re-presenting to the hospital after leaving AMA this past evening and realizing that she was not safe at home. Vitals in the ED with mildly elevated BP and borderline bradycardia, otherwise unremarkable. Nursing notes reviewed.     Glucose borderline low at 68.  Patient fed.  CBC and chemistry panel sent.  Management discussed with neurology, accepted the patient for admission back other service.      Final diagnoses:   Ischemic stroke (H)   Insulin dependent type 2 diabetes mellitus (H)     New Prescriptions    No medications on file     --  Zane Mello MD   Emergency Medicine   [facility] EMERGENCY DEPARTMENT  5/21/2025       Zane Mello MD  05/22/25 0141
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
## 2025-05-22 NOTE — PLAN OF CARE
Goal Outcome Evaluation:      Plan of Care Reviewed With: other (see comments) (care coordinator)          Outcome Evaluation: may discharge to ARU depending on current recs

## 2025-05-22 NOTE — PROGRESS NOTES
"   05/22/25 1400   Appointment Info   Signing Clinician's Name / Credentials (OT) aysha alcazar ot/l   Living Environment   People in Home alone   Current Living Arrangements apartment   Home Accessibility no concerns   Self-Care   Usual Activity Tolerance fair   Current Activity Tolerance fair   Regular Exercise Yes   Activity/Exercise Type strength training;other (see comments)  (neuro re-ed)   General Information   Referring Physician Wicho Miller   Patient/Family Therapy Goal Statement (OT) \"I left the hospital too early\" \" I want to get my right arm back\"   Additional Occupational Profile Info/Pertinent History of Current Problem Amy Choudhury is a 65F h/o T2DM, tobacco and polysubstance use disorder, who presented to an outside hospital 4/28 with headache and visual changes for approximately the preceding 3 to 4 weeks.  She was admitted to [facility]/28-4/30, thought to have findings on MRI concerning for PRES in the setting of significant hypertension to the 200s systolic.  She ultimately left [place], was readmitted 5/2-5/5 with worsening headaches, again in the setting of significant hypertension to the 180s systolic with MRI findings concerning for press versus inflammatory amyloid.  Headache was managed symptomatically and she was discharged.     She was admitted to [facility] 5/6 with worsening headache and visual disturbance (blurry vision, vague visual distortions, hallucinations, palinopsia, this time without evidence of hypertension.  MRI brain revealed new, extensive leptomeningeal enhancement, sulcal FLAIR signal, and microbleeds /superficial siderosis in the bilateral parieto-occipital areas.  CSF 5/12 revealed lymphocytic pleocytosis (32), elevated protein (75), but studies were otherwise negative including MEP and flow cytometry/cytology.  Serum inflammatory workup was pursued in consultation with rheumatology, and is so far been negative.  CT C/A/P nondiagnostic.  CTA and DSA have showed no evidence of " "vasculopathy. 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      The evening of 5/21, apparently due to multiple frustrations about her prolonged hospitalization, she discharged AMA.  She now re-presents requesting re-admission.      On discussion with Amy, she tells me she has no new symptoms or concerns whatsoever.  On getting home, attempting to use her arm, get her medications organized (which she did not have), she tells me she realized she may have \"rushed out.\" Exam is stable vs discharge, notable for known LUE weakness and sensory loss, mild luiz-neglect, and hemianopia.   Existing Precautions/Restrictions fall   General Observations and Info pt motivated in rehab   Cognitive Status Examination   Orientation Status orientation to person, place and time   Cognitive Status Comments further testing required, pt showing reason and linear logic but did endorse confusion last night   Visual Perception   Visual Field Deficit other (see comments)  (pt with L hemianopsia approx 45 degrees from midline, all other vision WFL. pt reported blurry vision last night now resolved.)   Sensory   Sensory Quick Adds sensation intact   Sensory Comments paresthesia in L hand but retains protective sensation.   Range of Motion Comprehensive   General Range of Motion other (see comments)   Comment, General Range of Motion PROM B UE WFL, L AROM limited to approx 120 degrees shoudler flex, elbow WFL, hand NWFL.   Strength Comprehensive (MMT)   General Manual Muscle Testing (MMT) Assessment other (see comments)   Comment, General Manual Muscle Testing (MMT) Assessment L hand 0-1/5, all other L UE grossly 3/5   Coordination   Coordination Comments severe deficit in L hand and UE.   Sit-Stand Transfer   Sit-Stand [place] (Transfers) supervision   Lower " Body Dressing Assessment/Training   Forrest Level (Lower Body Dressing) socks;minimum assist (75% patient effort)   Clinical Impression   Criteria for Skilled Therapeutic Interventions Met (OT) Yes, treatment indicated   OT Diagnosis decreased ADL I   Assessment of Occupational Performance 5 or more Performance Deficits   Identified Performance Deficits dressing, bathing, toileting, G/H, home making, leisure.   Planned Therapy Interventions (OT) ADL retraining;IADL retraining;cognition;fine motor coordination training;joint mobilzation;manual therapy;motor coordination training;neuromuscular re-education;ROM;strengthening;transfer training;home program guidelines;progressive activity/exercise;visual perception;risk factor education   Clinical Decision Making Complexity (OT) problem focused assessment/low complexity   Risk & Benefits of therapy have been explained evaluation/treatment results reviewed;care plan/treatment goals reviewed;risks/benefits reviewed;current/potential barriers reviewed;participants included;participants voiced agreement with care plan;patient   Clinical Impression Comments Pt presents to OT with L hemiparesis and hemianopsia as well as waxing and waning cognitive deficits leading to decreased ADL I   OT Total Evaluation Time   OT Eval, Low Complexity Minutes (87383) 10   OT Goals   Therapy Frequency (OT) 5 times/week   OT Predicted Duration/Target Date for Goal Attainment 05/28/25   OT: Hygiene/Grooming modified independent   OT: Upper Body Dressing Modified independent   OT: Lower Body Dressing Modified independent   OT: Upper Body Bathing Modified independent   OT: Lower Body Bathing Modified independent   OT: Toilet Transfer/Toileting Modified independent   OT Discharge Planning   OT Plan L neuro re-ed,   OT Discharge Recommendation (DC Rec) Acute Rehab Center-Motivated patient will benefit from intensive, interdisciplinary therapy.  Anticipate will be able to tolerate 3 hours of  therapy per day   OT Rationale for DC Rec pt below baseline in ADL I   OT Brief overview of current status min assist LE dressing, L hemiparesis and L hemianopsia.   Total Session Time   Total Session Time (sum of timed and untimed services) 10

## 2025-05-22 NOTE — PHARMACY-ADMISSION MEDICATION HISTORY
Pharmacist Admission Medication History    Admission medication history is complete. The information provided in this note is only as accurate as the sources available at the time of the update.    Information Source(s): previous admission - patient was admitted to [facility] from 5/6/25-5/21/25     Pertinent Information:   Per neurology will be on a prednisone taper - received only one dose prior to leaving AMA on 5/21. Plan for prednisone 60 mg x1 month, followed by taper. Did not add to PTA list. Plan for PJP ppx with bactrim, GI ppx with pantoprazole, and vit/Ca supplementation while on steroids.   Did not add additional anti-diabetic agents that patient was on during previous admission, endocrine still following for diabetic management    Changes made to PTA medication list:    Added:    VPA - started during previous admission   Gabapentin - started during previous admission   Propranolol - started during previous admission   Pantoprazole - started during previous admission   ASA - started during previous admission     Deleted:   Hydrochlorothiazide - stopped last admission d/t hypoNa    Changed:   None    Medication History Completed By: Felicia Wilson [facility] 5/22/2025 1:33 PM    PTA Med List   Medication Sig Last Dose/Taking    acetaminophen (TYLENOL) 500 MG tablet Take 1,000 mg by mouth every 6 hours as needed for other (Headache). Max acetaminophen dose: 4000mg in 24 hrs. Taking As Needed    amLODIPine (NORVASC) 10 MG tablet Take 10 mg by mouth at bedtime. Taking    aspirin 81 MG EC tablet Take 81 mg by mouth daily. Taking    divalproex sodium delayed-release (DEPAKOTE) 500 MG DR tablet Take 500 mg by mouth 2 times daily. Taking    gabapentin (NEURONTIN) 300 MG capsule Take 300 mg by mouth 3 times daily. Taking    losartan (COZAAR) 100 MG tablet Take 100 mg by mouth daily. Taking    pantoprazole (PROTONIX) 40 MG EC tablet Take 40 mg by mouth daily. Taking    propranolol ER (INDERAL LA) 60 MG 24 hr capsule Take 60 mg  by mouth daily. Taking    sertraline (ZOLOFT) 50 MG tablet Take 50 mg by mouth daily. Taking

## 2025-05-22 NOTE — PROGRESS NOTES
05/22/25 1108   Appointment Info   Signing Clinician's Name / Credentials (PT) Jaida Ruiz, PT, DPT   Living Environment   People in Home alone   Current Living Arrangements apartment   Home Accessibility no concerns   Transportation Anticipated health plan transportation   Self-Care   Usual Activity Tolerance good   Current Activity Tolerance moderate   Regular Exercise No   Equipment Currently Used at Home none   Fall history within last six months yes   Number of times patient has fallen within last six months 2   Activity/Exercise/Self-Care Comment Patient left hospital AMA yesterday and reports that she is unable in her current state to function safely at home, as family is not able to assist as she originally thought.   General Information   Onset of Illness/Injury or Date of Surgery 05/21/25   Referring Physician Wicho Miller MD   Patient/Family Therapy Goals Statement (PT) to go to rehab.   Pertinent History of Current Problem (include personal factors and/or comorbidities that impact the POC) Amy Choudhury is a 65F h/o T2DM, tobacco and polysubstance use disorder, who presented to an outside hospital 4/28 with headache and visual changes for approximately the preceding 3 to 4 weeks.  She was admitted to [facility]/28-4/30, thought to have findings on MRI concerning for PRES in the setting of significant hypertension to the 200s systolic.  She ultimately left AMA, was readmitted 5/2-5/5 with worsening headaches, again in the setting of significant hypertension to the 180s systolic with MRI findings concerning for press versus inflammatory amyloid.  Headache was managed symptomatically and she was discharged.     She was admitted to [facility] 5/6 with worsening headache and visual disturbance (blurry vision, vague visual distortions, hallucinations, palinopsia, this time without evidence of hypertension.  MRI brain revealed new, extensive leptomeningeal enhancement, sulcal FLAIR signal, and  "microbleeds /superficial siderosis in the bilateral parieto-occipital areas.  CSF 5/12 revealed lymphocytic pleocytosis (32), elevated protein (75), but studies were otherwise negative including MEP and flow cytometry/cytology.  Serum inflammatory workup was pursued in consultation with rheumatology, and is so far been negative.  CT C/A/P nondiagnostic.  CTA and DSA have showed no evidence of vasculopathy. 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      The evening of 5/21, apparently due to multiple frustrations about her prolonged hospitalization, she discharged AMA.  She now re-presents requesting re-admission.      On discussion with Amy, she tells me she has no new symptoms or concerns whatsoever.  On getting home, attempting to use her arm, get her medications organized (which she did not have), she tells me she realized she may have \"rushed out.\" Exam is stable vs discharge, notable for known LUE weakness and sensory loss, mild luiz-neglect, and hemianopia.   Existing Precautions/Restrictions fall   Cognition   Affect/Mental Status (Cognition) WFL   Pain Assessment   Patient Currently in Pain Yes, see Vital Sign flowsheet   Integumentary/Edema   Integumentary/Edema no deficits were identifed   Posture    Posture Not impaired   Range of Motion (ROM)   Range of Motion ROM is WFL   Strength (Manual Muscle Testing)   Strength Comments Clear strength deficits on the LUE; unable to  hand or move fingers individually. She is able to lift it against gravity.   Bed Mobility   Bed Mobility no deficits identified   Transfers   Comment, (Transfers) Ind   Gait/Stairs (Locomotion)   Comment, (Gait/Stairs) CGA; slow gait speed with occasional path deviation noted. Able to ambulate the unit well but demonstrates some safety awareness issues " and readily admits her vision is blurry.   Clinical Impression   Criteria for Skilled Therapeutic Intervention Yes, treatment indicated   PT Diagnosis (PT) impaired mobility   Influenced by the following impairments weakness   Functional limitations due to impairments transfers and gait; stairs   Clinical Presentation (PT Evaluation Complexity) stable   Clinical Presentation Rationale clinical judgement   Clinical Decision Making (Complexity) low complexity   Planned Therapy Interventions (PT) balance training;bed mobility training;gait training;home exercise program;neuromuscular re-education;stair training;strengthening;ROM (range of motion);transfer training   Risk & Benefits of therapy have been explained evaluation/treatment results reviewed;care plan/treatment goals reviewed;risks/benefits reviewed   PT Total Evaluation Time   PT Eval, Low Complexity Minutes (53713) 15   Physical Therapy Goals   PT Frequency Daily   PT Predicted Duration/Target Date for Goal Attainment 06/19/25   PT: Bed Mobility Independent   PT: Transfers Independent   PT: Gait Modified independent;Straight cane;150 feet   PT Discharge Planning   PT Plan progress activity as able   PT Discharge Recommendation (DC Rec) Acute Rehab Center-Motivated patient will benefit from intensive, interdisciplinary therapy.  Anticipate will be able to tolerate 3 hours of therapy per day   PT Rationale for DC Rec [place] has clear balance and strength deficits, and would benefit from an intensive rehab program. She is now on board with and willing to participate in a program. She is not safe to discharge home at this point.   PT Brief overview of current status A x 1   PT Total Distance Amb During Session (feet) 125   Physical Therapy Time and Intention   Total Session Time (sum of timed and untimed services) 15

## 2025-05-22 NOTE — ED TRIAGE NOTES
Pt BIBA [place] from home  Recently discharged from hospital this morning. Was supposed to do PT on the arm but never followed through.     Had procedure done to look at her brain where they thread a wire through her L arm. L arm is now paralyzed and swollen. Able to move a little bit. Minimal sensation, but the pulses are good. Anxious, hasn't been able to find her medications. Last time she took them was this morning.     162/105 BP. Oxygen good on room air. Sugar 91. Can walk.

## 2025-05-22 NOTE — ED NOTES
Called report to [facility]. Assessed pt and educated on transport to [facility]. Pt verbalized understanding. A & O, up ad sylvester, L arm mobility impaired with limited ability to raise and grasp. Other extremities are well controlled by pt

## 2025-05-23 ENCOUNTER — APPOINTMENT (OUTPATIENT)
Dept: OCCUPATIONAL THERAPY | Facility: CLINIC | Age: 65
End: 2025-05-23
Payer: COMMERCIAL

## 2025-05-23 ENCOUNTER — APPOINTMENT (OUTPATIENT)
Dept: PHYSICAL THERAPY | Facility: CLINIC | Age: 65
End: 2025-05-23
Payer: COMMERCIAL

## 2025-05-23 VITALS
TEMPERATURE: 98.8 F | SYSTOLIC BLOOD PRESSURE: 131 MMHG | OXYGEN SATURATION: 98 % | DIASTOLIC BLOOD PRESSURE: 89 MMHG | RESPIRATION RATE: 16 BRPM | HEART RATE: 54 BPM

## 2025-05-23 LAB
ANION GAP SERPL CALCULATED.3IONS-SCNC: 12 MMOL/L (ref 7–15)
BUN SERPL-MCNC: 16.3 MG/DL (ref 8–23)
CALCIUM SERPL-MCNC: 7.7 MG/DL (ref 8.8–10.4)
CHLORIDE SERPL-SCNC: 107 MMOL/L (ref 98–107)
CREAT SERPL-MCNC: 0.58 MG/DL (ref 0.51–0.95)
CRYOGLOB SER QL: NEGATIVE
EGFRCR SERPLBLD CKD-EPI 2021: >90 ML/MIN/1.73M2
ERYTHROCYTE [DISTWIDTH] IN BLOOD BY AUTOMATED COUNT: 15.7 % (ref 10–15)
GLUCOSE BLDC GLUCOMTR-MCNC: 112 MG/DL (ref 70–99)
GLUCOSE BLDC GLUCOMTR-MCNC: 140 MG/DL (ref 70–99)
GLUCOSE BLDC GLUCOMTR-MCNC: 170 MG/DL (ref 70–99)
GLUCOSE BLDC GLUCOMTR-MCNC: 199 MG/DL (ref 70–99)
GLUCOSE BLDC GLUCOMTR-MCNC: 222 MG/DL (ref 70–99)
GLUCOSE BLDC GLUCOMTR-MCNC: 233 MG/DL (ref 70–99)
GLUCOSE BLDC GLUCOMTR-MCNC: 295 MG/DL (ref 70–99)
GLUCOSE SERPL-MCNC: 125 MG/DL (ref 70–99)
HCO3 SERPL-SCNC: 25 MMOL/L (ref 22–29)
HCT VFR BLD AUTO: 34.9 % (ref 35–47)
HGB BLD-MCNC: 11.3 G/DL (ref 11.7–15.7)
MAYO MISC RESULT: NORMAL
MCH RBC QN AUTO: 26.7 PG (ref 26.5–33)
MCHC RBC AUTO-ENTMCNC: 32.4 G/DL (ref 31.5–36.5)
MCV RBC AUTO: 83 FL (ref 78–100)
PLATELET # BLD AUTO: 371 10E3/UL (ref 150–450)
POTASSIUM SERPL-SCNC: 3.8 MMOL/L (ref 3.4–5.3)
RBC # BLD AUTO: 4.23 10E6/UL (ref 3.8–5.2)
SODIUM SERPL-SCNC: 144 MMOL/L (ref 135–145)
WBC # BLD AUTO: 9.5 10E3/UL (ref 4–11)

## 2025-05-23 PROCEDURE — 250N000012 HC RX MED GY IP 250 OP 636 PS 637

## 2025-05-23 PROCEDURE — 250N000013 HC RX MED GY IP 250 OP 250 PS 637

## 2025-05-23 PROCEDURE — 120N000002 HC R&B MED SURG/OB UMMC

## 2025-05-23 PROCEDURE — 36415 COLL VENOUS BLD VENIPUNCTURE: CPT

## 2025-05-23 PROCEDURE — 97112 NEUROMUSCULAR REEDUCATION: CPT | Mod: GP | Performed by: PHYSICAL THERAPIST

## 2025-05-23 PROCEDURE — 97110 THERAPEUTIC EXERCISES: CPT | Mod: GO | Performed by: OCCUPATIONAL THERAPIST

## 2025-05-23 PROCEDURE — 97535 SELF CARE MNGMENT TRAINING: CPT | Mod: GO | Performed by: OCCUPATIONAL THERAPIST

## 2025-05-23 PROCEDURE — 97750 PHYSICAL PERFORMANCE TEST: CPT | Mod: GP | Performed by: PHYSICAL THERAPIST

## 2025-05-23 PROCEDURE — 99232 SBSQ HOSP IP/OBS MODERATE 35: CPT

## 2025-05-23 PROCEDURE — 99232 SBSQ HOSP IP/OBS MODERATE 35: CPT | Mod: GC | Performed by: STUDENT IN AN ORGANIZED HEALTH CARE EDUCATION/TRAINING PROGRAM

## 2025-05-23 PROCEDURE — 85041 AUTOMATED RBC COUNT: CPT

## 2025-05-23 PROCEDURE — 80048 BASIC METABOLIC PNL TOTAL CA: CPT

## 2025-05-23 PROCEDURE — 85018 HEMOGLOBIN: CPT

## 2025-05-23 PROCEDURE — 250N000011 HC RX IP 250 OP 636

## 2025-05-23 RX ORDER — QUETIAPINE FUMARATE 25 MG/1
25 TABLET, FILM COATED ORAL
Status: DISCONTINUED | OUTPATIENT
Start: 2025-05-23 | End: 2025-05-28 | Stop reason: HOSPADM

## 2025-05-23 RX ADMIN — PREDNISONE 60 MG: 20 TABLET ORAL at 07:58

## 2025-05-23 RX ADMIN — GABAPENTIN 300 MG: 300 CAPSULE ORAL at 15:31

## 2025-05-23 RX ADMIN — INSULIN HUMAN 10 UNITS: 100 INJECTION, SUSPENSION SUBCUTANEOUS at 11:05

## 2025-05-23 RX ADMIN — PANTOPRAZOLE SODIUM 40 MG: 40 TABLET, DELAYED RELEASE ORAL at 06:45

## 2025-05-23 RX ADMIN — INSULIN ASPART 4 UNITS: 100 INJECTION, SOLUTION INTRAVENOUS; SUBCUTANEOUS at 12:18

## 2025-05-23 RX ADMIN — LOSARTAN POTASSIUM 100 MG: 100 TABLET, FILM COATED ORAL at 07:57

## 2025-05-23 RX ADMIN — GABAPENTIN 300 MG: 300 CAPSULE ORAL at 20:49

## 2025-05-23 RX ADMIN — CALCIUM CARBONATE 600 MG (1,500 MG)-VITAMIN D3 400 UNIT TABLET 1 TABLET: at 20:49

## 2025-05-23 RX ADMIN — GABAPENTIN 300 MG: 300 CAPSULE ORAL at 07:58

## 2025-05-23 RX ADMIN — ASPIRIN 81 MG CHEWABLE TABLET 81 MG: 81 TABLET CHEWABLE at 07:58

## 2025-05-23 RX ADMIN — DIVALPROEX SODIUM 500 MG: 250 TABLET, DELAYED RELEASE ORAL at 07:58

## 2025-05-23 RX ADMIN — QUETIAPINE FUMARATE 25 MG: 25 TABLET ORAL at 22:54

## 2025-05-23 RX ADMIN — INSULIN ASPART 2 UNITS: 100 INJECTION, SOLUTION INTRAVENOUS; SUBCUTANEOUS at 17:44

## 2025-05-23 RX ADMIN — DIVALPROEX SODIUM 500 MG: 250 TABLET, DELAYED RELEASE ORAL at 20:49

## 2025-05-23 RX ADMIN — SULFAMETHOXAZOLE AND TRIMETHOPRIM 1 TABLET: 800; 160 TABLET ORAL at 11:06

## 2025-05-23 RX ADMIN — CALCIUM CARBONATE 600 MG (1,500 MG)-VITAMIN D3 400 UNIT TABLET 1 TABLET: at 07:57

## 2025-05-23 RX ADMIN — PROPRANOLOL HYDROCHLORIDE 60 MG: 60 CAPSULE, EXTENDED RELEASE ORAL at 07:58

## 2025-05-23 RX ADMIN — SERTRALINE 75 MG: 25 TABLET, FILM COATED ORAL at 08:02

## 2025-05-23 RX ADMIN — ENOXAPARIN SODIUM 40 MG: 40 INJECTION SUBCUTANEOUS at 08:01

## 2025-05-23 RX ADMIN — ACETAMINOPHEN 650 MG: 325 TABLET, FILM COATED ORAL at 07:07

## 2025-05-23 RX ADMIN — ACETAMINOPHEN 650 MG: 325 TABLET, FILM COATED ORAL at 15:31

## 2025-05-23 RX ADMIN — AMLODIPINE BESYLATE 5 MG: 5 TABLET ORAL at 22:54

## 2025-05-23 RX ADMIN — Medication 500 MG: at 07:58

## 2025-05-23 ASSESSMENT — ACTIVITIES OF DAILY LIVING (ADL)
ADLS_ACUITY_SCORE: 63
ADLS_ACUITY_SCORE: 63
ADLS_ACUITY_SCORE: 60
ADLS_ACUITY_SCORE: 62
ADLS_ACUITY_SCORE: 63
ADLS_ACUITY_SCORE: 60
ADLS_ACUITY_SCORE: 63
ADLS_ACUITY_SCORE: 63
ADLS_ACUITY_SCORE: 62
ADLS_ACUITY_SCORE: 63
ADLS_ACUITY_SCORE: 63
ADLS_ACUITY_SCORE: 60
ADLS_ACUITY_SCORE: 63

## 2025-05-23 NOTE — INTERIM SUMMARY
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 She was admitted [facility] 4/28-4/30, initial SBP 200s and was thought to have PRES-like changes on MRI. She left AMA, was readmitted 5/2-5/5 with worsening headaches and SBP 180s. MRI was largely stable, PRES favored at that time over inflammatory amyloid and headache was symptomatically managed.      She was admitted to [facility] on 5/6 with worsening headache and visual disturbances (distortions, blurry vision, hallucinations and pallinopsia). MRI Brain revealed new and extensive leptomeningeal enhancement, sulcal hyperintensities and microbleeds/superficial siderosis in bilateral parieto-occipital regions. Left cerebellar FLAIR hyperintense lesions were stable. CSF analysis 5/12 revealed lymphocytic pleocytosis (32) and elevated protein (74.9) with negative meningitis panel and flow cytometry / cytology. Rheumatology was consulted, serum inflammatory workup negative thus far. CT Chest/Abd/Pelvis unrevealing. CTA and Cerebral angiogram 5/16 showed no evidence of vasculopathy.      Constellation of findings were concerning for inflammatory amyloid angiopathy. She unfortunately developed left hemiparesis & hemineglect post-DSA, found to have multifocal infarcts and she has been gradually  improving.      She was set to transfer to  Acute Rehab for approximately a week course of intensive rehab, however unfortunately discharged against medical advise 5/21 prior to transfer despite efforts and did not get any meds as she discharged before orders could be placed. She went home and returned back to the hospital later in the night due to confusion and concerns about meds. She continues to have blurry vision and hallucinations today. Exam remarkable for improving left superior quadrantonopia, improving left arm paresis and intact strength in LLE. She remains appropriate for ARC for intensive therapies, close medical management, education in ongoing management and prevention of possible further strokes.    The patient remains appropriate for transfer to acute inpatient rehabilitation for intensive therapies not available in a lesser level of care including PT/OT/SLP, ongoing medical management at least three days per week, and rehabilitative nursing cares. At baseline Amy is independent with mobility and ADL. Currently needing Ax1 with mobility and ADL.Patient requires an intensive inpatient rehab program to address the following acute impairments: L UE weakness, L field cut, L neglect, impaired balance, decreased activity tolerance, impaired cognition, impaired judgement/safety awareness. These impairments are impacting the patients ability to safely and independently manage functional transfers, ambulation, gait, stairs and ADL tasks.     Current Active Medical Management Needs/Risks for Clinical Complications  The patient requires the high level of rehabilitation physician supervision that accompanies the provision of intensive rehabilitation therapy.  The patient needs the services of the rehabilitation physician to assess the patient medically and functionally and to modify the course of treatment as needed to maximize the patient's capacity to benefit from the rehabilitation process. The patient  requires physician involvement at least 3 days per week to manage:   Neurologic status: assess for neurologic recovery. s/p IV methylprednisolone 1000mg 5 day course 5(5/16-5/20). Transitioned to oral on 5/21. Continue taper. While on prolonged steroid course, prophylaxis with Protonix daily, vit D/calcium daily, and bactrim DS three times weekly.  Provide stroke education and risk factor reduction strategies (modifiable risk factors include HTN, DM). Pt is at risk for recurrent strokes and pain in setting of CVA. The patient is at risk for falls w/ injury in setting of stroke. ***  Pain: Headache mgmt. Patient will need ongoing assessment and adjustment of pain medications for optimal participation in therapies. Gabapentin 300 mg TID (decreased from 600mg TID on 5/20), propranolol ER 60 mg daily for migraine prevention, depakote 500mg BID. ***  Hyponatremia: Trend BMP, strict I/Os. ***  Leukocytosis: Likely reaction to steroids. Daily CBC. ***  Endocrine, DM II: Sub-optimal control (A1c 8.7%, Hgb: normal)  Will need close PCP follow up. Has needed endocrinology to follow. Continue to titrate/adjust medications. At risk for steroid induced hyperglycemia. ***  HTN: SBP <180. discontinue PTA hydrochlorothiazide 12.5 mg daily due to hyponatremia. increase losartan to 100mg every day. start amlodipine 5mg daily (PTA dose 10mg). Pt is at risk for fluctuations in BP, uncontrolled BP, dizziness. ***  BMI: 37.2. Increased body habitus places the patient at increased risk for complications and mortality. Obesity is clinically significant d/t increased RN cares, use of resources, and specialty equipment. ***  Substance use hx: Continue gabapentin. Patient states it helps with anxiety but doesn't like side effects at higher doses. Trial dosing strategies that work best with patient. Increase Sertraline to 75 mg daily (previously tolerated 150 mg). Keep at present dose for 4 weeks and reassess. Monitor for need for health psych  "consult while on ARC.   {ARC Med Mgmt Needs/Risks Clinical Complications:960964}    Past Medical/Surgical History   Surgery in the past 100 days: {Yes-No:652437}   Additional relevant past medical history: ***    Level of Functioning Prior to Admission:    LIVING ENVIRONMENT   People in Home: alone   Current Living Arrangements: apartment   Home Accessibility: no concerns     Transportation Anticipated: health plan transportation     SELF-CARE   Usual Activity Tolerance: fair   Regular Exercise: Yes    Activity/Exercise Type: strength training, other (see comments) (neuro re-ed)     Equipment Currently Used at Home: none   Activity/Exercise/Self-Care Comment: Patient left hospital AMA yesterday and reports that she is unable in her current state to function safely at home, as family is not able to assist as she originally thought.    Level of Function: GG Scale (Section GG Functional Ability and Goals; CMS's SORIA Version 3.0 Manual effective 10.1.2019):  PT Current Function Goals for Rehab   Bed Rolling {GG Scale:691747::\"6 Independent\"} {Goals for Rehab:279889}   Supine to Sit {GG Scale:372308::\"6 Independent\"} {Goals for Rehab:170076}   Sit to Stand {GG Scale:068233::\"6 Independent\"} {Goals for Rehab:730654}   Transfer {GG Scale:043075::\"6 Independent\"} {Goals for Rehab:032368}   Ambulation {GG Scale:994392::\"6 Independent\"} {Goals for Rehab:390859}   Stairs {GG Scale:571083::\"6 Independent\"} {Goals for Rehab:667218}     OT Current Function Goals for Rehab   Feeding {GG Scale:568661::\"6 Independent\"} {Goals for Rehab:674868}   Grooming {GG Scale:282512::\"6 Independent\"} {Goals for Rehab:641886}   Bathing {GG Scale:943213::\"6 Independent\"} {Goals for Rehab:784476}   Upper Body Dressing {GG Scale:757360::\"6 Independent\"} {Goals for Rehab:814554}   Lower Body Dressing {GG Scale:663354::\"6 Independent\"} {Goals for Rehab:101352}   Toileting {GG Scale:442254::\"6 Independent\"} {Goals for Rehab:861801}   Toilet Transfer " "{GG Scale:229668::\"6 Independent\"} {Goals for Rehab:505471}   Tub/Shower Transfer {GG Scale:176137::\"6 Independent\"} {Goals for Rehab:623335}   Cognition {ARC Impaired Not NA:303232} {ARC COGNITION GOAL:169286}     SLP Current Function Goals for Rehab   Swallow {ARC Impaired Not NA:732188} {ARC SWALLOW GOAL:801631::\"Tolerate least restrictive diet without signs & symptoms of aspiration and adhere to safe swallow strategies\"}   Communication {ARC Impaired Not NA:125038} {ARC COMM GOAL:280123::\"Communicate basic needs effectively\"}       Current Diet:  {ARC CURRENT DIET:408114}    Summary Statement:  ***    Expected Therapies and Services Required During Inpatient Rehab Admission  Intensity of Therapy: Patient requires intensive therapies not available in a lesser level of care. Patient is motivated, making gains, and can tolerate 3 hours of therapy a day.  Physical Therapy: 60 minutes per day, 6 days a week for 7 days  Occupational Therapy: 60 minutes per day, 6 days a week for 7 days  Speech and Language Therapy: 60 minutes per day, 6 days a week for 7 days  Rehabilitation Nursing Needs: Patient requires 24 hour Rehab Nursing to manage {ARC Nursing Needs:824636}.    Precautions/restrictions/special needs:   Precautions: {ARC Precautions:444168}   Restrictions: ***   Special Needs: {ARC Special Needs:652688}    Expected Level of Improvement: ***  Expected Length of time to achieve: 7 days    Anticipated Discharge Needs:  Anticipated Discharge Destination: {ARC Discharge Destination:964498}  Anticipated Discharge Support: {ARC Discharge Support:678669}  24/7 support available : {Yes-No:696431}  Identified caregiver(s):  ***  Anticipated Discharge Needs: {ARC Discharge Needs:903568}    Identified challenges/barriers:  ***    Liaison signature/date/time: Raymond Tavares CM 5/23/2025 10:10 AM    Physician statement of review and agreement:  I have reviewed and am in agreement of the need for IRF stay to address above " functional and medical needs. In addition to above statements address, Patient requires intensive active and ongoing therapeutic intervention and multiple therapies; Patient requires medical supervision; Expected to actively participate in the intensive rehab program; Sufficiently stable to actively participate; Expectation for measurable improvement in functional capacity or adaption to impairments.    MD signature/date/time:

## 2025-05-23 NOTE — PLAN OF CARE
[id number]  Goal Outcome Evaluation:      Plan of Care Reviewed With: patient    Overall Patient Progress: no changeOverall Patient Progress: no change     Vital signs:  Temp: 98.6  F (37  C) Temp src: Oral BP: (!) 151/92 Pulse: 54   Resp: 16 SpO2: 97 % O2 Device: None (Room air)    Vital signs:  Temp: 98.6  F (37  C) Temp src: Oral BP: (!) 151/92 Pulse: 54   Resp: 16 SpO2: 97 % O2 Device: None (Room air)      Status: readmitted after leaving AMA   Activity: SBA, bed alarm on for safety   Neuro: LUE unable to , 3/5 strength, blurry vision. No deficits or neuro changes other than that  Cardiac: on telemetry, denies chest pain  Respiratory: denies SOB  GI/: voiding spont to bathroom, no BM this shift  Diet: Moderate Consistent Carb (60 g CHO per Meal)   Skin: scattered bruising   Lines: PIV SL  Drains: none  Labs: reviewed, BG checks ACHS and 0200  Pain/ nausea: denies both    New changes this shift: no acute changes, requested lab to grab CBC later in the day    Plan:  continue POC

## 2025-05-23 NOTE — PROGRESS NOTES
Inpatient Diabetes Management Service: Daily Progress Note     HPI: 65 female with past medical history of DM, anxiety, tobacco use, polysubstance use presented 5/6/25 with headaches and vision changes ongoing for 3 to 4 weeks.  Patient hospitalized x 2 at outside hospital for similar symptoms and workup include MRI brain with a working diagnosis of possible PRES. She was admitted to [facility] 5/6 with worsening headache and visual disturbance (blurry vision, vague visual distortions, hallucinations, palinopsia, this time without evidence of hypertension.  MRI brain revealed new, extensive leptomeningeal enhancement, sulcal FLAIR signal, and microbleeds /superficial siderosis in the bilateral parieto-occipital areas.  CSF 5/12 revealed lymphocytic pleocytosis (32), elevated protein (75), but studies were otherwise negative including MEP and flow cytometry/cytology.  Serum inflammatory workup was pursued in consultation with rheumatology, and is so far been negative.  CT C/A/P nondiagnostic.  CTA and DSA have showed no evidence of vasculopathy. 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      The evening of 5/21, apparently due to multiple frustrations about her prolonged hospitalization, she discharged AMA. She re-presented later in the night 5/21 requesting re-admission with no new symptoms. Inpatient Diabetes Service consulted 5/21/25 for assistance with glycemic control with steroid use.         Assessment/Plan:     Assessment:   Type 2 Diabetes Mellitus, without known complications. 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    Plan/Recommendations:   - NPH 10 units q 24 hrs at 0800 with prednisone. Hold if prednisone on hold.    - Unhold and change: Novolog Meal Coverage:   1 units per 10 --> 15 g CHO with breakfast and lunch  1 units per 15 --> 20 g CHO with dinner  1 units per 10 --> 20 g CHO PRN with snacks/supplements (0442-9315)  1 units per 15 --> none g CHO PRN with snacks/supplements (7025-5515)  - Novolog Correction Scale: 1:25>140 TID AC (high resistance), 1:50>200 HS, 0200 (medium resistance)   - BG monitoring: TID AC, HS, 0200   - Hypoglycemia protocol    - Carb counting protocol     Discussion:   Carb coverage held yesterday due to BG within goal at noon despite eating 30g cho without coverage (as recommended by me yesterday). BG elevated to 389 mg/dL at 1840--suspect this is due to eating carbs. Will resume carb coverage and lower doses for today.      Discharge Planning: (tentative) --> plan for ARU (either [place] or [place])  Medications: TBD. See note from 5/21 for previous d/c plan. 5/20: Discussed CGM, she would be interested in this (can be pursued outpatient or at the end of her ARU stay)   Test Claims: completed 5/19--> PA for NPH flexpens DENIED 5/21 (has to use vials).     Education: will be needed if discharging home  Outpatient Follow-up: recommend PCP    Diabetes Management Discharge Plan (tentative; will continue to adjust daily)  Instructions to patient were posted in AVS and discussed on day of discharge.   Medications and supplies are to be ordered by primary service on discharge.   *please use the DIAB non-branded discharge supply order set ([id number])*     Patient will need the following supplies prescribed:   Novolog Flexpens  \"BD\" (32G x 4mm) insulin pen needles  Glucometer (if brand of meter not known, can be ordered \"no brand specified\" and note to pharmacy: \"can substitute per insurance coverage\")  Lancets  Test strips  Sharps container  Alcohol swabs      Blood glucose " monitoring: Three times daily before meals, and at bedtime.     Blood Glucose (BG) goals:  mg/dL before meals     Glucose Control Regimen:     1) Rapid-acting insulin: aspart (Novolog) carbohydrate coverage/mealtime insulin (if taking prednisone 60 mg in the morning)  Take 8 units before average meals that contain carbohydrates                     OR  Take 4 units before small meals or snacks that contain carbohydrates     2) Rapid-acting insulin: aspart (Novolog) correction - see chart below. Take for high blood glucoses three times daily before meals  Add the correction dose to the carbohydrate coverage/mealtime insulin dose and give in one injection--ideally 10-15 minutes before a meal.  You may take the correction dose even if you skip a meal (as long as it has been 4 hours since previous correction dose).      Pre-meal correction scale:     Blood Glucose Insulin Novolog Before Meals:   Less than 140 0 units   140 -164  1 units   165 -189 2 units   190 - 214 3 units   215 - 239 4 units    240 - 264  5 units   265 - 289 6 units   290 - 314 7 units   315 - 339 8 units   340 - 364 9 units   365 or more 10 units         Outpatient Diabetes Follow-Up: Follow up with your Primary Care Provider (PCP) in 1-2 weeks, bring glucose data to your appointment. Follow up sooner if blood glucose runs consistently greater than 200 mg/dL or if having more than two episodes less than 70 mg/dL.     Your target A1c value is less than 7% to help prevent future complications from diabetes. Your most recent A1c is 8.7%.      Thank you for letting the Diabetes Management Team be involved in your care!    Please notify Inpatient Diabetes Service if changes are planned to steroids, nutrition, TPN/TF and anticipated procedures requiring prolonged NPO status.         Interval History/Review of Systems :   The last 24 hours progress and nursing notes reviewed.  - No acute events overnight.   - Feeling frustrated with reduced  independence and privacy  - Went to the library today  - No nausea or emesis    Planned Procedures/Surgeries: none    Inpatient Glucose Control:       Recent Labs   Lab 05/23/25  0139 05/22/25  2131 05/22/25  1839 05/22/25  1213 05/22/25  0211 05/22/25  0111   * 242* 389* 127* 89 68*             Medications Impacting Glycemia:   Steroids:    5/16 - 5/20: Methylprednisolone 1,000 mg daily at 0900   5/21-present: Prednisone 60 mg daily x 1 month, then tentatively taper by 10 mg every week pending results of brain MRI   D5W containing solutions/medications: none  Other medications impacting glucose: none        Nutrition:   Orders Placed This Encounter      Consistent Carbohydrate Diet Moderate Consistent Carb (60 g CHO per Meal) Diet    Supplements: none   TF: none   TPN: none         Diabetes History: see full consult note for complete diabetes history   Diabetes Type and Duration: T2DM, per patient was told she has T2DM 2 weeks ago at OSH. Per chart review, diagnosed 4/2021 with A1c 6.7%      GAD65 antibody, zinc transporter 8 antibody, islet antibody, insulin antibody, and c-peptide not available on epic search      PTA Medication Regimen: none; discharged 5/21 AMA with recommendations of Novolog 10 units before regular meals, 5 units with small meals/snacks plus correction 1:25>140 TID AC with Prednisone 60mg daily.      Missing doses?: did not take any insulin after leaving hospital 5/21     Historical Diabetes Medications:   - reports taking Metformin 500 mg BID recently at OSH but wasn't taking at home. Has never been on insulin before.       Glucose monitoring device/frequency/trends: has used glucometer randomly before because she has family members with diabetes.     Hypoglycemia PTA: none     Outpatient Diabetes Provider: Has PCP but hasn't seen him since 2021 - Randal Castellanos (with [facility])      Formal Diabetes Education/Educator: no        Physical Exam:   BP (!) 151/92 (BP Location: Left arm,  Patient Position: Supine, Cuff Size: Adult Large)   Pulse 54   Temp 98.6  F (37  C) (Oral)   Resp 16   SpO2 97%   Constitutional: well-appearing patient in no acute distress.  Eyes: sclera anicteric.  Respiratory: No signs of labored breathing.         Data:     Lab Results   Component Value Date    A1C 8.7 (H) 05/06/2025    A1C 6.0 (H) 02/11/2022    A1C 6.6 (H) 10/15/2021    A1C 5.8 (H) 09/06/2021    A1C 6.7 (H) 04/19/2021    A1C 5.0 02/11/2015       ROUTINE IP LABS (Last four results)  BMP  Recent Labs   Lab 05/23/25  0139 05/22/25  2131 05/22/25  1839 05/22/25  1213 05/22/25  0211 05/21/25  0739 05/21/25  0646 05/21/25  0201 05/20/25  2352 05/20/25  0826 05/20/25  0659 05/19/25  0757 05/19/25  0636   NA  --   --   --   --  145  --  142  --   --   --  139  --  144   POTASSIUM  --   --   --   --  3.4  --  3.6  --   --   --  4.0  --  4.0   CHLORIDE  --   --   --   --  106  --  107  --   --   --  103  --  107   CRISTIAN  --   --   --   --  8.4*  --  7.8*  --   --   --  8.1*  --  8.3*   CO2  --   --   --   --  25  --  25  --   --   --  23  --  25   BUN  --   --   --   --  21.8  --  18.9  --   --   --  24.0*  --  20.1   CR  --   --   --   --  0.71  --  0.65  --  0.68  --  0.74  --  0.70   * 242* 389* 127* 89   < > 100*   < >  --    < > 217*   < > 106*    < > = values in this interval not displayed.     CBC  Recent Labs   Lab 05/22/25  0211 05/21/25  0646 05/20/25 2352 05/20/25  0659 05/19/25  0636   WBC 13.5* 11.7*  --  12.9* 11.9*   RBC 4.60 3.96  --  4.22 4.00   HGB 12.2 10.6*  --  11.0* 10.5*   HCT 37.7 32.8*  --  34.4* 34.1*   MCV 82 83 84 82 85   MCH 26.5 26.8  --  26.1* 26.3*   MCHC 32.4 32.3  --  32.0 30.8*   RDW 15.3* 15.1*  --  15.3* 15.1*    322 322 352 336     INR  Recent Labs   Lab 05/16/25  1311   INR 0.97       Inpatient Diabetes Service will continue to follow, please don't hesitate to contact the team with any questions or concerns.     Ila Vazquez PA-C  Inpatient Diabetes  Service  Available on [employer] or Secure Chat     Plan discussed with patient, bedside RN, and primary team via this note.    To contact Inpatient Diabetes Service:     7 AM - 5 PM: Page the IDS ELBERT following the patient that day (see filed or incomplete progress notes/consult notes under Endocrinology)    OR if uncertain of provider assignment: page job code [phone]    5 PM - 7 AM: First call after hours is to primary service.    For urgent after-hours questions, page job code for on call fellow: [phone]     I spent a total of 45 minutes on the date of the encounter doing prep/post-work, chart review, history and exam, documentation and further activities per the note including lab review, multidisciplinary communication, counseling the patient and/or coordinating care regarding acute hyper/hypoglycemic management, as well as discharge management and planning/communication.

## 2025-05-23 NOTE — PLAN OF CARE
Goal Outcome Evaluation:      Plan of Care Reviewed With: patient    Overall Patient Progress: no changeOverall Patient Progress: no change    Outcome Evaluation: waiting for placement    Vitals: VSS. RA. Cardiac monitoring.   Neuros: A&Ox4. Blurry vision bilaterally. 3/5 LUE, 5/5 all other extremities. Drift present in LUE. Absent grasp of L hand. L neglect, and L visual neglect  IV: R PIV SL  Labs/Electrolytes: WNL  Resp: WNL  Diet: Consistent carb diet 60g. Good appetite.  GI/: Voiding spon in BR. LBM PTA - passing gas.   Skin: Scattered bruising t/o.   Pain: Headache pain managed with tylenol.   Activity: Independent in room, door alarm in place for elopement   Plan/Updates this shift: waiting for placement

## 2025-05-23 NOTE — PROGRESS NOTES
Dynamic Gait Index   05/23/25 1020   Signing Clinician's Name / Credentials   Signing clinician's name / credentials Antione Purcell PT   Dynamic Gait Index (Chi and Loving Will, 1995)   Gait Level Surface 3   Change in Gait Speed 3   Gait and Horizontal Head Turns 3   Gait with Vertical Head Turns 3   Gait and Pivot Turns 3   Step Over Obstacle 1   Step Around Obstacles 3   Steps 1   Total Dynamic Gait Index Score  (A score of 19 or less has been correlated to an increased risk of falls in community dwelling older adults, patients with vestibular disorders, and patients with MS.)   Total Score (out of 24) 20     Dynamic Gait Index (DGI):The DGI is a measure of balance during gait that is reliable and valid for the elderly and individuals with Parkinson's disease, MS, vestibular disorders, or s/p stroke. Gait assistive device used: None    Patient score: 20/24  Scores <=19/24 indicate an increased risk for falls according to Tiara et al 2000  Minimal Detectable Change = 2.9 in community dwelling elderly according to Ilya et al 2011    9 Minutes billed as physical performance test

## 2025-05-23 NOTE — PROGRESS NOTES
[facility]    Vascular Neurology Progress Note    Interval History     No acute events overnight. Amy reports ongoing visual changes and headaches, Tylenol does help her headache.     Hospital Course     Chief complaint: arm paralysis and left upper extremity edema    65-year-old female with a PMH of DM, anxiety, tobacco use, and polysubstance use (positive for methamphetamine on urine drug screen) presented with a 3-4 week history of worsening headaches and visual disturbances. She had two prior hospitalizations for similar symptoms, during which MRI imaging at an outside hospital (OSH) revealed diffuse cerebral microbleeds predominantly in the parietal and occipital regions, along with significant leptomeningeal enhancement in the cerebellum and bilateral cerebral hemispheres, sparing parts of the frontal lobes. The initial differential included posterior reversible encephalopathy syndrome (PRES) and hypertensive vasculopathy. On 05/06, she presented to [facility] with worsening symptoms of headaches and visual disturbances. MRI Brain showed new and extensive leptomeningeal enhancement, sulcal hyperintensities, and evidence of microbleeds/superficial siderosis in the bilateral parieto-occipital regions. Continuous EEG done for 3 days without any abnormal epileptiform discharges. CT chest abdominal pelvis completed to rule out any malignancy was unremarkable. D-dimer, ESR, CRP within normal limits.  Lumbar puncture revealed lymphocytic pleocytosis and elevated protein, with no evidence of infection. Rheumatology was consulted, serum inflammatory workup negative thus far. Given the imaging findings, inflammatory amyloid angiopathy was considered as a leading differential. To further evaluate for vasculopathy vs vasculitis, a digital subtraction angiogram (DSA) was performed on 05/16 and was negative. Following the DSA procedure, the patient developed new left upper  extremity weakness and left hemineglect. Repeat imaging demonstrated multiple new infarcts, suspect most likely periprocedural. She has completed 5 day course of IV methylprednisolone 1000mg daily and was switched to oral prednisone.     On 5/21, due to multiple frustrations about prolonged hospitalization, the patient discharged against medical advice but was having difficulty at home and represented to the hospital late in the evening on 5/21. She was readmitted and there has been re-evaluation by PT/OT who continue to recommend ARU. The patient is medically ready for discharge to ARU.     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 Differential diagnoses include inflammatory amyloid angiopathy, CNS lymphoma, neoplastic/autoimmune process, vasculitis, less likely PRES given significant leptomeningeal enhancement and less prominent FLAIR changes though could be recovering PRES. LP with findings suggestive of inflammatory process, which appears to have been steroid responsive. Serum workup for vasculitis was negative for inflammatory markers. Repeat MRI (05/12) brain with and without contrast noted slight increased extent of leptomeningeal enhancement, and unchanged microhemorrhages. She underwent a DSA on 05/16 that didn't show any evidence of vasculopathy. However she developed new LUE extremity weakness post-procedure with MRI showing multiple infarcts in the R parietal and occipital lobes. Based on imaging findings, the most probable diagnosis remains inflammatory  cerebral amyloid angiopathy. She has completed  5 day course of IV methylprednisolone 1000mg daily and was switched to oral prednisone.   -Continue headache management with               - Gabapentin 300 mg TID (decreased from 600mg TID on 5/20)  - propranolol ER 60 mg daily for migraine prevention   - depakote 500mg BID   - PRN compazine & tylenol   - magnesium 500mg daily  - s/p IV methylprednisolone 1000mg 5 day course 5(5/16-5/20)  - Continue oral prednisone 60 mg daily for 1 month (s/p single dose yesterday)  - After one month, taper by 10mg each week down to 10mg daily. After that, duration of steroids treatment will be determined during follow-up visit with neurology  - while on prolonged steroid course, prophylaxis with Protonix daily, vit D/calcium daily, and bactrim DS three times weekly  - serum and CSF autoimmune encephalopathy panel pending  -Continue Aspirin 81mg  -Normotensive BP goal  -Follow-up MRI wwo in one month, then plan for repeat MRI wwo in 3 months     #Hyponatremia, resolved  Workup as of 5/11 serum osmol 285, urine osmol 862, urine sodium 219, glucose at time of urine studies was 224, serum sodium 128. Workup most consistent with SIADH vs thiazide induced hyponatremia. Holding thiazide since 5/11 hyponatremia improved. 5/17 was 133 then improved to 142 on 5/18.   -trend BMP  -strict I/Os  -encourage adequate PO intake     #incidental finding- Ill-defined 1.6 cm area of enhancement within the left inferior thyroid lobe.   -TSH normal. Consider outpatient  nonemergent thyroid ultrasound for further Assessment, will defer to PCP.     #incidental 3. 5 mm solid nodule in the right upper lung lobe  -Can consider optional follow-up CT in 12 months to document stability, per PCP     # Leukocytosis, stable  - Likely reactionary to steroids  - Daily CBC     # Type 2 diabetes mellitus  - A1c this admission 8.7, goal < 7.0. Will need close PCP follow up  - Endocrine following for diabetes management,  appreciate assistance     #Hypertension   cautious re-introduction of PTA BP meds to avoid fluctuations, and permissive HTN in setting of new stroke on 5/16, will slowly reintroducing PTA meds  - BP parameters:              - Notify provider for SBP > 180              - PRN antihypertensives for SBP > 180  - Hydralazine/labetalol as needed for systolic blood pressure>180  - discontinue PTA hydrochlorothiazide 12.5 mg daily due to hyponatremia  - losartan to 100mg every day  - amlodipine 5mg daily (PTA dose 10mg)     #Eczema  - PRN triamcinolone cream that the patient has used in the past     #Substance use history  - addiction medicine consult, appreciate recs  - Continue gabapentin. Patient states it helps with anxiety but doesn't like side effects at higher doses. Trial dosing strategies that work best with patient.  - Increase Sertraline to 75 mg daily (previously tolerated 150 mg). Keep at present dose for 4 weeks and reassess  - Psychiatry and Psychotherapy referrals sent for outpatient  - Our peer  will meet the patient if agreeable and still hospitalized to provide additional outpatient resources  - To contact Jazzy Peer  from [facility] call or text: [phone]    DVT Prophylaxis: enoxaparin    Medically Ready for Discharge: Ready Now    The patient was seen and discussed with neurology attending, Dr. Kwame KILLIAN MD  Neurology Resident, PGY-2    Physical Examination     Temp: 98.6  F (37  C) Temp src: Oral BP: (!) 151/92 Pulse: 54   Resp: 16 SpO2: 97 % O2 Device: None (Room air)      General:  patient sitting in chair, no acute distress  HEENT:  normocephalic/atraumatic  Cardio:  RRR  Pulmonary:  no respiratory distress on room air  Skin:  bruising present     Neurologic  Mental Status:  alert, oriented x 3, follows commands, speech clear and fluent, naming and repetition normal  Cranial Nerves:  L superior quadrantanopia, EOMI with normal smooth  pursuit, facial sensation intact and symmetric, facial movements symmetric, hearing not formally tested but intact to conversation, palate elevation symmetric and uvula midline, no dysarthria, shoulder shrug strong bilaterally, tongue protrusion midline  Motor:  LUE drift without hitting the bed. Left shoulder abduction 3+/5, elbow extension 4+/5, elbow flexion 4+/5, wrist extension 2/5, finger extension 1/5, finger flexion 3/5.  5/5 strength in right arm and legs bilaterally. normal muscle tone and bulk, no abnormal movements.   Reflexes:  No ankle clonus bilaterally  Sensory:  light touch sensation intact and symmetric throughout upper and lower extremities, no extinction on double simultaneous stimulation   Coordination:  Finger-nose-finger on right without ataxia or dysmetria, unable to assess on left. Heel to shin intact bilaterally   Station/Gait:  deferred    Stroke Scales     NIHSS 3    Imaging/Labs   (Bolded imaging and labs new and/or personally reviewed or re-reviewed by me today)    MRI/Head CT MRI brain/MRA brain 5/20/25  IMPRESSION:  No arterial stenosis or concentric enhancement of the arterial walls to suggest middle sized arterial vasculitis. However there is persistent leptomeningeal enhancement which can be seen with small peripheral vasculitis for example KIRK. The leptomeningeal enhancement  appears relatively improved compared with 5/15/2025, indeterminant if this represents treatment effect or due to differences in T1 sequence technique.     MRI Brain 5/16  IMPRESSION:  1. Multiple new acute infarcts since the MRI 24 hours ago, most  notably in the right parietal lobe and right occipital lobe. Tiny  evolving infarct in the left cerebellum.  2. Worsened marked abnormal leptomeningeal FLAIR signal, greatest in  the posterior cerebral hemispheres     MRI Brain (5/15)  IMPRESSION:  1. Slight increase in extent of leptomeningeal enhancement and  unchanged numerous microhemorrhages in the occipital,  temporal, and  parietal regions bilaterally. These findings are nonspecific and could  be seen with angiopathy and/or inflammation.  2. Tiny new infarct in the left cerebellum.  3. Superficial siderosis within the right occipital lobe, likely  related to prior hemorrhage.  4. Moderate sequelae of chronic small vessel ischemic disease.     CT head:  IMPRESSION:  1.  No acute findings. No acute intracranial hemorrhage. No acute calvarial fracture.     MRI: (5/7)  IMPRESSION:  1. There is somewhat decreased cortical T2 hyperintense signal with increased conspicuity of leptomeningeal enhancement and numerous presumed microhemorrhages within the occipital, temporal parietal regions bilaterally. These findings are nonspecific, although may be seen with process or hematogenous malignancy such as melanoma.  Findings are less likely related to cerebral amyloid  angiopathy given the distribution. The distribution could be seen with hypertensive angiopathy, however these are extremely great number, much more than expected.   2. 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  3. Chronic small vessel ischemic changes.      Intracranial Vasculature HEAD CTA:   No large vessel occlusion findings intracranially. No high-grade stenosis intracranially.   Cervical Vasculature NECK CTA:  1.  No acute vascular injury in the neck. No high-grade stenosis in the neck.      Echocardiogram Not obtained   EKG/Telemetry Not obtained      LDL  5/6/2025: 72 mg/dL   A1C  5/6/2025: 8.7 %   Troponin No lab value available in past 48 hrs   TSH 1.24  CT CAP: IMPRESSION:   1. No evidence of primary malignancy throughout the chest, abdomen or  pelvis.  2. Ill-defined 1.6 cm area of enhancement within the left inferior  thyroid lobe. Consider nonemergent thyroid ultrasound for further  assessment.  3. 5 mm solid nodule in the right upper lobe. Can consider optional  follow-up CT in 12 months to document  stability.  4. 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 The gastrojejunal anastomosis appears  patent, however, inspissated debris may suggest delayed  gastrointestinal transit.  5. Hepatic steatosis.  6. Additional chronic and incidental findings, as described above.       SUMMARY OF 3 DAYS OF VIDEO EEG:  This 3 day video EEG is abnormal due to the presence increased superimposed fast activity, likely related to sedating medications employed. No seizures or epileptiform discharges were seen. Clinical correlation is recommended.     CSF (5/12)  Protein total CSF (74.9), Glucose CSF (102), RBC (5), Appearance (clear) Lymphocyte%, Mono %, Neutrophil % (all WNL)

## 2025-05-23 NOTE — PROGRESS NOTES
Care Management Follow Up    Length of Stay (days): 1    Expected Discharge Date: 05/26/2025     Concerns to be Addressed: Discharge planning     Patient plan of care discussed at interdisciplinary rounds: Yes    Anticipated Discharge Disposition:  OT and Physical Therapy are recommending acute rehab placement     Anticipated Discharge Services:   OT and Physical Therapy are recommending acute rehab placement  Anticipated Discharge DME:   Not applicable at this time    Patient/family educated on Medicare website which has current facility and service quality ratings:  Yes  Education Provided on the Discharge Plan:  Yes  Patient/Family in Agreement with the Plan:  Yes    Referrals Placed by CM/SW:    Post acute care facility's  Private pay costs discussed:   Not applicable at this time    Discussed  Partnership in Safe Discharge Planning  document with patient/family: No     Handoff Completed: No, handoff not indicated or clinically appropriate    Additional Information:  SW is following pt for discharge planning.  OT and Physical Therapy are recommending acute rehab placement.  SW contacted Dr. Ceron who confirmed readiness for discharge.    Pt was referred to the following ARU facility's by ICU SW:  - [facility], this SW is aware that this ARU is full this week for external admits per call to Admissions earlier this week  - [facility], this SW is aware that this ARU is full until at least 5/28/2025 per earlier call to Admissions this week  - [facility], this SW is aware that this ARU is full until at least 5/28/2025 per earlier call to Admissions this week  - [facility], KULWANT sent a [facility] Message to Admissions ([facility]) informing of readiness for discharge, asking if they have bed availability for pt today and asking if they've received prior auth from insurance.    KULWANT received a response from [facility] stating that he submitted for prior auth.  [facility] states that they do not have bed  availability today.  Pt's name will be placed on the wait list.  [facility] states that they have limited discharges this weekend and there are people ahead of pt on the waitlist.      KULWANT met with pt and updated.  Pt voices remorse that she left the hospital AMA this week.  Pt tearful as states that she is having difficulty remembering her name today and this is making her fearful.  Pt states that she asked for a fan in her room and a hair tie and was told that they are not available.  Pt states that she would appreciate if a staff member had the time to take her to the 5th floor library today.  SW relayed these requests to the 6A Charge Nurse who confirms that they will follow up on these matters.      Next Steps:  Await bed availability at [facility] and confirmation of receipt of prior auth.  SW will continue to follow for discharge planning.    CARLINE Padgett  Social Work, 6A  Phone:  [phone]  Pager:  [phone]  5/23/2025       TANG Harrison

## 2025-05-24 ENCOUNTER — APPOINTMENT (OUTPATIENT)
Dept: PHYSICAL THERAPY | Facility: CLINIC | Age: 65
End: 2025-05-24
Payer: COMMERCIAL

## 2025-05-24 LAB
ANION GAP SERPL CALCULATED.3IONS-SCNC: 14 MMOL/L (ref 7–15)
BUN SERPL-MCNC: 17.7 MG/DL (ref 8–23)
CALCIUM SERPL-MCNC: 7.6 MG/DL (ref 8.8–10.4)
CHLORIDE SERPL-SCNC: 106 MMOL/L (ref 98–107)
CREAT SERPL-MCNC: 0.74 MG/DL (ref 0.51–0.95)
EGFRCR SERPLBLD CKD-EPI 2021: 90 ML/MIN/1.73M2
ERYTHROCYTE [DISTWIDTH] IN BLOOD BY AUTOMATED COUNT: 15.9 % (ref 10–15)
GLUCOSE BLDC GLUCOMTR-MCNC: 108 MG/DL (ref 70–99)
GLUCOSE BLDC GLUCOMTR-MCNC: 116 MG/DL (ref 70–99)
GLUCOSE BLDC GLUCOMTR-MCNC: 201 MG/DL (ref 70–99)
GLUCOSE BLDC GLUCOMTR-MCNC: 203 MG/DL (ref 70–99)
GLUCOSE BLDC GLUCOMTR-MCNC: 262 MG/DL (ref 70–99)
GLUCOSE BLDC GLUCOMTR-MCNC: 470 MG/DL (ref 70–99)
GLUCOSE SERPL-MCNC: 260 MG/DL (ref 70–99)
HCO3 SERPL-SCNC: 24 MMOL/L (ref 22–29)
HCT VFR BLD AUTO: 35.1 % (ref 35–47)
HGB BLD-MCNC: 10.9 G/DL (ref 11.7–15.7)
MCH RBC QN AUTO: 26.2 PG (ref 26.5–33)
MCHC RBC AUTO-ENTMCNC: 31.1 G/DL (ref 31.5–36.5)
MCV RBC AUTO: 84 FL (ref 78–100)
PLATELET # BLD AUTO: 398 10E3/UL (ref 150–450)
POTASSIUM SERPL-SCNC: 3.9 MMOL/L (ref 3.4–5.3)
RBC # BLD AUTO: 4.16 10E6/UL (ref 3.8–5.2)
SODIUM SERPL-SCNC: 144 MMOL/L (ref 135–145)
WBC # BLD AUTO: 8.8 10E3/UL (ref 4–11)

## 2025-05-24 PROCEDURE — 250N000011 HC RX IP 250 OP 636

## 2025-05-24 PROCEDURE — 250N000012 HC RX MED GY IP 250 OP 636 PS 637

## 2025-05-24 PROCEDURE — 99232 SBSQ HOSP IP/OBS MODERATE 35: CPT

## 2025-05-24 PROCEDURE — 97112 NEUROMUSCULAR REEDUCATION: CPT | Mod: GP | Performed by: PHYSICAL THERAPIST

## 2025-05-24 PROCEDURE — 999N000127 HC STATISTIC PERIPHERAL IV START W US GUIDANCE

## 2025-05-24 PROCEDURE — 85014 HEMATOCRIT: CPT

## 2025-05-24 PROCEDURE — 84132 ASSAY OF SERUM POTASSIUM: CPT

## 2025-05-24 PROCEDURE — 82310 ASSAY OF CALCIUM: CPT

## 2025-05-24 PROCEDURE — 97750 PHYSICAL PERFORMANCE TEST: CPT | Mod: GP | Performed by: PHYSICAL THERAPIST

## 2025-05-24 PROCEDURE — 120N000002 HC R&B MED SURG/OB UMMC

## 2025-05-24 PROCEDURE — 99232 SBSQ HOSP IP/OBS MODERATE 35: CPT | Mod: GC | Performed by: STUDENT IN AN ORGANIZED HEALTH CARE EDUCATION/TRAINING PROGRAM

## 2025-05-24 PROCEDURE — 36415 COLL VENOUS BLD VENIPUNCTURE: CPT

## 2025-05-24 PROCEDURE — 85041 AUTOMATED RBC COUNT: CPT

## 2025-05-24 PROCEDURE — 250N000013 HC RX MED GY IP 250 OP 250 PS 637

## 2025-05-24 RX ADMIN — INSULIN HUMAN 10 UNITS: 100 INJECTION, SUSPENSION SUBCUTANEOUS at 08:42

## 2025-05-24 RX ADMIN — QUETIAPINE FUMARATE 25 MG: 25 TABLET ORAL at 21:16

## 2025-05-24 RX ADMIN — GABAPENTIN 300 MG: 300 CAPSULE ORAL at 13:34

## 2025-05-24 RX ADMIN — INSULIN ASPART 3 UNITS: 100 INJECTION, SOLUTION INTRAVENOUS; SUBCUTANEOUS at 12:32

## 2025-05-24 RX ADMIN — PROCHLORPERAZINE MALEATE 10 MG: 10 TABLET, FILM COATED ORAL at 13:34

## 2025-05-24 RX ADMIN — GABAPENTIN 300 MG: 300 CAPSULE ORAL at 08:41

## 2025-05-24 RX ADMIN — INSULIN ASPART 3 UNITS: 100 INJECTION, SOLUTION INTRAVENOUS; SUBCUTANEOUS at 08:42

## 2025-05-24 RX ADMIN — CALCIUM CARBONATE 600 MG (1,500 MG)-VITAMIN D3 400 UNIT TABLET 1 TABLET: at 17:03

## 2025-05-24 RX ADMIN — PROPRANOLOL HYDROCHLORIDE 60 MG: 60 CAPSULE, EXTENDED RELEASE ORAL at 08:41

## 2025-05-24 RX ADMIN — DIVALPROEX SODIUM 500 MG: 250 TABLET, DELAYED RELEASE ORAL at 21:15

## 2025-05-24 RX ADMIN — INSULIN ASPART 10 UNITS: 100 INJECTION, SOLUTION INTRAVENOUS; SUBCUTANEOUS at 16:31

## 2025-05-24 RX ADMIN — ENOXAPARIN SODIUM 40 MG: 40 INJECTION SUBCUTANEOUS at 08:42

## 2025-05-24 RX ADMIN — ACETAMINOPHEN 650 MG: 325 TABLET, FILM COATED ORAL at 06:24

## 2025-05-24 RX ADMIN — AMLODIPINE BESYLATE 5 MG: 5 TABLET ORAL at 21:16

## 2025-05-24 RX ADMIN — Medication 500 MG: at 08:41

## 2025-05-24 RX ADMIN — PREDNISONE 60 MG: 20 TABLET ORAL at 08:41

## 2025-05-24 RX ADMIN — Medication 3 MG: at 21:15

## 2025-05-24 RX ADMIN — LOSARTAN POTASSIUM 100 MG: 100 TABLET, FILM COATED ORAL at 08:41

## 2025-05-24 RX ADMIN — CALCIUM CARBONATE 600 MG (1,500 MG)-VITAMIN D3 400 UNIT TABLET 1 TABLET: at 08:41

## 2025-05-24 RX ADMIN — SERTRALINE 75 MG: 25 TABLET, FILM COATED ORAL at 08:40

## 2025-05-24 RX ADMIN — PANTOPRAZOLE SODIUM 40 MG: 40 TABLET, DELAYED RELEASE ORAL at 08:40

## 2025-05-24 RX ADMIN — DIVALPROEX SODIUM 500 MG: 250 TABLET, DELAYED RELEASE ORAL at 08:41

## 2025-05-24 RX ADMIN — GABAPENTIN 300 MG: 300 CAPSULE ORAL at 21:15

## 2025-05-24 RX ADMIN — ACETAMINOPHEN 650 MG: 325 TABLET, FILM COATED ORAL at 21:15

## 2025-05-24 RX ADMIN — ASPIRIN 81 MG CHEWABLE TABLET 81 MG: 81 TABLET CHEWABLE at 08:40

## 2025-05-24 ASSESSMENT — ACTIVITIES OF DAILY LIVING (ADL)
ADLS_ACUITY_SCORE: 60
ADLS_ACUITY_SCORE: 62
ADLS_ACUITY_SCORE: 60
ADLS_ACUITY_SCORE: 62
ADLS_ACUITY_SCORE: 62
ADLS_ACUITY_SCORE: 60
ADLS_ACUITY_SCORE: 60
ADLS_ACUITY_SCORE: 62
ADLS_ACUITY_SCORE: 60
ADLS_ACUITY_SCORE: 60
ADLS_ACUITY_SCORE: 62
ADLS_ACUITY_SCORE: 62
ADLS_ACUITY_SCORE: 60
ADLS_ACUITY_SCORE: 60
ADLS_ACUITY_SCORE: 62
ADLS_ACUITY_SCORE: 60
ADLS_ACUITY_SCORE: 62
ADLS_ACUITY_SCORE: 62
ADLS_ACUITY_SCORE: 60

## 2025-05-24 NOTE — PROGRESS NOTES
Functional Gait Assessment   05/24/25 1200   Signing Clinician's Name / Credentials   Signing clinician's name / credentials Antione Purcell PT   Functional Gait Assessment (ROSSY Melo, JANEEN Flores, et al. (2004))   1. GAIT LEVEL SURFACE 3   2. CHANGE IN GAIT SPEED 3   3. GAIT WITH HORIZONTAL HEAD TURNS 3   4. GAIT WITH VERTICAL HEAD TURNS 3   5. GAIT AND PIVOT TURN 3   6. STEP OVER OBSTACLE 1   7. GAIT WITH NARROW BASE OF SUPPORT 0   8. GAIT WITH EYES CLOSED 3   9. AMBULATING BACKWARDS 3   10. STEPS 1   Total Functional Gait Assessment Score   TOTAL SCORE: (MAXIMUM SCORE 30) 23     Functional Gait Assessment (FGA): The FGA assesses postural stability during various walking tasks.   Gait assistive device used: None    Scores of <22 /30 have been correlated with predicting falls in community-dwelling older adults according to Kameron & John 2010.   Scores of <18 /30 have been correlated with increased risk for falls in patients with Parkinsons Disease according to Sherif Wade, Begum et al 2014.  Minimal Detectable Change for patients with acute/chronic stroke = 4.2 according to Thikj & Ritschel 2009  Minimal Detectable Change for patients with vestibular disorder = 8 according to Kameron & John 2010

## 2025-05-24 NOTE — DISCHARGE SUMMARY
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   incidental 3. 5 mm solid nodule in the right upper lung lobe  Leukocytosis, improved  Type 2 diabetes, insulin depedent  Hypertension  Eczema  Polysubstance Use    Problem Leading to Hospitalization (from HPI):   From H&P by Dr. Kulkarni on 5/6/25:  \"Amy Choudhury is a 65 year old female with history of DM2, anxiety, tobacco dependence, GERD, s/p gastric bypass, who presents to the ED via EMS with worsening headache and blurred vision.     Patient was admitted to [facility] on 4/25/2025 with left-sided frontal headache for 3 weeks.  Her CT head was unremarkable . She was treated with migraine cocktail, improved and discharged home.     She presented to the [facility] ED again on 4/2825 with headache again and was found to have blood pressure of 220s/120s. MRI brain w/without contrast showed Interval development of patchy T2 FLAIR " "hyperintensity within the left posteroinferior cerebellum, with possible patchy enhancement. Additional new areas of subcortical T2 FLAIR hyperintensity involving the posterior parieto-occipital regions. Progressed scattered associated susceptibility artifact, most notably right occipital lobe.  She was treated as a case of posterior skull encephalopathy with gradual decrease in blood pressure and started on blood pressure medications to be taken at home.  Her UDS was also positive for methamphetamine.     She had another admission from 5/2/25-5/5/25 for headache and was again hypertensive.  Repeat MRI was similar to prior MRIs with small microhemorrhages and superficial siderosis along with the signal normalities through posterior cerebral and cerebellar hemispheres with leptomeningeal enhancement.  She was discharged yesterday.     On her way back, she lost her way while walking because she could not remember her way back home.  A bystander called EMS and she was dropped off home by them.  She slept fine.  She woke up this morning with persistent headache and blurred/quadrupled/choppy vision.  Throughout time since yesterday, she thinks she has been losing time.  She has 7/10 headache from front of her head but that keeps migrating.  She was very hungry and Eating food in the ED. she also made few remarks such as, \"the pen is turning into cigarette, or I have seen that lady jumping before\".\"    Please see H&P dated 5/21/2025 for further details about presentation.    Brief Hospital Course:   65-year-old female with a PMH of DM, anxiety, tobacco use, and polysubstance use (positive for methamphetamine on urine drug screen) presented with a 3-4 week history of worsening headaches and visual disturbances. She had two prior hospitalizations for similar symptoms, during which MRI imaging at an outside hospital (OSH) revealed diffuse cerebral microbleeds predominantly in the parietal and occipital regions, along with " significant leptomeningeal enhancement in the cerebellum and bilateral cerebral hemispheres, sparing parts of the frontal lobes. The initial differential included posterior reversible encephalopathy syndrome (PRES) and hypertensive vasculopathy. On 05/06, she presented to [facility] with worsening symptoms of headaches and visual disturbances. MRI Brain showed new and extensive leptomeningeal enhancement, sulcal hyperintensities, and evidence of microbleeds/superficial siderosis in the bilateral parieto-occipital regions. Continuous EEG done for 3 days without any abnormal epileptiform discharges. CT chest abdominal pelvis completed to rule out any malignancy was unremarkable. D-dimer, ESR, CRP within normal limits.  Lumbar puncture revealed lymphocytic pleocytosis and elevated protein, with no evidence of infection. Rheumatology was consulted, serum inflammatory workup negative thus far. Given the imaging findings, inflammatory amyloid angiopathy was considered as a leading differential. To further evaluate for vasculopathy vs vasculitis, a digital subtraction angiogram (DSA) was performed on 05/16 and was negative. Following the DSA procedure, the patient developed new left upper extremity weakness and left hemineglect. Repeat imaging demonstrated multiple new infarcts, suspect most likely periprocedural. She has completed 5 day course of IV methylprednisolone 1000mg daily and was switched to oral prednisone.      On 5/21, due to multiple frustrations about prolonged hospitalization, the patient discharged against medical advice but was having difficulty at home and represented to the hospital late in the evening on 5/21. She was readmitted and there has been re-evaluation by PT/OT who continue to recommend ARU.     Rehab evaluation: OT, PT, and SLP.     Smoking Cessation: { :321884}    BP Long-term Goal: <130/80    Antithrombotic/Anticoagulant Agent: aspirin 81 mg    Statins: { :263273}       Hgb A1C Goal: <  7.0    Complications: {stroke complications:761364}.     Other problems addressed during this hospitalization:  ***    PERTINENT INVESTIGATIONS    Labs  Lipid Panel:   Recent Labs   Lab Test 05/11/25  2151   CHOL 186   HDL 49*   *   TRIG 185*     A1C:   Lab Results   Component Value Date    A1C 8.7 05/06/2025    A1C 6.7 04/19/2021     INR: No lab results found in last 7 days.   Coag Panel / Hypercoag Workup: {not indicated/labs sent:447855}  Pending test results: ***    Echo: ***  Imaging: ***  Endovascular procedure: { :456655}     Cardiac Monitoring: Patient had > 24 hrs of cardiac monitor while in hospital.    Findings: { :287919}    Sleep Apnea Screen:   {Sleep Apnea Screen:208444}    Sleep Apnea Screen Findings: { :308318}    PHQ-9 Depression Screen Score: {total score/or unable:121359}    Education discussed with: {provided to whom:746068} on {topics:688959}.    During daily rounds, the plan of care {was or was not discussed with patient/family:955133}.    PHYSICAL EXAMINATION  Vital Signs:  B/P: 139/81, T: 96.8, P: 70, R: 18    General:  { :340242}     HEENT:  { :235078}  Cardio:  { :704092}  Pulmonary:  { :176570}  Abdomen:  { :312609}  Extremities:  { :139423}  Skin:  {Skin:110598}     Neurologic  Mental Status:  {Mental Status:603850}  Cranial Nerves:  {Cranial Nerves:088238}  Motor:  {Motor:627239}  Reflexes:  {Reflexes:711299}  Sensory:  {Sensory:554843}  Coordination:  {Coordination:726971}  Station/Gait:  {Station/Gait:792093}    National Institutes of Health Stroke Scale (on day of discharge)  NIHSS Total Score: {NIHSS Total Score:478100}    {Discharge Modified Ninfa:571795}    Medications    Current Discharge Medication List        CONTINUE these medications which have NOT CHANGED    Details   acetaminophen (TYLENOL) 500 MG tablet Take 1,000 mg by mouth every 6 hours as needed for other (Headache). Max acetaminophen dose: 4000mg in 24 hrs.      amLODIPine (NORVASC) 10 MG tablet Take 10 mg by  mouth at bedtime.      aspirin 81 MG EC tablet Take 81 mg by mouth daily.      divalproex sodium delayed-release (DEPAKOTE) 500 MG DR tablet Take 500 mg by mouth 2 times daily.      gabapentin (NEURONTIN) 300 MG capsule Take 300 mg by mouth 3 times daily.      losartan (COZAAR) 100 MG tablet Take 100 mg by mouth daily.      pantoprazole (PROTONIX) 40 MG EC tablet Take 40 mg by mouth daily.      propranolol ER (INDERAL LA) 60 MG 24 hr capsule Take 60 mg by mouth daily.      sertraline (ZOLOFT) 50 MG tablet Take 50 mg by mouth daily.      ibuprofen (ADVIL/MOTRIN) 600 MG tablet Take 600 mg by mouth every 6 hours as needed for other (Pain). Maximum of 3200 mg in 24 hours.      metFORMIN (GLUCOPHAGE) 500 MG tablet Take 500 mg by mouth 2 times daily (with meals).             Additional recommendations and follow up:        Patient was seen and discussed with the Attending, . ***.    BART KILLIAN MD  Pager: ***

## 2025-05-24 NOTE — PLAN OF CARE
Vitals: BP (!) 141/74 (BP Location: Right arm, Patient Position: Chair, Cuff Size: Adult Regular)   Pulse 88   Temp 98.4  F (36.9  C) (Oral)   Resp 20   SpO2 98%   Neuros: A&Ox4. Blurry vision bilaterally. 3/5 LUE, 5/5 all other extremities. Drift present in LUE. Absent grasp of L hand. L neglect, and L visual neglect   IV: PIV SL   Labs/Electrolytes: See labs and results tab for updated values.   Resp: RA, denies SOB   Diet: Consistent carb diet 60g. Blood sugars elevated see prior note.  GI/: Voiding well, reported passing gas no BM during the shift.   BG: BG elevated during the shift.   Skin: Skin intact, no new issues noted, pt showered today and changed outfits.   Pain: Denies pain and discomfort.   Activity: Independent in room and hallway door alarm in place.   Plan: Pt rested between cares, is able to make needs known. Needs more educations regarding diet and blood sugars. Awaiting placement, SW/CM following care coordination and disposition needs.   Education completed: Blood sugar educations and the numbers what is considered normal.       Intake/Output Summary (Last 24 hours) at 5/24/2025 1922  Last data filed at 5/24/2025 1705  Gross per 24 hour   Intake 1050 ml   Output Not measured--    Net 1050 ml       Goal Outcome Evaluation: No Change       Plan of Care Reviewed With: patient    Overall Patient Progress: no change

## 2025-05-24 NOTE — PROGRESS NOTES
Inpatient Diabetes Management Service: Daily Progress Note     HPI: 65 female with past medical history of DM, anxiety, tobacco use, polysubstance use presented 5/6/25 with headaches and vision changes ongoing for 3 to 4 weeks.  Patient hospitalized x 2 at outside hospital for similar symptoms and workup include MRI brain with a working diagnosis of possible PRES. She was admitted to [facility] 5/6 with worsening headache and visual disturbance (blurry vision, vague visual distortions, hallucinations, palinopsia, this time without evidence of hypertension.  MRI brain revealed new, extensive leptomeningeal enhancement, sulcal FLAIR signal, and microbleeds /superficial siderosis in the bilateral parieto-occipital areas.  CSF 5/12 revealed lymphocytic pleocytosis (32), elevated protein (75), but studies were otherwise negative including MEP and flow cytometry/cytology.  Serum inflammatory workup was pursued in consultation with rheumatology, and is so far been negative.  CT C/A/P nondiagnostic.  CTA and DSA have showed no evidence of vasculopathy. 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      The evening of 5/21, apparently due to multiple frustrations about her prolonged hospitalization, she discharged AMA. She re-presented later in the night 5/21 requesting re-admission with no new symptoms. Inpatient Diabetes Service consulted 5/21/25 for assistance with glycemic control with steroid use.         Assessment/Plan:     Assessment:   Type 2 Diabetes Mellitus, without known complications. 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    Plan/Recommendations:   - NPH 10 units q 24 hrs at 0800 with prednisone. Hold if prednisone on hold.    - Change: Novolog Meal Coverage:   1 units per 15 --> 12 g CHO with breakfast and lunch  1 units per 20 --> 15 g CHO with dinner  1 units per 20 g CHO PRN with snacks/supplements (5038-7307)  No insulin --> 1 units per 50 g CHO PRN with snacks/supplements (4670-6812)  - Novolog Correction Scale: 1:25>140 TID AC (high resistance), 1:50>200 HS, 0200 (medium resistance)   - BG monitoring: TID AC, HS, 0200   - Hypoglycemia protocol    - Carb counting protocol     Discussion:   Hyperglycemic after lunch yesterday despite carb coverage and elevated after supper. Will increase carb coverage today. Suspect some glycemic variation due to snacking without carb coverage. Pt did have jenifer crackers and coffee in room at time of visit. Not clear that she is notifying staff of all carbs she is eating.     Discharge Planning: (tentative) --> plan for ARU (either [place] or [place])  Medications: TBD. See note from 5/21 for previous d/c plan. 5/20: Discussed CGM, she would be interested in this (can be pursued outpatient or at the end of her ARU stay)   Test Claims: completed 5/19--> PA for NPH flexpens DENIED 5/21 (has to use vials).     Education: will be needed if discharging home  Outpatient Follow-up: recommend PCP    Diabetes Management Discharge Plan (tentative; will continue to adjust daily)  Instructions to patient were posted in AVS and discussed on day of discharge.   Medications and supplies are to be ordered by primary service on discharge.   *please use the DIAB non-branded discharge supply order set ([id number])*     Patient will need the following supplies prescribed:   Novolog Flexpens  \"BD\" (32G x 4mm) insulin pen needles  Glucometer (if brand of meter not known, can be ordered \"no brand specified\" and note to pharmacy: \"can substitute per insurance coverage\")  Lancets  Test " strips  Sharps container  Alcohol swabs      Blood glucose monitoring: Three times daily before meals, and at bedtime.     Blood Glucose (BG) goals:  mg/dL before meals     Glucose Control Regimen:     1) Rapid-acting insulin: aspart (Novolog) carbohydrate coverage/mealtime insulin (if taking prednisone 60 mg in the morning)  Take 8 units before average meals that contain carbohydrates                     OR  Take 4 units before small meals or snacks that contain carbohydrates     2) Rapid-acting insulin: aspart (Novolog) correction - see chart below. Take for high blood glucoses three times daily before meals  Add the correction dose to the carbohydrate coverage/mealtime insulin dose and give in one injection--ideally 10-15 minutes before a meal.  You may take the correction dose even if you skip a meal (as long as it has been 4 hours since previous correction dose).      Pre-meal correction scale:     Blood Glucose Insulin Novolog Before Meals:   Less than 140 0 units   140 -164  1 units   165 -189 2 units   190 - 214 3 units   215 - 239 4 units    240 - 264  5 units   265 - 289 6 units   290 - 314 7 units   315 - 339 8 units   340 - 364 9 units   365 or more 10 units         Outpatient Diabetes Follow-Up: Follow up with your Primary Care Provider (PCP) in 1-2 weeks, bring glucose data to your appointment. Follow up sooner if blood glucose runs consistently greater than 200 mg/dL or if having more than two episodes less than 70 mg/dL.     Your target A1c value is less than 7% to help prevent future complications from diabetes. Your most recent A1c is 8.7%.      Thank you for letting the Diabetes Management Team be involved in your care!    Please notify Inpatient Diabetes Service if changes are planned to steroids, nutrition, TPN/TF and anticipated procedures requiring prolonged NPO status.         Interval History/Review of Systems :   The last 24 hours progress and nursing notes reviewed.  - No acute  events overnight.   - Snack and meal overnight.  - Visit brief due to pt receiving a phone call from friend/relative.     Planned Procedures/Surgeries: none    Inpatient Glucose Control:       Recent Labs   Lab 05/24/25  1230 05/24/25  0838 05/24/25  0621 05/24/25  0255 05/23/25  2256 05/23/25  2204   * 201* 260* 108* 199* 233*             Medications Impacting Glycemia:   Steroids:    5/16 - 5/20: Methylprednisolone 1,000 mg daily at 0900   5/21-present: Prednisone 60 mg daily x 1 month, then tentatively taper by 10 mg every week pending results of brain MRI   D5W containing solutions/medications: none  Other medications impacting glucose: none        Nutrition:   Orders Placed This Encounter      Consistent Carbohydrate Diet Moderate Consistent Carb (60 g CHO per Meal) Diet    Supplements: none   TF: none   TPN: none         Diabetes History: see full consult note for complete diabetes history   Diabetes Type and Duration: T2DM, per patient was told she has T2DM 2 weeks ago at OSH. Per chart review, diagnosed 4/2021 with A1c 6.7%      GAD65 antibody, zinc transporter 8 antibody, islet antibody, insulin antibody, and c-peptide not available on epic search      PTA Medication Regimen: none; discharged 5/21 AMA with recommendations of Novolog 10 units before regular meals, 5 units with small meals/snacks plus correction 1:25>140 TID AC with Prednisone 60mg daily.      Missing doses?: did not take any insulin after leaving hospital 5/21     Historical Diabetes Medications:   - reports taking Metformin 500 mg BID recently at OSH but wasn't taking at home. Has never been on insulin before.       Glucose monitoring device/frequency/trends: has used glucometer randomly before because she has family members with diabetes.     Hypoglycemia PTA: none     Outpatient Diabetes Provider: Has PCP but hasn't seen him since 2021 - Randal Castellanos (with [facility])      Formal Diabetes Education/Educator: no        Physical  Exam:   /81 (BP Location: Right arm, Patient Position: Semi-Diego's, Cuff Size: Adult Regular)   Pulse 70   Temp 96.8  F (36  C) (Axillary)   Resp 18   SpO2 100%   Constitutional: well-appearing patient in no acute distress.  Eyes: sclera anicteric.  Respiratory: No signs of labored breathing.          Data:     Lab Results   Component Value Date    A1C 8.7 (H) 05/06/2025    A1C 6.0 (H) 02/11/2022    A1C 6.6 (H) 10/15/2021    A1C 5.8 (H) 09/06/2021    A1C 6.7 (H) 04/19/2021    A1C 5.0 02/11/2015       ROUTINE IP LABS (Last four results)  BMP  Recent Labs   Lab 05/24/25  1230 05/24/25  0838 05/24/25  0621 05/24/25  0255 05/23/25  0741 05/23/25  0602 05/22/25  1213 05/22/25  0211 05/21/25  0739 05/21/25  0646   NA  --   --  144  --   --  144  --  145  --  142   POTASSIUM  --   --  3.9  --   --  3.8  --  3.4  --  3.6   CHLORIDE  --   --  106  --   --  107  --  106  --  107   CRISTIAN  --   --  7.6*  --   --  7.7*  --  8.4*  --  7.8*   CO2  --   --  24  --   --  25  --  25  --  25   BUN  --   --  17.7  --   --  16.3  --  21.8  --  18.9   CR  --   --  0.74  --   --  0.58  --  0.71  --  0.65   * 201* 260* 108*   < > 125*   < > 89   < > 100*    < > = values in this interval not displayed.     CBC  Recent Labs   Lab 05/24/25  0621 05/23/25  0649 05/22/25  0211 05/21/25  0646   WBC 8.8 9.5 13.5* 11.7*   RBC 4.16 4.23 4.60 3.96   HGB 10.9* 11.3* 12.2 10.6*   HCT 35.1 34.9* 37.7 32.8*   MCV 84 83 82 83   MCH 26.2* 26.7 26.5 26.8   MCHC 31.1* 32.4 32.4 32.3   RDW 15.9* 15.7* 15.3* 15.1*    [phone]     INR  No lab results found in last 7 days.      Inpatient Diabetes Service will continue to follow, please don't hesitate to contact the team with any questions or concerns.     Ila Vazquez PA-C  Inpatient Diabetes Service  Available on [employer] or Secure Chat     Plan discussed with patient, bedside RN, and primary team via this note.    To contact Inpatient Diabetes Service:     7 AM - 5 PM: Page the  [facility] following the patient that day (see filed or incomplete progress notes/consult notes under Endocrinology)    OR if uncertain of provider assignment: page job code [phone]    5 PM - 7 AM: First call after hours is to primary service.    For urgent after-hours questions, page job code for on call fellow: [phone]     I spent a total of 40 minutes on the date of the encounter doing prep/post-work, chart review, history and exam, documentation and further activities per the note including lab review, multidisciplinary communication, counseling the patient and/or coordinating care regarding acute hyper/hypoglycemic management, as well as discharge management and planning/communication.

## 2025-05-24 NOTE — PROGRESS NOTES
"Care Management Follow Up    Length of Stay (days): 2    Expected Discharge Date: 05/26/2025     Concerns to be Addressed: discharge planning     Patient plan of care discussed at interdisciplinary rounds: Yes    Anticipated Discharge Disposition: Acute Rehab              Anticipated Discharge Services:  ARU services  Anticipated Discharge DME:  None    Patient/family educated on Medicare website which has current facility and service quality ratings:  Yes  Education Provided on the Discharge Plan:  Yes  Patient/Family in Agreement with the Plan:  Yes    Referrals Placed by CM/SW:      Pending:    [facility]  Ph: [phone]  Per  Amada note from 5/23: \"this  is aware that this ARU is full this week for external admits per call to Admissions earlier this week\"    [facility]  Ph: [phone]  Per  Theresa note from 5/23: \"this  is aware that this ARU is full until at least 5/28/2025 per earlier call to Admissions this week\"    [facility]  Ph: [phone]  Per  Theresa note from 5/23: \"this  is aware that this ARU is full until at least 5/28/2025 per earlier call to Admissions this week\"    [facility]  [address], [place] [address]  Ph: [phone]  RN to RN Ph: [phone]   5/24: Left VM with Admissions to check on bed availability and if prior auth was received.  requested message back.   1013: Per Cayla ( Rehab Admissions liaison), they do not anticipate any bed availability this weekend for pt. They will not be accepting admits on Monday, 5/26 due to the holiday and low staffing. Cayla confirms that they received insurance prior auth for ARU services from pt's insurance. Cayla to connect with  tomorrow about potential bed availability for pt on Tuesday, 5/27.       Private pay costs discussed: Not applicable    Discussed  Partnership in Safe Discharge Planning  document with patient/family: No     Handoff Completed: No, " handoff not indicated or clinically appropriate    Additional Information:  [facility] following for discharge planning. PT/OT currently recommending ARU at discharge. SW followed up on pending ARU referral as noted above.      Next Steps:   - Follow up on pending ARU referrals as appropriate tomorrow.   - Coordinate discharge to ARU once  bed available    REY Sierra   [facility]     Social Work and Care Management Department       [facility] - search [facility] WORK       [place] (0800 - 1630) Saturday and Sunday     Units: 4A Vocera, 4C Vocera, & 4E Vocera        Units: 5A 5415-9769 Vocera, 5A 4189-1344 Vocera , BMT SW 1 BMT SW 2, BMT SW 3 & BMT SW 4  5C Off Service 5401 - 5416  5C Off Service [id number]     Units: 6A Vocera & 6B Vocera      Units: 6C Vocera     Units: 7A Vocera & 7B Vocera      Units: 7C Med Surg 7401 thru 7418 and 7C Med Surg 7502 thru 7521      Unit: [place] ED Vocera & [place] Obs Vocera     [facility] ([phone]) Saturday and Sunday      Units: 5 Ortho Vocera, 5 Med Surg Vocera & WB ED Vocera     Units: 6 Med Surg Vocera, 8 Med Surg Vocera, & 10 ICU Vocera      After hours Vocera [facility] and After Hours Vocera [place]     Please NOTE changes to times below:    **Saturday & Sunday (1630 - 2030)    **Mon-Fri ([id number])     **[facility] Recognized Holidays  ([id number])    Units: ALL   - see above VOCERA links to units

## 2025-05-24 NOTE — PROGRESS NOTES
Notified MD at 4:30 PM regarding Pt evening blood sugar before dinner was 470, pt has been snacking on many gram crackers(17grams per pack) and saltine(5grams per pack) crackers with either butter or peanut butter and other snacks in room.     Spoke with: Christiana Chaudhari    Orders were obtained.    Comments: Added on an order for extra 5 unit of insulin aspart (NovoLOG) and to recheck at the 2hr luis daniel (7pm this evening.).

## 2025-05-24 NOTE — PROGRESS NOTES
[facility]    Vascular Neurology Progress Note    Interval History     No acute events overnight. Amy reports ongoing blurred vision and mild headaches, but no new or worsening symptoms.     Hospital Course     Chief complaint: arm paralysis and left upper extremity edema    65-year-old female with a PMH of DM, anxiety, tobacco use, and polysubstance use (positive for methamphetamine on urine drug screen) presented with a 3-4 week history of worsening headaches and visual disturbances. She had two prior hospitalizations for similar symptoms, during which MRI imaging at an outside hospital (OSH) revealed diffuse cerebral microbleeds predominantly in the parietal and occipital regions, along with significant leptomeningeal enhancement in the cerebellum and bilateral cerebral hemispheres, sparing parts of the frontal lobes. The initial differential included posterior reversible encephalopathy syndrome (PRES) and hypertensive vasculopathy. On 05/06, she presented to [facility] with worsening symptoms of headaches and visual disturbances. MRI Brain showed new and extensive leptomeningeal enhancement, sulcal hyperintensities, and evidence of microbleeds/superficial siderosis in the bilateral parieto-occipital regions. Continuous EEG done for 3 days without any abnormal epileptiform discharges. CT chest abdominal pelvis completed to rule out any malignancy was unremarkable. D-dimer, ESR, CRP within normal limits.  Lumbar puncture revealed lymphocytic pleocytosis and elevated protein, with no evidence of infection. Rheumatology was consulted, serum inflammatory workup negative thus far. Given the imaging findings, inflammatory amyloid angiopathy was considered as a leading differential. To further evaluate for vasculopathy vs vasculitis, a digital subtraction angiogram (DSA) was performed on 05/16 and was negative. Following the DSA procedure, the patient developed new left  upper extremity weakness and left hemineglect. Repeat imaging demonstrated multiple new infarcts, suspect most likely periprocedural. She has completed 5 day course of IV methylprednisolone 1000mg daily and was switched to oral prednisone.     On 5/21, due to multiple frustrations about prolonged hospitalization, the patient discharged against medical advice but was having difficulty at home and represented to the hospital late in the evening on 5/21. She was readmitted and there has been re-evaluation by PT/OT who continue to recommend ARU. The patient is medically ready for discharge to ARU.     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 Differential diagnoses include inflammatory amyloid angiopathy, CNS lymphoma, neoplastic/autoimmune process, vasculitis, less likely PRES given significant leptomeningeal enhancement and less prominent FLAIR changes though could be recovering PRES. LP with findings suggestive of inflammatory process, which appears to have been steroid responsive. Serum workup for vasculitis was negative for inflammatory markers. Repeat MRI (05/12) brain with and without contrast noted slight increased extent of leptomeningeal enhancement, and unchanged microhemorrhages. She underwent a DSA on 05/16 that didn't show any evidence of vasculopathy. However she developed new LUE extremity weakness post-procedure with MRI showing multiple infarcts in the R parietal and occipital lobes. Based on imaging findings, the most probable diagnosis remains  inflammatory cerebral amyloid angiopathy. She has completed  5 day course of IV methylprednisolone 1000mg daily and was switched to oral prednisone.   -Continue headache management with               - Gabapentin 300 mg TID (decreased from 600mg TID on 5/20)  - propranolol ER 60 mg daily for migraine prevention   - depakote 500mg BID   - PRN compazine & tylenol   - magnesium 500mg daily  - s/p IV methylprednisolone 1000mg 5 day course 5(5/16-5/20)  - Continue oral prednisone 60 mg daily for 1 month (s/p single dose yesterday)  - After one month, taper by 10mg each week down to 10mg daily. After that, duration of steroids treatment will be determined during follow-up visit with neurology  - while on prolonged steroid course, prophylaxis with Protonix daily, vit D/calcium daily, and bactrim DS three times weekly  - serum autoimmune encephalopathy panel pending/ CSF autoimmune encephalopathy panel negative.   -Continue Aspirin 81mg  -Normotensive BP goal  -Follow-up MRI wwo in one month, then plan for repeat MRI wwo in 3 months  - follow up with stroke neurology in 6 weeks with Dr. Puente     #Hyponatremia, resolved  Workup as of 5/11 serum osmol 285, urine osmol 862, urine sodium 219, glucose at time of urine studies was 224, serum sodium 128. Workup most consistent with SIADH vs thiazide induced hyponatremia. Discontinued thiazide on 5/11 and hyponatremia resolved.  -trend BMP  -strict I/Os  -encourage adequate PO intake     #incidental finding- Ill-defined 1.6 cm area of enhancement within the left inferior thyroid lobe.   -TSH normal. Consider outpatient  nonemergent thyroid ultrasound for further Assessment, will defer to PCP.     #incidental 3. 5 mm solid nodule in the right upper lung lobe  -Can consider optional follow-up CT in 12 months to document stability, per PCP     # Leukocytosis, stable  - Likely reactionary to steroids  - Daily CBC     # Type 2 diabetes mellitus  - A1c this admission 8.7, goal <  7.0. Will need close PCP follow up  - Endocrine following for diabetes management, appreciate assistance     #Hypertension   cautious re-introduction of PTA BP meds to avoid fluctuations, and permissive HTN in setting of new stroke on 5/16, will slowly reintroducing PTA meds  - BP parameters:              - Notify provider for SBP > 180              - PRN antihypertensives for SBP > 180  - Hydralazine/labetalol as needed for systolic blood pressure>180  - discontinue PTA hydrochlorothiazide 12.5 mg daily due to hyponatremia  - losartan to 100mg every day  - amlodipine 5mg daily (PTA dose 10mg)     #Eczema  - PRN triamcinolone cream that the patient has used in the past     #Substance use history  - addiction medicine consult, appreciate recs  - Continue gabapentin. Patient states it helps with anxiety but doesn't like side effects at higher doses. Trial dosing strategies that work best with patient.  - Increase Sertraline to 75 mg daily (previously tolerated 150 mg). Keep at present dose for 4 weeks and reassess  - Psychiatry and Psychotherapy referrals sent for outpatient  - Our peer  will meet the patient if agreeable and still hospitalized to provide additional outpatient resources  - To contact Jazzy Peer  from [facility] call or text: [phone]    DVT Prophylaxis: enoxaparin    Medically Ready for Discharge: Ready Now    The patient was seen and discussed with neurology attending, Dr. Kwame KILLIAN MD  Neurology Resident, PGY-2    Physical Examination     Temp: 98.2  F (36.8  C) Temp src: Oral BP: (!) 155/85 Pulse: 83   Resp: 16 SpO2: 98 % O2 Device: None (Room air)      General:  patient sitting in chair, no acute distress  HEENT:  normocephalic/atraumatic  Cardio:  RRR  Pulmonary:  no respiratory distress on room air  Skin:  bruising present     Neurologic  Mental Status:  alert, oriented x 3, follows commands, speech clear and fluent, naming and  repetition normal  Cranial Nerves:  VFF, EOMI with normal smooth pursuit, facial sensation intact and symmetric, facial movements symmetric, hearing not formally tested but intact to conversation, palate elevation symmetric and uvula midline, no dysarthria, shoulder shrug strong bilaterally, tongue protrusion midline  Motor:  LUE drift without hitting the bed. Left shoulder abduction 4-/5, elbow extension 4+/5, elbow flexion 4+/5, wrist extension 2/5, finger extension 1/5, finger flexion 3/5.  5/5 strength in right arm and legs bilaterally. normal muscle tone and bulk, no abnormal movements.   Reflexes:  No ankle clonus bilaterally  Sensory:  light touch sensation intact and symmetric throughout upper and lower extremities, no extinction on double simultaneous stimulation   Coordination:  Finger-nose-finger on right without ataxia or dysmetria, unable to assess on left. Heel to shin intact bilaterally   Station/Gait:  deferred    Stroke Scales     NIHSS 1    Imaging/Labs   (Bolded imaging and labs new and/or personally reviewed or re-reviewed by me today)    MRI/Head CT MRI brain/MRA brain 5/20/25  IMPRESSION:  No arterial stenosis or concentric enhancement of the arterial walls to suggest middle sized arterial vasculitis. However there is persistent leptomeningeal enhancement which can be seen with small peripheral vasculitis for example KIRK. The leptomeningeal enhancement  appears relatively improved compared with 5/15/2025, indeterminant if this represents treatment effect or due to differences in T1 sequence technique.     MRI Brain 5/16  IMPRESSION:  1. Multiple new acute infarcts since the MRI 24 hours ago, most  notably in the right parietal lobe and right occipital lobe. Tiny  evolving infarct in the left cerebellum.  2. Worsened marked abnormal leptomeningeal FLAIR signal, greatest in  the posterior cerebral hemispheres     MRI Brain (5/15)  IMPRESSION:  1. Slight increase in extent of leptomeningeal  enhancement and  unchanged numerous microhemorrhages in the occipital, temporal, and  parietal regions bilaterally. These findings are nonspecific and could  be seen with angiopathy and/or inflammation.  2. Tiny new infarct in the left cerebellum.  3. Superficial siderosis within the right occipital lobe, likely  related to prior hemorrhage.  4. Moderate sequelae of chronic small vessel ischemic disease.     CT head:  IMPRESSION:  1.  No acute findings. No acute intracranial hemorrhage. No acute calvarial fracture.     MRI: (5/7)  IMPRESSION:  1. There is somewhat decreased cortical T2 hyperintense signal with increased conspicuity of leptomeningeal enhancement and numerous presumed microhemorrhages within the occipital, temporal parietal regions bilaterally. These findings are nonspecific, although may be seen with process or hematogenous malignancy such as melanoma.  Findings are less likely related to cerebral amyloid  angiopathy given the distribution. The distribution could be seen with hypertensive angiopathy, however these are extremely great number, much more than expected.   2. 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  3. Chronic small vessel ischemic changes.      Intracranial Vasculature HEAD CTA:   No large vessel occlusion findings intracranially. No high-grade stenosis intracranially.   Cervical Vasculature NECK CTA:  1.  No acute vascular injury in the neck. No high-grade stenosis in the neck.      Echocardiogram Not obtained   EKG/Telemetry Not obtained      LDL  5/6/2025: 72 mg/dL   A1C  5/6/2025: 8.7 %   Troponin No lab value available in past 48 hrs   TSH 1.24  CT CAP: IMPRESSION:   1. No evidence of primary malignancy throughout the chest, abdomen or  pelvis.  2. Ill-defined 1.6 cm area of enhancement within the left inferior  thyroid lobe. Consider nonemergent thyroid ultrasound for further  assessment.  3. 5 mm solid nodule in the right upper  lobe. Can consider optional  follow-up CT in 12 months to document stability.  4. 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 The gastrojejunal anastomosis appears  patent, however, inspissated debris may suggest delayed  gastrointestinal transit.  5. Hepatic steatosis.  6. Additional chronic and incidental findings, as described above.       SUMMARY OF 3 DAYS OF VIDEO EEG:  This 3 day video EEG is abnormal due to the presence increased superimposed fast activity, likely related to sedating medications employed. No seizures or epileptiform discharges were seen. Clinical correlation is recommended.     CSF (5/12)  Protein total CSF (74.9), Glucose CSF (102), RBC (5), Appearance (clear) Lymphocyte%, Mono %, Neutrophil % (all WNL)

## 2025-05-24 NOTE — PLAN OF CARE
Goal Outcome Evaluation:      Plan of Care Reviewed With: patient    Overall Patient Progress: no changeOverall Patient Progress: no change     Vitals: VSS. RA. Cardiac monitoring.   Neuros: A&Ox4. Blurry vision bilaterally. 3/5 LUE, 5/5 all other extremities. Drift present in LUE. Absent grasp of L hand. L neglect, and L visual neglect  IV: R PIV SL  Labs/Electrolytes: WNL  Resp: WNL  Diet: Consistent carb diet 60g. Good appetite.  GI/: Voiding spon in BR. LBM PTA - passing gas.   Skin: Scattered bruising t/o.   Pain: Headache pain managed with tylenol.   Activity: Independent in room, door alarm in place for elopement   Plan/Updates this shift: waiting for placement

## 2025-05-25 ENCOUNTER — APPOINTMENT (OUTPATIENT)
Dept: GENERAL RADIOLOGY | Facility: CLINIC | Age: 65
End: 2025-05-25
Payer: COMMERCIAL

## 2025-05-25 LAB
ANION GAP SERPL CALCULATED.3IONS-SCNC: 9 MMOL/L (ref 7–15)
BUN SERPL-MCNC: 15.5 MG/DL (ref 8–23)
CALCIUM SERPL-MCNC: 7.8 MG/DL (ref 8.8–10.4)
CHLORIDE SERPL-SCNC: 106 MMOL/L (ref 98–107)
CREAT SERPL-MCNC: 0.6 MG/DL (ref 0.51–0.95)
EGFRCR SERPLBLD CKD-EPI 2021: >90 ML/MIN/1.73M2
ERYTHROCYTE [DISTWIDTH] IN BLOOD BY AUTOMATED COUNT: 15.7 % (ref 10–15)
GLUCOSE BLDC GLUCOMTR-MCNC: 148 MG/DL (ref 70–99)
GLUCOSE BLDC GLUCOMTR-MCNC: 174 MG/DL (ref 70–99)
GLUCOSE BLDC GLUCOMTR-MCNC: 224 MG/DL (ref 70–99)
GLUCOSE BLDC GLUCOMTR-MCNC: 242 MG/DL (ref 70–99)
GLUCOSE SERPL-MCNC: 124 MG/DL (ref 70–99)
HCO3 SERPL-SCNC: 27 MMOL/L (ref 22–29)
HCT VFR BLD AUTO: 35.7 % (ref 35–47)
HGB BLD-MCNC: 11.1 G/DL (ref 11.7–15.7)
MCH RBC QN AUTO: 26.1 PG (ref 26.5–33)
MCHC RBC AUTO-ENTMCNC: 31.1 G/DL (ref 31.5–36.5)
MCV RBC AUTO: 84 FL (ref 78–100)
PLATELET # BLD AUTO: 388 10E3/UL (ref 150–450)
POTASSIUM SERPL-SCNC: 3.9 MMOL/L (ref 3.4–5.3)
RBC # BLD AUTO: 4.25 10E6/UL (ref 3.8–5.2)
SODIUM SERPL-SCNC: 142 MMOL/L (ref 135–145)
WBC # BLD AUTO: 10.9 10E3/UL (ref 4–11)

## 2025-05-25 PROCEDURE — 84132 ASSAY OF SERUM POTASSIUM: CPT

## 2025-05-25 PROCEDURE — 250N000013 HC RX MED GY IP 250 OP 250 PS 637

## 2025-05-25 PROCEDURE — 250N000012 HC RX MED GY IP 250 OP 636 PS 637

## 2025-05-25 PROCEDURE — 74018 RADEX ABDOMEN 1 VIEW: CPT | Mod: 26 | Performed by: RADIOLOGY

## 2025-05-25 PROCEDURE — 99232 SBSQ HOSP IP/OBS MODERATE 35: CPT

## 2025-05-25 PROCEDURE — 99231 SBSQ HOSP IP/OBS SF/LOW 25: CPT | Mod: GC | Performed by: STUDENT IN AN ORGANIZED HEALTH CARE EDUCATION/TRAINING PROGRAM

## 2025-05-25 PROCEDURE — 250N000011 HC RX IP 250 OP 636

## 2025-05-25 PROCEDURE — 85048 AUTOMATED LEUKOCYTE COUNT: CPT

## 2025-05-25 PROCEDURE — 36415 COLL VENOUS BLD VENIPUNCTURE: CPT

## 2025-05-25 PROCEDURE — 74018 RADEX ABDOMEN 1 VIEW: CPT

## 2025-05-25 PROCEDURE — 85014 HEMATOCRIT: CPT

## 2025-05-25 PROCEDURE — 120N000002 HC R&B MED SURG/OB UMMC

## 2025-05-25 RX ORDER — ONDANSETRON 4 MG/1
4 TABLET, FILM COATED ORAL EVERY 6 HOURS PRN
Status: DISCONTINUED | OUTPATIENT
Start: 2025-05-25 | End: 2025-05-28 | Stop reason: HOSPADM

## 2025-05-25 RX ORDER — SENNOSIDES 8.6 MG
8.6 TABLET ORAL 2 TIMES DAILY PRN
Status: DISCONTINUED | OUTPATIENT
Start: 2025-05-25 | End: 2025-05-28 | Stop reason: HOSPADM

## 2025-05-25 RX ADMIN — INSULIN ASPART 5 UNITS: 100 INJECTION, SOLUTION INTRAVENOUS; SUBCUTANEOUS at 11:17

## 2025-05-25 RX ADMIN — PROPRANOLOL HYDROCHLORIDE 60 MG: 60 CAPSULE, EXTENDED RELEASE ORAL at 08:04

## 2025-05-25 RX ADMIN — INSULIN ASPART 2 UNITS: 100 INJECTION, SOLUTION INTRAVENOUS; SUBCUTANEOUS at 16:45

## 2025-05-25 RX ADMIN — PANTOPRAZOLE SODIUM 40 MG: 40 TABLET, DELAYED RELEASE ORAL at 08:05

## 2025-05-25 RX ADMIN — CALCIUM CARBONATE 600 MG (1,500 MG)-VITAMIN D3 400 UNIT TABLET 1 TABLET: at 19:00

## 2025-05-25 RX ADMIN — PROCHLORPERAZINE MALEATE 10 MG: 10 TABLET, FILM COATED ORAL at 13:19

## 2025-05-25 RX ADMIN — DIVALPROEX SODIUM 500 MG: 250 TABLET, DELAYED RELEASE ORAL at 21:11

## 2025-05-25 RX ADMIN — ASPIRIN 81 MG CHEWABLE TABLET 81 MG: 81 TABLET CHEWABLE at 08:05

## 2025-05-25 RX ADMIN — SERTRALINE 75 MG: 25 TABLET, FILM COATED ORAL at 08:04

## 2025-05-25 RX ADMIN — CALCIUM CARBONATE 600 MG (1,500 MG)-VITAMIN D3 400 UNIT TABLET 1 TABLET: at 08:05

## 2025-05-25 RX ADMIN — GABAPENTIN 300 MG: 300 CAPSULE ORAL at 08:06

## 2025-05-25 RX ADMIN — LOSARTAN POTASSIUM 100 MG: 100 TABLET, FILM COATED ORAL at 08:06

## 2025-05-25 RX ADMIN — DIVALPROEX SODIUM 500 MG: 250 TABLET, DELAYED RELEASE ORAL at 08:05

## 2025-05-25 RX ADMIN — Medication 500 MG: at 08:03

## 2025-05-25 RX ADMIN — AMLODIPINE BESYLATE 5 MG: 5 TABLET ORAL at 22:06

## 2025-05-25 RX ADMIN — ENOXAPARIN SODIUM 40 MG: 40 INJECTION SUBCUTANEOUS at 08:05

## 2025-05-25 RX ADMIN — PREDNISONE 60 MG: 20 TABLET ORAL at 08:05

## 2025-05-25 ASSESSMENT — ACTIVITIES OF DAILY LIVING (ADL)
ADLS_ACUITY_SCORE: 60

## 2025-05-25 NOTE — PLAN OF CARE
Goal Outcome Evaluation:      Plan of Care Reviewed With: patient    Overall Patient Progress: no changeOverall Patient Progress: no change    Status:  PMH of DM, anxiety, tobacco use, and polysubstance use (positive for methamphetamine on urine drug screen) Admitted for micro-bleeds in parietal and occipital regions, vasculopathy vs vasculitis. S/p angiogram (DSA) on 05/16, c/b new left upper extremity weakness and left luiz-neglect, multiple new infarcts, suspect most likely agueda-procedural. Left AMA 5/21 and readmitted following night for ARU placement.   Vitals: VSS. RA.   Neuros: A&Ox4. Blurry vision bilaterally. 3/5 LUE, 5/5 all other extremities. Drift present in LUE. Absent grasp of L hand. L neglect, and L visual neglect  IV: R PIV SL  Labs/Electrolytes: WNL  Resp: WNL  Diet: Consistent carb diet 60g. Good appetite. Carb coverage.  GI/: Voiding spon in BR. LBM PTA - passing gas.   Skin: Scattered bruising t/o.   Pain: Headache pain managed with tylenol.   Activity: Independent in room, door alarm in place for elopement   Plan/Updates this shift: waiting for placement

## 2025-05-25 NOTE — PROGRESS NOTES
Inpatient Diabetes Management Service: Daily Progress Note     HPI: 65 female with past medical history of DM, anxiety, tobacco use, polysubstance use presented 5/6/25 with headaches and vision changes ongoing for 3 to 4 weeks.  Patient hospitalized x 2 at outside hospital for similar symptoms and workup include MRI brain with a working diagnosis of possible PRES. She was admitted to [facility] 5/6 with worsening headache and visual disturbance (blurry vision, vague visual distortions, hallucinations, palinopsia, this time without evidence of hypertension.  MRI brain revealed new, extensive leptomeningeal enhancement, sulcal FLAIR signal, and microbleeds /superficial siderosis in the bilateral parieto-occipital areas.  CSF 5/12 revealed lymphocytic pleocytosis (32), elevated protein (75), but studies were otherwise negative including MEP and flow cytometry/cytology.  Serum inflammatory workup was pursued in consultation with rheumatology, and is so far been negative.  CT C/A/P nondiagnostic.  CTA and DSA have showed no evidence of vasculopathy. 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      The evening of 5/21, apparently due to multiple frustrations about her prolonged hospitalization, she discharged AMA. She re-presented later in the night 5/21 requesting re-admission with no new symptoms. Inpatient Diabetes Service consulted 5/21/25 for assistance with glycemic control with steroid use.         Assessment/Plan:     Assessment:   Type 2 Diabetes Mellitus, without known complications. 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    Plan/Recommendations:   - Change NPH 10 --> 16 units q 24 hrs at 0800 with prednisone. Hold if prednisone on hold.    - Novolog Meal Coverage:   1 units per 12 g CHO with breakfast and lunch  1 units per 15 g CHO with dinner  1 units per 20 g CHO PRN with snacks/supplements ([id number])  1 units per 50 g CHO PRN with snacks/supplements ([id number])  - Novolog Correction Scale: 1:25>140 TID AC (high resistance), 1:50>200 HS, 0200 (medium resistance)   - BG monitoring: TID AC, HS, 0200   - Hypoglycemia protocol    - Carb counting protocol     Discussion:   Hyperglycemic yesterday due to increased snacking. Difficult to catch PO intake with carb coverage resulting in BG of 470 mg/dL at 1622. An extra 5 units of Novolog recommended by Endocrine fellow on call and BG returned to goal at 116 mg/dL at 2100. Given frequent snacking will increase NPH dose this AM. Discussed trying to consolidate snacking with pt. She notes it is very difficult when taking prednisone. She is in agreement with the increased NPH dose. We discussed that I will continue to evaluate need for adjustment, but do assume she will be eating regularly with the higher dose of NPH. She is in agreement with this.     Discharge Planning: (tentative) --> plan for ARU (either [place] or [facility])  Medications: TBD. See note from 5/21 for previous d/c plan. 5/20: Discussed CGM, she would be interested in this (can be pursued outpatient or at the end of her ARU stay)   Test Claims: completed 5/19--> PA for NPH flexpens DENIED 5/21 (has to use vials).     Education: will be needed if discharging home  Outpatient Follow-up: recommend PCP    Diabetes Management Discharge Plan (tentative; will continue to adjust daily)  Instructions to patient were posted in AVS and discussed on day of discharge.   Medications and supplies are to be ordered by primary service on discharge.   *please use the DIAB non-branded discharge supply order set  "([id number])*     Patient will need the following supplies prescribed:   Novolog Flexpens  \"BD\" (32G x 4mm) insulin pen needles  Glucometer (if brand of meter not known, can be ordered \"no brand specified\" and note to pharmacy: \"can substitute per insurance coverage\")  Lancets  Test strips  Sharps container  Alcohol swabs      Blood glucose monitoring: Three times daily before meals, and at bedtime.     Blood Glucose (BG) goals:  mg/dL before meals     Glucose Control Regimen:     1) Rapid-acting insulin: aspart (Novolog) carbohydrate coverage/mealtime insulin (if taking prednisone 60 mg in the morning)  Take 8 units before average meals that contain carbohydrates                     OR  Take 4 units before small meals or snacks that contain carbohydrates     2) Rapid-acting insulin: aspart (Novolog) correction - see chart below. Take for high blood glucoses three times daily before meals  Add the correction dose to the carbohydrate coverage/mealtime insulin dose and give in one injection--ideally 10-15 minutes before a meal.  You may take the correction dose even if you skip a meal (as long as it has been 4 hours since previous correction dose).      Pre-meal correction scale:     Blood Glucose Insulin Novolog Before Meals:   Less than 140 0 units   140 -164  1 units   165 -189 2 units   190 - 214 3 units   215 - 239 4 units    240 - 264  5 units   265 - 289 6 units   290 - 314 7 units   315 - 339 8 units   340 - 364 9 units   365 or more 10 units         Outpatient Diabetes Follow-Up: Follow up with your Primary Care Provider (PCP) in 1-2 weeks, bring glucose data to your appointment. Follow up sooner if blood glucose runs consistently greater than 200 mg/dL or if having more than two episodes less than 70 mg/dL.     Your target A1c value is less than 7% to help prevent future complications from diabetes. Your most recent A1c is 8.7%.      Thank you for letting the Diabetes Management Team be involved in " your care!    Please notify Inpatient Diabetes Service if changes are planned to steroids, nutrition, TPN/TF and anticipated procedures requiring prolonged NPO status.         Interval History/Review of Systems :   The last 24 hours progress and nursing notes reviewed.  - No acute events overnight.   - Frequent snacking throughout the day yesterday and overnight   - No issues with nausea or vomiting.     Planned Procedures/Surgeries: none    Inpatient Glucose Control:       Recent Labs   Lab 05/25/25  0624 05/25/25  0306 05/24/25  2118 05/24/25  1913 05/24/25  1622 05/24/25  1230   * 148* 116* 262* 470* 203*             Medications Impacting Glycemia:   Steroids:    5/16 - 5/20: Methylprednisolone 1,000 mg daily at 0900   5/21-present: Prednisone 60 mg daily x 1 month, then tentatively taper by 10 mg every week pending results of brain MRI   D5W containing solutions/medications: none  Other medications impacting glucose: none        Nutrition:   Orders Placed This Encounter      Consistent Carbohydrate Diet Moderate Consistent Carb (60 g CHO per Meal) Diet    Supplements: none   TF: none   TPN: none         Diabetes History: see full consult note for complete diabetes history   Diabetes Type and Duration: T2DM, per patient was told she has T2DM 2 weeks ago at OSH. Per chart review, diagnosed 4/2021 with A1c 6.7%      GAD65 antibody, zinc transporter 8 antibody, islet antibody, insulin antibody, and c-peptide not available on epic search      PTA Medication Regimen: none; discharged 5/21 AMA with recommendations of Novolog 10 units before regular meals, 5 units with small meals/snacks plus correction 1:25>140 TID AC with Prednisone 60mg daily.      Missing doses?: did not take any insulin after leaving hospital 5/21     Historical Diabetes Medications:   - reports taking Metformin 500 mg BID recently at OSH but wasn't taking at home. Has never been on insulin before.       Glucose monitoring  device/frequency/trends: has used glucometer randomly before because she has family members with diabetes.     Hypoglycemia PTA: none     Outpatient Diabetes Provider: Has PCP but hasn't seen him since 2021 - Radnal Castellanos (with [facility])      Formal Diabetes Education/Educator: no        Physical Exam:   BP (!) 148/82 (BP Location: Right arm)   Pulse 77   Temp 98  F (36.7  C) (Oral)   Resp 16   SpO2 99%   Constitutional: well-appearing patient in no acute distress.  Eyes: sclera anicteric.  Respiratory: No signs of labored breathing.          Data:     Lab Results   Component Value Date    A1C 8.7 (H) 05/06/2025    A1C 6.0 (H) 02/11/2022    A1C 6.6 (H) 10/15/2021    A1C 5.8 (H) 09/06/2021    A1C 6.7 (H) 04/19/2021    A1C 5.0 02/11/2015       ROUTINE IP LABS (Last four results)  BMP  Recent Labs   Lab 05/25/25  0624 05/25/25  0306 05/24/25  2118 05/24/25  1913 05/24/25  0838 05/24/25  0621 05/23/25  0741 05/23/25  0602 05/22/25  1213 05/22/25  0211     --   --   --   --  144  --  144  --  145   POTASSIUM 3.9  --   --   --   --  3.9  --  3.8  --  3.4   CHLORIDE 106  --   --   --   --  106  --  107  --  106   CRISTIAN 7.8*  --   --   --   --  7.6*  --  7.7*  --  8.4*   CO2 27  --   --   --   --  24  --  25  --  25   BUN 15.5  --   --   --   --  17.7  --  16.3  --  21.8   CR 0.60  --   --   --   --  0.74  --  0.58  --  0.71   * 148* 116* 262*   < > 260*   < > 125*   < > 89    < > = values in this interval not displayed.     CBC  Recent Labs   Lab 05/25/25  0624 05/24/25  0621 05/23/25  0649 05/22/25  0211   WBC 10.9 8.8 9.5 13.5*   RBC 4.25 4.16 4.23 4.60   HGB 11.1* 10.9* 11.3* 12.2   HCT 35.7 35.1 34.9* 37.7   MCV 84 84 83 82   MCH 26.1* 26.2* 26.7 26.5   MCHC 31.1* 31.1* 32.4 32.4   RDW 15.7* 15.9* 15.7* 15.3*    [phone]     INR  No lab results found in last 7 days.      Inpatient Diabetes Service will continue to follow, please don't hesitate to contact the team with any questions or  concerns.     Ila Vazquez PA-C  Inpatient Diabetes Service  Available on [employer] or Secure Chat     Plan discussed with patient, bedside RN, and primary team via this note.    To contact Inpatient Diabetes Service:     7 AM - 5 PM: Page the IDS ELBERT following the patient that day (see filed or incomplete progress notes/consult notes under Endocrinology)    OR if uncertain of provider assignment: page job code [phone]    5 PM - 7 AM: First call after hours is to primary service.    For urgent after-hours questions, page job code for on call fellow: [phone]     I spent a total of 35 minutes on the date of the encounter doing prep/post-work, chart review, history and exam, documentation and further activities per the note including lab review, multidisciplinary communication, counseling the patient and/or coordinating care regarding acute hyper/hypoglycemic management, as well as discharge management and planning/communication.

## 2025-05-25 NOTE — PLAN OF CARE
"Goal Outcome Evaluation:    Plan of Care Reviewed With: patient    Status: Pt has 3-4 week history of worsening headaches and visual disturbances. Admitted to [facility] on 5/6, left AMA 5/21, readmitted 5/22. Hx of DM, anxiety, tobacco use, and polysubstance use (positive for methamphetamine on urine drug screen)   Vitals: VSS RA ex HTN w/in parameters.  Neuros: A/Ox4. Strengths 5/5 ex LUE 3/5. LUE drift. Weak grasp L hand. Blurred vision with L visual field cut and L neglect. Denies N/T.  IV: PIV SL  Labs/Electrolytes: BG ACHS  Resp: WNL   Diet: Moderate consistent carb (60 g CHO per meal). Carb coverage. Good appetite.  GI: LBM 5/25. Abdomen firm and rounded, pt reports feeling a \"blockage\" in upper abdomen. Intermittent nausea. Abdominal XR completed this shift.  : Voiding spontaneously to bathroom.  Skin: Bruising t/o  Pain: Abdominal discomfort, relieved with scheduled calcium carbonate.  Activity: Up ad sylvester, doorbell alarm for elopement risk.  Social: Daughter at bedside  Plan: Awaiting ARU placement                   "

## 2025-05-25 NOTE — PROGRESS NOTES
"Care Management Follow Up    Length of Stay (days): 3    Expected Discharge Date: 05/28/2025     Concerns to be Addressed: discharge planning     Patient plan of care discussed at interdisciplinary rounds: Yes    Anticipated Discharge Disposition: Acute Rehab              Anticipated Discharge Services:  ARU services  Anticipated Discharge DME:  None    Patient/family educated on Medicare website which has current facility and service quality ratings:  Yes  Education Provided on the Discharge Plan:  Yes  Patient/Family in Agreement with the Plan:  Yes    Referrals Placed by CM/SW:      Pending:     [facility]  Ph: [phone]  Per  Amada note from 5/23: \"this  is aware that this ARU is full this week for external admits per call to Admissions earlier this week\"     [facility]  Ph: [phone]  Per  Theresa note from 5/23: \"this SW is aware that this ARU is full until at least 5/28/2025 per earlier call to Admissions this week\"     [facility]  Ph: [phone]  Per  Theresa note from 5/23: \"this SW is aware that this ARU is full until at least 5/28/2025 per earlier call to Admissions this week\"     [facility]  [address], [place] [address]  Ph: [phone]  RN to RN Ph: [phone]   5/25: SW messaged [facility]ia[facility] (Cayla) to inquire about potential bed availability on Tuesday, 5/27 for pt. Cayla reports that there is a \"small possibility\" that they will have a bed available for pt on Tuesday but requests that SW follow up with liaison team on Tuesday.     Private pay costs discussed: Not applicable    Discussed  Partnership in Safe Discharge Planning  document with patient/family: No     Handoff Completed: No, handoff not indicated or clinically appropriate    Additional Information:  SW following for discharge planning. PT/OT currently recommending ARU at discharge. Pt remains medically ready for discharge. SW followed up on ARU " referrals as noted above.     Next Steps:   - Tuesday SW follow up with [facility] to check on status of ARU referral.   - Coordinate discharge to ARU once bed available    REY Sierra   [facility]     Social Work and Care Management Department       SEARCHABLE in [employer] - search SOCIAL WORK       [place] (0800 - 1630) Saturday and Sunday     Units: 4A Vocera, 4C Vocera, & 4E Vocera        Units: 5A 6915-3667 Vocera, 5A 3271-0975 Vocera , BMT SW 1 BMT SW 2, BMT SW 3 & BMT SW 4  5C Off Service 5401 - 5416  5C Off Service [id number]     Units: 6A Vocera & 6B Vocera      Units: 6C Vocera     Units: 7A Vocera & 7B Vocera      Units: 7C Med Surg 7401 thru 7418 and 7C Med Surg 7502 thru 7521      Unit: [place] ED Vocera & [place] Obs Vocera     [facility] [place] ([phone]) Saturday and Sunday      Units: 5 Ortho Vocera, 5 Med Surg Vocera & WB ED Vocera     Units: 6 Med Surg Vocera, 8 Med Surg Vocera, & 10 ICU Vocera      After hours Vocera [facility] [place] and After Hours Vocera [place]     Please NOTE changes to times below:    **Saturday & Sunday (1630 - 2030)    **Mon-Fri ([phone])     **[facility] Recognized Holidays  ([id number])    Units: ALL   - see above VOCERA links to units

## 2025-05-25 NOTE — PLAN OF CARE
Vitals: VSS on RA.   Neuros: A&Ox4. Blurry vision bilaterally. 3/5 LUE, 5/5 all other extremities. Drift present in LUE. Absent grasp of L hand. L neglect, and L field cut  IV: R PIV SL  Labs/Electrolytes: WNL  Resp: WNL  Diet: Consistent carb diet 60g. Good appetite. Carb coverage.  GI/: Voiding spon in BR. LBM today. Emesis x3, compazine given. MD paged.  Skin: Scattered bruising t/o.   Pain: Denies  Activity: Independent in room, door alarm in place for elopement   Plan/Updates this shift: waiting for ARU placement   15-May-2023

## 2025-05-25 NOTE — PROGRESS NOTES
[facility]    Vascular Neurology Progress Note    Interval History     No acute events overnight. NPH increased from 10-> 16.     Hospital Course     Chief complaint: arm paralysis and left upper extremity edema    65-year-old female with a PMH of DM, anxiety, tobacco use, and polysubstance use (positive for methamphetamine on urine drug screen) presented with a 3-4 week history of worsening headaches and visual disturbances. She had two prior hospitalizations for similar symptoms, during which MRI imaging at an outside hospital (OSH) revealed diffuse cerebral microbleeds predominantly in the parietal and occipital regions, along with significant leptomeningeal enhancement in the cerebellum and bilateral cerebral hemispheres, sparing parts of the frontal lobes. The initial differential included posterior reversible encephalopathy syndrome (PRES) and hypertensive vasculopathy. On 05/06, she presented to [facility] with worsening symptoms of headaches and visual disturbances. MRI Brain showed new and extensive leptomeningeal enhancement, sulcal hyperintensities, and evidence of microbleeds/superficial siderosis in the bilateral parieto-occipital regions. Continuous EEG done for 3 days without any abnormal epileptiform discharges. CT chest abdominal pelvis completed to rule out any malignancy was unremarkable. D-dimer, ESR, CRP within normal limits.  Lumbar puncture revealed lymphocytic pleocytosis and elevated protein, with no evidence of infection. Rheumatology was consulted, serum inflammatory workup negative thus far. Given the imaging findings, inflammatory amyloid angiopathy was considered as a leading differential. To further evaluate for vasculopathy vs vasculitis, a digital subtraction angiogram (DSA) was performed on 05/16 and was negative. Following the DSA procedure, the patient developed new left upper extremity weakness and left hemineglect. Repeat imaging  demonstrated multiple new infarcts, suspect most likely periprocedural. She has completed 5 day course of IV methylprednisolone 1000mg daily and was switched to oral prednisone.     On 5/21, due to multiple frustrations about prolonged hospitalization, the patient discharged against medical advice but was having difficulty at home and represented to the hospital late in the evening on 5/21. She was readmitted and there has been re-evaluation by PT/OT who continue to recommend ARU. The patient is medically ready for discharge to ARU.     Assessment and Plan     Today's Changes  -NPH insulin 10u->16u      #New onset headache with intermittent vision disturbances and migrainous features  #Spells of impaired awareness  #New auditory hallucinations  #Intracerebral microbleeds with diffuse leptomeningeal enhancement  #left sided weakness, neglect on left  #New acute infarcts - right parietal lobe and right occipital lobe  65 year old female with history of DM2, anxiety, tobacco dependence, GERD, s/p gastric bypass, who presented with worsening headaches and blurred vision. Differential diagnoses include inflammatory amyloid angiopathy, CNS lymphoma, neoplastic/autoimmune process, vasculitis, less likely PRES given significant leptomeningeal enhancement and less prominent FLAIR changes though could be recovering PRES. LP with findings suggestive of inflammatory process, which appears to have been steroid responsive. Serum workup for vasculitis was negative for inflammatory markers. Repeat MRI (05/12) brain with and without contrast noted slight increased extent of leptomeningeal enhancement, and unchanged microhemorrhages. She underwent a DSA on 05/16 that didn't show any evidence of vasculopathy. However she developed new LUE extremity weakness post-procedure with MRI showing multiple infarcts in the R parietal and occipital lobes. 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 She has completed  5 day course of IV methylprednisolone 1000mg daily and was switched to oral prednisone.   -Continue headache management with               - Gabapentin 300 mg TID (decreased from 600mg TID on 5/20)  - Propranolol ER 60 mg daily for migraine prevention   - Depakote 500mg BID   - PRN compazine & tylenol   - Magnesium 500mg daily  - S/p IV methylprednisolone 1000mg 5 day course (5/16-5/20)  - Continue oral prednisone 60 mg daily for 1 month   - After one month, taper by 10mg each week down to 10mg daily. After that, duration of steroids treatment will be determined during follow-up visit with neurology  - While on prolonged steroid course, prophylaxis with Protonix daily, vit D/calcium daily, and bactrim DS three times weekly  - Serum autoimmune encephalopathy panel pending/ CSF autoimmune encephalopathy panel negative.   - Continue Aspirin 81mg  - Normotensive BP goal  - Follow-up MRI wwo in one month, then plan for repeat MRI wwo in 3 months  - Follow up with stroke neurology in 6 weeks with Dr. Puente     # Type 2 diabetes mellitus  - A1c this admission 8.7, goal < 7.0. Will need close PCP follow up  - Endocrine following for diabetes management, appreciate assistance   - NPH 16 units q 24 hrs at 0800 with prednisone. Hold if prednisone on hold.    - Novolog Meal Coverage:   1 units per 12 g CHO with breakfast and lunch  1 units per 15 g CHO with dinner  1 units per 20 g CHO PRN with snacks/supplements ([id number])  1 units per 50 g CHO PRN with snacks/supplements ([id number])  - Novolog Correction Scale: 1:25>140 TID AC (high resistance), 1:50>200 HS, 0200 (medium resistance)   - BG monitoring: TID AC, HS, 0200   - Hypoglycemia protocol    - Carb counting protocol     #Hypertension   - BP parameters:              - Notify provider for SBP > 180              - PRN antihypertensives for SBP > 180  - Hydralazine/labetalol as needed for systolic blood pressure>180  - Losartan to  100mg every day  - Amlodipine 5mg daily (PTA dose 10mg)  - Discontinue PTA hydrochlorothiazide 12.5 mg daily due to hyponatremia     #Substance use history  - Addiction medicine consult, appreciate recs  - Continue gabapentin. Patient states it helps with anxiety but doesn't like side effects at higher doses. Trial dosing strategies that work best with patient.  - Increase Sertraline to 75 mg daily (previously tolerated 150 mg). Keep at present dose for 4 weeks and reassess  - Psychiatry and Psychotherapy referrals sent for outpatient  - Our peer  will meet the patient if agreeable and still hospitalized to provide additional outpatient resources  - To contact Jazzy, Peer  from [facility] ([facility]): call or text: [phone]    #Eczema  - PRN triamcinolone cream that the patient has used in the past    # Leukocytosis - resolved  - Likely reactionary to steroids  - Daily CBC    #Hyponatremia, resolved  Workup as of 5/11 serum osmol 285, urine osmol 862, urine sodium 219, glucose at time of urine studies was 224, serum sodium 128. Workup most consistent with SIADH vs thiazide induced hyponatremia. Discontinued thiazide on 5/11 and hyponatremia resolved.  -trend BMP  -strict I/Os  -encourage adequate PO intake     #incidental finding- Ill-defined 1.6 cm area of enhancement within the left inferior thyroid lobe.   -TSH normal. Consider outpatient  nonemergent thyroid ultrasound for further assessment, will defer to PCP.     #incidental 3. 5 mm solid nodule in the right upper lung lobe  - Can consider optional follow-up CT in 12 months to document stability, per PCP    DVT Prophylaxis: enoxaparin    Medically Ready for Discharge: Ready Now    The patient was seen and discussed with neurology attending, Dr. Kwame Snow MD  Neurology Resident PGY-2    Physical Examination     Temp: 98  F (36.7  C) Temp src: Oral BP: (!) 148/82 Pulse: 77   Resp: 16 SpO2: 99 % O2 Device: None (Room  air)      General:  patient sitting in chair, no acute distress  HEENT:  normocephalic/atraumatic  Cardio:  RRR  Pulmonary:  no respiratory distress on room air  Skin:  bruising present     Neurologic  Mental Status:  alert, oriented x 3, follows commands, speech clear and fluent, naming and repetition normal  Cranial Nerves:  VFF, EOMI with normal smooth pursuit, facial sensation intact and symmetric, facial movements symmetric, hearing not formally tested but intact to conversation, palate elevation symmetric and uvula midline, no dysarthria, shoulder shrug strong bilaterally, tongue protrusion midline  Motor:  LUE drift without hitting the bed. Left shoulder abduction 4/5, elbow extension 4+/5, elbow flexion 4+/5, wrist extension 2/5, finger extension 3/5, finger flexion 3/5.  5/5 strength in right arm and legs bilaterally. normal muscle tone and bulk, no abnormal movements.   Reflexes:  No ankle clonus bilaterally  Sensory:  light touch sensation intact and symmetric throughout upper and lower extremities, no extinction on double simultaneous stimulation   Coordination:  Finger-nose-finger on right without ataxia or dysmetria, unable to assess on left. Heel to shin intact bilaterally   Station/Gait:  deferred    Stroke Scales     NIHSS 1    Imaging/Labs   (Bolded imaging and labs new and/or personally reviewed or re-reviewed by me today)    MRI/Head CT MRI brain/MRA brain 5/20/25  IMPRESSION:  No arterial stenosis or concentric enhancement of the arterial walls to suggest middle sized arterial vasculitis. However there is persistent leptomeningeal enhancement which can be seen with small peripheral vasculitis for example KIRK. The leptomeningeal enhancement  appears relatively improved compared with 5/15/2025, indeterminant if this represents treatment effect or due to differences in T1 sequence technique.     MRI Brain 5/16  IMPRESSION:  1. Multiple new acute infarcts since the MRI 24 hours ago, most  notably  in the right parietal lobe and right occipital lobe. Tiny  evolving infarct in the left cerebellum.  2. Worsened marked abnormal leptomeningeal FLAIR signal, greatest in  the posterior cerebral hemispheres     MRI Brain (5/15)  IMPRESSION:  1. Slight increase in extent of leptomeningeal enhancement and  unchanged numerous microhemorrhages in the occipital, temporal, and  parietal regions bilaterally. These findings are nonspecific and could  be seen with angiopathy and/or inflammation.  2. Tiny new infarct in the left cerebellum.  3. Superficial siderosis within the right occipital lobe, likely  related to prior hemorrhage.  4. Moderate sequelae of chronic small vessel ischemic disease.     CT head:  IMPRESSION:  1.  No acute findings. No acute intracranial hemorrhage. No acute calvarial fracture.     MRI: (5/7)  IMPRESSION:  1. There is somewhat decreased cortical T2 hyperintense signal with increased conspicuity of leptomeningeal enhancement and numerous presumed microhemorrhages within the occipital, temporal parietal regions bilaterally. These findings are nonspecific, although may be seen with process or hematogenous malignancy such as melanoma.  Findings are less likely related to cerebral amyloid  angiopathy given the distribution. The distribution could be seen with hypertensive angiopathy, however these are extremely great number, much more than expected.   2. 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  3. Chronic small vessel ischemic changes.      Intracranial Vasculature HEAD CTA:   No large vessel occlusion findings intracranially. No high-grade stenosis intracranially.   Cervical Vasculature NECK CTA:  1.  No acute vascular injury in the neck. No high-grade stenosis in the neck.      Echocardiogram Not obtained   EKG/Telemetry Not obtained      LDL  5/6/2025: 72 mg/dL   A1C  5/6/2025: 8.7 %   Troponin No lab value available in past 48 hrs   TSH 1.24  CT  CAP: IMPRESSION:   1. No evidence of primary malignancy throughout the chest, abdomen or  pelvis.  2. Ill-defined 1.6 cm area of enhancement within the left inferior  thyroid lobe. Consider nonemergent thyroid ultrasound for further  assessment.  3. 5 mm solid nodule in the right upper lobe. Can consider optional  follow-up CT in 12 months to document stability.  4. 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 The gastrojejunal anastomosis appears  patent, however, inspissated debris may suggest delayed  gastrointestinal transit.  5. Hepatic steatosis.  6. Additional chronic and incidental findings, as described above.       SUMMARY OF 3 DAYS OF VIDEO EEG:  This 3 day video EEG is abnormal due to the presence increased superimposed fast activity, likely related to sedating medications employed. No seizures or epileptiform discharges were seen. Clinical correlation is recommended.     CSF (5/12)  Protein total CSF (74.9), Glucose CSF (102), RBC (5), Appearance (clear) Lymphocyte%, Mono %, Neutrophil % (all WNL)

## 2025-05-26 LAB
AMPAR2 IGG SERPL QL CBA IFA: NEGATIVE
AMPHIPHYSIN IGG SER QL IA: NEGATIVE
ANION GAP SERPL CALCULATED.3IONS-SCNC: 11 MMOL/L (ref 7–15)
ANNOTATION COMMENT IMP: NORMAL
BUN SERPL-MCNC: 16 MG/DL (ref 8–23)
CALCIUM SERPL-MCNC: 8.3 MG/DL (ref 8.8–10.4)
CASPR2 IGG SER QL CBA IFA: NEGATIVE
CHLORIDE SERPL-SCNC: 104 MMOL/L (ref 98–107)
CREAT SERPL-MCNC: 0.61 MG/DL (ref 0.51–0.95)
CV2 AB SERPL QL IF: NEGATIVE
DPPX IGG SER QL CBA IFA: NEGATIVE
EGFRCR SERPLBLD CKD-EPI 2021: >90 ML/MIN/1.73M2
ERYTHROCYTE [DISTWIDTH] IN BLOOD BY AUTOMATED COUNT: 15.9 % (ref 10–15)
GABABR IGG SERPL QL CBA IFA: NEGATIVE
GAD65 AB SER-SCNC: 0 NMOL/L
GFAP ALPHA IGG SER QL IF: NEGATIVE
GLIAL NUC TYPE 1 AB SER QL IF: NEGATIVE
GLUCOSE BLDC GLUCOMTR-MCNC: 120 MG/DL (ref 70–99)
GLUCOSE BLDC GLUCOMTR-MCNC: 132 MG/DL (ref 70–99)
GLUCOSE BLDC GLUCOMTR-MCNC: 146 MG/DL (ref 70–99)
GLUCOSE BLDC GLUCOMTR-MCNC: 175 MG/DL (ref 70–99)
GLUCOSE BLDC GLUCOMTR-MCNC: 269 MG/DL (ref 70–99)
GLUCOSE SERPL-MCNC: 104 MG/DL (ref 70–99)
HCO3 SERPL-SCNC: 27 MMOL/L (ref 22–29)
HCT VFR BLD AUTO: 35.2 % (ref 35–47)
HGB BLD-MCNC: 11.1 G/DL (ref 11.7–15.7)
HU1 AB SER QL: NEGATIVE
HU2 AB SER QL IF: NEGATIVE
HU3 AB SER QL: NEGATIVE
IGLON5 IGG SER QL CBA IFA: NEGATIVE
IMMUNOLOGIST REVIEW: NORMAL
LGI1 IGG SER QL CBA IFA: NEGATIVE
MCH RBC QN AUTO: 26.6 PG (ref 26.5–33)
MCHC RBC AUTO-ENTMCNC: 31.5 G/DL (ref 31.5–36.5)
MCV RBC AUTO: 84 FL (ref 78–100)
MGLUR1 IGG SER QL IF: NEGATIVE
NEUROCHONDRIN AB SERPL QL IF: NEGATIVE
NIF IGG SER QL IF: NEGATIVE
NMDAR1 IGG SER QL CBA IFA: NEGATIVE
PCA-1 AB SER QL IF: NEGATIVE
PCA-2 AB SER QL IF: NEGATIVE
PCA-TR AB SER QL IF: NEGATIVE
PDE10A AB SPEC QL IF: NEGATIVE
PLATELET # BLD AUTO: 257 10E3/UL (ref 150–450)
POTASSIUM SERPL-SCNC: 3.7 MMOL/L (ref 3.4–5.3)
RBC # BLD AUTO: 4.18 10E6/UL (ref 3.8–5.2)
SEPTIN-7 IGG SERPL QL IF: NEGATIVE
SODIUM SERPL-SCNC: 142 MMOL/L (ref 135–145)
TRIM46 SPEC QL IF: NEGATIVE
WBC # BLD AUTO: 11.1 10E3/UL (ref 4–11)

## 2025-05-26 PROCEDURE — 99232 SBSQ HOSP IP/OBS MODERATE 35: CPT | Mod: GC | Performed by: STUDENT IN AN ORGANIZED HEALTH CARE EDUCATION/TRAINING PROGRAM

## 2025-05-26 PROCEDURE — 36415 COLL VENOUS BLD VENIPUNCTURE: CPT

## 2025-05-26 PROCEDURE — 250N000012 HC RX MED GY IP 250 OP 636 PS 637

## 2025-05-26 PROCEDURE — 85041 AUTOMATED RBC COUNT: CPT

## 2025-05-26 PROCEDURE — 120N000002 HC R&B MED SURG/OB UMMC

## 2025-05-26 PROCEDURE — 99232 SBSQ HOSP IP/OBS MODERATE 35: CPT

## 2025-05-26 PROCEDURE — 250N000013 HC RX MED GY IP 250 OP 250 PS 637

## 2025-05-26 PROCEDURE — 250N000011 HC RX IP 250 OP 636

## 2025-05-26 PROCEDURE — 84132 ASSAY OF SERUM POTASSIUM: CPT

## 2025-05-26 PROCEDURE — 82947 ASSAY GLUCOSE BLOOD QUANT: CPT

## 2025-05-26 RX ORDER — GABAPENTIN 300 MG/1
300 CAPSULE ORAL 3 TIMES DAILY
Status: DISCONTINUED | OUTPATIENT
Start: 2025-05-26 | End: 2025-05-28 | Stop reason: HOSPADM

## 2025-05-26 RX ADMIN — GABAPENTIN 300 MG: 300 CAPSULE ORAL at 08:59

## 2025-05-26 RX ADMIN — CALCIUM CARBONATE 600 MG (1,500 MG)-VITAMIN D3 400 UNIT TABLET 1 TABLET: at 08:59

## 2025-05-26 RX ADMIN — ACETAMINOPHEN 650 MG: 325 TABLET, FILM COATED ORAL at 03:08

## 2025-05-26 RX ADMIN — ENOXAPARIN SODIUM 40 MG: 40 INJECTION SUBCUTANEOUS at 08:58

## 2025-05-26 RX ADMIN — PREDNISONE 60 MG: 20 TABLET ORAL at 08:58

## 2025-05-26 RX ADMIN — ONDANSETRON HYDROCHLORIDE 4 MG: 4 TABLET, FILM COATED ORAL at 14:30

## 2025-05-26 RX ADMIN — AMLODIPINE BESYLATE 5 MG: 5 TABLET ORAL at 22:21

## 2025-05-26 RX ADMIN — PANTOPRAZOLE SODIUM 40 MG: 40 TABLET, DELAYED RELEASE ORAL at 08:58

## 2025-05-26 RX ADMIN — DIVALPROEX SODIUM 500 MG: 250 TABLET, DELAYED RELEASE ORAL at 19:57

## 2025-05-26 RX ADMIN — DIVALPROEX SODIUM 500 MG: 250 TABLET, DELAYED RELEASE ORAL at 08:59

## 2025-05-26 RX ADMIN — ACETAMINOPHEN 650 MG: 325 TABLET, FILM COATED ORAL at 18:08

## 2025-05-26 RX ADMIN — INSULIN ASPART 6 UNITS: 100 INJECTION, SOLUTION INTRAVENOUS; SUBCUTANEOUS at 16:48

## 2025-05-26 RX ADMIN — GABAPENTIN 300 MG: 300 CAPSULE ORAL at 19:57

## 2025-05-26 RX ADMIN — Medication 500 MG: at 08:59

## 2025-05-26 RX ADMIN — INSULIN ASPART 2 UNITS: 100 INJECTION, SOLUTION INTRAVENOUS; SUBCUTANEOUS at 12:59

## 2025-05-26 RX ADMIN — CALCIUM CARBONATE 600 MG (1,500 MG)-VITAMIN D3 400 UNIT TABLET 1 TABLET: at 18:45

## 2025-05-26 RX ADMIN — ASPIRIN 81 MG CHEWABLE TABLET 81 MG: 81 TABLET CHEWABLE at 08:59

## 2025-05-26 RX ADMIN — SERTRALINE 75 MG: 25 TABLET, FILM COATED ORAL at 08:58

## 2025-05-26 RX ADMIN — GABAPENTIN 300 MG: 300 CAPSULE ORAL at 14:03

## 2025-05-26 RX ADMIN — PROPRANOLOL HYDROCHLORIDE 60 MG: 60 CAPSULE, EXTENDED RELEASE ORAL at 08:58

## 2025-05-26 RX ADMIN — LOSARTAN POTASSIUM 100 MG: 100 TABLET, FILM COATED ORAL at 08:59

## 2025-05-26 RX ADMIN — SULFAMETHOXAZOLE AND TRIMETHOPRIM 1 TABLET: 800; 160 TABLET ORAL at 09:47

## 2025-05-26 ASSESSMENT — ACTIVITIES OF DAILY LIVING (ADL)
ADLS_ACUITY_SCORE: 60

## 2025-05-26 NOTE — PLAN OF CARE
Goal Outcome Evaluation:      Plan of Care Reviewed With: patient    Overall Patient Progress: no changeOverall Patient Progress: no change     Status: Pt has 3-4 week history of worsening headaches and visual disturbances. Admitted to [facility] on 5/6, left AMA 5/21, readmitted 5/22. Hx of DM, anxiety, tobacco use, and polysubstance use (positive for methamphetamine on urine drug screen)   Vitals: VSS on RA  Neuros: LUE 3/5. LUE drift. Weak grasp L hand. Blurred vision with L visual field cut and L neglect. Forgetful.   IV: PIV SL  Labs/Electrolytes: BG ACHS  Resp: WNL   Diet: Moderate consistent carb (60 g CHO per meal). Carb coverage.   GI: LBM 5/25. No nausea/emesis overnight.  : Voiding spontaneously to bathroom.  Skin: Bruising t/o  Pain: PRN tylenol given for mild HA.  Activity: Up ad sylvester, doorbell alarm for elopement risk.  Social: Daughter at bedside  Plan: Awaiting ARU placement, possibly Tuesday

## 2025-05-26 NOTE — PLAN OF CARE
Status: Pt had 3-4 week history of worsening headaches and visual disturbances. Admitted to [facility] on 5/6, left AMA 5/21, readmitted 5/22. Hx of DM, anxiety, tobacco use, and polysubstance use (positive for methamphetamine on urine drug screen)   Vitals: VSS RA ex HTN w/in parameters, SBP <220  Neuros: A&Ox4. Strengths 5/5 ex LUE 3/5. LUE drift. Weak grasp L hand. Blurred vision with L neglect. Denies N/T.  IV: PIV SL  Labs/Electrolytes: BG ACHS  Resp: WNL   Diet: Moderate consistent carb (60 g CHO per meal). Carb coverage. Good appetite.  GI: LBM 5/26, nausea and emesis around 1400, zofran and sprite given.  : Voiding spontaneously to bathroom.  Skin: Bruising t/o  Pain: denies  Activity: Up ad sylvester, doorbell alarm for elopement risk.  Plan: Awaiting ARU placement  Updates this shift: NPH dose changed to 24 units, only give when prednisone is given  Education: reinforcing blood sugar management w/ insulin

## 2025-05-26 NOTE — PROGRESS NOTES
[facility]    Vascular Neurology Progress Note    Interval History     No acute events overnight.     Patient was seen while she was sat on her bed. She reports her headache and visual disturbances to still be present (same as before) and that she has no new issues. She reports her left arm weakness to be slowly improving. Patient was tearful when reflecting upon recent events while in the hospital as well as the fact that she is not able to independently go shopping - she was consoled by the clinical team.      Hospital Course     Chief complaint: arm paralysis and left upper extremity edema    65-year-old female with a PMH of DM, anxiety, tobacco use, and polysubstance use (positive for methamphetamine on urine drug screen) presented with a 3-4 week history of worsening headaches and visual disturbances. She had two prior hospitalizations for similar symptoms, during which MRI imaging at an outside hospital (OSH) revealed diffuse cerebral microbleeds predominantly in the parietal and occipital regions, along with significant leptomeningeal enhancement in the cerebellum and bilateral cerebral hemispheres, sparing parts of the frontal lobes. The initial differential included posterior reversible encephalopathy syndrome (PRES) and hypertensive vasculopathy. On 05/06, she presented to [facility] with worsening symptoms of headaches and visual disturbances. MRI Brain showed new and extensive leptomeningeal enhancement, sulcal hyperintensities, and evidence of microbleeds/superficial siderosis in the bilateral parieto-occipital regions. Continuous EEG done for 3 days without any abnormal epileptiform discharges. CT chest abdominal pelvis completed to rule out any malignancy was unremarkable. D-dimer, ESR, CRP within normal limits.  Lumbar puncture revealed lymphocytic pleocytosis and elevated protein, with no evidence of infection. Rheumatology was consulted, serum  inflammatory workup negative thus far. Given the imaging findings, inflammatory amyloid angiopathy was considered as a leading differential. To further evaluate for vasculopathy vs vasculitis, a digital subtraction angiogram (DSA) was performed on 05/16 and was negative. Following the DSA procedure, the patient developed new left upper extremity weakness and left hemineglect. Repeat imaging demonstrated multiple new infarcts, suspect most likely periprocedural. She has completed 5 day course of IV methylprednisolone 1000mg daily and was switched to oral prednisone.     On 5/21, due to multiple frustrations about prolonged hospitalization, the patient discharged against medical advice but was having difficulty at home and represented to the hospital late in the evening on 5/21. She was readmitted and there has been re-evaluation by PT/OT who continue to recommend ARU. The patient is medically ready for discharge to ARU.     Assessment and Plan     Today's Changes  - NPH insulin increased from 16 -> 24  - BP is 170/95. Monitor trend     #New onset headache with intermittent vision disturbances and migrainous features  #Spells of impaired awareness  #New auditory hallucinations  #Intracerebral microbleeds with diffuse leptomeningeal enhancement  #left sided weakness, neglect on left  #New acute infarcts - right parietal lobe and right occipital lobe  65 year old female with history of DM2, anxiety, tobacco dependence, GERD, s/p gastric bypass, who presented with worsening headaches and blurred vision. Differential diagnoses include inflammatory amyloid angiopathy, CNS lymphoma, neoplastic/autoimmune process, vasculitis, less likely PRES given significant leptomeningeal enhancement and less prominent FLAIR changes though could be recovering PRES. LP with findings suggestive of inflammatory process, which appears to have been steroid responsive. Serum workup for vasculitis was negative for inflammatory markers. Repeat  MRI (05/12) brain with and without contrast noted slight increased extent of leptomeningeal enhancement, and unchanged microhemorrhages. She underwent a DSA on 05/16 that didn't show any evidence of vasculopathy. However she developed new LUE extremity weakness post-procedure with MRI showing multiple infarcts in the R parietal and occipital lobes. Based on imaging findings, the most probable diagnosis remains inflammatory cerebral amyloid angiopathy. She has completed  5 day course of IV methylprednisolone 1000mg daily and was switched to oral prednisone.   -Continue headache management with               - Gabapentin 300 mg TID (decreased from 600mg TID on 5/20)  - Propranolol ER 60 mg daily for migraine prevention   - Depakote 500mg BID   - PRN compazine & tylenol   - Magnesium 500mg daily  - S/p IV methylprednisolone 1000mg 5 day course (5/16-5/20)  - Continue oral prednisone 60 mg daily for 1 month   - After one month, taper by 10mg each week down to 10mg daily. After that, duration of steroids treatment will be determined during follow-up visit with neurology  - While on prolonged steroid course, prophylaxis with Protonix daily, vit D/calcium daily, and bactrim DS three times weekly  - Serum autoimmune encephalopathy panel pending/ CSF autoimmune encephalopathy panel negative.   - Continue Aspirin 81mg  - Normotensive BP goal  - Follow-up MRI wwo in one month, then plan for repeat MRI wwo in 3 months  - Follow up with stroke neurology in 6 weeks with Dr. Puente     # Type 2 diabetes mellitus  - A1c this admission 8.7, goal < 7.0. Will need close PCP follow up  - Endocrine following for diabetes management, appreciate assistance   - NPH 24 units q 24 hrs at 0800 with prednisone. Hold if prednisone on hold.    - Novolog Meal Coverage:   1 units per 12 g CHO with breakfast and lunch  1 units per 15 g CHO with dinner  1 units per 20 g CHO PRN with snacks/supplements ([id number])  1 units per 50 g CHO PRN with  snacks/supplements ([id number])  - Novolog Correction Scale: 1:25>140 TID AC (high resistance), 1:50>200 HS, 0200 (medium resistance)   - BG monitoring: TID AC, HS, 0200   - Hypoglycemia protocol    - Carb counting protocol     #Hypertension   - BP parameters:              - Notify provider for SBP > 180              - PRN antihypertensives for SBP > 180  - Hydralazine/labetalol as needed for systolic blood pressure>180  - Losartan to 100mg every day  - Amlodipine 5mg daily (PTA dose 10mg)  - Discontinue PTA hydrochlorothiazide 12.5 mg daily due to hyponatremia     #Substance use history  - Addiction medicine consult, appreciate recs  - Continue gabapentin. Patient states it helps with anxiety but doesn't like side effects at higher doses. Trial dosing strategies that work best with patient.  - Increase Sertraline to 75 mg daily (previously tolerated 150 mg). Keep at present dose for 4 weeks and reassess  - Psychiatry and Psychotherapy referrals sent for outpatient  - Our peer  will meet the patient if agreeable and still hospitalized to provide additional outpatient resources  - To contact Jazzy Peer  from [facility] ([facility]): call or text: [phone]    #Eczema  - PRN triamcinolone cream that the patient has used in the past    # Leukocytosis - resolved  - Likely reactionary to steroids  - Daily CBC    #Hyponatremia, resolved  Workup as of 5/11 serum osmol 285, urine osmol 862, urine sodium 219, glucose at time of urine studies was 224, serum sodium 128. Workup most consistent with SIADH vs thiazide induced hyponatremia. Discontinued thiazide on 5/11 and hyponatremia resolved.  -trend BMP  -strict I/Os  -encourage adequate PO intake     #incidental finding- Ill-defined 1.6 cm area of enhancement within the left inferior thyroid lobe.   -TSH normal. Consider outpatient  nonemergent thyroid ultrasound for further assessment, will defer to PCP.     #incidental 3. 5 mm solid nodule in  the right upper lung lobe  - Can consider optional follow-up CT in 12 months to document stability, per PCP    DVT Prophylaxis: enoxaparin    Medically Ready for Discharge: Ready Now    The patient was seen and discussed with neurology attending, Dr. Fuad LIRA, Adalid Tovar, am serving as a scribe at 14:31 on 5/26/2025 to document services personally performed by Jonnathan Carpenter based on my observations and the provider's statements to me.    I, GEOVANNY Robbins, have reviewed the clinical note written by the Research Fellow. The information is accurate and appropriate, and I have made necessary edits to ensure the note aligns with the patient's condition and management plan.    GEOVANNY Robbins  Neurology Rresident PGY2       Physical Examination     Temp: 97.8  F (36.6  C) Temp src: Oral BP: (!) 170/95 (RN aware, below parameter of 220) Pulse: 64   Resp: 16 SpO2: 97 % O2 Device: None (Room air)      General:  patient sitting on bed, no acute distress  HEENT:  normocephalic/atraumatic  Cardio:  RRR  Pulmonary:  no respiratory distress on room air  Skin:  bruising present     Neurologic  Mental Status:  alert, oriented x 3, follows commands, speech clear and fluent, naming and repetition normal  Cranial Nerves:  Left sided hemianopia, EOMI with normal smooth pursuit, facial sensation intact and symmetric, facial movements symmetric, hearing not formally tested but intact to conversation, palate elevation symmetric and uvula midline, no dysarthria  Motor:  LUE drift without hitting the bed. Left shoulder abduction 4/5, elbow extension 4+/5, elbow flexion 4+/5.  5/5 strength in right arm and legs bilaterally. normal muscle tone and bulk, no abnormal movements.   Reflexes:  No ankle clonus bilaterally  Sensory:  light touch sensation intact and symmetric throughout upper and lower extremities, no extinction on double simultaneous stimulation   Coordination:  Finger-nose-finger on right without ataxia or  dysmetria, unable to assess on left. Heel to shin intact bilaterally   Station/Gait:  deferred    Stroke Scales     [employer] Stroke Scale     Score    Level of consciousness: (0)   Alert, keenly responsive    LOC questions: (0)   Answers both questions correctly    LOC commands: (0)   Performs both tasks correctly    Best gaze: (0)   Normal    Visual: (2)   Complete hemianopia    Facial palsy: (0)   Normal symmetrical movements    Motor arm (left): (1)   Drift    Motor arm (right): (0)   No drift    Motor leg (left): (0)   No drift    Motor leg (right): (0)   No drift    Limb ataxia: (0)   Absent    Sensory: (0)   Normal- no sensory loss    Best language: (0)   Normal- no aphasia    Dysarthria: (0)   Normal    Extinction and inattention: (0)   No abnormality        Total Score:  3         Imaging/Labs   (Bolded imaging and labs new and/or personally reviewed or re-reviewed by me today)    MRI/Head CT MRI brain/MRA brain 5/20/25  IMPRESSION:  No arterial stenosis or concentric enhancement of the arterial walls to suggest middle sized arterial vasculitis. However there is persistent leptomeningeal enhancement which can be seen with small peripheral vasculitis for example KIRK. The leptomeningeal enhancement  appears relatively improved compared with 5/15/2025, indeterminant if this represents treatment effect or due to differences in T1 sequence technique.     MRI Brain 5/16  IMPRESSION:  1. Multiple new acute infarcts since the MRI 24 hours ago, most  notably in the right parietal lobe and right occipital lobe. Tiny  evolving infarct in the left cerebellum.  2. Worsened marked abnormal leptomeningeal FLAIR signal, greatest in  the posterior cerebral hemispheres     MRI Brain (5/15)  IMPRESSION:  1. Slight increase in extent of leptomeningeal enhancement and  unchanged numerous microhemorrhages in the occipital, temporal, and  parietal regions bilaterally. These findings are nonspecific and  could  be seen with angiopathy and/or inflammation.  2. Tiny new infarct in the left cerebellum.  3. Superficial siderosis within the right occipital lobe, likely  related to prior hemorrhage.  4. Moderate sequelae of chronic small vessel ischemic disease.     CT head:  IMPRESSION:  1.  No acute findings. No acute intracranial hemorrhage. No acute calvarial fracture.     MRI: (5/7)  IMPRESSION:  1. There is somewhat decreased cortical T2 hyperintense signal with increased conspicuity of leptomeningeal enhancement and numerous presumed microhemorrhages within the occipital, temporal parietal regions bilaterally. These findings are nonspecific, although may be seen with process or hematogenous malignancy such as melanoma.  Findings are less likely related to cerebral amyloid  angiopathy given the distribution. The distribution could be seen with hypertensive angiopathy, however these are extremely great number, much more than expected.   2. 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  3. Chronic small vessel ischemic changes.      Intracranial Vasculature HEAD CTA:   No large vessel occlusion findings intracranially. No high-grade stenosis intracranially.   Cervical Vasculature NECK CTA:  1.  No acute vascular injury in the neck. No high-grade stenosis in the neck.      Echocardiogram Not obtained   EKG/Telemetry Not obtained      LDL  5/6/2025: 72 mg/dL   A1C  5/6/2025: 8.7 %   Troponin No lab value available in past 48 hrs   TSH 1.24  CT CAP: IMPRESSION:   1. No evidence of primary malignancy throughout the chest, abdomen or  pelvis.  2. Ill-defined 1.6 cm area of enhancement within the left inferior  thyroid lobe. Consider nonemergent thyroid ultrasound for further  assessment.  3. 5 mm solid nodule in the right upper lobe. Can consider optional  follow-up CT in 12 months to document stability.  4. Postsurgical changes of Joan-en-Y gastric bypass with  inspissated  debris in the gastric pouch. The gastrojejunal anastomosis appears  patent, however, inspissated debris may suggest delayed  gastrointestinal transit.  5. Hepatic steatosis.  6. Additional chronic and incidental findings, as described above.       SUMMARY OF 3 DAYS OF VIDEO EEG:  This 3 day video EEG is abnormal due to the presence increased superimposed fast activity, likely related to sedating medications employed. No seizures or epileptiform discharges were seen. Clinical correlation is recommended.     CSF (5/12)  Protein total CSF (74.9), Glucose CSF (102), RBC (5), Appearance (clear) Lymphocyte%, Mono %, Neutrophil % (all WNL)

## 2025-05-26 NOTE — PROGRESS NOTES
Inpatient Diabetes Management Service: Daily Progress Note     HPI: 65 female with past medical history of DM, anxiety, tobacco use, polysubstance use presented 5/6/25 with headaches and vision changes ongoing for 3 to 4 weeks.  Patient hospitalized x 2 at outside hospital for similar symptoms and workup include MRI brain with a working diagnosis of possible PRES. She was admitted to [facility] 5/6 with worsening headache and visual disturbance (blurry vision, vague visual distortions, hallucinations, palinopsia, this time without evidence of hypertension.  MRI brain revealed new, extensive leptomeningeal enhancement, sulcal FLAIR signal, and microbleeds /superficial siderosis in the bilateral parieto-occipital areas.  CSF 5/12 revealed lymphocytic pleocytosis (32), elevated protein (75), but studies were otherwise negative including MEP and flow cytometry/cytology.  Serum inflammatory workup was pursued in consultation with rheumatology, and is so far been negative.  CT C/A/P nondiagnostic.  CTA and DSA have showed no evidence of vasculopathy. 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      The evening of 5/21, apparently due to multiple frustrations about her prolonged hospitalization, she discharged AMA. She re-presented later in the night 5/21 requesting re-admission with no new symptoms. Inpatient Diabetes Service consulted 5/21/25 for assistance with glycemic control with steroid use.         Assessment/Plan:     Assessment:   Type 2 Diabetes Mellitus, without known complications. 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    Plan/Recommendations:   - Change NPH 16 --> 24 units q 24 hrs at 0800 with prednisone. Hold if prednisone on hold.    - Novolog Meal Coverage:   1 units per 12 g CHO with breakfast and lunch  1 units per 15 g CHO with dinner  1 units per 20 g CHO PRN with snacks/supplements ([id number])  1 units per 50 g CHO PRN with snacks/supplements ([id number])  - Novolog Correction Scale: 1:25>140 TID AC (high resistance), 1:50>200 HS, 0200 (medium resistance)   - BG monitoring: TID AC, HS, 0200   - Hypoglycemia protocol    - Carb counting protocol     Discussion:   Improved glycemic control with increase in NPH. Will increase further today. Discussed with pt to be aware if she is eating less today she will be at risk of a low blood sugar.     Discharge Planning: (tentative) --> plan for ARU (either [place] or [place])  Medications: TBD. Ideally NPH + Novolog set dose/correction (see below for current d/c plan without NPH). 5/20: Discussed CGM, she would be interested in this (can be pursued outpatient or at the end of her ARU stay)   Test Claims: completed 5/19--> PA for NPH flexpens DENIED 5/21 (has to use vials).     Education: will be needed if discharging home  Outpatient Follow-up: recommend PCP    Diabetes Management Discharge Plan (tentative; will continue to adjust daily)  Instructions to patient were posted in AVS and discussed on day of discharge.   Medications and supplies are to be ordered by primary service on discharge.   *please use the DIAB non-branded discharge supply order set ([id number])*     Patient will need the following supplies prescribed:   Novolog Flexpens  \"BD\" (32G x 4mm) insulin pen needles  Glucometer (if brand of meter not known, can be ordered \"no brand specified\" and note to pharmacy: \"can substitute per insurance coverage\")  Lancets  Test strips  Sharps container  Alcohol swabs      Blood glucose monitoring: Three times daily before meals, and at bedtime.     Blood " Glucose (BG) goals:  mg/dL before meals     Glucose Control Regimen:     1) Rapid-acting insulin: aspart (Novolog) carbohydrate coverage/mealtime insulin (if taking prednisone 60 mg in the morning)  Take 8 units before average meals that contain carbohydrates                     OR  Take 4 units before small meals or snacks that contain carbohydrates     2) Rapid-acting insulin: aspart (Novolog) correction - see chart below. Take for high blood glucoses three times daily before meals  Add the correction dose to the carbohydrate coverage/mealtime insulin dose and give in one injection--ideally 10-15 minutes before a meal.  You may take the correction dose even if you skip a meal (as long as it has been 4 hours since previous correction dose).      Pre-meal correction scale:     Blood Glucose Insulin Novolog Before Meals:   Less than 140 0 units   140 -164  1 units   165 -189 2 units   190 - 214 3 units   215 - 239 4 units    240 - 264  5 units   265 - 289 6 units   290 - 314 7 units   315 - 339 8 units   340 - 364 9 units   365 or more 10 units         Outpatient Diabetes Follow-Up: Follow up with your Primary Care Provider (PCP) in 1-2 weeks, bring glucose data to your appointment. Follow up sooner if blood glucose runs consistently greater than 200 mg/dL or if having more than two episodes less than 70 mg/dL.     Your target A1c value is less than 7% to help prevent future complications from diabetes. Your most recent A1c is 8.7%.      Thank you for letting the Diabetes Management Team be involved in your care!    Please notify Inpatient Diabetes Service if changes are planned to steroids, nutrition, TPN/TF and anticipated procedures requiring prolonged NPO status.         Interval History/Review of Systems :   The last 24 hours progress and nursing notes reviewed.  - No acute events overnight.   - Frequent snacking throughout the day yesterday and overnight   - endorses nausea this AM  - Endorses  headache    Planned Procedures/Surgeries: none    Inpatient Glucose Control:       Recent Labs   Lab 05/26/25  0650 05/26/25  0303 05/25/25  2157 05/25/25  1644 05/25/25  1100 05/25/25  0624   * 132* 224* 174* 242* 124*             Medications Impacting Glycemia:   Steroids:    5/16 - 5/20: Methylprednisolone 1,000 mg daily at 0900   5/21-present: Prednisone 60 mg daily x 1 month, then tentatively taper by 10 mg every week pending results of brain MRI   D5W containing solutions/medications: none  Other medications impacting glucose: none        Nutrition:   Orders Placed This Encounter      Consistent Carbohydrate Diet Moderate Consistent Carb (60 g CHO per Meal) Diet    Supplements: none   TF: none   TPN: none         Diabetes History: see full consult note for complete diabetes history   Diabetes Type and Duration: T2DM, per patient was told she has T2DM 2 weeks ago at OSH. Per chart review, diagnosed 4/2021 with A1c 6.7%      GAD65 antibody, zinc transporter 8 antibody, islet antibody, insulin antibody, and c-peptide not available on epic search      PTA Medication Regimen: none; discharged 5/21 AMA with recommendations of Novolog 10 units before regular meals, 5 units with small meals/snacks plus correction 1:25>140 TID AC with Prednisone 60mg daily.      Missing doses?: did not take any insulin after leaving hospital 5/21     Historical Diabetes Medications:   - reports taking Metformin 500 mg BID recently at OSH but wasn't taking at home. Has never been on insulin before.       Glucose monitoring device/frequency/trends: has used glucometer randomly before because she has family members with diabetes.     Hypoglycemia PTA: none     Outpatient Diabetes Provider: Has PCP but hasn't seen him since 2021 - Randal Castellanos (with [facility])      Formal Diabetes Education/Educator: no        Physical Exam:   BP (!) 144/80 (BP Location: Right arm)   Pulse 57   Temp 98.8  F (37.1  C) (Oral)   Resp 18   SpO2 98%    Constitutional: well-appearing patient in no acute distress.  Eyes: sclera anicteric.  Respiratory: No signs of labored breathing.          Data:     Lab Results   Component Value Date    A1C 8.7 (H) 05/06/2025    A1C 6.0 (H) 02/11/2022    A1C 6.6 (H) 10/15/2021    A1C 5.8 (H) 09/06/2021    A1C 6.7 (H) 04/19/2021    A1C 5.0 02/11/2015       ROUTINE IP LABS (Last four results)  BMP  Recent Labs   Lab 05/26/25  0650 05/26/25  0303 05/25/25  2157 05/25/25  1644 05/25/25  1100 05/25/25  0624 05/24/25  0838 05/24/25  0621 05/23/25  0741 05/23/25  0602     --   --   --   --  142  --  144  --  144   POTASSIUM 3.7  --   --   --   --  3.9  --  3.9  --  3.8   CHLORIDE 104  --   --   --   --  106  --  106  --  107   CRISTIAN 8.3*  --   --   --   --  7.8*  --  7.6*  --  7.7*   CO2 27  --   --   --   --  27  --  24  --  25   BUN 16.0  --   --   --   --  15.5  --  17.7  --  16.3   CR 0.61  --   --   --   --  0.60  --  0.74  --  0.58   * 132* 224* 174*   < > 124*   < > 260*   < > 125*    < > = values in this interval not displayed.     CBC  Recent Labs   Lab 05/26/25  0650 05/25/25  0624 05/24/25  0621 05/23/25  0649   WBC 11.1* 10.9 8.8 9.5   RBC 4.18 4.25 4.16 4.23   HGB 11.1* 11.1* 10.9* 11.3*   HCT 35.2 35.7 35.1 34.9*   MCV 84 84 84 83   MCH 26.6 26.1* 26.2* 26.7   MCHC 31.5 31.1* 31.1* 32.4   RDW 15.9* 15.7* 15.9* 15.7*    [phone]     INR  No lab results found in last 7 days.      Inpatient Diabetes Service will continue to follow, please don't hesitate to contact the team with any questions or concerns.     Ila Vazquez PA-C  Inpatient Diabetes Service  Available on [employer] or Secure Chat     Plan discussed with patient, bedside RN, and primary team via this note.    To contact Inpatient Diabetes Service:     7 AM - 5 PM: Page the [employer] Partager [facility]BERT following the patient that day (see filed or incomplete progress notes/consult notes under Endocrinology)    OR if uncertain of provider assignment: page job code  [phone]    5 PM - 7 AM: First call after hours is to primary service.    For urgent after-hours questions, page job code for on call fellow: [phone]     I spent a total of 40 minutes on the date of the encounter doing prep/post-work, chart review, history and exam, documentation and further activities per the note including lab review, multidisciplinary communication, counseling the patient and/or coordinating care regarding acute hyper/hypoglycemic management, as well as discharge management and planning/communication.

## 2025-05-27 ENCOUNTER — APPOINTMENT (OUTPATIENT)
Dept: PHYSICAL THERAPY | Facility: CLINIC | Age: 65
DRG: 545 | End: 2025-05-27
Payer: COMMERCIAL

## 2025-05-27 ENCOUNTER — APPOINTMENT (OUTPATIENT)
Dept: OCCUPATIONAL THERAPY | Facility: CLINIC | Age: 65
DRG: 545 | End: 2025-05-27
Payer: COMMERCIAL

## 2025-05-27 LAB
ANION GAP SERPL CALCULATED.3IONS-SCNC: 12 MMOL/L (ref 7–15)
BUN SERPL-MCNC: 20.8 MG/DL (ref 8–23)
CALCIUM SERPL-MCNC: 8.3 MG/DL (ref 8.8–10.4)
CHLORIDE SERPL-SCNC: 107 MMOL/L (ref 98–107)
CREAT SERPL-MCNC: 0.71 MG/DL (ref 0.51–0.95)
EGFRCR SERPLBLD CKD-EPI 2021: >90 ML/MIN/1.73M2
ERYTHROCYTE [DISTWIDTH] IN BLOOD BY AUTOMATED COUNT: 16 % (ref 10–15)
GLUCOSE BLDC GLUCOMTR-MCNC: 106 MG/DL (ref 70–99)
GLUCOSE BLDC GLUCOMTR-MCNC: 123 MG/DL (ref 70–99)
GLUCOSE BLDC GLUCOMTR-MCNC: 217 MG/DL (ref 70–99)
GLUCOSE BLDC GLUCOMTR-MCNC: 278 MG/DL (ref 70–99)
GLUCOSE BLDC GLUCOMTR-MCNC: 287 MG/DL (ref 70–99)
GLUCOSE SERPL-MCNC: 107 MG/DL (ref 70–99)
HCO3 SERPL-SCNC: 25 MMOL/L (ref 22–29)
HCT VFR BLD AUTO: 37.6 % (ref 35–47)
HGB BLD-MCNC: 11.9 G/DL (ref 11.7–15.7)
MCH RBC QN AUTO: 26.4 PG (ref 26.5–33)
MCHC RBC AUTO-ENTMCNC: 31.6 G/DL (ref 31.5–36.5)
MCV RBC AUTO: 84 FL (ref 78–100)
PLATELET # BLD AUTO: 359 10E3/UL (ref 150–450)
POTASSIUM SERPL-SCNC: 4 MMOL/L (ref 3.4–5.3)
RBC # BLD AUTO: 4.5 10E6/UL (ref 3.8–5.2)
SODIUM SERPL-SCNC: 144 MMOL/L (ref 135–145)
WBC # BLD AUTO: 13.8 10E3/UL (ref 4–11)

## 2025-05-27 PROCEDURE — 250N000013 HC RX MED GY IP 250 OP 250 PS 637

## 2025-05-27 PROCEDURE — 84132 ASSAY OF SERUM POTASSIUM: CPT

## 2025-05-27 PROCEDURE — 85027 COMPLETE CBC AUTOMATED: CPT

## 2025-05-27 PROCEDURE — 82565 ASSAY OF CREATININE: CPT

## 2025-05-27 PROCEDURE — 36415 COLL VENOUS BLD VENIPUNCTURE: CPT

## 2025-05-27 PROCEDURE — 250N000012 HC RX MED GY IP 250 OP 636 PS 637

## 2025-05-27 PROCEDURE — 99232 SBSQ HOSP IP/OBS MODERATE 35: CPT | Performed by: PHYSICIAN ASSISTANT

## 2025-05-27 PROCEDURE — 97116 GAIT TRAINING THERAPY: CPT | Mod: GP

## 2025-05-27 PROCEDURE — 120N000002 HC R&B MED SURG/OB UMMC

## 2025-05-27 PROCEDURE — 97110 THERAPEUTIC EXERCISES: CPT | Mod: GO | Performed by: OCCUPATIONAL THERAPIST

## 2025-05-27 PROCEDURE — 97535 SELF CARE MNGMENT TRAINING: CPT | Mod: GO | Performed by: OCCUPATIONAL THERAPIST

## 2025-05-27 PROCEDURE — 250N000011 HC RX IP 250 OP 636

## 2025-05-27 RX ADMIN — LOSARTAN POTASSIUM 100 MG: 100 TABLET, FILM COATED ORAL at 09:04

## 2025-05-27 RX ADMIN — GABAPENTIN 300 MG: 300 CAPSULE ORAL at 14:26

## 2025-05-27 RX ADMIN — GABAPENTIN 300 MG: 300 CAPSULE ORAL at 20:44

## 2025-05-27 RX ADMIN — QUETIAPINE FUMARATE 25 MG: 25 TABLET ORAL at 02:38

## 2025-05-27 RX ADMIN — DIVALPROEX SODIUM 500 MG: 250 TABLET, DELAYED RELEASE ORAL at 20:44

## 2025-05-27 RX ADMIN — AMLODIPINE BESYLATE 5 MG: 5 TABLET ORAL at 22:24

## 2025-05-27 RX ADMIN — GABAPENTIN 300 MG: 300 CAPSULE ORAL at 09:04

## 2025-05-27 RX ADMIN — PREDNISONE 60 MG: 20 TABLET ORAL at 09:03

## 2025-05-27 RX ADMIN — DIVALPROEX SODIUM 500 MG: 250 TABLET, DELAYED RELEASE ORAL at 09:04

## 2025-05-27 RX ADMIN — PROPRANOLOL HYDROCHLORIDE 60 MG: 60 CAPSULE, EXTENDED RELEASE ORAL at 09:04

## 2025-05-27 RX ADMIN — CALCIUM CARBONATE 600 MG (1,500 MG)-VITAMIN D3 400 UNIT TABLET 1 TABLET: at 09:03

## 2025-05-27 RX ADMIN — ENOXAPARIN SODIUM 40 MG: 40 INJECTION SUBCUTANEOUS at 09:04

## 2025-05-27 RX ADMIN — CALCIUM CARBONATE 600 MG (1,500 MG)-VITAMIN D3 400 UNIT TABLET 1 TABLET: at 17:11

## 2025-05-27 RX ADMIN — SERTRALINE 75 MG: 25 TABLET, FILM COATED ORAL at 09:04

## 2025-05-27 RX ADMIN — ASPIRIN 81 MG CHEWABLE TABLET 81 MG: 81 TABLET CHEWABLE at 09:04

## 2025-05-27 RX ADMIN — Medication 500 MG: at 09:04

## 2025-05-27 RX ADMIN — PANTOPRAZOLE SODIUM 40 MG: 40 TABLET, DELAYED RELEASE ORAL at 09:04

## 2025-05-27 RX ADMIN — ACETAMINOPHEN 650 MG: 325 TABLET, FILM COATED ORAL at 02:07

## 2025-05-27 RX ADMIN — INSULIN ASPART 6 UNITS: 100 INJECTION, SOLUTION INTRAVENOUS; SUBCUTANEOUS at 14:29

## 2025-05-27 ASSESSMENT — ACTIVITIES OF DAILY LIVING (ADL)
ADLS_ACUITY_SCORE: 60

## 2025-05-27 NOTE — PLAN OF CARE
Status: Pt had 3-4 week history of worsening headaches and visual disturbances. Admitted to [facility] on 5/6, left AMA 5/21, readmitted 5/22. Hx of DM, anxiety, tobacco use, and polysubstance use (positive for methamphetamine on urine drug screen)   Vitals: VSS RA ex HTN w/in parameters, SBP <220  Neuros: A&Ox4. Strengths 5/5 ex LUE 3/5. LUE drift. Weak grasp L hand. Blurred vision with L neglect. Denies N/T.  IV: PIV SL  Labs/Electrolytes: BG ACHS  Resp: WNL   Diet: Moderate consistent carb (60 g CHO per meal). Carb coverage.   GI: LBM 5/26, nausea and emesis intermittent  : Voiding spontaneously to bathroom.  Skin: Bruising t/o  Pain: denies  Activity: Up ad sylvester, doorbell alarm for elopement risk.  Plan: Awaiting ARU placement  Education: reinforcing blood sugar management w/ insulin

## 2025-05-27 NOTE — PROGRESS NOTES
[facility]    Vascular Neurology Progress Note    Interval History     No acute events overnight.     Patient was seen while she was walking in her room. She reports her headache to have improved compared to yesterday and that her visual symptoms remain the same. She has no new issues. She reports her left arm weakness to be slowly improving, and request to be given a arm sling that is more comfortable around her neck.     Vitals - blood pressure stable ~ 150s/80s      Hospital Course     Chief complaint: arm paralysis and left upper extremity edema    65-year-old female with a PMH of DM, anxiety, tobacco use, and polysubstance use (positive for methamphetamine on urine drug screen) presented with a 3-4 week history of worsening headaches and visual disturbances. She had two prior hospitalizations for similar symptoms, during which MRI imaging at an outside hospital (OSH) revealed diffuse cerebral microbleeds predominantly in the parietal and occipital regions, along with significant leptomeningeal enhancement in the cerebellum and bilateral cerebral hemispheres, sparing parts of the frontal lobes. The initial differential included posterior reversible encephalopathy syndrome (PRES) and hypertensive vasculopathy. On 05/06, she presented to [facility] with worsening symptoms of headaches and visual disturbances. MRI Brain showed new and extensive leptomeningeal enhancement, sulcal hyperintensities, and evidence of microbleeds/superficial siderosis in the bilateral parieto-occipital regions. Continuous EEG done for 3 days without any abnormal epileptiform discharges. CT chest abdominal pelvis completed to rule out any malignancy was unremarkable. D-dimer, ESR, CRP within normal limits.  Lumbar puncture revealed lymphocytic pleocytosis and elevated protein, with no evidence of infection. Rheumatology was consulted, serum inflammatory workup negative thus far. Given the  imaging findings, inflammatory amyloid angiopathy was considered as a leading differential. To further evaluate for vasculopathy vs vasculitis, a digital subtraction angiogram (DSA) was performed on 05/16 and was negative. Following the DSA procedure, the patient developed new left upper extremity weakness and left hemineglect. Repeat imaging demonstrated multiple new infarcts, suspect most likely periprocedural. She has completed 5 day course of IV methylprednisolone 1000mg daily and was switched to oral prednisone.     On 5/21, due to multiple frustrations about prolonged hospitalization, the patient discharged against medical advice but was having difficulty at home and represented to the hospital late in the evening on 5/21. She was readmitted and there has been re-evaluation by PT/OT who continue to recommend ARU. The patient is medically ready for discharge to ARU.     Assessment and Plan     Today's Changes  - No new changes  - Ready for discharge - ARU requesting therapy to see her before discharge    #New onset headache with intermittent vision disturbances and migrainous features  #Spells of impaired awareness  #New auditory hallucinations  #Intracerebral microbleeds with diffuse leptomeningeal enhancement  #left sided weakness, neglect on left  #New acute infarcts - right parietal lobe and right occipital lobe  65 year old female with history of DM2, anxiety, tobacco dependence, GERD, s/p gastric bypass, who presented with worsening headaches and blurred vision. Differential diagnoses include inflammatory amyloid angiopathy, CNS lymphoma, neoplastic/autoimmune process, vasculitis, less likely PRES given significant leptomeningeal enhancement and less prominent FLAIR changes though could be recovering PRES. LP with findings suggestive of inflammatory process, which appears to have been steroid responsive. Serum workup for vasculitis was negative for inflammatory markers. Repeat MRI (05/12) brain with and  without contrast noted slight increased extent of leptomeningeal enhancement, and unchanged microhemorrhages. She underwent a DSA on 05/16 that didn't show any evidence of vasculopathy. However she developed new LUE extremity weakness post-procedure with MRI showing multiple infarcts in the R parietal and occipital lobes. Based on imaging findings, the most probable diagnosis remains inflammatory cerebral amyloid angiopathy. She has completed  5 day course of IV methylprednisolone 1000mg daily and was switched to oral prednisone.   -Continue headache management with               - Gabapentin 300 mg TID (decreased from 600mg TID on 5/20)  - Propranolol ER 60 mg daily for migraine prevention   - Depakote 500mg BID   - PRN compazine & tylenol   - Magnesium 500mg daily  - S/p IV methylprednisolone 1000mg 5 day course (5/16-5/20)  - Continue oral prednisone 60 mg daily for 1 month   - After one month, taper by 10mg each week down to 10mg daily. After that, duration of steroids treatment will be determined during follow-up visit with neurology  - While on prolonged steroid course, prophylaxis with Protonix daily, vit D/calcium daily, and bactrim DS three times weekly  - Serum autoimmune encephalopathy panel pending/ CSF autoimmune encephalopathy panel negative.   - Continue Aspirin 81mg  - Normotensive BP goal  - Follow-up MRI wwo in one month, then plan for repeat MRI wwo in 3 months  - Follow up with stroke neurology in 6 weeks with Dr. Puente     # Type 2 diabetes mellitus  - A1c this admission 8.7, goal < 7.0. Will need close PCP follow up  - Endocrine following for diabetes management, appreciate assistance   - NPH 24 units q 24 hrs at 0800 with prednisone. Hold if prednisone on hold.    - Novolog Meal Coverage:   1 units per 12 g CHO with breakfast and lunch  1 units per 15 g CHO with dinner  1 units per 20 g CHO PRN with snacks/supplements (2350-3302)  1 units per 50 g CHO PRN with snacks/supplements  ([id number])  - Novolog Correction Scale: 1:25>140 TID AC (high resistance), 1:50>200 HS, 0200 (medium resistance)   - BG monitoring: TID AC, HS, 0200   - Hypoglycemia protocol    - Carb counting protocol     #Hypertension   - BP parameters:              - Notify provider for SBP > 180              - PRN antihypertensives for SBP > 180  - Hydralazine/labetalol as needed for systolic blood pressure>180  - Losartan to 100mg every day  - Amlodipine 5mg daily (PTA dose 10mg)  - Discontinue PTA hydrochlorothiazide 12.5 mg daily due to hyponatremia     #Substance use history  - Addiction medicine consult, appreciate recs  - Continue gabapentin. Patient states it helps with anxiety but doesn't like side effects at higher doses. Trial dosing strategies that work best with patient.  - Increase Sertraline to 75 mg daily (previously tolerated 150 mg). Keep at present dose for 4 weeks and reassess  - Psychiatry and Psychotherapy referrals sent for outpatient  - Our peer  will meet the patient if agreeable and still hospitalized to provide additional outpatient resources  - To contact Jazzy Peer  from [facility] ([facility]): call or text: [phone]    #Eczema  - PRN triamcinolone cream that the patient has used in the past    # Leukocytosis - resolved  - Likely reactionary to steroids  - Daily CBC    #Hyponatremia, resolved  Workup as of 5/11 serum osmol 285, urine osmol 862, urine sodium 219, glucose at time of urine studies was 224, serum sodium 128. Workup most consistent with SIADH vs thiazide induced hyponatremia. Discontinued thiazide on 5/11 and hyponatremia resolved.  -trend BMP  -strict I/Os  -encourage adequate PO intake     #incidental finding- Ill-defined 1.6 cm area of enhancement within the left inferior thyroid lobe.   -TSH normal. Consider outpatient  nonemergent thyroid ultrasound for further assessment, will defer to PCP.     #incidental 3. 5 mm solid nodule in the right upper lung  lobe  - Can consider optional follow-up CT in 12 months to document stability, per PCP    DVT Prophylaxis: enoxaparin    Medically Ready for Discharge: Ready Now    The patient was seen and discussed with neurology attending, Adalid Rhodes, am serving as a scribe at 14:31 on 5/27/2025 to document services personally performed by Jonnathan Carpenter based on my observations and the provider's statements to me.    I, GEOVANNY Robbins, have reviewed the clinical note written by the Research Fellow. The information is accurate and appropriate, and I have made necessary edits to ensure the note aligns with the patient's condition and management plan.     GEOVANNY Robbins  Neurology Rresident PGY2       Physical Examination     Temp: 98  F (36.7  C) Temp src: Oral BP: 120/59 Pulse: 66   Resp: 18 SpO2: 98 % O2 Device: None (Room air)      General:  patient sitting on bed, no acute distress  HEENT:  normocephalic/atraumatic  Cardio:  RRR  Pulmonary:  no respiratory distress on room air  Skin:  bruising present     Neurologic  Mental Status:  alert, oriented x 3, follows commands, speech clear and fluent, naming and repetition normal  Cranial Nerves:  Left sided hemianopia, EOMI with normal smooth pursuit, facial sensation intact and symmetric, facial movements symmetric, hearing not formally tested but intact to conversation, palate elevation symmetric and uvula midline, no dysarthria  Motor:  LUE drift without hitting the bed. Left shoulder abduction 4/5, elbow extension 4+/5, elbow flexion 4+/5.  5/5 strength in right arm and legs bilaterally. normal muscle tone and bulk, no abnormal movements.   Reflexes:  No ankle clonus bilaterally  Sensory:  light touch sensation intact and symmetric throughout upper and lower extremities, no extinction on double simultaneous stimulation   Coordination:  Finger-nose-finger on right without ataxia or dysmetria, unable to assess on left. Heel to shin intact bilaterally    Station/Gait:  deferred    Stroke Scales     National Institutes of Health Stroke Scale     Score    Level of consciousness: (0)   Alert, keenly responsive    LOC questions: (0)   Answers both questions correctly    LOC commands: (0)   Performs both tasks correctly    Best gaze: (0)   Normal    Visual: (2)   Complete hemianopia    Facial palsy: (0)   Normal symmetrical movements    Motor arm (left): (1)   Drift    Motor arm (right): (0)   No drift    Motor leg (left): (0)   No drift    Motor leg (right): (0)   No drift    Limb ataxia: (0)   Absent    Sensory: (0)   Normal- no sensory loss    Best language: (0)   Normal- no aphasia    Dysarthria: (0)   Normal    Extinction and inattention: (0)   No abnormality        Total Score:  3         Imaging/Labs   (Bolded imaging and labs new and/or personally reviewed or re-reviewed by me today)    MRI/Head CT MRI brain/MRA brain 5/20/25  IMPRESSION:  No arterial stenosis or concentric enhancement of the arterial walls to suggest middle sized arterial vasculitis. However there is persistent leptomeningeal enhancement which can be seen with small peripheral vasculitis for example KIRK. The leptomeningeal enhancement  appears relatively improved compared with 5/15/2025, indeterminant if this represents treatment effect or due to differences in T1 sequence technique.     MRI Brain 5/16  IMPRESSION:  1. Multiple new acute infarcts since the MRI 24 hours ago, most  notably in the right parietal lobe and right occipital lobe. Tiny  evolving infarct in the left cerebellum.  2. Worsened marked abnormal leptomeningeal FLAIR signal, greatest in  the posterior cerebral hemispheres     MRI Brain (5/15)  IMPRESSION:  1. Slight increase in extent of leptomeningeal enhancement and  unchanged numerous microhemorrhages in the occipital, temporal, and  parietal regions bilaterally. These findings are nonspecific and could  be seen with angiopathy and/or inflammation.  2. Tiny new infarct in  the left cerebellum.  3. Superficial siderosis within the right occipital lobe, likely  related to prior hemorrhage.  4. Moderate sequelae of chronic small vessel ischemic disease.     CT head:  IMPRESSION:  1.  No acute findings. No acute intracranial hemorrhage. No acute calvarial fracture.     MRI: (5/7)  IMPRESSION:  1. There is somewhat decreased cortical T2 hyperintense signal with increased conspicuity of leptomeningeal enhancement and numerous presumed microhemorrhages within the occipital, temporal parietal regions bilaterally. These findings are nonspecific, although may be seen with process or hematogenous malignancy such as melanoma.  Findings are less likely related to cerebral amyloid  angiopathy given the distribution. The distribution could be seen with hypertensive angiopathy, however these are extremely great number, much more than expected.   2. 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  3. Chronic small vessel ischemic changes.      Intracranial Vasculature HEAD CTA:   No large vessel occlusion findings intracranially. No high-grade stenosis intracranially.   Cervical Vasculature NECK CTA:  1.  No acute vascular injury in the neck. No high-grade stenosis in the neck.      Echocardiogram Not obtained   EKG/Telemetry Not obtained      LDL  5/6/2025: 72 mg/dL   A1C  5/6/2025: 8.7 %   Troponin No lab value available in past 48 hrs   TSH 1.24  CT CAP: IMPRESSION:   1. No evidence of primary malignancy throughout the chest, abdomen or  pelvis.  2. Ill-defined 1.6 cm area of enhancement within the left inferior  thyroid lobe. Consider nonemergent thyroid ultrasound for further  assessment.  3. 5 mm solid nodule in the right upper lobe. Can consider optional  follow-up CT in 12 months to document stability.  4. 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 The gastrojejunal anastomosis appears  patent,  however, inspissated debris may suggest delayed  gastrointestinal transit.  5. Hepatic steatosis.  6. Additional chronic and incidental findings, as described above.       SUMMARY OF 3 DAYS OF VIDEO EEG:  This 3 day video EEG is abnormal due to the presence increased superimposed fast activity, likely related to sedating medications employed. No seizures or epileptiform discharges were seen. Clinical correlation is recommended.     CSF (5/12)  Protein total CSF (74.9), Glucose CSF (102), RBC (5), Appearance (clear) Lymphocyte%, Mono %, Neutrophil % (all WNL)

## 2025-05-27 NOTE — PROGRESS NOTES
"Care Management Follow Up    Length of Stay (days): 5    Expected Discharge Date: 05/28/2025     Concerns to be Addressed: discharge planning     Patient plan of care discussed at interdisciplinary rounds: Yes    Anticipated Discharge Disposition: Acute Rehab              Anticipated Discharge Services:  ARU services  Anticipated Discharge DME:  None    Patient/family educated on Medicare website which has current facility and service quality ratings:  Yes  Education Provided on the Discharge Plan:  Yes  Patient/Family in Agreement with the Plan:  Yes    Referrals Placed by CM/SW:      Pending:     [facility]  Ph: [phone]  Per KULWANT Ybarra note from 5/23: \"this SW is aware that this ARU is full this week for external admits per call to Admissions earlier this week\"    [facility]  [address], [place] [address]  Ph: [phone]  RN to RN Ph: [phone]   5/27: KULWANT spoke with [facility] Admissions Liaison (Renata) who requested updated PT/OT notes for pt due to last notes being from 5/24 and 5/23.   SW spoke with PT requesting that they work with pt today for ARU referrals.     Declined:  [facility] ge[facility]  Ph: [phone]  5/27: KULWANT received VM from Janice in Admissions with MARYANN Pickard and . Janice reports that she was following patient, but notes that recent notes state that pt is SBA and walking 400-500 ft. Janice reports that pt is doing too well for their ARU and reports that she is going to decline pt at this time. Janice reports that if pt needs more rehab she would rec TCU or pt to go home with assist. Janice left call back phone number ([phone]) for questions.      Leigh george [facility]  Ph: [phone]  5/27: See MARYANN Pickard note for more information.      Private pay costs discussed: Not applicable    Discussed  Partnership in Safe Discharge Planning  document with patient/family: No     Handoff Completed: No, handoff not indicated or " clinically appropriate    Additional Information:  SW following for discharge planning. PT/OT currently recommending ARU. Per Neuro Stroke, pt remains medically ready for ARU discharge. SW followed up on ARU referrals as noted above. SW notified by therapies that pt declined to work with PT x2 today. Per discussion with OT, changing recs to home with likely OP OT. They would also recommend adding additional PCA hours or more support with managing medications and finances at home.     KULWANT received phone call from Elysia, pt's Transition Care Coordinator with [employer] (ph: [phone]). KULWANT provided brief update to Elysia. Elysia reports that she has also been in contact with pt's [facility] CM (Erendira Richard, ph: [phone]). Elysia requests to be kept updated on pt discharge plan.     Next Steps: KULWANT to continue to follow for discharge planning.   - CM team to watch for PT recs.   - KULWANT to assist pt with calling [facility] CM (Erendira Richard, ph: [phone]) on adding additional services if pt wishes  - KULWANT to update [facility] (Elysia, ph: [phone]) on discharge plan once known    REY Sierra   [facility]   Available via [employer]  Covering 6A KULWANT, ph: [phone]

## 2025-05-27 NOTE — PLAN OF CARE
Goal Outcome Evaluation:      Plan of Care Reviewed With: patient    Overall Patient Progress: no changeOverall Patient Progress: no change    Outcome Evaluation: Continue reinforcing glucose management, SW following for placement    Status: Initially admitted on 5/6 for headaches and vision changes c/b multifocal infarcts following cerebral angiogram. Left AMA on 5/21 and was re-admitted that evening.   Vitals: VSS ex HTN within parameters of SBP between 120-220  Neuros: A&Ox4, strengths LUE 3/5 AOE 5/5, LUE drift, blurred vision, L field cut, L neglect, weak L hand grasp   IV: PIV SL   Labs/Electrolytes: BG checks ACHS with carb coverage   Resp: LSC on RA with infrequent cough   Diet: Consistent carb diet (60g CHO), intermittent nausea managed without medications   GI: LBM 5/26, BS+  : Voiding spontaneously   Skin: Bruising throughout   Pain: Denies   Activity: Ind in room, doorbell alarm for elopement risk   Plan/Updates this shift: SW following for discharge, potential discharge to  ARU Tuesday 5/27 pending bed availability.    Education completed: Needs reinforcement on blood glucose management

## 2025-05-27 NOTE — CONSULTS
Discharge Pharmacy Test Claim    Patient's [employer] prepaid medical assistance plan denied prior authorization for novolin N flexpens. Pharmacy liaison is working on an appeal on behalf of endocrine.    Addendum 5/28: appeal approved with $0 copay.    Test Claim Copay   novolin N flexpens 0.00     Susan Olivera  [facility] Pharmacy Liaison, [place]  Ph: [phone]  Fax: [phone]  Available on Teams and [employer]  Disclaimer: Pharmacy test claims are estimates and may not reflect final costs. Suggested alternatives aim to be cost-effective and may not be therapeutically equivalent. This consult is informational and does not constitute medical advice. Clinical decisions should be made by qualified healthcare providers.

## 2025-05-27 NOTE — PLAN OF CARE
Goal Outcome Evaluation:      Plan of Care Reviewed With: patient    Overall Patient Progress: no changeOverall Patient Progress: no change    Outcome Evaluation: Possible discharge today pending ARU placement. NIHSS score 4      Status: Initially admitted on 5/6 for headaches and vision changes c/b multifocal infarcts following cerebral angiogram. Left AMA on 5/21 and was re-admitted that evening  Vitals: Intermittently HTN within parameters. OVSS on RA  Neuros: AOX4. Intermittent confusion. Strengths LUE 3/5 with limited ROM; AOE 5/5. LUE drift. Moderate L hand grasp. Intermittent blurred/distorted vision. L field cut. L neglect  IV: PIV SL   Labs/Electrolytes: ACHS BG check with carb coverage. 0200 sugar 106  Resp: Infrequent cough  Diet: Consistent carb diet (60g CHO). Denies nausea this shift  GI: LBM 5/26  : VDSP to BR  Skin: No new deficits noted  Pain: Denies   Activity: Ind in room. Doorbell alarm for elopement risk. Wears L arm sling   Plan/Updates this shift: Possible discharge to  ARU today pending bed availability

## 2025-05-28 ENCOUNTER — APPOINTMENT (OUTPATIENT)
Dept: OCCUPATIONAL THERAPY | Facility: CLINIC | Age: 65
End: 2025-05-28
Payer: COMMERCIAL

## 2025-05-28 VITALS
SYSTOLIC BLOOD PRESSURE: 121 MMHG | DIASTOLIC BLOOD PRESSURE: 67 MMHG | TEMPERATURE: 98 F | HEART RATE: 71 BPM | OXYGEN SATURATION: 96 % | RESPIRATION RATE: 16 BRPM

## 2025-05-28 LAB
ANION GAP SERPL CALCULATED.3IONS-SCNC: 11 MMOL/L (ref 7–15)
BUN SERPL-MCNC: 20.6 MG/DL (ref 8–23)
CALCIUM SERPL-MCNC: 8.1 MG/DL (ref 8.8–10.4)
CHLORIDE SERPL-SCNC: 108 MMOL/L (ref 98–107)
CREAT SERPL-MCNC: 0.63 MG/DL (ref 0.51–0.95)
EGFRCR SERPLBLD CKD-EPI 2021: >90 ML/MIN/1.73M2
ERYTHROCYTE [DISTWIDTH] IN BLOOD BY AUTOMATED COUNT: 15.9 % (ref 10–15)
GLUCOSE BLDC GLUCOMTR-MCNC: 124 MG/DL (ref 70–99)
GLUCOSE BLDC GLUCOMTR-MCNC: 125 MG/DL (ref 70–99)
GLUCOSE BLDC GLUCOMTR-MCNC: 135 MG/DL (ref 70–99)
GLUCOSE BLDC GLUCOMTR-MCNC: 154 MG/DL (ref 70–99)
GLUCOSE SERPL-MCNC: 90 MG/DL (ref 70–99)
HCO3 SERPL-SCNC: 26 MMOL/L (ref 22–29)
HCT VFR BLD AUTO: 34.2 % (ref 35–47)
HGB BLD-MCNC: 10.8 G/DL (ref 11.7–15.7)
MCH RBC QN AUTO: 26.8 PG (ref 26.5–33)
MCHC RBC AUTO-ENTMCNC: 31.6 G/DL (ref 31.5–36.5)
MCV RBC AUTO: 85 FL (ref 78–100)
PLATELET # BLD AUTO: 351 10E3/UL (ref 150–450)
POTASSIUM SERPL-SCNC: 3.6 MMOL/L (ref 3.4–5.3)
RBC # BLD AUTO: 4.03 10E6/UL (ref 3.8–5.2)
SODIUM SERPL-SCNC: 145 MMOL/L (ref 135–145)
WBC # BLD AUTO: 10.7 10E3/UL (ref 4–11)

## 2025-05-28 PROCEDURE — 250N000012 HC RX MED GY IP 250 OP 636 PS 637

## 2025-05-28 PROCEDURE — 250N000013 HC RX MED GY IP 250 OP 250 PS 637

## 2025-05-28 PROCEDURE — 80048 BASIC METABOLIC PNL TOTAL CA: CPT

## 2025-05-28 PROCEDURE — 99233 SBSQ HOSP IP/OBS HIGH 50: CPT | Performed by: PHYSICIAN ASSISTANT

## 2025-05-28 PROCEDURE — 97535 SELF CARE MNGMENT TRAINING: CPT | Mod: GO | Performed by: OCCUPATIONAL THERAPIST

## 2025-05-28 PROCEDURE — 97110 THERAPEUTIC EXERCISES: CPT | Mod: GO | Performed by: OCCUPATIONAL THERAPIST

## 2025-05-28 PROCEDURE — 36415 COLL VENOUS BLD VENIPUNCTURE: CPT

## 2025-05-28 PROCEDURE — 85014 HEMATOCRIT: CPT

## 2025-05-28 PROCEDURE — 250N000011 HC RX IP 250 OP 636

## 2025-05-28 RX ORDER — PREDNISONE 10 MG/1
TABLET ORAL
Qty: 243 TABLET | Refills: 1 | Status: SHIPPED | OUTPATIENT
Start: 2025-05-29 | End: 2025-07-26

## 2025-05-28 RX ORDER — PREDNISONE 10 MG/1
TABLET ORAL
Qty: 243 TABLET | Refills: 0 | Status: SHIPPED | OUTPATIENT
Start: 2025-05-29 | End: 2025-05-28

## 2025-05-28 RX ORDER — CONTAINER,EMPTY
EACH MISCELLANEOUS
Qty: 1 EACH | Refills: 1 | Status: SHIPPED | OUTPATIENT
Start: 2025-05-28

## 2025-05-28 RX ORDER — BLOOD PRESSURE TEST KIT
KIT MISCELLANEOUS
Qty: 200 EACH | Refills: 1 | Status: SHIPPED | OUTPATIENT
Start: 2025-05-28

## 2025-05-28 RX ORDER — INSULIN ASPART 100 [IU]/ML
INJECTION, SOLUTION INTRAVENOUS; SUBCUTANEOUS
Qty: 15 ML | Refills: 3 | Status: SHIPPED | OUTPATIENT
Start: 2025-05-28

## 2025-05-28 RX ORDER — MAGNESIUM GLUCONATE 27 MG(500)
500 TABLET ORAL DAILY
Qty: 90 TABLET | Refills: 2 | Status: SHIPPED | OUTPATIENT
Start: 2025-05-29

## 2025-05-28 RX ORDER — CALCIUM CARBONATE/VITAMIN D3 600 MG-10
1 TABLET ORAL 2 TIMES DAILY WITH MEALS
Qty: 180 TABLET | Refills: 0 | Status: SHIPPED | OUTPATIENT
Start: 2025-05-28

## 2025-05-28 RX ADMIN — PREDNISONE 60 MG: 20 TABLET ORAL at 09:03

## 2025-05-28 RX ADMIN — GABAPENTIN 300 MG: 300 CAPSULE ORAL at 09:03

## 2025-05-28 RX ADMIN — CALCIUM CARBONATE 600 MG (1,500 MG)-VITAMIN D3 400 UNIT TABLET 1 TABLET: at 09:03

## 2025-05-28 RX ADMIN — ACETAMINOPHEN 650 MG: 325 TABLET, FILM COATED ORAL at 09:03

## 2025-05-28 RX ADMIN — SULFAMETHOXAZOLE AND TRIMETHOPRIM 1 TABLET: 800; 160 TABLET ORAL at 09:03

## 2025-05-28 RX ADMIN — ENOXAPARIN SODIUM 40 MG: 40 INJECTION SUBCUTANEOUS at 09:02

## 2025-05-28 RX ADMIN — GABAPENTIN 300 MG: 300 CAPSULE ORAL at 13:43

## 2025-05-28 RX ADMIN — PROPRANOLOL HYDROCHLORIDE 60 MG: 60 CAPSULE, EXTENDED RELEASE ORAL at 09:02

## 2025-05-28 RX ADMIN — PANTOPRAZOLE SODIUM 40 MG: 40 TABLET, DELAYED RELEASE ORAL at 09:02

## 2025-05-28 RX ADMIN — ASPIRIN 81 MG CHEWABLE TABLET 81 MG: 81 TABLET CHEWABLE at 09:03

## 2025-05-28 RX ADMIN — INSULIN ASPART 1 UNITS: 100 INJECTION, SOLUTION INTRAVENOUS; SUBCUTANEOUS at 12:15

## 2025-05-28 RX ADMIN — DIVALPROEX SODIUM 500 MG: 250 TABLET, DELAYED RELEASE ORAL at 09:02

## 2025-05-28 RX ADMIN — LOSARTAN POTASSIUM 100 MG: 100 TABLET, FILM COATED ORAL at 09:03

## 2025-05-28 RX ADMIN — SERTRALINE 75 MG: 25 TABLET, FILM COATED ORAL at 09:03

## 2025-05-28 RX ADMIN — Medication 500 MG: at 09:03

## 2025-05-28 ASSESSMENT — ACTIVITIES OF DAILY LIVING (ADL)
ADLS_ACUITY_SCORE: 60
ADLS_ACUITY_SCORE: 60
ADLS_ACUITY_SCORE: 63
ADLS_ACUITY_SCORE: 63
ADLS_ACUITY_SCORE: 60
ADLS_ACUITY_SCORE: 63
ADLS_ACUITY_SCORE: 60
ADLS_ACUITY_SCORE: 60
ADLS_ACUITY_SCORE: 63
ADLS_ACUITY_SCORE: 60
ADLS_ACUITY_SCORE: 63
ADLS_ACUITY_SCORE: 60
ADLS_ACUITY_SCORE: 60

## 2025-05-28 ASSESSMENT — PATIENT HEALTH QUESTIONNAIRE - PHQ9: SUM OF ALL RESPONSES TO PHQ QUESTIONS 1-9: 8

## 2025-05-28 NOTE — PLAN OF CARE
Goal Outcome Evaluation:      Plan of Care Reviewed With: patient    Overall Patient Progress: no changeOverall Patient Progress: no change    Outcome Evaluation: Needs reinforcement on BG management, SW following for placement to ARU    Status: Initially admitted on 5/6 for headaches and vision changes c/b multifocal infarcts following cerebral angiogram. Left AMA on 5/21 and was re-admitted that evening.   Vitals: VSS ex HTN within parameters of SBP between 120-220  Neuros: A&Ox4, strengths LUE 3/5 AOE 5/5, LUE drift, blurred vision, L field cut, L neglect, weak L hand grasp, refused 8pm assessment   IV: PIV SL   Labs/Electrolytes: BG checks ACHS with carb coverage   Resp: LSC on RA with infrequent cough   Diet: Consistent carb diet (60g CHO), intermittent nausea managed without medications   GI: LBM 5/26, BS+  : Voiding spontaneously   Skin: Bruising throughout   Pain: Denies   Activity: Ind in room, doorbell alarm for elopement risk   Social: Daughter staying overnight   Plan/Updates this shift: PT/OT recommending home with assist, continue with POC  Education completed: Needs reinforcement on blood glucose management

## 2025-05-28 NOTE — PLAN OF CARE
Goal Outcome Evaluation:      Plan of Care Reviewed With: patient    Overall Patient Progress: no changeOverall Patient Progress: no change    Outcome Evaluation: SW following. Neuros unchanged      Status: Initially admitted on 5/6 for headaches and vision changes c/b multifocal infarcts following cerebral angiogram. Left AMA on 5/21 and was re-admitted that evening  Vitals: VSS on RA  Neuros: AOX4. Strengths LUE 4/5 with mildly limited ROM; AOE 5/5. LUE drift. Moderate L hand grasp. Denies blurred/distorted vision this shift. L neglect  IV: PIV SL   Labs/Electrolytes: ACHS BG check with carb coverage. 0200 sugar 124  Resp: Infrequent cough. Denies SOB  Diet: Consistent carb diet (60g CHO)  GI: LBM 5/26  : VDSP to BR  Skin: No new deficits noted  Pain: Denies   Activity: Ind in room. Doorbell alarm for elopement risk. Intermittently wearing L arm sling d/t neglect  Social: Daughter in room overnight  Plan/Updates this shift: PT/OT recommending home with assist. Continue to monitor and follow POC   Education: Patient needs reinforcement on blood sugar management

## 2025-05-28 NOTE — PROGRESS NOTES
Care Management Discharge Note    Discharge Date: 05/28/2025       Discharge Disposition: Home    Discharge Services:  Out Patient Therapy    Discharge DME:      Discharge Transportation: [employer] #[phone]                                                [employer] cab #[phone]                                                 4pm     Private pay costs discussed: Not applicable       Education Provided on the Discharge Plan:  Yes  Persons Notified of Discharge Plans: Patient  Patient/Family in Agreement with the Plan:  yes    Handoff Referral Completed: Yes, [facility] PCP: Internal handoff referral completed    Additional Information:  Patient medically stable for discharge today. Therapy recommends home with assist, Out Patient PT/OT.  Patients Bea Hicks plans to stay with Patient post discharge. Endocrine APRN met with Patient and her Daughter to review diabetic management, glucose monitoring and insulin administration.  Transport to home arranged through [facility] [employer], 4pm .    Brooke Dumont RN  6A care coordinator #[phone]

## 2025-05-28 NOTE — DISCHARGE INSTRUCTIONS
Diabetes Management Discharge Plan      Blood glucose monitoring: Three times daily before meals, and at bedtime.     Blood Glucose (BG) goals:  mg/dL before meals     Glucose Control Regimen:  When Prednisone tapers on 6/20/2025 to prednisone 50 mg by mouth daily need to see primary care provider prior to see what to do with insulin     1) Rapid-acting insulin: aspart (Novolog) carbohydrate coverage/mealtime insulin (if taking prednisone 60 mg in the morning)  Take 10 units before average meals that contain carbohydrates                     OR  Take 5 units before small meals or snacks that contain carbohydrates     2) Rapid-acting insulin: aspart (Novolog) correction - see chart below. Take for high blood glucoses three times daily before meals  Add the correction dose to the carbohydrate coverage/mealtime insulin dose and give in one injection--ideally 10-15 minutes before a meal.  You may take the correction dose even if you skip a meal (as long as it has been 4 hours since previous correction dose).      Pre-meal correction scale:     Blood Glucose Insulin Novolog Before Meals:   Less than 140 0 units   140 -164  1 units   165 -189 2 units   190 - 214 3 units   215 - 239 4 units    240 - 264  5 units   265 - 289 6 units   290 - 314 7 units   315 - 339 8 units   340 - 364 9 units   365 or more 10 units         Outpatient Diabetes Follow-Up: Follow up with your Primary Care Provider (PCP) in 1-2 weeks, bring glucose data to your appointment. Follow up sooner if blood glucose runs consistently greater than 200 mg/dL or if having more than two episodes less than 70 mg/dL.     Your target A1c value is less than 7% to help prevent future complications from diabetes. Your most recent A1c is 8.7%.      Thank you for letting the Diabetes Management Team be involved in your care!

## 2025-05-28 NOTE — PROGRESS NOTES
Patient has been educated on potential risks of choosing to leave the unit and that the responsibility for patient well-being will belong to the patient. Pt has been informed that admission to hospital is due to need for medical treatment. Education given to the patient on some of the potential risks included but are not limited to:      - lack of access to nursing intervention      - possible missed appointments with MD, therapies, tests      - possible missed medications, antibiotics, management of IV's    Patient Response:verbalized understanding    Patient notified staff prior to leaving unit: yes  Coban wrap placed over IV prior to pt leaving unit: yes      Pt was given permission to leave floor with family. RN educated patient she must always notify RN, can only be off floor for 20mins. Pt verbalized understanding, has left the floor x2 and follow boundaries.

## 2025-05-28 NOTE — DISCHARGE SUMMARY
Discharge time/date: 1600 5/28  Walked or Wheelchair: walked  PIV removed: yes  Reviewed AVS with patient: yes  Medication due times added to AVS in EPIC: yes  Verbalized understanding of discharge with teachback: yes  Medications retrieved from pharmacy: yes  Supplies sent home: yes  Belongings from security with patient: Belongings with patient

## 2025-05-28 NOTE — CONSULTS
Diabetes CNS/Educator Consult    Amy ALEJANDRO Choudhury is a 65 year old female per notes with past medical history of DM, anxiety, tobacco use, polysubstance use presented 5/6/25 with headaches and vision changes ongoing for 3 to 4 weeks.  Patient hospitalized x 2 at outside hospital for similar symptoms and workup include MRI brain with a working diagnosis of possible PRES. She was admitted to [facility] 5/6 with worsening headache and visual disturbance (blurry vision, vague visual distortions, hallucinations, palinopsia, this time without evidence of hypertension.  MRI brain revealed new, extensive leptomeningeal enhancement, sulcal FLAIR signal, and microbleeds /superficial siderosis in the bilateral parieto-occipital areas.  CSF 5/12 revealed lymphocytic pleocytosis (32), elevated protein (75), but studies were otherwise negative including MEP and flow cytometry/cytology.  Serum inflammatory workup was pursued in consultation with rheumatology, and is so far been negative.  CT C/A/P nondiagnostic.  CTA and DSA have showed no evidence of vasculopathy. 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      The evening of 5/21, apparently due to multiple frustrations about her prolonged hospitalization, she discharged AMA. She re-presented later in the night 5/21/25.    Diabetes CNS/Educator consulted for new insulin needs with T2DM and steroid-induced hyperglycemia. A1c 8.7% on 5/6/25. PTA was not on any medications. Was not monitoring BGs at home.    Inpatient Diabetes Service is following for glycemic management. Please see their notes for plan.     NPH vials covered, NPH pens not covered. PA for pens was denied on 5/21, appeal was initiated. Team awaiting response. Will not discharge on NPH for simplicity of plan and safety.    Met with Amy and  "daughter (Chelsi) at bedside for education. Daughter will be helping Amy with managing diabetes cares at home due to her cognitive impairments (forgetful) and left upper extremity weakness (left  weak, but pt says improving). Daughter shared that she had a stroke that affected her left side as well, but she is currently taking Humalog pens and able to use. Daughter does not live with Amy, but will be staying with her at discharge for a while.    Diet: Consistent Carbohydrate Diet Moderate Consistent Carb (60 g CHO per Meal) Diet       Barriers to diabetes management: left upper extremity weakness (left  weak), forgetfulness, impaired vision (blurry)    Diabetes Education Provided:  Discussed reason for requiring blood glucose monitoring and insulin with steroid induced hyperglycemia. Provided \"Understanding Diabetes\" handout.   Discussed insulin aspart action, dose, onset, duration, and frequency. Proper insulin pen storage, preparation, and injection technique, pen needle disposal in sharps container. Site selection and rotation. Amy and daughter declined to practice stating both have done it before and feel confident in their ability. Provided \"4 mm pen needle - tips for good injection practice\" handout.   Frequency of BG testing discussed. BG targets discussed. Amy and daughter know how to use glucose meters and declined further teaching on it.   Amy verbalized hypoglycemia symptoms and treatment.   Advised calling provider for consistently elevated BGs and/or lows x2.    Discussed following up with PCP in 1-2 weeks after discharge.     Discussion:  Diabetes education completed. Amy explained that she has multiple family members with diabetes and helped care for her son with T1DM, so she is very familiar with insulin administration and BG monitoring. Daughter will be staying with her at discharge. Daughter had a stroke w/ residual left sided weakness, but has dexterity to use insulin pens for " "herself and will be helping Amy. Both stated that plan is manageable. All questions answered. Discussed with IDS NAT. Notified Neurology and RN.    Supplies Needed at Discharge: please use the DIAB non-branded discharge supply order set ([id number])   Novolog Flexpens  \"BD\" (32G x 4mm) insulin pen needles - ordered by IDS  Accu Chek Guide Glucometer - ordered by IDS  Accu Chek Soft Clix Lancets - ordered by IDS  Accu Chek Guide Test strips - ordered by IDS  Sharps container - ordered by IDS  Alcohol swabs - ordered by IDS    Diabetes Plan Reviewed:   Blood glucose monitoring: Three times daily before meals, and at bedtime.  Blood Glucose (BG) goals:  mg/dL before meals    Glucose Control Regimen: While on Prednisone 60 mg daily in the morning     1) Rapid-acting insulin: aspart (Novolog) carbohydrate coverage/mealtime insulin (if taking prednisone 60 mg in the morning)  Take 10 units before average meals that contain carbohydrates                     OR  Take 5 units before small meals or snacks that contain carbohydrates     2) Rapid-acting insulin: aspart (Novolog) correction - see chart below. Take for high blood glucoses three times daily before meals  Add the correction dose to the carbohydrate coverage/mealtime insulin dose and give in one injection--ideally 10-15 minutes before a meal.  You may take the correction dose even if you skip a meal (as long as it has been 4 hours since previous correction dose).      Pre-meal correction scale:     Blood Glucose Insulin Novolog Before Meals:   Less than 140 0 units   140 -164  1 unit   165 -189 2 units   190 - 214 3 units   215 - 239 4 units    240 - 264  5 units   265 - 289 6 units   290 - 314 7 units   315 - 339 8 units   340 - 364 9 units   365 or more 10 units       When Prednisone tapers on 6/20/2025 to prednisone 50 mg by mouth daily, you need to see your primary care provider prior to see what to do with insulin dosing      Outpatient Diabetes " Follow-Up: Follow up with your Primary Care Provider (PCP) in 1-2 weeks, bring glucose data to your appointment. Follow up sooner if blood glucose runs consistently greater than 200 mg/dL or if having more than two episodes less than 70 mg/dL.     Your target A1c value is less than 7% to help prevent future complications from diabetes. Your most recent A1c is 8.7%.      Thank you for letting the Diabetes Management Team be involved in your care!      Hypoglycemia (Low Blood Glucose) Management:  Signs/symptoms:  Shaking, sweating, fast heartbeat  Feeling dizzy, tired, or weak   Feeling anxious and easy to irritate  Feeling nervous, crabby, or confused  Hunger  Vision problems, headache  Numb or tingling mouth    If blood glucose is 51 to 70:   Eat or drink 15 grams of carbohydrate. Examples:   1/2 cup (4 ounces) apple juice or regular soda pop, or   1 cup (8 ounces) milk, or   15 skittles, or   3 to 4 glucose tablets.   Re-check your blood glucose in 15 minutes.   Repeat these steps every 15 minutes until your blood glucose is above 80.       If blood glucose is 50 or less:   Eat or drink 30 grams of carbohydrate. Examples:   1 cup (8 ounces) apple juice or regular soda pop, or   2 cups (16 ounces) milk, or   1 banana, or   6 to 8 glucose tablets.   Re-check your blood glucose in 15 minutes.  Repeat these steps every 15 minutes until your blood glucose is above 80.     JAVIER Bassett, SAGE, [facility]  Diabetes Educator  Pager: [phone]  Office: [phone]  Securely message with [facility]

## 2025-05-28 NOTE — PLAN OF CARE
Pt admitted 5/6 for HA and vision changes, cerebral angio identities multi-infarcts, stroke work provided but pt left AMA 5/21 and then pt readmitted same night. Pt's VSS, neuros include: see flow sheet. PIV removed per orders, mod-cho diet which pt doesn't follow, voids with urgency, BM today, up ad sylvester with door alarm in doorway. Pt's room is packed and ready to go, plan for discharge ride @ 1600 via cab services. Discharge education to be completed and printed off for patient. Pt currently dressed and sleeping in bed. Continue to care per orders.

## 2025-05-28 NOTE — PROGRESS NOTES
Inpatient Diabetes Management Service: Daily Progress Note     HPI: Amy Choudhury is a 65 female with past medical history of DM, anxiety, tobacco use, polysubstance use presented 5/6/25 with headaches and vision changes ongoing for 3 to 4 weeks.  Patient hospitalized x 2 at outside hospital for similar symptoms and workup include MRI brain with a working diagnosis of possible PRES. She was admitted to [facility] 5/6 with worsening headache and visual disturbance (blurry vision, vague visual distortions, hallucinations, palinopsia, this time without evidence of hypertension.  MRI brain revealed new, extensive leptomeningeal enhancement, sulcal FLAIR signal, and microbleeds /superficial siderosis in the bilateral parieto-occipital areas.  CSF 5/12 revealed lymphocytic pleocytosis (32), elevated protein (75), but studies were otherwise negative including MEP and flow cytometry/cytology.  Serum inflammatory workup was pursued in consultation with rheumatology, and is so far been negative.  CT C/A/P nondiagnostic.  CTA and DSA have showed no evidence of vasculopathy. 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      The evening of 5/21, apparently due to multiple frustrations about her prolonged hospitalization, she discharged AMA. She re-presented later in the night 5/21 requesting re-admission with no new symptoms. Inpatient Diabetes Service consulted 5/21/25 for assistance with glycemic control with steroid use.         Assessment/Plan:     Assessment:   Type 2 Diabetes Mellitus, without known complications. 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    Plan/Recommendations:   - Increase NPH 28 units q 24 hrs at 0800 with prednisone 60 mg po daily. Hold if prednisone on hold.  Will not send home on NPH because only comes in vial and due to left arm weakness she is unable to do vial, also declines NPH in vial    - Adjust Novolog Meal Coverage:   Send out on fixed meal dose as below  1 units per 10 g CHO with breakfast   1 units per 10 g CHO with   lunch  Increase 1 units per 10 g CHO with dinner from 1/15  1 units per 20 g CHO PRN with snacks/supplements ([id number])  1 units per 50 g CHO PRN with snacks/supplements ([id number])  - Novolog Correction Scale: 1:25>140 TID AC (high resistance), 1:50>200 HS, 0200 (medium resistance)   - BG monitoring: TID AC, HS, 0200   - Hypoglycemia protocol    - Carb counting protocol     Discussion:   Was going to ARU but now going home today per PT and primary service.  Put in another pharmacy liaison consult 5/27/2025 to see since she has left sided weakness in left arm that giving insulin with vial and syringe would be very difficult. She also declines NPH in vial but if PA goes through she will try to do it with pen. Yesterday she said she was a new diabetic and did not know how to give insulin.  Today she say her daughter is diabetic and can help her.  She is going home with daughter today. Diabetes education is coming to see her today  Unable to give her a steroid taper that will start 4 weeks from now.  She is going to schedule herself with her primary care provider. Explained this to patient and she understands.  She will make appointment with primary DA Jack with [facility].    Discharge Planning:to go home and not ARU per neurology and PT  Medications: Fixed meal dose and correction as per note  Test Claims: completed 5/19--> PA for NPH flexpens DENIED 5/21 (has to use vials). Put in another PA for flex pen with left arm weakness on 5/27/2025    Education: will be needed if  "discharging home-- put in education 5/28/2025  Outpatient Follow-up: recommend PCP    Diabetes Management Discharge Plan   Instructions to patient were posted in AVS and discussed on day of discharge.   Medications and supplies are to be ordered by primary service on discharge.   *please use the DIAB non-branded discharge supply order set ([id number])*     Patient will need the following supplies prescribed:      Novolog Flexpens  \"BD\" (32G x 4mm) insulin pen needles  Glucometer (if brand of meter not known, can be ordered \"no brand specified\" and note to pharmacy: \"can substitute per insurance coverage\")  Lancets  Test strips  Sharps container  Alcohol swabs      Blood glucose monitoring: Three times daily before meals, and at bedtime.     Blood Glucose (BG) goals:  mg/dL before meals     Glucose Control Regimen:     When Prednisone tapers on 6/20/2025 to prednisone 50 mg by mouth daily need to see primary care provider prior to see what to do with insulin    1) Rapid-acting insulin: aspart (Novolog) carbohydrate coverage/mealtime insulin (if taking prednisone 60 mg in the morning)  Take 10 units before average meals that contain carbohydrates                     OR  Take 5 units before small meals or snacks that contain carbohydrates     2) Rapid-acting insulin: aspart (Novolog) correction - see chart below. Take for high blood glucoses three times daily before meals  Add the correction dose to the carbohydrate coverage/mealtime insulin dose and give in one injection--ideally 10-15 minutes before a meal.  You may take the correction dose even if you skip a meal (as long as it has been 4 hours since previous correction dose).      Pre-meal correction scale:     Blood Glucose Insulin Novolog Before Meals:   Less than 140 0 units   140 -164  1 units   165 -189 2 units   190 - 214 3 units   215 - 239 4 units    240 - 264  5 units   265 - 289 6 units   290 - 314 7 units   315 - 339 8 units   340 - 364 9 units "   365 or more 10 units         Outpatient Diabetes Follow-Up: Follow up with your Primary Care Provider (PCP) in 1-2 weeks, bring glucose data to your appointment. Follow up sooner if blood glucose runs consistently greater than 200 mg/dL or if having more than two episodes less than 70 mg/dL.     Your target A1c value is less than 7% to help prevent future complications from diabetes. Your most recent A1c is 8.7%.      Thank you for letting the Diabetes Management Team be involved in your care!    Please notify Inpatient Diabetes Service if changes are planned to steroids, nutrition, TPN/TF and anticipated procedures requiring prolonged NPO status.         Interval History/Review of Systems :   The last 24 hours progress and nursing notes reviewed.  - No acute events overnight.   - Frequent snacking without carb coverage  - Denies n,v,d today  -daughter is present today      Planned Procedures/Surgeries: none    Inpatient Glucose Control:       Recent Labs   Lab 05/28/25  0743 05/28/25  0552 05/28/25  0206 05/27/25  2222 05/27/25  1428 05/27/25  1157   * 90 124* 278* 287* 217*             Medications Impacting Glycemia:   Steroids:    5/16 - 5/20: Methylprednisolone 1,000 mg daily at 0900   5/21-present: Prednisone 60 mg daily x 1 month, then tentatively taper by 10 mg every week pending results of brain MRI   D5W containing solutions/medications: none  Other medications impacting glucose: none        Nutrition:   Orders Placed This Encounter      Consistent Carbohydrate Diet Moderate Consistent Carb (60 g CHO per Meal) Diet    Supplements: none   TF: none   TPN: none         Diabetes History: see full consult note for complete diabetes history   Diabetes Type and Duration: T2DM, per patient was told she has T2DM 2 weeks ago at OSH. Per chart review, diagnosed 4/2021 with A1c 6.7%      GAD65 antibody, zinc transporter 8 antibody, islet antibody, insulin antibody, and c-peptide not available on epic search       PTA Medication Regimen: none; discharged 5/21 AMA with recommendations of Novolog 10 units before regular meals, 5 units with small meals/snacks plus correction 1:25>140 TID AC with Prednisone 60mg daily.      Missing doses?: did not take any insulin after leaving hospital 5/21     Historical Diabetes Medications:   - reports taking Metformin 500 mg BID recently at OSH but wasn't taking at home. Has never been on insulin before.       Glucose monitoring device/frequency/trends: has used glucometer randomly before because she has family members with diabetes.     Hypoglycemia PTA: none     Outpatient Diabetes Provider: Has PCP but hasn't seen him since 2021 - Randal Castellanos (with [facility])      Formal Diabetes Education/Educator: no        Physical Exam:   /74   Pulse 73   Temp 98.2  F (36.8  C) (Oral)   Resp 16   SpO2 99%   Constitutional: well-appearing patient in no acute distress.  Daughter present  Eyes: sclera anicteric.  Respiratory: No signs of labored breathing. Remains on room air  Left arm: with very weak          Data:     Lab Results   Component Value Date    A1C 8.7 (H) 05/06/2025    A1C 6.0 (H) 02/11/2022    A1C 6.6 (H) 10/15/2021    A1C 5.8 (H) 09/06/2021    A1C 6.7 (H) 04/19/2021    A1C 5.0 02/11/2015       ROUTINE IP LABS (Last four results)  BMP  Recent Labs   Lab 05/28/25  0743 05/28/25  0552 05/28/25  0206 05/27/25  2222 05/27/25  0820 05/27/25  0631 05/26/25  0822 05/26/25  0650 05/25/25  1100 05/25/25  0624   NA  --  145  --   --   --  144  --  142  --  142   POTASSIUM  --  3.6  --   --   --  4.0  --  3.7  --  3.9   CHLORIDE  --  108*  --   --   --  107  --  104  --  106   CRISTIAN  --  8.1*  --   --   --  8.3*  --  8.3*  --  7.8*   CO2  --  26  --   --   --  25  --  27  --  27   BUN  --  20.6  --   --   --  20.8  --  16.0  --  15.5   CR  --  0.63  --   --   --  0.71  --  0.61  --  0.60   * 90 124* 278*   < > 107*   < > 104*   < > 124*    < > = values in this interval not  displayed.     CBC  Recent Labs   Lab 05/28/25  0552 05/27/25  0631 05/26/25  0650 05/25/25  0624   WBC 10.7 13.8* 11.1* 10.9   RBC 4.03 4.50 4.18 4.25   HGB 10.8* 11.9 11.1* 11.1*   HCT 34.2* 37.6 35.2 35.7   MCV 85 84 84 84   MCH 26.8 26.4* 26.6 26.1*   MCHC 31.6 31.6 31.5 31.1*   RDW 15.9* 16.0* 15.9* 15.7*    359 257 388     INR  No lab results found in last 7 days.      Inpatient Diabetes Service will continue to follow, please don't hesitate to contact the team with any questions or concerns.     Marta Mckinley PA-C  Inpatient Diabetes Service  [phone]  Available by babbel  Date of Service: 5/28/2025      Plan discussed with patient/daughter, bedside RN by carla, and primary team via this note and by [employer].  Spoke with diabetes ed as well..    To contact Inpatient Diabetes Service:     7 AM - 5 PM: Page the IDS ELBERT following the patient that day (see filed or incomplete progress notes/consult notes under Endocrinology)    OR if uncertain of provider assignment: page job code [phone]    5 PM - 7 AM: First call after hours is to primary service.    For urgent after-hours questions, page job code for on call fellow: [phone]     I spent a total of 50 minutes on the date of the encounter doing prep/post-work, chart review, history and exam, documentation and further activities per the note including lab review, multidisciplinary communication, counseling the patient and/or coordinating care regarding acute hyper/hypoglycemic management, as well as discharge management and planning/communication.

## 2025-05-28 NOTE — DISCHARGE SUMMARY
"[facility]    Neurology Stroke Discharge Summary    Date of Admission: 5/21/2025  Date of Discharge: 05/28/2025    Disposition: Discharged to home  Primary Care Physician: Randal Castellanos      *Patient was initially admitted on 5/6/2025. She left AMA on 5/21/2025, and returned to the ED on 5/21/2025. This discharge note accounts for her hospital stay from 5/6/2025 to her discharge on 5/28/2025.    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   incidental 3. 5 mm solid nodule in the right upper lung lobe  Leukocytosis, improved  Type 2 diabetes, insulin depedent  Hypertension  Eczema  Polysubstance Use    Problem Leading to Hospitalization (from HPI):   From H&P by Dr. Kulkarni on 5/6/25:  \"Amy Choudhury is a 65 year old female with history of DM2, anxiety, tobacco dependence, GERD, s/p gastric bypass, who presents to the ED via EMS with worsening headache and blurred vision.     Patient was admitted to [facility] on 4/25/2025 with left-sided frontal headache for 3 weeks.  Her CT head was unremarkable . She was treated with migraine cocktail, improved and " "discharged home.     She presented to the [facility] ED again on 4/2825 with headache again and was found to have blood pressure of 220s/120s. MRI brain w/without contrast showed Interval development of patchy T2 FLAIR hyperintensity within the left posteroinferior cerebellum, with possible patchy enhancement. Additional new areas of subcortical T2 FLAIR hyperintensity involving the posterior parieto-occipital regions. Progressed scattered associated susceptibility artifact, most notably right occipital lobe.  She was treated as a case of posterior skull encephalopathy with gradual decrease in blood pressure and started on blood pressure medications to be taken at home.  Her UDS was also positive for methamphetamine.     She had another admission from 5/2/25-5/5/25 for headache and was again hypertensive.  Repeat MRI was similar to prior MRIs with small microhemorrhages and superficial siderosis along with the signal normalities through posterior cerebral and cerebellar hemispheres with leptomeningeal enhancement.  She was discharged yesterday.     On her way back, she lost her way while walking because she could not remember her way back home.  A bystander called EMS and she was dropped off home by them.  She slept fine.  She woke up this morning with persistent headache and blurred/quadrupled/choppy vision.  Throughout time since yesterday, she thinks she has been losing time.  She has 7/10 headache from front of her head but that keeps migrating.  She was very hungry and Eating food in the ED. she also made few remarks such as, \"the pen is turning into cigarette, or I have seen that lady jumping before\".    Please see H&P dated 5/6/2025 for further details about presentation.    Brief Hospital Course:   65-year-old female with a PMH of DM, anxiety, tobacco use, and polysubstance use (positive for methamphetamine on urine drug screen) presented with a 3-4 week history of worsening headaches and visual disturbances. " She had two prior hospitalizations for similar symptoms, during which MRI imaging at an outside hospital (OSH) revealed diffuse cerebral microbleeds predominantly in the parietal and occipital regions, along with significant leptomeningeal enhancement in the cerebellum and bilateral cerebral hemispheres, sparing parts of the frontal lobes. The initial differential included posterior reversible encephalopathy syndrome (PRES) and hypertensive vasculopathy. On 05/06, she presented to [facility] with worsening symptoms of headaches and visual disturbances. MRI Brain showed new and extensive leptomeningeal enhancement, sulcal hyperintensities, and evidence of microbleeds/superficial siderosis in the bilateral parieto-occipital regions. Continuous EEG done for 3 days without any abnormal epileptiform discharges. CT chest abdominal pelvis completed to rule out any malignancy was unremarkable. D-dimer, ESR, CRP within normal limits.  Lumbar puncture revealed lymphocytic pleocytosis and elevated protein, with no evidence of infection. Rheumatology was consulted, serum inflammatory workup negative thus far. Given the imaging findings, inflammatory amyloid angiopathy was considered as a leading differential. To further evaluate for vasculopathy vs vasculitis, a digital subtraction angiogram (DSA) was performed on 05/16 and was negative. Following the DSA procedure, the patient developed new left upper extremity weakness and left hemineglect. Repeat imaging demonstrated multiple new infarcts, suspect most likely periprocedural. She has completed 5 day course of IV methylprednisolone 1000mg daily and was switched to oral prednisone.     IOn 5/21, due to multiple frustrations about prolonged hospitalization, the patient discharged against medical advice but was having difficulty at home and represented to the hospital late in the evening on 5/21. She was readmitted and there has been re-evaluation by PT/OT who continue to recommend  Home with OP assist.      The imaging findings and presentation was consistent with CAA-RI    - S/p IV methylprednisolone 1000mg 5 day course (5/16-5/20)  - Continue oral prednisone 60 mg daily for 1 month   - After one month, taper by 10mg each week down to 10mg daily. After that, duration of steroids treatment will be determined during follow-up visit with neurology  - While on prolonged steroid course, prophylaxis with Protonix daily, vit D/calcium daily, and bactrim DS three times weekly  -headache management with               - Gabapentin 300 mg TID (decreased from 600mg TID on 5/20)  - Propranolol ER 60 mg daily for migraine prevention   - Depakote 500mg BID   - PRN compazine & tylenol   - Magnesium 500mg daily  - Serum autoimmune encephalopathy panel pending/ CSF autoimmune encephalopathy panel negative.   - Continue Aspirin 81mg  - Follow up MRI in One month and 3 months     IV thrombolysis was not given due to:   - head trauma/stroke within the past 3 months    Work-up as stated below under Pertinent Investigations.    Rehab evaluation: No rehab services required due to lack of ongoing deficit.     Smoking Cessation: education provided, support referral ordered, patient received smoking cessation counseling from Rehab Services    BP Long-term Goal: 140/90 or less    Antithrombotic/Anticoagulant Agent: aspirin 81 mg    Statins: Not indicated (stroke was agueda procedural)     Hgb A1C Goal: < 7.0    Complications: None.     Other problems addressed during this hospitalization:  # Type 2 diabetes mellitus  - A1c this admission 8.7, goal < 7.0. Will need close PCP follow up  - Medium dose sliding scale -> high dose sliding scale switched on 5/12  -low consistent carb diet  - Endocrine consulted for diabetes management, appreciate assistance  - Diabetes Plan:   Blood glucose monitoring: Three times daily before meals, and at bedtime.  Blood Glucose (BG) goals:  mg/dL before meals     Glucose Control  Regimen: While on Prednisone 60 mg daily in the morning     1) Rapid-acting insulin: aspart (Novolog) carbohydrate coverage/mealtime insulin (if taking prednisone 60 mg in the morning)  Take 10 units before average meals that contain carbohydrates                     OR  Take 5 units before small meals or snacks that contain carbohydrates     2) Rapid-acting insulin: aspart (Novolog) correction - see chart below. Take for high blood glucoses three times daily before meals  Add the correction dose to the carbohydrate coverage/mealtime insulin dose and give in one injection--ideally 10-15 minutes before a meal.  You may take the correction dose even if you skip a meal (as long as it has been 4 hours since previous correction dose).      Pre-meal correction scale:     Blood Glucose Insulin Novolog Before Meals:   Less than 140 0 units   140 -164  1 unit   165 -189 2 units   190 - 214 3 units   215 - 239 4 units    240 - 264  5 units   265 - 289 6 units   290 - 314 7 units   315 - 339 8 units   340 - 364 9 units   365 or more 10 units       When Prednisone tapers on 6/20/2025 to prednisone 50 mg by mouth daily, you need to see your primary care provider prior to see what to do with insulin dosing        Outpatient Diabetes Follow-Up: Follow up with your Primary Care Provider (PCP) in 1-2 weeks, bring glucose data to your appointment. Follow up sooner if blood glucose runs consistently greater than 200 mg/dL or if having more than two episodes less than 70 mg/dL.     Your target A1c value is less than 7% to help prevent future complications from diabetes. Your most recent A1c is 8.7%.      Thank you for letting the Diabetes Management Team be involved in your care!        Hypoglycemia (Low Blood Glucose) Management:  Signs/symptoms:  Shaking, sweating, fast heartbeat  Feeling dizzy, tired, or weak   Feeling anxious and easy to irritate  Feeling nervous, crabby, or confused  Hunger  Vision problems, headache  Numb or  tingling mouth     If blood glucose is 51 to 70:   Eat or drink 15 grams of carbohydrate. Examples:   1/2 cup (4 ounces) apple juice or regular soda pop, or   1 cup (8 ounces) milk, or   15 skittles, or   3 to 4 glucose tablets.   Re-check your blood glucose in 15 minutes.   Repeat these steps every 15 minutes until your blood glucose is above 80.       If blood glucose is 50 or less:   Eat or drink 30 grams of carbohydrate. Examples:   1 cup (8 ounces) apple juice or regular soda pop, or   2 cups (16 ounces) milk, or   1 banana, or   6 to 8 glucose tablets.   Re-check your blood glucose in 15 minutes.  Repeat these steps every 15 minutes until your blood glucose is above 80.       #Hypertension   - BP parameters:              - Notify provider for SBP > 180              - PRN antihypertensives for SBP > 180  - Hydralazine/labetalol as needed for systolic blood pressure>180  - Losartan to 100mg every day  - Amlodipine 5mg daily (PTA dose 10mg)  - Discontinued PTA hydrochlorothiazide 12.5 mg daily due to hyponatremia    #Substance use history  - Addiction medicine consult, appreciate recs  - Continue gabapentin. Patient states it helps with anxiety but doesn't like side effects at higher doses. Trial dosing strategies that work best with patient.  - Increase Sertraline to 75 mg daily (previously tolerated 150 mg). Keep at present dose for 4 weeks and reassess  - Psychiatry and Psychotherapy referrals sent for outpatient  - Our peer  will meet the patient if agreeable and still hospitalized to provide additional outpatient resources  - To contact Jazzy Peer  from [facility] ([facility]): call or text: [phone]     #Eczema  - PRN triamcinolone cream that the patient has used in the past     # Leukocytosis - resolved  - Likely reactionary to steroids  - Daily CBC     #Hyponatremia, resolved  Workup as of 5/11 serum osmol 285, urine osmol 862, urine sodium 219, glucose at time of urine  studies was 224, serum sodium 128. Workup most consistent with SIADH vs thiazide induced hyponatremia. Discontinued thiazide on 5/11 and hyponatremia resolved.  -trend BMP  -strict I/Os  -encourage adequate PO intake     #incidental finding- Ill-defined 1.6 cm area of enhancement within the left inferior thyroid lobe.   -TSH normal. Consider outpatient  nonemergent thyroid ultrasound for further assessment, will defer to PCP.     #incidental 3. 5 mm solid nodule in the right upper lung lobe  - Can consider optional follow-up CT in 12 months to document stability, per PCP    PERTINENT INVESTIGATIONS    Labs  Lipid Panel:       Recent Labs   Lab Test 05/11/25  2151   CHOL 186   HDL 49*   *   TRIG 185*      A1C:         Lab Results   Component Value Date     A1C 8.7 05/06/2025     A1C 6.7 04/19/2021      INR:       Recent Labs   Lab 05/16/25  1311   INR 0.97      Coag Panel / Hypercoag Workup: Not indicated  Pending test results: serum and CSF encephalopathy panel     MRI/Head CT MRI brain/MRA brain 5/20/25  IMPRESSION:  No arterial stenosis or concentric enhancement of the arterial walls to suggest middle sized arterial vasculitis. However there is persistent leptomeningeal enhancement which can be seen with small peripheral vasculitis for example KIRK. The leptomeningeal enhancement  appears relatively improved compared with 5/15/2025, indeterminant if this represents treatment effect or due to differences in T1 sequence technique.     MRI Brain 5/16  IMPRESSION:  1. Multiple new acute infarcts since the MRI 24 hours ago, most  notably in the right parietal lobe and right occipital lobe. Tiny  evolving infarct in the left cerebellum.  2. Worsened marked abnormal leptomeningeal FLAIR signal, greatest in  the posterior cerebral hemispheres     MRI Brain (5/15)  IMPRESSION:  1. Slight increase in extent of leptomeningeal enhancement and  unchanged numerous microhemorrhages in the occipital, temporal,  and  parietal regions bilaterally. These findings are nonspecific and could  be seen with angiopathy and/or inflammation.  2. Tiny new infarct in the left cerebellum.  3. Superficial siderosis within the right occipital lobe, likely  related to prior hemorrhage.  4. Moderate sequelae of chronic small vessel ischemic disease.     CT head:  IMPRESSION:  1.  No acute findings. No acute intracranial hemorrhage. No acute calvarial fracture.     MRI: (5/7)  IMPRESSION:  1. There is somewhat decreased cortical T2 hyperintense signal with increased conspicuity of leptomeningeal enhancement and numerous presumed microhemorrhages within the occipital, temporal parietal regions bilaterally. These findings are nonspecific, although may be seen with process or hematogenous malignancy such as melanoma.  Findings are less likely related to cerebral amyloid  angiopathy given the distribution. The distribution could be seen with hypertensive angiopathy, however these are extremely great number, much more than expected.   2. 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  3. Chronic small vessel ischemic changes.      Intracranial Vasculature HEAD CTA:   No large vessel occlusion findings intracranially. No high-grade stenosis intracranially.   Cervical Vasculature NECK CTA:  1.  No acute vascular injury in the neck. No high-grade stenosis in the neck.      Echocardiogram Not obtained   EKG/Telemetry Not obtained      LDL  5/6/2025: 72 mg/dL   A1C  5/6/2025: 8.7 %   Troponin No lab value available in past 48 hrs      CT CAP: IMPRESSION:   1. No evidence of primary malignancy throughout the chest, abdomen or  pelvis.  2. Ill-defined 1.6 cm area of enhancement within the left inferior  thyroid lobe. Consider nonemergent thyroid ultrasound for further  assessment.  3. 5 mm solid nodule in the right upper lobe. Can consider optional  follow-up CT in 12 months to document stability.  4.  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 The gastrojejunal anastomosis appears  patent, however, inspissated debris may suggest delayed  gastrointestinal transit.  5. Hepatic steatosis.  6. Additional chronic and incidental findings, as described above.       SUMMARY OF 3 DAYS OF VIDEO EEG:  This 3 day video EEG is abnormal due to the presence increased superimposed fast activity, likely related to sedating medications employed. No seizures or epileptiform discharges were seen. Clinical correlation is recommended.     CSF (5/12)  Protein total CSF (74.9), Glucose CSF (102), RBC (5), Appearance (clear) Lymphocyte%, Mono %, Neutrophil % (all WNL)     Endovascular procedure: DSA      Cardiac Monitoring: Patient had > 24 hrs of cardiac monitor while in hospital.    Findings: No atrial fibrillation was found.       Sleep Apnea Screen:   Questions/Answers      1. Prior to your stroke, have you been told that you snore? Yes.    2. Prior to your stroke, have you been told that you struggle to breath while you are sleeping? Yes.    3. Prior to your stroke, do you feel tired and sleepy even after getting a normal night of sleep? Yes.    Sleep Apnea Screen Findings: Patient has 2 or more symptoms of sleep apnea.  Outpatient sleep study is recommended.    PHQ-9 Depression Screen Score: 8    Education discussed with: patient and family on blood pressure management, medical management, smoking cessation plan, diet modification, and exercise recommendation.    During daily rounds, the plan of care was discussed and developed with patient.  Plan of care includes:    -Continue headache management   - Continue oral prednisone 60 mg daily for 1 month   - While on prolonged steroid course, prophylaxis with Protonix daily, vit D/calcium daily, and bactrim DS three times weekly  - Serum autoimmune encephalopathy panel pending/ CSF autoimmune encephalopathy panel negative.   -  Continue Aspirin 81mg  - Normotensive BP goal  - Follow-up MRI wwo in one month, then plan for repeat MRI wwo in 3 months  - Follow up with stroke neurology in 6 weeks with Dr. Puente    PHYSICAL EXAMINATION  Vital Signs:  B/P: 123/74, T: 98.2, P: 73, R: 16    General:  patient sitting in chair, no acute distress  HEENT:  normocephalic/atraumatic  Cardio:  RRR  Pulmonary:  no respiratory distress on room air  Skin:  bruising present     Neurologic  Mental Status:  alert, oriented x 3, follows commands, speech clear and fluent, naming and repetition normal  Cranial Nerves:  L superior quadrantanopia, EOMI with normal smooth pursuit, facial sensation intact and symmetric, facial movements symmetric, hearing not formally tested but intact to conversation, palate elevation symmetric and uvula midline, no dysarthria, shoulder shrug strong bilaterally, tongue protrusion midline  Motor: LUE weakness - drift without hitting bed - elbow extension 4+/5, elbow flexion 4+/5. 5/5 strength in right arm and both legs bilaterally. normal muscle tone and bulk, no abnormal movements.   Reflexes:  No ankle clonus bilaterally  Sensory:  light touch sensation intact and symmetric throughout upper and lower extremities, no extinction on double simultaneous stimulation   Coordination:  Finger-nose-finger on right without ataxia or dysmetria, unable to assess on left. Heel to shin intact bilaterally   Station/Gait:  deferred    National Institutes of Health Stroke Scale (on day of discharge)  NIHSS Total Score: 3    Modified Pateros Score (Discharge)    -  2 - Slight disability; unable to carry out all previous activities, but able to look after own affairs     Medications    Current Discharge Medication List        START taking these medications    Details   Alcohol Swabs PADS Use to swab the area of the injection or rachele as directed Per insurance coverage  Qty: 200 each, Refills: 1    Associated Diagnoses: Type 2 diabetes mellitus  with hyperglycemia, without long-term current use of insulin (H)      blood glucose (NO BRAND SPECIFIED) lancets standard To use to test glucose level in the blood Use to test blood sugar  3  times daily as directed. To accompany glucose monitor brands per insurance coverage.  Qty: 200 each, Refills: 1    Associated Diagnoses: Type 2 diabetes mellitus with hyperglycemia, without long-term current use of insulin (H)      blood glucose (NO BRAND SPECIFIED) test strip To use to test glucose level in the blood Use to test blood sugar  3 times daily as directed. To accompany glucose monitor brands per insurance coverage.  Qty: 200 strip, Refills: 1    Associated Diagnoses: Type 2 diabetes mellitus with hyperglycemia, without long-term current use of insulin (H)      blood glucose monitoring (NO BRAND SPECIFIED) meter device kit Use as directed Per insurance coverage  Qty: 1 kit, Refills: 0    Associated Diagnoses: Type 2 diabetes mellitus with hyperglycemia, without long-term current use of insulin (H)      calcium carbonate-vitamin D (CALTRATE) 600-10 MG-MCG per tablet Take 1 tablet by mouth 2 times daily (with meals).  Qty: 180 tablet, Refills: 0    Associated Diagnoses: Ischemic stroke (H)      !! insulin aspart (NOVOLOG FLEXPEN) 100 UNIT/ML pen Take 10 units before average meals that contain carbohydrates  OR Take 5 units before small meals or snacks that contain carbohydrates  Qty: 15 mL, Refills: 3    Associated Diagnoses: Insulin dependent type 2 diabetes mellitus (H)      !! insulin aspart (NOVOLOG FLEXPEN) 100 UNIT/ML pen Blood Glucose Insulin Novolog Before Meals: Less than 140 0 units, 140 -164 1 units, 165 -189 2 units, 190 - 214 3 units, 215 - 239 4 units,  240 - 264 5 units, 265 - 289 6 units, 290 - 314 7 units, 315 - 339 8 units, 340 - 364 9 units, 365 or more 10 units  Qty: 15 mL, Refills: 3    Associated Diagnoses: Insulin dependent type 2 diabetes mellitus (H)      insulin pen needle (32G X 4 MM) 32G X  4 MM miscellaneous Use as directed by provider Per insurance coverage  Qty: 200 each, Refills: 1    Associated Diagnoses: Type 2 diabetes mellitus with hyperglycemia, without long-term current use of insulin (H)      magnesium gluconate (MAGONATE) 500 MG tablet Take 1 tablet (500 mg) by mouth daily.  Qty: 90 tablet, Refills: 2    Associated Diagnoses: Chronic migraine without aura without status migrainosus, not intractable      predniSONE (DELTASONE) 10 MG tablet Take 6 tablets (60 mg) by mouth daily for 23 days, THEN 5 tablets (50 mg) daily for 7 days, THEN 4 tablets (40 mg) daily for 7 days, THEN 3 tablets (30 mg) daily for 7 days, THEN 2 tablets (20 mg) daily for 7 days, THEN 1 tablet (10 mg) daily for 7 days.  Qty: 243 tablet, Refills: 1    Associated Diagnoses: Ischemic stroke (H)      Sharps Container MISC Use as directed to dispose of needles, lancets and other sharps  Qty: 1 each, Refills: 1    Associated Diagnoses: Type 2 diabetes mellitus with hyperglycemia, without long-term current use of insulin (H)       !! - Potential duplicate medications found. Please discuss with provider.        CONTINUE these medications which have NOT CHANGED    Details   acetaminophen (TYLENOL) 500 MG tablet Take 1,000 mg by mouth every 6 hours as needed for other (Headache). Max acetaminophen dose: 4000mg in 24 hrs.      amLODIPine (NORVASC) 10 MG tablet Take 10 mg by mouth at bedtime.      aspirin 81 MG EC tablet Take 81 mg by mouth daily.      divalproex sodium delayed-release (DEPAKOTE) 500 MG DR tablet Take 500 mg by mouth 2 times daily.      gabapentin (NEURONTIN) 300 MG capsule Take 300 mg by mouth 3 times daily.      losartan (COZAAR) 100 MG tablet Take 100 mg by mouth daily.      pantoprazole (PROTONIX) 40 MG EC tablet Take 40 mg by mouth daily.      propranolol ER (INDERAL LA) 60 MG 24 hr capsule Take 60 mg by mouth daily.      sertraline (ZOLOFT) 50 MG tablet Take 50 mg by mouth daily.      ibuprofen (ADVIL/MOTRIN)  600 MG tablet Take 600 mg by mouth every 6 hours as needed for other (Pain). Maximum of 3200 mg in 24 hours.      metFORMIN (GLUCOPHAGE) 500 MG tablet Take 500 mg by mouth 2 times daily (with meals).             Additional recommendations and follow up:  - Continue headache management   - Continue oral prednisone 60 mg daily for 1 month   - While on prolonged steroid course, prophylaxis with Protonix daily, vit D/calcium daily, and bactrim DS three times weekly  - Serum autoimmune encephalopathy panel pending/ CSF autoimmune encephalopathy panel negative.   - Continue Aspirin 81mg  - Normotensive BP goal  - Follow-up MRI wwo in one month, then plan for repeat MRI wwo in 3 months  - Follow up with stroke neurology in 6 weeks with Dr. Puente     MR Brain w/o & w Contrast     Primary Care - Care Coordination Referral      Adult Neurology  Referral      Physical Therapy  Referral      Occupational Therapy  Referral      Adult Sleep Eval & Management Referral      Reason for your hospital stay    You have had headache with migrainous features, auditory hallucinations, vision disturbances with MRI showing changes concerning for inflammatory amyloid angiopathy. Hospital admission was further complicated by iatrogenic Right parietal and occipital infarcts     Activity    Your activity upon discharge: activity as tolerated     ADULT [facility] Specialty Follow-up and recommended labs and tests    Follow up: Follow up with Dr. Puente , at [facility], within 6 weeks to follow up on results. The following labs/tests are recommended: MRI Brain in 1 month.    Appointments on [place] and/or [facility] (with [facility] or [facility] provider or service). Call [phone] if you haven't heard regarding these appointments within 7 days of discharge.     Diet    Follow this diet upon discharge: Current Diet:Orders Placed This Encounter      Consistent Carbohydrate Diet Moderate Consistent Carb (60 g CHO per Meal)  [facility]     Hospital Follow-up with Existing Primary Care Provider (PCP)            Patient was seen and discussed with the Attending, Dr. Fuad LIRA, Adalid Tovar, am serving as a scribe at 13:46 on 5/28/2025 to document services personally performed by Jonnathan Carpenter based on my observations and the provider's statements to me.    Jonnathan Carpenter, GEOVANNY  Neurology Resident PGY2

## 2025-05-29 ENCOUNTER — PATIENT OUTREACH (OUTPATIENT)
Dept: CARE COORDINATION | Facility: CLINIC | Age: 65
End: 2025-05-29
Payer: COMMERCIAL

## 2025-05-29 NOTE — PLAN OF CARE
Occupational Therapy Discharge Summary    Reason for therapy discharge:    Discharged to home with outpatient therapy.    Progress towards therapy goal(s). See goals on Care Plan in [facility] electronic health record for goal details.  Goals partially met.  Barriers to achieving goals:   discharge from facility.    Therapy recommendation(s):    Continued therapy is recommended.  Rationale/Recommendations:  decreased ADL I.    Goal Outcome Evaluation:

## 2025-06-02 ENCOUNTER — PATIENT OUTREACH (OUTPATIENT)
Dept: CARE COORDINATION | Facility: CLINIC | Age: 65
End: 2025-06-02
Payer: COMMERCIAL

## 2025-06-02 ENCOUNTER — DOCUMENTATION ONLY (OUTPATIENT)
Dept: FAMILY MEDICINE | Facility: CLINIC | Age: 65
End: 2025-06-02
Payer: COMMERCIAL

## 2025-06-02 NOTE — PROGRESS NOTES
"When opening a documentation only encounter, be sure to enter in \"Chief Complaint\" Forms and in \" Comments\" Title of form, description if needed.    Amy is a 65 year old  female  Form received via: Fax  Form now resides in: Provider Ready    Res Care Official diagnoses with codes    Tl Farah MA              "

## 2025-06-06 ENCOUNTER — HOSPITAL ENCOUNTER (OUTPATIENT)
Facility: CLINIC | Age: 65
Setting detail: OBSERVATION
Discharge: HOME | End: 2025-06-11
Attending: EMERGENCY MEDICINE | Admitting: STUDENT IN AN ORGANIZED HEALTH CARE EDUCATION/TRAINING PROGRAM
Payer: COMMERCIAL

## 2025-06-06 ENCOUNTER — APPOINTMENT (OUTPATIENT)
Dept: GENERAL RADIOLOGY | Facility: CLINIC | Age: 65
End: 2025-06-06
Attending: EMERGENCY MEDICINE
Payer: COMMERCIAL

## 2025-06-06 ENCOUNTER — APPOINTMENT (OUTPATIENT)
Dept: ULTRASOUND IMAGING | Facility: CLINIC | Age: 65
End: 2025-06-06
Attending: EMERGENCY MEDICINE
Payer: COMMERCIAL

## 2025-06-06 ENCOUNTER — APPOINTMENT (OUTPATIENT)
Dept: PHYSICAL THERAPY | Facility: CLINIC | Age: 65
End: 2025-06-06
Attending: EMERGENCY MEDICINE
Payer: COMMERCIAL

## 2025-06-06 DIAGNOSIS — Z71.89 OTHER SPECIFIED COUNSELING: Chronic | ICD-10-CM

## 2025-06-06 DIAGNOSIS — E11.9 INSULIN DEPENDENT TYPE 2 DIABETES MELLITUS (H): ICD-10-CM

## 2025-06-06 DIAGNOSIS — I63.9 ISCHEMIC STROKE (H): ICD-10-CM

## 2025-06-06 DIAGNOSIS — Z79.4 INSULIN DEPENDENT TYPE 2 DIABETES MELLITUS (H): ICD-10-CM

## 2025-06-06 DIAGNOSIS — I68.0 CEREBRAL AMYLOID ANGIOPATHY (H): ICD-10-CM

## 2025-06-06 DIAGNOSIS — M79.605 PAIN IN BOTH LOWER EXTREMITIES: ICD-10-CM

## 2025-06-06 DIAGNOSIS — E85.4 CEREBRAL AMYLOID ANGIOPATHY (H): ICD-10-CM

## 2025-06-06 DIAGNOSIS — E11.9 TYPE 2 DIABETES MELLITUS WITHOUT COMPLICATION, WITH LONG-TERM CURRENT USE OF INSULIN (H): ICD-10-CM

## 2025-06-06 DIAGNOSIS — S42.201A CLOSED FRACTURE OF PROXIMAL END OF RIGHT HUMERUS, UNSPECIFIED FRACTURE MORPHOLOGY, INITIAL ENCOUNTER: Primary | ICD-10-CM

## 2025-06-06 DIAGNOSIS — Z79.4 TYPE 2 DIABETES MELLITUS WITHOUT COMPLICATION, WITH LONG-TERM CURRENT USE OF INSULIN (H): ICD-10-CM

## 2025-06-06 DIAGNOSIS — F43.23 ADJUSTMENT DISORDER WITH MIXED ANXIETY AND DEPRESSED MOOD: ICD-10-CM

## 2025-06-06 DIAGNOSIS — R26.81 GAIT INSTABILITY: ICD-10-CM

## 2025-06-06 DIAGNOSIS — I10 ESSENTIAL HYPERTENSION: ICD-10-CM

## 2025-06-06 DIAGNOSIS — G43.709 CHRONIC MIGRAINE WITHOUT AURA WITHOUT STATUS MIGRAINOSUS, NOT INTRACTABLE: ICD-10-CM

## 2025-06-06 DIAGNOSIS — Z92.241 H/O SYSTEMIC STEROID THERAPY: ICD-10-CM

## 2025-06-06 DIAGNOSIS — M79.604 PAIN IN BOTH LOWER EXTREMITIES: ICD-10-CM

## 2025-06-06 DIAGNOSIS — K21.9 GASTROESOPHAGEAL REFLUX DISEASE WITHOUT ESOPHAGITIS: ICD-10-CM

## 2025-06-06 LAB
ALBUMIN SERPL BCG-MCNC: 3.8 G/DL (ref 3.5–5.2)
ALP SERPL-CCNC: 73 U/L (ref 40–150)
ALT SERPL W P-5'-P-CCNC: 32 U/L (ref 0–50)
ANION GAP SERPL CALCULATED.3IONS-SCNC: 13 MMOL/L (ref 7–15)
AST SERPL W P-5'-P-CCNC: 18 U/L (ref 0–45)
BASOPHILS # BLD AUTO: 0 10E3/UL (ref 0–0.2)
BASOPHILS NFR BLD AUTO: 0 %
BILIRUB SERPL-MCNC: 0.2 MG/DL
BUN SERPL-MCNC: 38.8 MG/DL (ref 8–23)
CALCIUM SERPL-MCNC: 8.4 MG/DL (ref 8.8–10.4)
CHLORIDE SERPL-SCNC: 104 MMOL/L (ref 98–107)
CREAT SERPL-MCNC: 0.97 MG/DL (ref 0.51–0.95)
EGFRCR SERPLBLD CKD-EPI 2021: 65 ML/MIN/1.73M2
EOSINOPHIL # BLD AUTO: 0.1 10E3/UL (ref 0–0.7)
EOSINOPHIL NFR BLD AUTO: 1 %
ERYTHROCYTE [DISTWIDTH] IN BLOOD BY AUTOMATED COUNT: 16.2 % (ref 10–15)
GLUCOSE BLDC GLUCOMTR-MCNC: 137 MG/DL (ref 70–99)
GLUCOSE BLDC GLUCOMTR-MCNC: 283 MG/DL (ref 70–99)
GLUCOSE SERPL-MCNC: 176 MG/DL (ref 70–99)
HCO3 SERPL-SCNC: 24 MMOL/L (ref 22–29)
HCT VFR BLD AUTO: 33.1 % (ref 35–47)
HGB BLD-MCNC: 10.2 G/DL (ref 11.7–15.7)
IMM GRANULOCYTES # BLD: 0.1 10E3/UL
IMM GRANULOCYTES NFR BLD: 1 %
LIPASE SERPL-CCNC: 29 U/L (ref 13–60)
LYMPHOCYTES # BLD AUTO: 2.2 10E3/UL (ref 0.8–5.3)
LYMPHOCYTES NFR BLD AUTO: 20 %
MCH RBC QN AUTO: 26.6 PG (ref 26.5–33)
MCHC RBC AUTO-ENTMCNC: 30.8 G/DL (ref 31.5–36.5)
MCV RBC AUTO: 86 FL (ref 78–100)
MONOCYTES # BLD AUTO: 1.1 10E3/UL (ref 0–1.3)
MONOCYTES NFR BLD AUTO: 10 %
NEUTROPHILS # BLD AUTO: 7.7 10E3/UL (ref 1.6–8.3)
NEUTROPHILS NFR BLD AUTO: 69 %
NRBC # BLD AUTO: 0 10E3/UL
NRBC BLD AUTO-RTO: 0 /100
PLATELET # BLD AUTO: 309 10E3/UL (ref 150–450)
POTASSIUM SERPL-SCNC: 4.6 MMOL/L (ref 3.4–5.3)
PROT SERPL-MCNC: 6.3 G/DL (ref 6.4–8.3)
RBC # BLD AUTO: 3.84 10E6/UL (ref 3.8–5.2)
SODIUM SERPL-SCNC: 141 MMOL/L (ref 135–145)
WBC # BLD AUTO: 11.2 10E3/UL (ref 4–11)

## 2025-06-06 PROCEDURE — 96361 HYDRATE IV INFUSION ADD-ON: CPT | Performed by: EMERGENCY MEDICINE

## 2025-06-06 PROCEDURE — 96360 HYDRATION IV INFUSION INIT: CPT | Performed by: EMERGENCY MEDICINE

## 2025-06-06 PROCEDURE — 84450 TRANSFERASE (AST) (SGOT): CPT | Performed by: EMERGENCY MEDICINE

## 2025-06-06 PROCEDURE — 99285 EMERGENCY DEPT VISIT HI MDM: CPT | Mod: 25 | Performed by: EMERGENCY MEDICINE

## 2025-06-06 PROCEDURE — 250N000013 HC RX MED GY IP 250 OP 250 PS 637: Performed by: NURSE PRACTITIONER

## 2025-06-06 PROCEDURE — G0378 HOSPITAL OBSERVATION PER HR: HCPCS

## 2025-06-06 PROCEDURE — 99207 PR APP CREDIT; MD BILLING SHARED VISIT: CPT | Mod: FS | Performed by: NURSE PRACTITIONER

## 2025-06-06 PROCEDURE — 85025 COMPLETE CBC W/AUTO DIFF WBC: CPT | Performed by: EMERGENCY MEDICINE

## 2025-06-06 PROCEDURE — 80053 COMPREHEN METABOLIC PANEL: CPT | Performed by: EMERGENCY MEDICINE

## 2025-06-06 PROCEDURE — 250N000013 HC RX MED GY IP 250 OP 250 PS 637: Performed by: EMERGENCY MEDICINE

## 2025-06-06 PROCEDURE — 97161 PT EVAL LOW COMPLEX 20 MIN: CPT | Mod: GP

## 2025-06-06 PROCEDURE — 250N000011 HC RX IP 250 OP 636: Mod: JZ | Performed by: NURSE PRACTITIONER

## 2025-06-06 PROCEDURE — 258N000003 HC RX IP 258 OP 636: Performed by: EMERGENCY MEDICINE

## 2025-06-06 PROCEDURE — 250N000012 HC RX MED GY IP 250 OP 636 PS 637: Performed by: NURSE PRACTITIONER

## 2025-06-06 PROCEDURE — 73564 X-RAY EXAM KNEE 4 OR MORE: CPT | Mod: 26 | Performed by: RADIOLOGY

## 2025-06-06 PROCEDURE — 36415 COLL VENOUS BLD VENIPUNCTURE: CPT | Performed by: EMERGENCY MEDICINE

## 2025-06-06 PROCEDURE — 83690 ASSAY OF LIPASE: CPT | Performed by: EMERGENCY MEDICINE

## 2025-06-06 PROCEDURE — 99285 EMERGENCY DEPT VISIT HI MDM: CPT | Performed by: EMERGENCY MEDICINE

## 2025-06-06 PROCEDURE — 73564 X-RAY EXAM KNEE 4 OR MORE: CPT | Mod: 50

## 2025-06-06 PROCEDURE — 93970 EXTREMITY STUDY: CPT | Mod: 26 | Performed by: RADIOLOGY

## 2025-06-06 PROCEDURE — 82962 GLUCOSE BLOOD TEST: CPT

## 2025-06-06 PROCEDURE — 97530 THERAPEUTIC ACTIVITIES: CPT | Mod: GP

## 2025-06-06 PROCEDURE — 99222 1ST HOSP IP/OBS MODERATE 55: CPT | Performed by: PEDIATRICS

## 2025-06-06 PROCEDURE — 250N000013 HC RX MED GY IP 250 OP 250 PS 637: Performed by: PEDIATRICS

## 2025-06-06 PROCEDURE — 93970 EXTREMITY STUDY: CPT

## 2025-06-06 PROCEDURE — 96374 THER/PROPH/DIAG INJ IV PUSH: CPT

## 2025-06-06 RX ORDER — AMLODIPINE BESYLATE 5 MG/1
10 TABLET ORAL AT BEDTIME
Status: DISCONTINUED | OUTPATIENT
Start: 2025-06-06 | End: 2025-06-11 | Stop reason: HOSPADM

## 2025-06-06 RX ORDER — CALCIUM CARBONATE/VITAMIN D3 600 MG-10
1 TABLET ORAL 2 TIMES DAILY WITH MEALS
Status: DISCONTINUED | OUTPATIENT
Start: 2025-06-06 | End: 2025-06-11 | Stop reason: HOSPADM

## 2025-06-06 RX ORDER — DIVALPROEX SODIUM 500 MG/1
500 TABLET, DELAYED RELEASE ORAL 2 TIMES DAILY
Status: DISCONTINUED | OUTPATIENT
Start: 2025-06-06 | End: 2025-06-11 | Stop reason: HOSPADM

## 2025-06-06 RX ORDER — ONDANSETRON 2 MG/ML
4 INJECTION INTRAMUSCULAR; INTRAVENOUS EVERY 6 HOURS PRN
Status: DISCONTINUED | OUTPATIENT
Start: 2025-06-06 | End: 2025-06-11 | Stop reason: HOSPADM

## 2025-06-06 RX ORDER — ACETAMINOPHEN 325 MG/1
975 TABLET ORAL EVERY 8 HOURS PRN
Status: DISCONTINUED | OUTPATIENT
Start: 2025-06-06 | End: 2025-06-11 | Stop reason: HOSPADM

## 2025-06-06 RX ORDER — OXYCODONE HYDROCHLORIDE 5 MG/1
5 TABLET ORAL ONCE
Refills: 0 | Status: COMPLETED | OUTPATIENT
Start: 2025-06-06 | End: 2025-06-06

## 2025-06-06 RX ORDER — PREDNISONE 20 MG/1
40 TABLET ORAL DAILY
Status: CANCELLED | OUTPATIENT
Start: 2025-06-28 | End: 2025-07-05

## 2025-06-06 RX ORDER — PREDNISONE 20 MG/1
60 TABLET ORAL DAILY
Status: CANCELLED | OUTPATIENT
Start: 2025-06-06 | End: 2025-06-21

## 2025-06-06 RX ORDER — ENOXAPARIN SODIUM 100 MG/ML
40 INJECTION SUBCUTANEOUS EVERY 24 HOURS
Status: DISCONTINUED | OUTPATIENT
Start: 2025-06-07 | End: 2025-06-11 | Stop reason: HOSPADM

## 2025-06-06 RX ORDER — PROPRANOLOL HYDROCHLORIDE 60 MG/1
60 CAPSULE, EXTENDED RELEASE ORAL DAILY
Status: DISCONTINUED | OUTPATIENT
Start: 2025-06-06 | End: 2025-06-11 | Stop reason: HOSPADM

## 2025-06-06 RX ORDER — PROCHLORPERAZINE MALEATE 5 MG/1
10 TABLET ORAL EVERY 6 HOURS PRN
Status: DISCONTINUED | OUTPATIENT
Start: 2025-06-06 | End: 2025-06-11 | Stop reason: HOSPADM

## 2025-06-06 RX ORDER — OXYCODONE HYDROCHLORIDE 10 MG/1
10 TABLET ORAL EVERY 4 HOURS PRN
Status: DISCONTINUED | OUTPATIENT
Start: 2025-06-06 | End: 2025-06-09

## 2025-06-06 RX ORDER — DEXTROSE MONOHYDRATE 25 G/50ML
25-50 INJECTION, SOLUTION INTRAVENOUS
Status: DISCONTINUED | OUTPATIENT
Start: 2025-06-06 | End: 2025-06-11 | Stop reason: HOSPADM

## 2025-06-06 RX ORDER — LOSARTAN POTASSIUM 100 MG/1
100 TABLET ORAL DAILY
Status: DISCONTINUED | OUTPATIENT
Start: 2025-06-06 | End: 2025-06-11 | Stop reason: HOSPADM

## 2025-06-06 RX ORDER — PREDNISONE 10 MG/1
10 TABLET ORAL DAILY
Status: ON HOLD | COMMUNITY
Start: 2025-08-10 | End: 2025-06-09

## 2025-06-06 RX ORDER — NICOTINE POLACRILEX 4 MG
15-30 LOZENGE BUCCAL
Status: DISCONTINUED | OUTPATIENT
Start: 2025-06-06 | End: 2025-06-11 | Stop reason: HOSPADM

## 2025-06-06 RX ORDER — ONDANSETRON 4 MG/1
4 TABLET, ORALLY DISINTEGRATING ORAL EVERY 6 HOURS PRN
Status: DISCONTINUED | OUTPATIENT
Start: 2025-06-06 | End: 2025-06-11 | Stop reason: HOSPADM

## 2025-06-06 RX ORDER — AMOXICILLIN 250 MG
2 CAPSULE ORAL 2 TIMES DAILY PRN
Status: DISCONTINUED | OUTPATIENT
Start: 2025-06-06 | End: 2025-06-11 | Stop reason: HOSPADM

## 2025-06-06 RX ORDER — PREDNISONE 20 MG/1
60 TABLET ORAL DAILY
Status: DISCONTINUED | OUTPATIENT
Start: 2025-06-06 | End: 2025-06-11 | Stop reason: HOSPADM

## 2025-06-06 RX ORDER — GABAPENTIN 300 MG/1
300 CAPSULE ORAL 3 TIMES DAILY
Status: DISCONTINUED | OUTPATIENT
Start: 2025-06-06 | End: 2025-06-11 | Stop reason: HOSPADM

## 2025-06-06 RX ORDER — OXYCODONE HYDROCHLORIDE 5 MG/1
5 TABLET ORAL EVERY 4 HOURS PRN
Status: DISCONTINUED | OUTPATIENT
Start: 2025-06-06 | End: 2025-06-06

## 2025-06-06 RX ORDER — SULFAMETHOXAZOLE AND TRIMETHOPRIM 800; 160 MG/1; MG/1
TABLET ORAL
Status: ON HOLD | COMMUNITY
Start: 2025-06-06 | End: 2025-06-09

## 2025-06-06 RX ORDER — ACETAMINOPHEN 325 MG/1
975 TABLET ORAL ONCE
Status: COMPLETED | OUTPATIENT
Start: 2025-06-06 | End: 2025-06-06

## 2025-06-06 RX ORDER — PANTOPRAZOLE SODIUM 40 MG/1
40 TABLET, DELAYED RELEASE ORAL DAILY
Status: DISCONTINUED | OUTPATIENT
Start: 2025-06-06 | End: 2025-06-11 | Stop reason: HOSPADM

## 2025-06-06 RX ORDER — PREDNISONE 20 MG/1
20 TABLET ORAL DAILY
Status: CANCELLED | OUTPATIENT
Start: 2025-07-12 | End: 2025-07-19

## 2025-06-06 RX ORDER — SULFAMETHOXAZOLE AND TRIMETHOPRIM 800; 160 MG/1; MG/1
1 TABLET ORAL
Status: DISCONTINUED | OUTPATIENT
Start: 2025-06-06 | End: 2025-06-11 | Stop reason: HOSPADM

## 2025-06-06 RX ORDER — PREDNISONE 5 MG/1
10 TABLET ORAL DAILY
Status: CANCELLED | OUTPATIENT
Start: 2025-07-19 | End: 2025-07-26

## 2025-06-06 RX ORDER — HYDROMORPHONE HYDROCHLORIDE 1 MG/ML
0.5 INJECTION, SOLUTION INTRAMUSCULAR; INTRAVENOUS; SUBCUTANEOUS ONCE
Status: COMPLETED | OUTPATIENT
Start: 2025-06-06 | End: 2025-06-06

## 2025-06-06 RX ORDER — ASPIRIN 81 MG/1
81 TABLET ORAL DAILY
Status: DISCONTINUED | OUTPATIENT
Start: 2025-06-06 | End: 2025-06-11 | Stop reason: HOSPADM

## 2025-06-06 RX ORDER — SERTRALINE HYDROCHLORIDE 25 MG/1
75 TABLET, FILM COATED ORAL DAILY
Status: DISCONTINUED | OUTPATIENT
Start: 2025-06-06 | End: 2025-06-11 | Stop reason: HOSPADM

## 2025-06-06 RX ORDER — AMOXICILLIN 250 MG
1 CAPSULE ORAL 2 TIMES DAILY PRN
Status: DISCONTINUED | OUTPATIENT
Start: 2025-06-06 | End: 2025-06-11 | Stop reason: HOSPADM

## 2025-06-06 RX ORDER — LIDOCAINE 40 MG/G
CREAM TOPICAL
Status: DISCONTINUED | OUTPATIENT
Start: 2025-06-06 | End: 2025-06-11 | Stop reason: HOSPADM

## 2025-06-06 RX ADMIN — OXYCODONE HYDROCHLORIDE 10 MG: 10 TABLET ORAL at 22:32

## 2025-06-06 RX ADMIN — DIVALPROEX SODIUM 500 MG: 500 TABLET, DELAYED RELEASE ORAL at 22:33

## 2025-06-06 RX ADMIN — ACETAMINOPHEN 975 MG: 325 TABLET ORAL at 09:25

## 2025-06-06 RX ADMIN — SERTRALINE HYDROCHLORIDE 75 MG: 50 TABLET ORAL at 17:20

## 2025-06-06 RX ADMIN — Medication 500 MG: at 17:19

## 2025-06-06 RX ADMIN — ACETAMINOPHEN 975 MG: 325 TABLET ORAL at 16:33

## 2025-06-06 RX ADMIN — PROPRANOLOL HYDROCHLORIDE 60 MG: 60 CAPSULE, EXTENDED RELEASE ORAL at 17:20

## 2025-06-06 RX ADMIN — PREDNISONE 60 MG: 20 TABLET ORAL at 16:33

## 2025-06-06 RX ADMIN — METFORMIN HYDROCHLORIDE 500 MG: 500 TABLET, FILM COATED ORAL at 17:19

## 2025-06-06 RX ADMIN — PANTOPRAZOLE SODIUM 40 MG: 40 TABLET, DELAYED RELEASE ORAL at 16:33

## 2025-06-06 RX ADMIN — OXYCODONE HYDROCHLORIDE 10 MG: 10 TABLET ORAL at 18:12

## 2025-06-06 RX ADMIN — HYDROMORPHONE HYDROCHLORIDE 0.5 MG: 1 INJECTION, SOLUTION INTRAMUSCULAR; INTRAVENOUS; SUBCUTANEOUS at 15:40

## 2025-06-06 RX ADMIN — ASPIRIN 81 MG: 81 TABLET ORAL at 16:34

## 2025-06-06 RX ADMIN — LOSARTAN POTASSIUM 100 MG: 100 TABLET, FILM COATED ORAL at 16:34

## 2025-06-06 RX ADMIN — CALCIUM CARBONATE 600 MG (1,500 MG)-VITAMIN D3 400 UNIT TABLET 1 TABLET: at 17:20

## 2025-06-06 RX ADMIN — OXYCODONE HYDROCHLORIDE 5 MG: 5 TABLET ORAL at 06:50

## 2025-06-06 RX ADMIN — SULFAMETHOXAZOLE AND TRIMETHOPRIM 1 TABLET: 800; 160 TABLET ORAL at 17:26

## 2025-06-06 RX ADMIN — GABAPENTIN 300 MG: 300 CAPSULE ORAL at 22:29

## 2025-06-06 RX ADMIN — SODIUM CHLORIDE 1000 ML: 0.9 INJECTION, SOLUTION INTRAVENOUS at 10:24

## 2025-06-06 RX ADMIN — OXYCODONE HYDROCHLORIDE 5 MG: 5 TABLET ORAL at 12:30

## 2025-06-06 RX ADMIN — AMLODIPINE BESYLATE 10 MG: 5 TABLET ORAL at 22:32

## 2025-06-06 ASSESSMENT — COLUMBIA-SUICIDE SEVERITY RATING SCALE - C-SSRS
1. IN THE PAST MONTH, HAVE YOU WISHED YOU WERE DEAD OR WISHED YOU COULD GO TO SLEEP AND NOT WAKE UP?: NO
2. HAVE YOU ACTUALLY HAD ANY THOUGHTS OF KILLING YOURSELF IN THE PAST MONTH?: NO
6. HAVE YOU EVER DONE ANYTHING, STARTED TO DO ANYTHING, OR PREPARED TO DO ANYTHING TO END YOUR LIFE?: NO

## 2025-06-06 ASSESSMENT — ACTIVITIES OF DAILY LIVING (ADL)
ADLS_ACUITY_SCORE: 59
ADLS_ACUITY_SCORE: 56
ADLS_ACUITY_SCORE: 60
ADLS_ACUITY_SCORE: 59
ADLS_ACUITY_SCORE: 56
ADLS_ACUITY_SCORE: 59
ADLS_ACUITY_SCORE: 56
ADLS_ACUITY_SCORE: 59
DEPENDENT_IADLS:: INDEPENDENT
ADLS_ACUITY_SCORE: 59
ADLS_ACUITY_SCORE: 60
ADLS_ACUITY_SCORE: 56
ADLS_ACUITY_SCORE: 60
ADLS_ACUITY_SCORE: 56
ADLS_ACUITY_SCORE: 60
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
## 2025-06-06 NOTE — ED TRIAGE NOTES
Pt endorses to this RN bilateral leg pain, however worse in the right leg. Pain notably to a/p knees. Denies falls/trauma. States pain woke her from sleep but that she was having a hard time getting comfortable prior to going to bed.     Triage Assessment (Adult)       Row Name 06/06/25 0603          Triage Assessment    Airway WDL WDL        Respiratory WDL    Respiratory WDL WDL        Skin Circulation/Temperature WDL    Skin Circulation/Temperature WDL WDL        Cardiac WDL    Cardiac WDL WDL        Peripheral/Neurovascular WDL    Peripheral Neurovascular WDL X        Cognitive/Neuro/Behavioral WDL    Cognitive/Neuro/Behavioral WDL WDL

## 2025-06-06 NOTE — ED TRIAGE NOTES
BIBA from home  Having severe leg pain both legs, got up to go to the bathroom, could not make it, crying upset. Htn, vss  100mcg fent  15mg toradol     20g LH
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
## 2025-06-06 NOTE — ED PROVIDER NOTES
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 The patient states that her legs felt uncomfortable all night while she was sleeping last night. She then tried to get out of bed today but was unable to walk due to pain in her knees bilaterally which is new for her today. She states that she had to lower herself down to one knee due to the pain but did not fall. The patient states that she was recently admitted at [facility] for a stroke which occurred after she underwent a heart catheter procedure. She states that she has been experiencing paralysis in her left arm and double vision since the stroke but denies any ongoing problems with her legs or knees. The patient states that she was discharged from [facility] to a TCU 2 days ago but was only there for a day before she went home because they had problems with flooding/mold. She has not done any physical therapy since being discharged.     Per chart review, patient presented with a 3-4 week history of headache and vision changes. She was initially admitted to an outside hospital on 4/28 with elevated systolic blood pressure SBP in the 200s and was thought to have posterior reversible encephalopathy syndrome-like changes on MRI. She left against medical advice and was readmitted on 5/2-5/5 with worsening headaches and SBP in the 180s. MRI findings were largely stable.  On 5/6, she was admitted to [facility] with worsening headache and visual disturbances, including distortions, blurry vision. Upon  arrival, her SBP was in the 120s. MRI of the brain revealed extensive leptomeningeal enhancement, sulcal hyperintensities, and microbleeds/superficial siderosis in the bilateral parieto-occipital regions. Left cerebellar FLAIR hyperintense lesions were stable. CSF analysis on 5/12 showed lymphocytic pleocytosis (32 cells/ L) and elevated protein (74.9 mg/dL) with negative meningitis panel, flow cytometry/cytology, and CSF encephalopathy panel. Rheumatology consultation and serum inflammatory workup were negative. CT of the chest, abdomen, and pelvis was unrevealing. CT angiography and cerebral angiogram on 5/16 showed no evidence of vasculopathy.    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 She developed left hemiparesis and hemineglect post-digital subtraction angiography, with multifocal infarcts identified on repeated MRI brain. Patient was discharged with home assist on 5/28 but grandson unable to assist. She was admitted again on 5/29 with generalized weakness, unable to perform ADLs at home independently. Weakness improved over the course of her hospitalization and she was discharged to TCU on 6/4.     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    Depressive disorder     Gastro-oesophageal reflux disease     bleeding ulcers    Hiatal hernia     NEWLY DIAGNOSISED    Hyperparathyroidism, unspecified 03/29/2012    Peptic ulcer, unspecified site, unspecified as acute or chronic, without mention of hemorrhage or perforation     Pneumonia     NOVEMBER    PONV (postoperative nausea and vomiting)     TMJ (temporomandibular joint syndrome) 05/08/2014    Vitamin D deficiency 06/07/2012     Past Surgical History:   Procedure Laterality Date    APPENDECTOMY      CHOLECYSTECTOMY      DILATION AND CURETTAGE, OPERATIVE HYSTEROSCOPY WITH MORCELLATOR, COMBINED   2012    Procedure: COMBINED DILATION AND CURETTAGE, OPERATIVE HYSTEROSCOPY WITH MORCELLATOR;  Hysteroscopy, Polypectomy, Dilation and Curettage  ;  Surgeon: Marta Montejo MD;  Location: [facility] OR    GI SURGERY      gastric bypass at age 31    IR CAROTID CEREBRAL ANGIOGRAM BILATERAL  2025    ORTHOPEDIC SURGERY      back surgery at age 31    PARATHYROIDECTOMY Right 2015    Procedure: PARATHYROIDECTOMY;  Surgeon: Gregory Coleman MD;  Location:  [facility]    RI MARSUP BARTHOLIN GLAND CYST      SPINE SURGERY       acetaminophen (TYLENOL) 500 MG tablet  Alcohol Swabs PADS  amLODIPine (NORVASC) 10 MG tablet  aspirin 81 MG EC tablet  blood glucose (NO BRAND SPECIFIED) lancets standard  blood glucose (NO BRAND SPECIFIED) test strip  blood glucose monitoring (NO BRAND SPECIFIED) meter device kit  calcium carbonate-vitamin D (CALTRATE) 600-10 MG-MCG per tablet  divalproex sodium delayed-release (DEPAKOTE) 500 MG DR tablet  gabapentin (NEURONTIN) 300 MG capsule  ibuprofen (ADVIL/MOTRIN) 600 MG tablet  insulin aspart (NOVOLOG FLEXPEN) 100 UNIT/ML pen  insulin aspart (NOVOLOG FLEXPEN) 100 UNIT/ML pen  insulin pen needle (32G X 4 MM) 32G X 4 MM miscellaneous  losartan (COZAAR) 100 MG tablet  magnesium gluconate (MAGONATE) 500 MG tablet  metFORMIN (GLUCOPHAGE) 500 MG tablet  pantoprazole (PROTONIX) 40 MG EC tablet  predniSONE (DELTASONE) 10 MG tablet  propranolol ER (INDERAL LA) 60 MG 24 hr capsule  sertraline (ZOLOFT) 50 MG tablet  Sharps Container MISC      Allergies   Allergen Reactions    Nsaids Other (See Comments), Nausea and Vomiting and GI Disturbance     History of GI bleeding and ulcers    Codeine Sulfate GI Disturbance     Family History  Family History   Problem Relation Age of Onset    Thyroid Disease Mother     Breast Cancer Mother 57         65 y.o.    Other - See Comments Mother         cystic acne    Cancer Brother     Diabetes Daughter         Type 1; insulin     Social History    Social History     Tobacco Use    Smoking status: Every Day     Current packs/day: 0.50     Average packs/day: 0.5 packs/day for 30.0 years (15.0 ttl pk-yrs)     Types: Cigarettes    Smokeless tobacco: Never   Substance Use Topics    Alcohol use: Yes     Comment: occasionally    Drug use: No      A medically appropriate review of systems was performed with pertinent positives and negatives noted in the HPI, and all other systems negative.    Physical Exam   BP: 132/79  Pulse: 65  Temp: 97.4  F (36.3  C)  Resp: 20  SpO2: 96 %  Physical Exam  Vitals and nursing note reviewed.   Constitutional:       General: She is not in acute distress.     Appearance: Normal appearance. She is well-developed.   HENT:      Head: Normocephalic and atraumatic.   Eyes:      General: No scleral icterus.     Conjunctiva/sclera: Conjunctivae normal.   Cardiovascular:      Rate and Rhythm: Normal rate.   Pulmonary:      Effort: Pulmonary effort is normal. No respiratory distress.   Abdominal:      General: Abdomen is flat.   Musculoskeletal:      Cervical back: Normal range of motion and neck supple.   Skin:     General: Skin is warm and dry.      Findings: No rash.   Neurological:      Mental Status: She is alert and oriented to person, place, and time.             ED Course, Procedures, & Data      Procedures            Results for orders placed or performed during the hospital encounter of 06/06/25   XR Knee Bilateral G/E 4 Views     Status: None    Narrative    Exam: 4 views of the bilateral knees dated 6/6/2025.    COMPARISON: None.    CLINICAL HISTORY: Knee pain.    FINDINGS: AP and lateral views of the bilateral knees were obtained.  The bones are well aligned. Mild joint space narrowing in the medial  femorotibial joint compartment of both knees. No large joint effusion  in either knee joint. No displaced fractures.      Impression    IMPRESSION: No acute bone abnormality in either knee.    NI VACA MD         SYSTEM ID:   [id number]   Comprehensive metabolic panel     Status: Abnormal   Result Value Ref Range    Sodium 141 135 - 145 mmol/L    Potassium 4.6 3.4 - 5.3 mmol/L    Carbon Dioxide (CO2) 24 22 - 29 mmol/L    Anion Gap 13 7 - 15 mmol/L    Urea Nitrogen 38.8 (H) 8.0 - 23.0 mg/dL    Creatinine 0.97 (H) 0.51 - 0.95 mg/dL    GFR Estimate 65 >60 mL/min/1.73m2    Calcium 8.4 (L) 8.8 - 10.4 mg/dL    Chloride 104 98 - 107 mmol/L    Glucose 176 (H) 70 - 99 mg/dL    Alkaline Phosphatase 73 40 - 150 U/L    AST 18 0 - 45 U/L    ALT 32 0 - 50 U/L    Protein Total 6.3 (L) 6.4 - 8.3 g/dL    Albumin 3.8 3.5 - 5.2 g/dL    Bilirubin Total 0.2 <=1.2 mg/dL   [place] Draw     Status: None (In process)    Narrative    The following orders were created for panel order [place] Draw.  Procedure                               Abnormality         Status                     ---------                               -----------         ------                     Extra Blue Top Tube[[id number]]                             In process                 Extra Red Top Tube[[id number]]                              In process                   Please view results for these tests on the individual orders.   CBC with platelets and differential     Status: Abnormal   Result Value Ref Range    WBC Count 11.2 (H) 4.0 - 11.0 10e3/uL    RBC Count 3.84 3.80 - 5.20 10e6/uL    Hemoglobin 10.2 (L) 11.7 - 15.7 g/dL    Hematocrit 33.1 (L) 35.0 - 47.0 %    MCV 86 78 - 100 fL    MCH 26.6 26.5 - 33.0 pg    MCHC 30.8 (L) 31.5 - 36.5 g/dL    RDW 16.2 (H) 10.0 - 15.0 %    Platelet Count 309 150 - 450 10e3/uL    % Neutrophils 69 %    % Lymphocytes 20 %    % Monocytes 10 %    % Eosinophils 1 %    % Basophils 0 %    % Immature Granulocytes 1 %    NRBCs per 100 WBC 0 <1 /100    Absolute Neutrophils 7.7 1.6 - 8.3 10e3/uL    Absolute Lymphocytes 2.2 0.8 - 5.3 10e3/uL    Absolute Monocytes 1.1 0.0 - 1.3 10e3/uL    Absolute Eosinophils 0.1 0.0 - 0.7 10e3/uL    Absolute Basophils 0.0 0.0 - 0.2  10e3/uL    Absolute Immature Granulocytes 0.1 <=0.4 10e3/uL    Absolute NRBCs 0.0 10e3/uL   CBC with platelets differential     Status: Abnormal    Narrative    The following orders were created for panel order CBC with platelets differential.  Procedure                               Abnormality         Status                     ---------                               -----------         ------                     CBC with platelets and ...[[id number]]  Abnormal            Final result                 Please view results for these tests on the individual orders.     Medications   oxyCODONE (ROXICODONE) tablet 5 mg (5 mg Oral $Given 6/6/25 0677)     Labs Ordered and Resulted from Time of ED Arrival to Time of ED Departure   COMPREHENSIVE METABOLIC PANEL - Abnormal       Result Value    Sodium 141      Potassium 4.6      Carbon Dioxide (CO2) 24      Anion Gap 13      Urea Nitrogen 38.8 (*)     Creatinine 0.97 (*)     GFR Estimate 65      Calcium 8.4 (*)     Chloride 104      Glucose 176 (*)     Alkaline Phosphatase 73      AST 18      ALT 32      Protein Total 6.3 (*)     Albumin 3.8      Bilirubin Total 0.2     CBC WITH PLATELETS AND DIFFERENTIAL - Abnormal    WBC Count 11.2 (*)     RBC Count 3.84      Hemoglobin 10.2 (*)     Hematocrit 33.1 (*)     MCV 86      MCH 26.6      MCHC 30.8 (*)     RDW 16.2 (*)     Platelet Count 309      % Neutrophils 69      % Lymphocytes 20      % Monocytes 10      % Eosinophils 1      % Basophils 0      % Immature Granulocytes 1      NRBCs per 100 WBC 0      Absolute Neutrophils 7.7      Absolute Lymphocytes 2.2      Absolute Monocytes 1.1      Absolute Eosinophils 0.1      Absolute Basophils 0.0      Absolute Immature Granulocytes 0.1      Absolute NRBCs 0.0       XR Knee Bilateral G/E 4 Views   Final Result   IMPRESSION: No acute bone abnormality in either knee.      NI VACA MD            SYSTEM ID:  [id number]      US Lower Extremity Venous Duplex Bilateral    (Results  Pending)          Critical care was not performed.     Medical Decision Making  The patient's presentation was of high complexity (a chronic illness severe exacerbation, progression, or side effect of treatment).    The patient's evaluation involved:  ordering and/or review of 3+ test(s) in this encounter (see separate area of note for details)  discussion of management or test interpretation with another health professional (PT, , medicine hospitalist)    The patient's management necessitated high risk (a decision regarding hospitalization).    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  Vital signs stable afebrile including normal pulse ox at 96 send on room air.  IV established, labs sent which revealed BUN/creatinine of 30/0.9 otherwise stable electrolytes, leukocytosis 11.2 without stable CBC, lipase normal at 29.  Patient given oxycodone and a liter normal saline fluid bolus and sent to radiology for ultrasound and x-rays of bilateral lower extremities which revealed no evidence of DVT or acute traumatic process.  PT was consulted, please refer to the note for further details.  Social work was also consulted, please refer to the note for further details.  Case discussed with hospitalist service with placement on observation status with plan for nursing home placement hopefully tomorrow.    I have reviewed the nursing notes. I have reviewed the findings, diagnosis, plan and need for follow up with the patient.    New Prescriptions    No medications on file       Final diagnoses:   Gait instability     I, Verenice Pina, am serving as a trained medical scribe to document  services personally performed by Richa Parikh MD, based on the provider's statements to me.     I, Richa Parikh MD, was physically present and have reviewed and verified the accuracy of this note documented by Verenice Pina.      Richa Parikh MD   [facility] EMERGENCY DEPARTMENT  6/6/2025     Richa Parikh MD  06/06/25 0467

## 2025-06-06 NOTE — CONSULTS
Care Management Initial Consult    General Information  Assessment completed with: Patient, VM-chart review,    Type of CM/SW Visit: Initial Assessment    Primary Care Provider verified and updated as needed: Yes (Randal Castellanos [phone] [address] [address])   Readmission within the last 30 days: previous discharge plan unsuccessful   Return Category: Exacerbation of disease  Reason for Consult: discharge planning  Advance Care Planning: Advance Care Planning Reviewed: no concerns identified          Communication Assessment  Patient's communication style: spoken language (English or Bilingual)             Cognitive  Cognitive/Neuro/Behavioral: WDL                      Living Environment:   People in home: alone, child(vidya), adult (Lives alone but daughter and grandson often stay with her)     Current living Arrangements: apartment      Able to return to prior arrangements: yes       Family/Social Support:  Care provided by: self  Provides care for: no one  Marital Status: Single  Support system: Children, Other (specify), Neighbor (grandchildren)          Description of Support System: Supportive, Involved    Support Assessment: Adequate family and caregiver support, Adequate social supports    Current Resources:   Patient receiving home care services:          Community Resources: Financial/Insurance, County Programs, County Worker; Transportation Services ([id number]; P: [phone])   Equipment currently used at home: none  Supplies currently used at home: Diabetic Supplies    Employment/Financial:  Employment Status: disabled        Financial Concerns: none   Referral to Financial Worker: No       Does the patient's insurance plan have a 3 day qualifying hospital stay waiver?  No    Lifestyle & Psychosocial Needs:  Social Drivers of Health     Food Insecurity: Food Insecurity Present (5/29/2025)    Received from [facility]note 2]    Hunger Vital Sign     Worried  About Running Out of Food in the Last Year: Never true     Ran Out of Food in the Last Year: Sometimes true   Depression: At risk (11/4/2021)    PHQ-2     PHQ-2 Score: 5   Housing Stability: Low Risk  (5/29/2025)    Received from [facility]    Housing Stability     What is your housing situation today?: 3 - I have housing   Tobacco Use: High Risk (5/29/2025)    Received from [facility]    Patient History     Smoking Tobacco Use: Every Day     Smokeless Tobacco Use: Current     Passive Exposure: Not on file   Financial Resource Strain: Medium Risk (5/29/2025)    Received from [facility]    Overall Financial Resource Strain (CARDIA)     Difficulty of Paying Living Expenses: Somewhat hard   Alcohol Use: Not on file   Transportation Needs: Unmet Transportation Needs (5/29/2025)    Received from [facility]    PRAPARE - Transportation     Lack of Transportation (Medical): Yes     Lack of Transportation (Non-Medical): Yes   Physical Activity: Not on file   Interpersonal Safety: Not At Risk (5/29/2025)    Received from [facility]    Humiliation, Afraid, Rape, and Kick questionnaire     Fear of Current or Ex-Partner: No     Emotionally Abused: No     Physically Abused: No     Sexually Abused: No   Stress: Not on file   Social Connections: Socially Integrated (5/4/2025)    Received from [facility]    Social Connections     Do you often feel lonely or isolated from those around you?: 0   Health Literacy: Not on file       Functional Status:  Prior to admission patient needed assistance:   Dependent ADLs:: Independent  Dependent IADLs:: Independent  Assesssment of Functional Status: Not at baseline with ADL Functioning, Not at baseline with mobility    Mental Health Status:  Mental Health Status: No Current Concerns       Chemical Dependency Status:  Chemical Dependency Status: No Current Concerns             Values/Beliefs:  Spiritual, Cultural  "Beliefs, Adventism Practices, Values that affect care: no               Discussed  Partnership in Safe Discharge Planning  document with patient/family: No    Additional Information:    Per chart review: \"Amy Choudhury is a 65 year old woman admitted on 6/6/2025. 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 KULWANT performed chart review to begin assessment.      1810 KULWANT met with Amy at bedside to complete assessment and confirm/update information in previous assessment (5/22/2025).  KULWANT introduced self and role, and explained the reason for the visit. She agreed to speak with KULWANT    Amy reports that there have been no changes to her living situation or supports since her last admission to [facility]. She reports after she discharged from [facility] she realized that she wasn't safe at and went to OSH. She was placed at [facility] where she reported being concerned with mold and damp. She said that there were multiple areas of wet carpeting that staff was trying to dry out, but she was afraid of mold and left AMA.  She told SW that she thinks she left her medications there and had been without them until she presented to [facility].    She expressed agreement with new TCU referrals being made, but also reported that she thought she could manage with OP or [facility] physical rehab. KULWANT explained that the care management team would make appropriate referrals per PT recommendation. Amy expressed understanding.    KULWANT provided support via active and reflective listening; normalized and validated " feelings and experiences; and provided a supportive non-anxious presence. No additional questions or concerns were reported. SW provided availability and contact information and excused self from Amy's bedside.    Next Steps:    Follow for PT discharge recommendations  Make referrals as needed    SW will continue to follow as needed.    SB Elam, [facility]  ED/OBS   [facility]  Phone: [phone]  Pager: [phone]  Fax: [phone]     After hours [facility] and After Hours [facility] [place]     On-call pager, [phone], 4:00 pm to 8:30 pm

## 2025-06-06 NOTE — MEDICATION SCRIBE - ADMISSION MEDICATION HISTORY
Medication Scribe Admission Medication History    Admission medication history is complete. The information provided in this note is only as accurate as the sources available at the time of the update.    Information Source(s): Hospital records, CareEverywhere/SureScripts, and Dispensary Report via in-person    Pertinent Information: Patient was seen at [facility] 05/29/2025-06/04/2025 and medication hx was completed by pharmacy at the facility. Also called patient TCU facility [facility] ([phone]) and they stated that patient left AMA so no medication MAR available.     Changes made to PTA medication list:  Added: Insulin  Unit/ML Injection          Prednisone 10 MG Tab (Starting 08/10/2025)          Bactrim 800-160 MG Tab  Deleted: Ibuprofen 600 MG Tab  Changed: Sertraline 50 MG Tab: Take 50 mg by mouth daily. >>> Sertraline 50 MG Tab: Take 75 mg by mouth daily    Allergies reviewed with patient and updates made in EHR: no    Medication History Completed By: Rocio Damon 6/6/2025 11:40 AM    PTA Med List   Medication Sig Last Dose/Taking    acetaminophen (TYLENOL) 500 MG tablet Take 1,000 mg by mouth every 6 hours as needed for other (Headache). Max acetaminophen dose: 4000mg in 24 hrs. Taking As Needed    Alcohol Swabs PADS Use to swab the area of the injection or rachele as directed Per insurance coverage Taking    amLODIPine (NORVASC) 10 MG tablet Take 10 mg by mouth at bedtime. Taking    aspirin 81 MG EC tablet Take 81 mg by mouth daily. Taking    blood glucose (NO BRAND SPECIFIED) lancets standard To use to test glucose level in the blood Use to test blood sugar  3  times daily as directed. To accompany glucose monitor brands per insurance coverage. Taking    blood glucose (NO BRAND SPECIFIED) test strip To use to test glucose level in the blood Use to test blood sugar  3 times daily as directed. To accompany glucose monitor brands per insurance coverage. Taking     blood glucose monitoring (NO BRAND SPECIFIED) meter device kit Use as directed Per insurance coverage Taking    calcium carbonate-vitamin D (CALTRATE) 600-10 MG-MCG per tablet Take 1 tablet by mouth 2 times daily (with meals). Taking    divalproex sodium delayed-release (DEPAKOTE) 500 MG DR tablet Take 500 mg by mouth 2 times daily. Taking    gabapentin (NEURONTIN) 300 MG capsule Take 300 mg by mouth 3 times daily. Taking    insulin aspart (NOVOLOG FLEXPEN) 100 UNIT/ML pen Take 10 units before average meals that contain carbohydrates  OR Take 5 units before small meals or snacks that contain carbohydrates Taking    insulin aspart (NOVOLOG FLEXPEN) 100 UNIT/ML pen Blood Glucose Insulin Novolog Before Meals: Less than 140 0 units, 140 -164 1 units, 165 -189 2 units, 190 - 214 3 units, 215 - 239 4 units,  240 - 264 5 units, 265 - 289 6 units, 290 - 314 7 units, 315 - 339 8 units, 340 - 364 9 units, 365 or more 10 units Taking    insulin  UNIT/ML injection Inject 16 UNITS subcutaneously every morning for 45 days. Please only give with Prednisone 40 mg or greater. Do not give if prednisone is held. Taking    insulin pen needle (32G X 4 MM) 32G X 4 MM miscellaneous Use as directed by provider Per insurance coverage Taking    losartan (COZAAR) 100 MG tablet Take 100 mg by mouth daily. Taking    metFORMIN (GLUCOPHAGE) 500 MG tablet Take 500 mg by mouth 2 times daily (with meals). Taking    pantoprazole (PROTONIX) 40 MG EC tablet Take 40 mg by mouth daily. Taking    predniSONE (DELTASONE) 10 MG tablet Take 6 tablets (60 mg) by mouth daily for 23 days, THEN 5 tablets (50 mg) daily for 7 days, THEN 4 tablets (40 mg) daily for 7 days, THEN 3 tablets (30 mg) daily for 7 days, THEN 2 tablets (20 mg) daily for 7 days, THEN 1 tablet (10 mg) daily for 7 days. Taking    propranolol ER (INDERAL LA) 60 MG 24 hr capsule Take 60 mg by mouth daily. Taking    sertraline (ZOLOFT) 50 MG tablet Take 75 mg by mouth daily. Taking     Sharps Container MISC Use as directed to dispose of needles, lancets and other sharps Taking    sulfamethoxazole-trimethoprim (BACTRIM DS) 800-160 MG tablet Take 1 tablet by mouth every Mon, Wed and Fri for 64 days. Taking

## 2025-06-07 LAB
APTT PPP: 24 SECONDS (ref 22–38)
ERYTHROCYTE [DISTWIDTH] IN BLOOD BY AUTOMATED COUNT: 16.1 % (ref 10–15)
GLUCOSE BLDC GLUCOMTR-MCNC: 140 MG/DL (ref 70–99)
GLUCOSE BLDC GLUCOMTR-MCNC: 176 MG/DL (ref 70–99)
GLUCOSE BLDC GLUCOMTR-MCNC: 209 MG/DL (ref 70–99)
GLUCOSE BLDC GLUCOMTR-MCNC: 222 MG/DL (ref 70–99)
GLUCOSE BLDC GLUCOMTR-MCNC: 280 MG/DL (ref 70–99)
HCT VFR BLD AUTO: 32.4 % (ref 35–47)
HGB BLD-MCNC: 10.4 G/DL (ref 11.7–15.7)
INR PPP: 0.9 (ref 0.85–1.15)
MCH RBC QN AUTO: 27.8 PG (ref 26.5–33)
MCHC RBC AUTO-ENTMCNC: 32.1 G/DL (ref 31.5–36.5)
MCV RBC AUTO: 87 FL (ref 78–100)
PLATELET # BLD AUTO: 328 10E3/UL (ref 150–450)
PROTHROMBIN TIME: 12.6 SECONDS (ref 11.8–14.8)
RBC # BLD AUTO: 3.74 10E6/UL (ref 3.8–5.2)
WBC # BLD AUTO: 9.3 10E3/UL (ref 4–11)

## 2025-06-07 PROCEDURE — 82962 GLUCOSE BLOOD TEST: CPT

## 2025-06-07 PROCEDURE — G0378 HOSPITAL OBSERVATION PER HR: HCPCS

## 2025-06-07 PROCEDURE — 250N000012 HC RX MED GY IP 250 OP 636 PS 637: Performed by: NURSE PRACTITIONER

## 2025-06-07 PROCEDURE — 250N000013 HC RX MED GY IP 250 OP 250 PS 637: Performed by: NURSE PRACTITIONER

## 2025-06-07 PROCEDURE — 85027 COMPLETE CBC AUTOMATED: CPT | Performed by: STUDENT IN AN ORGANIZED HEALTH CARE EDUCATION/TRAINING PROGRAM

## 2025-06-07 PROCEDURE — 96372 THER/PROPH/DIAG INJ SC/IM: CPT | Performed by: PEDIATRICS

## 2025-06-07 PROCEDURE — 250N000011 HC RX IP 250 OP 636: Performed by: PEDIATRICS

## 2025-06-07 PROCEDURE — 36415 COLL VENOUS BLD VENIPUNCTURE: CPT | Performed by: STUDENT IN AN ORGANIZED HEALTH CARE EDUCATION/TRAINING PROGRAM

## 2025-06-07 PROCEDURE — 85610 PROTHROMBIN TIME: CPT | Performed by: STUDENT IN AN ORGANIZED HEALTH CARE EDUCATION/TRAINING PROGRAM

## 2025-06-07 PROCEDURE — 99233 SBSQ HOSP IP/OBS HIGH 50: CPT | Mod: FS | Performed by: STUDENT IN AN ORGANIZED HEALTH CARE EDUCATION/TRAINING PROGRAM

## 2025-06-07 PROCEDURE — 85730 THROMBOPLASTIN TIME PARTIAL: CPT | Performed by: STUDENT IN AN ORGANIZED HEALTH CARE EDUCATION/TRAINING PROGRAM

## 2025-06-07 PROCEDURE — 250N000013 HC RX MED GY IP 250 OP 250 PS 637: Performed by: PEDIATRICS

## 2025-06-07 PROCEDURE — 99207 PR APP CREDIT; MD BILLING SHARED VISIT: CPT | Mod: FS

## 2025-06-07 RX ORDER — NALOXONE HYDROCHLORIDE 0.4 MG/ML
0.2 INJECTION, SOLUTION INTRAMUSCULAR; INTRAVENOUS; SUBCUTANEOUS
Status: DISCONTINUED | OUTPATIENT
Start: 2025-06-07 | End: 2025-06-11 | Stop reason: HOSPADM

## 2025-06-07 RX ORDER — NALOXONE HYDROCHLORIDE 0.4 MG/ML
0.4 INJECTION, SOLUTION INTRAMUSCULAR; INTRAVENOUS; SUBCUTANEOUS
Status: DISCONTINUED | OUTPATIENT
Start: 2025-06-07 | End: 2025-06-11 | Stop reason: HOSPADM

## 2025-06-07 RX ORDER — LIDOCAINE 40 MG/G
CREAM TOPICAL
Status: DISCONTINUED | OUTPATIENT
Start: 2025-06-07 | End: 2025-06-11 | Stop reason: HOSPADM

## 2025-06-07 RX ADMIN — ACETAMINOPHEN 975 MG: 325 TABLET ORAL at 00:35

## 2025-06-07 RX ADMIN — METFORMIN HYDROCHLORIDE 500 MG: 500 TABLET, FILM COATED ORAL at 17:34

## 2025-06-07 RX ADMIN — INSULIN ASPART 1 UNITS: 100 INJECTION, SOLUTION INTRAVENOUS; SUBCUTANEOUS at 07:58

## 2025-06-07 RX ADMIN — GABAPENTIN 300 MG: 300 CAPSULE ORAL at 13:15

## 2025-06-07 RX ADMIN — CALCIUM CARBONATE 600 MG (1,500 MG)-VITAMIN D3 400 UNIT TABLET 1 TABLET: at 07:53

## 2025-06-07 RX ADMIN — INSULIN HUMAN 16 UNITS: 100 INJECTION, SUSPENSION SUBCUTANEOUS at 07:56

## 2025-06-07 RX ADMIN — CALCIUM CARBONATE 600 MG (1,500 MG)-VITAMIN D3 400 UNIT TABLET 1 TABLET: at 17:34

## 2025-06-07 RX ADMIN — PREDNISONE 60 MG: 20 TABLET ORAL at 07:52

## 2025-06-07 RX ADMIN — OXYCODONE HYDROCHLORIDE 10 MG: 10 TABLET ORAL at 23:10

## 2025-06-07 RX ADMIN — ENOXAPARIN SODIUM 40 MG: 40 INJECTION SUBCUTANEOUS at 07:52

## 2025-06-07 RX ADMIN — ACETAMINOPHEN 975 MG: 325 TABLET ORAL at 23:10

## 2025-06-07 RX ADMIN — INSULIN ASPART 3 UNITS: 100 INJECTION, SOLUTION INTRAVENOUS; SUBCUTANEOUS at 13:13

## 2025-06-07 RX ADMIN — DIVALPROEX SODIUM 500 MG: 500 TABLET, DELAYED RELEASE ORAL at 07:53

## 2025-06-07 RX ADMIN — OXYCODONE HYDROCHLORIDE 10 MG: 10 TABLET ORAL at 03:15

## 2025-06-07 RX ADMIN — SERTRALINE HYDROCHLORIDE 75 MG: 50 TABLET ORAL at 07:52

## 2025-06-07 RX ADMIN — ASPIRIN 81 MG: 81 TABLET ORAL at 07:52

## 2025-06-07 RX ADMIN — DIVALPROEX SODIUM 500 MG: 500 TABLET, DELAYED RELEASE ORAL at 20:54

## 2025-06-07 RX ADMIN — INSULIN ASPART 4 UNITS: 100 INJECTION, SOLUTION INTRAVENOUS; SUBCUTANEOUS at 17:36

## 2025-06-07 RX ADMIN — Medication 500 MG: at 07:53

## 2025-06-07 RX ADMIN — PROPRANOLOL HYDROCHLORIDE 60 MG: 60 CAPSULE, EXTENDED RELEASE ORAL at 07:54

## 2025-06-07 RX ADMIN — LOSARTAN POTASSIUM 100 MG: 100 TABLET, FILM COATED ORAL at 07:52

## 2025-06-07 RX ADMIN — GABAPENTIN 300 MG: 300 CAPSULE ORAL at 20:54

## 2025-06-07 RX ADMIN — PANTOPRAZOLE SODIUM 40 MG: 40 TABLET, DELAYED RELEASE ORAL at 07:52

## 2025-06-07 RX ADMIN — METFORMIN HYDROCHLORIDE 500 MG: 500 TABLET, FILM COATED ORAL at 07:54

## 2025-06-07 RX ADMIN — GABAPENTIN 300 MG: 300 CAPSULE ORAL at 07:52

## 2025-06-07 RX ADMIN — AMLODIPINE BESYLATE 10 MG: 5 TABLET ORAL at 23:09

## 2025-06-07 ASSESSMENT — ACTIVITIES OF DAILY LIVING (ADL)
ADLS_ACUITY_SCORE: 59
ADLS_ACUITY_SCORE: 59
ADLS_ACUITY_SCORE: 60
ADLS_ACUITY_SCORE: 59
ADLS_ACUITY_SCORE: 60
ADLS_ACUITY_SCORE: 59
ADLS_ACUITY_SCORE: 58
ADLS_ACUITY_SCORE: 59
ADLS_ACUITY_SCORE: 60
ADLS_ACUITY_SCORE: 58
ADLS_ACUITY_SCORE: 60
ADLS_ACUITY_SCORE: 59

## 2025-06-07 NOTE — PLAN OF CARE
Goal Outcome Evaluation:      Plan of Care Reviewed With: patient    Overall Patient Progress: improvingOverall Patient Progress: improving    Outcome Evaluation: TCU placement vs home with OP rehab therapies vs home with HHC rehab therapies    SB Elam, LGSW  ED/OBS   [facility]  Phone: [phone]  Pager: [phone]  Fax: [phone]     After hours [employer] and After Hours Voc[employer] [place]     On-call pager, [phone], 4:00 pm to 8:30 pm

## 2025-06-07 NOTE — PLAN OF CARE
OBS Goal Assessment    -Safe disposition plan has been identified: social work consulted, in progress    Nurse to notify provider when observation goals have been met and patient is ready for discharge

## 2025-06-07 NOTE — PLAN OF CARE
Goal Outcome Evaluation:      Plan of Care Reviewed With: patient    Overall Patient Progress: improvingOverall Patient Progress: improving    Neuro: A&Ox4.   Cardiac: SR. VSS.   Respiratory: Sating upper 90's on RA.  GI/: Adequate urine output. BM X1  Diet/appetite: Tolerating regular diet. Eating well.  Activity:  Assist of 1, up to Bathroom  Pain: Pt reporting 8/10 knee pain with relief to 5/10 with oral oxy.   Skin: Scattered bruising - pt states this is normal  LDA's: RPIV SL    Plan: Observation pt. Pain management. Social work consult. Continue with POC. Notify primary team with changes.    BP (!) 144/81 (BP Location: Right arm)   Pulse 75   Temp 97.7  F (36.5  C) (Oral)   Resp 16   SpO2 96%

## 2025-06-07 NOTE — PROGRESS NOTES
8MS ADMISSION    D: Patient admitted/transferred from [place] ED via St[facility] for weakness.     I: Upon arrival to the unit patient was oriented to room, unit, and call light. Patient s height, weight, and vital signs were obtained. Allergies reviewed and allergy band applied. Provider notified of patient s arrival on the unit. Adult AVS completed. Head to toe assessment completed. Education assessment completed. Care plan initiated.    A: Vital signs stable upon admission. Patient denies pain. Two RN skin check completed with Christina Sams RN. Skin Findings include scattered bruises on bilateral arms and abdomen.     P: Continue to monitor patient s vitals and intervene as needed. Continue with plan of care. Notify provider with any concerns or changes in patient status.

## 2025-06-07 NOTE — PROGRESS NOTES
[facility]    Medicine Progress Note - Hospitalist Service    Date of Admission:  6/6/2025    Assessment & Plan      Amy Choudhury is a 65 year old woman admitted on 6/6/2025. 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    Medically ready for discharge-needs PT/OT/SW and new TCU placement.     #) Acute bilateral knee pain-improved - The patient reports one days of severe bilateral knee pain, weakness and a sense of swelling.  The pain is inconsistent with no provoking or relieving factors, and currently predominantly on the right.  It affects the joint and extends to the soft tissue under her thigh.  She denies any recent trauma or fevers.  She has no history of gout.  She notes that with her recent hospital discharge and TCU stay, she has been without the 60 mg of prednisone she is currently taking for her inflammatory myeloid angiopathy for two days.     - Xray without evidence for fracture, US without evidence for DVT  - She is able to bend both knees so very much doubt a septic arthritis.  - Hydromorphone IV x1, then start oxycodone 10 mg q4h PRN pain  - Restart of prednisone and other meds below tonight     #) Discharge planning - The patient has been discharged from multiple facilities for a variety of issues over the past three months and has ended up re-hospitalized within a day or two, often at a different institution.  She was most recently hospitalized here from 5/21-5/28, [place] [facility] [place], discharged to a TCU, left the next day and is now re-admitted here.  I suspect she will need to go to a TCU, and not the one she just came from.  - Consult social work    #) Recent acute ischemic CVA - Ilene-procedural  "stroke with resolving left sided motor deficits.  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  She is on 60 mg of prednisone through 6/28, after which she will taper down 10 mg every 7 days until she is at 10 mg/day.  - Resuming prednisone  - Continue Bactrim for PJP prophylaxis  - Continue protonix    #) History of headaches  - Continue home depakote, gabapentin,     #) History of hypertension  - Continue home amlodipine, losartan    #) History of DM II  - Continue sliding sale insulin  - Continue 16 units NPH qam while on prednisone  - Continue metformin    Recent Labs   Lab 06/07/25  0758 06/07/25  0315 06/06/25  2212 06/06/25  1645 06/06/25  0608   * 176* 283* 137* 176*            Observation Goals: -safe disposition plan has been identified, Nurse to notify provider when observation goals have been met and patient is ready for discharge.  Diet: Regular Diet Adult    DVT Prophylaxis: Ambulate every shift  Diehl Catheter: Not present  Lines: None     Cardiac Monitoring: None  Code Status: Full Code      Clinically Significant Risk Factors Present on Admission           # Hypocalcemia: Lowest Ca = 8.4 mg/dL in last 2 days, will monitor and replace as appropriate       # Drug Induced Platelet Defect: home medication list includes an antiplatelet medication   # Hypertension: Noted on problem list      # Anemia: based on hgb <11      # DMII: A1C = 8.7 % (Ref range: <5.7 %) within past 6 months  # Severe Obesity: Estimated body mass index is 37.2 kg/m  as calculated from the following:    Height as of 5/14/25: 1.676 m (5' 6\").    Weight as of 5/14/25: 104.6 kg (230 lb 8 oz). with complications       # Financial/Environmental Concerns: none         Social Drivers of Health    Food Insecurity: Food Insecurity Present (5/29/2025)    Received from [facility] Vital Sign     Worried About Running Out of Food in the Last Year: " Never true     Ran Out of Food in the Last Year: Sometimes true   Depression: At risk (11/4/2021)    PHQ-2     PHQ-2 Score: 5   Tobacco Use: High Risk (5/29/2025)    Received from [facility]    Patient History     Smoking Tobacco Use: Every Day     Smokeless Tobacco Use: Current   Financial Resource Strain: Medium Risk (5/29/2025)    Received from [facility]    Overall Financial Resource Strain (CARDIA)     Difficulty of Paying Living Expenses: Somewhat hard   Transportation Needs: Unmet Transportation Needs (5/29/2025)    Received from [facility]    PRAPARE - Transportation     Lack of Transportation (Medical): Yes     Lack of Transportation (Non-Medical): Yes          Disposition Plan     Medically Ready for Discharge: Anticipated Today, pending placement (PT/OT/SW recs)           The patient's care was discussed with the Attending Physician, Dr. Arce, Bedside Nurse, Patient, and ED charge.    JAVIER Mooney [facility]  Hospitalist Service  [facility]  Securely message with Vocera (more info)  Text page via [employer] Paging/Directory     This chart documentation was completed with Dragon voice-recognition software. Even though reviewed, this chart may still contain some grammatical, spelling, and word errors. Please contact the author for any questions or clarifications.     ______________________________________________________________________    Interval History   Nursing/SW/consult/interdisciplinary healthcare worker's notes reviewed.     I reviewed all medications, new labs and imaging results over the last 24 hours. Please see discussion of these results in the A/P.    No acute events overnight. Feels pain much better. Ready to discharge, but does not want to return to the TCU she was at prior.     ROS: 12 point ROS negative other than the symptoms noted here or above in the assessment and plan.       Physical Exam   Vital Signs: Temp:  98.1  F (36.7  C) Temp src: Oral BP: 128/83 Pulse: 63   Resp: 18 SpO2: 98 % O2 Device: None (Room air)    Weight: 0 lbs 0 oz    General Appearance: In NAD, sitting at bedside eating breakfast  Respiratory: LS clear b/l, normal RR  Cardiovascular: S1, S2, no m/r/g  GI: BS+, all 4 quadrants, no masses, non-tender upon palpation  Skin: Intact on face, arms, legs. No wounds, bruising, or lesions noted.  Neuropsych:A&Ox4, moving all extremities      Medical Decision Making       60 MINUTES SPENT BY ME on the date of service doing chart review, history, exam, documentation & further activities per the note.      Data     I have personally reviewed the following data over the past 24 hrs:    ALT: N/A AST: N/A AP: N/A TBILI: N/A   ALB: N/A TOT PROTEIN: N/A LIPASE: N/A       Imaging results reviewed over the past 24 hrs:   No results found for this or any previous visit (from the past 24 hours).

## 2025-06-07 NOTE — PROGRESS NOTES
Transfer  Transferred to: 8 [facility] [place]  Via: Ambulance  Reason for transfer: ED boarder awaiting   Family: Aware of transfer  Belongings: Packed and sent with pt  Chart: Delivered with pt to next unit  Medications: Meds sent to new unit with pt  Report given to: France Wisdom  Pt status: Stable    Pt is going through TCU workup with social work and PT/OT. Pain in legs has mostly resolved after restarting prednisone and gabapentin and pt has not required any PRN meds this morning.

## 2025-06-07 NOTE — PLAN OF CARE
"Goal Outcome Evaluation:      Plan of Care Reviewed With: patient    VS: /78 (BP Location: Right arm)   Pulse 63   Temp 98.1  F (36.7  C) (Oral)   Resp 18   Ht 1.676 m (5' 6\")   Wt 105.5 kg (232 lb 8 oz)   SpO2 98%   BMI 37.53 kg/m      O2: >92% on RA, no complaints of SOB or chest pain.   Output: Voiding w/o pain or difficulty to bathroom.   Last BM: 6/7/25   Activity: SBA    Skin: Scattered bruises on bilateral arms and abdomen. Dual skin check done with Flaquita Vasquez.   Pain: Denies pain this shift.    CMS: A&Ox4, L sided weakness at baseline.    Dressing: N/A   Diet: Regular. No complaints of nausea or vomiting.   LDA: R PIV, SL    Equipment: Personal belongings   Plan: Continue POC.   Additional Info:               "

## 2025-06-08 ENCOUNTER — APPOINTMENT (OUTPATIENT)
Dept: PHYSICAL THERAPY | Facility: CLINIC | Age: 65
End: 2025-06-08
Payer: COMMERCIAL

## 2025-06-08 LAB
CREAT SERPL-MCNC: 0.97 MG/DL (ref 0.51–0.95)
EGFRCR SERPLBLD CKD-EPI 2021: 65 ML/MIN/1.73M2
GLUCOSE BLDC GLUCOMTR-MCNC: 129 MG/DL (ref 70–99)
GLUCOSE BLDC GLUCOMTR-MCNC: 146 MG/DL (ref 70–99)
GLUCOSE BLDC GLUCOMTR-MCNC: 189 MG/DL (ref 70–99)
GLUCOSE BLDC GLUCOMTR-MCNC: 248 MG/DL (ref 70–99)

## 2025-06-08 PROCEDURE — 97116 GAIT TRAINING THERAPY: CPT | Mod: GP

## 2025-06-08 PROCEDURE — 96372 THER/PROPH/DIAG INJ SC/IM: CPT | Performed by: PEDIATRICS

## 2025-06-08 PROCEDURE — 82962 GLUCOSE BLOOD TEST: CPT

## 2025-06-08 PROCEDURE — 85049 AUTOMATED PLATELET COUNT: CPT | Performed by: PEDIATRICS

## 2025-06-08 PROCEDURE — 250N000013 HC RX MED GY IP 250 OP 250 PS 637: Performed by: PEDIATRICS

## 2025-06-08 PROCEDURE — 99233 SBSQ HOSP IP/OBS HIGH 50: CPT | Performed by: STUDENT IN AN ORGANIZED HEALTH CARE EDUCATION/TRAINING PROGRAM

## 2025-06-08 PROCEDURE — 36415 COLL VENOUS BLD VENIPUNCTURE: CPT | Performed by: PEDIATRICS

## 2025-06-08 PROCEDURE — 250N000012 HC RX MED GY IP 250 OP 636 PS 637: Performed by: NURSE PRACTITIONER

## 2025-06-08 PROCEDURE — G0378 HOSPITAL OBSERVATION PER HR: HCPCS

## 2025-06-08 PROCEDURE — 250N000011 HC RX IP 250 OP 636: Performed by: PEDIATRICS

## 2025-06-08 PROCEDURE — 250N000013 HC RX MED GY IP 250 OP 250 PS 637: Performed by: NURSE PRACTITIONER

## 2025-06-08 RX ADMIN — DIVALPROEX SODIUM 500 MG: 500 TABLET, DELAYED RELEASE ORAL at 07:51

## 2025-06-08 RX ADMIN — OXYCODONE HYDROCHLORIDE 10 MG: 10 TABLET ORAL at 19:55

## 2025-06-08 RX ADMIN — LOSARTAN POTASSIUM 100 MG: 100 TABLET, FILM COATED ORAL at 07:49

## 2025-06-08 RX ADMIN — INSULIN HUMAN 16 UNITS: 100 INJECTION, SUSPENSION SUBCUTANEOUS at 07:43

## 2025-06-08 RX ADMIN — ENOXAPARIN SODIUM 40 MG: 40 INJECTION SUBCUTANEOUS at 07:45

## 2025-06-08 RX ADMIN — INSULIN ASPART 5 UNITS: 100 INJECTION, SOLUTION INTRAVENOUS; SUBCUTANEOUS at 18:19

## 2025-06-08 RX ADMIN — OXYCODONE HYDROCHLORIDE 10 MG: 10 TABLET ORAL at 10:36

## 2025-06-08 RX ADMIN — OXYCODONE HYDROCHLORIDE 10 MG: 10 TABLET ORAL at 15:44

## 2025-06-08 RX ADMIN — CALCIUM CARBONATE 600 MG (1,500 MG)-VITAMIN D3 400 UNIT TABLET 1 TABLET: at 18:18

## 2025-06-08 RX ADMIN — ASPIRIN 81 MG: 81 TABLET ORAL at 07:50

## 2025-06-08 RX ADMIN — SERTRALINE HYDROCHLORIDE 75 MG: 50 TABLET ORAL at 07:50

## 2025-06-08 RX ADMIN — OXYCODONE HYDROCHLORIDE 10 MG: 10 TABLET ORAL at 22:46

## 2025-06-08 RX ADMIN — INSULIN ASPART 1 UNITS: 100 INJECTION, SOLUTION INTRAVENOUS; SUBCUTANEOUS at 07:43

## 2025-06-08 RX ADMIN — PREDNISONE 60 MG: 20 TABLET ORAL at 07:51

## 2025-06-08 RX ADMIN — METFORMIN HYDROCHLORIDE 500 MG: 500 TABLET, FILM COATED ORAL at 07:51

## 2025-06-08 RX ADMIN — METFORMIN HYDROCHLORIDE 500 MG: 500 TABLET, FILM COATED ORAL at 18:18

## 2025-06-08 RX ADMIN — GABAPENTIN 300 MG: 300 CAPSULE ORAL at 07:50

## 2025-06-08 RX ADMIN — AMLODIPINE BESYLATE 10 MG: 5 TABLET ORAL at 22:46

## 2025-06-08 RX ADMIN — ACETAMINOPHEN 975 MG: 325 TABLET ORAL at 08:59

## 2025-06-08 RX ADMIN — DIVALPROEX SODIUM 500 MG: 500 TABLET, DELAYED RELEASE ORAL at 19:55

## 2025-06-08 RX ADMIN — PANTOPRAZOLE SODIUM 40 MG: 40 TABLET, DELAYED RELEASE ORAL at 07:51

## 2025-06-08 RX ADMIN — PROPRANOLOL HYDROCHLORIDE 60 MG: 60 CAPSULE, EXTENDED RELEASE ORAL at 07:50

## 2025-06-08 RX ADMIN — OXYCODONE HYDROCHLORIDE 10 MG: 10 TABLET ORAL at 06:10

## 2025-06-08 RX ADMIN — GABAPENTIN 300 MG: 300 CAPSULE ORAL at 19:55

## 2025-06-08 RX ADMIN — CALCIUM CARBONATE 600 MG (1,500 MG)-VITAMIN D3 400 UNIT TABLET 1 TABLET: at 07:50

## 2025-06-08 RX ADMIN — INSULIN ASPART 2 UNITS: 100 INJECTION, SOLUTION INTRAVENOUS; SUBCUTANEOUS at 11:58

## 2025-06-08 RX ADMIN — Medication 500 MG: at 09:00

## 2025-06-08 RX ADMIN — GABAPENTIN 300 MG: 300 CAPSULE ORAL at 14:47

## 2025-06-08 ASSESSMENT — ACTIVITIES OF DAILY LIVING (ADL)
ADLS_ACUITY_SCORE: 60

## 2025-06-08 NOTE — PLAN OF CARE
"Patient admitted -    VS: Blood pressure 103/62, pulse 63, temperature 98.1  F (36.7  C), temperature source Oral, resp. rate 18, height 1.676 m (5' 6\"), weight 105.5 kg (232 lb 8 oz), SpO2 97%, not currently breastfeeding.       O2: >90% on RA no complaints of SOB or chest pain.   Output: Voiding adequate amounts w/o pain or difficulty to bathroom.   Last BM: Continent of B&B, no BM during shift    Activity: SBA    Skin: Scattered bruises on bilateral arms and abdomen.   Pain: 8/10 managed with Oxycodone    CMS: A/O x4, gait instability or left sided weakness.    Dressing: None    Diet: Regular, tolerating well. Glucose monitoring before meals and bedtime.    LDA: MONTRELL GILMORE   Equipment: Personal belongings   Plan: Continue POC    Additional Info: Q 4 vitals & BS check 4 times daily B4 meals and bedtime.                "

## 2025-06-08 NOTE — PLAN OF CARE
"Pt  A/O x3. Afebrile. /62 (BP Location: Left arm)   Pulse 63   Temp 98.1  F (36.7  C) (Oral)   Resp 18   Ht 1.676 m (5' 6\")   Wt 105.5 kg (232 lb 8 oz)   SpO2 97%   BMI 37.53 kg/m    Lungs- Clear bilaterally with both anterior and posterior: Bowels- Hyperactive in all 4 quadrants. PP- + DP-+. CMS and Neuro's are intact. Pt is on a regular diet and taking PO food/ fluids. Voiding. Pain to bilat knees reported, prn pain meds effective. Pt is up with independently: Pt is able to make needs known and the call light is within the pt's reach. Continue to monitor.                       "

## 2025-06-08 NOTE — UTILIZATION REVIEW
Concurrent stay review; Secondary Review Determination - URCUSTODIAL    [facility]        Under the authority of the Utilization Management Committee, the utilization review process indicated a secondary review on the above patient.  The review outcome is based on review of the medical records, discussions with staff, and applying clinical experience noted on the date of the review.        (x) Outpatient senior living status is appropriate       RATIONALE FOR DETERMINATION:     65 year old woman admitted on 6/6/2025. 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   Acute bilateral knee pain improved.  Patient is waiting for new TCU placement    Patient delayed discharge is related to disposition, there is no medical necessity for inpatient admission at the time of this review. If there is a change in patient status, please resend for review.    The information on this document is developed by the utilization review team in order for the business office to ensure compliance.  This only denotes the appropriateness of proper admission status and does not reflect the quality of care rendered.       The definitions of Inpatient Status and Observation Status used in making the determination above are those provided in the CMS Coverage Manual, Chapter 1 and Chapter 6, section 70.4.         Sincerely,     SELENA POWER MD   Utilization Review  Physician Advisor  [facility]

## 2025-06-08 NOTE — CONSULTS
Care Management Follow Up    Length of Stay (days): 0    Expected Discharge Date: 06/09/2025     Concerns to be Addressed: discharge planning     Patient plan of care discussed at interdisciplinary rounds: No    Anticipated Discharge Disposition: Skilled Nursing Facility, Transitional Care     Anticipated Discharge Services: None  Anticipated Discharge DME: None    Patient/family educated on Medicare website which has current facility and service quality ratings: no  Education Provided on the Discharge Plan: Yes  Patient/Family in Agreement with the Plan: yes    Referrals Placed by CM/SW: Post Acute Facilities    Pending:    [facility]   [address] [place] [address]  [phone]  6/8: Initial referral sent    [facility]  [address] [place] [address]  P:  [phone]  6/8: Initial referral sent to [facility] 4th floor  [address] [place]  [address]  Admissions: [phone]  Fax: [phone]  RN to RN:  [phone]  6/8: Initial referral sent    [facility]  [address] [place], [place] [address] [phone]  6/8: Initial referral sent    [facility]  [address] [place] [address]  [phone]  6/8: Initial referral sent to [facility]  [address] [place] [address]  [phone]  Admissions:  [phone]  Fax:  [phone]  6/8: Initial referral sent to [facility]  [address] [place] [address]  [phone]  6/8: Initial referral sent    [facility]  [address] [place], [place] [id number] [phone]  6/8: Initial referral sent    [facility] [place] [facility]  [address] [place] [address]  Phone (Main): [phone]  Admissions: [phone]  6/8: Initial referral  sent    Private pay costs discussed: insurance costs general coverage for TCU    Discussed  Partnership in Safe Discharge Planning  document with patient/family: Yes: discussed that we discharge to first accepting facility     Handoff Completed: Yes, LEIGH PCP: Internal handoff referral completed    Additional Information:    Per provider notes, patient is medically ready for discharge.    KULWANT met with patient at bedside. SW discussed recommendation by PT for Transitional Care (TCU). Patient agreed that a TCU stay for rehab would be beneficial. SW presented Medicare.gov list of TCU facilities and requested that patient identify 5-10 preferences of facilities for SW to send referrals to. SW explained the Medicare.gov list - the star rating, what contributes to the rating, and how to find additional information about most recent health department survey results on Medicare.gov. Patient opted to go through the list with SW and identified 9 facilities at this time.   SW explained referral process, noting that SW will send referrals as soon as possible in order to avoid a prolonged hospitalization while waiting for accepting facility. SW explained that acceptance to TCU facilities depends on many factors, including bed availability/staffing, insurance coverage, and level of care/acuity. SW explained that once patient is medically ready for discharge, patient will be discharged to the first accepting facility. Patient verbalized understanding. KULWANT discussed insurance coverage for TCU. Patient has [employer] [place] for insurance. Briefly discussed insurance coverage for TCU. SW instructed patient to reach out to her particular insurance company for specific coverage information. No further questions at this time.    SW initiated referrals (see above).    Next Steps:     - Follow up on TCU referrals    Once placement is found:  - Complete PAS  - Discuss transportation options (including possible out of pocket costs) and  schedule if needed    On day of discharge:   - Send orders to facility        TANG Vance, LSW  Weekend Covering   [facility] Acute [facility] Management  Searchable on Vocera: 6 Med Surg SW, 8 Med Surg SW, 10 ICU SW

## 2025-06-08 NOTE — PROGRESS NOTES
[facility]    Medicine Progress Note - Hospitalist Service, GOLD TEAM 21    Date of Admission:  6/6/2025    Assessment & Plan      Amy Choudhury is a 65 year old woman admitted on 6/6/2025. 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    Medically ready for discharge-needs PT/OT/SW and new TCU placement.     #) Acute bilateral knee pain-improved - The patient reports one days of severe bilateral knee pain, weakness and a sense of swelling.  The pain is inconsistent with no provoking or relieving factors, and currently predominantly on the right.  It affects the joint and extends to the soft tissue under her thigh.  She denies any recent trauma or fevers.  She has no history of gout.  She notes that with her recent hospital discharge and TCU stay, she has been without the 60 mg of prednisone she is currently taking for her inflammatory myeloid angiopathy for two days.     - Xray without evidence for fracture, US without evidence for DVT  - She is able to bend both knees so very much doubt a septic arthritis.  - oxycodone 10 mg q4h PRN pain  - Restart of prednisone and other meds below tonight     #) Discharge planning - The patient has been discharged from multiple facilities for a variety of issues over the past three months and has ended up re-hospitalized within a day or two, often at a different institution.  She was most recently hospitalized here from 5/21-5/28, hospitalized at [facility] from 5/29-6/4, discharged to a TCU, left the next day and is now re-admitted here.  I suspect she will need to go to a TCU, and not the one she just came from.  - Consult social work    #) Recent acute ischemic CVA - Ilene-procedural stroke with resolving  "left sided motor deficits.  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  She is on 60 mg of prednisone through 6/28, after which she will taper down 10 mg every 7 days until she is at 10 mg/day.  - Resuming prednisone  - Continue Bactrim for PJP prophylaxis  - Continue protonix    #) History of headaches  - Continue home depakote, gabapentin,     #) History of hypertension  - Continue home amlodipine, losartan    #) History of DM II  - Continue sliding sale insulin  - Continue 16 units NPH qam while on prednisone  - Continue metformin       Observation Goals: -safe disposition plan has been identified, Nurse to notify provider when observation goals have been met and patient is ready for discharge.  Diet: Regular Diet Adult    DVT Prophylaxis: Ambulate every shift  Diehl Catheter: Not present  Lines: None     Cardiac Monitoring: None  Code Status: Full Code      Clinically Significant Risk Factors Present on Admission                 # Drug Induced Platelet Defect: home medication list includes an antiplatelet medication   # Hypertension: Noted on problem list        # Anemia: based on hgb <11      # DMII: A1C = 8.7 % (Ref range: <5.7 %) within past 6 months  # Severe Obesity: Estimated body mass index is 37.53 kg/m  as calculated from the following:    Height as of this encounter: 1.676 m (5' 6\").    Weight as of this encounter: 105.5 kg (232 lb 8 oz). with complications         # Financial/Environmental Concerns: none         Social Drivers of Health    Food Insecurity: Low Risk  (6/7/2025)    Food Insecurity     Within the past 12 months, did you worry that your food would run out before you got money to buy more?: No     Within the past 12 months, did the food you bought just not last and you didn t have money to get more?: No   Recent Concern: Food Insecurity - Food Insecurity Present (5/29/2025)    Received from [facility] " Vital Sign     Worried About Running Out of Food in the Last Year: Never true     Ran Out of Food in the Last Year: Sometimes true   Depression: At risk (11/4/2021)    PHQ-2     PHQ-2 Score: 5   Tobacco Use: High Risk (5/29/2025)    Received from [facility]    Patient History     Smoking Tobacco Use: Every Day     Smokeless Tobacco Use: Current   Financial Resource Strain: Low Risk  (6/7/2025)    Financial Resource Strain     Within the past 12 months, have you or your family members you live with been unable to get utilities (heat, electricity) when it was really needed?: No   Recent Concern: Financial Resource Strain - Medium Risk (5/29/2025)    Received from [facility]    Overall Financial Resource Strain (CARDIA)     Difficulty of Paying Living Expenses: Somewhat hard   Transportation Needs: High Risk (6/7/2025)    Transportation Needs     Within the past 12 months, has lack of transportation kept you from medical appointments, getting your medicines, non-medical meetings or appointments, work, or from getting things that you need?: Yes          Disposition Plan     Medically Ready for Discharge: Anticipated Today, pending placement (PT/OT/SW recs)           The patient's care was discussed with the Attending Physician, Dr. Arce, Bedside Nurse, Patient, and ED charge.    Vernon Styles, DO  Hospitalist Service, GOLD TEAM [address] [place] [address]  Securely message with [email or web address] (more info)  Text page via [facility] Paging/Directory   See signed in provider for up to date coverage information    This chart documentation was completed with Dragon voice-recognition software. Even though reviewed, this chart may still contain some grammatical, spelling, and word errors. Please contact the author for any questions or clarifications.     ______________________________________________________________________    Interval History     Transferred from [place] overnight for  placement. Notes that her knee pain returned for a bit but is now improved. She hopes it was stopping the pred that caused her pain. Still ready for TCU       Physical Exam   Vital Signs: Temp: 97.9  F (36.6  C) Temp src: Oral BP: (!) 128/91 Pulse: 63   Resp: 18 SpO2: 96 % O2 Device: None (Room air)    Weight: 232 lbs 8 oz    General Appearance: In NAD  Respiratory: breathing comfortably on room air   Cardiovascular: RRR  GI: non distended   Skin: Intact on face, arms, legs. No wounds, bruising, or lesions noted.  Neuropsych:A&Ox4, moving all extremities      Medical Decision Making       55 MINUTES SPENT BY ME on the date of service doing chart review, history, exam, documentation & further activities per the note.      Data     I have personally reviewed the following data over the past 24 hrs:    9.3  \   10.4 (L)   / 328     N/A N/A N/A /  146 (H)   N/A N/A N/A \     INR:  0.90 PTT:  24   D-dimer:  N/A Fibrinogen:  N/A       Imaging results reviewed over the past 24 hrs:   No results found for this or any previous visit (from the past 24 hours).

## 2025-06-08 NOTE — PLAN OF CARE
"Goal Outcome Evaluation:      Plan of Care Reviewed With: patient    VS: /80 (BP Location: Left arm)   Pulse 63   Temp 98.4  F (36.9  C) (Oral)   Resp 16   Ht 1.676 m (5' 6\")   Wt 105.5 kg (232 lb 8 oz)   SpO2 96%   BMI 37.53 kg/m      O2: >92% on RA, no complaints of SOB or chest pain.   Output: Voiding w/o pain or difficulty to bathroom.   Last BM: 6/8/25 per pt   Activity: Independent    Skin: Skin intact   Pain: Managed with tylenol and oxycodone PRN.   CMS: A&OX4   Dressing: N/A   Diet: Regular, tolerating well. No complaints of nausea or vomiting.   LDA: SONA PEACOCK, MONTRELL   Equipment: Personal belongings   Plan: Continue POC.   Additional Info:                    "

## 2025-06-09 PROBLEM — I10 ESSENTIAL HYPERTENSION: Status: ACTIVE | Noted: 2025-06-09

## 2025-06-09 PROBLEM — Z79.4 TYPE 2 DIABETES MELLITUS WITHOUT COMPLICATION, WITH LONG-TERM CURRENT USE OF INSULIN (H): Status: ACTIVE | Noted: 2025-06-09

## 2025-06-09 PROBLEM — Z71.89 OTHER SPECIFIED COUNSELING: Chronic | Status: ACTIVE | Noted: 2025-06-09

## 2025-06-09 PROBLEM — I68.0 CEREBRAL AMYLOID ANGIOPATHY (H): Status: ACTIVE | Noted: 2025-06-09

## 2025-06-09 PROBLEM — E85.4 CEREBRAL AMYLOID ANGIOPATHY (H): Status: ACTIVE | Noted: 2025-06-09

## 2025-06-09 PROBLEM — Z92.241 H/O SYSTEMIC STEROID THERAPY: Status: ACTIVE | Noted: 2025-06-09

## 2025-06-09 PROBLEM — E11.9 INSULIN DEPENDENT TYPE 2 DIABETES MELLITUS (H): Status: ACTIVE | Noted: 2025-06-09

## 2025-06-09 PROBLEM — G43.709 CHRONIC MIGRAINE WITHOUT AURA WITHOUT STATUS MIGRAINOSUS, NOT INTRACTABLE: Status: ACTIVE | Noted: 2025-06-09

## 2025-06-09 PROBLEM — E11.9 TYPE 2 DIABETES MELLITUS WITHOUT COMPLICATION, WITH LONG-TERM CURRENT USE OF INSULIN (H): Status: ACTIVE | Noted: 2025-06-09

## 2025-06-09 PROBLEM — Z79.4 INSULIN DEPENDENT TYPE 2 DIABETES MELLITUS (H): Status: ACTIVE | Noted: 2025-06-09

## 2025-06-09 LAB
GLUCOSE BLDC GLUCOMTR-MCNC: 112 MG/DL (ref 70–99)
GLUCOSE BLDC GLUCOMTR-MCNC: 147 MG/DL (ref 70–99)
GLUCOSE BLDC GLUCOMTR-MCNC: 251 MG/DL (ref 70–99)
GLUCOSE BLDC GLUCOMTR-MCNC: 269 MG/DL (ref 70–99)
MCV RBC AUTO: 87 FL (ref 78–100)
PLATELET # BLD AUTO: 283 10E3/UL (ref 150–450)

## 2025-06-09 PROCEDURE — 97167 OT EVAL HIGH COMPLEX 60 MIN: CPT | Mod: GO

## 2025-06-09 PROCEDURE — 250N000013 HC RX MED GY IP 250 OP 250 PS 637: Performed by: STUDENT IN AN ORGANIZED HEALTH CARE EDUCATION/TRAINING PROGRAM

## 2025-06-09 PROCEDURE — 99232 SBSQ HOSP IP/OBS MODERATE 35: CPT | Performed by: STUDENT IN AN ORGANIZED HEALTH CARE EDUCATION/TRAINING PROGRAM

## 2025-06-09 PROCEDURE — 82962 GLUCOSE BLOOD TEST: CPT

## 2025-06-09 PROCEDURE — 97535 SELF CARE MNGMENT TRAINING: CPT | Mod: GO

## 2025-06-09 PROCEDURE — 250N000013 HC RX MED GY IP 250 OP 250 PS 637: Performed by: PEDIATRICS

## 2025-06-09 PROCEDURE — 250N000012 HC RX MED GY IP 250 OP 636 PS 637: Performed by: NURSE PRACTITIONER

## 2025-06-09 PROCEDURE — 250N000011 HC RX IP 250 OP 636: Performed by: PEDIATRICS

## 2025-06-09 PROCEDURE — 96372 THER/PROPH/DIAG INJ SC/IM: CPT | Performed by: PEDIATRICS

## 2025-06-09 PROCEDURE — G0378 HOSPITAL OBSERVATION PER HR: HCPCS

## 2025-06-09 PROCEDURE — 250N000013 HC RX MED GY IP 250 OP 250 PS 637: Performed by: NURSE PRACTITIONER

## 2025-06-09 PROCEDURE — 97129 THER IVNTJ 1ST 15 MIN: CPT | Mod: GO

## 2025-06-09 RX ORDER — AMLODIPINE BESYLATE 10 MG/1
10 TABLET ORAL AT BEDTIME
Qty: 60 TABLET | Refills: 1 | Status: SHIPPED | OUTPATIENT
Start: 2025-06-09

## 2025-06-09 RX ORDER — SULFAMETHOXAZOLE AND TRIMETHOPRIM 800; 160 MG/1; MG/1
1 TABLET ORAL
Qty: 30 TABLET | Refills: 0 | Status: SHIPPED | OUTPATIENT
Start: 2025-06-09 | End: 2025-08-18

## 2025-06-09 RX ORDER — GABAPENTIN 300 MG/1
300 CAPSULE ORAL 3 TIMES DAILY
Qty: 90 CAPSULE | Refills: 1 | Status: SHIPPED | OUTPATIENT
Start: 2025-06-09

## 2025-06-09 RX ORDER — OXYCODONE HYDROCHLORIDE 10 MG/1
10 TABLET ORAL EVERY 4 HOURS PRN
Qty: 18 TABLET | Refills: 0 | Status: SHIPPED | OUTPATIENT
Start: 2025-06-09 | End: 2025-06-11

## 2025-06-09 RX ORDER — PREDNISONE 10 MG/1
10 TABLET ORAL DAILY
Qty: 60 TABLET | Refills: 1 | Status: SHIPPED | OUTPATIENT
Start: 2025-08-10

## 2025-06-09 RX ORDER — PROPRANOLOL HYDROCHLORIDE 60 MG/1
60 CAPSULE, EXTENDED RELEASE ORAL DAILY
Qty: 60 CAPSULE | Refills: 1 | Status: SHIPPED | OUTPATIENT
Start: 2025-06-09

## 2025-06-09 RX ORDER — OXYCODONE HYDROCHLORIDE 10 MG/1
10 TABLET ORAL EVERY 6 HOURS PRN
Refills: 0 | Status: DISCONTINUED | OUTPATIENT
Start: 2025-06-09 | End: 2025-06-11 | Stop reason: HOSPADM

## 2025-06-09 RX ORDER — PREDNISONE 10 MG/1
TABLET ORAL
Qty: 225 TABLET | Refills: 0 | Status: SHIPPED | OUTPATIENT
Start: 2025-06-09 | End: 2025-08-03

## 2025-06-09 RX ORDER — INSULIN ASPART 100 [IU]/ML
INJECTION, SOLUTION INTRAVENOUS; SUBCUTANEOUS
Qty: 15 ML | Refills: 3 | Status: SHIPPED | OUTPATIENT
Start: 2025-06-09

## 2025-06-09 RX ORDER — ACETAMINOPHEN 500 MG
1000 TABLET ORAL EVERY 6 HOURS PRN
Qty: 120 TABLET | Refills: 0 | Status: SHIPPED | OUTPATIENT
Start: 2025-06-09

## 2025-06-09 RX ORDER — MAGNESIUM GLUCONATE 27 MG(500)
500 TABLET ORAL DAILY
Qty: 90 TABLET | Refills: 2 | Status: SHIPPED | OUTPATIENT
Start: 2025-06-09

## 2025-06-09 RX ORDER — DIVALPROEX SODIUM 500 MG/1
500 TABLET, DELAYED RELEASE ORAL 2 TIMES DAILY
Qty: 120 TABLET | Refills: 1 | Status: SHIPPED | OUTPATIENT
Start: 2025-06-09

## 2025-06-09 RX ORDER — PANTOPRAZOLE SODIUM 40 MG/1
40 TABLET, DELAYED RELEASE ORAL DAILY
Qty: 60 TABLET | Refills: 0 | Status: SHIPPED | OUTPATIENT
Start: 2025-06-09

## 2025-06-09 RX ORDER — LOSARTAN POTASSIUM 100 MG/1
100 TABLET ORAL DAILY
Qty: 60 TABLET | Refills: 1 | Status: SHIPPED | OUTPATIENT
Start: 2025-06-09

## 2025-06-09 RX ORDER — CALCIUM CARBONATE/VITAMIN D3 600 MG-10
1 TABLET ORAL 2 TIMES DAILY WITH MEALS
Qty: 180 TABLET | Refills: 1 | Status: SHIPPED | OUTPATIENT
Start: 2025-06-09

## 2025-06-09 RX ORDER — ASPIRIN 81 MG/1
81 TABLET ORAL DAILY
Qty: 60 TABLET | Refills: 1 | Status: SHIPPED | OUTPATIENT
Start: 2025-06-09

## 2025-06-09 RX ADMIN — GABAPENTIN 300 MG: 300 CAPSULE ORAL at 13:34

## 2025-06-09 RX ADMIN — METFORMIN HYDROCHLORIDE 500 MG: 500 TABLET, FILM COATED ORAL at 08:14

## 2025-06-09 RX ADMIN — PREDNISONE 60 MG: 20 TABLET ORAL at 08:15

## 2025-06-09 RX ADMIN — DIVALPROEX SODIUM 500 MG: 500 TABLET, DELAYED RELEASE ORAL at 08:14

## 2025-06-09 RX ADMIN — SERTRALINE HYDROCHLORIDE 75 MG: 50 TABLET ORAL at 08:15

## 2025-06-09 RX ADMIN — CALCIUM CARBONATE 600 MG (1,500 MG)-VITAMIN D3 400 UNIT TABLET 1 TABLET: at 08:14

## 2025-06-09 RX ADMIN — ENOXAPARIN SODIUM 40 MG: 40 INJECTION SUBCUTANEOUS at 08:15

## 2025-06-09 RX ADMIN — ASPIRIN 81 MG: 81 TABLET ORAL at 08:15

## 2025-06-09 RX ADMIN — DIVALPROEX SODIUM 500 MG: 500 TABLET, DELAYED RELEASE ORAL at 20:46

## 2025-06-09 RX ADMIN — LOSARTAN POTASSIUM 100 MG: 100 TABLET, FILM COATED ORAL at 08:14

## 2025-06-09 RX ADMIN — METFORMIN HYDROCHLORIDE 500 MG: 500 TABLET, FILM COATED ORAL at 17:39

## 2025-06-09 RX ADMIN — PANTOPRAZOLE SODIUM 40 MG: 40 TABLET, DELAYED RELEASE ORAL at 08:15

## 2025-06-09 RX ADMIN — OXYCODONE HYDROCHLORIDE 10 MG: 10 TABLET ORAL at 05:21

## 2025-06-09 RX ADMIN — AMLODIPINE BESYLATE 10 MG: 5 TABLET ORAL at 22:10

## 2025-06-09 RX ADMIN — CALCIUM CARBONATE 600 MG (1,500 MG)-VITAMIN D3 400 UNIT TABLET 1 TABLET: at 17:39

## 2025-06-09 RX ADMIN — SULFAMETHOXAZOLE AND TRIMETHOPRIM 1 TABLET: 800; 160 TABLET ORAL at 08:22

## 2025-06-09 RX ADMIN — PROPRANOLOL HYDROCHLORIDE 60 MG: 60 CAPSULE, EXTENDED RELEASE ORAL at 08:15

## 2025-06-09 RX ADMIN — GABAPENTIN 300 MG: 300 CAPSULE ORAL at 08:14

## 2025-06-09 RX ADMIN — INSULIN ASPART 5 UNITS: 100 INJECTION, SOLUTION INTRAVENOUS; SUBCUTANEOUS at 19:24

## 2025-06-09 RX ADMIN — ACETAMINOPHEN 975 MG: 325 TABLET ORAL at 17:42

## 2025-06-09 RX ADMIN — INSULIN ASPART 6 UNITS: 100 INJECTION, SOLUTION INTRAVENOUS; SUBCUTANEOUS at 13:34

## 2025-06-09 RX ADMIN — OXYCODONE HYDROCHLORIDE 10 MG: 10 TABLET ORAL at 14:35

## 2025-06-09 RX ADMIN — Medication 500 MG: at 08:15

## 2025-06-09 RX ADMIN — GABAPENTIN 300 MG: 300 CAPSULE ORAL at 20:46

## 2025-06-09 RX ADMIN — INSULIN HUMAN 16 UNITS: 100 INJECTION, SUSPENSION SUBCUTANEOUS at 08:16

## 2025-06-09 ASSESSMENT — ACTIVITIES OF DAILY LIVING (ADL)
ADLS_ACUITY_SCORE: 60

## 2025-06-09 NOTE — PLAN OF CARE
"Goal Outcome Evaluation:      VS: /73 (BP Location: Left arm)   Pulse 65   Temp 98.3  F (36.8  C) (Oral)   Resp 19   Ht 1.676 m (5' 6\")   Wt 106 kg (233 lb 11.2 oz)   SpO2 98%   BMI 37.72 kg/m       O2: Stable ORA   Output: Voids without difficulties   Last BM: 6/8/25   Activity: Independent   Up for meals? Yes   Skin: Intact   Pain: Reports pain @ 8/10 managed with PRN oxy and tylenol    CMS: AO x4   Dressing: None   Diet: Regular   LDA: Rt PIV SL   Equipment: Personal belongings   Plan: Continue with POC    Additional Info: Medically ready for discharge, TCU refs sent             "

## 2025-06-09 NOTE — PROGRESS NOTES
[facility]    Medicine Progress Note - Hospitalist Service, GOLD TEAM 21    Date of Admission:  6/6/2025    Assessment & Plan      Amy Choudhury is a 65 year old woman admitted on 6/6/2025. 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 Patients knee pain resolved when steroids were resumed so that was felt likely part of the cause. Cleared for home by PT however OT is concerned regarding discharge home given low slums/moca and inability to perform ADLs. 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    Medically ready for discharge-needs PT/OT/SW and new TCU placement.     Cognitive decline?  Noted to previously have a slums of 29/30 back in 2024 which has now gone down to 17/30. No other new changes in her life except this recent diagnosis of inflammatory cerebral amyloid angiopathy. There is concern regarding discharging her home and her safety so will see if there are any TCU's that can take her first and see if she shows continued improvement with OT prior to discharge.    Acute bilateral knee pain-improved  The patient reports one days of severe bilateral knee pain, weakness and a sense of swelling.  The pain is inconsistent with no provoking or relieving factors, and currently predominantly on the right.  It affects the joint and extends to the soft tissue under her thigh.  She denies any recent trauma or fevers.  She has no history of gout.  She notes that with her recent hospital discharge and TCU stay, she has been without the 60 mg of prednisone she is currently taking  for her inflammatory myeloid angiopathy for two days.  Xray without evidence for fracture, US without evidence for DVT. She is able to bend both knees so very much doubt a septic arthritis. This improved with resumption of her steroids.  - oxycodone 10 mg q6h PRN pain    Recent acute ischemic CVA  Ilene-procedural stroke with resolving left sided motor deficits.  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  She is on 60 mg of prednisone through 6/28, after which she will taper down 10 mg every 7 days until she is at 10 mg/day.  - Resuming prednisone  - Continue Bactrim for PJP prophylaxis  - Continue protonix    History of headaches  - Continue home depakote, gabapentin,     History of hypertension  - Continue home amlodipine, losartan    History of DM II  - Continue sliding sale insulin  - Continue 16 units NPH qam while on prednisone  - Continue metformin    Discharge planning  The patient has been discharged from multiple facilities for a variety of issues over the past three months and has ended up re-hospitalized within a day or two, often at a different institution.  She was most recently hospitalized here from 5/21-5/28, hospitalized at [facility] from 5/29-6/4, discharged to a TCU, left the next day and is now re-admitted here. Now cleared for home by PT however OT  concerned regarding mental status.          Diet: Regular Diet Adult  Diet    DVT Prophylaxis: Ambulate every shift  Diehl Catheter: Not present  Lines: None     Cardiac Monitoring: None  Code Status: Full Code      Clinically Significant Risk Factors Present on Admission                 # Drug Induced Platelet Defect: home medication list includes an antiplatelet medication   # Hypertension: Noted on problem list            # DMII: A1C = 8.7 % (Ref range: <5.7 %) within past 6 months  # Severe Obesity: Estimated body mass index is 37.72 kg/m  as calculated from the following:    Height  "as of this encounter: 1.676 m (5' 6\").    Weight as of this encounter: 106 kg (233 lb 11.2 oz). with complications         # Financial/Environmental Concerns: none         Social Drivers of Health    Food Insecurity: Low Risk  (6/7/2025)    Food Insecurity     Within the past 12 months, did you worry that your food would run out before you got money to buy more?: No     Within the past 12 months, did the food you bought just not last and you didn t have money to get more?: No   Recent Concern: Food Insecurity - Food Insecurity Present (5/29/2025)    Received [facility]    Hunger Vital Sign     Worried About Running Out of Food in the Last Year: Never true     Ran Out of Food in the Last Year: Sometimes true   Depression: At risk (11/4/2021)    PHQ-2     PHQ-2 Score: 5   Tobacco Use: High Risk (5/29/2025)    Received from [facility]    Patient History     Smoking Tobacco Use: Every Day     Smokeless Tobacco Use: Current   Financial Resource Strain: Low Risk  (6/7/2025)    Financial Resource Strain     Within the past 12 months, have you or your family members you live with been unable to get utilities (heat, electricity) when it was really needed?: No   Recent Concern: Financial Resource Strain - Medium Risk (5/29/2025)    Received from [facility]    Overall Financial Resource Strain (CARDIA)     Difficulty of Paying Living Expenses: Somewhat hard   Transportation Needs: High Risk (6/7/2025)    Transportation Needs     Within the past 12 months, has lack of transportation kept you from medical appointments, getting your medicines, non-medical meetings or appointments, work, or from getting things that you need?: Yes          Disposition Plan     Medically Ready for Discharge: Anticipated Today, pending placement (PT/OT/SW recs)           The patient's care was discussed with the Attending Physician, Dr. Arce, Bedside Nurse, Patient, and ED charge.    Vernon Styles, DO  Hospitalist " Service, GOLD TEAM 21  [facility]  Securely message with [employer] (more info)  Text page via [employer] Paging/Directory   See signed in provider for up to date coverage information    This chart documentation was completed with Dragon voice-recognition software. Even though reviewed, this chart may still contain some grammatical, spelling, and word errors. Please contact the author for any questions or clarifications.     ______________________________________________________________________    Interval History     No acute events overnight. She was cleared by PT however OT concerned regarding ability to care for self at home given her low SLUMS as well as watching her attempt ADLs. On review of the chart it looks like she has had issues with this in the past which seem to resolve so possibly this will start to improve and she may be able to safely go home. If it does not improve will consider talking to neuro or psych.      Physical Exam   Vital Signs: Temp: 98.3  F (36.8  C) Temp src: Oral BP: 105/73 Pulse: 65   Resp: 19 SpO2: 98 % O2 Device: None (Room air)    Weight: 233 lbs 11.2 oz    General Appearance: In NAD  Respiratory: breathing comfortably on room air   Cardiovascular: RRR  GI: non distended   Skin: Intact on face, arms, legs. No wounds, bruising, or lesions noted.  Neuropsych:A&Ox4, moving all extremities      Medical Decision Making       55 MINUTES SPENT BY ME on the date of service doing chart review, history, exam, documentation & further activities per the note.      Data     I have personally reviewed the following data over the past 24 hrs:    N/A  \   N/A   / 283     N/A N/A N/A /  269 (H)   N/A N/A N/A \       Imaging results reviewed over the past 24 hrs:   No results found for this or any previous visit (from the past 24 hours).
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
## 2025-06-09 NOTE — PLAN OF CARE
"Patient admitted -    VS: Blood pressure 130/80, pulse 63, temperature 98.4  F (36.9  C), temperature source Oral, resp. rate 16, height 1.676 m (5' 6\"), weight 105.5 kg (232 lb 8 oz), SpO2 96%, not currently breastfeeding.       O2: >90% on RA no complaints of SOB or chest pain.   Output: Voiding adequate amounts w/o pain or difficulty to bathroom.   Last BM: Continent of B&B, last BM 6/8/2025   Activity: Independent in room     Skin: Appears intact    Pain: 7/10 pain managed with PRN 10 mg oxycodone.    CMS: A/O x4, able to make needs known    Dressing: None    Diet: Regular, tolerating well. Glucose monitoring before meals and bedtime.    LDA: SONA STOCK.    Equipment: Personal belongings   Plan: Continue POC   Additional Info:                "
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
## 2025-06-10 LAB
GLUCOSE BLDC GLUCOMTR-MCNC: 130 MG/DL (ref 70–99)
GLUCOSE BLDC GLUCOMTR-MCNC: 136 MG/DL (ref 70–99)
GLUCOSE BLDC GLUCOMTR-MCNC: 226 MG/DL (ref 70–99)
GLUCOSE BLDC GLUCOMTR-MCNC: 227 MG/DL (ref 70–99)
GLUCOSE BLDC GLUCOMTR-MCNC: 230 MG/DL (ref 70–99)
GLUCOSE BLDC GLUCOMTR-MCNC: 320 MG/DL (ref 70–99)

## 2025-06-10 PROCEDURE — G0378 HOSPITAL OBSERVATION PER HR: HCPCS

## 2025-06-10 PROCEDURE — 250N000011 HC RX IP 250 OP 636: Performed by: PEDIATRICS

## 2025-06-10 PROCEDURE — 82962 GLUCOSE BLOOD TEST: CPT

## 2025-06-10 PROCEDURE — 250N000013 HC RX MED GY IP 250 OP 250 PS 637: Performed by: PEDIATRICS

## 2025-06-10 PROCEDURE — 99233 SBSQ HOSP IP/OBS HIGH 50: CPT | Performed by: STUDENT IN AN ORGANIZED HEALTH CARE EDUCATION/TRAINING PROGRAM

## 2025-06-10 PROCEDURE — 250N000013 HC RX MED GY IP 250 OP 250 PS 637: Performed by: STUDENT IN AN ORGANIZED HEALTH CARE EDUCATION/TRAINING PROGRAM

## 2025-06-10 PROCEDURE — 96372 THER/PROPH/DIAG INJ SC/IM: CPT | Performed by: PEDIATRICS

## 2025-06-10 PROCEDURE — 250N000012 HC RX MED GY IP 250 OP 636 PS 637: Performed by: NURSE PRACTITIONER

## 2025-06-10 PROCEDURE — 250N000013 HC RX MED GY IP 250 OP 250 PS 637: Performed by: NURSE PRACTITIONER

## 2025-06-10 RX ADMIN — INSULIN HUMAN 16 UNITS: 100 INJECTION, SUSPENSION SUBCUTANEOUS at 07:59

## 2025-06-10 RX ADMIN — CALCIUM CARBONATE 600 MG (1,500 MG)-VITAMIN D3 400 UNIT TABLET 1 TABLET: at 17:28

## 2025-06-10 RX ADMIN — PANTOPRAZOLE SODIUM 40 MG: 40 TABLET, DELAYED RELEASE ORAL at 07:59

## 2025-06-10 RX ADMIN — INSULIN ASPART 4 UNITS: 100 INJECTION, SOLUTION INTRAVENOUS; SUBCUTANEOUS at 07:59

## 2025-06-10 RX ADMIN — OXYCODONE HYDROCHLORIDE 10 MG: 10 TABLET ORAL at 10:11

## 2025-06-10 RX ADMIN — OXYCODONE HYDROCHLORIDE 10 MG: 10 TABLET ORAL at 19:49

## 2025-06-10 RX ADMIN — GABAPENTIN 300 MG: 300 CAPSULE ORAL at 13:28

## 2025-06-10 RX ADMIN — GABAPENTIN 300 MG: 300 CAPSULE ORAL at 19:46

## 2025-06-10 RX ADMIN — AMLODIPINE BESYLATE 10 MG: 5 TABLET ORAL at 21:50

## 2025-06-10 RX ADMIN — SERTRALINE HYDROCHLORIDE 75 MG: 50 TABLET ORAL at 07:58

## 2025-06-10 RX ADMIN — CALCIUM CARBONATE 600 MG (1,500 MG)-VITAMIN D3 400 UNIT TABLET 1 TABLET: at 07:58

## 2025-06-10 RX ADMIN — Medication 500 MG: at 07:59

## 2025-06-10 RX ADMIN — PROPRANOLOL HYDROCHLORIDE 60 MG: 60 CAPSULE, EXTENDED RELEASE ORAL at 07:58

## 2025-06-10 RX ADMIN — LOSARTAN POTASSIUM 100 MG: 100 TABLET, FILM COATED ORAL at 07:59

## 2025-06-10 RX ADMIN — PREDNISONE 60 MG: 20 TABLET ORAL at 07:58

## 2025-06-10 RX ADMIN — DIVALPROEX SODIUM 500 MG: 500 TABLET, DELAYED RELEASE ORAL at 07:58

## 2025-06-10 RX ADMIN — ACETAMINOPHEN 975 MG: 325 TABLET ORAL at 19:49

## 2025-06-10 RX ADMIN — METFORMIN HYDROCHLORIDE 500 MG: 500 TABLET, FILM COATED ORAL at 17:28

## 2025-06-10 RX ADMIN — METFORMIN HYDROCHLORIDE 500 MG: 500 TABLET, FILM COATED ORAL at 07:58

## 2025-06-10 RX ADMIN — ENOXAPARIN SODIUM 40 MG: 40 INJECTION SUBCUTANEOUS at 07:59

## 2025-06-10 RX ADMIN — INSULIN ASPART 8 UNITS: 100 INJECTION, SOLUTION INTRAVENOUS; SUBCUTANEOUS at 17:25

## 2025-06-10 RX ADMIN — DIVALPROEX SODIUM 500 MG: 500 TABLET, DELAYED RELEASE ORAL at 19:46

## 2025-06-10 RX ADMIN — INSULIN ASPART 4 UNITS: 100 INJECTION, SOLUTION INTRAVENOUS; SUBCUTANEOUS at 13:41

## 2025-06-10 RX ADMIN — GABAPENTIN 300 MG: 300 CAPSULE ORAL at 07:59

## 2025-06-10 RX ADMIN — ASPIRIN 81 MG: 81 TABLET ORAL at 07:59

## 2025-06-10 ASSESSMENT — ACTIVITIES OF DAILY LIVING (ADL)
ADLS_ACUITY_SCORE: 60

## 2025-06-10 NOTE — PROGRESS NOTES
"Patient has been educated on potential risks of choosing to leave the unit and that the responsibility for patient well-being will belong to the patient. Pt has been informed that admission to hospital is due to need for medical treatment. Education given to the patient on some of the potential risks included but are not limited to:      - lack of access to nursing intervention      - possible missed appointments with MD, therapies, tests      - possible missed medications, antibiotics, management of IV's    Patient Response: \"I will be back soon, I just need to get some fresh air\"    Patient notified staff prior to leaving unit: Yes  Coban wrap placed over IV prior to pt leaving unit Yes   "

## 2025-06-10 NOTE — PROGRESS NOTES
[facility]    Medicine Progress Note - Hospitalist Service, GOLD TEAM 21    Date of Admission:  6/6/2025    Assessment & Plan   Amy Choudhury is a 65 year old woman admitted on 6/6/2025. 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 Working on safe disposition given possible cognitive decline and ongoing left sided weakness, vision changes.     Cognitive decline with visual changes  Discharge planning  The patient has been discharged from multiple facilities for a variety of issues over the past three months and has ended up re-hospitalized within a day or two, often at a different institution.  She was most recently hospitalized here from 5/21-5/28, [facility] [place] [facility] [place]/4, discharged to a TCU, left the next day and is now re-admitted here. Now cleared for home by PT however OT  concerned regarding mental status. Noted to previously have a slums of 29/30 back in 2024 which has now gone down to 17/30. No other new changes in her life except this recent diagnosis of inflammatory cerebral amyloid angiopathy. Patient reports that her vision with trailing motion, black lines, melting have been present since her stroke and have since improved. There is concern regarding discharging her home and her safety so will see if there are any TCU's that can take her first and see if she shows continued improvement with OT prior to discharge. B12 232 on 5/2025  - if does not improve with OT, can consider neuro consult to evaluate for other causes of cognitive decline    Acute bilateral knee pain-improved  The patient reports one days of severe bilateral knee pain, weakness and a sense of swelling.   The pain is inconsistent with no provoking or relieving factors, and currently predominantly on the right.  It affects the joint and extends to the soft tissue under her thigh.  She denies any recent trauma or fevers.  She has no history of gout.  She notes that with her recent hospital discharge and TCU stay, she has been without the 60 mg of prednisone she is currently taking for her inflammatory myeloid angiopathy for two days.  Xray without evidence for fracture, US without evidence for DVT. She is able to bend both knees so very much doubt a septic arthritis. This improved with resumption of her steroids.  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  She is on 60 mg of prednisone through 6/28, after which she will taper down 10 mg every 7 days until she is at 10 mg/day.  - Resuming prednisone  - Continue Bactrim for PJP prophylaxis  - Continue protonix    History of headaches- Continue home depakote, gabapentin,     History of hypertension- Continue home amlodipine, losartan    History of DM II  - Continue sliding sale insulin  - Continue 16 units NPH qam while on prednisone  - Continue metformin       Observation Goals: -safe disposition plan has been identified, Nurse to notify provider when observation goals have been met and patient is ready for discharge.  Diet: Regular Diet Adult  Diet    DVT Prophylaxis: Enoxaparin (Lovenox) SQ  Diehl Catheter: Not present  Lines: None     Cardiac Monitoring: None  Code Status: Full Code      Clinically Significant Risk Factors Present on Admission                 # Drug Induced Platelet Defect: home medication list includes an antiplatelet medication   # Hypertension: Noted on problem list          # DMII: A1C = 8.7 % (Ref range: <5.7 %) within past 6 months  # Severe Obesity:  "Estimated body mass index is 37.87 kg/m  as calculated from the following:    Height as of this encounter: 1.676 m (5' 6\").    Weight as of this encounter: 106.4 kg (234 lb 9.6 oz). with complications       # Financial/Environmental Concerns: none         Social Drivers of Health    Food Insecurity: Low Risk  (6/7/2025)    Food Insecurity     Within the past 12 months, did you worry that your food would run out before you got money to buy more?: No     Within the past 12 months, did the food you bought just not last and you didn t have money to get more?: No   Recent Concern: Food Insecurity - Food Insecurity Present (5/29/2025)    Received from [facility]    Hunger Vital Sign     Worried About Running Out of Food in the Last Year: Never true     Ran Out of Food in the Last Year: Sometimes true   Depression: At risk (11/4/2021)    PHQ-2     PHQ-2 Score: 5   Tobacco Use: High Risk (5/29/2025)    Received from [facility]    Patient History     Smoking Tobacco Use: Every Day     Smokeless Tobacco Use: Current   Financial Resource Strain: Low Risk  (6/7/2025)    Financial Resource Strain     Within the past 12 months, have you or your family members you live with been unable to get utilities (heat, electricity) when it was really needed?: No   Recent Concern: Financial Resource Strain - Medium Risk (5/29/2025)    Received from [facility]    Overall Financial Resource Strain (CARDIA)     Difficulty of Paying Living Expenses: Somewhat hard   Transportation Needs: High Risk (6/7/2025)    Transportation Needs     Within the past 12 months, has lack of transportation kept you from medical appointments, getting your medicines, non-medical meetings or appointments, work, or from getting things that you need?: Yes          Disposition Plan     Medically Ready for Discharge: Ready Now             Lisasamy French Mai, MD  Hospitalist Service, GOLD TEAM 21  [facility] " "Center  Securely message with [employer] (more info)  Text page via [employer] Paging/Directory   See signed in provider for up to date coverage information  ______________________________________________________________________    Interval History   NAEO. Patient expressed her distrust for the medical system and her frustration about her periprocedural stroke. She reports that she has children in the area but is not willing to ask them for help as this is what \"the doctors did.\" She is willing to get [facility] services. She describes her vision as sometimes seeing trailing copies of moving things, melting, dark lines that all started with her stroke but has since improved.     Physical Exam   Vital Signs: Temp: 98.6  F (37  C) Temp src: Oral BP: 119/73 Pulse: 69   Resp: 18 SpO2: 98 % O2 Device: None (Room air)    Weight: 234 lbs 9.6 oz    Constitutional: awake, alert, cooperative, no apparent distress, and appears stated age  Respiratory: No increased work of breathing  Cardiovascular: WWP  Neurologic: Awake, alert, oriented to name, place and time.  Cranial nerves II-XII are grossly intact.  Motor is 4/5 on L side  Neuropsychiatric: tearful    Medical Decision Making       55 MINUTES SPENT BY ME on the date of service doing chart review, history, exam, documentation & further activities per the note.      Data         Imaging results reviewed over the past 24 hrs:   No results found for this or any previous visit (from the past 24 hours).  "

## 2025-06-10 NOTE — PLAN OF CARE
"Goal Outcome Evaluation:      VS: BP 98/66 (BP Location: Left arm)   Pulse 68   Temp 98.3  F (36.8  C) (Oral)   Resp 17   Ht 1.676 m (5' 6\")   Wt 106.4 kg (234 lb 9.6 oz)   SpO2 95%   BMI 37.87 kg/m       O2: Stable ORA   Output: Voids spontaneously   Last BM: 6/8/25   Activity: Independent   Up for meals? Yes   Skin: Intact   Pain: Reports pain  5/10   CMS: AO x4   Dressing: None   Diet: Regular   LDA: Rt PIV SL   Equipment: None   Plan: Continue with POC   Additional Info:             "

## 2025-06-10 NOTE — PROGRESS NOTES
"Care Management Follow Up    Length of Stay (days): 1    Expected Discharge Date: 06/11/2025     Concerns to be Addressed: discharge planning     Patient plan of care discussed at interdisciplinary rounds: Yes    Anticipated Discharge Disposition: TCU ([facility])     Anticipated Discharge Services: TCU services  Anticipated Discharge DME: No new DME anticipated    Patient/family educated on Medicare website which has current facility and service quality ratings: pt deferred to RNCC for home care agency selection; SW previously discussed TCU Medicare Care Compare list w/ pt  Education Provided on the Discharge Plan: Yes  Patient/Family in Agreement with the Plan: yes    Referrals Placed by CM/SW: Post Acute Facilities (see bottom of \"Additional Information\" section of this note for full list); home care  Private pay costs discussed: Not applicable    Discussed  Partnership in Safe Discharge Planning  document with patient/family: Not by this writer    Handoff Completed: Yes, LEIGH PCP: Internal handoff referral completed    Additional Information:    Accepting TCU Referral (primary discharge option, currently pursuing this):    [facility] Referral  Ph: [phone]  Fx: [phone]  6/9: Global referral sent for TCU placement.  6/10: Followed up w/ liaison Tiffany, ultimately accepted for placement at [facility].  ______________________________________________________    Accepting Home Care Agency (backup option in the event pt changes her mind on TCU discharge):  [facility]  Ph: [phone]  Fx: [phone]  : Rosita ([phone])  Services to be provided: OT and RN (nursing indicated for disease mgmt, med mgmt, post-hospitalization assessments, and education)  ______________________________________________________    10:00am - Case discussed in rounds this AM.  Provider reported pt does not want to return home alone at this time, " however she is also unwilling to ask her family for assistance.  With home care services, which have been secured, pt may be more agreeable to a return to home.  That being said, it was determined that care mgmt is to follow up on remaining TCU referrals, however provider aware that placement may be unlikely.  Care mgmt to provide further updates to provider regarding discharge coordination progress once available.    11:38am - Called [facility] Liaison, Tiffany Haller ([phone]), to follow up on global [facility] referral.  She inquired if pt would be willing to stay at TCU, given hx of leaving AMA.  This writer stated that at this time, it seems so, however if an accepting facility is found, this writer will further discuss w/ pt regarding options to go to TCU vs go home.  Tiffany expressed understanding, reported she will see if a placement would be available and call this writer back.    11:53am - Received call from Tiffany, she reported that a bed can be offered at [facility] for tomorrow, preferred arrival time between 10:00am and 3:00pm, to ensure proper staffing available to address any immediate concerns.  RNCC to update Tiffany to pt's acceptance/declination of the bed.    11:58am - Attempted to meet w/ pt at bedside, however privacy curtain pulled, pt not dressed at this time, requested this writer to return later.    2:10pm - Met w/ pt at bedside, updated her to accepting HC agency and accepting TCU, discussed options/answered questions.  Explained that if discharging to home, pt is recommended to get as much additional support from her family or friends that she can, per OT's recommendation.  Pt expressed understanding, also recalled discussion w/ OT regarding recs.  Pt reported she left from a TCU AMA previously, as she felt ready to return home, but shortly thereafter realized that she should have stayed for more therapies.  Pt ultimately reported preference of TCU, is agreeable to  [facility] for tomorrow, intends to stay for duration of time recommended by TCU team.  Provided additional encouragement to pt to not leave TCU AMA, explained that it impacts her ability to receive TCU services in the future, as facilities consider hx of leaving AMA when they review referrals.  Pt expressed understanding, appreciated the information.  Regarding transport, pt maintains that she can travel independently in a cab.  Informed pt that discharge timing will get sorted out later on, she expressed understanding, no further questions/concerns at this time.    Per check in w/ charge RN, pt continues to do well w/ ambulation and would be fine to transport in a cab on her own.    2:20pm - Called Tiffany, informed her that pt is agreeable to TCU placement.  She confirmed that it is still preferred for pt to arrive between 10:00am and 3:00pm tomorrow.  Informed her that pt will likely be taking a cab, she expressed understanding.  Care mgmt to touch base w/ Tiffany tomorrow before discharge to update her to transportation time/close the loop.    Received call from Rosita at [facility].  She inquired if pt left AMA, this writer stated no, explained that discharge will occur tomorrow.  Stated that at this time, pt is anticipated to discharge to a post-acute facility, however pt may still change her mind, so requested to maintain HC acceptance at this time.  Rosita expressed understanding, requested update tomorrow on if pt will be needing home care services or not.  She provided her direct call back number: [phone].    Updated provider to current discharge plan.      Pending TCU Referrals:    [facility]  [address] [id number] [phone]  6/8: Initial referral sent (central [facility] referral specifically for [facility] location)     [address] [address]  Phone (Main): [phone]  Admissions: [phone]  6/8: Initial referral sent      Declined TCU Referrals:    [facility]  [address] [place] [address]  [phone]  6/8: Initial referral sent  6/10: Care mgmt received call declining pt d/t hx leaving AMA.    [facility]   [address] [place] [address]  [phone]  6/8: Initial referral sent  6/9: Declined d/t out of insurance network.    [facility]  [address] [place] [address]  [phone]  6/8: Initial referral sent  6/9: Declined d/t lack of bed.    [facility]  [address] [place] [address]  [phone]  6/8: Initial referral sent to [facility] admissions  6/9: Declined d/t behavioral issues/concerns, multiple hospital readmits w/in short period of time, and hx leaving AMA from TCU.     [facility]  [address] [place] [address]  [phone]  Admissions:  [phone]  Fax:  [phone]  6/8: Initial referral sent to [facility] admissions  6/9: Declined d/t behavioral issues/concerns, multiple hospital readmits w/in short period of time, and hx leaving AMA from TCU.    [facility]  [address] [place] [address]  P:  [phone]  6/8: Initial referral sent to [facility] central admissions  6/9: Declined d/t behavioral issues/concerns, multiple hospital readmits w/in short period of time, and hx leaving AMA from TCU.    [facility] 4th floor  [address] [place]  [address]  Admissions: [phone]  Fax: [phone]  RN to RN:  [phone]  6/8: Initial referral sent  6/9: Declined d/t not qualifying for TCU.      Next Steps:  - Coordinate TCU discharge for 6/11 w/ [facility]:  Schedule a ride for sometime that would allow pt to arrive to TCU between 10:00am and 3:00pm, per Tiffany's request (likely utilize health plan transport benefits to secure cab  katerina)  Contact Tiffany in the AM to close the loop and update her to transport timing  Complete PAS  Send orders and any physical prescriptions to TCU in advance of discharge time  Update Rosita at [facility] that their services will not be needed at this time    - If pt changes her mind and opts to discharge to home on 6/11:  Advise pt to arrange as much assistance from family/friends as she is able to  Send orders to accepting home care agency ([facility]) and notify Rosita at the agency of discharge to home  Consider additional resources to provide to pt, such as [facility] [place], [facility], and [facility] (they serve not only blind individuals, but also those w/ low vision)          Emeterio Sifuentes RN  8MS Nurse Care Coordinator  [facility] Acute Care Management  Phone: [phone]  Available on Vocera: 8 Med Surg Vocera

## 2025-06-10 NOTE — PLAN OF CARE
Goal Outcome Evaluation:    Plan of Care Reviewed With: patient  Overall Patient Progress: no changeOverall Patient Progress: no change  Shift 6430-7063  No acute event overnight. Pt is AxOx4, denies sob, cp, or pain. Denies n/v. Ind in the room. Reported pain 7/10, managed with scheduled med.  at bedtime. No new medical concern reported. POC ongoing.   Temp: 98.4  F (36.9  C) Temp src: Oral BP: 101/59 Pulse: 74   Resp: 18 SpO2: 95 % O2 Device: None (Room air)

## 2025-06-11 VITALS
HEART RATE: 67 BPM | SYSTOLIC BLOOD PRESSURE: 132 MMHG | HEIGHT: 66 IN | BODY MASS INDEX: 17.04 KG/M2 | WEIGHT: 106 LBS | RESPIRATION RATE: 19 BRPM | TEMPERATURE: 98.1 F | DIASTOLIC BLOOD PRESSURE: 80 MMHG | OXYGEN SATURATION: 94 %

## 2025-06-11 LAB
ALBUMIN SERPL BCG-MCNC: 3.8 G/DL (ref 3.5–5.2)
ANION GAP SERPL CALCULATED.3IONS-SCNC: 17 MMOL/L (ref 7–15)
BUN SERPL-MCNC: 32.7 MG/DL (ref 8–23)
CALCIUM SERPL-MCNC: 8.5 MG/DL (ref 8.8–10.4)
CHLORIDE SERPL-SCNC: 100 MMOL/L (ref 98–107)
CREAT SERPL-MCNC: 1.07 MG/DL (ref 0.51–0.95)
EGFRCR SERPLBLD CKD-EPI 2021: 58 ML/MIN/1.73M2
ERYTHROCYTE [DISTWIDTH] IN BLOOD BY AUTOMATED COUNT: 15.9 % (ref 10–15)
GLUCOSE BLDC GLUCOMTR-MCNC: 112 MG/DL (ref 70–99)
GLUCOSE BLDC GLUCOMTR-MCNC: 180 MG/DL (ref 70–99)
GLUCOSE SERPL-MCNC: 290 MG/DL (ref 70–99)
HCO3 SERPL-SCNC: 19 MMOL/L (ref 22–29)
HCT VFR BLD AUTO: 32.4 % (ref 35–47)
HGB BLD-MCNC: 10 G/DL (ref 11.7–15.7)
MAGNESIUM SERPL-MCNC: 1.8 MG/DL (ref 1.7–2.3)
MCH RBC QN AUTO: 26.5 PG (ref 26.5–33)
MCHC RBC AUTO-ENTMCNC: 30.9 G/DL (ref 31.5–36.5)
MCV RBC AUTO: 86 FL (ref 78–100)
PHOSPHATE SERPL-MCNC: 4.9 MG/DL (ref 2.5–4.5)
PLATELET # BLD AUTO: 337 10E3/UL (ref 150–450)
POTASSIUM SERPL-SCNC: 5.3 MMOL/L (ref 3.4–5.3)
RBC # BLD AUTO: 3.77 10E6/UL (ref 3.8–5.2)
SODIUM SERPL-SCNC: 136 MMOL/L (ref 135–145)
WBC # BLD AUTO: 8.8 10E3/UL (ref 4–11)

## 2025-06-11 PROCEDURE — 99239 HOSP IP/OBS DSCHRG MGMT >30: CPT | Performed by: STUDENT IN AN ORGANIZED HEALTH CARE EDUCATION/TRAINING PROGRAM

## 2025-06-11 PROCEDURE — 250N000013 HC RX MED GY IP 250 OP 250 PS 637: Performed by: STUDENT IN AN ORGANIZED HEALTH CARE EDUCATION/TRAINING PROGRAM

## 2025-06-11 PROCEDURE — 250N000013 HC RX MED GY IP 250 OP 250 PS 637: Performed by: PEDIATRICS

## 2025-06-11 PROCEDURE — 36415 COLL VENOUS BLD VENIPUNCTURE: CPT | Performed by: STUDENT IN AN ORGANIZED HEALTH CARE EDUCATION/TRAINING PROGRAM

## 2025-06-11 PROCEDURE — 250N000013 HC RX MED GY IP 250 OP 250 PS 637: Performed by: NURSE PRACTITIONER

## 2025-06-11 PROCEDURE — 250N000011 HC RX IP 250 OP 636: Performed by: PEDIATRICS

## 2025-06-11 PROCEDURE — 96372 THER/PROPH/DIAG INJ SC/IM: CPT | Performed by: PEDIATRICS

## 2025-06-11 PROCEDURE — 83735 ASSAY OF MAGNESIUM: CPT | Performed by: STUDENT IN AN ORGANIZED HEALTH CARE EDUCATION/TRAINING PROGRAM

## 2025-06-11 PROCEDURE — 85041 AUTOMATED RBC COUNT: CPT | Performed by: STUDENT IN AN ORGANIZED HEALTH CARE EDUCATION/TRAINING PROGRAM

## 2025-06-11 PROCEDURE — 250N000011 HC RX IP 250 OP 636: Performed by: STUDENT IN AN ORGANIZED HEALTH CARE EDUCATION/TRAINING PROGRAM

## 2025-06-11 PROCEDURE — 84132 ASSAY OF SERUM POTASSIUM: CPT | Performed by: STUDENT IN AN ORGANIZED HEALTH CARE EDUCATION/TRAINING PROGRAM

## 2025-06-11 PROCEDURE — 82962 GLUCOSE BLOOD TEST: CPT

## 2025-06-11 PROCEDURE — 250N000012 HC RX MED GY IP 250 OP 636 PS 637: Performed by: NURSE PRACTITIONER

## 2025-06-11 PROCEDURE — G0378 HOSPITAL OBSERVATION PER HR: HCPCS

## 2025-06-11 RX ORDER — ONDANSETRON 4 MG/1
4 TABLET, ORALLY DISINTEGRATING ORAL ONCE
Status: COMPLETED | OUTPATIENT
Start: 2025-06-11 | End: 2025-06-11

## 2025-06-11 RX ORDER — OXYCODONE HYDROCHLORIDE 10 MG/1
10 TABLET ORAL EVERY 4 HOURS PRN
Qty: 18 TABLET | Refills: 0 | Status: SHIPPED | OUTPATIENT
Start: 2025-06-11

## 2025-06-11 RX ADMIN — ENOXAPARIN SODIUM 40 MG: 40 INJECTION SUBCUTANEOUS at 07:42

## 2025-06-11 RX ADMIN — GABAPENTIN 300 MG: 300 CAPSULE ORAL at 07:41

## 2025-06-11 RX ADMIN — METFORMIN HYDROCHLORIDE 500 MG: 500 TABLET, FILM COATED ORAL at 07:41

## 2025-06-11 RX ADMIN — ONDANSETRON 4 MG: 4 TABLET, ORALLY DISINTEGRATING ORAL at 12:32

## 2025-06-11 RX ADMIN — LOSARTAN POTASSIUM 100 MG: 100 TABLET, FILM COATED ORAL at 07:41

## 2025-06-11 RX ADMIN — SULFAMETHOXAZOLE AND TRIMETHOPRIM 1 TABLET: 800; 160 TABLET ORAL at 08:43

## 2025-06-11 RX ADMIN — ACETAMINOPHEN 975 MG: 325 TABLET ORAL at 12:32

## 2025-06-11 RX ADMIN — CALCIUM CARBONATE 600 MG (1,500 MG)-VITAMIN D3 400 UNIT TABLET 1 TABLET: at 07:41

## 2025-06-11 RX ADMIN — SERTRALINE HYDROCHLORIDE 75 MG: 50 TABLET ORAL at 07:42

## 2025-06-11 RX ADMIN — OXYCODONE HYDROCHLORIDE 10 MG: 10 TABLET ORAL at 06:48

## 2025-06-11 RX ADMIN — PANTOPRAZOLE SODIUM 40 MG: 40 TABLET, DELAYED RELEASE ORAL at 07:41

## 2025-06-11 RX ADMIN — ASPIRIN 81 MG: 81 TABLET ORAL at 07:42

## 2025-06-11 RX ADMIN — INSULIN HUMAN 16 UNITS: 100 INJECTION, SUSPENSION SUBCUTANEOUS at 07:46

## 2025-06-11 RX ADMIN — PROPRANOLOL HYDROCHLORIDE 60 MG: 60 CAPSULE, EXTENDED RELEASE ORAL at 07:41

## 2025-06-11 RX ADMIN — INSULIN ASPART 2 UNITS: 100 INJECTION, SOLUTION INTRAVENOUS; SUBCUTANEOUS at 11:57

## 2025-06-11 RX ADMIN — PREDNISONE 60 MG: 20 TABLET ORAL at 07:41

## 2025-06-11 RX ADMIN — Medication 500 MG: at 07:41

## 2025-06-11 RX ADMIN — GABAPENTIN 300 MG: 300 CAPSULE ORAL at 13:32

## 2025-06-11 RX ADMIN — ONDANSETRON 4 MG: 4 TABLET, ORALLY DISINTEGRATING ORAL at 14:00

## 2025-06-11 RX ADMIN — DIVALPROEX SODIUM 500 MG: 500 TABLET, DELAYED RELEASE ORAL at 07:41

## 2025-06-11 ASSESSMENT — ACTIVITIES OF DAILY LIVING (ADL)
ADLS_ACUITY_SCORE: 60

## 2025-06-11 NOTE — PLAN OF CARE
Goal Outcome Evaluation:      Plan of Care Reviewed With: patient    Overall Patient Progress: improvingOverall Patient Progress: improving    Outcome Evaluation: Pt has continued pain control with Oxycodone Pt is at risk for falls pt has bed alarm on

## 2025-06-11 NOTE — PLAN OF CARE
"Goal Outcome Evaluation:      VS: /80 (BP Location: Right arm)   Pulse 67   Temp 98.1  F (36.7  C) (Oral)   Resp 19   Ht 1.676 m (5' 6\")   Wt 106.4 kg (234 lb 9.6 oz)   SpO2 94%   BMI 37.87 kg/m       O2: Stable ORA   Output: Voids without difficulties   Last BM: 6/8/25   Activity: Ind   Up for meals? Yes   Skin: Intact   Pain: Reports pain @ 5/10   CMS: AO x4   Dressing: None   Diet: Regular   LDA: Rt PIV SL   Equipment: Personal belongings   Plan: Continue with POC   Additional Info:         8MS DISCHARGE    D: Patient discharged to TCU at 1500.     I: Discharge prescriptions sent to TCU. All discharge instructions reviewed with patient. Patient instructed to seek care if experiencing worsening symptoms, such as unrelieved pain. Other phone numbers to call with questions or concerns after discharge reviewed. Rt PIV removed. Education completed.    A: Patient verbalized understanding of discharge medications and instructions. Prescribed home medications given to patient.  Belongings returned to patient.    P: Patient to follow-up with TCU PCP if needed     Report given to Moo @ the TCU @ 7124           "

## 2025-06-11 NOTE — PROGRESS NOTES
Brief Care Management Note:    2:37pm - Received call from Rosita at [facility].  She inquired if pt needs home care services for discharge.  This writer stated that the referral can be cancelled at this time.    See note from Mary BLUM filed today for care mgmt discharge note.  Pt proceeded w/ TCU discharge.        Emeterio Sifuentes RN  8MS Nurse Care Coordinator  [facility] Acute Care Management  Phone: [phone]  Available on Vocera: 8 Med Surg Vocera

## 2025-06-11 NOTE — DISCHARGE SUMMARY
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    Weight as of this encounter: 48.1 kg (106 lb).       Follow-ups Needed After Discharge   Follow-up Appointments       Hospital Follow-up with Existing Primary Care Provider (PCP)          Schedule Primary Care visit within: 7 Days             Unresulted Labs Ordered in the Past 30 Days of this Admission       No orders found from 5/7/2025 to 6/7/2025.        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 Working on safe disposition given possible cognitive decline and ongoing left sided weakness, vision changes.     Cognitive decline with visual changes  Discharge planning  The patient has been discharged from multiple facilities for a variety of issues over the past three months and has ended up re-hospitalized within a day or two, often at a different institution.  She was most recently hospitalized here from 5/21-5/28, hospitalized at [facility] from 5/29-6/4, discharged " to a TCU, left the next day and is now re-admitted here. Now cleared for home by PT however OT  concerned regarding mental status. Noted to previously have a slums of 29/30 back in 2024 which has now gone down to 17/30. No other new changes in her life except this recent diagnosis of inflammatory cerebral amyloid angiopathy. Patient reports that her vision with trailing motion, black lines, melting have been present since her stroke and have since improved. B12 232 on 5/2025. To continue working with OT on discharge.     Acute bilateral knee pain-improved  The patient reports one days of severe bilateral knee pain, weakness and a sense of swelling.  The pain is inconsistent with no provoking or relieving factors, and currently predominantly on the right.  It affects the joint and extends to the soft tissue under her thigh.  She denies any recent trauma or fevers.  She has no history of gout.  She notes that with her recent hospital discharge and TCU stay, she has been without the 60 mg of prednisone she is currently taking for her inflammatory myeloid angiopathy for two days.  Xray without evidence for fracture, US without evidence for DVT. She is able to bend both knees so very much doubt a septic arthritis. This improved with resumption of her steroids and was managed with oxycodone 10 mg q6h PRN pain    Recent acute ischemic CVA  Ilene-procedural stroke with resolving left sided motor deficits. 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  She is on 60 mg of prednisone through 6/28, after which she will taper down 10 mg every 7 days until she is at 10 mg/day. Continue Bactrim for PJP prophylaxis and protonix for GI ppx.     History of headaches- Continue home depakote, gabapentin,     History of hypertension- Continue home amlodipine, losartan    History of DM II- Continue  sliding sale insulin, 16 units NPH qam while on prednisone, metformin    Consultations This Hospital Stay   PHYSICAL THERAPY ADULT IP CONSULT  CARE MANAGEMENT / SOCIAL WORK IP CONSULT  OCCUPATIONAL THERAPY ADULT IP CONSULT  CARE MANAGEMENT / SOCIAL WORK IP CONSULT  CARE MANAGEMENT / SOCIAL WORK IP CONSULT    Code Status   Prior    Time Spent on this Encounter   ILisa Mai, MD, personally saw the patient today and spent greater than 30 minutes discharging this patient.       Lisa French Mai, MD  [facility] [address]  [address] [address]  Phone: [phone]  Fax: [phone]  ______________________________________________________________________    Physical Exam   Vital Signs: Temp: 98.1  F (36.7  C) Temp src: Oral BP: 132/80 Pulse: 67   Resp: 19 SpO2: 94 % O2 Device: None (Room air)    Weight: 106 lbs 0 oz  Constitutional: awake, alert, cooperative, no apparent distress, and appears stated age  Respiratory: No increased work of breathing, good air exchange, clear to auscultation bilaterally, no crackles or wheezing  Cardiovascular: Normal apical impulse, regular rate and rhythm, normal S1 and S2, no S3 or S4, and no murmur noted  GI: No scars, normal bowel sounds, soft, non-distended, non-tender, no masses palpated, no hepatosplenomegally  Skin: no rashes  Musculoskeletal: no lower extremity pitting edema present  Neurologic: Awake, alert, oriented to name, place and time.  Cranial nerves II-XII are grossly intact. 4/5 left sided weakness, LUE weaker than LLE       Primary Care Physician   Randal Castellanos    Discharge Orders      Primary Care - Care Coordination Referral      Reason for your hospital stay    You were admitted to the hospital due to weakness and pain in your knees. This was felt due to abruptly stopping your steroids and improved with restarting them. You were evaluated by PT and felt ok to discharge back home and not have to go to a TCU.     Activity    Your activity upon  "discharge: activity as tolerated     Diet    Follow this diet upon discharge: Current Diet:Orders Placed This Encounter      Regular Diet Adult     Hospital Follow-up with Existing Primary Care Provider (PCP)            Significant Results and Procedures   Results for orders placed or performed during the hospital encounter of 06/06/25   XR Knee Bilateral G/E 4 Views    Narrative    Exam: 4 views of the bilateral knees dated 6/6/2025.    COMPARISON: None.    CLINICAL HISTORY: Knee pain.    FINDINGS: AP and lateral views of the bilateral knees were obtained.  The bones are well aligned. Mild joint space narrowing in the medial  femorotibial joint compartment of both knees. No large joint effusion  in either knee joint. No displaced fractures.      Impression    IMPRESSION: No acute bone abnormality in either knee.    NI VACA MD         SYSTEM ID:  [id number]   US Lower Extremity Venous Duplex Bilateral    Narrative    ULTRASOUND LOWER EXTREMITY VENOUS DUPLEX BILATERAL 6/6/2025 9:47 AM    CLINICAL HISTORY: pain      COMPARISONS: None available.    REFERRING PROVIDER: JESSICA BAUER    TECHNIQUE: Grayscale, color Doppler, Doppler waveform ultrasound  evaluation was performed through the bilateral common femoral,  femoral, and popliteal veins. Bilateral posterior tibial and peroneal  veins were evaluated with grayscale imaging and compression.    FINDINGS: Right common femoral, femoral, and popliteal veins are fully  compressible with phasic Doppler waveforms.    Right posterior tibial veins are fully compressible.    Right peroneal veins are fully compressible.    Left common femoral, femoral, and popliteal veins are fully  compressible with phasic Doppler waveforms.    Left posterior tibial veins are fully compressible.    Left peroneal veins are fully compressible.      Impression    IMPRESSION: No deep venous thrombosis demonstrated in either leg.    Reference: \"Duplex Ultrasound in the Diagnosis of " "Lower-Extremity Deep  Venous Thrombosis\"- Abena Xavier MD, S; Piyush Dickson MD  (Circulation. 2014;129:917-921. http://circ.ahajournals.org)    I have personally reviewed the examination and initial interpretation  and I agree with the findings.    MANOJ BANG MD         SYSTEM ID:  [id number]       Discharge Medications   Discharge Medication List as of 6/11/2025 12:11 PM        START taking these medications    Details   oxyCODONE IR (ROXICODONE) 10 MG tablet Take 1 tablet (10 mg) by mouth every 4 hours as needed for severe pain., Disp-18 tablet, R-0, E-Prescribe           CONTINUE these medications which have CHANGED    Details   acetaminophen (TYLENOL) 500 MG tablet Take 2 tablets (1,000 mg) by mouth every 6 hours as needed for other (Headache). Max acetaminophen dose: 4000mg in 24 hrs., Disp-120 tablet, R-0, E-Prescribe      amLODIPine (NORVASC) 10 MG tablet Take 1 tablet (10 mg) by mouth at bedtime., Disp-60 tablet, R-1, E-Prescribe      aspirin 81 MG EC tablet Take 1 tablet (81 mg) by mouth daily., Disp-60 tablet, R-1, E-Prescribe      calcium carbonate-vitamin D (CALTRATE) 600-10 MG-MCG per tablet Take 1 tablet by mouth 2 times daily (with meals)., Disp-180 tablet, R-1, E-Prescribe      divalproex sodium delayed-release (DEPAKOTE) 500 MG DR tablet Take 1 tablet (500 mg) by mouth 2 times daily., Disp-120 tablet, R-1, E-Prescribe      gabapentin (NEURONTIN) 300 MG capsule Take 1 capsule (300 mg) by mouth 3 times daily., Disp-90 capsule, R-1, E-Prescribe      !! insulin aspart (NOVOLOG FLEXPEN) 100 UNIT/ML pen Take 10 units before average meals that contain carbohydrates  OR Take 5 units before small meals or snacks that contain carbohydrates, Disp-15 mL, R-3, E-Prescribe      !! insulin aspart (NOVOLOG FLEXPEN) 100 UNIT/ML pen Blood Glucose Insulin Novolog Before Meals: Less than 140 0 units, 140 -164 1 units, 165 -189 2 units, 190 - 214 3 units, 215 - 239 4 units,  240 - 264 5 units, 265 - 289 6 " units, 290 - 314 7 units, 315 - 339 8 units, 340 - 364 9 units, 365 or more 10 un its, Disp-15 mL, R-3, E-Prescribe      insulin  UNIT/ML injection Inject 16 UNITS subcutaneously every morning for 45 days. Please only give with Prednisone 40 mg or greater. Do not give if prednisone is held, Disp-15 mL, R-1, E-Prescribe      losartan (COZAAR) 100 MG tablet Take 1 tablet (100 mg) by mouth daily., Disp-60 tablet, R-1, E-Prescribe      magnesium gluconate (MAGONATE) 500 MG tablet Take 1 tablet (500 mg) by mouth daily., Disp-90 tablet, R-2, E-Prescribe      metFORMIN (GLUCOPHAGE) 500 MG tablet Take 1 tablet (500 mg) by mouth 2 times daily (with meals)., Disp-60 tablet, R-1, E-Prescribe      pantoprazole (PROTONIX) 40 MG EC tablet Take 1 tablet (40 mg) by mouth daily., Disp-60 tablet, R-0, E-Prescribe      !! predniSONE (DELTASONE) 10 MG tablet Take 6 tablets (60 mg) by mouth daily for 20 days, THEN 5 tablets (50 mg) daily for 7 days, THEN 4 tablets (40 mg) daily for 7 days, THEN 3 tablets (30 mg) daily for 7 days, THEN 2 tablets (20 mg) daily for 7 days, THEN 1 tablet (10 mg) daily for 7 days. , Disp-225 tablet, R-0, E-Prescribe      !! predniSONE (DELTASONE) 10 MG tablet Take 1 tablet (10 mg) by mouth daily. Continue until discontinued by neurologist., Disp-60 tablet, R-1, E-Prescribe      propranolol ER (INDERAL LA) 60 MG 24 hr capsule Take 1 capsule (60 mg) by mouth daily., Disp-60 capsule, R-1, E-Prescribe      sertraline (ZOLOFT) 50 MG tablet Take 1.5 tablets (75 mg) by mouth daily., Disp-90 tablet, R-1, E-Prescribe      sulfamethoxazole-trimethoprim (BACTRIM DS) 800-160 MG tablet Take 1 tablet by mouth Every Mon, Wed, Fri Morning. Take 1 tablet by mouth every Mon, Wed and Fri until 8/10, Disp-30 tablet, R-0, E-Prescribe       !! - Potential duplicate medications found. Please discuss with provider.        CONTINUE these medications which have NOT CHANGED    Details   Alcohol Swabs PADS Use to swab the area  of the injection or rachele as directed Per insurance coverage, Disp-200 each, R-1, E-Prescribe      blood glucose (NO BRAND SPECIFIED) lancets standard To use to test glucose level in the blood Use to test blood sugar  3  times daily as directed. To accompany glucose monitor brands per insurance coverage.Disp-200 each, [id number]      blood glucose (NO BRAND SPECIFIED) test strip To use to test glucose level in the blood Use to test blood sugar  3 times daily as directed. To accompany glucose monitor brands per insurance coverage., Disp-200 strip, R-1, E-Prescribe      blood glucose monitoring (NO BRAND SPECIFIED) meter device kit Use as directed Per insurance coverageDisp-1 kit, [id number]      insulin pen needle (32G X 4 MM) 32G X 4 MM miscellaneous Use as directed by provider Per insurance coverageDisp-200 each, [id number]      Sharps Container MISC Use as directed to dispose of needles, lancets and other sharps, Disp-1 each, R-1, E-Prescribe           Allergies   Allergies   Allergen Reactions    Nsaids Other (See Comments), Nausea and Vomiting and GI Disturbance     History of GI bleeding and ulcers    Codeine Sulfate GI Disturbance

## 2025-06-11 NOTE — PROGRESS NOTES
Shift 5867-2660:Admitted with severe pain in both legs,worse in the right leg  History of recent ischemic stroke which occurred after a heart catheter procedure Has had paralysis in her left arm and double vision since the procedure  Summary:Awaiting TCU placement Needs PT/OT/SW  VS:       Pt A/O X 4. Afebrile. VSS. Lungs- Clear bilaterally  Denies nausea, shortness of breath, and chest pain.     Output:       Bowels- + in all four quadrants. Voids spontaneously without   difficulty in the bathroom.      Activity:       Pt up with to the bathroom and independent with cares     Skin:   Scattered bruising .     Pain:       Has pain in the bilateral knees and given Oxycodone x 1,.      CMS:       CMS and Neuro's are intact. Denies numbness and tingling in all extremities.      Dressing:     No incisions or dressings.      Diet:       Pt is on a Regular diet and appetite was good this shift.       LDA:       PIV is patent in the Right Arm and SL.      Equipment:       Refused  PCD's on BLE's. Bilateral heels are elevated off the bed.     Plan:       Pt is able to make needs known and the call light is within the pt's reach. Continue to monitor.       Additional Info:        .

## 2025-06-11 NOTE — PROGRESS NOTES
Care Management Discharge Note    Discharge Date: 06/11/2025       Discharge Disposition: Skilled Nursing Facility, Transitional Care    [facility]  [address] [place] [address]  Main Number: [phone]  [facility] Liaison - Tiffany Garcia  P: [phone]  F: [phone]  Email: [email or web address]      Discharge Services: None    Discharge DME: None    Discharge Transportation: health plan transportation    Private pay costs discussed: Not applicable    Does the patient's insurance plan have a 3 day qualifying hospital stay waiver?  Yes     Which insurance plan 3 day waiver is available? Alternative insurance waiver    Will the waiver be used for post-acute placement? No    PAS Confirmation Code:  [id number]  Patient/family educated on Medicare website which has current facility and service quality ratings: no    Education Provided on the Discharge Plan: Yes  Persons Notified of Discharge Plans: bedside nurse, charge nurse, patient, provider, facility liaison  Patient/Family in Agreement with the Plan: yes    Handoff Referral Completed: Yes, RUSTY PCP: Internal handoff referral completed    Additional Information:    Met with patient at bedside. Patient agreeable to go TCU today. Patient agrees that she can travel independently in a cab.    KULWANT completed PAS, see above.    KULWANT asked liaison if ride time of 3pm would be okay, she confirmed yes.    KULWANT called [employer]: [phone] to schedule transportation. Ride scheduled for  at 3pm with [employer].     11:59 AM  Orders sent to facility    1:43 PM  Hard script faxed to facility. Given to bedside RN to place in TCU packet.    3:23 PM  Updated by bedside RN that the cab never came. KULWANT ordered cab through [employer] Confirmation # [id number]         AMEE VanceW, LSW  8 Med Surg   [facility]  Phone: [phone]  Vocera: Searchable by name or  group: 8 Med Surg Vocera

## 2025-06-12 ENCOUNTER — DOCUMENTATION ONLY (OUTPATIENT)
Dept: FAMILY MEDICINE | Facility: CLINIC | Age: 65
End: 2025-06-12
Payer: COMMERCIAL

## 2025-06-12 NOTE — PROGRESS NOTES
"When opening a documentation only encounter, be sure to enter in \"Chief Complaint\" Forms and in \" Comments\" Title of form, description if needed.    Amy is a 65 year old  female  Form received via: Fax  Form now resides in: Provider Ann Marie Farah MA              "

## 2025-06-13 ENCOUNTER — PATIENT OUTREACH (OUTPATIENT)
Dept: NEUROLOGY | Facility: CLINIC | Age: 65
End: 2025-06-13
Payer: COMMERCIAL

## 2025-06-13 NOTE — PROGRESS NOTES
Stroke RN Care Coordination - Unable to Reach / Voicemail Note     Stroke RN Care Coordinator Outreach:  Clinic Care Coordination - Initial (Stroke RN Outreach)       Outreach attempted x 1.      Left message on TCU SW's voicemail with call back information and requested return call.    Stroke RN Care Coordinator will try to reach TCU SW again in 3-5 business days.      Jaci LUBIN, RN, SCRN  RN Stroke Neurology Care Coordinator  [facility] Neuroscience Service Line

## 2025-06-20 ENCOUNTER — MEDICAL CORRESPONDENCE (OUTPATIENT)
Dept: HEALTH INFORMATION MANAGEMENT | Facility: CLINIC | Age: 65
End: 2025-06-20
Payer: COMMERCIAL

## 2025-06-20 NOTE — PROGRESS NOTES
Stroke RN Care Coordination - Unable to Reach / Voicemail Note     Stroke RN Care Coordinator Outreach:  Clinic Care Coordination - Initial (Stroke RN Outreach)       Outreach attempted x 2.  Initially spoke with TCU HUC who was able to scheduled stroke follow up appt for 8/18 w/ Dr. Puente.     Left message on TCU SW's voicemail with call back information and requested return call.    Stroke RN Care Coordinator will try to reach SW again in 3-5 business days.      Jaci Abreu BS, RN, SCRN  RN Stroke Neurology Care Coordinator  [facility] Neuroscience Service Line

## 2025-06-23 ENCOUNTER — TRANSCRIBE ORDERS (OUTPATIENT)
Dept: OTHER | Age: 65
End: 2025-06-23

## 2025-06-23 DIAGNOSIS — I69.354 HEMIPLEGIA AND HEMIPARESIS FOLLOWING CEREBRAL INFARCTION AFFECTING LEFT NON-DOMINANT SIDE (H): Primary | ICD-10-CM

## 2025-06-24 NOTE — PROGRESS NOTES
Received return VM from TCU SW that pt does not have definitive discharge plans at this time. She will call me once a date/plan is determined.     RN will check back in with TCU SW again in 4 weeks if she does not contact me sooner.       Jaci Abreu BS, RN, SCRN  Stroke/Neurovascular Clinic RN  [facility] Neuroscience Service Line

## 2025-07-10 NOTE — CONFIDENTIAL NOTE
NEUROLOGY - PRE VISIT PLANNING             Referring Provider Reason for Referral Office Visit Notes   Damir Puente MD - [facility] Ischemic stroke (H)  5/21/2025 - 5/28/2025 - ED        IMAGING/NOTES/SCANS Status/ Location  DATE   MRI/MRA(HEAD, NECK, SPINE) Internal   External - [facility] - PACS 5/20/2025,  5/16/2025, 5/7/2025 5/03/2025    CT/CTA   Internal   External  - [facility] - PACS 5/16/2025, 5/15/2025,  5/6/2025 5/02/2025    EMG N/A    EEG Internal 5/7/25 2-12 HRS, 5/8/25 12-26 HRS  5/9/2025 2-12 HRS    LABS Internal, External    Neuropsychological testing  N/A    Additional Testing/Notes N/A      Does patient have C2C?  Year last updated Action     YES   [x]   2021   Please update at appointment if outdated more than 5 years       NO     []   N/A   Please complete C2C at appointment

## 2025-08-07 ENCOUNTER — OFFICE VISIT (OUTPATIENT)
Dept: OPHTHALMOLOGY | Facility: CLINIC | Age: 65
End: 2025-08-07
Payer: COMMERCIAL

## 2025-08-07 DIAGNOSIS — H53.453 ABNORMAL PERIPHERAL VISION OF BOTH EYES: ICD-10-CM

## 2025-08-07 DIAGNOSIS — I69.30 HISTORY OF CVA WITH RESIDUAL DEFICIT: Primary | ICD-10-CM

## 2025-08-07 DIAGNOSIS — H52.4 HYPEROPIA WITH PRESBYOPIA OF BOTH EYES: ICD-10-CM

## 2025-08-07 DIAGNOSIS — H52.03 HYPEROPIA WITH PRESBYOPIA OF BOTH EYES: ICD-10-CM

## 2025-08-07 PROCEDURE — 92015 DETERMINE REFRACTIVE STATE: CPT

## 2025-08-07 PROCEDURE — 92081 LIMITED VISUAL FIELD XM: CPT

## 2025-08-07 PROCEDURE — G0463 HOSPITAL OUTPT CLINIC VISIT: HCPCS

## 2025-08-07 ASSESSMENT — REFRACTION_WEARINGRX
OS_CYLINDER: SPHERE
OS_SPHERE: +3.25
OD_CYLINDER: SPHERE
SPECS_TYPE: READERS
OD_SPHERE: +3.25

## 2025-08-07 ASSESSMENT — VISUAL ACUITY
METHOD: SNELLEN - LINEAR
OS_SC: 20/50
OD_SC: 20/50
OS_SC+: +2
OD_SC+: +2

## 2025-08-07 ASSESSMENT — REFRACTION_MANIFEST
OS_SPHERE: +0.50
OS_CYLINDER: +1.50
OD_ADD: +2.50
OS_AXIS: 015
OD_CYLINDER: SPHERE
OS_ADD: +2.50
OD_SPHERE: +1.50

## 2025-08-07 ASSESSMENT — CONF VISUAL FIELD
OD_SUPERIOR_TEMPORAL_RESTRICTION: 0
OD_INFERIOR_TEMPORAL_RESTRICTION: 0
OD_NORMAL: 1
OD_INFERIOR_NASAL_RESTRICTION: 0
METHOD: COUNTING FINGERS
OS_INFERIOR_TEMPORAL_RESTRICTION: 3
OS_SUPERIOR_TEMPORAL_RESTRICTION: 3
OD_SUPERIOR_NASAL_RESTRICTION: 0

## 2025-08-07 ASSESSMENT — SLIT LAMP EXAM - LIDS
COMMENTS: NORMAL
COMMENTS: NORMAL

## 2025-08-07 ASSESSMENT — CUP TO DISC RATIO
OS_RATIO: 0.4
OD_RATIO: 0.3

## 2025-08-07 ASSESSMENT — TONOMETRY
IOP_METHOD: ICARE
OS_IOP_MMHG: 23
OD_IOP_MMHG: 22

## 2025-08-16 ENCOUNTER — HEALTH MAINTENANCE LETTER (OUTPATIENT)
Age: 65
End: 2025-08-16

## 2025-08-18 ENCOUNTER — OFFICE VISIT (OUTPATIENT)
Dept: NEUROLOGY | Facility: CLINIC | Age: 65
End: 2025-08-18
Payer: COMMERCIAL

## 2025-08-18 ENCOUNTER — PRE VISIT (OUTPATIENT)
Dept: NEUROLOGY | Facility: CLINIC | Age: 65
End: 2025-08-18

## 2025-08-18 VITALS — OXYGEN SATURATION: 95 % | SYSTOLIC BLOOD PRESSURE: 90 MMHG | DIASTOLIC BLOOD PRESSURE: 57 MMHG | HEART RATE: 71 BPM

## 2025-08-18 DIAGNOSIS — E11.9 TYPE 2 DIABETES MELLITUS WITHOUT COMPLICATION, WITHOUT LONG-TERM CURRENT USE OF INSULIN (H): ICD-10-CM

## 2025-08-18 DIAGNOSIS — I63.9 ISCHEMIC STROKE (H): ICD-10-CM

## 2025-08-18 DIAGNOSIS — E85.4 CEREBRAL AMYLOID ANGIOPATHY (H): Primary | ICD-10-CM

## 2025-08-18 DIAGNOSIS — E78.5 HYPERLIPIDEMIA LDL GOAL <70: ICD-10-CM

## 2025-08-18 DIAGNOSIS — I10 ESSENTIAL HYPERTENSION: ICD-10-CM

## 2025-08-18 DIAGNOSIS — I68.0 CEREBRAL AMYLOID ANGIOPATHY (H): Primary | ICD-10-CM

## 2025-08-18 PROCEDURE — 3078F DIAST BP <80 MM HG: CPT | Performed by: STUDENT IN AN ORGANIZED HEALTH CARE EDUCATION/TRAINING PROGRAM

## 2025-08-18 PROCEDURE — 99417 PROLNG OP E/M EACH 15 MIN: CPT | Performed by: STUDENT IN AN ORGANIZED HEALTH CARE EDUCATION/TRAINING PROGRAM

## 2025-08-18 PROCEDURE — 3074F SYST BP LT 130 MM HG: CPT | Performed by: STUDENT IN AN ORGANIZED HEALTH CARE EDUCATION/TRAINING PROGRAM

## 2025-08-18 PROCEDURE — 99215 OFFICE O/P EST HI 40 MIN: CPT | Performed by: STUDENT IN AN ORGANIZED HEALTH CARE EDUCATION/TRAINING PROGRAM

## 2025-08-18 RX ORDER — AMLODIPINE BESYLATE 10 MG/1
5 TABLET ORAL AT BEDTIME
Qty: 60 TABLET | Refills: 1 | Status: SHIPPED | OUTPATIENT
Start: 2025-08-18 | End: 2025-08-18

## 2025-08-20 ENCOUNTER — PATIENT OUTREACH (OUTPATIENT)
Dept: NEUROLOGY | Facility: CLINIC | Age: 65
End: 2025-08-20
Payer: COMMERCIAL

## 2025-08-25 ENCOUNTER — ANCILLARY PROCEDURE (OUTPATIENT)
Dept: MRI IMAGING | Facility: CLINIC | Age: 65
End: 2025-08-25
Attending: STUDENT IN AN ORGANIZED HEALTH CARE EDUCATION/TRAINING PROGRAM
Payer: COMMERCIAL

## 2025-08-25 ENCOUNTER — APPOINTMENT (OUTPATIENT)
Dept: CT IMAGING | Facility: CLINIC | Age: 65
DRG: 545 | End: 2025-08-25
Attending: EMERGENCY MEDICINE
Payer: COMMERCIAL

## 2025-08-25 ENCOUNTER — MEDICAL CORRESPONDENCE (OUTPATIENT)
Dept: HEALTH INFORMATION MANAGEMENT | Facility: CLINIC | Age: 65
End: 2025-08-25

## 2025-08-25 ENCOUNTER — HOSPITAL ENCOUNTER (INPATIENT)
Facility: CLINIC | Age: 65
DRG: 545 | End: 2025-08-25
Attending: EMERGENCY MEDICINE | Admitting: STUDENT IN AN ORGANIZED HEALTH CARE EDUCATION/TRAINING PROGRAM
Payer: COMMERCIAL

## 2025-08-25 DIAGNOSIS — E85.4 CEREBRAL AMYLOID ANGIOPATHY (H): ICD-10-CM

## 2025-08-25 DIAGNOSIS — I68.0 CEREBRAL AMYLOID ANGIOPATHY (H): ICD-10-CM

## 2025-08-25 PROBLEM — I63.30 CEREBROVASCULAR ACCIDENT (CVA) DUE TO THROMBOSIS OF CEREBRAL ARTERY (H): Status: ACTIVE | Noted: 2025-08-25

## 2025-08-25 PROCEDURE — 255N000002 HC RX 255 OP 636: Performed by: STUDENT IN AN ORGANIZED HEALTH CARE EDUCATION/TRAINING PROGRAM

## 2025-08-25 PROCEDURE — 70553 MRI BRAIN STEM W/O & W/DYE: CPT

## 2025-08-25 PROCEDURE — 0042T CT HEAD PERFUSION W CONTRAST: CPT

## 2025-08-25 PROCEDURE — A9585 GADOBUTROL INJECTION: HCPCS | Performed by: STUDENT IN AN ORGANIZED HEALTH CARE EDUCATION/TRAINING PROGRAM

## 2025-08-25 PROCEDURE — 70496 CT ANGIOGRAPHY HEAD: CPT

## 2025-08-25 PROCEDURE — 70450 CT HEAD/BRAIN W/O DYE: CPT

## 2025-08-25 RX ORDER — GADOBUTROL 604.72 MG/ML
10 INJECTION INTRAVENOUS ONCE
Status: COMPLETED | OUTPATIENT
Start: 2025-08-25 | End: 2025-08-25

## 2025-08-25 RX ADMIN — GADOBUTROL 10 ML: 604.72 INJECTION INTRAVENOUS at 12:18

## 2025-08-25 ASSESSMENT — ACTIVITIES OF DAILY LIVING (ADL)
ADLS_ACUITY_SCORE: 58
ADLS_ACUITY_SCORE: 61
ADLS_ACUITY_SCORE: 58

## 2025-08-26 ENCOUNTER — APPOINTMENT (OUTPATIENT)
Dept: CT IMAGING | Facility: CLINIC | Age: 65
DRG: 545 | End: 2025-08-26
Attending: PHYSICIAN ASSISTANT
Payer: COMMERCIAL

## 2025-08-26 ENCOUNTER — APPOINTMENT (OUTPATIENT)
Dept: GENERAL RADIOLOGY | Facility: CLINIC | Age: 65
DRG: 545 | End: 2025-08-26
Attending: STUDENT IN AN ORGANIZED HEALTH CARE EDUCATION/TRAINING PROGRAM
Payer: COMMERCIAL

## 2025-08-26 PROCEDURE — 62328 DX LMBR SPI PNXR W/FLUOR/CT: CPT

## 2025-08-26 PROCEDURE — 70496 CT ANGIOGRAPHY HEAD: CPT

## 2025-08-26 PROCEDURE — 70450 CT HEAD/BRAIN W/O DYE: CPT

## 2025-08-26 ASSESSMENT — ACTIVITIES OF DAILY LIVING (ADL)
ADLS_ACUITY_SCORE: 61
ADLS_ACUITY_SCORE: 61
ADLS_ACUITY_SCORE: 63
ADLS_ACUITY_SCORE: 61
ADLS_ACUITY_SCORE: 63
DEPENDENT_IADLS:: CLEANING;COOKING;LAUNDRY;SHOPPING;MEAL PREPARATION;MEDICATION MANAGEMENT;MONEY MANAGEMENT;TRANSPORTATION
ADLS_ACUITY_SCORE: 63
ADLS_ACUITY_SCORE: 61
ADLS_ACUITY_SCORE: 61
ADLS_ACUITY_SCORE: 63
ADLS_ACUITY_SCORE: 41
ADLS_ACUITY_SCORE: 63
ADLS_ACUITY_SCORE: 61
ADLS_ACUITY_SCORE: 41
ADLS_ACUITY_SCORE: 61
ADLS_ACUITY_SCORE: 63
ADLS_ACUITY_SCORE: 41
ADLS_ACUITY_SCORE: 61

## 2025-08-27 ENCOUNTER — APPOINTMENT (OUTPATIENT)
Dept: CT IMAGING | Facility: CLINIC | Age: 65
DRG: 545 | End: 2025-08-27
Attending: PHYSICIAN ASSISTANT
Payer: COMMERCIAL

## 2025-08-27 ENCOUNTER — APPOINTMENT (OUTPATIENT)
Dept: PHYSICAL THERAPY | Facility: CLINIC | Age: 65
DRG: 545 | End: 2025-08-27
Attending: STUDENT IN AN ORGANIZED HEALTH CARE EDUCATION/TRAINING PROGRAM
Payer: COMMERCIAL

## 2025-08-27 ENCOUNTER — APPOINTMENT (OUTPATIENT)
Dept: OCCUPATIONAL THERAPY | Facility: CLINIC | Age: 65
DRG: 545 | End: 2025-08-27
Attending: STUDENT IN AN ORGANIZED HEALTH CARE EDUCATION/TRAINING PROGRAM
Payer: COMMERCIAL

## 2025-08-27 PROCEDURE — 70450 CT HEAD/BRAIN W/O DYE: CPT

## 2025-08-27 ASSESSMENT — ACTIVITIES OF DAILY LIVING (ADL)
ADLS_ACUITY_SCORE: 41
ADLS_ACUITY_SCORE: 41
ADLS_ACUITY_SCORE: 37
ADLS_ACUITY_SCORE: 37
ADLS_ACUITY_SCORE: 40
ADLS_ACUITY_SCORE: 38
ADLS_ACUITY_SCORE: 37
ADLS_ACUITY_SCORE: 40
ADLS_ACUITY_SCORE: 37
ADLS_ACUITY_SCORE: 41
ADLS_ACUITY_SCORE: 37
ADLS_ACUITY_SCORE: 41
ADLS_ACUITY_SCORE: 40
ADLS_ACUITY_SCORE: 37
ADLS_ACUITY_SCORE: 37
ADLS_ACUITY_SCORE: 41
ADLS_ACUITY_SCORE: 37
ADLS_ACUITY_SCORE: 41
ADLS_ACUITY_SCORE: 37
ADLS_ACUITY_SCORE: 40
ADLS_ACUITY_SCORE: 40

## 2025-08-28 ENCOUNTER — APPOINTMENT (OUTPATIENT)
Dept: PHYSICAL THERAPY | Facility: CLINIC | Age: 65
DRG: 545 | End: 2025-08-28
Payer: COMMERCIAL

## 2025-08-28 ASSESSMENT — ACTIVITIES OF DAILY LIVING (ADL)
ADLS_ACUITY_SCORE: 37
ADLS_ACUITY_SCORE: 35
ADLS_ACUITY_SCORE: 37
ADLS_ACUITY_SCORE: 37
ADLS_ACUITY_SCORE: 35
ADLS_ACUITY_SCORE: 37
ADLS_ACUITY_SCORE: 35
ADLS_ACUITY_SCORE: 37
ADLS_ACUITY_SCORE: 35
ADLS_ACUITY_SCORE: 37
ADLS_ACUITY_SCORE: 37
ADLS_ACUITY_SCORE: 35
ADLS_ACUITY_SCORE: 35
ADLS_ACUITY_SCORE: 37
ADLS_ACUITY_SCORE: 37
ADLS_ACUITY_SCORE: 35

## 2025-08-29 ENCOUNTER — APPOINTMENT (OUTPATIENT)
Dept: PHYSICAL THERAPY | Facility: CLINIC | Age: 65
DRG: 545 | End: 2025-08-29
Payer: COMMERCIAL

## 2025-08-29 ENCOUNTER — APPOINTMENT (OUTPATIENT)
Dept: OCCUPATIONAL THERAPY | Facility: CLINIC | Age: 65
DRG: 545 | End: 2025-08-29
Payer: COMMERCIAL

## 2025-08-29 ASSESSMENT — ACTIVITIES OF DAILY LIVING (ADL)
ADLS_ACUITY_SCORE: 35
ADLS_ACUITY_SCORE: 36
ADLS_ACUITY_SCORE: 38
ADLS_ACUITY_SCORE: 38
ADLS_ACUITY_SCORE: 36
ADLS_ACUITY_SCORE: 38
ADLS_ACUITY_SCORE: 35
ADLS_ACUITY_SCORE: 39
ADLS_ACUITY_SCORE: 36
ADLS_ACUITY_SCORE: 36
ADLS_ACUITY_SCORE: 39
ADLS_ACUITY_SCORE: 36

## 2025-08-30 ASSESSMENT — ACTIVITIES OF DAILY LIVING (ADL)
ADLS_ACUITY_SCORE: 38

## 2025-08-31 ASSESSMENT — ACTIVITIES OF DAILY LIVING (ADL)
ADLS_ACUITY_SCORE: 38

## 2025-09-03 ENCOUNTER — PATIENT OUTREACH (OUTPATIENT)
Dept: CARE COORDINATION | Facility: CLINIC | Age: 65
End: 2025-09-03
Payer: COMMERCIAL

## 2025-09-03 ENCOUNTER — HOSPITAL ENCOUNTER (EMERGENCY)
Facility: CLINIC | Age: 65
Discharge: NURSING, REHAB OR LONG-TERM CARE | End: 2025-09-03
Attending: EMERGENCY MEDICINE | Admitting: EMERGENCY MEDICINE
Payer: COMMERCIAL

## 2025-09-03 VITALS
SYSTOLIC BLOOD PRESSURE: 141 MMHG | HEIGHT: 66 IN | RESPIRATION RATE: 18 BRPM | BODY MASS INDEX: 38.09 KG/M2 | HEART RATE: 62 BPM | DIASTOLIC BLOOD PRESSURE: 86 MMHG | OXYGEN SATURATION: 96 % | TEMPERATURE: 97.1 F | WEIGHT: 237 LBS

## 2025-09-03 DIAGNOSIS — E86.0 DEHYDRATION: ICD-10-CM

## 2025-09-03 DIAGNOSIS — M25.562 ACUTE PAIN OF BOTH KNEES: Primary | ICD-10-CM

## 2025-09-03 DIAGNOSIS — M25.561 ACUTE PAIN OF BOTH KNEES: Primary | ICD-10-CM

## 2025-09-03 LAB
ALBUMIN UR-MCNC: NEGATIVE MG/DL
ANION GAP SERPL CALCULATED.3IONS-SCNC: 14 MMOL/L (ref 7–15)
APPEARANCE UR: CLEAR
ATRIAL RATE - MUSE: 55 BPM
BACTERIA #/AREA URNS HPF: ABNORMAL /HPF
BASE EXCESS BLDV CALC-SCNC: 2 MMOL/L (ref -3–3)
BILIRUB UR QL STRIP: NEGATIVE
BUN SERPL-MCNC: 22.1 MG/DL (ref 8–23)
CALCIUM SERPL-MCNC: 8.3 MG/DL (ref 8.8–10.4)
CHLORIDE SERPL-SCNC: 101 MMOL/L (ref 98–107)
COLOR UR AUTO: ABNORMAL
CREAT SERPL-MCNC: 0.78 MG/DL (ref 0.51–0.95)
CRP SERPL-MCNC: <3 MG/L
D DIMER PPP FEU-MCNC: 0.5 UG/ML FEU (ref 0–0.5)
DIASTOLIC BLOOD PRESSURE - MUSE: NORMAL MMHG
EGFRCR SERPLBLD CKD-EPI 2021: 85 ML/MIN/1.73M2
ERYTHROCYTE [DISTWIDTH] IN BLOOD BY AUTOMATED COUNT: 14.8 % (ref 10–15)
GLUCOSE SERPL-MCNC: 178 MG/DL (ref 70–99)
GLUCOSE UR STRIP-MCNC: NEGATIVE MG/DL
HCO3 BLDV-SCNC: 27 MMOL/L (ref 21–28)
HCO3 SERPL-SCNC: 26 MMOL/L (ref 22–29)
HCT VFR BLD AUTO: 30.6 % (ref 35–47)
HGB BLD-MCNC: 9.5 G/DL (ref 11.7–15.7)
HGB UR QL STRIP: NEGATIVE
HOLD SPECIMEN: NORMAL
INTERPRETATION ECG - MUSE: NORMAL
KETONES UR STRIP-MCNC: NEGATIVE MG/DL
LACTATE BLD-SCNC: 3 MMOL/L (ref 0.7–2)
LACTATE SERPL-SCNC: 2.8 MMOL/L (ref 0.7–2)
LEUKOCYTE ESTERASE UR QL STRIP: NEGATIVE
MCH RBC QN AUTO: 24.1 PG (ref 26.5–33)
MCHC RBC AUTO-ENTMCNC: 31 G/DL (ref 31.5–36.5)
MCV RBC AUTO: 77.5 FL (ref 78–100)
MUCOUS THREADS #/AREA URNS LPF: PRESENT /LPF
NITRATE UR QL: NEGATIVE
P AXIS - MUSE: 69 DEGREES
PCO2 BLDV: 41 MM HG (ref 40–50)
PH BLDV: 7.43 [PH] (ref 7.32–7.43)
PH UR STRIP: 6.5 [PH] (ref 5–7)
PLATELET # BLD AUTO: 370 10E3/UL (ref 150–450)
PO2 BLDV: 46 MM HG (ref 25–47)
POTASSIUM SERPL-SCNC: 4.1 MMOL/L (ref 3.4–5.3)
PR INTERVAL - MUSE: 152 MS
QRS DURATION - MUSE: 68 MS
QT - MUSE: 458 MS
QTC - MUSE: 438 MS
R AXIS - MUSE: 2 DEGREES
RBC # BLD AUTO: 3.95 10E6/UL (ref 3.8–5.2)
RBC URINE: <1 /HPF
SAO2 % BLDV: 83 % (ref 70–75)
SODIUM SERPL-SCNC: 141 MMOL/L (ref 135–145)
SP GR UR STRIP: 1.01 (ref 1–1.03)
SQUAMOUS EPITHELIAL: 1 /HPF
SYSTOLIC BLOOD PRESSURE - MUSE: NORMAL MMHG
T AXIS - MUSE: 80 DEGREES
UROBILINOGEN UR STRIP-MCNC: NORMAL MG/DL
VENTRICULAR RATE- MUSE: 55 BPM
WBC # BLD AUTO: 12.38 10E3/UL (ref 4–11)
WBC URINE: 1 /HPF

## 2025-09-03 PROCEDURE — 86140 C-REACTIVE PROTEIN: CPT | Performed by: PHYSICIAN ASSISTANT

## 2025-09-03 PROCEDURE — 96366 THER/PROPH/DIAG IV INF ADDON: CPT

## 2025-09-03 PROCEDURE — 36415 COLL VENOUS BLD VENIPUNCTURE: CPT | Performed by: PHYSICIAN ASSISTANT

## 2025-09-03 PROCEDURE — 83605 ASSAY OF LACTIC ACID: CPT

## 2025-09-03 PROCEDURE — 99284 EMERGENCY DEPT VISIT MOD MDM: CPT | Mod: 25 | Performed by: EMERGENCY MEDICINE

## 2025-09-03 PROCEDURE — 250N000011 HC RX IP 250 OP 636: Performed by: PHYSICIAN ASSISTANT

## 2025-09-03 PROCEDURE — 250N000013 HC RX MED GY IP 250 OP 250 PS 637: Performed by: PHYSICIAN ASSISTANT

## 2025-09-03 PROCEDURE — 36415 COLL VENOUS BLD VENIPUNCTURE: CPT | Performed by: EMERGENCY MEDICINE

## 2025-09-03 PROCEDURE — 93005 ELECTROCARDIOGRAM TRACING: CPT

## 2025-09-03 PROCEDURE — 96365 THER/PROPH/DIAG IV INF INIT: CPT

## 2025-09-03 PROCEDURE — 85027 COMPLETE CBC AUTOMATED: CPT | Performed by: EMERGENCY MEDICINE

## 2025-09-03 PROCEDURE — 85379 FIBRIN DEGRADATION QUANT: CPT | Performed by: EMERGENCY MEDICINE

## 2025-09-03 PROCEDURE — 87040 BLOOD CULTURE FOR BACTERIA: CPT | Performed by: PHYSICIAN ASSISTANT

## 2025-09-03 PROCEDURE — 81001 URINALYSIS AUTO W/SCOPE: CPT | Performed by: PHYSICIAN ASSISTANT

## 2025-09-03 PROCEDURE — 258N000003 HC RX IP 258 OP 636: Performed by: PHYSICIAN ASSISTANT

## 2025-09-03 PROCEDURE — 80048 BASIC METABOLIC PNL TOTAL CA: CPT | Performed by: EMERGENCY MEDICINE

## 2025-09-03 PROCEDURE — 36415 COLL VENOUS BLD VENIPUNCTURE: CPT

## 2025-09-03 PROCEDURE — 250N000011 HC RX IP 250 OP 636: Performed by: EMERGENCY MEDICINE

## 2025-09-03 PROCEDURE — 96375 TX/PRO/DX INJ NEW DRUG ADDON: CPT

## 2025-09-03 PROCEDURE — 87040 BLOOD CULTURE FOR BACTERIA: CPT | Performed by: EMERGENCY MEDICINE

## 2025-09-03 RX ORDER — TRAMADOL HYDROCHLORIDE 50 MG/1
50 TABLET ORAL EVERY 6 HOURS PRN
Qty: 10 TABLET | Refills: 0 | Status: SHIPPED | OUTPATIENT
Start: 2025-09-03 | End: 2025-09-06

## 2025-09-03 RX ORDER — KETOROLAC TROMETHAMINE 15 MG/ML
15 INJECTION, SOLUTION INTRAMUSCULAR; INTRAVENOUS ONCE
Status: COMPLETED | OUTPATIENT
Start: 2025-09-03 | End: 2025-09-03

## 2025-09-03 RX ORDER — ACETAMINOPHEN 325 MG/1
975 TABLET ORAL ONCE
Status: COMPLETED | OUTPATIENT
Start: 2025-09-03 | End: 2025-09-03

## 2025-09-03 RX ORDER — CEFTRIAXONE 2 G/1
2 INJECTION, POWDER, FOR SOLUTION INTRAMUSCULAR; INTRAVENOUS ONCE
Status: COMPLETED | OUTPATIENT
Start: 2025-09-03 | End: 2025-09-03

## 2025-09-03 RX ADMIN — KETOROLAC TROMETHAMINE 15 MG: 15 INJECTION, SOLUTION INTRAMUSCULAR; INTRAVENOUS at 15:03

## 2025-09-03 RX ADMIN — CEFTRIAXONE SODIUM 2 G: 2 INJECTION, POWDER, FOR SOLUTION INTRAMUSCULAR; INTRAVENOUS at 14:08

## 2025-09-03 RX ADMIN — SODIUM CHLORIDE 1000 ML: 0.9 INJECTION, SOLUTION INTRAVENOUS at 14:10

## 2025-09-03 RX ADMIN — ACETAMINOPHEN 975 MG: 325 TABLET ORAL at 13:12

## 2025-09-03 ASSESSMENT — ACTIVITIES OF DAILY LIVING (ADL)
ADLS_ACUITY_SCORE: 57

## 2025-09-03 ASSESSMENT — COLUMBIA-SUICIDE SEVERITY RATING SCALE - C-SSRS
6. HAVE YOU EVER DONE ANYTHING, STARTED TO DO ANYTHING, OR PREPARED TO DO ANYTHING TO END YOUR LIFE?: NO
1. IN THE PAST MONTH, HAVE YOU WISHED YOU WERE DEAD OR WISHED YOU COULD GO TO SLEEP AND NOT WAKE UP?: NO
2. HAVE YOU ACTUALLY HAD ANY THOUGHTS OF KILLING YOURSELF IN THE PAST MONTH?: NO

## 2025-09-04 LAB
BACTERIA SPEC CULT: NORMAL
BACTERIA SPEC CULT: NORMAL

## (undated) RX ORDER — FENTANYL CITRATE 50 UG/ML
INJECTION, SOLUTION INTRAMUSCULAR; INTRAVENOUS
Status: DISPENSED
Start: 2025-05-16

## (undated) RX ORDER — LIDOCAINE HYDROCHLORIDE 10 MG/ML
INJECTION, SOLUTION EPIDURAL; INFILTRATION; INTRACAUDAL; PERINEURAL
Status: DISPENSED
Start: 2025-05-09

## (undated) RX ORDER — LIDOCAINE HYDROCHLORIDE 10 MG/ML
INJECTION, SOLUTION EPIDURAL; INFILTRATION; INTRACAUDAL; PERINEURAL
Status: DISPENSED
Start: 2025-05-12

## (undated) RX ORDER — HEPARIN SODIUM 1000 [USP'U]/ML
INJECTION, SOLUTION INTRAVENOUS; SUBCUTANEOUS
Status: DISPENSED
Start: 2025-05-16

## (undated) RX ORDER — LIDOCAINE HYDROCHLORIDE 10 MG/ML
INJECTION, SOLUTION EPIDURAL; INFILTRATION; INTRACAUDAL; PERINEURAL
Status: DISPENSED
Start: 2025-05-16